# Patient Record
Sex: FEMALE | Race: WHITE | NOT HISPANIC OR LATINO | Employment: OTHER | URBAN - METROPOLITAN AREA
[De-identification: names, ages, dates, MRNs, and addresses within clinical notes are randomized per-mention and may not be internally consistent; named-entity substitution may affect disease eponyms.]

---

## 2017-03-14 ENCOUNTER — OFFICE VISIT (OUTPATIENT)
Dept: INTERNAL MEDICINE | Facility: CLINIC | Age: 82
End: 2017-03-14

## 2017-03-14 VITALS
HEART RATE: 72 BPM | BODY MASS INDEX: 30.05 KG/M2 | HEIGHT: 64 IN | WEIGHT: 176 LBS | SYSTOLIC BLOOD PRESSURE: 160 MMHG | DIASTOLIC BLOOD PRESSURE: 66 MMHG

## 2017-03-14 DIAGNOSIS — E11.9 TYPE 2 DIABETES MELLITUS WITHOUT COMPLICATION, WITHOUT LONG-TERM CURRENT USE OF INSULIN (HCC): ICD-10-CM

## 2017-03-14 DIAGNOSIS — N64.89 BREAST ASYMMETRY: ICD-10-CM

## 2017-03-14 DIAGNOSIS — I10 BENIGN ESSENTIAL HYPERTENSION: ICD-10-CM

## 2017-03-14 DIAGNOSIS — I67.82 TEMPORARY CEREBRAL VASCULAR DYSFUNCTION: Primary | ICD-10-CM

## 2017-03-14 DIAGNOSIS — E78.00 HYPERCHOLESTEROLEMIA: Primary | ICD-10-CM

## 2017-03-14 PROCEDURE — 99213 OFFICE O/P EST LOW 20 MIN: CPT | Performed by: INTERNAL MEDICINE

## 2017-03-14 RX ORDER — LOVASTATIN 40 MG/1
TABLET ORAL
Qty: 180 TABLET | Refills: 1 | Status: CANCELLED | OUTPATIENT
Start: 2017-03-14

## 2017-03-14 RX ORDER — LOVASTATIN 40 MG/1
40 TABLET ORAL NIGHTLY
Qty: 90 TABLET | Refills: 3 | Status: SHIPPED | OUTPATIENT
Start: 2017-03-14 | End: 2017-11-22

## 2017-03-14 NOTE — PROGRESS NOTES
Subjective   Monica Scherer is a 83 y.o. female.     History of Present Illness she is here today for follow-up of diabetes mellitus and hypertension.  She never received the prescription for hydrochlorothiazide and thus she is just taking Plendil in the morning for her hypertension.  She has had 4 days of cough without sputum production.  He began with sore throat.  There have been no fever sweats or chills.  She denied any wheezing or dyspnea.  There has been no nausea vomiting or diarrhea.  In general she has done well otherwise.  She denies any dyspnea on exertion or PND.  There has been no chest pain.  She denied any focal neurologic symptoms.  She is scheduled for her ophthalmology exam today.    Review of Systems   Constitutional: Negative for activity change, appetite change, chills, diaphoresis, fatigue and fever.   Respiratory: Positive for cough. Negative for chest tightness, shortness of breath and wheezing.    Cardiovascular: Negative for chest pain, palpitations and leg swelling.   Gastrointestinal: Negative for abdominal pain, diarrhea, nausea and vomiting.   Genitourinary: Negative for flank pain and hematuria.   Musculoskeletal: Negative for arthralgias and myalgias.   Neurological: Negative for dizziness, syncope, facial asymmetry, speech difficulty, weakness, numbness and headaches.   Psychiatric/Behavioral: Negative for dysphoric mood. The patient is not nervous/anxious.        Objective   Physical Exam   Constitutional: She is oriented to person, place, and time. She appears well-developed and well-nourished. She is active. She does not appear ill.   Eyes: Conjunctivae are normal.   Neck: Carotid bruit is not present.   Cardiovascular: Normal rate, regular rhythm, S1 normal and S2 normal.  Exam reveals no S3 and no S4.    No murmur heard.  Pulses:       Posterior tibial pulses are 2+ on the right side, and 2+ on the left side.   Pulmonary/Chest: No tachypnea. No respiratory distress. She has  no decreased breath sounds. She has no wheezes. She has no rhonchi. She has no rales.   Abdominal: Soft. Normal appearance and bowel sounds are normal. She exhibits no abdominal bruit and no mass. There is no hepatosplenomegaly. There is no tenderness.       Vascular Status -  Her exam exhibits no right foot edema. Her exam exhibits no left foot edema.  Neurological: She is alert and oriented to person, place, and time. Gait normal.   Psychiatric: She has a normal mood and affect. Her speech is normal and behavior is normal. Judgment and thought content normal. Cognition and memory are normal.       Assessment/Plan  November lab showed a hemoglobin A1c of 6.3.  Total cholesterol was 189 triglycerides 117 HDL cholesterol 53 LDL cholesterol 113    Assessment #1 hypertension-still inadequate control of systolic #2 diabetes mellitus-good control with diet #3 cerebral vascular disease-without recurrent symptoms    Plan #1 begin hydrochlorothiazide 12.5 mg by mouth daily.  Routine follow-up with me in 6 months.

## 2017-06-18 DIAGNOSIS — I10 ESSENTIAL HYPERTENSION: ICD-10-CM

## 2017-06-19 RX ORDER — FELODIPINE 10 MG/1
TABLET, EXTENDED RELEASE ORAL
Qty: 90 TABLET | Refills: 0 | Status: SHIPPED | OUTPATIENT
Start: 2017-06-19 | End: 2017-09-16 | Stop reason: SDUPTHER

## 2017-09-16 DIAGNOSIS — I10 ESSENTIAL HYPERTENSION: ICD-10-CM

## 2017-09-18 RX ORDER — FELODIPINE 10 MG/1
TABLET, EXTENDED RELEASE ORAL
Qty: 90 TABLET | Refills: 3 | Status: SHIPPED | OUTPATIENT
Start: 2017-09-18 | End: 2018-11-13 | Stop reason: SDUPTHER

## 2017-11-22 ENCOUNTER — OFFICE VISIT (OUTPATIENT)
Dept: INTERNAL MEDICINE | Facility: CLINIC | Age: 82
End: 2017-11-22

## 2017-11-22 VITALS
HEIGHT: 64 IN | HEART RATE: 71 BPM | DIASTOLIC BLOOD PRESSURE: 60 MMHG | OXYGEN SATURATION: 91 % | SYSTOLIC BLOOD PRESSURE: 130 MMHG | WEIGHT: 180 LBS | BODY MASS INDEX: 30.73 KG/M2

## 2017-11-22 DIAGNOSIS — E11.9 TYPE 2 DIABETES MELLITUS WITHOUT COMPLICATION, WITHOUT LONG-TERM CURRENT USE OF INSULIN (HCC): ICD-10-CM

## 2017-11-22 DIAGNOSIS — I67.82 TEMPORARY CEREBRAL VASCULAR DYSFUNCTION: ICD-10-CM

## 2017-11-22 DIAGNOSIS — I10 BENIGN ESSENTIAL HYPERTENSION: Primary | ICD-10-CM

## 2017-11-22 LAB
ALBUMIN SERPL-MCNC: 4.4 G/DL (ref 3.5–5.2)
ALBUMIN/GLOB SERPL: 1.4 G/DL
ALP SERPL-CCNC: 99 U/L (ref 39–117)
ALT SERPL-CCNC: 13 U/L (ref 1–33)
APPEARANCE UR: ABNORMAL
AST SERPL-CCNC: 17 U/L (ref 1–32)
BASOPHILS # BLD AUTO: 0.03 10*3/MM3 (ref 0–0.2)
BASOPHILS NFR BLD AUTO: 0.3 % (ref 0–1.5)
BILIRUB SERPL-MCNC: 0.5 MG/DL (ref 0.1–1.2)
BILIRUB UR QL STRIP: NEGATIVE
BUN SERPL-MCNC: 24 MG/DL (ref 8–23)
BUN/CREAT SERPL: 25.8 (ref 7–25)
CALCIUM SERPL-MCNC: 9.5 MG/DL (ref 8.6–10.5)
CHLORIDE SERPL-SCNC: 101 MMOL/L (ref 98–107)
CO2 SERPL-SCNC: 23.3 MMOL/L (ref 22–29)
COLOR UR: YELLOW
CREAT SERPL-MCNC: 0.93 MG/DL (ref 0.57–1)
EOSINOPHIL # BLD AUTO: 0.13 10*3/MM3 (ref 0–0.7)
EOSINOPHIL # BLD AUTO: 1.1 % (ref 0.3–6.2)
ERYTHROCYTE [DISTWIDTH] IN BLOOD BY AUTOMATED COUNT: 15.4 % (ref 11.7–13)
GLOBULIN SER CALC-MCNC: 3.2 GM/DL
GLUCOSE SERPL-MCNC: 109 MG/DL (ref 65–99)
GLUCOSE UR QL: NEGATIVE
HBA1C MFR BLD: 5.87 % (ref 4.8–5.6)
HCT VFR BLD AUTO: 41.1 % (ref 35.6–45.5)
HGB BLD-MCNC: 13.1 G/DL (ref 11.9–15.5)
HGB UR QL STRIP: NEGATIVE
IMM GRANULOCYTES # BLD: 0.06 10*3/MM3 (ref 0–0.03)
IMM GRANULOCYTES NFR BLD: 0.5 % (ref 0–0.5)
KETONES UR QL STRIP: NEGATIVE
LEUKOCYTE ESTERASE UR QL STRIP: NEGATIVE
LYMPHOCYTES # BLD AUTO: 3.22 10*3/MM3 (ref 0.9–4.8)
LYMPHOCYTES NFR BLD AUTO: 27 % (ref 19.6–45.3)
MCH RBC QN AUTO: 30.7 PG (ref 26.9–32)
MCHC RBC AUTO-ENTMCNC: 31.9 G/DL (ref 32.4–36.3)
MCV RBC AUTO: 96.3 FL (ref 80.5–98.2)
MONOCYTES # BLD AUTO: 0.7 10*3/MM3 (ref 0.2–1.2)
MONOCYTES NFR BLD AUTO: 5.9 % (ref 5–12)
NEUTROPHILS # BLD AUTO: 7.78 10*3/MM3 (ref 1.9–8.1)
NEUTROPHILS NFR BLD AUTO: 65.2 % (ref 42.7–76)
NITRITE UR QL STRIP: NEGATIVE
PH UR STRIP.AUTO: 5.5 [PH] (ref 5–8)
PLATELET # BLD AUTO: 277 10*3/MM3 (ref 140–500)
POTASSIUM SERPL-SCNC: 3.9 MMOL/L (ref 3.5–5.2)
PROT SERPL-MCNC: 7.6 G/DL (ref 6–8.5)
PROTEIN: NEGATIVE
RBC # BLD AUTO: 4.27 10*6/MM3 (ref 3.9–5.2)
SODIUM SERPL-SCNC: 141 MMOL/L (ref 136–145)
SP GR UR: 1.02 (ref 1–1.03)
UROBILINOGEN UR STRIP-MCNC: ABNORMAL MG/DL
WBC # BLD AUTO: 11.92 10*3/MM3 (ref 4.5–10.7)

## 2017-11-22 PROCEDURE — 99214 OFFICE O/P EST MOD 30 MIN: CPT | Performed by: INTERNAL MEDICINE

## 2017-11-22 NOTE — PROGRESS NOTES
Subjective   Monica Scherer is a 84 y.o. female.     History of Present Illness she is here today for yearly follow-up of hypertension, hypercholesterolemia, diabetes mellitus, and history of CVA.  She continues to do very well.  She is not taking lovastatin however for her hypercholesterolemia and her only medication for hypertension is Plendil.  She denies any dizziness or focal neurologic episodes.  She has not had any dyspnea on exertion, chest pain, PND, or swelling in the ankles.  She has one per night nocturia.  Otherwise her review systems is negative.  She had a normal mammogram in January of this year.  She has already received flu vaccination for this season.        Review of Systems   Constitutional: Negative for activity change, appetite change, fatigue and unexpected weight change.   HENT: Negative for trouble swallowing.    Respiratory: Negative for cough, chest tightness, shortness of breath and wheezing.    Cardiovascular: Negative for chest pain, palpitations and leg swelling.   Gastrointestinal: Negative for abdominal pain, anal bleeding, blood in stool, constipation, diarrhea, nausea and vomiting.   Genitourinary: Negative for dysuria, flank pain and frequency.   Musculoskeletal: Negative for gait problem.   Neurological: Negative for dizziness, syncope, facial asymmetry, speech difficulty, weakness, numbness and headaches.   Psychiatric/Behavioral: Negative for dysphoric mood. The patient is not nervous/anxious.        Objective   Physical Exam   Constitutional: She is oriented to person, place, and time. Vital signs are normal. She appears well-developed and well-nourished. She is active. She does not appear ill.   Eyes: Conjunctivae are normal.   Fundoscopic exam:       The right eye shows no AV nicking, no exudate and no hemorrhage.        The left eye shows no AV nicking, no exudate and no hemorrhage.   Neck: Carotid bruit is not present. No thyroid mass and no thyromegaly present.    Cardiovascular: Normal rate, regular rhythm, S1 normal and S2 normal.  Exam reveals no S3 and no S4.    No murmur heard.  Pulses:       Posterior tibial pulses are 2+ on the right side, and 2+ on the left side.   Pulmonary/Chest: No tachypnea. No respiratory distress. She has no decreased breath sounds. She has no wheezes. She has no rhonchi. She has no rales.   Abdominal: Soft. Normal appearance and bowel sounds are normal. She exhibits no abdominal bruit and no mass. There is no hepatosplenomegaly. There is no tenderness.       Vascular Status -  Her exam exhibits no right foot edema. Her exam exhibits no left foot edema.  Neurological: She is alert and oriented to person, place, and time. Gait normal.   Reflex Scores:       Bicep reflexes are 2+ on the right side.  Psychiatric: She has a normal mood and affect. Her speech is normal and behavior is normal. Judgment and thought content normal. Cognition and memory are normal.       Assessment/Plan assessment #1 hypertension-good control #2 diabetes mellitus-usually well controlled with diet #3 hypercholesterolemia-she no longer wishes to use statin therapy #4 cerebral vascular disease-without recurrent symptoms    Plan #1 no change medication #2 CBC, CMP, urinalysis, hemoglobin A1c today.  Routine follow-up with me in 6 months.

## 2017-12-06 ENCOUNTER — TELEPHONE (OUTPATIENT)
Dept: INTERNAL MEDICINE | Facility: CLINIC | Age: 82
End: 2017-12-06

## 2017-12-06 RX ORDER — AMOXICILLIN 500 MG/1
500 CAPSULE ORAL 3 TIMES DAILY
Qty: 30 CAPSULE | Refills: 0 | Status: SHIPPED | OUTPATIENT
Start: 2017-12-06 | End: 2018-01-11

## 2017-12-06 NOTE — TELEPHONE ENCOUNTER
----- Message from Roro Shepherd sent at 12/6/2017  2:04 PM EST -----  Contact: pt  Pt has a sore throat, coughing and congestion, aching all over.  Has notchecked for a fever.  She would like to see if she can get something called in.    Please advise.    [facility] [employer] [address] [place] [address] ATUL RUIZ AT [facility] [phone] [facility] [phone]

## 2018-01-11 ENCOUNTER — OFFICE VISIT (OUTPATIENT)
Dept: INTERNAL MEDICINE | Facility: CLINIC | Age: 83
End: 2018-01-11

## 2018-01-11 VITALS
DIASTOLIC BLOOD PRESSURE: 84 MMHG | SYSTOLIC BLOOD PRESSURE: 134 MMHG | HEART RATE: 77 BPM | BODY MASS INDEX: 30.56 KG/M2 | WEIGHT: 179 LBS | TEMPERATURE: 97.9 F | HEIGHT: 64 IN | OXYGEN SATURATION: 98 %

## 2018-01-11 DIAGNOSIS — J06.9 ACUTE URI: Primary | ICD-10-CM

## 2018-01-11 PROCEDURE — 99213 OFFICE O/P EST LOW 20 MIN: CPT | Performed by: NURSE PRACTITIONER

## 2018-01-11 RX ORDER — LORATADINE 10 MG/1
10 TABLET ORAL DAILY
Qty: 10 TABLET
Start: 2018-01-11 | End: 2018-01-21

## 2018-01-11 RX ORDER — GUAIFENESIN 600 MG/1
600 TABLET, EXTENDED RELEASE ORAL EVERY 12 HOURS SCHEDULED
Qty: 14 TABLET
Start: 2018-01-11 | End: 2018-01-18

## 2018-01-11 RX ORDER — CEPHALEXIN 500 MG/1
500 TABLET ORAL 3 TIMES DAILY
Qty: 21 TABLET | Refills: 0 | Status: SHIPPED | OUTPATIENT
Start: 2018-01-11 | End: 2018-01-18

## 2018-01-11 NOTE — PROGRESS NOTES
Subjective   Monica Scherer is a 84 y.o. female who presents due to respiratory symptoms.    URI    This is a new problem. The current episode started in the past 7 days. The problem has been gradually worsening. There has been no fever. Associated symptoms include congestion, coughing (mainly dry and nonproductive), nausea, rhinorrhea, sneezing and a sore throat. Pertinent negatives include no abdominal pain, chest pain, diarrhea, dysuria, ear pain, headaches, vomiting or wheezing. She has tried nothing for the symptoms.   Cough   Associated symptoms include postnasal drip, rhinorrhea and a sore throat. Pertinent negatives include no chest pain, chills, ear pain, fever, headaches, shortness of breath or wheezing.        The following portions of the patient's history were reviewed and updated as appropriate: allergies, current medications, past social history and problem list.    Past Medical History:   Diagnosis Date   • Diabetes mellitus    • Hyperlipidemia    • Hypertension    • Prolapsed uterus     per patient         Current Outpatient Prescriptions:   •  felodipine (PLENDIL) 10 MG 24 hr tablet, TAKE 1 TABLET BY MOUTH EVERY DAY., Disp: 90 tablet, Rfl: 3  •  Cephalexin 500 MG tablet, Take 500 mg by mouth 3 (Three) Times a Day for 7 days., Disp: 21 tablet, Rfl: 0  •  guaiFENesin (MUCINEX) 600 MG 12 hr tablet, Take 1 tablet by mouth Every 12 (Twelve) Hours for 7 days., Disp: 14 tablet, Rfl:   •  loratadine (CLARITIN) 10 MG tablet, Take 1 tablet by mouth Daily for 10 days., Disp: 10 tablet, Rfl:     Allergies   Allergen Reactions   • Latex Itching       Review of Systems   Constitutional: Positive for fatigue. Negative for activity change, appetite change, chills, fever and unexpected weight change.   HENT: Positive for congestion, postnasal drip, rhinorrhea, sinus pressure, sneezing and sore throat. Negative for drooling, ear pain, facial swelling, hearing loss, mouth sores, nosebleeds, trouble swallowing and  "voice change.    Eyes: Negative for pain, discharge, itching and visual disturbance.   Respiratory: Positive for cough (mainly dry and nonproductive). Negative for choking, chest tightness, shortness of breath and wheezing.    Cardiovascular: Negative for chest pain and palpitations.   Gastrointestinal: Positive for nausea. Negative for abdominal pain, constipation, diarrhea and vomiting.   Endocrine: Negative for polyuria.   Genitourinary: Negative for dysuria and frequency.   Musculoskeletal: Negative for back pain and joint swelling.   Neurological: Negative for headaches.       Objective   Vitals:    01/11/18 0918   BP: 134/84   BP Location: Left arm   Patient Position: Sitting   Cuff Size: Adult   Pulse: 77   Temp: 97.9 °F (36.6 °C)   TempSrc: Oral   SpO2: 98%   Weight: 81.2 kg (179 lb)   Height: 162.6 cm (64\")     Physical Exam   Constitutional: She appears well-developed and well-nourished. She is cooperative. She does not have a sickly appearance. She does not appear ill.   HENT:   Head: Normocephalic.   Right Ear: Hearing and external ear normal. No drainage, swelling or tenderness. Tympanic membrane is bulging. Tympanic membrane is not erythematous. No middle ear effusion. No decreased hearing is noted.   Left Ear: Hearing and external ear normal. No drainage, swelling or tenderness. Tympanic membrane is bulging. Tympanic membrane is not erythematous.  No middle ear effusion. No decreased hearing is noted.   Nose: Nose normal. No mucosal edema, rhinorrhea, sinus tenderness or nasal deformity. Right sinus exhibits no maxillary sinus tenderness and no frontal sinus tenderness. Left sinus exhibits no maxillary sinus tenderness and no frontal sinus tenderness.   Mouth/Throat: Mucous membranes are normal. Posterior oropharyngeal erythema present.   Eyes: Conjunctivae and lids are normal.   Neck: Trachea normal.   Cardiovascular: Regular rhythm, normal heart sounds and normal pulses.    No murmur " heard.  Pulmonary/Chest: Breath sounds normal. No respiratory distress. She has no decreased breath sounds. She has no wheezes. She has no rhonchi. She has no rales.   Lymphadenopathy:     She has no cervical adenopathy.   Neurological: She is alert.   Skin: Skin is warm, dry and intact.   Nursing note and vitals reviewed.      Assessment/Plan   Monica was seen today for uri and cough.    Diagnoses and all orders for this visit:    Acute URI  Comments:  may take Delsym at night to improve sleep if needed  Orders:  -     Cephalexin 500 MG tablet; Take 500 mg by mouth 3 (Three) Times a Day for 7 days.  -     guaiFENesin (MUCINEX) 600 MG 12 hr tablet; Take 1 tablet by mouth Every 12 (Twelve) Hours for 7 days.  -     loratadine (CLARITIN) 10 MG tablet; Take 1 tablet by mouth Daily for 10 days.

## 2018-01-23 ENCOUNTER — OFFICE VISIT (OUTPATIENT)
Dept: INTERNAL MEDICINE | Facility: CLINIC | Age: 83
End: 2018-01-23

## 2018-01-23 VITALS
TEMPERATURE: 98.5 F | HEIGHT: 64 IN | BODY MASS INDEX: 29.53 KG/M2 | SYSTOLIC BLOOD PRESSURE: 130 MMHG | HEART RATE: 72 BPM | OXYGEN SATURATION: 96 % | WEIGHT: 173 LBS | DIASTOLIC BLOOD PRESSURE: 70 MMHG

## 2018-01-23 DIAGNOSIS — J20.8 ACUTE VIRAL BRONCHITIS: Primary | ICD-10-CM

## 2018-01-23 PROCEDURE — 99212 OFFICE O/P EST SF 10 MIN: CPT | Performed by: INTERNAL MEDICINE

## 2018-01-23 RX ORDER — OSELTAMIVIR PHOSPHATE 75 MG/1
75 CAPSULE ORAL
COMMUNITY
Start: 2018-01-20 | End: 2018-01-25

## 2018-01-23 NOTE — PROGRESS NOTES
Subjective   Monica Scherer is a 84 y.o. female.     History of Present Illness she is here today for follow-up of acute bronchitis.  She was seen on the 11th and started on Keflex but this caused a rash so was discontinued.  On the 20th she had fever to 104° and was seen in urgent care.  She was influenza negative but started on Tamiflu 75 mg once daily.  She had fever to 101° yesterday but none today.  She has had a dry cough which appears to be improving.  She has had clear rhinorrhea as well.  There is been no sore throat.  She did have global headache but that has diminished significantly.  She had some nausea without vomiting which has improved as well.  He denies any dyspnea or wheezing.  There has been no chest pain.        Review of Systems   Constitutional: Positive for activity change, appetite change and fever. Negative for chills, diaphoresis and fatigue.   HENT: Positive for rhinorrhea. Negative for ear pain, postnasal drip, sinus pressure, sneezing, sore throat, trouble swallowing and voice change.    Respiratory: Positive for cough. Negative for chest tightness, shortness of breath and wheezing.    Cardiovascular: Negative for chest pain and leg swelling.   Gastrointestinal: Negative for abdominal pain, diarrhea, nausea and vomiting.   Musculoskeletal: Negative for arthralgias and myalgias.   Neurological: Negative for dizziness, syncope, weakness and headaches.       Objective   Physical Exam   Constitutional: She is oriented to person, place, and time. Vital signs are normal. She appears well-developed and well-nourished. She is active. She does not appear ill.   Eyes: Conjunctivae are normal.   Cardiovascular: Normal rate, regular rhythm, S1 normal and S2 normal.  Exam reveals no S3 and no S4.    No murmur heard.  Pulmonary/Chest: No tachypnea. No respiratory distress. She has no decreased breath sounds. She has no wheezes. She has no rhonchi. She has no rales.   Abdominal: Soft. Normal  appearance and bowel sounds are normal. She exhibits no abdominal bruit and no mass. There is no hepatosplenomegaly. There is no tenderness.       Vascular Status -  Her exam exhibits no right foot edema. Her exam exhibits no left foot edema.  Lymphadenopathy:     She has no cervical adenopathy.   Neurological: She is alert and oriented to person, place, and time. Gait normal.   Psychiatric: She has a normal mood and affect. Her speech is normal and behavior is normal. Judgment and thought content normal. Cognition and memory are normal.       Assessment/Plan O2 sat is 98% at rest and 98% post ambulation    Assessment #1 viral bronchitis-improving    Plan #1 discontinue Tamiflu #2 drink 8 glasses of fluids per day and use Tylenol Extra Strength one 4 times a day when necessary.  She will call if symptoms do not completely resolve.

## 2018-02-18 ENCOUNTER — APPOINTMENT (OUTPATIENT)
Dept: CT IMAGING | Facility: HOSPITAL | Age: 83
End: 2018-02-18

## 2018-02-18 ENCOUNTER — HOSPITAL ENCOUNTER (INPATIENT)
Facility: HOSPITAL | Age: 83
LOS: 6 days | Discharge: HOME | End: 2018-02-24
Attending: EMERGENCY MEDICINE | Admitting: INTERNAL MEDICINE

## 2018-02-18 DIAGNOSIS — K85.10 ACUTE BILIARY PANCREATITIS, UNSPECIFIED COMPLICATION STATUS: Primary | ICD-10-CM

## 2018-02-18 DIAGNOSIS — K21.00 GASTROESOPHAGEAL REFLUX DISEASE WITH ESOPHAGITIS: ICD-10-CM

## 2018-02-18 PROBLEM — I10 HYPERTENSION: Status: ACTIVE | Noted: 2018-02-18

## 2018-02-18 LAB
ALBUMIN SERPL-MCNC: 3.7 G/DL (ref 3.5–5.2)
ALBUMIN/GLOB SERPL: 1.1 G/DL
ALP SERPL-CCNC: 267 U/L (ref 39–117)
ALT SERPL W P-5'-P-CCNC: 94 U/L (ref 1–33)
AMYLASE SERPL-CCNC: 1348 U/L (ref 28–100)
ANION GAP SERPL CALCULATED.3IONS-SCNC: 14.6 MMOL/L
AST SERPL-CCNC: 198 U/L (ref 1–32)
BACTERIA UR QL AUTO: ABNORMAL /HPF
BASOPHILS # BLD AUTO: 0.02 10*3/MM3 (ref 0–0.2)
BASOPHILS NFR BLD AUTO: 0.1 % (ref 0–1.5)
BILIRUB SERPL-MCNC: 1.1 MG/DL (ref 0.1–1.2)
BILIRUB UR QL STRIP: NEGATIVE
BUN BLD-MCNC: 17 MG/DL (ref 8–23)
BUN/CREAT SERPL: 17.5 (ref 7–25)
CALCIUM SPEC-SCNC: 8.9 MG/DL (ref 8.6–10.5)
CHLORIDE SERPL-SCNC: 100 MMOL/L (ref 98–107)
CLARITY UR: ABNORMAL
CO2 SERPL-SCNC: 26.4 MMOL/L (ref 22–29)
COLOR UR: YELLOW
CREAT BLD-MCNC: 0.97 MG/DL (ref 0.57–1)
DEPRECATED RDW RBC AUTO: 53.3 FL (ref 37–54)
EOSINOPHIL # BLD AUTO: 0.16 10*3/MM3 (ref 0–0.7)
EOSINOPHIL NFR BLD AUTO: 0.6 % (ref 0.3–6.2)
ERYTHROCYTE [DISTWIDTH] IN BLOOD BY AUTOMATED COUNT: 15.5 % (ref 11.7–13)
GFR SERPL CREATININE-BSD FRML MDRD: 55 ML/MIN/1.73
GLOBULIN UR ELPH-MCNC: 3.5 GM/DL
GLUCOSE BLD-MCNC: 186 MG/DL (ref 65–99)
GLUCOSE UR STRIP-MCNC: NEGATIVE MG/DL
HCT VFR BLD AUTO: 39.1 % (ref 35.6–45.5)
HGB BLD-MCNC: 12.3 G/DL (ref 11.9–15.5)
HGB UR QL STRIP.AUTO: NEGATIVE
HOLD SPECIMEN: NORMAL
HOLD SPECIMEN: NORMAL
HYALINE CASTS UR QL AUTO: ABNORMAL /LPF
IMM GRANULOCYTES # BLD: 0.12 10*3/MM3 (ref 0–0.03)
IMM GRANULOCYTES NFR BLD: 0.5 % (ref 0–0.5)
KETONES UR QL STRIP: NEGATIVE
LEUKOCYTE ESTERASE UR QL STRIP.AUTO: ABNORMAL
LIPASE SERPL-CCNC: >600 U/L (ref 13–60)
LYMPHOCYTES # BLD AUTO: 2.55 10*3/MM3 (ref 0.9–4.8)
LYMPHOCYTES NFR BLD AUTO: 10 % (ref 19.6–45.3)
MCH RBC QN AUTO: 29.6 PG (ref 26.9–32)
MCHC RBC AUTO-ENTMCNC: 31.5 G/DL (ref 32.4–36.3)
MCV RBC AUTO: 94.2 FL (ref 80.5–98.2)
MONOCYTES # BLD AUTO: 1.25 10*3/MM3 (ref 0.2–1.2)
MONOCYTES NFR BLD AUTO: 4.9 % (ref 5–12)
NEUTROPHILS # BLD AUTO: 21.45 10*3/MM3 (ref 1.9–8.1)
NEUTROPHILS NFR BLD AUTO: 83.9 % (ref 42.7–76)
NITRITE UR QL STRIP: NEGATIVE
PH UR STRIP.AUTO: 7.5 [PH] (ref 5–8)
PLATELET # BLD AUTO: 283 10*3/MM3 (ref 140–500)
PMV BLD AUTO: 9.3 FL (ref 6–12)
POTASSIUM BLD-SCNC: 3.3 MMOL/L (ref 3.5–5.2)
PROT SERPL-MCNC: 7.2 G/DL (ref 6–8.5)
PROT UR QL STRIP: NEGATIVE
RBC # BLD AUTO: 4.15 10*6/MM3 (ref 3.9–5.2)
RBC # UR: ABNORMAL /HPF
REF LAB TEST METHOD: ABNORMAL
SODIUM BLD-SCNC: 141 MMOL/L (ref 136–145)
SP GR UR STRIP: 1.01 (ref 1–1.03)
SQUAMOUS #/AREA URNS HPF: ABNORMAL /HPF
UROBILINOGEN UR QL STRIP: ABNORMAL
WBC NRBC COR # BLD: 25.55 10*3/MM3 (ref 4.5–10.7)
WBC UR QL AUTO: ABNORMAL /HPF
WHOLE BLOOD HOLD SPECIMEN: NORMAL
WHOLE BLOOD HOLD SPECIMEN: NORMAL

## 2018-02-18 PROCEDURE — 99284 EMERGENCY DEPT VISIT MOD MDM: CPT

## 2018-02-18 PROCEDURE — 85025 COMPLETE CBC W/AUTO DIFF WBC: CPT | Performed by: EMERGENCY MEDICINE

## 2018-02-18 PROCEDURE — 80053 COMPREHEN METABOLIC PANEL: CPT | Performed by: EMERGENCY MEDICINE

## 2018-02-18 PROCEDURE — 83690 ASSAY OF LIPASE: CPT | Performed by: EMERGENCY MEDICINE

## 2018-02-18 PROCEDURE — 0 IOPAMIDOL 61 % SOLUTION: Performed by: NURSE PRACTITIONER

## 2018-02-18 PROCEDURE — 25010000002 MORPHINE PER 10 MG: Performed by: NURSE PRACTITIONER

## 2018-02-18 PROCEDURE — 36415 COLL VENOUS BLD VENIPUNCTURE: CPT | Performed by: EMERGENCY MEDICINE

## 2018-02-18 PROCEDURE — 81001 URINALYSIS AUTO W/SCOPE: CPT | Performed by: EMERGENCY MEDICINE

## 2018-02-18 PROCEDURE — 25010000002 ONDANSETRON PER 1 MG: Performed by: NURSE PRACTITIONER

## 2018-02-18 PROCEDURE — 74177 CT ABD & PELVIS W/CONTRAST: CPT

## 2018-02-18 PROCEDURE — 82150 ASSAY OF AMYLASE: CPT | Performed by: NURSE PRACTITIONER

## 2018-02-18 RX ORDER — FELODIPINE 10 MG/1
10 TABLET, EXTENDED RELEASE ORAL
Status: DISCONTINUED | OUTPATIENT
Start: 2018-02-19 | End: 2018-02-24 | Stop reason: HOSPADM

## 2018-02-18 RX ORDER — SODIUM CHLORIDE 0.9 % (FLUSH) 0.9 %
1-10 SYRINGE (ML) INJECTION AS NEEDED
Status: DISCONTINUED | OUTPATIENT
Start: 2018-02-18 | End: 2018-02-24 | Stop reason: HOSPADM

## 2018-02-18 RX ORDER — HEPARIN SODIUM 5000 [USP'U]/ML
5000 INJECTION, SOLUTION INTRAVENOUS; SUBCUTANEOUS EVERY 12 HOURS SCHEDULED
Status: DISCONTINUED | OUTPATIENT
Start: 2018-02-19 | End: 2018-02-20

## 2018-02-18 RX ORDER — ONDANSETRON 2 MG/ML
4 INJECTION INTRAMUSCULAR; INTRAVENOUS ONCE
Status: COMPLETED | OUTPATIENT
Start: 2018-02-18 | End: 2018-02-18

## 2018-02-18 RX ORDER — SODIUM CHLORIDE 0.9 % (FLUSH) 0.9 %
10 SYRINGE (ML) INJECTION AS NEEDED
Status: DISCONTINUED | OUTPATIENT
Start: 2018-02-18 | End: 2018-02-24 | Stop reason: HOSPADM

## 2018-02-18 RX ORDER — SODIUM CHLORIDE 9 MG/ML
125 INJECTION, SOLUTION INTRAVENOUS CONTINUOUS
Status: DISCONTINUED | OUTPATIENT
Start: 2018-02-19 | End: 2018-02-19

## 2018-02-18 RX ORDER — MORPHINE SULFATE 2 MG/ML
2 INJECTION, SOLUTION INTRAMUSCULAR; INTRAVENOUS
Status: DISCONTINUED | OUTPATIENT
Start: 2018-02-18 | End: 2018-02-24 | Stop reason: HOSPADM

## 2018-02-18 RX ORDER — ACETAMINOPHEN 325 MG/1
650 TABLET ORAL EVERY 4 HOURS PRN
Status: DISCONTINUED | OUTPATIENT
Start: 2018-02-18 | End: 2018-02-24 | Stop reason: HOSPADM

## 2018-02-18 RX ORDER — MORPHINE SULFATE 2 MG/ML
2 INJECTION, SOLUTION INTRAMUSCULAR; INTRAVENOUS ONCE
Status: COMPLETED | OUTPATIENT
Start: 2018-02-18 | End: 2018-02-18

## 2018-02-18 RX ORDER — ONDANSETRON 2 MG/ML
4 INJECTION INTRAMUSCULAR; INTRAVENOUS ONCE
Status: COMPLETED | OUTPATIENT
Start: 2018-02-19 | End: 2018-02-19

## 2018-02-18 RX ADMIN — ONDANSETRON 4 MG: 2 INJECTION INTRAMUSCULAR; INTRAVENOUS at 14:45

## 2018-02-18 RX ADMIN — MORPHINE SULFATE 2 MG: 2 INJECTION, SOLUTION INTRAMUSCULAR; INTRAVENOUS at 14:48

## 2018-02-18 RX ADMIN — SODIUM CHLORIDE 1000 ML: 9 INJECTION, SOLUTION INTRAVENOUS at 14:45

## 2018-02-18 RX ADMIN — IOPAMIDOL 85 ML: 612 INJECTION, SOLUTION INTRAVENOUS at 16:32

## 2018-02-18 NOTE — ED TRIAGE NOTES
Patient reports generalized abdominal pain with nausea that started this morning.  Family reports intermittent fever, n/v/d.  Has seen pcp for same.  Pain increasingly worse today.

## 2018-02-18 NOTE — ED PROVIDER NOTES
"EMERGENCY DEPARTMENT ENCOUNTER    CHIEF COMPLAINT  Chief Complaint: Abd pain  History given by: Pt  History limited by: Nothing  Room Number: 43/43  PMD: Juan Hernandez Jr., MD    HPI:  Pt is a 84 y.o. female with a hx of hypertension and diabetes who presents with abd pain described as sore onset last night. She also complains of nausea and constipation but denies CP, SOA, flu like symptoms, diarrhea, vomiting, or any other symptoms at this time. She reports she's recently had GI like symptoms about two weeks ago and she was seen by [facility] and her PCP at that time. She was given tamiflu despite a negative flu swab at that time    Duration: Last night  Timing: gradual  Location: generalized  Radiation: none  Quality: \"sore\"  Intensity/Severity: moderate  Progression: unchanged  Associated Symptoms: nausea, constipation  Aggravating Factors: none  Alleviating Factors: none  Previous Episodes: none  Treatment before arrival: Pt received no treatment PTA.    PAST MEDICAL HISTORY  Active Ambulatory Problems     Diagnosis Date Noted   • Vertigo 06/14/2016   • Temporary cerebral vascular dysfunction 06/14/2016   • Gastroesophageal reflux disease with esophagitis 06/14/2016   • Degeneration of intervertebral disc of cervical region 06/14/2016   • Diabetes mellitus 06/14/2016   • Benign essential hypertension 06/14/2016   • S/P cholecystectomy 06/14/2016   • Osteoarthritis of knee 10/31/2016   • Herpes zoster without complication 11/08/2016   • Acute viral bronchitis 01/23/2018     Resolved Ambulatory Problems     Diagnosis Date Noted   • Acute cholecystitis 06/07/2016   • Indigestion 10/31/2016     Past Medical History:   Diagnosis Date   • Diabetes mellitus    • Hyperlipidemia    • Hypertension    • Prolapsed uterus        PAST SURGICAL HISTORY  Past Surgical History:   Procedure Laterality Date   • APPENDECTOMY     • CHOLECYSTECTOMY N/A 6/8/2016    Procedure: CHOLECYSTECTOMY LAPAROSCOPIC;  Surgeon: Russ Gar MD;  " [place] [facility] [place] [facility] [place]  Service:        FAMILY HISTORY  History reviewed. No pertinent family history.    SOCIAL HISTORY  Social History     Social History   • Marital status: Single     Spouse name: N/A   • Number of children: N/A   • Years of education: N/A     Occupational History   • Not on file.     Social History Main Topics   • Smoking status: Never Smoker   • Smokeless tobacco: Never Used   • Alcohol use No   • Drug use: No   • Sexual activity: Defer     Other Topics Concern   • Not on file     Social History Narrative         ALLERGIES  Cephalexin and Latex    REVIEW OF SYSTEMS  Review of Systems   Constitutional: Negative.  Negative for activity change, appetite change ( decreased), chills, fatigue and fever.   HENT: Negative.  Negative for congestion, ear pain, rhinorrhea and sore throat.    Eyes: Negative.    Respiratory: Negative.  Negative for cough and shortness of breath.    Cardiovascular: Negative.  Negative for chest pain, palpitations and leg swelling ( pedal).   Gastrointestinal: Positive for abdominal pain, constipation and nausea. Negative for diarrhea and vomiting.   Endocrine: Negative.    Genitourinary: Negative.  Negative for decreased urine volume, difficulty urinating, dysuria, menstrual problem, pelvic pain, urgency and vaginal discharge.   Musculoskeletal: Negative.  Negative for back pain.   Skin: Negative.  Negative for rash.   Allergic/Immunologic: Negative.    Neurological: Negative.  Negative for dizziness, weakness, light-headedness, numbness and headaches.   Hematological: Negative.    Psychiatric/Behavioral: Negative.  The patient is not nervous/anxious.    All other systems reviewed and are negative.      PHYSICAL EXAM  ED Triage Vitals   Temp Heart Rate Resp BP SpO2   02/18/18 1338 02/18/18 1338 02/18/18 1338 02/18/18 1343 02/18/18 1338   98.8 °F (37.1 °C) 105 16 184/78 95 %      Temp src Heart Rate Source Patient Position BP Location FiO2 (%)   02/18/18 1338 -- -- -- --    Tympanic           Physical Exam   Constitutional: She is well-developed, well-nourished, and in no distress. No distress.   HENT:   Head: Normocephalic and atraumatic.   Mouth/Throat: Oropharynx is clear and moist and mucous membranes are normal.   Eyes: Pupils are equal, round, and reactive to light.   Neck: Normal range of motion.   Cardiovascular: Normal rate, regular rhythm and normal heart sounds.    Pulmonary/Chest: Effort normal and breath sounds normal. She has no wheezes.   Abdominal: Soft. Bowel sounds are normal. There is generalized tenderness ( mild).   Musculoskeletal: Normal range of motion. She exhibits no edema.   Neurological: She is alert.   Skin: Skin is warm and dry. No rash noted.   Psychiatric: Mood, memory, affect and judgment normal.   Nursing note and vitals reviewed.      LAB RESULTS  Recent Results (from the past 24 hour(s))   Comprehensive Metabolic Panel    Collection Time: 02/18/18  1:49 PM   Result Value Ref Range    Glucose 186 (H) 65 - 99 mg/dL    BUN 17 8 - 23 mg/dL    Creatinine 0.97 0.57 - 1.00 mg/dL    Sodium 141 136 - 145 mmol/L    Potassium 3.3 (L) 3.5 - 5.2 mmol/L    Chloride 100 98 - 107 mmol/L    CO2 26.4 22.0 - 29.0 mmol/L    Calcium 8.9 8.6 - 10.5 mg/dL    Total Protein 7.2 6.0 - 8.5 g/dL    Albumin 3.70 3.50 - 5.20 g/dL    ALT (SGPT) 94 (H) 1 - 33 U/L    AST (SGOT) 198 (H) 1 - 32 U/L    Alkaline Phosphatase 267 (H) 39 - 117 U/L    Total Bilirubin 1.1 0.1 - 1.2 mg/dL    eGFR Non African Amer 55 (L) >60 mL/min/1.73    Globulin 3.5 gm/dL    A/G Ratio 1.1 g/dL    BUN/Creatinine Ratio 17.5 7.0 - 25.0    Anion Gap 14.6 mmol/L   Lipase    Collection Time: 02/18/18  1:49 PM   Result Value Ref Range    Lipase >600 (H) 13 - 60 U/L   Light Blue Top    Collection Time: 02/18/18  1:49 PM   Result Value Ref Range    Extra Tube hold for add-on    Green Top (Gel)    Collection Time: 02/18/18  1:49 PM   Result Value Ref Range    Extra Tube Hold for add-ons.    Lavender Top     Collection Time: 02/18/18  1:49 PM   Result Value Ref Range    Extra Tube hold for add-on    Gold Top - SST    Collection Time: 02/18/18  1:49 PM   Result Value Ref Range    Extra Tube Hold for add-ons.    CBC Auto Differential    Collection Time: 02/18/18  1:49 PM   Result Value Ref Range    WBC 25.55 (H) 4.50 - 10.70 10*3/mm3    RBC 4.15 3.90 - 5.20 10*6/mm3    Hemoglobin 12.3 11.9 - 15.5 g/dL    Hematocrit 39.1 35.6 - 45.5 %    MCV 94.2 80.5 - 98.2 fL    MCH 29.6 26.9 - 32.0 pg    MCHC 31.5 (L) 32.4 - 36.3 g/dL    RDW 15.5 (H) 11.7 - 13.0 %    RDW-SD 53.3 37.0 - 54.0 fl    MPV 9.3 6.0 - 12.0 fL    Platelets 283 140 - 500 10*3/mm3    Neutrophil % 83.9 (H) 42.7 - 76.0 %    Lymphocyte % 10.0 (L) 19.6 - 45.3 %    Monocyte % 4.9 (L) 5.0 - 12.0 %    Eosinophil % 0.6 0.3 - 6.2 %    Basophil % 0.1 0.0 - 1.5 %    Immature Grans % 0.5 0.0 - 0.5 %    Neutrophils, Absolute 21.45 (H) 1.90 - 8.10 10*3/mm3    Lymphocytes, Absolute 2.55 0.90 - 4.80 10*3/mm3    Monocytes, Absolute 1.25 (H) 0.20 - 1.20 10*3/mm3    Eosinophils, Absolute 0.16 0.00 - 0.70 10*3/mm3    Basophils, Absolute 0.02 0.00 - 0.20 10*3/mm3    Immature Grans, Absolute 0.12 (H) 0.00 - 0.03 10*3/mm3   Amylase    Collection Time: 02/18/18  1:49 PM   Result Value Ref Range    Amylase 1348 (H) 28 - 100 U/L   Urinalysis With / Culture If Indicated - Urine, Clean Catch    Collection Time: 02/18/18  2:44 PM   Result Value Ref Range    Color, UA Yellow Yellow, Straw    Appearance, UA Cloudy (A) Clear    pH, UA 7.5 5.0 - 8.0    Specific Gravity, UA 1.013 1.005 - 1.030    Glucose, UA Negative Negative    Ketones, UA Negative Negative    Bilirubin, UA Negative Negative    Blood, UA Negative Negative    Protein, UA Negative Negative    Leuk Esterase, UA Trace (A) Negative    Nitrite, UA Negative Negative    Urobilinogen, UA 2.0 E.U./dL (A) 0.2 - 1.0 E.U./dL   Urinalysis, Microscopic Only - Urine, Clean Catch    Collection Time: 02/18/18  2:44 PM   Result Value Ref Range     RBC, UA 0-2 None Seen, 0-2 /HPF    WBC, UA 3-5 (A) None Seen, 0-2 /HPF    Bacteria, UA None Seen None Seen /HPF    Squamous Epithelial Cells, UA 7-12 (A) None Seen, 0-2 /HPF    Hyaline Casts, UA 3-6 None Seen /LPF    Methodology Automated Microscopy        I ordered the above labs and reviewed the results    RADIOLOGY  CT Abdomen Pelvis With Contrast   Final Result       1. Acute pancreatitis. Biliary ductal dilatation without a specific   cause identified, further evaluation could be considered.   2. Inflammatory changes around the proximal duodenum, presumably   reactive versus coexisting duodenitis.            Discussed by telephone Dr. Watson at time of interpretation, 1700,   02/18/2018.           This report was finalized on 2/18/2018 5:00 PM by Dr. Sai Plasencia MD.              I ordered the above noted radiological studies and reviewed the images on the PACS system.      PROGRESS AND CONSULTS    1500 Reviewed pt's history and workup with Dr. Watson.  After a bedside evaluation; Dr. Watson agrees with the plan of care    1520 BP- 141/87 HR- 83 Temp- 98.8 °F (37.1 °C) (Tympanic) O2 sat- 93%. Rechecked the patient who is in NAD and is resting comfortably. Informed pt of lab results which show elevated lipase and WBC. Informed pt and family of plan to order CT for further evaluation. Pt understands and agrees with treatment. All questions and concerns were addressed at this time.    1820 BP- 146/61 HR- 85 Temp- 98.8 °F (37.1 °C) (Tympanic) O2 sat- 96%. Rechecked the patient who is in NAD and is resting comfortably. Informed pt of plan to admit secondary CT results which show pancreatitis. Pt understands and agrees with treatment. All questions and concerns were addressed at this time.      CONSULTS  Time: 1810  Discussed case with Dr. Johnston (hospitalist)  Reviewed history, exam, results and treatments.  Discussed concerns and plan of care.  Dr. Johnston (hospiatlist) who agrees to admit the pt     COURSE  "& MEDICAL DECISION MAKING  Pertinent Labs and Imaging studies that were ordered and reviewed are noted above.  Results were reviewed/discussed with the patient and they were also made aware of online assess.   Pt also made aware that some labs, such as cultures, will not be resulted during ER visit and follow up with PMD is necessary.     MEDICATIONS GIVEN IN ER  Medications   sodium chloride 0.9 % flush 10 mL (not administered)   sodium chloride 0.9 % bolus 1,000 mL (0 mL Intravenous Stopped 2/18/18 1645)   morphine injection 2 mg (2 mg Intravenous Given 2/18/18 1448)   ondansetron (ZOFRAN) injection 4 mg (4 mg Intravenous Given 2/18/18 1445)   iopamidol (ISOVUE-300) 61 % injection 100 mL (85 mL Intravenous Given 2/18/18 1632)       /61 (BP Location: Left arm, Patient Position: Sitting)  Pulse 85  Temp 98.8 °F (37.1 °C) (Tympanic)   Resp 18  Ht 162.6 cm (64\")  Wt 77.1 kg (170 lb)  SpO2 96%  BMI 29.18 kg/m2    ADMISSION    Discussed treatment plan and reason for admission with pt/family and admitting physician.  Pt/family voiced understanding of the plan for admission for further testing/treatment as needed.      DIAGNOSIS  Final diagnoses:   Acute biliary pancreatitis, unspecified complication status       Documentation assistance provided by sammy Weston for CORTEZ Simon.  Information recorded by the scralison was done at my direction and has been verified and validated by me.     Henrietta Weston  02/18/18 1816       CORTEZ Orr  02/18/18 1921    "

## 2018-02-18 NOTE — ED NOTES
Pt complains of lower abd pain. Pt worse with palpation to RUQ. Reports constipation. Denies any urinary sx.      Katiana Andrews RN  02/18/18 8499

## 2018-02-18 NOTE — ED PROVIDER NOTES
The patient presents complaining of diffuse abdominal pain that began this morning. Pt denies fever, chills and any other symtoms. Pt's family reports flu like symptoms over the past month with vomiting and diarrhea x2.    Limited physical exam:  Patient is nontoxic appearing   Lungs/cardiovascular: Normal  Abdomen: Mild epigastric tenderness, no rebound or guarding    Labs reviewed. WBC 25.55 and Lipase>600. Plan to order CT    1655 - Received call from Dr. Plasencia (radiology) who states CT showed acute pancreatitis, inflammatory changes around proximal duodenum, common bile duct is 1.7cm and no pseudocysts    I supervised care provided by the midlevel provider.  We have discussed this patient's history, physical exam, and treatment plan.  I have reviewed the note and personally saw and examined the patient and agree with the plan of care.    Documentation assistance provided by sammy Sparks for Dr. Watson.  Information recorded by the sammy was done at my direction and has been verified and validated by me.           Bryn Sparks  02/18/18 6489       Ivan Watson MD  02/18/18 1937

## 2018-02-19 ENCOUNTER — APPOINTMENT (OUTPATIENT)
Dept: MRI IMAGING | Facility: HOSPITAL | Age: 83
End: 2018-02-19
Attending: INTERNAL MEDICINE

## 2018-02-19 ENCOUNTER — TELEPHONE (OUTPATIENT)
Dept: INTERNAL MEDICINE | Facility: CLINIC | Age: 83
End: 2018-02-19

## 2018-02-19 LAB
ALBUMIN SERPL-MCNC: 3 G/DL (ref 3.5–5.2)
ALBUMIN/GLOB SERPL: 1 G/DL
ALP SERPL-CCNC: 264 U/L (ref 39–117)
ALT SERPL W P-5'-P-CCNC: 116 U/L (ref 1–33)
ANION GAP SERPL CALCULATED.3IONS-SCNC: 13.9 MMOL/L
AST SERPL-CCNC: 144 U/L (ref 1–32)
BILIRUB SERPL-MCNC: 2.3 MG/DL (ref 0.1–1.2)
BUN BLD-MCNC: 12 MG/DL (ref 8–23)
BUN/CREAT SERPL: 14.3 (ref 7–25)
CALCIUM SPEC-SCNC: 8.2 MG/DL (ref 8.6–10.5)
CHLORIDE SERPL-SCNC: 102 MMOL/L (ref 98–107)
CHOLEST SERPL-MCNC: 126 MG/DL (ref 0–200)
CO2 SERPL-SCNC: 25.1 MMOL/L (ref 22–29)
CREAT BLD-MCNC: 0.84 MG/DL (ref 0.57–1)
DEPRECATED RDW RBC AUTO: 53.8 FL (ref 37–54)
ERYTHROCYTE [DISTWIDTH] IN BLOOD BY AUTOMATED COUNT: 15.8 % (ref 11.7–13)
GFR SERPL CREATININE-BSD FRML MDRD: 65 ML/MIN/1.73
GLOBULIN UR ELPH-MCNC: 3.1 GM/DL
GLUCOSE BLD-MCNC: 102 MG/DL (ref 65–99)
HCT VFR BLD AUTO: 33.1 % (ref 35.6–45.5)
HDLC SERPL-MCNC: 41 MG/DL (ref 40–60)
HGB BLD-MCNC: 10.6 G/DL (ref 11.9–15.5)
LDLC SERPL CALC-MCNC: 73 MG/DL (ref 0–100)
LDLC/HDLC SERPL: 1.78 {RATIO}
LIPASE SERPL-CCNC: >600 U/L (ref 13–60)
MCH RBC QN AUTO: 29.7 PG (ref 26.9–32)
MCHC RBC AUTO-ENTMCNC: 32 G/DL (ref 32.4–36.3)
MCV RBC AUTO: 92.7 FL (ref 80.5–98.2)
PLATELET # BLD AUTO: 266 10*3/MM3 (ref 140–500)
PMV BLD AUTO: 10.1 FL (ref 6–12)
POTASSIUM BLD-SCNC: 2.9 MMOL/L (ref 3.5–5.2)
PROT SERPL-MCNC: 6.1 G/DL (ref 6–8.5)
RBC # BLD AUTO: 3.57 10*6/MM3 (ref 3.9–5.2)
SODIUM BLD-SCNC: 141 MMOL/L (ref 136–145)
TRIGL SERPL-MCNC: 60 MG/DL (ref 0–150)
VLDLC SERPL-MCNC: 12 MG/DL (ref 5–40)
WBC NRBC COR # BLD: 13.7 10*3/MM3 (ref 4.5–10.7)

## 2018-02-19 PROCEDURE — 25010000002 ONDANSETRON PER 1 MG: Performed by: INTERNAL MEDICINE

## 2018-02-19 PROCEDURE — 85027 COMPLETE CBC AUTOMATED: CPT | Performed by: INTERNAL MEDICINE

## 2018-02-19 PROCEDURE — 25010000002 MORPHINE PER 10 MG: Performed by: INTERNAL MEDICINE

## 2018-02-19 PROCEDURE — 25010000002 ONDANSETRON PER 1 MG: Performed by: HOSPITALIST

## 2018-02-19 PROCEDURE — 99223 1ST HOSP IP/OBS HIGH 75: CPT | Performed by: INTERNAL MEDICINE

## 2018-02-19 PROCEDURE — 0 GADOBENATE DIMEGLUMINE 529 MG/ML SOLUTION: Performed by: HOSPITALIST

## 2018-02-19 PROCEDURE — 80061 LIPID PANEL: CPT | Performed by: INTERNAL MEDICINE

## 2018-02-19 PROCEDURE — 80053 COMPREHEN METABOLIC PANEL: CPT | Performed by: INTERNAL MEDICINE

## 2018-02-19 PROCEDURE — 25810000003 SODIUM CHLORIDE 0.9 % WITH KCL 20 MEQ 20-0.9 MEQ/L-% SOLUTION: Performed by: HOSPITALIST

## 2018-02-19 PROCEDURE — 74183 MRI ABD W/O CNTR FLWD CNTR: CPT

## 2018-02-19 PROCEDURE — A9577 INJ MULTIHANCE: HCPCS | Performed by: HOSPITALIST

## 2018-02-19 PROCEDURE — 83690 ASSAY OF LIPASE: CPT | Performed by: INTERNAL MEDICINE

## 2018-02-19 PROCEDURE — 25010000002 HEPARIN (PORCINE) PER 1000 UNITS: Performed by: INTERNAL MEDICINE

## 2018-02-19 RX ORDER — POTASSIUM CHLORIDE 750 MG/1
40 CAPSULE, EXTENDED RELEASE ORAL AS NEEDED
Status: DISCONTINUED | OUTPATIENT
Start: 2018-02-19 | End: 2018-02-24 | Stop reason: HOSPADM

## 2018-02-19 RX ORDER — FAMOTIDINE 10 MG/ML
20 INJECTION, SOLUTION INTRAVENOUS EVERY 12 HOURS SCHEDULED
Status: DISCONTINUED | OUTPATIENT
Start: 2018-02-19 | End: 2018-02-21 | Stop reason: DRUGHIGH

## 2018-02-19 RX ORDER — POTASSIUM CHLORIDE 7.45 MG/ML
10 INJECTION INTRAVENOUS
Status: DISCONTINUED | OUTPATIENT
Start: 2018-02-19 | End: 2018-02-24 | Stop reason: HOSPADM

## 2018-02-19 RX ORDER — ESTRADIOL 0.1 MG/G
2 CREAM VAGINAL NIGHTLY
Status: DISCONTINUED | OUTPATIENT
Start: 2018-02-19 | End: 2018-02-24 | Stop reason: HOSPADM

## 2018-02-19 RX ORDER — ONDANSETRON 4 MG/1
4 TABLET, ORALLY DISINTEGRATING ORAL EVERY 6 HOURS PRN
Status: DISCONTINUED | OUTPATIENT
Start: 2018-02-19 | End: 2018-02-24 | Stop reason: HOSPADM

## 2018-02-19 RX ORDER — PROMETHAZINE HYDROCHLORIDE 25 MG/ML
12.5 INJECTION, SOLUTION INTRAMUSCULAR; INTRAVENOUS EVERY 6 HOURS PRN
Status: DISCONTINUED | OUTPATIENT
Start: 2018-02-19 | End: 2018-02-24 | Stop reason: HOSPADM

## 2018-02-19 RX ORDER — ONDANSETRON 2 MG/ML
4 INJECTION INTRAMUSCULAR; INTRAVENOUS EVERY 6 HOURS PRN
Status: DISCONTINUED | OUTPATIENT
Start: 2018-02-19 | End: 2018-02-24 | Stop reason: HOSPADM

## 2018-02-19 RX ORDER — PROMETHAZINE HYDROCHLORIDE 12.5 MG/1
12.5 TABLET ORAL EVERY 6 HOURS PRN
Status: DISCONTINUED | OUTPATIENT
Start: 2018-02-19 | End: 2018-02-24 | Stop reason: HOSPADM

## 2018-02-19 RX ORDER — ONDANSETRON 4 MG/1
4 TABLET, FILM COATED ORAL EVERY 6 HOURS PRN
Status: DISCONTINUED | OUTPATIENT
Start: 2018-02-19 | End: 2018-02-24 | Stop reason: HOSPADM

## 2018-02-19 RX ORDER — SODIUM CHLORIDE AND POTASSIUM CHLORIDE 150; 900 MG/100ML; MG/100ML
100 INJECTION, SOLUTION INTRAVENOUS CONTINUOUS
Status: DISCONTINUED | OUTPATIENT
Start: 2018-02-19 | End: 2018-02-22

## 2018-02-19 RX ORDER — POTASSIUM CHLORIDE 1.5 G/1.77G
40 POWDER, FOR SOLUTION ORAL AS NEEDED
Status: DISCONTINUED | OUTPATIENT
Start: 2018-02-19 | End: 2018-02-24 | Stop reason: HOSPADM

## 2018-02-19 RX ADMIN — ONDANSETRON 4 MG: 2 INJECTION INTRAMUSCULAR; INTRAVENOUS at 00:13

## 2018-02-19 RX ADMIN — SODIUM CHLORIDE 100 ML/HR: 9 INJECTION, SOLUTION INTRAVENOUS at 09:00

## 2018-02-19 RX ADMIN — HEPARIN SODIUM 5000 UNITS: 5000 INJECTION, SOLUTION INTRAVENOUS; SUBCUTANEOUS at 20:02

## 2018-02-19 RX ADMIN — ONDANSETRON 4 MG: 2 INJECTION INTRAMUSCULAR; INTRAVENOUS at 21:59

## 2018-02-19 RX ADMIN — HEPARIN SODIUM 5000 UNITS: 5000 INJECTION, SOLUTION INTRAVENOUS; SUBCUTANEOUS at 00:03

## 2018-02-19 RX ADMIN — SODIUM CHLORIDE 100 ML/HR: 9 INJECTION, SOLUTION INTRAVENOUS at 00:04

## 2018-02-19 RX ADMIN — ESTRADIOL 2 G: 0.1 CREAM VAGINAL at 16:20

## 2018-02-19 RX ADMIN — GADOBENATE DIMEGLUMINE 15 ML: 529 INJECTION, SOLUTION INTRAVENOUS at 13:32

## 2018-02-19 RX ADMIN — ONDANSETRON 4 MG: 2 INJECTION INTRAMUSCULAR; INTRAVENOUS at 16:19

## 2018-02-19 RX ADMIN — POTASSIUM CHLORIDE 40 MEQ: 750 CAPSULE, EXTENDED RELEASE ORAL at 20:14

## 2018-02-19 RX ADMIN — HEPARIN SODIUM 5000 UNITS: 5000 INJECTION, SOLUTION INTRAVENOUS; SUBCUTANEOUS at 08:22

## 2018-02-19 RX ADMIN — MORPHINE SULFATE 2 MG: 2 INJECTION, SOLUTION INTRAMUSCULAR; INTRAVENOUS at 20:26

## 2018-02-19 RX ADMIN — MORPHINE SULFATE 2 MG: 2 INJECTION, SOLUTION INTRAMUSCULAR; INTRAVENOUS at 11:03

## 2018-02-19 RX ADMIN — FAMOTIDINE 20 MG: 10 INJECTION, SOLUTION INTRAVENOUS at 20:02

## 2018-02-19 RX ADMIN — POTASSIUM CHLORIDE AND SODIUM CHLORIDE 100 ML/HR: 900; 150 INJECTION, SOLUTION INTRAVENOUS at 16:55

## 2018-02-19 RX ADMIN — FAMOTIDINE 20 MG: 10 INJECTION, SOLUTION INTRAVENOUS at 14:02

## 2018-02-19 RX ADMIN — POTASSIUM CHLORIDE 40 MEQ: 750 CAPSULE, EXTENDED RELEASE ORAL at 16:19

## 2018-02-19 RX ADMIN — ONDANSETRON 4 MG: 2 INJECTION INTRAMUSCULAR; INTRAVENOUS at 09:41

## 2018-02-19 NOTE — H&P
Internal medicine history and physical   INTERNAL MEDICINE   [facility]       Patient Identification:  Name: Monica Scherer  Age: 84 y.o.  Sex: female  :  1933  MRN: [id number]                   Primary Care Physician: Juan Hernandez Jr., MD                                   Chief Complaint:  Severe abdominal pain started as an ache last night and worse this morning    History of Present Illness:   Patient is a 84-year-old female who's past medical history is as noted below really started feeling poorly about a month ago.  According to the daughter patient has episode of nausea vomiting started a month ago lasted for about 3-4s and she got better she did okay since then until about 2 weeks ago when she has similar symptoms that lasted for couple of days and resolved.  Patient recalled going back and seeing Dr. Hernandez on 2018 and at that time according to the daughter they were told the patient is recovered from the viral illness and continue to hydrate and monitor her overall course.  In that background patient went to bed feeling little bit sore in her stomach but otherwise okay and woke up this morning with worsening pain and associated nausea.  Patient says that her appetite is preserved she denies any fever and chills.  In the last month she was treated with Keflex for bronchitis and then recently was treated with Tamiflu for suspected pneumonia.    Past Medical History:  Past Medical History:   Diagnosis Date   • Diabetes mellitus    • Hyperlipidemia    • Hypertension    • Prolapsed uterus     per patient     Past Surgical History:  Past Surgical History:   Procedure Laterality Date   • APPENDECTOMY     • CHOLECYSTECTOMY N/A 2016    Procedure: CHOLECYSTECTOMY LAPAROSCOPIC;  Surgeon: Russ Gar MD;  [place] [facility] [place] [facility] [place]:       Home Meds:  Prescriptions Prior to Admission   Medication Sig Dispense Refill Last Dose   • felodipine (PLENDIL) 10 MG 24 hr tablet  "TAKE 1 TABLET BY MOUTH EVERY DAY. (Patient taking differently: TAKE 1 TABLET BY MOUTH EVERY MORNING) 90 tablet 3 Taking     Current Meds:     Current Facility-Administered Medications:   •  sodium chloride 0.9 % flush 10 mL, 10 mL, Intravenous, PRN, Ivan Watson MD  Allergies:  Allergies   Allergen Reactions   • Cephalexin Rash   • Latex Itching     Social History:   Social History   Substance Use Topics   • Smoking status: Never Smoker   • Smokeless tobacco: Never Used   • Alcohol use No      Family History:  History reviewed. No pertinent family history.       Review of Systems  See history of present illness and past medical history.  Constitutional: Negative.  Negative for activity change, appetite change ( decreased), chills, fatigue and fever.   HENT: Negative.  Negative for congestion, ear pain, rhinorrhea and sore throat.    Eyes: Negative.    Respiratory: Negative.  Negative for cough and shortness of breath.    Cardiovascular: Negative.  Negative for chest pain, palpitations and leg swelling  Gastrointestinal: Positive for abdominal pain, constipation and nausea. Negative for diarrhea and vomiting.   Endocrine: Negative.    Genitourinary: Negative.  Negative for decreased urine volume, difficulty urinating, dysuria, menstrual problem, pelvic pain, urgency and vaginal discharge.   Musculoskeletal: Negative.  Negative for back pain.   Skin: Negative.  Negative for rash.   Allergic/Immunologic: Negative.    Neurological: Negative.  Negative for dizziness, weakness, light-headedness, numbness and headaches.   Hematological: Negative.    Psychiatric/Behavioral: Negative.  The patient is not nervous/anxious.    Vitals:   /73 (BP Location: Left arm, Patient Position: Lying)  Pulse 65  Temp 98.2 °F (36.8 °C) (Oral)   Resp 18  Ht 162.6 cm (64\")  Wt 75 kg (165 lb 5.5 oz)  SpO2 94%  BMI 28.38 kg/m2  I/O:   Intake/Output Summary (Last 24 hours) at 02/18/18 3035  Last data filed at 02/18/18 1645   " Gross per 24 hour   Intake             1000 ml   Output                0 ml   Net             1000 ml     Exam:  General Appearance:    Alert, cooperative, no distress, appears stated ageDoes not appear to be toxic or septic    Head:    Normocephalic, without obvious abnormality, atraumatic   Eyes:    PERRL, conjunctiva/corneas clear, EOM's intact, both eyes   Ears:    Normal external ear canals, both ears   Nose:   Nares normal, septum midline, mucosa normal, no drainage    or sinus tenderness   Throat:   Lips, tongue, gums normal; oral mucosa pink and moist   Neck:   Supple, symmetrical, trachea midline, no adenopathy;     thyroid:  no enlargement/tenderness/nodules; no carotid    bruit or JVD   Back:     Symmetric, no curvature, ROM normal, no CVA tenderness   Lungs:     Clear to auscultation bilaterally, respirations unlabored   Chest Wall:    No tenderness or deformity    Heart:    Regular rate and rhythm, S1 and S2 normal, no murmur, rub   or gallop   Abdomen:     Soft, Epigastric tenderness-mild, bowel sounds active all four quadrants,     no masses, no hepatomegaly, no splenomegaly   Extremities:   Extremities normal, atraumatic, no cyanosis or edema   Pulses:   Pulses palpable in all extremities; symmetric all extremities   Skin:   Skin color normal, Skin is warm and dry,  no rashes or palpable lesions   Neurologic:   CNII-XII intact, motor strength grossly intact, sensation grossly intact to light touch, no focal deficits noted       Data Review:      I reviewed the patient's new clinical results.    Results from last 7 days  Lab Units 02/18/18  1349   WBC 10*3/mm3 25.55*   HEMOGLOBIN g/dL 12.3   PLATELETS 10*3/mm3 283       Results from last 7 days  Lab Units 02/18/18  1349   SODIUM mmol/L 141   POTASSIUM mmol/L 3.3*   CHLORIDE mmol/L 100   CO2 mmol/L 26.4   BUN mg/dL 17   CREATININE mg/dL 0.97   CALCIUM mg/dL 8.9   GLUCOSE mg/dL 186*   Lipase greater than 600 months amylase 1348  PLT 94  alkaline  phosphatase 267  CT Abdomen Pelvis With Contrast   Final Result        1. Acute pancreatitis. Biliary ductal dilatation without a specific   cause identified, further evaluation could be considered.   2. Inflammatory changes around the proximal duodenum, presumably   reactive versus coexisting duodenitis.               Assessment:  Active Hospital Problems (** Indicates Principal Problem)    Diagnosis Date Noted   • **Acute biliary pancreatitis [K85.10] 02/18/2018   • Hypertension [I10] 02/18/2018   • Diabetes mellitus [E11.9] 06/14/2016   • Benign essential hypertension [I10] 06/14/2016   • S/P cholecystectomy [Z90.49] 06/14/2016      Resolved Hospital Problems    Diagnosis Date Noted Date Resolved   No resolved problems to display.     Plan:  Acute pancreatitis context of previous cholecystectomy and alcohol use-underlying cause is either viral process or dyslipidemia with hypertriglyceridemia or effect of medication.  Plan bowel rest and keep her nothing by mouth except for ice chips and medications controlled her pain and hydrate her carefully.  Duodenitis versus reactive pancreatic inflammation involving the duodenum.  Plan IV Pepcid and monitor.  Hypertension-continue her blood pressure medicine  Abnormal LFTs with pancreatitis concerning is possibility of choledocholithiasis causing this presentation.  Diabetes mellitus with Hyperglycemia likely reactive-check hemoglobin A1c, Accu-Cheks and sliding scale coverage   Joshua Johnston MD   2/18/2018  11:46 PM  Much of this encounter note is an electronic transcription/translation of spoken language to printed text. The electronic translation of spoken language may permit erroneous, or at times, nonsensical words or phrases to be inadvertently transcribed; Although I have reviewed the note for such errors, some may still exist

## 2018-02-19 NOTE — TELEPHONE ENCOUNTER
----- Message from Idalia Bloom sent at 2/19/2018  8:35 AM EST -----  Contact: Raquel, daughter - Dr Hernandez's pt - RE: call / visit  Raquel, daughter calling and would like to know if you can go to hospital to see pt. Raquel informs is not very happy w/ the dr on call / treating pt. Raquel informs that they are informing that pt has pancreatis. Could you please call Raquel to discuss? Please Advise. Thanks    Raquel would like to know if you can see pt after hours.    Raquel, daughter  # [phone]

## 2018-02-19 NOTE — CONSULTS
[facility]  Initial Inpatient Consult Note    Referring Provider: MD Preston    Reason for Consultation: Pancreatitis    Subjective     History of present illness:    84 y.o. female with no significant GI past medical history except cholecystectomy 2016 for biliary colic, no previous history of pancreatitis, presents with abdominal pain.  She has had the abdominal pain for 48 hours.  It as epigastric radiating into her back.  Worse with movement.  Worse when she takes sips of water.  Better at rest.  She's never had pain like this before.  It is constant.  Over the last 10 days she has had intermittent periods of nausea and vomiting, diagnosed with the flu, diagnosed with bronchitis.  Placed on antibiotics and Tamiflu.  Her last vomiting episode was 4 days ago.  The abdominal pain began 48 hours ago.  There's been no bright red blood per rectum, melena.  No hematemesis.  She's had no previous EGD or colonoscopy in her lifetime.  She has NOT been having intermittent abdominal pain in the epigastric region consistent with S OD.  Her first bout of pain was 48 hours ago    Past Medical History:  Past Medical History:   Diagnosis Date   • Diabetes mellitus    • Hyperlipidemia    • Hypertension    • Prolapsed uterus     per patient     Past Surgical History:  Past Surgical History:   Procedure Laterality Date   • APPENDECTOMY     • CHOLECYSTECTOMY N/A 6/8/2016    Procedure: CHOLECYSTECTOMY LAPAROSCOPIC;  Surgeon: Russ Gar MD;  Location: [facility] [place];  Service:       Social History:   Social History   Substance Use Topics   • Smoking status: Never Smoker   • Smokeless tobacco: Never Used   • Alcohol use No      Family History:  History reviewed. No pertinent family history.    Home Meds:  Prescriptions Prior to Admission   Medication Sig Dispense Refill Last Dose   • felodipine (PLENDIL) 10 MG 24 hr tablet TAKE 1 TABLET BY MOUTH EVERY DAY. (Patient taking differently: TAKE 1 TABLET BY MOUTH  EVERY MORNING) 90 tablet 3 Taking     Current Meds:     felodipine 10 mg Oral Q24H   heparin (porcine) 5,000 Units Subcutaneous Q12H     Allergies:  Allergies   Allergen Reactions   • Cephalexin Rash   • Latex Itching     Review of Systems  She endorses abdominal pain, bloating, weakness and fatigue all other systems reviewed and negative     Objective     Vital Signs  Temp:  [98 °F (36.7 °C)-98.8 °F (37.1 °C)] 98.2 °F (36.8 °C)  Heart Rate:  [] 73  Resp:  [16-18] 18  BP: (119-184)/(61-87) 131/71  Physical Exam:  General Appearance:    Alert, cooperative, in no acute distress   Head:    Normocephalic, without obvious abnormality, atraumatic   Eyes:            Lids and lashes normal, conjunctivae and sclerae normal, no   icterus   Throat:   No oral lesions, no thrush, oral mucosa moist   Neck:   No adenopathy, supple, trachea midline, no thyromegaly, no   carotid bruit, no JVD   Lungs:     Clear to auscultation,respirations regular, even and                   unlabored    Heart:    Regular rhythm and normal rate, normal S1 and S2, no            murmur, no gallop, no rub, no click   Chest Wall:    No abnormalities observed   Abdomen:     Normal bowel sounds, no masses, no organomegaly, soft        tender, non-distended, no guarding, no rebound                 tenderness   Rectal:     Deferred   Extremities:   no edema, no cyanosis, no redness   Skin:   No bleeding, bruising or rash   Lymph nodes:   No palpable adenopathy   Psychiatric:  Judgement and insight: normal   Orientation to person place and time: normal   Mood and affect: normal   Results Review:   I reviewed the patient's new clinical results.      Results from last 7 days  Lab Units 02/19/18  0408 02/18/18  1349   WBC 10*3/mm3 13.70* 25.55*   HEMOGLOBIN g/dL 10.6* 12.3   HEMATOCRIT % 33.1* 39.1   PLATELETS 10*3/mm3 266 283       Results from last 7 days  Lab Units 02/19/18  0408 02/18/18  1349   SODIUM mmol/L 141 141   POTASSIUM mmol/L 2.9* 3.3*    CHLORIDE mmol/L 102 100   CO2 mmol/L 25.1 26.4   BUN mg/dL 12 17   CREATININE mg/dL 0.84 0.97   CALCIUM mg/dL 8.2* 8.9   BILIRUBIN mg/dL 2.3* 1.1   ALK PHOS U/L 264* 267*   ALT (SGPT) U/L 116* 94*   AST (SGOT) U/L 144* 198*   GLUCOSE mg/dL 102* 186*           Lab Results  Lab Value Date/Time   LIPASE >600 (H) 02/19/2018 0408   LIPASE >600 (H) 02/18/2018 1349   LIPASE 11 (L) 06/07/2016 2006       Radiology:  CT Abdomen Pelvis With Contrast   Final Result       1. Acute pancreatitis. Biliary ductal dilatation without a specific   cause identified, further evaluation could be considered.   2. Inflammatory changes around the proximal duodenum, presumably   reactive versus coexisting duodenitis.            Discussed by telephone Dr. Watson at time of interpretation, 1700,   02/18/2018.           This report was finalized on 2/18/2018 5:00 PM by Dr. Sai Plasencia MD.          MRI Abdomen With Contrast    (Results Pending)       Assessment/Plan   Patient Active Problem List   Diagnosis   • Vertigo   • Temporary cerebral vascular dysfunction   • Gastroesophageal reflux disease with esophagitis   • Degeneration of intervertebral disc of cervical region   • Diabetes mellitus   • Benign essential hypertension   • S/P cholecystectomy   • Osteoarthritis of knee   • Herpes zoster without complication   • Acute viral bronchitis   • Acute biliary pancreatitis   • Hypertension     Problem list    Acute pancreatitis  Abnormal imaging of the bile ducts on CAT scan  Elevated liver tests consistent with SOD 1 as a cause for pancreatitis, ampullary stenosis is a possibility, retained stones or sludge possibility  Abnormal imaging showing possible duodenitis versus duodenal ulcer versus inflammatory changes in the pancreas with no real abnormality of duodenum      Assessment/Plan    Continue nothing by mouth with lipase still greater than 600, hopefully able to start clears tomorrow  Increase IV fluid resuscitation today  MRCP  with contrast to get a better idea of size of bile ducts, any stones or sludge within the bile ducts, ampullary stenosis etc.  She will likely need ERCP when her pancreas cools off a bit  A.m. lipase  At the time of ERCP she may need EGD first to look at duodenum  Add IV Pepcid in case changes in  duodenum are peptic ulcer disease    I discussed the patients findings and my recommendations with patient, family and nursing staff.    Donnie Tobias MD

## 2018-02-19 NOTE — PROGRESS NOTES
LOS: 1 day     Name: Monica Scherer  Age/Sex: 84 y.o. female  :  1933        PCP: Juan Hernandez Jr., MD    Subjective   She still having some nausea and vomiting but none presently.  She's tolerating ice chips at times.  She wonders why she can't drink and eat if she is going to be throwing up anyway.  Called by nursing this morning about vaginal prolapse.  This is been a chronic issue for the last 2 years but at this point it's been rubbing on the sheets and is become somewhat irritated.  It also appears to have worsened a little bit with her significant nausea and vomiting.    General: No Fever or Chills, Cardiac: No Chest Pain or Palpitations, Resp: No Cough or SOA, GI: No Nausea, Vomiting, or Diarrhea and Other: No bleeding      famotidine 20 mg Intravenous Q12H   felodipine 10 mg Oral Q24H   heparin (porcine) 5,000 Units Subcutaneous Q12H       sodium chloride 125 mL/hr Last Rate: 125 mL/hr (18 1402)       Objective   Vital Signs  Temp:  [97 °F (36.1 °C)-98.8 °F (37.1 °C)] 97 °F (36.1 °C)  Heart Rate:  [64-90] 90  Resp:  [18] 18  BP: (119-163)/(61-87) 156/71  Body mass index is 28.38 kg/(m^2).    Intake/Output Summary (Last 24 hours) at 18 1443  Last data filed at 18 1200   Gross per 24 hour   Intake             1890 ml   Output             1450 ml   Net              440 ml       Physical Exam   Constitutional: She is oriented to person, place, and time. She appears well-developed and well-nourished. No distress.   HENT:   Head: Normocephalic and atraumatic.   Nose: Nose normal.   Mouth/Throat: No oropharyngeal exudate.   Eyes: Conjunctivae and EOM are normal. Scleral icterus is present.   Neck: Neck supple. No JVD present.   Cardiovascular: Normal rate, regular rhythm and normal heart sounds.    Pulmonary/Chest: Effort normal and breath sounds normal. No respiratory distress.   Abdominal: Soft. Bowel sounds are normal. There is tenderness. There is no rebound and no guarding.    Genitourinary:   Genitourinary Comments: There is about a 3 inch prolapse from the vagina   Musculoskeletal: Normal range of motion. She exhibits no edema.   Neurological: She is alert and oriented to person, place, and time.   Skin: Skin is warm and dry. No rash noted. She is not diaphoretic.   Psychiatric: She has a normal mood and affect. Her behavior is normal.   Nursing note and vitals reviewed.        Results Review:       I reviewed the patient's new clinical results.    Results from last 7 days  Lab Units 02/19/18  0408 02/18/18  1349   WBC 10*3/mm3 13.70* 25.55*   HEMOGLOBIN g/dL 10.6* 12.3   PLATELETS 10*3/mm3 266 283     Results from last 7 days  Lab Units 02/19/18  0408 02/18/18  1349   SODIUM mmol/L 141 141   POTASSIUM mmol/L 2.9* 3.3*   CHLORIDE mmol/L 102 100   CO2 mmol/L 25.1 26.4   BUN mg/dL 12 17   CREATININE mg/dL 0.84 0.97   CALCIUM mg/dL 8.2* 8.9   Estimated Creatinine Clearance: 49.4 mL/min (by C-G formula based on Cr of 0.84).  Lab Results   Component Value Date    HGBA1C 5.87 (H) 11/22/2017    HGBA1C 6.3 (H) 11/08/2016    HGBA1C 6.6 (H) 06/09/2015   No results found for: POCGLU      Assessment/Plan   Principal Problem:    Acute biliary pancreatitis  Active Problems:    Diabetes mellitus    Benign essential hypertension    S/P cholecystectomy    Hypertension      PLAN  - Plan to follow-up the MRCP with plans for probable ERCP tomorrow or the next day.  Lipase remains greater than 600  - Continue nothing by mouth except ice chips and allow IV fluids.  We'll change her fluids to normal saline with 20 of K.  I've also started her on potassium protocol for replacement.  - She has uterine prolapse noted.  There is some excoriation of the tissues.  As was discussed with Dr. Fontanez by phone and the plan is for Estrace cream and saran wrap for barrier.  She can follow-up in the outpatient setting with Dr. Fontanez once this is been under control and taking care of here in the hospital.  - She has a listed  history of diabetes her last A1c was 5.8.  Given her age will follow.  Her glucose was elevated on her BNP yesterday but this morning is within normal limits.      Disposition        Florencio Galan MD  [facility]  02/19/18  2:43 PM

## 2018-02-19 NOTE — PLAN OF CARE
Problem: Patient Care Overview (Adult)  Goal: Plan of Care Review  Outcome: Ongoing (interventions implemented as appropriate)   02/19/18 0318   Coping/Psychosocial Response Interventions   Plan Of Care Reviewed With patient   Outcome Evaluation   Outcome Summary/Follow up Plan New admission from ER. Abdominal pain tolerable. Started on IVFluids. NPO, may have ice chips. Voided freely.     Goal: Adult Individualization and Mutuality  Outcome: Ongoing (interventions implemented as appropriate)    Goal: Discharge Needs Assessment  Outcome: Ongoing (interventions implemented as appropriate)      Problem: Pancreatitis, Acute/Chronic (Adult)  Goal: Signs and Symptoms of Listed Potential Problems Will be Absent or Manageable (Pancreatitis, Acute/Chronic)  Outcome: Ongoing (interventions implemented as appropriate)

## 2018-02-20 LAB
ALBUMIN SERPL-MCNC: 3 G/DL (ref 3.5–5.2)
ALBUMIN/GLOB SERPL: 1 G/DL
ALP SERPL-CCNC: 290 U/L (ref 39–117)
ALT SERPL W P-5'-P-CCNC: 88 U/L (ref 1–33)
AMYLASE SERPL-CCNC: 370 U/L (ref 28–100)
ANION GAP SERPL CALCULATED.3IONS-SCNC: 10.8 MMOL/L
AST SERPL-CCNC: 81 U/L (ref 1–32)
BASOPHILS # BLD AUTO: 0.02 10*3/MM3 (ref 0–0.2)
BASOPHILS NFR BLD AUTO: 0.2 % (ref 0–1.5)
BILIRUB SERPL-MCNC: 4.7 MG/DL (ref 0.1–1.2)
BUN BLD-MCNC: 11 MG/DL (ref 8–23)
BUN/CREAT SERPL: 12.4 (ref 7–25)
CALCIUM SPEC-SCNC: 8.6 MG/DL (ref 8.6–10.5)
CHLORIDE SERPL-SCNC: 107 MMOL/L (ref 98–107)
CO2 SERPL-SCNC: 23.2 MMOL/L (ref 22–29)
CREAT BLD-MCNC: 0.89 MG/DL (ref 0.57–1)
DEPRECATED RDW RBC AUTO: 55.9 FL (ref 37–54)
EOSINOPHIL # BLD AUTO: 0.18 10*3/MM3 (ref 0–0.7)
EOSINOPHIL NFR BLD AUTO: 1.7 % (ref 0.3–6.2)
ERYTHROCYTE [DISTWIDTH] IN BLOOD BY AUTOMATED COUNT: 16.4 % (ref 11.7–13)
GFR SERPL CREATININE-BSD FRML MDRD: 60 ML/MIN/1.73
GLOBULIN UR ELPH-MCNC: 3 GM/DL
GLUCOSE BLD-MCNC: 93 MG/DL (ref 65–99)
HCT VFR BLD AUTO: 32.6 % (ref 35.6–45.5)
HGB BLD-MCNC: 10.3 G/DL (ref 11.9–15.5)
IMM GRANULOCYTES # BLD: 0.06 10*3/MM3 (ref 0–0.03)
IMM GRANULOCYTES NFR BLD: 0.6 % (ref 0–0.5)
INR PPP: 1.24 (ref 0.9–1.1)
LIPASE SERPL-CCNC: 547 U/L (ref 13–60)
LYMPHOCYTES # BLD AUTO: 1.36 10*3/MM3 (ref 0.9–4.8)
LYMPHOCYTES NFR BLD AUTO: 12.9 % (ref 19.6–45.3)
MCH RBC QN AUTO: 29.5 PG (ref 26.9–32)
MCHC RBC AUTO-ENTMCNC: 31.6 G/DL (ref 32.4–36.3)
MCV RBC AUTO: 93.4 FL (ref 80.5–98.2)
MONOCYTES # BLD AUTO: 0.55 10*3/MM3 (ref 0.2–1.2)
MONOCYTES NFR BLD AUTO: 5.2 % (ref 5–12)
NEUTROPHILS # BLD AUTO: 8.37 10*3/MM3 (ref 1.9–8.1)
NEUTROPHILS NFR BLD AUTO: 79.4 % (ref 42.7–76)
PLATELET # BLD AUTO: 220 10*3/MM3 (ref 140–500)
PMV BLD AUTO: 10.2 FL (ref 6–12)
POTASSIUM BLD-SCNC: 4.7 MMOL/L (ref 3.5–5.2)
PROT SERPL-MCNC: 6 G/DL (ref 6–8.5)
PROTHROMBIN TIME: 15.4 SECONDS (ref 11.7–14.2)
RBC # BLD AUTO: 3.49 10*6/MM3 (ref 3.9–5.2)
SODIUM BLD-SCNC: 141 MMOL/L (ref 136–145)
WBC NRBC COR # BLD: 10.54 10*3/MM3 (ref 4.5–10.7)

## 2018-02-20 PROCEDURE — 82150 ASSAY OF AMYLASE: CPT | Performed by: INTERNAL MEDICINE

## 2018-02-20 PROCEDURE — 99232 SBSQ HOSP IP/OBS MODERATE 35: CPT | Performed by: INTERNAL MEDICINE

## 2018-02-20 PROCEDURE — 85025 COMPLETE CBC W/AUTO DIFF WBC: CPT | Performed by: INTERNAL MEDICINE

## 2018-02-20 PROCEDURE — 25810000003 SODIUM CHLORIDE 0.9 % WITH KCL 20 MEQ 20-0.9 MEQ/L-% SOLUTION: Performed by: HOSPITALIST

## 2018-02-20 PROCEDURE — 25010000002 HEPARIN (PORCINE) PER 1000 UNITS: Performed by: INTERNAL MEDICINE

## 2018-02-20 PROCEDURE — 83690 ASSAY OF LIPASE: CPT | Performed by: INTERNAL MEDICINE

## 2018-02-20 PROCEDURE — 80053 COMPREHEN METABOLIC PANEL: CPT | Performed by: INTERNAL MEDICINE

## 2018-02-20 PROCEDURE — 85610 PROTHROMBIN TIME: CPT | Performed by: INTERNAL MEDICINE

## 2018-02-20 RX ORDER — HEPARIN SODIUM 5000 [USP'U]/ML
5000 INJECTION, SOLUTION INTRAVENOUS; SUBCUTANEOUS ONCE
Status: COMPLETED | OUTPATIENT
Start: 2018-02-20 | End: 2018-02-20

## 2018-02-20 RX ADMIN — POTASSIUM CHLORIDE AND SODIUM CHLORIDE 100 ML/HR: 900; 150 INJECTION, SOLUTION INTRAVENOUS at 12:46

## 2018-02-20 RX ADMIN — FAMOTIDINE 20 MG: 10 INJECTION, SOLUTION INTRAVENOUS at 20:47

## 2018-02-20 RX ADMIN — POTASSIUM CHLORIDE AND SODIUM CHLORIDE 100 ML/HR: 900; 150 INJECTION, SOLUTION INTRAVENOUS at 03:21

## 2018-02-20 RX ADMIN — HEPARIN SODIUM 5000 UNITS: 5000 INJECTION, SOLUTION INTRAVENOUS; SUBCUTANEOUS at 08:33

## 2018-02-20 RX ADMIN — ACETAMINOPHEN 650 MG: 325 TABLET, FILM COATED ORAL at 18:35

## 2018-02-20 RX ADMIN — HEPARIN SODIUM 5000 UNITS: 5000 INJECTION, SOLUTION INTRAVENOUS; SUBCUTANEOUS at 20:46

## 2018-02-20 RX ADMIN — FELODIPINE 10 MG: 10 TABLET, FILM COATED, EXTENDED RELEASE ORAL at 08:33

## 2018-02-20 RX ADMIN — ESTRADIOL 2 G: 0.1 CREAM VAGINAL at 19:10

## 2018-02-20 RX ADMIN — POTASSIUM CHLORIDE 40 MEQ: 750 CAPSULE, EXTENDED RELEASE ORAL at 00:22

## 2018-02-20 RX ADMIN — FAMOTIDINE 20 MG: 10 INJECTION, SOLUTION INTRAVENOUS at 08:33

## 2018-02-20 NOTE — PLAN OF CARE
Problem: Patient Care Overview (Adult)  Goal: Plan of Care Review  Outcome: Ongoing (interventions implemented as appropriate)   02/20/18 0251   Coping/Psychosocial Response Interventions   Plan Of Care Reviewed With patient;daughter   Outcome Evaluation   Outcome Summary/Follow up Plan pain controlled. vss. ambulates to void. IV fluids. NPO except ice chips. zofran for nausea. no emesis.

## 2018-02-20 NOTE — PROGRESS NOTES
Discharge Planning Assessment  [facility]     Patient Name: Monica Scherer  MRN: [id number]  Today's Date: 2/20/2018    Admit Date: 2/18/2018          Discharge Needs Assessment       02/20/18 1408    Living Environment    Lives With alone    Living Arrangements house    Home Accessibility no concerns    Stair Railings at Home outside, present on left side    Type of Financial/Environmental Concern none    Transportation Available car;family or friend will provide    Living Environment    Provides Primary Care For no one    Quality Of Family Relationships supportive    Able to Return to Prior Living Arrangements yes    Discharge Needs Assessment    Concerns To Be Addressed no discharge needs identified;denies needs/concerns at this time    Readmission Within The Last 30 Days no previous admission in last 30 days    Anticipated Changes Related to Illness none    Equipment Currently Used at Home none    Equipment Needed After Discharge none            Discharge Plan       02/20/18 1404    Case Management/Social Work Plan    Plan Home w/o needs    Patient/Family In Agreement With Plan yes    Additional Comments I met with pt and her granddaughter Elisa Mart ([phone]), pt confirmed the address, PCP and pharmacy are correct. Pt said she is IADL, uses no DME, has never had home health or been to a SNF. Pt said when discharged she plans to go to her son's home, [place] ([facility]), she said she most likely will later move into his home. Pt said she drives and has transportation. Pt said she does not anticipte any discharge needs.       Deedee Diaz RN          Discharge Placement     No information found                Demographic Summary       02/20/18 1407    Referral Information    Admission Type inpatient    Arrived From admitted as an inpatient    Referral Source admission list    Record Reviewed medical record    Contact Information    Permission Granted to Share  Information With family/designee            Functional Status       02/20/18 1408    Functional Status Current    Ambulation 2-->assistive person    Transferring 2-->assistive person    Toileting 2-->assistive person    Bathing 2-->assistive person    Dressing 2-->assistive person    Eating 0-->independent    Communication 0-->understands/communicates without difficulty    Swallowing (if score 2 or more for any item, consult Rehab Services) 0-->swallows foods/liquids without difficulty    Change in Functional Status Since Onset of Current Illness/Injury yes    Functional Status Prior    Ambulation 0-->independent    Transferring 0-->independent    Toileting 0-->independent    Bathing 0-->independent    Dressing 0-->independent    Eating 0-->independent    Communication 0-->understands/communicates without difficulty    Swallowing 0-->swallows foods/liquids without difficulty    IADL    Medications independent    Meal Preparation independent    Housekeeping independent    Laundry independent    Shopping independent    Oral Care independent            Psychosocial     None            Abuse/Neglect     None            Legal       02/20/18 1409    Legal    Legal Considerations patient/family ability to make health care decisions;patient/ for healthcare needs;patient/family capacity to make sound judgments    Legal Comments --   Pt said she does not have HCS, POA or Living Will            Substance Abuse     None            Patient Forms       02/20/18 1409    Patient Forms    Provider Choice List Delivered    Delivered to Patient    Method of delivery In person          Deedee Diaz RN

## 2018-02-20 NOTE — PROGRESS NOTES
LOS: 2 days     Name: Monica Scherer  Age/Sex: 84 y.o. female  :  1933        PCP: Juan Hernandez Jr., MD    Subjective   No vomiting overnight but still having pain crampy occasionally.  No new issues or complaints.  Main concern today is her Jaundice.  Questions answered and pathology explained.      General: No Fever or Chills, Cardiac: No Chest Pain or Palpitations, Resp: No Cough or SOA, GI: No Nausea, Vomiting, or Diarrhea and Other: No bleeding      estradiol 2 g Vaginal Nightly   famotidine 20 mg Intravenous Q12H   felodipine 10 mg Oral Q24H   heparin (porcine) 5,000 Units Subcutaneous Q12H       sodium chloride 0.9 % with KCl 20 mEq 100 mL/hr Last Rate: 100 mL/hr (18 0321)       Objective   Vital Signs  Temp:  [97 °F (36.1 °C)-98.9 °F (37.2 °C)] 98.9 °F (37.2 °C)  Heart Rate:  [68-90] 68  Resp:  [16-18] 16  BP: (112-156)/(59-74) 114/59  Body mass index is 28.38 kg/(m^2).    Intake/Output Summary (Last 24 hours) at 18 0745  Last data filed at 18 0635   Gross per 24 hour   Intake          2158.83 ml   Output             2100 ml   Net            58.83 ml       Physical Exam   Constitutional: She is oriented to person, place, and time. She appears well-developed and well-nourished. No distress.   HENT:   Head: Normocephalic and atraumatic.   Eyes: Conjunctivae and EOM are normal. Scleral icterus is present.   Neck: Neck supple. No JVD present.   Cardiovascular: Normal rate, regular rhythm and normal heart sounds.    Pulmonary/Chest: Effort normal and breath sounds normal. No respiratory distress.   Abdominal: Soft. Bowel sounds are normal. There is tenderness. There is no rebound and no guarding.   Genitourinary:   Genitourinary Comments: There is about a 3 inch prolapse from the vagina   Musculoskeletal: Normal range of motion. She exhibits no edema.   Neurological: She is alert and oriented to person, place, and time.   Skin: Skin is warm and dry. No rash noted. She is not  diaphoretic.   Psychiatric: She has a normal mood and affect. Her behavior is normal.   Nursing note and vitals reviewed.        Results Review:       I reviewed the patient's new clinical results.    Results from last 7 days  Lab Units 02/20/18  0410 02/19/18  0408 02/18/18  1349   WBC 10*3/mm3 10.54 13.70* 25.55*   HEMOGLOBIN g/dL 10.3* 10.6* 12.3   PLATELETS 10*3/mm3 220 266 283       Results from last 7 days  Lab Units 02/20/18  0410 02/19/18  0408 02/18/18  1349   SODIUM mmol/L 141 141 141   POTASSIUM mmol/L 4.7 2.9* 3.3*   CHLORIDE mmol/L 107 102 100   CO2 mmol/L 23.2 25.1 26.4   BUN mg/dL 11 12 17   CREATININE mg/dL 0.89 0.84 0.97   CALCIUM mg/dL 8.6 8.2* 8.9   Estimated Creatinine Clearance: 46.6 mL/min (by C-G formula based on Cr of 0.89).  Lab Results   Component Value Date    HGBA1C 5.87 (H) 11/22/2017    HGBA1C 6.3 (H) 11/08/2016    HGBA1C 6.6 (H) 06/09/2015   No results found for: POCGLU    Results from last 7 days  Lab Units 02/20/18  0410 02/19/18  0408 02/18/18  1349   ALK PHOS U/L 290* 264* 267*   BILIRUBIN mg/dL 4.7* 2.3* 1.1   ALT (SGPT) U/L 88* 116* 94*   AST (SGOT) U/L 81* 144* 198*       MRCP  IMPRESSION  1. Findings consistent with acute pancreatitis.  2. Mild biliary dilatation. No bile duct stones or strictures are seen.  3. Minimal pleural fluid.      Assessment/Plan   Principal Problem:    Acute biliary pancreatitis  Active Problems:    Diabetes mellitus    Benign essential hypertension    S/P cholecystectomy    Hypertension      PLAN  - MRCP shows acute pancreatitis with mild biliary dilation with no stones or strictures noted.  GI to decide on ERCP.  Lipase trending down bili remains elevated.  - Continue nothing by mouth except ice chips and allow IV fluids.  Potassium is better today.  If no plans for ERCP will begin CLD as tolerated  - continue estrace cream for irritation  - She has a listed history of diabetes her last A1c was 5.8.  Given her age will follow.  Her glucose was  elevated on her BMP 2/18 but this morning remains within normal limits.    Disposition        Florencio Galan MD  [facility]  02/20/18  7:45 AM

## 2018-02-20 NOTE — PLAN OF CARE
Problem: Patient Care Overview (Adult)  Goal: Plan of Care Review  Outcome: Ongoing (interventions implemented as appropriate)   02/20/18 7682   Coping/Psychosocial Response Interventions   Plan Of Care Reviewed With patient;family   Outcome Evaluation   Outcome Summary/Follow up Plan pt denies pain today. IVF continue. started on clear liq and elaina well but taking it slowly. no nausea. voiding well. NPO after MD for poss ERCP tm. Sl jaundice noted to eyes. low grade temp. enc cdb and walking.    Patient Care Overview   Progress improving     Goal: Adult Individualization and Mutuality  Outcome: Ongoing (interventions implemented as appropriate)    Goal: Discharge Needs Assessment  Outcome: Ongoing (interventions implemented as appropriate)      Problem: Pancreatitis, Acute/Chronic (Adult)  Goal: Signs and Symptoms of Listed Potential Problems Will be Absent or Manageable (Pancreatitis, Acute/Chronic)  Outcome: Ongoing (interventions implemented as appropriate)      Problem: Pressure Ulcer Risk (Jani Scale) (Adult,Obstetrics,Pediatric)  Goal: Identify Related Risk Factors and Signs and Symptoms  Outcome: Outcome(s) achieved Date Met: 02/20/18    Goal: Skin Integrity  Outcome: Ongoing (interventions implemented as appropriate)

## 2018-02-20 NOTE — PROGRESS NOTES
[facility], [facility]  Inpatient Progress Note    Reason for Follow Up:  Acute pancreatitis, elevated liver tests    Subjective     Interval History:   She feels better today less abdominal pain, would like clear liquid diet, MRCP done yesterday and reviewed with family    Current Facility-Administered Medications:   •  acetaminophen (TYLENOL) tablet 650 mg, 650 mg, Oral, Q4H PRN, Joshua Johnston MD  •  estradiol (ESTRACE) vaginal cream 2 g, 2 g, Vaginal, Nightly, Florencio Galan MD, 2 g at 02/19/18 1620  •  famotidine (PEPCID) injection 20 mg, 20 mg, Intravenous, Q12H, Donnie Tobias MD, 20 mg at 02/20/18 0833  •  felodipine (PLENDIL) 24 hr tablet 10 mg, 10 mg, Oral, Q24H, Joshua Johnston MD, 10 mg at 02/20/18 0833  •  heparin (porcine) 5000 UNIT/ML injection 5,000 Units, 5,000 Units, Subcutaneous, Q12H, Joshua Johnston MD, 5,000 Units at 02/20/18 0833  •  morphine injection 2 mg, 2 mg, Intravenous, Q2H PRN, Joshua Johnston MD, 2 mg at 02/19/18 2026  •  ondansetron (ZOFRAN) tablet 4 mg, 4 mg, Oral, Q6H PRN **OR** ondansetron ODT (ZOFRAN-ODT) disintegrating tablet 4 mg, 4 mg, Oral, Q6H PRN **OR** ondansetron (ZOFRAN) injection 4 mg, 4 mg, Intravenous, Q6H PRN, Florencio Galan MD, 4 mg at 02/19/18 2159  •  potassium chloride (MICRO-K) CR capsule 40 mEq, 40 mEq, Oral, PRN, 40 mEq at 02/20/18 0022 **OR** potassium chloride (KLOR-CON) packet 40 mEq, 40 mEq, Oral, PRN **OR** potassium chloride 10 mEq in 100 mL IVPB, 10 mEq, Intravenous, Q1H PRN, Florencio Galan MD  •  promethazine (PHENERGAN) tablet 12.5 mg, 12.5 mg, Oral, Q6H PRN **OR** promethazine (PHENERGAN) injection 12.5 mg, 12.5 mg, Intravenous, Q6H PRN, Florencio Galan MD  •  sodium chloride 0.9 % flush 1-10 mL, 1-10 mL, Intravenous, PRN, Joshua Johnston MD  •  sodium chloride 0.9 % flush 10 mL, 10 mL, Intravenous, PRN, Ivan Watson MD  •  sodium chloride 0.9 % with KCl 20 mEq/L infusion, 100 mL/hr, Intravenous, Continuous, Florencio MÉNDEZ  MD Angelia, Last Rate: 100 mL/hr at 02/20/18 0321, 100 mL/hr at 02/20/18 0321  Review of Systems:    She endorses weakness and fatigue all other systems reviewed and negative    Objective     Vital Signs  Temp:  [97 °F (36.1 °C)-98.9 °F (37.2 °C)] 97.9 °F (36.6 °C)  Heart Rate:  [66-90] 66  Resp:  [16-18] 16  BP: (112-156)/(59-74) 140/70  Body mass index is 28.38 kg/(m^2).    Intake/Output Summary (Last 24 hours) at 02/20/18 1043  Last data filed at 02/20/18 0830   Gross per 24 hour   Intake          1953.83 ml   Output             2100 ml   Net          -146.17 ml     I/O this shift:  In: -   Out: 300 [Urine:300]     Physical Exam:   General: patient awake, alert and cooperative   Eyes: Normal lids and lashes, no scleral icterus   Neck: supple, normal ROM   Skin: warm and dry, not jaundiced   Cardiovascular: regular rhythm and rate, no murmurs auscultated   Pulm: clear to auscultation bilaterally, regular and unlabored   Abdomen: soft, nontender, nondistended; normal bowel sounds   Rectal: deferred   Extremities: no rash or edema   Psychiatric: Normal mood and behavior; memory intact     Results Review:     I reviewed the patient's new clinical results.      Results from last 7 days  Lab Units 02/20/18  0410 02/19/18  0408 02/18/18  1349   WBC 10*3/mm3 10.54 13.70* 25.55*   HEMOGLOBIN g/dL 10.3* 10.6* 12.3   HEMATOCRIT % 32.6* 33.1* 39.1   PLATELETS 10*3/mm3 220 266 283       Results from last 7 days  Lab Units 02/20/18  0410 02/19/18  0408 02/18/18  1349   SODIUM mmol/L 141 141 141   POTASSIUM mmol/L 4.7 2.9* 3.3*   CHLORIDE mmol/L 107 102 100   CO2 mmol/L 23.2 25.1 26.4   BUN mg/dL 11 12 17   CREATININE mg/dL 0.89 0.84 0.97   CALCIUM mg/dL 8.6 8.2* 8.9   BILIRUBIN mg/dL 4.7* 2.3* 1.1   ALK PHOS U/L 290* 264* 267*   ALT (SGPT) U/L 88* 116* 94*   AST (SGOT) U/L 81* 144* 198*   GLUCOSE mg/dL 93 102* 186*       Results from last 7 days  Lab Units 02/20/18  0410   INR  1.24*       Lab Results  Lab Value Date/Time    LIPASE 547 (H) 02/20/2018 0410   LIPASE >600 (H) 02/19/2018 0408   LIPASE >600 (H) 02/18/2018 1349   LIPASE 11 (L) 06/07/2016 2006       Radiology:  MRI abdomen w wo contrast mrcp         CT Abdomen Pelvis With Contrast   Final Result       1. Acute pancreatitis. Biliary ductal dilatation without a specific   cause identified, further evaluation could be considered.   2. Inflammatory changes around the proximal duodenum, presumably   reactive versus coexisting duodenitis.            Discussed by telephone Dr. Watson at time of interpretation, 1700,   02/18/2018.           This report was finalized on 2/18/2018 5:00 PM by Dr. Sai Plasencia MD.              Assessment/Plan     Patient Active Problem List   Diagnosis   • Vertigo   • Temporary cerebral vascular dysfunction   • Gastroesophageal reflux disease with esophagitis   • Degeneration of intervertebral disc of cervical region   • Diabetes mellitus   • Benign essential hypertension   • S/P cholecystectomy   • Osteoarthritis of knee   • Herpes zoster without complication   • Acute viral bronchitis   • Acute biliary pancreatitis   • Hypertension     Acute pancreatitis  Elevated liver tests    Plan:    MRCP was reviewed with family, no obvious stones or sludge, mild ductal dilation, ERCP decision per Dr. Lechuga, I'll discuss this with him today  Follow liver tests  Follow lipase  Clear liquid diet today and I will make nothing by mouth after midnight in case ERCP to be done tomorrow    I discussed the patients findings and my recommendations with patient and family.    Donnie Tobias MD      D/W Dr Lechuga:    With MR CP showing no stones, sludge or large ductal dilation, he would rather wait on ERCP and discuss with her as an outpatient.  I will get a follow-up appointment with her in 2 weeks to discuss ERCP.  He does agree with EGD to look at the duodenum based on the CAT scan results above.  Let's await her lipase to normalize, then plan for EGD day  before discharge.  EGD likely Thursday.  Clear liquid diet today, likely advance to low fat diet tomorrow

## 2018-02-21 LAB
ALBUMIN SERPL-MCNC: 3.1 G/DL (ref 3.5–5.2)
ALBUMIN/GLOB SERPL: 0.9 G/DL
ALP SERPL-CCNC: 296 U/L (ref 39–117)
ALT SERPL W P-5'-P-CCNC: 62 U/L (ref 1–33)
ANION GAP SERPL CALCULATED.3IONS-SCNC: 11 MMOL/L
APTT PPP: 34.6 SECONDS (ref 22.7–35.4)
AST SERPL-CCNC: 40 U/L (ref 1–32)
BASOPHILS # BLD AUTO: 0.02 10*3/MM3 (ref 0–0.2)
BASOPHILS NFR BLD AUTO: 0.2 % (ref 0–1.5)
BILIRUB SERPL-MCNC: 2 MG/DL (ref 0.1–1.2)
BUN BLD-MCNC: 8 MG/DL (ref 8–23)
BUN/CREAT SERPL: 10.1 (ref 7–25)
CALCIUM SPEC-SCNC: 8.6 MG/DL (ref 8.6–10.5)
CHLORIDE SERPL-SCNC: 101 MMOL/L (ref 98–107)
CO2 SERPL-SCNC: 23 MMOL/L (ref 22–29)
CREAT BLD-MCNC: 0.79 MG/DL (ref 0.57–1)
DEPRECATED RDW RBC AUTO: 57.2 FL (ref 37–54)
EOSINOPHIL # BLD AUTO: 0.46 10*3/MM3 (ref 0–0.7)
EOSINOPHIL NFR BLD AUTO: 4.7 % (ref 0.3–6.2)
ERYTHROCYTE [DISTWIDTH] IN BLOOD BY AUTOMATED COUNT: 16.3 % (ref 11.7–13)
GFR SERPL CREATININE-BSD FRML MDRD: 69 ML/MIN/1.73
GLOBULIN UR ELPH-MCNC: 3.6 GM/DL
GLUCOSE BLD-MCNC: 104 MG/DL (ref 65–99)
HCT VFR BLD AUTO: 36.3 % (ref 35.6–45.5)
HGB BLD-MCNC: 10.9 G/DL (ref 11.9–15.5)
IMM GRANULOCYTES # BLD: 0.07 10*3/MM3 (ref 0–0.03)
IMM GRANULOCYTES NFR BLD: 0.7 % (ref 0–0.5)
INR PPP: 1.1 (ref 0.9–1.1)
LIPASE SERPL-CCNC: 193 U/L (ref 13–60)
LYMPHOCYTES # BLD AUTO: 1.89 10*3/MM3 (ref 0.9–4.8)
LYMPHOCYTES NFR BLD AUTO: 19.4 % (ref 19.6–45.3)
MCH RBC QN AUTO: 29 PG (ref 26.9–32)
MCHC RBC AUTO-ENTMCNC: 30 G/DL (ref 32.4–36.3)
MCV RBC AUTO: 96.5 FL (ref 80.5–98.2)
MONOCYTES # BLD AUTO: 0.69 10*3/MM3 (ref 0.2–1.2)
MONOCYTES NFR BLD AUTO: 7.1 % (ref 5–12)
NEUTROPHILS # BLD AUTO: 6.6 10*3/MM3 (ref 1.9–8.1)
NEUTROPHILS NFR BLD AUTO: 67.9 % (ref 42.7–76)
NRBC BLD MANUAL-RTO: 0 /100 WBC (ref 0–0)
PLATELET # BLD AUTO: 245 10*3/MM3 (ref 140–500)
PMV BLD AUTO: 9.4 FL (ref 6–12)
POTASSIUM BLD-SCNC: 3.8 MMOL/L (ref 3.5–5.2)
PROT SERPL-MCNC: 6.7 G/DL (ref 6–8.5)
PROTHROMBIN TIME: 14 SECONDS (ref 11.7–14.2)
RBC # BLD AUTO: 3.76 10*6/MM3 (ref 3.9–5.2)
SODIUM BLD-SCNC: 135 MMOL/L (ref 136–145)
WBC NRBC COR # BLD: 9.73 10*3/MM3 (ref 4.5–10.7)

## 2018-02-21 PROCEDURE — 99232 SBSQ HOSP IP/OBS MODERATE 35: CPT | Performed by: INTERNAL MEDICINE

## 2018-02-21 PROCEDURE — 85610 PROTHROMBIN TIME: CPT | Performed by: INTERNAL MEDICINE

## 2018-02-21 PROCEDURE — 85025 COMPLETE CBC W/AUTO DIFF WBC: CPT | Performed by: INTERNAL MEDICINE

## 2018-02-21 PROCEDURE — 83690 ASSAY OF LIPASE: CPT | Performed by: NURSE PRACTITIONER

## 2018-02-21 PROCEDURE — 25810000003 SODIUM CHLORIDE 0.9 % WITH KCL 20 MEQ 20-0.9 MEQ/L-% SOLUTION: Performed by: HOSPITALIST

## 2018-02-21 PROCEDURE — 80053 COMPREHEN METABOLIC PANEL: CPT | Performed by: INTERNAL MEDICINE

## 2018-02-21 PROCEDURE — 85730 THROMBOPLASTIN TIME PARTIAL: CPT | Performed by: INTERNAL MEDICINE

## 2018-02-21 RX ORDER — FAMOTIDINE 20 MG/1
20 TABLET, FILM COATED ORAL 2 TIMES DAILY
Status: DISCONTINUED | OUTPATIENT
Start: 2018-02-21 | End: 2018-02-24 | Stop reason: HOSPADM

## 2018-02-21 RX ADMIN — POTASSIUM CHLORIDE AND SODIUM CHLORIDE 100 ML/HR: 900; 150 INJECTION, SOLUTION INTRAVENOUS at 19:06

## 2018-02-21 RX ADMIN — FELODIPINE 10 MG: 10 TABLET, FILM COATED, EXTENDED RELEASE ORAL at 08:58

## 2018-02-21 RX ADMIN — FAMOTIDINE 20 MG: 20 TABLET, FILM COATED ORAL at 21:09

## 2018-02-21 RX ADMIN — FAMOTIDINE 20 MG: 10 INJECTION, SOLUTION INTRAVENOUS at 08:58

## 2018-02-21 RX ADMIN — ESTRADIOL 2 G: 0.1 CREAM VAGINAL at 21:09

## 2018-02-21 RX ADMIN — POTASSIUM CHLORIDE AND SODIUM CHLORIDE 100 ML/HR: 900; 150 INJECTION, SOLUTION INTRAVENOUS at 00:12

## 2018-02-21 NOTE — PLAN OF CARE
Problem: Patient Care Overview (Adult)  Goal: Plan of Care Review  Outcome: Ongoing (interventions implemented as appropriate)   02/21/18 0045   Coping/Psychosocial Response Interventions   Plan Of Care Reviewed With patient;daughter   Outcome Evaluation   Outcome Summary/Follow up Plan No c/o pain. VSS. ambulates to void w/ assist. IV fluids. tolerates clears.    Patient Care Overview   Progress improving

## 2018-02-21 NOTE — PROGRESS NOTES
LOS: 3 days     Name: Monica Scherer  Age/Sex: 84 y.o. female  :  1933        PCP: Juan Hernandez Jr., MD    Subjective   No further vomiting or pain.  Feeling a lot better and wants to eat    General: No Fever or Chills, Cardiac: No Chest Pain or Palpitations, Resp: No Cough or SOA, GI: No Nausea, Vomiting, or Diarrhea and Other: No bleeding      estradiol 2 g Vaginal Nightly   famotidine 20 mg Intravenous Q12H   felodipine 10 mg Oral Q24H       sodium chloride 0.9 % with KCl 20 mEq 100 mL/hr Last Rate: 100 mL/hr (18 0012)       Objective   Vital Signs  Temp:  [97.6 °F (36.4 °C)-99.3 °F (37.4 °C)] 98.4 °F (36.9 °C)  Heart Rate:  [71-86] 72  Resp:  [16-18] 16  BP: (109-145)/(53-75) 145/75  Body mass index is 28.38 kg/(m^2).    Intake/Output Summary (Last 24 hours) at 18 0831  Last data filed at 18 0012   Gross per 24 hour   Intake             2229 ml   Output             1900 ml   Net              329 ml       Physical Exam   Constitutional: She is oriented to person, place, and time. She appears well-developed and well-nourished. No distress.   HENT:   Head: Normocephalic and atraumatic.   Eyes: Conjunctivae and EOM are normal. No scleral icterus.   Neck: Neck supple. No JVD present.   Cardiovascular: Normal rate, regular rhythm and normal heart sounds.    Pulmonary/Chest: Effort normal and breath sounds normal. No respiratory distress.   Abdominal: Soft. Bowel sounds are normal. There is no tenderness. There is no rebound and no guarding.   Genitourinary:   Genitourinary Comments: There is about a 3 inch prolapse from the vagina   Musculoskeletal: Normal range of motion. She exhibits no edema.   Neurological: She is alert and oriented to person, place, and time.   Skin: Skin is warm and dry. No rash noted. She is not diaphoretic.   Psychiatric: She has a normal mood and affect. Her behavior is normal.   Nursing note and vitals reviewed.        Results Review:       I reviewed the  patient's new clinical results.    Results from last 7 days  Lab Units 02/21/18  0533 02/20/18  0410 02/19/18  0408 02/18/18  1349   WBC 10*3/mm3 9.73 10.54 13.70* 25.55*   HEMOGLOBIN g/dL 10.9* 10.3* 10.6* 12.3   PLATELETS 10*3/mm3 245 220 266 283       Results from last 7 days  Lab Units 02/21/18  0533 02/20/18  0410 02/19/18  0408 02/18/18  1349   SODIUM mmol/L 135* 141 141 141   POTASSIUM mmol/L 3.8 4.7 2.9* 3.3*   CHLORIDE mmol/L 101 107 102 100   CO2 mmol/L 23.0 23.2 25.1 26.4   BUN mg/dL 8 11 12 17   CREATININE mg/dL 0.79 0.89 0.84 0.97   CALCIUM mg/dL 8.6 8.6 8.2* 8.9   Estimated Creatinine Clearance: 51.9 mL/min (by C-G formula based on Cr of 0.79).  Lab Results   Component Value Date    HGBA1C 5.87 (H) 11/22/2017    HGBA1C 6.3 (H) 11/08/2016    HGBA1C 6.6 (H) 06/09/2015   No results found for: POCGLU    Results from last 7 days  Lab Units 02/21/18  0533 02/20/18 0410 02/19/18  0408   ALK PHOS U/L 296* 290* 264*   BILIRUBIN mg/dL 2.0* 4.7* 2.3*   ALT (SGPT) U/L 62* 88* 116*   AST (SGOT) U/L 40* 81* 144*       MRCP  IMPRESSION  1. Findings consistent with acute pancreatitis.  2. Mild biliary dilatation. No bile duct stones or strictures are seen.  3. Minimal pleural fluid.      Assessment/Plan   Principal Problem:    Acute biliary pancreatitis  Active Problems:    Diabetes mellitus    Benign essential hypertension    S/P cholecystectomy    Hypertension      PLAN  - MRCP shows acute pancreatitis with mild biliary dilation with no stones or strictures noted.  GI to decide on ERCP vs EGD.  Lipase trending down bili remains elevated.  - advance diet with no plans for procedure today  - continue estrace cream for irritation  - She has a listed history of diabetes her last A1c was 5.8.  Given her age will follow.  Her glucose was elevated on her BMP 2/18 but this morning remains within normal limits.    Disposition  Maybe home Friday or Saturday depending on GI's decisions on procedures      Florencio Galan,  MD [facility]  02/21/18  8:31 AM

## 2018-02-21 NOTE — PROGRESS NOTES
[facility]  Inpatient Progress Note    Reason for Follow Up:  Acute pancreatitis, elevated liver tests    Subjective     Interval History:   No abdominal pain, nausea or vomting.     Current Facility-Administered Medications:   •  acetaminophen (TYLENOL) tablet 650 mg, 650 mg, Oral, Q4H PRN, Joshua Johnston MD, 650 mg at 02/20/18 1835  •  estradiol (ESTRACE) vaginal cream 2 g, 2 g, Vaginal, Nightly, Florencio Galan MD, 2 g at 02/20/18 1910  •  famotidine (PEPCID) injection 20 mg, 20 mg, Intravenous, Q12H, Donnie Tobias MD, 20 mg at 02/21/18 0858  •  felodipine (PLENDIL) 24 hr tablet 10 mg, 10 mg, Oral, Q24H, Joshua Johnston MD, 10 mg at 02/21/18 0858  •  morphine injection 2 mg, 2 mg, Intravenous, Q2H PRN, Joshua Johnston MD, 2 mg at 02/19/18 2026  •  ondansetron (ZOFRAN) tablet 4 mg, 4 mg, Oral, Q6H PRN **OR** ondansetron ODT (ZOFRAN-ODT) disintegrating tablet 4 mg, 4 mg, Oral, Q6H PRN **OR** ondansetron (ZOFRAN) injection 4 mg, 4 mg, Intravenous, Q6H PRN, Florencio Galan MD, 4 mg at 02/19/18 2159  •  potassium chloride (MICRO-K) CR capsule 40 mEq, 40 mEq, Oral, PRN, 40 mEq at 02/20/18 0022 **OR** potassium chloride (KLOR-CON) packet 40 mEq, 40 mEq, Oral, PRN **OR** potassium chloride 10 mEq in 100 mL IVPB, 10 mEq, Intravenous, Q1H PRN, Florencio Galan MD  •  promethazine (PHENERGAN) tablet 12.5 mg, 12.5 mg, Oral, Q6H PRN **OR** promethazine (PHENERGAN) injection 12.5 mg, 12.5 mg, Intravenous, Q6H PRN, Florencio Galan MD  •  sodium chloride 0.9 % flush 1-10 mL, 1-10 mL, Intravenous, PRN, Joshua Johnston MD  •  sodium chloride 0.9 % flush 10 mL, 10 mL, Intravenous, PRN, Ivan Watson MD  •  sodium chloride 0.9 % with KCl 20 mEq/L infusion, 100 mL/hr, Intravenous, Continuous, Florencio Galan MD, Last Rate: 100 mL/hr at 02/21/18 0012, 100 mL/hr at 02/21/18 0012  Review of Systems:    The following systems were reviewed and negative;  respiratory, cardiovascular, musculoskeletal  and neurological    Objective     Vital Signs  Temp:  [97.6 °F (36.4 °C)-99.3 °F (37.4 °C)] 97.8 °F (36.6 °C)  Heart Rate:  [71-86] 82  Resp:  [16-18] 18  BP: (109-145)/(53-75) 111/60  Body mass index is 28.38 kg/(m^2).    Intake/Output Summary (Last 24 hours) at 02/21/18 1151  Last data filed at 02/21/18 0012   Gross per 24 hour   Intake             2229 ml   Output             1600 ml   Net              629 ml           Physical Exam:   General: patient awake, alert and cooperative   Eyes: Normal lids and lashes, no scleral icterus   Neck: supple, normal ROM   Skin: warm and dry, not jaundiced   Cardiovascular: regular rhythm and rate, no murmurs auscultated   Pulm: clear to auscultation bilaterally, regular and unlabored   Abdomen: soft, nontender, nondistended; normal bowel sounds   Rectal: deferred   Extremities: no rash or edema   Psychiatric: Normal mood and behavior; memory intact     Results Review:     I reviewed the patient's new clinical results.      Results from last 7 days  Lab Units 02/21/18  0533 02/20/18  0410 02/19/18  0408   WBC 10*3/mm3 9.73 10.54 13.70*   HEMOGLOBIN g/dL 10.9* 10.3* 10.6*   HEMATOCRIT % 36.3 32.6* 33.1*   PLATELETS 10*3/mm3 245 220 266       Results from last 7 days  Lab Units 02/21/18  0533 02/20/18  0410 02/19/18  0408   SODIUM mmol/L 135* 141 141   POTASSIUM mmol/L 3.8 4.7 2.9*   CHLORIDE mmol/L 101 107 102   CO2 mmol/L 23.0 23.2 25.1   BUN mg/dL 8 11 12   CREATININE mg/dL 0.79 0.89 0.84   CALCIUM mg/dL 8.6 8.6 8.2*   BILIRUBIN mg/dL 2.0* 4.7* 2.3*   ALK PHOS U/L 296* 290* 264*   ALT (SGPT) U/L 62* 88* 116*   AST (SGOT) U/L 40* 81* 144*   GLUCOSE mg/dL 104* 93 102*       Results from last 7 days  Lab Units 02/21/18  0533 02/20/18  0410   INR  1.10 1.24*       Lab Results  Lab Value Date/Time   LIPASE 547 (H) 02/20/2018 0410   LIPASE >600 (H) 02/19/2018 0408   LIPASE >600 (H) 02/18/2018 1349   LIPASE 11 (L) 06/07/2016 2006       Radiology:  MRI abdomen w wo contrast mrcp    Final Result      CT Abdomen Pelvis With Contrast   Final Result       1. Acute pancreatitis. Biliary ductal dilatation without a specific   cause identified, further evaluation could be considered.   2. Inflammatory changes around the proximal duodenum, presumably   reactive versus coexisting duodenitis.            Discussed by telephone Dr. Watson at time of interpretation, 1700,   02/18/2018.           This report was finalized on 2/18/2018 5:00 PM by Dr. Sai Plasencia MD.              Assessment/Plan     Patient Active Problem List   Diagnosis   • Vertigo   • Temporary cerebral vascular dysfunction   • Gastroesophageal reflux disease with esophagitis   • Degeneration of intervertebral disc of cervical region   • Diabetes mellitus   • Benign essential hypertension   • S/P cholecystectomy   • Osteoarthritis of knee   • Herpes zoster without complication   • Acute viral bronchitis   • Acute biliary pancreatitis   • Hypertension       I discussed the patients findings and my recommendations with patient and nursing staff.    Assessment/Recommendations:  1) Resolving pancreatitis with elevated LFT's - Dr. NADJA Lechuga wants to see the patient in two weeks to discuss possible outpatient ERCP once her pancreatitis has resolved. Advance diet as tolerated.  2) CT scan shows inflammatory changes around the proximal duodenum. Dr. Lechuga had said that he wanted an EGD prior to discharge when lipase has normalized. The patient seems very interested in being discharged. Lipase was not checked today but it will be tomorrow. I will not plan on EGD as patient states that Dr. Galan told her she would go home Thursday?    Russ Sheets MD

## 2018-02-22 ENCOUNTER — ANESTHESIA (OUTPATIENT)
Dept: GASTROENTEROLOGY | Facility: HOSPITAL | Age: 83
End: 2018-02-22

## 2018-02-22 ENCOUNTER — ANESTHESIA EVENT (OUTPATIENT)
Dept: GASTROENTEROLOGY | Facility: HOSPITAL | Age: 83
End: 2018-02-22

## 2018-02-22 LAB
ALBUMIN SERPL-MCNC: 3.2 G/DL (ref 3.5–5.2)
ALBUMIN/GLOB SERPL: 0.9 G/DL
ALP SERPL-CCNC: 268 U/L (ref 39–117)
ALT SERPL W P-5'-P-CCNC: 48 U/L (ref 1–33)
ANION GAP SERPL CALCULATED.3IONS-SCNC: 11.8 MMOL/L
AST SERPL-CCNC: 25 U/L (ref 1–32)
BILIRUB SERPL-MCNC: 1.4 MG/DL (ref 0.1–1.2)
BUN BLD-MCNC: 8 MG/DL (ref 8–23)
BUN/CREAT SERPL: 11.9 (ref 7–25)
CALCIUM SPEC-SCNC: 8.8 MG/DL (ref 8.6–10.5)
CHLORIDE SERPL-SCNC: 106 MMOL/L (ref 98–107)
CO2 SERPL-SCNC: 22.2 MMOL/L (ref 22–29)
CREAT BLD-MCNC: 0.67 MG/DL (ref 0.57–1)
GFR SERPL CREATININE-BSD FRML MDRD: 84 ML/MIN/1.73
GLOBULIN UR ELPH-MCNC: 3.4 GM/DL
GLUCOSE BLD-MCNC: 106 MG/DL (ref 65–99)
LIPASE SERPL-CCNC: 145 U/L (ref 13–60)
POTASSIUM BLD-SCNC: 3.9 MMOL/L (ref 3.5–5.2)
PROT SERPL-MCNC: 6.6 G/DL (ref 6–8.5)
SODIUM BLD-SCNC: 140 MMOL/L (ref 136–145)

## 2018-02-22 PROCEDURE — 0DD68ZX EXTRACTION OF STOMACH, VIA NATURAL OR ARTIFICIAL OPENING ENDOSCOPIC, DIAGNOSTIC: ICD-10-PCS | Performed by: INTERNAL MEDICINE

## 2018-02-22 PROCEDURE — 43239 EGD BIOPSY SINGLE/MULTIPLE: CPT | Performed by: INTERNAL MEDICINE

## 2018-02-22 PROCEDURE — 88305 TISSUE EXAM BY PATHOLOGIST: CPT | Performed by: INTERNAL MEDICINE

## 2018-02-22 PROCEDURE — 0DD98ZX EXTRACTION OF DUODENUM, VIA NATURAL OR ARTIFICIAL OPENING ENDOSCOPIC, DIAGNOSTIC: ICD-10-PCS | Performed by: INTERNAL MEDICINE

## 2018-02-22 PROCEDURE — 83690 ASSAY OF LIPASE: CPT | Performed by: HOSPITALIST

## 2018-02-22 PROCEDURE — 25810000003 SODIUM CHLORIDE 0.9 % WITH KCL 20 MEQ 20-0.9 MEQ/L-% SOLUTION: Performed by: HOSPITALIST

## 2018-02-22 PROCEDURE — 80053 COMPREHEN METABOLIC PANEL: CPT | Performed by: HOSPITALIST

## 2018-02-22 PROCEDURE — 25010000002 PROPOFOL 10 MG/ML EMULSION: Performed by: ANESTHESIOLOGY

## 2018-02-22 PROCEDURE — 87081 CULTURE SCREEN ONLY: CPT | Performed by: INTERNAL MEDICINE

## 2018-02-22 RX ORDER — PROPOFOL 10 MG/ML
VIAL (ML) INTRAVENOUS CONTINUOUS PRN
Status: DISCONTINUED | OUTPATIENT
Start: 2018-02-22 | End: 2018-02-22 | Stop reason: SURG

## 2018-02-22 RX ORDER — PANTOPRAZOLE SODIUM 40 MG/1
40 TABLET, DELAYED RELEASE ORAL
Status: DISCONTINUED | OUTPATIENT
Start: 2018-02-23 | End: 2018-02-23

## 2018-02-22 RX ORDER — PROPOFOL 10 MG/ML
VIAL (ML) INTRAVENOUS AS NEEDED
Status: DISCONTINUED | OUTPATIENT
Start: 2018-02-22 | End: 2018-02-22 | Stop reason: SURG

## 2018-02-22 RX ORDER — SODIUM CHLORIDE, SODIUM LACTATE, POTASSIUM CHLORIDE, CALCIUM CHLORIDE 600; 310; 30; 20 MG/100ML; MG/100ML; MG/100ML; MG/100ML
20 INJECTION, SOLUTION INTRAVENOUS CONTINUOUS
Status: DISCONTINUED | OUTPATIENT
Start: 2018-02-22 | End: 2018-02-22

## 2018-02-22 RX ADMIN — FELODIPINE 10 MG: 10 TABLET, FILM COATED, EXTENDED RELEASE ORAL at 08:39

## 2018-02-22 RX ADMIN — PROPOFOL 50 MG: 10 INJECTION, EMULSION INTRAVENOUS at 11:20

## 2018-02-22 RX ADMIN — PROPOFOL 30 MG: 10 INJECTION, EMULSION INTRAVENOUS at 11:25

## 2018-02-22 RX ADMIN — FAMOTIDINE 20 MG: 20 TABLET, FILM COATED ORAL at 21:21

## 2018-02-22 RX ADMIN — PROPOFOL 120 MCG/KG/MIN: 10 INJECTION, EMULSION INTRAVENOUS at 11:20

## 2018-02-22 RX ADMIN — POTASSIUM CHLORIDE AND SODIUM CHLORIDE 100 ML/HR: 900; 150 INJECTION, SOLUTION INTRAVENOUS at 04:14

## 2018-02-22 RX ADMIN — POTASSIUM CHLORIDE AND SODIUM CHLORIDE 100 ML/HR: 900; 150 INJECTION, SOLUTION INTRAVENOUS at 18:26

## 2018-02-22 RX ADMIN — SODIUM CHLORIDE, POTASSIUM CHLORIDE, SODIUM LACTATE AND CALCIUM CHLORIDE 20 ML/HR: 600; 310; 30; 20 INJECTION, SOLUTION INTRAVENOUS at 10:33

## 2018-02-22 RX ADMIN — FAMOTIDINE 20 MG: 20 TABLET, FILM COATED ORAL at 12:36

## 2018-02-22 RX ADMIN — SODIUM CHLORIDE, POTASSIUM CHLORIDE, SODIUM LACTATE AND CALCIUM CHLORIDE: 600; 310; 30; 20 INJECTION, SOLUTION INTRAVENOUS at 11:35

## 2018-02-22 NOTE — PROGRESS NOTES
LOS: 4 days     Name: Monica Scherer  Age/Sex: 84 y.o. female  :  1933        PCP: Juan Hernandez Jr., MD    Subjective   No further vomiting or pain.  Feeling a lot better upset this AM about miscommunication and wants to go home.  Not sure about EGD and demanding to speak with me.      General: No Fever or Chills, Cardiac: No Chest Pain or Palpitations, Resp: No Cough or SOA, GI: No Nausea, Vomiting, or Diarrhea and Other: No bleeding      estradiol 2 g Vaginal Nightly   famotidine 20 mg Oral BID   felodipine 10 mg Oral Q24H   [START ON 2018] pantoprazole 40 mg Oral Q AM       lactated ringers 20 mL/hr Last Rate: Stopped (18 1203)   sodium chloride 0.9 % with KCl 20 mEq 100 mL/hr Last Rate: 100 mL/hr (18 1237)       Objective   Vital Signs  Temp:  [97.1 °F (36.2 °C)-98.6 °F (37 °C)] 97.4 °F (36.3 °C)  Heart Rate:  [67-88] 67  Resp:  [16-18] 16  BP: (119-167)/(55-90) 127/62  Body mass index is 28.38 kg/(m^2).    Intake/Output Summary (Last 24 hours) at 18 1555  Last data filed at 18 1300   Gross per 24 hour   Intake             3770 ml   Output                0 ml   Net             3770 ml       Physical Exam   Constitutional: She is oriented to person, place, and time. She appears well-developed and well-nourished. No distress.   HENT:   Head: Normocephalic and atraumatic.   Eyes: Conjunctivae and EOM are normal. No scleral icterus.   Neck: Neck supple. No JVD present.   Cardiovascular: Normal rate, regular rhythm and normal heart sounds.    Pulmonary/Chest: Effort normal and breath sounds normal. No respiratory distress.   Abdominal: Soft. Bowel sounds are normal. There is no tenderness. There is no rebound and no guarding.   Genitourinary:   Genitourinary Comments: There is about a 3 inch prolapse from the vagina   Musculoskeletal: Normal range of motion. She exhibits no edema.   Neurological: She is alert and oriented to person, place, and time.   Skin: Skin is warm  and dry. No rash noted. She is not diaphoretic.   Psychiatric: She has a normal mood and affect. Her behavior is normal.   Nursing note and vitals reviewed.        Results Review:       I reviewed the patient's new clinical results.    Results from last 7 days  Lab Units 02/21/18  0533 02/20/18  0410 02/19/18  0408 02/18/18  1349   WBC 10*3/mm3 9.73 10.54 13.70* 25.55*   HEMOGLOBIN g/dL 10.9* 10.3* 10.6* 12.3   PLATELETS 10*3/mm3 245 220 266 283       Results from last 7 days  Lab Units 02/22/18  0302 02/21/18  0533 02/20/18  0410 02/19/18  0408 02/18/18  1349   SODIUM mmol/L 140 135* 141 141 141   POTASSIUM mmol/L 3.9 3.8 4.7 2.9* 3.3*   CHLORIDE mmol/L 106 101 107 102 100   CO2 mmol/L 22.2 23.0 23.2 25.1 26.4   BUN mg/dL 8 8 11 12 17   CREATININE mg/dL 0.67 0.79 0.89 0.84 0.97   CALCIUM mg/dL 8.8 8.6 8.6 8.2* 8.9   Estimated Creatinine Clearance: 51.9 mL/min (by C-G formula based on Cr of 0.67).  Lab Results   Component Value Date    HGBA1C 5.87 (H) 11/22/2017    HGBA1C 6.3 (H) 11/08/2016    HGBA1C 6.6 (H) 06/09/2015   No results found for: POCGLU    Results from last 7 days  Lab Units 02/22/18  0302 02/21/18  0533 02/20/18  0410   ALK PHOS U/L 268* 296* 290*   BILIRUBIN mg/dL 1.4* 2.0* 4.7*   ALT (SGPT) U/L 48* 62* 88*   AST (SGOT) U/L 25 40* 81*       MRCP  IMPRESSION  1. Findings consistent with acute pancreatitis.  2. Mild biliary dilatation. No bile duct stones or strictures are seen.  3. Minimal pleural fluid.      Assessment/Plan   Principal Problem:    Acute biliary pancreatitis  Active Problems:    Diabetes mellitus    Benign essential hypertension    S/P cholecystectomy    Hypertension      PLAN  - MRCP shows acute pancreatitis with mild biliary dilation with no stones or strictures noted.  GI plans EGD today Lipase is trending down  - advance diet following procedure  - continue estrace cream for irritation  - She has a listed history of diabetes her last A1c was 5.8.  Given her age will follow.  Her  glucose was elevated on her BMP 2/18 but this morning remains within normal limits.    Disposition  Maybe home Friday or Saturday depending on GI's decisions on procedures    > 35 mins spent in counseling and coordination regarding DC plan and procedures planned   .  Florencio Galan MD  [facility]  02/22/18  3:55 PM

## 2018-02-22 NOTE — PLAN OF CARE
Problem: Patient Care Overview (Adult)  Goal: Plan of Care Review  Outcome: Ongoing (interventions implemented as appropriate)   02/22/18 0708   Coping/Psychosocial Response Interventions   Plan Of Care Reviewed With patient   Outcome Evaluation   Outcome Summary/Follow up Plan Vitals stable. No complaints of pain. Up with assistance to BR. WEnt for EGD this am. Tolerating clear liquid diet and will be advanced as tolerated.    Patient Care Overview   Progress improving     Goal: Adult Individualization and Mutuality  Outcome: Ongoing (interventions implemented as appropriate)    Goal: Discharge Needs Assessment  Outcome: Ongoing (interventions implemented as appropriate)      Problem: Pancreatitis, Acute/Chronic (Adult)  Goal: Signs and Symptoms of Listed Potential Problems Will be Absent or Manageable (Pancreatitis, Acute/Chronic)  Outcome: Ongoing (interventions implemented as appropriate)      Problem: Pressure Ulcer Risk (Jani Scale) (Adult,Obstetrics,Pediatric)  Goal: Skin Integrity  Outcome: Ongoing (interventions implemented as appropriate)

## 2018-02-22 NOTE — ANESTHESIA POSTPROCEDURE EVALUATION
Patient: Monica Scherer    Procedure Summary     Date Anesthesia Start Anesthesia Stop Room / Location    02/22/18 1116 1142  [facility] /  [facility]       Procedure Diagnosis Surgeon Provider    ESOPHAGOGASTRODUODENOSCOPY with biopsies (N/A Esophagus) Duodenogastric bile reflux; Duodenal ulcer  (Gastroesophageal reflux disease with esophagitis [K21.0]) MD Danni Tovar MD          Anesthesia Type: MAC  Last vitals  BP   120/55 (02/22/18 1148)   Temp   36.7 °C (98.1 °F) (02/22/18 1027)   Pulse   71 (02/22/18 1148)   Resp   16 (02/22/18 1148)     SpO2   96 % (02/22/18 1148)     Post Anesthesia Care and Evaluation    Patient location during evaluation: bedside  Patient participation: complete - patient cannot participate  Level of consciousness: awake and alert  Pain management: adequate  Airway patency: patent  Anesthetic complications: No anesthetic complications  PONV Status: controlled  Cardiovascular status: acceptable  Respiratory status: acceptable  Hydration status: acceptable

## 2018-02-22 NOTE — PLAN OF CARE
Problem: GI Endoscopy (Adult)  Goal: Signs and Symptoms of Listed Potential Problems Will be Absent or Manageable (GI Endoscopy)  Outcome: Ongoing (interventions implemented as appropriate)   02/22/18 0958   GI Endoscopy   Problems Present (GI Endoscopy) none

## 2018-02-22 NOTE — PLAN OF CARE
Problem: Patient Care Overview (Adult)  Goal: Plan of Care Review  Outcome: Ongoing (interventions implemented as appropriate)   02/22/18 0151   Coping/Psychosocial Response Interventions   Plan Of Care Reviewed With patient;daughter   Outcome Evaluation   Outcome Summary/Follow up Plan no c/o pain. VSS. tolerates full liquid diet. NPO at midnight for EGD.

## 2018-02-22 NOTE — ANESTHESIA PREPROCEDURE EVALUATION
Anesthesia Evaluation     Patient summary reviewed and Nursing notes reviewed   NPO Solid Status: > 8 hours  NPO Liquid Status: > 2 hours           Airway   Mallampati: II  TM distance: >3 FB  Neck ROM: limited  no difficulty expected  Dental    (+) poor dentition        Pulmonary - normal exam   Cardiovascular - normal exam    (+) hypertension well controlled, hyperlipidemia      Neuro/Psych  (+) TIA, dizziness/light headedness,     GI/Hepatic/Renal/Endo    (+)  diabetes mellitus,     Musculoskeletal     Abdominal  - normal exam   Substance History      OB/GYN          Other   (+) arthritis (cervical ddd)                   Anesthesia Plan    ASA 3     MAC     Anesthetic plan and risks discussed with patient.

## 2018-02-22 NOTE — PLAN OF CARE
Problem: Patient Care Overview (Adult)  Goal: Plan of Care Review  Outcome: Ongoing (interventions implemented as appropriate)   02/21/18 3685   Coping/Psychosocial Response Interventions   Plan Of Care Reviewed With patient   Outcome Evaluation   Outcome Summary/Follow up Plan denies pain. elaina full liq diet. amb in samuel and sat in chair today with encouragement. family supportive. NPO after MN for EGD tomorrow. IVFs continue   Patient Care Overview   Progress improving

## 2018-02-23 LAB
ALBUMIN SERPL-MCNC: 3 G/DL (ref 3.5–5.2)
ALBUMIN/GLOB SERPL: 0.8 G/DL
ALP SERPL-CCNC: 233 U/L (ref 39–117)
ALT SERPL W P-5'-P-CCNC: 37 U/L (ref 1–33)
ANION GAP SERPL CALCULATED.3IONS-SCNC: 4.7 MMOL/L
AST SERPL-CCNC: 26 U/L (ref 1–32)
BASOPHILS # BLD AUTO: 0.02 10*3/MM3 (ref 0–0.2)
BASOPHILS NFR BLD AUTO: 0.2 % (ref 0–1.5)
BILIRUB SERPL-MCNC: 1 MG/DL (ref 0.1–1.2)
BUN BLD-MCNC: 13 MG/DL (ref 8–23)
BUN/CREAT SERPL: 17.1 (ref 7–25)
CALCIUM SPEC-SCNC: 9.1 MG/DL (ref 8.6–10.5)
CHLORIDE SERPL-SCNC: 102 MMOL/L (ref 98–107)
CO2 SERPL-SCNC: 26.3 MMOL/L (ref 22–29)
CREAT BLD-MCNC: 0.76 MG/DL (ref 0.57–1)
CYTO UR: NORMAL
DEPRECATED RDW RBC AUTO: 54.9 FL (ref 37–54)
EOSINOPHIL # BLD AUTO: 0.31 10*3/MM3 (ref 0–0.7)
EOSINOPHIL NFR BLD AUTO: 3.2 % (ref 0.3–6.2)
ERYTHROCYTE [DISTWIDTH] IN BLOOD BY AUTOMATED COUNT: 16.4 % (ref 11.7–13)
GFR SERPL CREATININE-BSD FRML MDRD: 73 ML/MIN/1.73
GLOBULIN UR ELPH-MCNC: 3.9 GM/DL
GLUCOSE BLD-MCNC: 114 MG/DL (ref 65–99)
HCT VFR BLD AUTO: 34.6 % (ref 35.6–45.5)
HGB BLD-MCNC: 11.6 G/DL (ref 11.9–15.5)
IMM GRANULOCYTES # BLD: 0.08 10*3/MM3 (ref 0–0.03)
IMM GRANULOCYTES NFR BLD: 0.8 % (ref 0–0.5)
LAB AP CASE REPORT: NORMAL
LIPASE SERPL-CCNC: 146 U/L (ref 13–60)
LYMPHOCYTES # BLD AUTO: 1.83 10*3/MM3 (ref 0.9–4.8)
LYMPHOCYTES NFR BLD AUTO: 19.1 % (ref 19.6–45.3)
Lab: NORMAL
MAGNESIUM SERPL-MCNC: 1.9 MG/DL (ref 1.6–2.4)
MCH RBC QN AUTO: 30.9 PG (ref 26.9–32)
MCHC RBC AUTO-ENTMCNC: 33.5 G/DL (ref 32.4–36.3)
MCV RBC AUTO: 92 FL (ref 80.5–98.2)
MONOCYTES # BLD AUTO: 0.74 10*3/MM3 (ref 0.2–1.2)
MONOCYTES NFR BLD AUTO: 7.7 % (ref 5–12)
NEUTROPHILS # BLD AUTO: 6.58 10*3/MM3 (ref 1.9–8.1)
NEUTROPHILS NFR BLD AUTO: 69 % (ref 42.7–76)
PATH REPORT.FINAL DX SPEC: NORMAL
PATH REPORT.GROSS SPEC: NORMAL
PLATELET # BLD AUTO: 274 10*3/MM3 (ref 140–500)
PMV BLD AUTO: 9.2 FL (ref 6–12)
POTASSIUM BLD-SCNC: 3.6 MMOL/L (ref 3.5–5.2)
POTASSIUM BLD-SCNC: 4.8 MMOL/L (ref 3.5–5.2)
PROT SERPL-MCNC: 6.9 G/DL (ref 6–8.5)
RBC # BLD AUTO: 3.76 10*6/MM3 (ref 3.9–5.2)
SODIUM BLD-SCNC: 133 MMOL/L (ref 136–145)
UREASE TISS QL: NEGATIVE
WBC NRBC COR # BLD: 9.56 10*3/MM3 (ref 4.5–10.7)

## 2018-02-23 PROCEDURE — 83690 ASSAY OF LIPASE: CPT | Performed by: HOSPITALIST

## 2018-02-23 PROCEDURE — 85025 COMPLETE CBC W/AUTO DIFF WBC: CPT | Performed by: HOSPITALIST

## 2018-02-23 PROCEDURE — 83735 ASSAY OF MAGNESIUM: CPT | Performed by: HOSPITALIST

## 2018-02-23 PROCEDURE — 84132 ASSAY OF SERUM POTASSIUM: CPT | Performed by: HOSPITALIST

## 2018-02-23 PROCEDURE — 99231 SBSQ HOSP IP/OBS SF/LOW 25: CPT | Performed by: INTERNAL MEDICINE

## 2018-02-23 PROCEDURE — 80053 COMPREHEN METABOLIC PANEL: CPT | Performed by: HOSPITALIST

## 2018-02-23 RX ORDER — PANTOPRAZOLE SODIUM 40 MG/1
40 TABLET, DELAYED RELEASE ORAL
Status: DISCONTINUED | OUTPATIENT
Start: 2018-02-23 | End: 2018-02-24 | Stop reason: HOSPADM

## 2018-02-23 RX ADMIN — PANTOPRAZOLE SODIUM 40 MG: 40 TABLET, DELAYED RELEASE ORAL at 06:37

## 2018-02-23 RX ADMIN — FAMOTIDINE 20 MG: 20 TABLET, FILM COATED ORAL at 20:10

## 2018-02-23 RX ADMIN — PANTOPRAZOLE SODIUM 40 MG: 40 TABLET, DELAYED RELEASE ORAL at 17:06

## 2018-02-23 RX ADMIN — FELODIPINE 10 MG: 10 TABLET, FILM COATED, EXTENDED RELEASE ORAL at 08:34

## 2018-02-23 RX ADMIN — POTASSIUM CHLORIDE 40 MEQ: 750 CAPSULE, EXTENDED RELEASE ORAL at 16:05

## 2018-02-23 RX ADMIN — POTASSIUM CHLORIDE 40 MEQ: 750 CAPSULE, EXTENDED RELEASE ORAL at 12:15

## 2018-02-23 RX ADMIN — FAMOTIDINE 20 MG: 20 TABLET, FILM COATED ORAL at 08:34

## 2018-02-23 NOTE — PLAN OF CARE
Problem: Patient Care Overview (Adult)  Goal: Plan of Care Review  Outcome: Ongoing (interventions implemented as appropriate)   02/23/18 6628   Coping/Psychosocial Response Interventions   Plan Of Care Reviewed With patient   Outcome Evaluation   Outcome Summary/Follow up Plan vss, elaina diet well no nausea, no c/o pain   Patient Care Overview   Progress improving     Goal: Adult Individualization and Mutuality  Outcome: Ongoing (interventions implemented as appropriate)    Goal: Discharge Needs Assessment  Outcome: Ongoing (interventions implemented as appropriate)      Problem: Pancreatitis, Acute/Chronic (Adult)  Goal: Signs and Symptoms of Listed Potential Problems Will be Absent or Manageable (Pancreatitis, Acute/Chronic)  Outcome: Ongoing (interventions implemented as appropriate)      Problem: Pressure Ulcer Risk (Jani Scale) (Adult,Obstetrics,Pediatric)  Goal: Skin Integrity  Outcome: Ongoing (interventions implemented as appropriate)

## 2018-02-23 NOTE — PLAN OF CARE
Problem: Patient Care Overview (Adult)  Goal: Plan of Care Review  Outcome: Ongoing (interventions implemented as appropriate)   02/23/18 0510   Coping/Psychosocial Response Interventions   Plan Of Care Reviewed With patient   Outcome Evaluation   Outcome Summary/Follow up Plan vss. elaina solid food last pm. no c/o pain, no c/o nausea. slept quietly between care through the night. family member at bedside.    Patient Care Overview   Progress improving     Goal: Adult Individualization and Mutuality  Outcome: Ongoing (interventions implemented as appropriate)    Goal: Discharge Needs Assessment  Outcome: Ongoing (interventions implemented as appropriate)      Problem: Pancreatitis, Acute/Chronic (Adult)  Goal: Signs and Symptoms of Listed Potential Problems Will be Absent or Manageable (Pancreatitis, Acute/Chronic)  Outcome: Ongoing (interventions implemented as appropriate)      Problem: Pressure Ulcer Risk (Jani Scale) (Adult,Obstetrics,Pediatric)  Goal: Skin Integrity  Outcome: Ongoing (interventions implemented as appropriate)

## 2018-02-23 NOTE — PROGRESS NOTES
LOS: 5 days     Name: Monica Scherer  Age/Sex: 84 y.o. female  :  1933        PCP: Juan Hernandez Jr., MD    Subjective   No further vomiting or pain.  Feeling a lot better no pain     General: No Fever or Chills, Cardiac: No Chest Pain or Palpitations, Resp: No Cough or SOA, GI: No Nausea, Vomiting, or Diarrhea and Other: No bleeding      estradiol 2 g Vaginal Nightly   famotidine 20 mg Oral BID   felodipine 10 mg Oral Q24H   pantoprazole 40 mg Oral BID AC          Objective   Vital Signs  Temp:  [97.4 °F (36.3 °C)-98.7 °F (37.1 °C)] 98.2 °F (36.8 °C)  Heart Rate:  [63-77] 77  Resp:  [16] 16  BP: (134-149)/(67-79) 136/74  Body mass index is 28.38 kg/(m^2).    Intake/Output Summary (Last 24 hours) at 18 1558  Last data filed at 18 1300   Gross per 24 hour   Intake             1746 ml   Output                0 ml   Net             1746 ml       Physical Exam   Constitutional: She is oriented to person, place, and time. She appears well-developed and well-nourished. No distress.   HENT:   Head: Normocephalic and atraumatic.   Eyes: Conjunctivae and EOM are normal. No scleral icterus.   Neck: Neck supple. No JVD present.   Cardiovascular: Normal rate, regular rhythm and normal heart sounds.    Pulmonary/Chest: Effort normal and breath sounds normal. No respiratory distress.   Abdominal: Soft. Bowel sounds are normal. There is no tenderness. There is no rebound and no guarding.   Genitourinary:   Genitourinary Comments: There is about a 3 inch prolapse from the vagina   Musculoskeletal: Normal range of motion. She exhibits no edema.   Neurological: She is alert and oriented to person, place, and time.   Skin: Skin is warm and dry. No rash noted. She is not diaphoretic.   Psychiatric: She has a normal mood and affect. Her behavior is normal.   Nursing note and vitals reviewed.        Results Review:       I reviewed the patient's new clinical results.    Results from last 7 days  Lab Units  02/23/18  0614 02/21/18  0533 02/20/18  0410 02/19/18  0408 02/18/18  1349   WBC 10*3/mm3 9.56 9.73 10.54 13.70* 25.55*   HEMOGLOBIN g/dL 11.6* 10.9* 10.3* 10.6* 12.3   PLATELETS 10*3/mm3 274 245 220 266 283       Results from last 7 days  Lab Units 02/23/18  0614 02/22/18  0302 02/21/18  0533 02/20/18  0410 02/19/18  0408 02/18/18  1349   SODIUM mmol/L 133* 140 135* 141 141 141   POTASSIUM mmol/L 3.6 3.9 3.8 4.7 2.9* 3.3*   CHLORIDE mmol/L 102 106 101 107 102 100   CO2 mmol/L 26.3 22.2 23.0 23.2 25.1 26.4   BUN mg/dL 13 8 8 11 12 17   CREATININE mg/dL 0.76 0.67 0.79 0.89 0.84 0.97   CALCIUM mg/dL 9.1 8.8 8.6 8.6 8.2* 8.9   MAGNESIUM mg/dL 1.9  --   --   --   --   --    Estimated Creatinine Clearance: 51.9 mL/min (by C-G formula based on Cr of 0.76).  Lab Results   Component Value Date    HGBA1C 5.87 (H) 11/22/2017    HGBA1C 6.3 (H) 11/08/2016    HGBA1C 6.6 (H) 06/09/2015   No results found for: POCGLU    Results from last 7 days  Lab Units 02/23/18  0614 02/22/18  0302 02/21/18  0533   ALK PHOS U/L 233* 268* 296*   BILIRUBIN mg/dL 1.0 1.4* 2.0*   ALT (SGPT) U/L 37* 48* 62*   AST (SGOT) U/L 26 25 40*       MRCP  IMPRESSION  1. Findings consistent with acute pancreatitis.  2. Mild biliary dilatation. No bile duct stones or strictures are seen.  3. Minimal pleural fluid.      Assessment/Plan   Principal Problem:    Acute biliary pancreatitis  Active Problems:    Diabetes mellitus    Benign essential hypertension    S/P cholecystectomy    Hypertension      PLAN  - MRCP shows acute pancreatitis with mild biliary dilation with no stones or strictures noted.  GI plans EGD with ucler now on PPI and lipase trending down   - advance diet following procedure  - continue estrace cream for irritation  - She has a listed history of diabetes her last A1c was 5.8.  Given her age will follow.  Her glucose was elevated on her BMP 2/18 but this morning remains within normal limits.    Disposition  Home in AM  .  Florencio Galan,  MD [facility]  02/23/18  3:58 PM

## 2018-02-23 NOTE — PROGRESS NOTES
[facility]  Inpatient Progress Note    Reason for Follow Up:  Acute pancreatitis, elevated liver tests    Subjective     Interval History:   EGD yesterday with one non bleeding duodenal ulcer, gastritis     No abdominal pain, nausea or vomting. Tolerating regular diet    Current Facility-Administered Medications:   •  acetaminophen (TYLENOL) tablet 650 mg, 650 mg, Oral, Q4H PRN, Joshua Johnston MD, 650 mg at 02/20/18 1835  •  estradiol (ESTRACE) vaginal cream 2 g, 2 g, Vaginal, Nightly, Florencio Galan MD, 2 g at 02/21/18 2109  •  famotidine (PEPCID) tablet 20 mg, 20 mg, Oral, BID, Donnie Tobias MD, 20 mg at 02/23/18 0834  •  felodipine (PLENDIL) 24 hr tablet 10 mg, 10 mg, Oral, Q24H, Joshua Johnston MD, 10 mg at 02/23/18 0834  •  morphine injection 2 mg, 2 mg, Intravenous, Q2H PRN, Joshua Johnston MD, 2 mg at 02/19/18 2026  •  ondansetron (ZOFRAN) tablet 4 mg, 4 mg, Oral, Q6H PRN **OR** ondansetron ODT (ZOFRAN-ODT) disintegrating tablet 4 mg, 4 mg, Oral, Q6H PRN **OR** ondansetron (ZOFRAN) injection 4 mg, 4 mg, Intravenous, Q6H PRN, Florencio Galan MD, 4 mg at 02/19/18 2159  •  pantoprazole (PROTONIX) EC tablet 40 mg, 40 mg, Oral, Q AM, Emerson RAMÍREZ MD, 40 mg at 02/23/18 0637  •  potassium chloride (MICRO-K) CR capsule 40 mEq, 40 mEq, Oral, PRN, 40 mEq at 02/23/18 1215 **OR** potassium chloride (KLOR-CON) packet 40 mEq, 40 mEq, Oral, PRN **OR** potassium chloride 10 mEq in 100 mL IVPB, 10 mEq, Intravenous, Q1H PRN, Florencio Galan MD  •  promethazine (PHENERGAN) tablet 12.5 mg, 12.5 mg, Oral, Q6H PRN **OR** promethazine (PHENERGAN) injection 12.5 mg, 12.5 mg, Intravenous, Q6H PRN, Florencio Galan MD  •  sodium chloride 0.9 % flush 1-10 mL, 1-10 mL, Intravenous, PRN, Joshua Johnston MD  •  sodium chloride 0.9 % flush 10 mL, 10 mL, Intravenous, PRN, Ivan Watson MD  Review of Systems:    The following systems were reviewed and negative;  respiratory, cardiovascular,  musculoskeletal and neurological    Objective     Vital Signs  Temp:  [97.1 °F (36.2 °C)-98.7 °F (37.1 °C)] 97.5 °F (36.4 °C)  Heart Rate:  [63-88] 68  Resp:  [16] 16  BP: (127-167)/(62-79) 149/67  Body mass index is 28.38 kg/(m^2).    Intake/Output Summary (Last 24 hours) at 02/23/18 1216  Last data filed at 02/23/18 0900   Gross per 24 hour   Intake             1736 ml   Output                0 ml   Net             1736 ml     I/O this shift:  In: 330 [P.O.:330]  Out: -      Physical Exam:   General: patient awake, alert and cooperative   Eyes: Normal lids and lashes, no scleral icterus   Neck: supple, normal ROM   Skin: warm and dry, not jaundiced   Cardiovascular: regular rhythm and rate, no murmurs auscultated   Pulm: clear to auscultation bilaterally, regular and unlabored   Abdomen: soft, nontender, nondistended; normal bowel sounds   Rectal: deferred   Extremities: no rash or edema   Psychiatric: Normal mood and behavior; memory intact     Results Review:     I reviewed the patient's new clinical results.      Results from last 7 days  Lab Units 02/23/18  0614 02/21/18  0533 02/20/18  0410   WBC 10*3/mm3 9.56 9.73 10.54   HEMOGLOBIN g/dL 11.6* 10.9* 10.3*   HEMATOCRIT % 34.6* 36.3 32.6*   PLATELETS 10*3/mm3 274 245 220       Results from last 7 days  Lab Units 02/23/18  0614 02/22/18  0302 02/21/18  0533   SODIUM mmol/L 133* 140 135*   POTASSIUM mmol/L 3.6 3.9 3.8   CHLORIDE mmol/L 102 106 101   CO2 mmol/L 26.3 22.2 23.0   BUN mg/dL 13 8 8   CREATININE mg/dL 0.76 0.67 0.79   CALCIUM mg/dL 9.1 8.8 8.6   BILIRUBIN mg/dL 1.0 1.4* 2.0*   ALK PHOS U/L 233* 268* 296*   ALT (SGPT) U/L 37* 48* 62*   AST (SGOT) U/L 26 25 40*   GLUCOSE mg/dL 114* 106* 104*       Results from last 7 days  Lab Units 02/21/18  0533 02/20/18  0410   INR  1.10 1.24*       Lab Results  Lab Value Date/Time   LIPASE 146 (H) 02/23/2018 0614   LIPASE 145 (H) 02/22/2018 0302   LIPASE 193 (H) 02/21/2018 1456   LIPASE 547 (H) 02/20/2018 0410    LIPASE >600 (H) 02/19/2018 0408   LIPASE >600 (H) 02/18/2018 1349   LIPASE 11 (L) 06/07/2016 2006       Radiology:  MRI abdomen w wo contrast mrcp   Final Result      CT Abdomen Pelvis With Contrast   Final Result       1. Acute pancreatitis. Biliary ductal dilatation without a specific   cause identified, further evaluation could be considered.   2. Inflammatory changes around the proximal duodenum, presumably   reactive versus coexisting duodenitis.            Discussed by telephone Dr. Watson at time of interpretation, 1700,   02/18/2018.           This report was finalized on 2/18/2018 5:00 PM by Dr. Sai Plasencia MD.              Assessment/Plan     Patient Active Problem List   Diagnosis   • Vertigo   • Temporary cerebral vascular dysfunction   • Gastroesophageal reflux disease with esophagitis   • Degeneration of intervertebral disc of cervical region   • Diabetes mellitus   • Benign essential hypertension   • S/P cholecystectomy   • Osteoarthritis of knee   • Herpes zoster without complication   • Acute viral bronchitis   • Acute biliary pancreatitis   • Hypertension       I discussed the patients findings and my recommendations with patient and nursing staff.      CORTEZ Bae      Discussed endoscopy findings with patient and patient's daughter.  Biopsy results are pending.  Patient should follow up with Dr. Lechuga as outlined below.  Please notify them of the date and time for appointment.  We'll sign off.        Impression   1) Resolving pancreatitis with elevated LFT's - lipase trending down. Tolerating diet  2) CT scan with abnormal duodenum-  Duodenal ulcer on EGD    Plan  PPI BID for duodenal ulcer  Pathology pending. [facility] office will call you with results.    Dr. NADJA Lechuga wants to see the patient in the office to discuss possible outpatient ERCP  Appointment on 3/13/18 at 10:15am  GI will sign off

## 2018-02-24 VITALS
RESPIRATION RATE: 16 BRPM | HEART RATE: 81 BPM | HEIGHT: 64 IN | DIASTOLIC BLOOD PRESSURE: 75 MMHG | BODY MASS INDEX: 28.23 KG/M2 | OXYGEN SATURATION: 96 % | SYSTOLIC BLOOD PRESSURE: 133 MMHG | WEIGHT: 165.34 LBS | TEMPERATURE: 97.3 F

## 2018-02-24 LAB
ALBUMIN SERPL-MCNC: 3.2 G/DL (ref 3.5–5.2)
ALBUMIN/GLOB SERPL: 0.9 G/DL
ALP SERPL-CCNC: 203 U/L (ref 39–117)
ALT SERPL W P-5'-P-CCNC: 37 U/L (ref 1–33)
ANION GAP SERPL CALCULATED.3IONS-SCNC: 10.7 MMOL/L
AST SERPL-CCNC: 29 U/L (ref 1–32)
BILIRUB SERPL-MCNC: 0.7 MG/DL (ref 0.1–1.2)
BUN BLD-MCNC: 22 MG/DL (ref 8–23)
BUN/CREAT SERPL: 19.6 (ref 7–25)
CALCIUM SPEC-SCNC: 9.1 MG/DL (ref 8.6–10.5)
CHLORIDE SERPL-SCNC: 101 MMOL/L (ref 98–107)
CO2 SERPL-SCNC: 23.3 MMOL/L (ref 22–29)
CREAT BLD-MCNC: 1.12 MG/DL (ref 0.57–1)
GFR SERPL CREATININE-BSD FRML MDRD: 46 ML/MIN/1.73
GLOBULIN UR ELPH-MCNC: 3.7 GM/DL
GLUCOSE BLD-MCNC: 164 MG/DL (ref 65–99)
LIPASE SERPL-CCNC: 202 U/L (ref 13–60)
POTASSIUM BLD-SCNC: 4 MMOL/L (ref 3.5–5.2)
PROT SERPL-MCNC: 6.9 G/DL (ref 6–8.5)
SODIUM BLD-SCNC: 135 MMOL/L (ref 136–145)

## 2018-02-24 PROCEDURE — 83690 ASSAY OF LIPASE: CPT | Performed by: HOSPITALIST

## 2018-02-24 PROCEDURE — 80053 COMPREHEN METABOLIC PANEL: CPT | Performed by: HOSPITALIST

## 2018-02-24 RX ORDER — PANTOPRAZOLE SODIUM 40 MG/1
40 TABLET, DELAYED RELEASE ORAL
Qty: 60 TABLET | Refills: 1 | Status: SHIPPED | OUTPATIENT
Start: 2018-02-24 | End: 2018-04-27 | Stop reason: SDUPTHER

## 2018-02-24 RX ORDER — ESTRADIOL 0.1 MG/G
2 CREAM VAGINAL NIGHTLY
Qty: 42.5 G | Refills: 1 | Status: SHIPPED | OUTPATIENT
Start: 2018-02-24 | End: 2018-07-31

## 2018-02-24 RX ORDER — ONDANSETRON 4 MG/1
4 TABLET, ORALLY DISINTEGRATING ORAL EVERY 6 HOURS PRN
Qty: 21 TABLET | Refills: 0 | Status: SHIPPED | OUTPATIENT
Start: 2018-02-24 | End: 2018-03-22

## 2018-02-24 RX ADMIN — FAMOTIDINE 20 MG: 20 TABLET, FILM COATED ORAL at 08:58

## 2018-02-24 RX ADMIN — PANTOPRAZOLE SODIUM 40 MG: 40 TABLET, DELAYED RELEASE ORAL at 06:52

## 2018-02-24 RX ADMIN — FELODIPINE 10 MG: 10 TABLET, FILM COATED, EXTENDED RELEASE ORAL at 08:58

## 2018-02-24 NOTE — DISCHARGE SUMMARY
Date of Admission: 2/18/2018  Date of Discharge:  2/24/2018    PCP: Juan Hernandez Jr., MD      DISCHARGE DIAGNOSIS  Principal Problem:    Acute biliary pancreatitis  Active Problems:    Diabetes mellitus    Benign essential hypertension    S/P cholecystectomy    Hypertension      SECONDARY DIAGNOSES  Past Medical History:   Diagnosis Date   • Diabetes mellitus     boarderline   • Hyperlipidemia    • Hypertension    • Prolapsed uterus     per patient       CONSULTS   Consults     Date and Time Order Name Status Description    2/18/2018 5206 Inpatient Consult to Gastroenterology Completed     2/18/2018 8267 [facility] (on-call MD unless specified) Completed           PROCEDURES PERFORMED  MRI abdomen w wo contrast mrcp   Final Result      CT Abdomen Pelvis With Contrast   Final Result       1. Acute pancreatitis. Biliary ductal dilatation without a specific   cause identified, further evaluation could be considered.   2. Inflammatory changes around the proximal duodenum, presumably   reactive versus coexisting duodenitis.            Discussed by telephone Dr. Watson at time of interpretation, 1700,   02/18/2018.           This report was finalized on 2/18/2018 5:00 PM by Dr. Sai Plasencia MD.          EGD- DUODENAL ULCER FOUND      HOSPITAL COURSE  Patient is a 84 y.o. female presented to [facility] complaining of abdominal pain nausea and vomiting.  Please see the admitting history and physical for further details.  He was made to the hospital with acute pancreatitis.  She was started on IV fluids and pain medications and supportively manage.  Her LFTs and bilirubin were elevated consistent with an obstructive process.  GI was consulted.  She had an MRCP done that did not show any focal stones or strictures.  She is continued on IV fluids her diet was slowly advanced.  Her case was discussed with Dr. Lechuga who planned for her to have an EGD done here to evaluate for any ulcers and then  "follow-up with him in the office in a few weeks to discuss ERCP down the road.  At any rate she is now tolerating a diet her pain is controlled she is feeling much better and really wants to go home today.  She's been started on Protonix for her ulcers and will follow-up with Dr. Lechuga.  She's had multiple episodes of this nausea and vomiting that been somewhat recurrent with pain.  Sounds like she might have microcytic stones or some form of stricture that's been intermittent at times.  She'll need an ERCP down the road but that can be further discussed in the outpatient setting.  At this point the plan is for her to go home today.      CONDITION ON DISCHARGE  Stable.      VITAL SIGNS  /75 (BP Location: Left arm, Patient Position: Lying)  Pulse 81  Temp 97.3 °F (36.3 °C) (Oral)   Resp 16  Ht 162.6 cm (64\")  Wt 75 kg (165 lb 5.5 oz)  SpO2 96%  BMI 28.38 kg/m2  Objective:  General Appearance:  Comfortable.    Vital signs: (most recent): Blood pressure 133/75, pulse 81, temperature 97.3 °F (36.3 °C), temperature source Oral, resp. rate 16, height 162.6 cm (64\"), weight 75 kg (165 lb 5.5 oz), SpO2 96 %.  Vital signs are normal.    Output: Producing urine.    HEENT: Normal HEENT exam.    Lungs:  Normal effort.  Breath sounds clear to auscultation.    Heart: Normal rate.  S1 normal and S2 normal.    Abdomen: Abdomen is soft.  Bowel sounds are normal.   There is no abdominal tenderness.     Extremities: Normal range of motion.    Pulses: Distal pulses are intact.    Neurological: Patient is alert and oriented to person, place and time.    Skin:  Warm and dry.                DISCHARGE DISPOSITION   Home or Self Care      DISCHARGE MEDICATIONS   Monica Scherer   Home Medication Instructions JOSHUA:[id number]    Printed on:02/24/18 7362   Medication Information                      estradiol (ESTRACE) 0.1 MG/GM vaginal cream  Insert 2 g into the vagina Every Night.             felodipine (PLENDIL) 10 MG 24 " hr tablet  TAKE 1 TABLET BY MOUTH EVERY DAY.             ondansetron ODT (ZOFRAN-ODT) 4 MG disintegrating tablet  Take 1 tablet by mouth Every 6 (Six) Hours As Needed for Nausea or Vomiting.             pantoprazole (PROTONIX) 40 MG EC tablet  Take 1 tablet by mouth 2 (Two) Times a Day Before Meals.               Diet Instructions     Diet: Regular; Thin       Discharge Diet:  Regular   Fluid Consistency:  Thin              Activity Instructions     Activity as Tolerated                 Future Appointments  Date Time Provider Department Center   3/8/2018 10:40 AM MD LAURIE Guerrero Jr. None   3/13/2018 10:15 AM MD LAURIE Garrison [facility] None   3/22/2018 9:20 AM MD LAURIE Guerrero Jr. None     Additional Instructions for the Follow-ups that You Need to Schedule     Discharge Follow-up with PCP    As directed    Follow Up Details:  2- 4 weeks           Discharge Follow-up with Specified Provider: Dr Lechuga as directed    As directed    To:  Dr Lechuga as directed           Discharge Follow-up with Specified Provider: Dr Fontanez; 6 Weeks    As directed    To:  Dr Fontanez    Follow Up:  6 Weeks                 Follow-up Information     Follow up with Sarah Fontanez MD .    Specialties:  Obstetrics and Gynecology, Gynecology    Why:  call for follow-up for prolasped uterus.     Contact information:    [address] [facility] [address]  [place] [id number] [phone]          Follow up with Juan Hernandez Jr., MD .    Specialty:  Internal Medicine    Why:  2- 4 weeks    Contact information:    [address] [place] [id number] [phone]            TEST  RESULTS PENDING AT DISCHARGE         Florencio Galan MD  [facility]  02/24/18  7:44 AM      Time: greater than 30 minutes.

## 2018-02-24 NOTE — PLAN OF CARE
Problem: Patient Care Overview (Adult)  Goal: Plan of Care Review  Outcome: Ongoing (interventions implemented as appropriate)   02/24/18 0314   Coping/Psychosocial Response Interventions   Plan Of Care Reviewed With patient   Outcome Evaluation   Outcome Summary/Follow up Plan VSS. No c/o pain or nausea. Ambulated in the samuel. Voided freely.    Patient Care Overview   Progress improving     Goal: Adult Individualization and Mutuality  Outcome: Ongoing (interventions implemented as appropriate)    Goal: Discharge Needs Assessment  Outcome: Ongoing (interventions implemented as appropriate)      Problem: Pancreatitis, Acute/Chronic (Adult)  Goal: Signs and Symptoms of Listed Potential Problems Will be Absent or Manageable (Pancreatitis, Acute/Chronic)  Outcome: Ongoing (interventions implemented as appropriate)      Problem: Pressure Ulcer Risk (Jani Scale) (Adult,Obstetrics,Pediatric)  Goal: Skin Integrity  Outcome: Ongoing (interventions implemented as appropriate)

## 2018-03-08 ENCOUNTER — TELEPHONE (OUTPATIENT)
Dept: GASTROENTEROLOGY | Facility: CLINIC | Age: 83
End: 2018-03-08

## 2018-03-08 NOTE — TELEPHONE ENCOUNTER
----- Message from Emerson RAMÍREZ MD sent at 2/27/2018  4:56 PM EST -----  Regarding: Biopsy results  To call results, recommend follow-up EGD with Dr. Tobias in 3 months, continue PPI.  ----- Message -----     From: Lab, Background User     Sent: 2/23/2018   1:40 PM       To: Emerson RAMÍREZ MD

## 2018-03-13 ENCOUNTER — OFFICE VISIT (OUTPATIENT)
Dept: GASTROENTEROLOGY | Facility: CLINIC | Age: 83
End: 2018-03-13

## 2018-03-13 VITALS
TEMPERATURE: 98.4 F | SYSTOLIC BLOOD PRESSURE: 132 MMHG | DIASTOLIC BLOOD PRESSURE: 62 MMHG | HEIGHT: 64 IN | BODY MASS INDEX: 29.19 KG/M2 | WEIGHT: 171 LBS

## 2018-03-13 DIAGNOSIS — K85.10 ACUTE BILIARY PANCREATITIS WITHOUT INFECTION OR NECROSIS: Primary | ICD-10-CM

## 2018-03-13 DIAGNOSIS — K26.9 DUODENAL ULCER: ICD-10-CM

## 2018-03-13 PROCEDURE — 99213 OFFICE O/P EST LOW 20 MIN: CPT | Performed by: INTERNAL MEDICINE

## 2018-03-13 NOTE — PROGRESS NOTES
Chief Complaint   Patient presents with   • Pancreatitis       Monica Scherer is a  84 y.o. female here for a follow up visit for History of pancreatitis.    HPI    Patient 84-year-old female with history diabetes, hyperlipidemia and hypertension presents status post hospitalization for acute pancreatitis.  Patient in the hospital for about a week with rapid improvement in her pancreatic numbers.  Patient noted with dilated ducts on CT with pancreatitis as well as MRCP.  Patient with no fever or chills and posts pancreatic improvement had EGD revealing a linear duodenal ulcer.  Since discharge patient is been doing well tolerating diet without difficulty.  Patient with no fever or chills no nausea vomiting noted.    Past Medical History:   Diagnosis Date   • Diabetes mellitus     boarderline   • Hyperlipidemia    • Hypertension    • Prolapsed uterus     per patient         Current Outpatient Prescriptions:   •  estradiol (ESTRACE) 0.1 MG/GM vaginal cream, Insert 2 g into the vagina Every Night., Disp: 42.5 g, Rfl: 1  •  felodipine (PLENDIL) 10 MG 24 hr tablet, TAKE 1 TABLET BY MOUTH EVERY DAY. (Patient taking differently: TAKE 1 TABLET BY MOUTH EVERY MORNING), Disp: 90 tablet, Rfl: 3  •  ondansetron ODT (ZOFRAN-ODT) 4 MG disintegrating tablet, Take 1 tablet by mouth Every 6 (Six) Hours As Needed for Nausea or Vomiting., Disp: 21 tablet, Rfl: 0  •  pantoprazole (PROTONIX) 40 MG EC tablet, Take 1 tablet by mouth 2 (Two) Times a Day Before Meals., Disp: 60 tablet, Rfl: 1    Allergies   Allergen Reactions   • Cephalexin Rash   • Latex Itching       Social History     Social History   • Marital status: Single     Spouse name: N/A   • Number of children: N/A   • Years of education: N/A     Occupational History   • Not on file.     Social History Main Topics   • Smoking status: Never Smoker   • Smokeless tobacco: Never Used   • Alcohol use No   • Drug use: No   • Sexual activity: Defer     Other Topics Concern   • Not  on file     Social History Narrative   • No narrative on file       History reviewed. No pertinent family history.    Review of Systems   Constitutional: Negative.    Respiratory: Negative.    Cardiovascular: Negative.    Gastrointestinal: Negative.    Endocrine: Negative.    Skin: Negative.    Hematological: Negative.        Vitals:    03/13/18 1023   BP: 132/62   Temp: 98.4 °F (36.9 °C)       Physical Exam   Constitutional: She is oriented to person, place, and time. She appears well-developed and well-nourished.   HENT:   Head: Normocephalic and atraumatic.   Eyes: Pupils are equal, round, and reactive to light. No scleral icterus.   Cardiovascular: Exam reveals no gallop and no friction rub.    No murmur heard.  Pulmonary/Chest: She has no wheezes. She has no rales.   Abdominal: Soft. Bowel sounds are normal. She exhibits no distension and no mass. There is no tenderness. No hernia.   Neurological: She is alert and oriented to person, place, and time.   Skin: Skin is warm and dry. No rash noted.   Psychiatric: She has a normal mood and affect. Her behavior is normal.   Vitals reviewed.      Admission on 02/18/2018, Discharged on 02/24/2018   No results displayed because visit has over 200 results.          Monica was seen today for pancreatitis.    Diagnoses and all orders for this visit:    Acute biliary pancreatitis without infection or necrosis    Duodenal ulcer      Patient 84-year-old female status post admission for acute pancreatitis found with duodenal ulcer.  Patient's pancreatic enzymes rapidly improved in-hospital.  On review of CT and MRI reading Owen's characterized ductal dilatation with duct measuring approximately 7-8 mm consistent with patient's age and history of cholecystectomy.  No stones were filling defects were seen and most likely pancreatitis was related to the duodenal ulcer or nodularity of the way around.  Patient currently feels well taking Protonix daily.  Would recommend patient  maintain on Protonix daily diet as tolerated and follow-up if symptoms return.  ERCP is not recommended at this time.

## 2018-03-22 ENCOUNTER — OFFICE VISIT (OUTPATIENT)
Dept: INTERNAL MEDICINE | Facility: CLINIC | Age: 83
End: 2018-03-22

## 2018-03-22 VITALS
DIASTOLIC BLOOD PRESSURE: 64 MMHG | HEART RATE: 76 BPM | OXYGEN SATURATION: 96 % | SYSTOLIC BLOOD PRESSURE: 126 MMHG | WEIGHT: 171 LBS | BODY MASS INDEX: 29.19 KG/M2 | HEIGHT: 64 IN

## 2018-03-22 DIAGNOSIS — I10 ESSENTIAL HYPERTENSION: ICD-10-CM

## 2018-03-22 DIAGNOSIS — I10 BENIGN ESSENTIAL HYPERTENSION: ICD-10-CM

## 2018-03-22 DIAGNOSIS — K85.10 ACUTE BILIARY PANCREATITIS WITHOUT INFECTION OR NECROSIS: ICD-10-CM

## 2018-03-22 DIAGNOSIS — K26.9 DUODENAL ULCER: Primary | ICD-10-CM

## 2018-03-22 DIAGNOSIS — I67.82 TEMPORARY CEREBRAL VASCULAR DYSFUNCTION: ICD-10-CM

## 2018-03-22 DIAGNOSIS — Z87.19 HISTORY OF PANCREATITIS: ICD-10-CM

## 2018-03-22 PROBLEM — J20.8 ACUTE VIRAL BRONCHITIS: Status: RESOLVED | Noted: 2018-01-23 | Resolved: 2018-03-22

## 2018-03-22 LAB
ALBUMIN SERPL-MCNC: 4.4 G/DL (ref 3.5–5.2)
ALBUMIN/GLOB SERPL: 1.5 G/DL
ALP SERPL-CCNC: 96 U/L (ref 39–117)
ALT SERPL-CCNC: 12 U/L (ref 1–33)
AST SERPL-CCNC: 16 U/L (ref 1–32)
BASOPHILS # BLD AUTO: 0.03 10*3/MM3 (ref 0–0.2)
BASOPHILS NFR BLD AUTO: 0.3 % (ref 0–1.5)
BILIRUB SERPL-MCNC: 0.6 MG/DL (ref 0.1–1.2)
BUN SERPL-MCNC: 23 MG/DL (ref 8–23)
BUN/CREAT SERPL: 22.1 (ref 7–25)
CALCIUM SERPL-MCNC: 9.5 MG/DL (ref 8.6–10.5)
CHLORIDE SERPL-SCNC: 100 MMOL/L (ref 98–107)
CO2 SERPL-SCNC: 26.5 MMOL/L (ref 22–29)
CREAT SERPL-MCNC: 1.04 MG/DL (ref 0.57–1)
EOSINOPHIL # BLD AUTO: 0.23 10*3/MM3 (ref 0–0.7)
EOSINOPHIL # BLD AUTO: 2.3 % (ref 0.3–6.2)
ERYTHROCYTE [DISTWIDTH] IN BLOOD BY AUTOMATED COUNT: 16.8 % (ref 11.7–13)
GLOBULIN SER CALC-MCNC: 3 GM/DL
GLUCOSE SERPL-MCNC: 141 MG/DL (ref 65–99)
HCT VFR BLD AUTO: 39.2 % (ref 35.6–45.5)
HGB BLD-MCNC: 12.2 G/DL (ref 11.9–15.5)
IMM GRANULOCYTES # BLD: 0.05 10*3/MM3 (ref 0–0.03)
IMM GRANULOCYTES NFR BLD: 0.5 % (ref 0–0.5)
LYMPHOCYTES # BLD AUTO: 3.03 10*3/MM3 (ref 0.9–4.8)
LYMPHOCYTES NFR BLD AUTO: 30.5 % (ref 19.6–45.3)
MCH RBC QN AUTO: 29.8 PG (ref 26.9–32)
MCHC RBC AUTO-ENTMCNC: 31.1 G/DL (ref 32.4–36.3)
MCV RBC AUTO: 95.6 FL (ref 80.5–98.2)
MONOCYTES # BLD AUTO: 0.6 10*3/MM3 (ref 0.2–1.2)
MONOCYTES NFR BLD AUTO: 6 % (ref 5–12)
NEUTROPHILS # BLD AUTO: 5.98 10*3/MM3 (ref 1.9–8.1)
NEUTROPHILS NFR BLD AUTO: 60.4 % (ref 42.7–76)
PLATELET # BLD AUTO: 285 10*3/MM3 (ref 140–500)
POTASSIUM SERPL-SCNC: 4 MMOL/L (ref 3.5–5.2)
PROT SERPL-MCNC: 7.4 G/DL (ref 6–8.5)
RBC # BLD AUTO: 4.1 10*6/MM3 (ref 3.9–5.2)
SODIUM SERPL-SCNC: 141 MMOL/L (ref 136–145)
WBC # BLD AUTO: 9.92 10*3/MM3 (ref 4.5–10.7)

## 2018-03-22 PROCEDURE — 99213 OFFICE O/P EST LOW 20 MIN: CPT | Performed by: INTERNAL MEDICINE

## 2018-03-22 NOTE — PROGRESS NOTES
Subjective   Monica Scherer is a 84 y.o. female.     History of Present Illness she is here today for follow-up of recent hospitalization for duodenal ulcer and secondary pancreatitis.  She has had no further abdominal pain nausea or vomiting.  Her appetite has returned to normal.  She was placed on Protonix 40 mg twice a day and taken off Plavix which had been used to prevent recurrent cerebral vascular episodes.  Presently she denies any further abdominal pain and she has not had any melanotic stool or rectal bleeding.  She denies any dysphagia.        Review of Systems   Constitutional: Positive for activity change and appetite change. Negative for fatigue.   HENT: Negative for trouble swallowing.    Respiratory: Negative for cough, chest tightness and shortness of breath.    Cardiovascular: Negative for chest pain and leg swelling.   Gastrointestinal: Negative for anal bleeding, blood in stool, constipation, diarrhea, nausea and vomiting.   Genitourinary: Negative for flank pain and hematuria.   Neurological: Negative for dizziness, syncope, facial asymmetry, speech difficulty, weakness, numbness and headaches.       Objective   Physical Exam   Constitutional: She is oriented to person, place, and time. Vital signs are normal. She appears well-developed and well-nourished. She is active. She does not appear ill.   Eyes: Conjunctivae are normal.   Neck: Carotid bruit is not present.   Cardiovascular: Normal rate, regular rhythm, S1 normal and S2 normal.  Exam reveals no S3 and no S4.    No murmur heard.  Pulmonary/Chest: No tachypnea. No respiratory distress. She has no decreased breath sounds. She has no wheezes. She has no rhonchi. She has no rales.   Abdominal: Soft. Normal appearance and bowel sounds are normal. She exhibits no abdominal bruit and no mass. There is no hepatosplenomegaly. There is no tenderness.     Vascular Status -  Her right foot exhibits normal right foot edema. Her left foot exhibits  normal left foot edema.  Neurological: She is alert and oriented to person, place, and time. Gait normal.   Psychiatric: She has a normal mood and affect. Her speech is normal and behavior is normal. Judgment and thought content normal. Cognition and memory are normal.         Assessment/Plan assessment #1 duodenal ulcer-doing well on Protonix #2 secondary pancreatitis-resolved #3 hypertension-good control #4 cerebral vascular disease-without recurrence thus far off Plavix    Plan #1 decrease Protonix 40 mg by mouth daily #2 CBC and CMP today.  Routine follow-up with me in 4 months.

## 2018-04-27 RX ORDER — PANTOPRAZOLE SODIUM 40 MG/1
TABLET, DELAYED RELEASE ORAL
Qty: 180 TABLET | Refills: 3 | Status: SHIPPED | OUTPATIENT
Start: 2018-04-27 | End: 2018-11-13 | Stop reason: SDUPTHER

## 2018-04-27 RX ORDER — PANTOPRAZOLE SODIUM 40 MG/1
40 TABLET, DELAYED RELEASE ORAL
Qty: 60 TABLET | Refills: 1 | Status: SHIPPED | OUTPATIENT
Start: 2018-04-27 | End: 2018-04-27 | Stop reason: SDUPTHER

## 2018-05-07 ENCOUNTER — TELEPHONE (OUTPATIENT)
Dept: INTERNAL MEDICINE | Facility: CLINIC | Age: 83
End: 2018-05-07

## 2018-05-07 RX ORDER — AZITHROMYCIN 250 MG/1
TABLET, FILM COATED ORAL
Qty: 6 TABLET | Refills: 0 | Status: SHIPPED | OUTPATIENT
Start: 2018-05-07 | End: 2018-07-31

## 2018-05-07 NOTE — TELEPHONE ENCOUNTER
----- Message from Idalia Bloom sent at 5/7/2018  8:58 AM EDT -----  Contact: Raquel, daughter - Dr Hernandez's pt - RE: new Rx  Raquel, daughter calling and would like a Rx called to pt's pharmacy. Raquel informs that pt has cough, congestion, sinus infection. Could you please call Rx to pt's pharmacy? Please advise. Thanks    [facility] [address] [place] [address] RD AT [facility] & [facility] [phone] [facility] [phone] FX    Pt # [phone]    Raquel, Daughter  # [phone]

## 2018-07-31 ENCOUNTER — OFFICE VISIT (OUTPATIENT)
Dept: INTERNAL MEDICINE | Facility: CLINIC | Age: 83
End: 2018-07-31

## 2018-07-31 VITALS
WEIGHT: 172 LBS | BODY MASS INDEX: 29.37 KG/M2 | DIASTOLIC BLOOD PRESSURE: 64 MMHG | HEIGHT: 64 IN | SYSTOLIC BLOOD PRESSURE: 136 MMHG

## 2018-07-31 DIAGNOSIS — E11.9 TYPE 2 DIABETES MELLITUS WITHOUT COMPLICATION, WITHOUT LONG-TERM CURRENT USE OF INSULIN (HCC): Primary | ICD-10-CM

## 2018-07-31 DIAGNOSIS — Z87.19 HISTORY OF PANCREATITIS: ICD-10-CM

## 2018-07-31 DIAGNOSIS — K26.9 DUODENAL ULCER: ICD-10-CM

## 2018-07-31 DIAGNOSIS — I10 ESSENTIAL HYPERTENSION: ICD-10-CM

## 2018-07-31 DIAGNOSIS — I67.82 TEMPORARY CEREBRAL VASCULAR DYSFUNCTION: ICD-10-CM

## 2018-07-31 LAB — HBA1C MFR BLD: 6.32 % (ref 4.8–5.6)

## 2018-07-31 PROCEDURE — 36415 COLL VENOUS BLD VENIPUNCTURE: CPT | Performed by: INTERNAL MEDICINE

## 2018-07-31 PROCEDURE — 99213 OFFICE O/P EST LOW 20 MIN: CPT | Performed by: INTERNAL MEDICINE

## 2018-07-31 NOTE — PROGRESS NOTES
Subjective   Monica Scherer is a 85 y.o. female.     History of Present Illness she is here today for follow-up of hypertension and diabetes mellitus as well as duodenal ulcer and and pancreatitis.  Fortunately since her hospitalization earlier this year she has had no recurrent GI symptoms.  She is back to her usual activity level.  She denies any DENG, PND, chest pain, or swelling in the ankles.  There is been no dizziness, syncope, or focal neurologic episodes.  Her appetite is good and her bowel habit is stable.  She denies any abdominal pain.        Review of Systems   Constitutional: Negative for activity change, appetite change and fatigue.   HENT: Negative for trouble swallowing.    Respiratory: Negative for cough, chest tightness, shortness of breath and wheezing.    Cardiovascular: Negative for chest pain, palpitations and leg swelling.   Gastrointestinal: Negative for abdominal pain, diarrhea, nausea and vomiting.   Genitourinary: Negative for flank pain, frequency and hematuria.   Neurological: Negative for dizziness, syncope, facial asymmetry, speech difficulty, weakness, numbness and headache.   Psychiatric/Behavioral: Negative for depressed mood. The patient is not nervous/anxious.        Objective   Physical Exam   Constitutional: She is oriented to person, place, and time. Vital signs are normal. She appears well-developed and well-nourished. She is active. She does not appear ill.   Eyes: Conjunctivae are normal.   Neck: Carotid bruit is not present.   Cardiovascular: Normal rate, regular rhythm, S1 normal and S2 normal.  Exam reveals no S3 and no S4.    No murmur heard.  Pulmonary/Chest: No tachypnea. No respiratory distress. She has no decreased breath sounds. She has no wheezes. She has no rhonchi. She has no rales.   Abdominal: Soft. Normal appearance and bowel sounds are normal. She exhibits no abdominal bruit and no mass. There is no hepatosplenomegaly. There is no tenderness.     Vascular  Status -  Her right foot exhibits no edema. Her left foot exhibits no edema.  Neurological: She is alert and oriented to person, place, and time. Gait normal.   Psychiatric: She has a normal mood and affect. Her behavior is normal. Judgment and thought content normal. Cognition and memory are normal.         Assessment/Plan assessment #1 hypertension-good control #2 diabetes mellitus-usually well controlled #3 history of duodenal ulcer and pancreatitis-without recurrence #4 cerebral vascular disease-without symptoms    Plan #1 no change medication #2 hemoglobin A1c today.  Routine follow-up in 4 months.

## 2018-11-13 ENCOUNTER — OFFICE VISIT (OUTPATIENT)
Dept: FAMILY MEDICINE CLINIC | Facility: CLINIC | Age: 83
End: 2018-11-13

## 2018-11-13 VITALS
BODY MASS INDEX: 29.37 KG/M2 | OXYGEN SATURATION: 98 % | HEIGHT: 64 IN | HEART RATE: 100 BPM | WEIGHT: 172 LBS | TEMPERATURE: 98.1 F | SYSTOLIC BLOOD PRESSURE: 138 MMHG | DIASTOLIC BLOOD PRESSURE: 80 MMHG

## 2018-11-13 DIAGNOSIS — K26.9 DUODENAL ULCER: ICD-10-CM

## 2018-11-13 DIAGNOSIS — E78.5 DYSLIPIDEMIA: ICD-10-CM

## 2018-11-13 DIAGNOSIS — Z23 IMMUNIZATION DUE: ICD-10-CM

## 2018-11-13 DIAGNOSIS — Z00.00 MEDICARE ANNUAL WELLNESS VISIT, SUBSEQUENT: Primary | ICD-10-CM

## 2018-11-13 DIAGNOSIS — I10 ESSENTIAL HYPERTENSION: ICD-10-CM

## 2018-11-13 DIAGNOSIS — R73.03 PREDIABETES: ICD-10-CM

## 2018-11-13 LAB
ALBUMIN SERPL-MCNC: 4.4 G/DL (ref 3.5–5.2)
ALBUMIN/GLOB SERPL: 1.3 G/DL
ALP SERPL-CCNC: 90 U/L (ref 39–117)
ALT SERPL-CCNC: 9 U/L (ref 1–33)
AST SERPL-CCNC: 10 U/L (ref 1–32)
BASOPHILS # BLD AUTO: 0.02 10*3/MM3 (ref 0–0.2)
BASOPHILS NFR BLD AUTO: 0.2 % (ref 0–1.5)
BILIRUB SERPL-MCNC: 0.6 MG/DL (ref 0.1–1.2)
BUN SERPL-MCNC: 27 MG/DL (ref 8–23)
BUN/CREAT SERPL: 24.1 (ref 7–25)
CALCIUM SERPL-MCNC: 10 MG/DL (ref 8.6–10.5)
CHLORIDE SERPL-SCNC: 104 MMOL/L (ref 98–107)
CHOLEST SERPL-MCNC: 191 MG/DL (ref 0–200)
CO2 SERPL-SCNC: 26.2 MMOL/L (ref 22–29)
CREAT SERPL-MCNC: 1.12 MG/DL (ref 0.57–1)
EOSINOPHIL # BLD AUTO: 0.04 10*3/MM3 (ref 0–0.7)
EOSINOPHIL NFR BLD AUTO: 0.4 % (ref 0.3–6.2)
ERYTHROCYTE [DISTWIDTH] IN BLOOD BY AUTOMATED COUNT: 14.9 % (ref 11.7–13)
GLOBULIN SER CALC-MCNC: 3.4 GM/DL
GLUCOSE SERPL-MCNC: 114 MG/DL (ref 65–99)
HBA1C MFR BLD: 6.1 % (ref 4.8–5.6)
HCT VFR BLD AUTO: 40.9 % (ref 35.6–45.5)
HDLC SERPL-MCNC: 50 MG/DL (ref 40–60)
HGB BLD-MCNC: 13.1 G/DL (ref 11.9–15.5)
IMM GRANULOCYTES # BLD: 0.04 10*3/MM3 (ref 0–0.03)
IMM GRANULOCYTES NFR BLD: 0.4 % (ref 0–0.5)
LDLC SERPL CALC-MCNC: 120 MG/DL (ref 0–100)
LYMPHOCYTES # BLD AUTO: 3.4 10*3/MM3 (ref 0.9–4.8)
LYMPHOCYTES NFR BLD AUTO: 31 % (ref 19.6–45.3)
MCH RBC QN AUTO: 29.9 PG (ref 26.9–32)
MCHC RBC AUTO-ENTMCNC: 32 G/DL (ref 32.4–36.3)
MCV RBC AUTO: 93.4 FL (ref 80.5–98.2)
MONOCYTES # BLD AUTO: 0.78 10*3/MM3 (ref 0.2–1.2)
MONOCYTES NFR BLD AUTO: 7.1 % (ref 5–12)
NEUTROPHILS # BLD AUTO: 6.74 10*3/MM3 (ref 1.9–8.1)
NEUTROPHILS NFR BLD AUTO: 61.3 % (ref 42.7–76)
PLATELET # BLD AUTO: 284 10*3/MM3 (ref 140–500)
POTASSIUM SERPL-SCNC: 3.9 MMOL/L (ref 3.5–5.2)
PROT SERPL-MCNC: 7.8 G/DL (ref 6–8.5)
RBC # BLD AUTO: 4.38 10*6/MM3 (ref 3.9–5.2)
SODIUM SERPL-SCNC: 142 MMOL/L (ref 136–145)
TRIGL SERPL-MCNC: 105 MG/DL (ref 0–150)
VLDLC SERPL CALC-MCNC: 21 MG/DL (ref 5–40)
WBC # BLD AUTO: 10.98 10*3/MM3 (ref 4.5–10.7)

## 2018-11-13 PROCEDURE — 90670 PCV13 VACCINE IM: CPT | Performed by: FAMILY MEDICINE

## 2018-11-13 PROCEDURE — 96160 PT-FOCUSED HLTH RISK ASSMT: CPT | Performed by: FAMILY MEDICINE

## 2018-11-13 PROCEDURE — G0009 ADMIN PNEUMOCOCCAL VACCINE: HCPCS | Performed by: FAMILY MEDICINE

## 2018-11-13 PROCEDURE — G0439 PPPS, SUBSEQ VISIT: HCPCS | Performed by: FAMILY MEDICINE

## 2018-11-13 RX ORDER — FELODIPINE 10 MG/1
10 TABLET, EXTENDED RELEASE ORAL DAILY
Qty: 90 TABLET | Refills: 3 | Status: SHIPPED | OUTPATIENT
Start: 2018-11-13 | End: 2019-12-26

## 2018-11-13 RX ORDER — PANTOPRAZOLE SODIUM 40 MG/1
40 TABLET, DELAYED RELEASE ORAL
Qty: 180 TABLET | Refills: 3 | Status: SHIPPED | OUTPATIENT
Start: 2018-11-13 | End: 2020-01-03

## 2018-11-13 NOTE — PROGRESS NOTES
QUICK REFERENCE INFORMATION:  The ABCs of the Annual Wellness Visit    Subsequent Medicare Wellness Visit    HEALTH RISK ASSESSMENT    5/4/1933    Recent Hospitalizations:  No hospitalization(s) within the last year..        Current Medical Providers:  Patient Care Team:  Amilcar Mauricio DO as PCP - General (Family Medicine)        Smoking Status:  Social History     Tobacco Use   Smoking Status Never Smoker   Smokeless Tobacco Never Used       Alcohol Consumption:  Social History     Substance and Sexual Activity   Alcohol Use No       Depression Screen:   PHQ-2/PHQ-9 Depression Screening 11/13/2018   Little interest or pleasure in doing things 0   Feeling down, depressed, or hopeless 0   Trouble falling or staying asleep, or sleeping too much 0   Feeling tired or having little energy 0   Poor appetite or overeating 0   Feeling bad about yourself - or that you are a failure or have let yourself or your family down 0   Trouble concentrating on things, such as reading the newspaper or watching television 0   Moving or speaking so slowly that other people could have noticed. Or the opposite - being so fidgety or restless that you have been moving around a lot more than usual 0   Thoughts that you would be better off dead, or of hurting yourself in some way 0   Total Score 0       Health Habits and Functional and Cognitive Screening:  Functional & Cognitive Status 11/13/2018   Do you have difficulty preparing food and eating? No   Do you have difficulty bathing yourself, getting dressed or grooming yourself? No   Do you have difficulty using the toilet? No   Do you have difficulty moving around from place to place? No   Do you have trouble with steps or getting out of a bed or a chair? No   In the past year have you fallen or experienced a near fall? No   Current Diet Well Balanced Diet   Dental Exam Not up to date   Eye Exam Up to date   Exercise (times per week) 5 times per week   Current Exercise Activities  Include Housecleaning   Do you need help using the phone?  No   Are you deaf or do you have serious difficulty hearing?  No   Do you need help with transportation? No   Do you need help shopping? No   Do you need help preparing meals?  No   Do you need help with housework?  No   Do you need help with laundry? No   Do you need help taking your medications? No   Do you need help managing money? No   Do you ever drive or ride in a car without wearing a seat belt? No   Have you felt unusual stress, anger or loneliness in the last month? No   Who do you live with? Child   If you need help, do you have trouble finding someone available to you? No   Do you have difficulty concentrating, remembering or making decisions? No           Does the patient have evidence of cognitive impairment? abnormal clock;  1 of 3 on recall    Aspirin use counseling: Does not need ASA (and currently is not on it)      Recent Lab Results:  CMP:  Lab Results   Component Value Date     (H) 11/13/2018    BUN 27 (H) 11/13/2018    CREATININE 1.12 (H) 11/13/2018    EGFRIFNONA 46 (L) 11/13/2018    EGFRIFAFRI 56 (L) 11/13/2018    BCR 24.1 11/13/2018     11/13/2018    K 3.9 11/13/2018    CO2 26.2 11/13/2018    CALCIUM 10.0 11/13/2018    PROTENTOTREF 7.8 11/13/2018    ALBUMIN 4.40 11/13/2018    LABGLOBREF 3.4 11/13/2018    LABIL2 1.3 11/13/2018    BILITOT 0.6 11/13/2018    ALKPHOS 90 11/13/2018    AST 10 11/13/2018    ALT 9 11/13/2018     Lipid Panel:  Lab Results   Component Value Date    CHOL 126 02/19/2018    TRIG 105 11/13/2018    HDL 50 11/13/2018    VLDL 21 11/13/2018    LDLHDL 1.78 02/19/2018     HbA1c:  Lab Results   Component Value Date    HGBA1C 6.10 (H) 11/13/2018       Visual Acuity:  No exam data present    Age-appropriate Screening Schedule:  Refer to the list below for future screening recommendations based on patient's age, sex and/or medical conditions. Orders for these recommended tests are listed in the plan section. The  patient has been provided with a written plan.    Health Maintenance   Topic Date Due   • URINE MICROALBUMIN  05/04/1933   • TDAP/TD VACCINES (1 - Tdap) 05/04/1952   • ZOSTER VACCINE (1 of 2) 05/04/1983   • MAMMOGRAM  01/18/2019   • HEMOGLOBIN A1C  05/13/2019   • PNEUMOCOCCAL VACCINES (65+ LOW/MEDIUM RISK) (2 of 2 - PPSV23) 11/13/2019   • LIPID PANEL  11/13/2019   • INFLUENZA VACCINE  Completed        Subjective   History of Present Illness    Monica Scherer is a 85 y.o. female who presents for an Subsequent Wellness Visit.    The following portions of the patient's history were reviewed and updated as appropriate: allergies, current medications, past family history, past medical history, past social history, past surgical history and problem list.    Outpatient Medications Prior to Visit   Medication Sig Dispense Refill   • felodipine (PLENDIL) 10 MG 24 hr tablet TAKE 1 TABLET BY MOUTH EVERY DAY. (Patient taking differently: TAKE 1 TABLET BY MOUTH EVERY MORNING) 90 tablet 3   • pantoprazole (PROTONIX) 40 MG EC tablet TAKE 1 TABLET BY MOUTH TWICE DAILY BEFORE MEALS 180 tablet 3     No facility-administered medications prior to visit.        Patient Active Problem List   Diagnosis   • Vertigo   • Temporary cerebral vascular dysfunction   • Gastroesophageal reflux disease with esophagitis   • Degeneration of intervertebral disc of cervical region   • Prediabetes   • S/P cholecystectomy   • Osteoarthritis of knee   • Herpes zoster without complication   • Hypertension   • Duodenal ulcer   • History of pancreatitis       Advance Care Planning:  has NO advance directive - information provided to the patient today    Identification of Risk Factors:  Risk factors include: prediabetes.    Review of Systems   Respiratory: Negative for shortness of breath.    Cardiovascular: Negative for chest pain, palpitations and leg swelling.   Gastrointestinal: Negative for abdominal pain, nausea and vomiting.       Compared to one  "year ago, the patient feels her physical health is the same.  Compared to one year ago, the patient feels her mental health is the same.    Objective     Physical Exam   Constitutional: She appears well-developed and well-nourished.   HENT:   Head: Normocephalic and atraumatic.   Neck: Neck supple. No JVD present. No thyromegaly present.   Cardiovascular: Normal rate, regular rhythm and normal heart sounds. Exam reveals no gallop and no friction rub.   No murmur heard.  Pulmonary/Chest: Effort normal and breath sounds normal. No respiratory distress. She has no wheezes. She has no rales.   Abdominal: Soft. Bowel sounds are normal. She exhibits no distension. There is no tenderness. There is no rebound and no guarding.   Musculoskeletal: She exhibits no edema.   Neurological: She is alert.   Skin: Skin is warm and dry.   Psychiatric: She has a normal mood and affect. Her behavior is normal.   Nursing note and vitals reviewed.      Vitals:    11/13/18 1041   BP: 138/80   BP Location: Left arm   Patient Position: Sitting   Cuff Size: Adult   Pulse: 100   Temp: 98.1 °F (36.7 °C)   TempSrc: Oral   SpO2: 98%   Weight: 78 kg (172 lb)   Height: 162.6 cm (64.02\")       Patient's Body mass index is 29.51 kg/m². BMI is above normal parameters. Recommendations include: educational material.      Assessment/Plan   Patient Self-Management and Personalized Health Advice  The patient has been provided with information about: diet and preventive services including:   · Pneumococcal vaccine .    Visit Diagnoses:    ICD-10-CM ICD-9-CM   1. Medicare annual wellness visit, subsequent Z00.00 V70.0   2. Essential hypertension I10 401.9   3. Duodenal ulcer K26.9 532.90   4. Prediabetes R73.03 790.29   5. Dyslipidemia E78.5 272.4   6. Immunization due Z23 V05.9       Orders Placed This Encounter   Procedures   • Comprehensive Metabolic Panel     Order Specific Question:   [facility] Has the patient fasted?     Answer:   Yes   • Hemoglobin A1c "     Order Specific Question:   [facility] Has the patient fasted?     Answer:   Yes   • Lipid Panel     Order Specific Question:   [facility] Has the patient fasted?     Answer:   Yes   • CBC & Differential     Order Specific Question:   Manual Differential     Answer:   No     Order Specific Question:   [facility] Has the patient fasted?     Answer:   Yes       Outpatient Encounter Medications as of 2018   Medication Sig Dispense Refill   • felodipine (PLENDIL) 10 MG 24 hr tablet Take 1 tablet by mouth Daily. 90 tablet 3   • pantoprazole (PROTONIX) 40 MG EC tablet Take 1 tablet by mouth 2 (Two) Times a Day Before Meals. 180 tablet 3   • [DISCONTINUED] felodipine (PLENDIL) 10 MG 24 hr tablet TAKE 1 TABLET BY MOUTH EVERY DAY. (Patient taking differently: TAKE 1 TABLET BY MOUTH EVERY MORNING) 90 tablet 3   • [DISCONTINUED] pantoprazole (PROTONIX) 40 MG EC tablet TAKE 1 TABLET BY MOUTH TWICE DAILY BEFORE MEALS 180 tablet 3   • [] pneumococcal conj. 13-valent (PREVNAR-13) vaccine 0.5 mL        No facility-administered encounter medications on file as of 2018.        Reviewed use of high risk medication in the elderly: yes  Reviewed for potential of harmful drug interactions in the elderly: yes    Follow Up:  Return in about 6 months (around 2019), or if symptoms worsen or fail to improve.     An After Visit Summary and PPPS with all of these plans were given to the patient.      Patient has been erroneously marked as diabetic. Based on the available clinical information, she does not have diabetes and should therefore be excluded from diabetic health maintenance and quality measures for the remainder of the reporting period.  She has prediabetes with last a1c 6.3%.  I reviewed chart and had one a1c 6.5%  pe rnotes never on hypoglycemic agents and a1c since then 6.3 to 6.3%;  Doesn't check bs's denies dm2 though.    Hx of duodenal ulcer, doing well on protonix, needs refill.

## 2018-11-13 NOTE — PATIENT INSTRUCTIONS
Medicare Wellness  Personal Prevention Plan of Service     Date of Office Visit:  2018  Encounter Provider:  Amilcar Mauricio DO  Place of Service:  [facility]  Patient Name: Monica Scherer  :  1933    As part of the Medicare Wellness portion of your visit today, we are providing you with this personalized preventive plan of services (PPPS). This plan is based upon recommendations of the [place] Preventive Services Task Force (USPSTF) and the Advisory Committee on Immunization Practices (ACIP).    This lists the preventive care services that should be considered, and provides dates of when you are due. Items listed as completed are up-to-date and do not require any further intervention.    Health Maintenance   Topic Date Due   • URINE MICROALBUMIN  1933   • TDAP/TD VACCINES (1 - Tdap) 1952   • ZOSTER VACCINE (1 of 2) 1983   • PNEUMOCOCCAL VACCINES (65+ LOW/MEDIUM RISK) (1 of 2 - PCV13) 1998   • MAMMOGRAM  2019   • HEMOGLOBIN A1C  2019   • LIPID PANEL  2019   • MEDICARE ANNUAL WELLNESS  2019   • INFLUENZA VACCINE  Completed       Orders Placed This Encounter   Procedures   • Comprehensive Metabolic Panel   • Hemoglobin A1c   • Lipid Panel   • CBC & Differential     Order Specific Question:   Manual Differential     Answer:   No       Return in about 6 months (around 2019), or if symptoms worsen or fail to improve.      Prediabetes  Prediabetes is the condition of having a blood sugar (blood glucose) level that is higher than it should be, but not high enough for you to be diagnosed with type 2 diabetes. Having prediabetes puts you at risk for developing type 2 diabetes (type 2 diabetes mellitus). Prediabetes may be called impaired glucose tolerance or impaired fasting glucose.  Prediabetes usually does not cause symptoms. Your health care provider can diagnose this condition with blood tests. You may be  tested for prediabetes if you are overweight and if you have at least one other risk factor for prediabetes.  Risk factors for prediabetes include:  · Having a family member with type 2 diabetes.  · Being overweight or obese.  · Being older than age 45.  · Being of [place] citizen, -American, /, or / descent.  · Having an inactive (sedentary) lifestyle.  · Having a history of gestational diabetes or polycystic ovarian syndrome (PCOS).  · Having low levels of good cholesterol (HDL-C) or high levels of blood fats (triglycerides).  · Having high blood pressure.    What is blood glucose and how is blood glucose measured?    Blood glucose refers to the amount of glucose in your bloodstream. Glucose comes from eating foods that contain sugars and starches (carbohydrates) that the body breaks down into glucose. Your blood glucose level may be measured in mg/dL (milligrams per deciliter) or mmol/L (millimoles per liter). Your blood glucose may be checked with one or more of the following blood tests:  · A fasting blood glucose (FBG) test. You will not be allowed to eat (you will fast) for at least 8 hours before a blood sample is taken.  ? A normal range for FBG is  mg/dl (3.9-5.6 mmol/L).  · An A1c (hemoglobin A1c) blood test. This test provides information about blood glucose control over the previous 2?3 months.  · An oral glucose tolerance test (OGTT). This test measures your blood glucose twice:  ? After fasting. This is your baseline level.  ? Two hours after you drink a beverage that contains glucose.    You may be diagnosed with prediabetes:  · If your FBG is 100?125 mg/dL (5.6-6.9 mmol/L).  · If your A1c level is 5.7?6.4%.  · If your OGGT result is 140?199 mg/dL (7.8-11 mmol/L).    These blood tests may be repeated to confirm your diagnosis.  What happens if blood glucose is too high?  The pancreas produces a hormone (insulin) that helps move glucose from the  bloodstream into cells. When cells in the body do not respond properly to insulin that the body makes (insulin resistance), excess glucose builds up in the blood instead of going into cells. As a result, high blood glucose (hyperglycemia) can develop, which can cause many complications. This is a symptom of prediabetes.  What can happen if blood glucose stays higher than normal for a long time?  Having high blood glucose for a long time is dangerous. Too much glucose in your blood can damage your nerves and blood vessels. Long-term damage can lead to complications from diabetes, which may include:  · Heart disease.  · Stroke.  · Blindness.  · Kidney disease.  · Depression.  · Poor circulation in the feet and legs, which could lead to surgical removal (amputation) in severe cases.    How can prediabetes be prevented from turning into type 2 diabetes?    To help prevent type 2 diabetes, take the following actions:  · Be physically active.  ? Do moderate-intensity physical activity for at least 30 minutes on at least 5 days of the week, or as much as told by your health care provider. This could be brisk walking, biking, or water aerobics.  ? Ask your health care provider what activities are safe for you. A mix of physical activities may be best, such as walking, swimming, cycling, and strength training.  · Lose weight as told by your health care provider.  ? Losing 5-7% of your body weight can reverse insulin resistance.  ? Your health care provider can determine how much weight loss is best for you and can help you lose weight safely.  · Follow a healthy meal plan. This includes eating lean proteins, complex carbohydrates, fresh fruits and vegetables, low-fat dairy products, and healthy fats.  ? Follow instructions from your health care provider about eating or drinking restrictions.  ? Make an appointment to see a diet and nutrition specialist (registered dietitian) to help you create a healthy eating plan that is  right for you.  · Do not smoke or use any tobacco products, such as cigarettes, chewing tobacco, and e-cigarettes. If you need help quitting, ask your health care provider.  · Take over-the-counter and prescription medicines as told by your health care provider. You may be prescribed medicines that help lower the risk of type 2 diabetes.    This information is not intended to replace advice given to you by your health care provider. Make sure you discuss any questions you have with your health care provider.  Document Released: 04/10/2017 Document Revised: 05/25/2017 Document Reviewed: 02/07/2017  [employer] Interactive Patient Education © 2018 [employer]    Advance Directive  Advance directives are legal documents that let you make choices ahead of time about your health care and medical treatment in case you become unable to communicate for yourself. Advance directives are a way for you to communicate your wishes to family, friends, and health care providers. This can help convey your decisions about end-of-life care if you become unable to communicate.  Discussing and writing advance directives should happen over time rather than all at once. Advance directives can be changed depending on your situation and what you want, even after you have signed the advance directives.  If you do not have an advance directive, some states assign family decision makers to act on your behalf based on how closely you are related to them. Each state has its own laws regarding advance directives. You may want to check with your health care provider, , or state representative about the laws in your state. There are different types of advance directives, such as:  · Medical power of .  · Living will.  · Do not resuscitate (DNR) or do not attempt resuscitation (DNAR) order.    Health care proxy and medical power of   A health care proxy, also called a health care agent, is a person who is appointed to make  medical decisions for you in cases in which you are unable to make the decisions yourself. Generally, people choose someone they know well and trust to represent their preferences. Make sure to ask this person for an agreement to act as your proxy. A proxy may have to exercise judgment in the event of a medical decision for which your wishes are not known.  A medical power of  is a legal document that names your health care proxy. Depending on the laws in your state, after the document is written, it may also need to be:  · Signed.  · Notarized.  · Dated.  · Copied.  · Witnessed.  · Incorporated into your medical record.    You may also want to appoint someone to manage your financial affairs in a situation in which you are unable to do so. This is called a durable power of  for finances. It is a separate legal document from the durable power of  for health care. You may choose the same person or someone different from your health care proxy to act as your agent in financial matters.  If you do not appoint a proxy, or if there is a concern that the proxy is not acting in your best interests, a court-appointed guardian may be designated to act on your behalf.  Living will  A living will is a set of instructions documenting your wishes about medical care when you cannot express them yourself. Health care providers should keep a copy of your living will in your medical record. You may want to give a copy to family members or friends. To alert caregivers in case of an emergency, you can place a card in your wallet to let them know that you have a living will and where they can find it. A living will is used if you become:  · Terminally ill.  · Incapacitated.  · Unable to communicate or make decisions.    Items to consider in your living will include:  · The use or non-use of life-sustaining equipment, such as dialysis machines and breathing machines (ventilators).  · A DNR or DNAR order, which is  the instruction not to use cardiopulmonary resuscitation (CPR) if breathing or heartbeat stops.  · The use or non-use of tube feeding.  · Withholding of food and fluids.  · Comfort (palliative) care when the goal becomes comfort rather than a cure.  · Organ and tissue donation.    A living will does not give instructions for distributing your money and property if you should pass away. It is recommended that you seek the advice of a  when writing a will. Decisions about taxes, beneficiaries, and asset distribution will be legally binding. This process can relieve your family and friends of any concerns surrounding disputes or questions that may come up about the distribution of your assets.  DNR or DNAR  A DNR or DNAR order is a request not to have CPR in the event that your heart stops beating or you stop breathing. If a DNR or DNAR order has not been made and shared, a health care provider will try to help any patient whose heart has stopped or who has stopped breathing. If you plan to have surgery, talk with your health care provider about how your DNR or DNAR order will be followed if problems occur.  Summary  · Advance directives are the legal documents that allow you to make choices ahead of time about your health care and medical treatment in case you become unable to communicate for yourself.  · The process of discussing and writing advance directives should happen over time. You can change the advance directives, even after you have signed them.  · Advance directives include DNR or DNAR orders, living hung, and designating an agent as your medical power of .  This information is not intended to replace advice given to you by your health care provider. Make sure you discuss any questions you have with your health care provider.  Document Released: 03/26/2009 Document Revised: 11/06/2017 Document Reviewed: 11/06/2017  [employer] Interactive Patient Education © 2017 [employer]

## 2018-11-18 NOTE — PROGRESS NOTES
Call and mail copy of results to patient.  Cholesterol is elevated, recommend atrovastatin 40mg po daily to prevent stroke.  Kidney function decline stable  Blood sugars are borderline, work on diet.

## 2018-11-21 RX ORDER — ATORVASTATIN CALCIUM 40 MG/1
40 TABLET, FILM COATED ORAL DAILY
Qty: 30 TABLET | Refills: 5 | Status: SHIPPED | OUTPATIENT
Start: 2018-11-21 | End: 2020-01-07 | Stop reason: HOSPADM

## 2019-02-18 DIAGNOSIS — Z12.39 SCREENING FOR MALIGNANT NEOPLASM OF BREAST: Primary | ICD-10-CM

## 2019-04-09 ENCOUNTER — TELEPHONE (OUTPATIENT)
Dept: FAMILY MEDICINE CLINIC | Facility: CLINIC | Age: 84
End: 2019-04-09

## 2019-04-09 RX ORDER — AZITHROMYCIN 250 MG/1
TABLET, FILM COATED ORAL
Qty: 6 TABLET | Refills: 0 | Status: SHIPPED | OUTPATIENT
Start: 2019-04-09 | End: 2019-07-19

## 2019-07-19 ENCOUNTER — OFFICE VISIT (OUTPATIENT)
Dept: FAMILY MEDICINE CLINIC | Facility: CLINIC | Age: 84
End: 2019-07-19

## 2019-07-19 VITALS
SYSTOLIC BLOOD PRESSURE: 122 MMHG | HEIGHT: 64 IN | BODY MASS INDEX: 28.34 KG/M2 | OXYGEN SATURATION: 97 % | DIASTOLIC BLOOD PRESSURE: 74 MMHG | WEIGHT: 166 LBS | HEART RATE: 62 BPM

## 2019-07-19 DIAGNOSIS — E78.49 OTHER HYPERLIPIDEMIA: ICD-10-CM

## 2019-07-19 DIAGNOSIS — K26.9 DUODENAL ULCER: ICD-10-CM

## 2019-07-19 DIAGNOSIS — R73.03 PREDIABETES: ICD-10-CM

## 2019-07-19 DIAGNOSIS — I10 ESSENTIAL HYPERTENSION: Primary | ICD-10-CM

## 2019-07-19 PROBLEM — E78.5 HYPERLIPIDEMIA: Status: ACTIVE | Noted: 2019-07-19

## 2019-07-19 PROCEDURE — 99214 OFFICE O/P EST MOD 30 MIN: CPT | Performed by: FAMILY MEDICINE

## 2019-07-19 NOTE — PROGRESS NOTES
Subjective   Monica Scherer is a 86 y.o. female. Presents today for   Chief Complaint   Patient presents with   • Hyperlipidemia   • Hypertension       Hypertension   This is a chronic problem. The current episode started more than 1 year ago. The problem is unchanged. The problem is controlled. Pertinent negatives include no chest pain, orthopnea, palpitations, peripheral edema, PND or shortness of breath. There are no associated agents to hypertension. Risk factors for coronary artery disease include dyslipidemia and post-menopausal state. Past treatments include calcium channel blockers. Current antihypertension treatment includes calcium channel blockers. The current treatment provides moderate improvement. There are no compliance problems.  Hypertensive end-organ damage includes CVA.   Hyperlipidemia   This is a chronic problem. The current episode started more than 1 year ago. The problem is controlled. Recent lipid tests were reviewed and are normal. Pertinent negatives include no chest pain or shortness of breath. Current antihyperlipidemic treatment includes statins. The current treatment provides moderate improvement of lipids. There are no compliance problems.  Risk factors for coronary artery disease include dyslipidemia, hypertension and post-menopausal.   Heartburn   She complains of heartburn (rare if spicey foods). She reports no chest pain, no coughing, no dysphagia, no early satiety or no wheezing. This is a chronic problem. The current episode started more than 1 month ago. The problem occurs rarely. Progression since onset: controlled. Pertinent negatives include no fatigue, melena, orthopnea or weight loss. She has tried a PPI for the symptoms. The treatment provided significant relief. Past procedures include an EGD. hx of duodenal ulcer.     Hx of predm, due for dm2 screen    Review of Systems   Constitutional: Negative for fatigue and weight loss.   Respiratory: Negative for cough,  "shortness of breath and wheezing.    Cardiovascular: Negative for chest pain, palpitations, orthopnea and PND.   Gastrointestinal: Positive for heartburn (rare if spicey foods). Negative for dysphagia and melena.       Patient Active Problem List   Diagnosis   • Vertigo   • Temporary cerebral vascular dysfunction   • Gastroesophageal reflux disease with esophagitis   • Degeneration of intervertebral disc of cervical region   • Prediabetes   • S/P cholecystectomy   • Osteoarthritis of knee   • Herpes zoster without complication   • Hypertension   • Duodenal ulcer   • History of pancreatitis       Social History     Socioeconomic History   • Marital status: Single     Spouse name: Not on file   • Number of children: Not on file   • Years of education: Not on file   • Highest education level: Not on file   Tobacco Use   • Smoking status: Never Smoker   • Smokeless tobacco: Never Used   Substance and Sexual Activity   • Alcohol use: No   • Drug use: No   • Sexual activity: Defer       Allergies   Allergen Reactions   • Cephalexin Rash   • Latex Itching       Current Outpatient Medications on File Prior to Visit   Medication Sig Dispense Refill   • atorvastatin (LIPITOR) 40 MG tablet Take 1 tablet by mouth Daily. 30 tablet 5   • felodipine (PLENDIL) 10 MG 24 hr tablet Take 1 tablet by mouth Daily. 90 tablet 3   • pantoprazole (PROTONIX) 40 MG EC tablet Take 1 tablet by mouth 2 (Two) Times a Day Before Meals. 180 tablet 3   • [DISCONTINUED] azithromycin (ZITHROMAX Z-YVES) 250 MG tablet Take 2 tablets the first day, then 1 tablet daily for 4 days. 6 tablet 0     No current facility-administered medications on file prior to visit.        Objective   Vitals:    07/19/19 1326   BP: 122/74   BP Location: Left arm   Patient Position: Sitting   Cuff Size: Adult   Pulse: 62   SpO2: 97%   Weight: 75.3 kg (166 lb)   Height: 162.6 cm (64\")       Physical Exam   Constitutional: She appears well-developed and well-nourished.   HENT: "   Head: Normocephalic and atraumatic.   Neck: Neck supple. No JVD present. No thyromegaly present.   Cardiovascular: Normal rate, regular rhythm and normal heart sounds. Exam reveals no gallop and no friction rub.   No murmur heard.  Pulmonary/Chest: Effort normal and breath sounds normal. No respiratory distress. She has no wheezes. She has no rales.   Abdominal: Soft. Bowel sounds are normal. She exhibits no distension. There is no tenderness. There is no rebound and no guarding.   Musculoskeletal: She exhibits no edema.   Neurological: She is alert.   Skin: Skin is warm and dry.   Psychiatric: She has a normal mood and affect. Her behavior is normal.   Nursing note and vitals reviewed.      Assessment/Plan   Monica was seen today for hyperlipidemia and hypertension.    Diagnoses and all orders for this visit:    Essential hypertension  -     Comprehensive Metabolic Panel  -     Hemoglobin A1c  -     Lipid Panel    Prediabetes  -     Comprehensive Metabolic Panel  -     Hemoglobin A1c  -     Lipid Panel    Duodenal ulcer  -     CBC & Differential    Other hyperlipidemia  -     Comprehensive Metabolic Panel  -     Hemoglobin A1c  -     Lipid Panel        -hypertension - controlled, continue medications  -HLD - continue statin, recheck lipids.  -screen for dm2  -recheck cbc;  Hx of duodenal ulcer, has been stable on ppi;  Heartburn controlled       -Follow up: 6 months and prn

## 2019-07-20 LAB
ALBUMIN SERPL-MCNC: 4.7 G/DL (ref 3.5–4.7)
ALBUMIN/GLOB SERPL: 1.8 {RATIO} (ref 1.2–2.2)
ALP SERPL-CCNC: 94 IU/L (ref 39–117)
ALT SERPL-CCNC: 9 IU/L (ref 0–32)
AST SERPL-CCNC: 13 IU/L (ref 0–40)
BASOPHILS # BLD AUTO: 0 X10E3/UL (ref 0–0.2)
BASOPHILS NFR BLD AUTO: 0 %
BILIRUB SERPL-MCNC: 0.6 MG/DL (ref 0–1.2)
BUN SERPL-MCNC: 25 MG/DL (ref 8–27)
BUN/CREAT SERPL: 22 (ref 12–28)
CALCIUM SERPL-MCNC: 9.8 MG/DL (ref 8.7–10.3)
CHLORIDE SERPL-SCNC: 102 MMOL/L (ref 96–106)
CHOLEST SERPL-MCNC: 192 MG/DL (ref 100–199)
CO2 SERPL-SCNC: 22 MMOL/L (ref 20–29)
CREAT SERPL-MCNC: 1.13 MG/DL (ref 0.57–1)
EOSINOPHIL # BLD AUTO: 0.2 X10E3/UL (ref 0–0.4)
EOSINOPHIL NFR BLD AUTO: 2 %
ERYTHROCYTE [DISTWIDTH] IN BLOOD BY AUTOMATED COUNT: 15.8 % (ref 12.3–15.4)
GLOBULIN SER CALC-MCNC: 2.6 G/DL (ref 1.5–4.5)
GLUCOSE SERPL-MCNC: 107 MG/DL (ref 65–99)
HBA1C MFR BLD: 6.3 % (ref 4.8–5.6)
HCT VFR BLD AUTO: 43.3 % (ref 34–46.6)
HDLC SERPL-MCNC: 58 MG/DL
HGB BLD-MCNC: 14.5 G/DL (ref 11.1–15.9)
IMM GRANULOCYTES # BLD AUTO: 0 X10E3/UL (ref 0–0.1)
IMM GRANULOCYTES NFR BLD AUTO: 0 %
LDLC SERPL CALC-MCNC: 115 MG/DL (ref 0–99)
LYMPHOCYTES # BLD AUTO: 3.3 X10E3/UL (ref 0.7–3.1)
LYMPHOCYTES NFR BLD AUTO: 30 %
MCH RBC QN AUTO: 30.6 PG (ref 26.6–33)
MCHC RBC AUTO-ENTMCNC: 33.5 G/DL (ref 31.5–35.7)
MCV RBC AUTO: 91 FL (ref 79–97)
MONOCYTES # BLD AUTO: 0.7 X10E3/UL (ref 0.1–0.9)
MONOCYTES NFR BLD AUTO: 7 %
NEUTROPHILS # BLD AUTO: 6.6 X10E3/UL (ref 1.4–7)
NEUTROPHILS NFR BLD AUTO: 61 %
PLATELET # BLD AUTO: 268 X10E3/UL (ref 150–450)
POTASSIUM SERPL-SCNC: 4.1 MMOL/L (ref 3.5–5.2)
PROT SERPL-MCNC: 7.3 G/DL (ref 6–8.5)
RBC # BLD AUTO: 4.74 X10E6/UL (ref 3.77–5.28)
SODIUM SERPL-SCNC: 143 MMOL/L (ref 134–144)
TRIGL SERPL-MCNC: 97 MG/DL (ref 0–149)
VLDLC SERPL CALC-MCNC: 19 MG/DL (ref 5–40)
WBC # BLD AUTO: 10.8 X10E3/UL (ref 3.4–10.8)

## 2019-07-20 NOTE — PROGRESS NOTES
Call and mail copy of results to patient.  Kidney function decline stable, avoid nsaids  Cholesterol ok control, continue atorvastatin  A1C prediabetes range.  It has been rising, work on diet and exercise.  Blood counts normal.

## 2019-12-24 DIAGNOSIS — I10 ESSENTIAL HYPERTENSION: ICD-10-CM

## 2019-12-26 RX ORDER — FELODIPINE 10 MG/1
10 TABLET, EXTENDED RELEASE ORAL DAILY
Qty: 90 TABLET | Refills: 3 | Status: SHIPPED | OUTPATIENT
Start: 2019-12-26 | End: 2020-12-11

## 2020-01-03 ENCOUNTER — HOSPITAL ENCOUNTER (EMERGENCY)
Facility: HOSPITAL | Age: 85
Discharge: HOME | End: 2020-01-03
Attending: EMERGENCY MEDICINE | Admitting: EMERGENCY MEDICINE

## 2020-01-03 VITALS
HEIGHT: 64 IN | TEMPERATURE: 97 F | HEART RATE: 85 BPM | OXYGEN SATURATION: 97 % | SYSTOLIC BLOOD PRESSURE: 148 MMHG | RESPIRATION RATE: 16 BRPM | DIASTOLIC BLOOD PRESSURE: 84 MMHG | BODY MASS INDEX: 29.12 KG/M2 | WEIGHT: 170.6 LBS

## 2020-01-03 DIAGNOSIS — I48.91 ATRIAL FIBRILLATION, UNSPECIFIED TYPE (HCC): Primary | ICD-10-CM

## 2020-01-03 LAB
ALBUMIN SERPL-MCNC: 4.1 G/DL (ref 3.5–5.2)
ALBUMIN/GLOB SERPL: 1.2 G/DL
ALP SERPL-CCNC: 80 U/L (ref 39–117)
ALT SERPL W P-5'-P-CCNC: 7 U/L (ref 1–33)
ANION GAP SERPL CALCULATED.3IONS-SCNC: 10.5 MMOL/L (ref 5–15)
AST SERPL-CCNC: 13 U/L (ref 1–32)
BACTERIA UR QL AUTO: NORMAL /HPF
BASOPHILS # BLD AUTO: 0.05 10*3/MM3 (ref 0–0.2)
BASOPHILS NFR BLD AUTO: 0.4 % (ref 0–1.5)
BILIRUB SERPL-MCNC: 0.3 MG/DL (ref 0.2–1.2)
BILIRUB UR QL STRIP: NEGATIVE
BUN BLD-MCNC: 30 MG/DL (ref 8–23)
BUN/CREAT SERPL: 25.9 (ref 7–25)
CALCIUM SPEC-SCNC: 9.2 MG/DL (ref 8.6–10.5)
CHLORIDE SERPL-SCNC: 103 MMOL/L (ref 98–107)
CLARITY UR: CLEAR
CO2 SERPL-SCNC: 25.5 MMOL/L (ref 22–29)
COLOR UR: YELLOW
CREAT BLD-MCNC: 1.16 MG/DL (ref 0.57–1)
DEPRECATED RDW RBC AUTO: 47.8 FL (ref 37–54)
EOSINOPHIL # BLD AUTO: 0.05 10*3/MM3 (ref 0–0.4)
EOSINOPHIL NFR BLD AUTO: 0.4 % (ref 0.3–6.2)
ERYTHROCYTE [DISTWIDTH] IN BLOOD BY AUTOMATED COUNT: 14 % (ref 12.3–15.4)
GFR SERPL CREATININE-BSD FRML MDRD: 44 ML/MIN/1.73
GLOBULIN UR ELPH-MCNC: 3.5 GM/DL
GLUCOSE BLD-MCNC: 115 MG/DL (ref 65–99)
GLUCOSE BLDC GLUCOMTR-MCNC: 112 MG/DL (ref 70–130)
GLUCOSE UR STRIP-MCNC: NEGATIVE MG/DL
HCT VFR BLD AUTO: 40.1 % (ref 34–46.6)
HGB BLD-MCNC: 13.5 G/DL (ref 12–15.9)
HGB UR QL STRIP.AUTO: ABNORMAL
HYALINE CASTS UR QL AUTO: NORMAL /LPF
IMM GRANULOCYTES # BLD AUTO: 0.11 10*3/MM3 (ref 0–0.05)
IMM GRANULOCYTES NFR BLD AUTO: 0.9 % (ref 0–0.5)
KETONES UR QL STRIP: NEGATIVE
LEUKOCYTE ESTERASE UR QL STRIP.AUTO: NEGATIVE
LYMPHOCYTES # BLD AUTO: 1.99 10*3/MM3 (ref 0.7–3.1)
LYMPHOCYTES NFR BLD AUTO: 17 % (ref 19.6–45.3)
MCH RBC QN AUTO: 31.3 PG (ref 26.6–33)
MCHC RBC AUTO-ENTMCNC: 33.7 G/DL (ref 31.5–35.7)
MCV RBC AUTO: 92.8 FL (ref 79–97)
MONOCYTES # BLD AUTO: 0.72 10*3/MM3 (ref 0.1–0.9)
MONOCYTES NFR BLD AUTO: 6.1 % (ref 5–12)
NEUTROPHILS # BLD AUTO: 8.81 10*3/MM3 (ref 1.7–7)
NEUTROPHILS NFR BLD AUTO: 75.2 % (ref 42.7–76)
NITRITE UR QL STRIP: NEGATIVE
NRBC BLD AUTO-RTO: 0 /100 WBC (ref 0–0.2)
PH UR STRIP.AUTO: <=5 [PH] (ref 5–8)
PLATELET # BLD AUTO: 248 10*3/MM3 (ref 140–450)
PMV BLD AUTO: 9.4 FL (ref 6–12)
POTASSIUM BLD-SCNC: 4 MMOL/L (ref 3.5–5.2)
PROT SERPL-MCNC: 7.6 G/DL (ref 6–8.5)
PROT UR QL STRIP: NEGATIVE
RBC # BLD AUTO: 4.32 10*6/MM3 (ref 3.77–5.28)
RBC # UR: NORMAL /HPF
REF LAB TEST METHOD: NORMAL
SODIUM BLD-SCNC: 139 MMOL/L (ref 136–145)
SP GR UR STRIP: 1.02 (ref 1–1.03)
SQUAMOUS #/AREA URNS HPF: NORMAL /HPF
UROBILINOGEN UR QL STRIP: ABNORMAL
WBC NRBC COR # BLD: 11.73 10*3/MM3 (ref 3.4–10.8)
WBC UR QL AUTO: NORMAL /HPF

## 2020-01-03 PROCEDURE — 99284 EMERGENCY DEPT VISIT MOD MDM: CPT

## 2020-01-03 PROCEDURE — 36415 COLL VENOUS BLD VENIPUNCTURE: CPT | Performed by: EMERGENCY MEDICINE

## 2020-01-03 PROCEDURE — 93010 ELECTROCARDIOGRAM REPORT: CPT | Performed by: INTERNAL MEDICINE

## 2020-01-03 PROCEDURE — 81001 URINALYSIS AUTO W/SCOPE: CPT | Performed by: EMERGENCY MEDICINE

## 2020-01-03 PROCEDURE — 85025 COMPLETE CBC W/AUTO DIFF WBC: CPT | Performed by: EMERGENCY MEDICINE

## 2020-01-03 PROCEDURE — 82962 GLUCOSE BLOOD TEST: CPT

## 2020-01-03 PROCEDURE — 80053 COMPREHEN METABOLIC PANEL: CPT | Performed by: EMERGENCY MEDICINE

## 2020-01-03 PROCEDURE — 93005 ELECTROCARDIOGRAM TRACING: CPT | Performed by: EMERGENCY MEDICINE

## 2020-01-03 RX ORDER — OMEPRAZOLE 20 MG/1
20 CAPSULE, DELAYED RELEASE ORAL DAILY
Qty: 30 CAPSULE | Refills: 11 | Status: SHIPPED | OUTPATIENT
Start: 2020-01-03 | End: 2020-09-14 | Stop reason: SDUPTHER

## 2020-01-03 RX ORDER — METOPROLOL SUCCINATE 25 MG/1
25 TABLET, EXTENDED RELEASE ORAL DAILY
Qty: 30 TABLET | Refills: 0 | Status: SHIPPED | OUTPATIENT
Start: 2020-01-03 | End: 2020-01-07 | Stop reason: HOSPADM

## 2020-01-04 ENCOUNTER — APPOINTMENT (OUTPATIENT)
Dept: GENERAL RADIOLOGY | Facility: HOSPITAL | Age: 85
End: 2020-01-04

## 2020-01-04 ENCOUNTER — APPOINTMENT (OUTPATIENT)
Dept: CT IMAGING | Facility: HOSPITAL | Age: 85
End: 2020-01-04

## 2020-01-04 ENCOUNTER — HOSPITAL ENCOUNTER (INPATIENT)
Facility: HOSPITAL | Age: 85
LOS: 3 days | Discharge: HOME HEALTH | End: 2020-01-07
Attending: EMERGENCY MEDICINE | Admitting: HOSPITALIST

## 2020-01-04 DIAGNOSIS — I48.91 ATRIAL FIBRILLATION WITH RVR (HCC): ICD-10-CM

## 2020-01-04 DIAGNOSIS — I63.81 CEREBROVASCULAR ACCIDENT (CVA) DUE TO OCCLUSION OF SMALL ARTERY (HCC): ICD-10-CM

## 2020-01-04 DIAGNOSIS — G45.9 TIA (TRANSIENT ISCHEMIC ATTACK): Primary | ICD-10-CM

## 2020-01-04 LAB
ABO GROUP BLD: NORMAL
ALBUMIN SERPL-MCNC: 4.4 G/DL (ref 3.5–5.2)
ALBUMIN/GLOB SERPL: 1.3 G/DL
ALP SERPL-CCNC: 84 U/L (ref 39–117)
ALT SERPL W P-5'-P-CCNC: 9 U/L (ref 1–33)
ANION GAP SERPL CALCULATED.3IONS-SCNC: 14.5 MMOL/L (ref 5–15)
APTT PPP: 26.6 SECONDS (ref 22.7–35.4)
AST SERPL-CCNC: 13 U/L (ref 1–32)
BASOPHILS # BLD AUTO: 0.06 10*3/MM3 (ref 0–0.2)
BASOPHILS NFR BLD AUTO: 0.5 % (ref 0–1.5)
BILIRUB SERPL-MCNC: 0.6 MG/DL (ref 0.2–1.2)
BLD GP AB SCN SERPL QL: NEGATIVE
BUN BLD-MCNC: 22 MG/DL (ref 8–23)
BUN/CREAT SERPL: 25 (ref 7–25)
CALCIUM SPEC-SCNC: 9.3 MG/DL (ref 8.6–10.5)
CHLORIDE SERPL-SCNC: 106 MMOL/L (ref 98–107)
CO2 SERPL-SCNC: 23.5 MMOL/L (ref 22–29)
CREAT BLD-MCNC: 0.88 MG/DL (ref 0.57–1)
DEPRECATED RDW RBC AUTO: 47.5 FL (ref 37–54)
EOSINOPHIL # BLD AUTO: 0.1 10*3/MM3 (ref 0–0.4)
EOSINOPHIL NFR BLD AUTO: 0.9 % (ref 0.3–6.2)
ERYTHROCYTE [DISTWIDTH] IN BLOOD BY AUTOMATED COUNT: 14.1 % (ref 12.3–15.4)
GFR SERPL CREATININE-BSD FRML MDRD: 61 ML/MIN/1.73
GLOBULIN UR ELPH-MCNC: 3.5 GM/DL
GLUCOSE BLD-MCNC: 150 MG/DL (ref 65–99)
GLUCOSE BLDC GLUCOMTR-MCNC: 134 MG/DL (ref 70–130)
HCT VFR BLD AUTO: 41.7 % (ref 34–46.6)
HGB BLD-MCNC: 14.5 G/DL (ref 12–15.9)
HOLD SPECIMEN: NORMAL
HOLD SPECIMEN: NORMAL
IMM GRANULOCYTES # BLD AUTO: 0.08 10*3/MM3 (ref 0–0.05)
IMM GRANULOCYTES NFR BLD AUTO: 0.7 % (ref 0–0.5)
INR PPP: 1.02 (ref 0.9–1.1)
LYMPHOCYTES # BLD AUTO: 3.12 10*3/MM3 (ref 0.7–3.1)
LYMPHOCYTES NFR BLD AUTO: 28.5 % (ref 19.6–45.3)
MCH RBC QN AUTO: 31.9 PG (ref 26.6–33)
MCHC RBC AUTO-ENTMCNC: 34.8 G/DL (ref 31.5–35.7)
MCV RBC AUTO: 91.9 FL (ref 79–97)
MONOCYTES # BLD AUTO: 0.73 10*3/MM3 (ref 0.1–0.9)
MONOCYTES NFR BLD AUTO: 6.7 % (ref 5–12)
NEUTROPHILS # BLD AUTO: 6.86 10*3/MM3 (ref 1.7–7)
NEUTROPHILS NFR BLD AUTO: 62.7 % (ref 42.7–76)
NRBC BLD AUTO-RTO: 0 /100 WBC (ref 0–0.2)
PLATELET # BLD AUTO: 239 10*3/MM3 (ref 140–450)
PMV BLD AUTO: 9.2 FL (ref 6–12)
POTASSIUM BLD-SCNC: 3.9 MMOL/L (ref 3.5–5.2)
PROT SERPL-MCNC: 7.9 G/DL (ref 6–8.5)
PROTHROMBIN TIME: 13.1 SECONDS (ref 11.7–14.2)
RBC # BLD AUTO: 4.54 10*6/MM3 (ref 3.77–5.28)
RH BLD: NEGATIVE
SODIUM BLD-SCNC: 144 MMOL/L (ref 136–145)
T&S EXPIRATION DATE: NORMAL
TROPONIN T SERPL-MCNC: <0.01 NG/ML (ref 0–0.03)
WBC NRBC COR # BLD: 10.95 10*3/MM3 (ref 3.4–10.8)
WHOLE BLOOD HOLD SPECIMEN: NORMAL
WHOLE BLOOD HOLD SPECIMEN: NORMAL

## 2020-01-04 PROCEDURE — 82607 VITAMIN B-12: CPT | Performed by: PSYCHIATRY & NEUROLOGY

## 2020-01-04 PROCEDURE — 85025 COMPLETE CBC W/AUTO DIFF WBC: CPT | Performed by: EMERGENCY MEDICINE

## 2020-01-04 PROCEDURE — 82746 ASSAY OF FOLIC ACID SERUM: CPT | Performed by: PSYCHIATRY & NEUROLOGY

## 2020-01-04 PROCEDURE — 84484 ASSAY OF TROPONIN QUANT: CPT | Performed by: EMERGENCY MEDICINE

## 2020-01-04 PROCEDURE — G0378 HOSPITAL OBSERVATION PER HR: HCPCS

## 2020-01-04 PROCEDURE — 0042T HC CT CEREBRAL PERFUSION W/WO CONTRAST: CPT

## 2020-01-04 PROCEDURE — 86850 RBC ANTIBODY SCREEN: CPT | Performed by: EMERGENCY MEDICINE

## 2020-01-04 PROCEDURE — 99285 EMERGENCY DEPT VISIT HI MDM: CPT

## 2020-01-04 PROCEDURE — 93005 ELECTROCARDIOGRAM TRACING: CPT | Performed by: EMERGENCY MEDICINE

## 2020-01-04 PROCEDURE — 85610 PROTHROMBIN TIME: CPT | Performed by: EMERGENCY MEDICINE

## 2020-01-04 PROCEDURE — 82565 ASSAY OF CREATININE: CPT

## 2020-01-04 PROCEDURE — 70496 CT ANGIOGRAPHY HEAD: CPT

## 2020-01-04 PROCEDURE — 0 IOPAMIDOL PER 1 ML: Performed by: EMERGENCY MEDICINE

## 2020-01-04 PROCEDURE — 86901 BLOOD TYPING SEROLOGIC RH(D): CPT | Performed by: EMERGENCY MEDICINE

## 2020-01-04 PROCEDURE — 25010000002 HEPARIN (PORCINE) PER 1000 UNITS: Performed by: EMERGENCY MEDICINE

## 2020-01-04 PROCEDURE — 93010 ELECTROCARDIOGRAM REPORT: CPT | Performed by: INTERNAL MEDICINE

## 2020-01-04 PROCEDURE — 70498 CT ANGIOGRAPHY NECK: CPT

## 2020-01-04 PROCEDURE — 85730 THROMBOPLASTIN TIME PARTIAL: CPT | Performed by: EMERGENCY MEDICINE

## 2020-01-04 PROCEDURE — 82962 GLUCOSE BLOOD TEST: CPT

## 2020-01-04 PROCEDURE — 71045 X-RAY EXAM CHEST 1 VIEW: CPT

## 2020-01-04 PROCEDURE — 80053 COMPREHEN METABOLIC PANEL: CPT | Performed by: EMERGENCY MEDICINE

## 2020-01-04 PROCEDURE — 86900 BLOOD TYPING SEROLOGIC ABO: CPT | Performed by: EMERGENCY MEDICINE

## 2020-01-04 RX ORDER — ASPIRIN 81 MG/1
324 TABLET, CHEWABLE ORAL ONCE
Status: COMPLETED | OUTPATIENT
Start: 2020-01-04 | End: 2020-01-04

## 2020-01-04 RX ORDER — HEPARIN SODIUM 10000 [USP'U]/100ML
12 INJECTION, SOLUTION INTRAVENOUS
Status: DISCONTINUED | OUTPATIENT
Start: 2020-01-04 | End: 2020-01-06

## 2020-01-04 RX ORDER — ATORVASTATIN CALCIUM 80 MG/1
80 TABLET, FILM COATED ORAL NIGHTLY
Status: DISCONTINUED | OUTPATIENT
Start: 2020-01-04 | End: 2020-01-05

## 2020-01-04 RX ORDER — ACETAMINOPHEN 650 MG/1
650 SUPPOSITORY RECTAL EVERY 4 HOURS PRN
Status: DISCONTINUED | OUTPATIENT
Start: 2020-01-04 | End: 2020-01-07 | Stop reason: HOSPADM

## 2020-01-04 RX ORDER — SODIUM CHLORIDE 0.9 % (FLUSH) 0.9 %
10 SYRINGE (ML) INJECTION AS NEEDED
Status: DISCONTINUED | OUTPATIENT
Start: 2020-01-04 | End: 2020-01-07 | Stop reason: HOSPADM

## 2020-01-04 RX ORDER — DILTIAZEM HYDROCHLORIDE 5 MG/ML
10 INJECTION INTRAVENOUS ONCE
Status: DISCONTINUED | OUTPATIENT
Start: 2020-01-04 | End: 2020-01-04

## 2020-01-04 RX ORDER — ACETAMINOPHEN 325 MG/1
650 TABLET ORAL EVERY 4 HOURS PRN
Status: DISCONTINUED | OUTPATIENT
Start: 2020-01-04 | End: 2020-01-07 | Stop reason: HOSPADM

## 2020-01-04 RX ORDER — SODIUM CHLORIDE 0.9 % (FLUSH) 0.9 %
10 SYRINGE (ML) INJECTION EVERY 12 HOURS SCHEDULED
Status: DISCONTINUED | OUTPATIENT
Start: 2020-01-04 | End: 2020-01-07 | Stop reason: HOSPADM

## 2020-01-04 RX ORDER — SODIUM CHLORIDE 9 MG/ML
100 INJECTION, SOLUTION INTRAVENOUS CONTINUOUS
Status: ACTIVE | OUTPATIENT
Start: 2020-01-04 | End: 2020-01-06

## 2020-01-04 RX ADMIN — IOPAMIDOL 95 ML: 755 INJECTION, SOLUTION INTRAVENOUS at 20:44

## 2020-01-04 RX ADMIN — ASPIRIN 324 MG: 81 TABLET, CHEWABLE ORAL at 21:26

## 2020-01-04 RX ADMIN — IOPAMIDOL 50 ML: 755 INJECTION, SOLUTION INTRAVENOUS at 20:40

## 2020-01-04 RX ADMIN — HEPARIN SODIUM 12 UNITS/KG/HR: 10000 INJECTION, SOLUTION INTRAVENOUS at 21:19

## 2020-01-04 NOTE — ED PROVIDER NOTES
EMERGENCY DEPARTMENT ENCOUNTER    Room Number:  32/32  Date seen:  1/3/2020  Time seen: 9:58 PM  PCP: Amilcar Mauricio DO  Historian: Patient      HPI:  Chief Complaint: Left foot weakness  A complete HPI/ROS/PMH/PSH/SH/FH are unobtainable due to: None  Context: Monica Scherer is a 86 y.o. female who presents to the ED c/o gradual onset of one episode of left foot weakness that occurred around 1430 today which spontaneously resolved without residual neurological deficits. Pt reports she was able to move her BLE during the onset of her sxs. She was able to walk without difficulty and she did not have to drag her left feet to ambulate. Pt denies numbness, CP, SOA, lightheadedness, palpitations, blurry vision, loss of vision, facial droop, slurred speech, blood in stool, melena. She has PMHx of HTN and gastric ulcers. Pt denies MI, PAD, a-fib. Pt denies recent surgery. Pt denies recent falls. Family at bedside        PAST MEDICAL HISTORY  Active Ambulatory Problems     Diagnosis Date Noted   • Vertigo 06/14/2016   • Temporary cerebral vascular dysfunction 06/14/2016   • Gastroesophageal reflux disease with esophagitis 06/14/2016   • Degeneration of intervertebral disc of cervical region 06/14/2016   • Prediabetes 06/14/2016   • S/P cholecystectomy 06/14/2016   • Osteoarthritis of knee 10/31/2016   • Herpes zoster without complication 11/08/2016   • Hypertension 02/18/2018   • Duodenal ulcer 03/13/2018   • History of pancreatitis 03/22/2018   • Hyperlipidemia 07/19/2019     Resolved Ambulatory Problems     Diagnosis Date Noted   • Acute cholecystitis 06/07/2016   • Benign essential hypertension 06/14/2016   • Indigestion 10/31/2016   • Acute viral bronchitis 01/23/2018   • Acute biliary pancreatitis 02/18/2018     Past Medical History:   Diagnosis Date   • Prolapsed uterus          PAST SURGICAL HISTORY  Past Surgical History:   Procedure Laterality Date   • APPENDECTOMY     • CHOLECYSTECTOMY N/A 6/8/2016     Procedure: CHOLECYSTECTOMY LAPAROSCOPIC;  Surgeon: Russ Gar MD;  Location: [facility] OR [facility];  Service:    • ENDOSCOPY N/A 2/22/2018    Z-line regular, 35 cm from incisors, normal esophagus, gastritis, bilious gastric fluid, one non-bleeding duodenal ulcer w/no stigmata of bleeding, Path: DUODENUM: FRAGMENTS OF GASTRIC TYPE MUCOSA WITH HYPERPLASIA (FOVEOLAR) AND PATCHY MIXED INFLAMMATION IN THE LAMINA PROPRIA WITH FOCAL FIBROSIS, COMMENT: 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    • EYE SURGERY      tre cataracts         FAMILY HISTORY  History reviewed. No pertinent family history.      SOCIAL HISTORY  Social History     Socioeconomic History   • Marital status: Single     Spouse name: Not on file   • Number of children: Not on file   • Years of education: Not on file   • Highest education level: Not on file   Tobacco Use   • Smoking status: Never Smoker   • Smokeless tobacco: Never Used   Substance and Sexual Activity   • Alcohol use: No   • Drug use: No   • Sexual activity: Defer         ALLERGIES  Cephalexin and Latex        REVIEW OF SYSTEMS  Review of Systems   Constitutional: Negative for diaphoresis and fever.   HENT: Negative for congestion and sore throat.         Denies facial droop   Eyes: Negative.  Negative for visual disturbance (denies blurry vision or loss of vision).   Respiratory: Negative for cough and shortness of breath.    Cardiovascular: Negative for chest pain and palpitations.   Gastrointestinal: Negative for abdominal pain, blood in stool, diarrhea and vomiting.        Denies melena   Endocrine: Negative for polyuria.   Genitourinary: Negative for dysuria and flank pain.   Musculoskeletal: Negative for joint swelling and neck pain.   Skin: Negative for rash and wound.   Allergic/Immunologic: Negative.    Neurological: Positive for weakness (left foot). Negative for seizures, speech difficulty (denies slurred speech), light-headedness, numbness and headaches.   Hematological:  Negative.  Negative for adenopathy.   Psychiatric/Behavioral: Negative.  Negative for sleep disturbance.   All other systems reviewed and are negative.           PHYSICAL EXAM  ED Triage Vitals   Temp Heart Rate Resp BP SpO2   01/03/20 1708 01/03/20 1708 01/03/20 1708 01/03/20 1732 01/03/20 1708   97 °F (36.1 °C) 115 16 113/93 98 %      Temp src Heart Rate Source Patient Position BP Location FiO2 (%)   01/03/20 1708 01/03/20 1708 01/03/20 1732 01/03/20 1732 --   Tympanic Monitor Sitting Left arm          GENERAL: Alert, awake, no acute distress  HENT: nares patent, poor dentition  EYES: no scleral icterus  CV: irregular rhythm, normal rate, no murmurs  RESPIRATORY: normal effort, CTAB  ABDOMEN: soft, non-tender throughout  MUSCULOSKELETAL: no deformity  NEURO: alert, moves all extremities, follows commands, Recent and remote memory functions are normal. The patient is attentive with normal concentration. Language is fluent. Speech is clear, non-dysarthric. Fund of knowledge is normal.   Symmetric smile with no facial droop.  Eyes close and shut strongly bilaterally.  Symmetric eyebrow raise bilaterally.  EOMI, PERRL  CN II-XII grossly normal otherwise.  5/5 strength to bilateral upper and lower extremities.  No pronator drift. Normal sensation to light touch throughout BUE and BLE  SKIN: warm, dry    Vital signs and nursing notes reviewed.      LAB RESULTS  Recent Results (from the past 24 hour(s))   POC Glucose Once    Collection Time: 01/03/20  5:35 PM   Result Value Ref Range    Glucose 112 70 - 130 mg/dL   Comprehensive Metabolic Panel    Collection Time: 01/03/20  5:48 PM   Result Value Ref Range    Glucose 115 (H) 65 - 99 mg/dL    BUN 30 (H) 8 - 23 mg/dL    Creatinine 1.16 (H) 0.57 - 1.00 mg/dL    Sodium 139 136 - 145 mmol/L    Potassium 4.0 3.5 - 5.2 mmol/L    Chloride 103 98 - 107 mmol/L    CO2 25.5 22.0 - 29.0 mmol/L    Calcium 9.2 8.6 - 10.5 mg/dL    Total Protein 7.6 6.0 - 8.5 g/dL    Albumin 4.10 3.50 -  5.20 g/dL    ALT (SGPT) 7 1 - 33 U/L    AST (SGOT) 13 1 - 32 U/L    Alkaline Phosphatase 80 39 - 117 U/L    Total Bilirubin 0.3 0.2 - 1.2 mg/dL    eGFR Non African Amer 44 (L) >60 mL/min/1.73    Globulin 3.5 gm/dL    A/G Ratio 1.2 g/dL    BUN/Creatinine Ratio 25.9 (H) 7.0 - 25.0    Anion Gap 10.5 5.0 - 15.0 mmol/L   CBC Auto Differential    Collection Time: 01/03/20  5:48 PM   Result Value Ref Range    WBC 11.73 (H) 3.40 - 10.80 10*3/mm3    RBC 4.32 3.77 - 5.28 10*6/mm3    Hemoglobin 13.5 12.0 - 15.9 g/dL    Hematocrit 40.1 34.0 - 46.6 %    MCV 92.8 79.0 - 97.0 fL    MCH 31.3 26.6 - 33.0 pg    MCHC 33.7 31.5 - 35.7 g/dL    RDW 14.0 12.3 - 15.4 %    RDW-SD 47.8 37.0 - 54.0 fl    MPV 9.4 6.0 - 12.0 fL    Platelets 248 140 - 450 10*3/mm3    Neutrophil % 75.2 42.7 - 76.0 %    Lymphocyte % 17.0 (L) 19.6 - 45.3 %    Monocyte % 6.1 5.0 - 12.0 %    Eosinophil % 0.4 0.3 - 6.2 %    Basophil % 0.4 0.0 - 1.5 %    Immature Grans % 0.9 (H) 0.0 - 0.5 %    Neutrophils, Absolute 8.81 (H) 1.70 - 7.00 10*3/mm3    Lymphocytes, Absolute 1.99 0.70 - 3.10 10*3/mm3    Monocytes, Absolute 0.72 0.10 - 0.90 10*3/mm3    Eosinophils, Absolute 0.05 0.00 - 0.40 10*3/mm3    Basophils, Absolute 0.05 0.00 - 0.20 10*3/mm3    Immature Grans, Absolute 0.11 (H) 0.00 - 0.05 10*3/mm3    nRBC 0.0 0.0 - 0.2 /100 WBC   Urinalysis With Culture If Indicated - Urine, Clean Catch    Collection Time: 01/03/20  8:11 PM   Result Value Ref Range    Color, UA Yellow Yellow, Straw    Appearance, UA Clear Clear    pH, UA <=5.0 5.0 - 8.0    Specific Gravity, UA 1.021 1.005 - 1.030    Glucose, UA Negative Negative    Ketones, UA Negative Negative    Bilirubin, UA Negative Negative    Blood, UA Small (1+) (A) Negative    Protein, UA Negative Negative    Leuk Esterase, UA Negative Negative    Nitrite, UA Negative Negative    Urobilinogen, UA 1.0 E.U./dL 0.2 - 1.0 E.U./dL   Urinalysis, Microscopic Only - Urine, Clean Catch    Collection Time: 01/03/20  8:11 PM   Result  Value Ref Range    RBC, UA 0-2 None Seen, 0-2 /HPF    WBC, UA 0-2 None Seen, 0-2 /HPF    Bacteria, UA None Seen None Seen /HPF    Squamous Epithelial Cells, UA 0-2 None Seen, 0-2 /HPF    Hyaline Casts, UA 0-2 None Seen /LPF    Methodology Automated Microscopy        Ordered the above labs and reviewed the results.    PROCEDURES  Procedures    EKG:           EKG time: 2039  Rhythm/Rate: A-fib, rate 92 BPM  P waves and NH: NA  QRS, axis: Normal  ST and T waves: No acute ischemic changes    Interpreted Contemporaneously by me, independently viewed  Compared to prior 10/21/16, a-fib is new today      MEDICATIONS GIVEN IN ER  Medications - No data to display          MEDICAL DECISION MAKING and ED Course    ED Course as of Jan 03 2323 Fri Jan 03, 2020 2222 86-year-old female presents with complaint of a very brief episode of weakness in her left foot today.  She has a normal neurologic exam now reports symptoms have completely resolved.  She had no difficulty walking during this time and had no other neurologic symptoms.  I do not feel that this represents a TIA nor do I feel that she needs neuro imaging or further evaluation for these transient symptoms at this time.  Incidentally, patient was noted to be in atrial fibrillation today without RVR.  She has no known history of atrial fibrillation.  She has a calculated chads vascular score of 4, correlating with a 4% annual risk of stroke.  She has no absolute contraindication to anticoagulation therapy.  She did have a diagnosis of peptic ulcer disease approximately 3 years ago but has no GI symptoms currently denies any recent melena or bright red blood per rectum.  Hemoglobin today is 13.5.  She does not currently take a daily PPI.  After explaining all the risk and benefits of anticoagulation therapy,, patient is agreement with starting a daily blood thinner to reduce her risk of stroke.  I have prescribed Eliquis 2.5 mg p.o. twice daily.  She will also be started  on omeprazole daily for GI prophylaxis as well as 25 mg metoprolol succinate daily.  She has been referred to cardiology for further evaluation.    [JR]      ED Course User Index  [JR] Craig Alvarez MD       7103: Examined pt. Pt is resting comfortably. Notified pt of negative acute lab work. Discussed with the pt at length the crucial need to start her on anticoagulation medication due to her EKG indicating a-fib rhythm. Discussed the plan to discharge the pt home with prescriptions for Metoprolol Succinate XL, Eliquis, Omeprazole. I instructed the pt to follow up with a cardiologist for further evaluation and management for her a-fib rhythm. Strict RTER instructions given to the pt. Pt understands and agrees with the plan, all questions answered.    MDM  Number of Diagnoses or Management Options  Atrial fibrillation, unspecified type (CMS/HCC):      Amount and/or Complexity of Data Reviewed  Clinical lab tests: ordered and reviewed (UA negative for UTI, Creatinine 1.16, WBC 11.73)  Tests in the medicine section of CPT®: ordered and reviewed (See EKG procedure note)  Decide to obtain previous medical records or to obtain history from someone other than the patient: yes               DIAGNOSIS  Final diagnoses:   Atrial fibrillation, unspecified type (CMS/HCC)         DISPOSITION  DISCHARGE    Patient discharged in stable condition.    Reviewed implications of results, diagnosis, meds, responsibility to follow up, warning signs and symptoms of possible worsening, potential complications and reasons to return to ER, including SOA or CP.    Patient/Family voiced understanding of above instructions.    Discussed plan for discharge, as there is no emergent indication for admission. Patient referred to primary care provider for BP management due to today's BP. Pt/family is agreeable and understands need for follow up and repeat testing.  Pt is aware that discharge does not mean that nothing is wrong but it  indicates no emergency is present that requires admission and they must continue care with follow-up as given below or physician of their choice.     FOLLOW-UP  [facility] CARDIOLOG[facility]  [address]  [facility] [address] [phone]  Schedule an appointment as soon as possible for a visit       Amilcar Mauricio DO  [address]  [address]  [facility] [address]  [phone]    Schedule an appointment as soon as possible for a visit            Medication List      New Prescriptions    apixaban 2.5 MG tablet tablet  Commonly known as:  ELIQUIS  Take 1 tablet by mouth Every 12 (Twelve) Hours for 30 days.     metoprolol succinate XL 25 MG 24 hr tablet  Commonly known as:  TOPROL-XL  Take 1 tablet by mouth Daily for 30 days.     omeprazole 20 MG capsule  Commonly known as:  priLOSEC  Take 1 capsule by mouth Daily.                      Latest Documented Vital Signs:  As of 11:23 PM  BP- 148/84 HR- 85 Temp- 97 °F (36.1 °C) (Tympanic) O2 sat- 97%        --  Documentation assistance provided by sammy Maravilla for Dr. IVAN Alvarez MD.  Information recorded by the sammy was done at my direction and has been verified and validated by me.      Please note that portions of this were completed with a voice recognition program.                Umu Maravilla  01/04/20 0216       Craig Alvarez MD  01/04/20 0417

## 2020-01-05 ENCOUNTER — APPOINTMENT (OUTPATIENT)
Dept: MRI IMAGING | Facility: HOSPITAL | Age: 85
End: 2020-01-05

## 2020-01-05 LAB
APTT PPP: 49.2 SECONDS (ref 22.7–35.4)
APTT PPP: 63.3 SECONDS (ref 22.7–35.4)
APTT PPP: 85.7 SECONDS (ref 22.7–35.4)
CHOLEST SERPL-MCNC: 188 MG/DL (ref 0–200)
FOLATE SERPL-MCNC: >20 NG/ML (ref 4.78–24.2)
GLUCOSE BLDC GLUCOMTR-MCNC: 111 MG/DL (ref 70–130)
GLUCOSE BLDC GLUCOMTR-MCNC: 123 MG/DL (ref 70–130)
GLUCOSE BLDC GLUCOMTR-MCNC: 141 MG/DL (ref 70–130)
GLUCOSE BLDC GLUCOMTR-MCNC: 159 MG/DL (ref 70–130)
HBA1C MFR BLD: 6.3 % (ref 4.8–5.6)
HDLC SERPL-MCNC: 49 MG/DL (ref 40–60)
LDLC SERPL CALC-MCNC: 116 MG/DL (ref 0–100)
LDLC/HDLC SERPL: 2.36 {RATIO}
T4 FREE SERPL-MCNC: 1.15 NG/DL (ref 0.93–1.7)
TRIGL SERPL-MCNC: 116 MG/DL (ref 0–150)
TSH SERPL DL<=0.05 MIU/L-ACNC: 3.42 UIU/ML (ref 0.27–4.2)
VIT B12 BLD-MCNC: 268 PG/ML (ref 211–946)
VLDLC SERPL-MCNC: 23.2 MG/DL (ref 5–40)

## 2020-01-05 PROCEDURE — 85730 THROMBOPLASTIN TIME PARTIAL: CPT | Performed by: INTERNAL MEDICINE

## 2020-01-05 PROCEDURE — 36415 COLL VENOUS BLD VENIPUNCTURE: CPT | Performed by: HOSPITALIST

## 2020-01-05 PROCEDURE — 84439 ASSAY OF FREE THYROXINE: CPT | Performed by: PSYCHIATRY & NEUROLOGY

## 2020-01-05 PROCEDURE — 25010000002 DIGOXIN PER 500 MCG: Performed by: INTERNAL MEDICINE

## 2020-01-05 PROCEDURE — 25010000002 HEPARIN (PORCINE) PER 1000 UNITS: Performed by: EMERGENCY MEDICINE

## 2020-01-05 PROCEDURE — 82962 GLUCOSE BLOOD TEST: CPT

## 2020-01-05 PROCEDURE — 99221 1ST HOSP IP/OBS SF/LOW 40: CPT | Performed by: PSYCHIATRY & NEUROLOGY

## 2020-01-05 PROCEDURE — 97530 THERAPEUTIC ACTIVITIES: CPT

## 2020-01-05 PROCEDURE — A9577 INJ MULTIHANCE: HCPCS | Performed by: INTERNAL MEDICINE

## 2020-01-05 PROCEDURE — 85730 THROMBOPLASTIN TIME PARTIAL: CPT | Performed by: HOSPITALIST

## 2020-01-05 PROCEDURE — 0 GADOBENATE DIMEGLUMINE 529 MG/ML SOLUTION: Performed by: INTERNAL MEDICINE

## 2020-01-05 PROCEDURE — 97162 PT EVAL MOD COMPLEX 30 MIN: CPT

## 2020-01-05 PROCEDURE — 70553 MRI BRAIN STEM W/O & W/DYE: CPT

## 2020-01-05 PROCEDURE — 80061 LIPID PANEL: CPT | Performed by: HOSPITALIST

## 2020-01-05 PROCEDURE — 83036 HEMOGLOBIN GLYCOSYLATED A1C: CPT | Performed by: HOSPITALIST

## 2020-01-05 PROCEDURE — 99222 1ST HOSP IP/OBS MODERATE 55: CPT | Performed by: INTERNAL MEDICINE

## 2020-01-05 PROCEDURE — 84443 ASSAY THYROID STIM HORMONE: CPT | Performed by: PSYCHIATRY & NEUROLOGY

## 2020-01-05 RX ORDER — ATORVASTATIN CALCIUM 80 MG/1
80 TABLET, FILM COATED ORAL NIGHTLY
Status: DISCONTINUED | OUTPATIENT
Start: 2020-01-05 | End: 2020-01-07 | Stop reason: HOSPADM

## 2020-01-05 RX ORDER — DIGOXIN 0.25 MG/ML
250 INJECTION INTRAMUSCULAR; INTRAVENOUS ONCE
Status: COMPLETED | OUTPATIENT
Start: 2020-01-05 | End: 2020-01-05

## 2020-01-05 RX ORDER — SODIUM CHLORIDE 0.9 % (FLUSH) 0.9 %
10 SYRINGE (ML) INJECTION EVERY 12 HOURS SCHEDULED
Status: DISCONTINUED | OUTPATIENT
Start: 2020-01-05 | End: 2020-01-07 | Stop reason: HOSPADM

## 2020-01-05 RX ORDER — DEXTROSE MONOHYDRATE 25 G/50ML
25 INJECTION, SOLUTION INTRAVENOUS
Status: DISCONTINUED | OUTPATIENT
Start: 2020-01-05 | End: 2020-01-07 | Stop reason: HOSPADM

## 2020-01-05 RX ORDER — SODIUM CHLORIDE 0.9 % (FLUSH) 0.9 %
10 SYRINGE (ML) INJECTION AS NEEDED
Status: DISCONTINUED | OUTPATIENT
Start: 2020-01-05 | End: 2020-01-07 | Stop reason: HOSPADM

## 2020-01-05 RX ORDER — ASPIRIN 300 MG/1
300 SUPPOSITORY RECTAL DAILY
Status: DISCONTINUED | OUTPATIENT
Start: 2020-01-05 | End: 2020-01-07 | Stop reason: HOSPADM

## 2020-01-05 RX ORDER — ASPIRIN 81 MG/1
81 TABLET, CHEWABLE ORAL DAILY
Status: DISCONTINUED | OUTPATIENT
Start: 2020-01-05 | End: 2020-01-07 | Stop reason: HOSPADM

## 2020-01-05 RX ORDER — NICOTINE POLACRILEX 4 MG
15 LOZENGE BUCCAL
Status: DISCONTINUED | OUTPATIENT
Start: 2020-01-05 | End: 2020-01-07 | Stop reason: HOSPADM

## 2020-01-05 RX ADMIN — GADOBENATE DIMEGLUMINE 15 ML: 529 INJECTION, SOLUTION INTRAVENOUS at 14:35

## 2020-01-05 RX ADMIN — SODIUM CHLORIDE, PRESERVATIVE FREE 10 ML: 5 INJECTION INTRAVENOUS at 08:08

## 2020-01-05 RX ADMIN — SODIUM CHLORIDE 100 ML/HR: 9 INJECTION, SOLUTION INTRAVENOUS at 06:41

## 2020-01-05 RX ADMIN — SODIUM CHLORIDE 100 ML/HR: 9 INJECTION, SOLUTION INTRAVENOUS at 22:19

## 2020-01-05 RX ADMIN — DIGOXIN 250 MCG: 0.25 INJECTION INTRAMUSCULAR; INTRAVENOUS at 15:33

## 2020-01-05 RX ADMIN — METOPROLOL TARTRATE 25 MG: 25 TABLET ORAL at 13:19

## 2020-01-05 RX ADMIN — SODIUM CHLORIDE, PRESERVATIVE FREE 10 ML: 5 INJECTION INTRAVENOUS at 21:05

## 2020-01-05 RX ADMIN — HEPARIN SODIUM 15.95 UNITS/KG/HR: 10000 INJECTION, SOLUTION INTRAVENOUS at 17:11

## 2020-01-05 RX ADMIN — DIGOXIN 250 MCG: 0.25 INJECTION INTRAMUSCULAR; INTRAVENOUS at 13:19

## 2020-01-05 RX ADMIN — SODIUM CHLORIDE, PRESERVATIVE FREE 10 ML: 5 INJECTION INTRAVENOUS at 21:04

## 2020-01-05 RX ADMIN — SODIUM CHLORIDE, PRESERVATIVE FREE 10 ML: 5 INJECTION INTRAVENOUS at 05:19

## 2020-01-05 RX ADMIN — ATORVASTATIN CALCIUM 40 MG: 80 TABLET, FILM COATED ORAL at 21:04

## 2020-01-06 ENCOUNTER — APPOINTMENT (OUTPATIENT)
Dept: CARDIOLOGY | Facility: HOSPITAL | Age: 85
End: 2020-01-06

## 2020-01-06 LAB
ANION GAP SERPL CALCULATED.3IONS-SCNC: 11.3 MMOL/L (ref 5–15)
AORTIC DIMENSIONLESS INDEX: 0.7 (DI)
APTT PPP: 82 SECONDS (ref 22.7–35.4)
BASOPHILS # BLD AUTO: 0.05 10*3/MM3 (ref 0–0.2)
BASOPHILS NFR BLD AUTO: 0.5 % (ref 0–1.5)
BH CV ECHO MEAS - ACS: 1.7 CM
BH CV ECHO MEAS - AI DEC SLOPE: 237 CM/SEC^2
BH CV ECHO MEAS - AI MAX PG: 93.7 MMHG
BH CV ECHO MEAS - AI MAX VEL: 484 CM/SEC
BH CV ECHO MEAS - AI P1/2T: 598.1 MSEC
BH CV ECHO MEAS - AO MAX PG: 7 MMHG
BH CV ECHO MEAS - AO MEAN PG (FULL): 3 MMHG
BH CV ECHO MEAS - AO MEAN PG: 4 MMHG
BH CV ECHO MEAS - AO ROOT AREA (BSA CORRECTED): 1.5
BH CV ECHO MEAS - AO ROOT AREA: 5.7 CM^2
BH CV ECHO MEAS - AO ROOT DIAM: 2.7 CM
BH CV ECHO MEAS - AO V2 MAX: 132 CM/SEC
BH CV ECHO MEAS - AO V2 MEAN: 90.7 CM/SEC
BH CV ECHO MEAS - AO V2 VTI: 27.7 CM
BH CV ECHO MEAS - AVA(I,A): 1.9 CM^2
BH CV ECHO MEAS - AVA(I,D): 1.9 CM^2
BH CV ECHO MEAS - BSA(HAYCOCK): 1.9 M^2
BH CV ECHO MEAS - BSA: 1.8 M^2
BH CV ECHO MEAS - BZI_BMI: 29.2 KILOGRAMS/M^2
BH CV ECHO MEAS - BZI_METRIC_HEIGHT: 162.6 CM
BH CV ECHO MEAS - BZI_METRIC_WEIGHT: 77.1 KG
BH CV ECHO MEAS - EDV(CUBED): 110.6 ML
BH CV ECHO MEAS - EDV(MOD-SP2): 70 ML
BH CV ECHO MEAS - EDV(MOD-SP4): 76 ML
BH CV ECHO MEAS - EDV(TEICH): 107.5 ML
BH CV ECHO MEAS - EF(CUBED): 67.5 %
BH CV ECHO MEAS - EF(MOD-BP): 62 %
BH CV ECHO MEAS - EF(MOD-SP2): 61.4 %
BH CV ECHO MEAS - EF(MOD-SP4): 61.8 %
BH CV ECHO MEAS - EF(TEICH): 59 %
BH CV ECHO MEAS - ESV(CUBED): 35.9 ML
BH CV ECHO MEAS - ESV(MOD-SP2): 27 ML
BH CV ECHO MEAS - ESV(MOD-SP4): 29 ML
BH CV ECHO MEAS - ESV(TEICH): 44.1 ML
BH CV ECHO MEAS - FS: 31.3 %
BH CV ECHO MEAS - IVS/LVPW: 1.1
BH CV ECHO MEAS - IVSD: 1 CM
BH CV ECHO MEAS - LA DIMENSION: 3.5 CM
BH CV ECHO MEAS - LA/AO: 1.3
BH CV ECHO MEAS - LAT PEAK E' VEL: 11 CM/SEC
BH CV ECHO MEAS - LV DIASTOLIC VOL/BSA (35-75): 41.6 ML/M^2
BH CV ECHO MEAS - LV MASS(C)D: 158.8 GRAMS
BH CV ECHO MEAS - LV MASS(C)DI: 87 GRAMS/M^2
BH CV ECHO MEAS - LV MAX PG: 3 MMHG
BH CV ECHO MEAS - LV MEAN PG: 1 MMHG
BH CV ECHO MEAS - LV SYSTOLIC VOL/BSA (12-30): 15.9 ML/M^2
BH CV ECHO MEAS - LV V1 MAX: 83 CM/SEC
BH CV ECHO MEAS - LV V1 MEAN: 57.1 CM/SEC
BH CV ECHO MEAS - LV V1 VTI: 18.1 CM
BH CV ECHO MEAS - LVIDD: 4.8 CM
BH CV ECHO MEAS - LVIDS: 3.3 CM
BH CV ECHO MEAS - LVLD AP2: 7.5 CM
BH CV ECHO MEAS - LVLD AP4: 7.1 CM
BH CV ECHO MEAS - LVLS AP2: 6.3 CM
BH CV ECHO MEAS - LVLS AP4: 6.1 CM
BH CV ECHO MEAS - LVOT AREA (M): 2.8 CM^2
BH CV ECHO MEAS - LVOT AREA: 2.8 CM^2
BH CV ECHO MEAS - LVOT DIAM: 1.9 CM
BH CV ECHO MEAS - LVPWD: 0.9 CM
BH CV ECHO MEAS - MED PEAK E' VEL: 8 CM/SEC
BH CV ECHO MEAS - MR MAX PG: 121.9 MMHG
BH CV ECHO MEAS - MR MAX VEL: 552 CM/SEC
BH CV ECHO MEAS - MV DEC SLOPE: 350 CM/SEC^2
BH CV ECHO MEAS - MV DEC TIME: 240 SEC
BH CV ECHO MEAS - MV E MAX VEL: 110 CM/SEC
BH CV ECHO MEAS - MV MAX PG: 6 MMHG
BH CV ECHO MEAS - MV MEAN PG: 2 MMHG
BH CV ECHO MEAS - MV P1/2T MAX VEL: 120 CM/SEC
BH CV ECHO MEAS - MV P1/2T: 100.4 MSEC
BH CV ECHO MEAS - MV V2 MEAN: 57.8 CM/SEC
BH CV ECHO MEAS - MV V2 VTI: 31.5 CM
BH CV ECHO MEAS - MVA P1/2T LCG: 1.8 CM^2
BH CV ECHO MEAS - MVA(P1/2T): 2.2 CM^2
BH CV ECHO MEAS - MVA(VTI): 1.6 CM^2
BH CV ECHO MEAS - PA ACC SLOPE: 763 CM/SEC^2
BH CV ECHO MEAS - PA ACC TIME: 0.11 SEC
BH CV ECHO MEAS - PA MAX PG: 3.4 MMHG
BH CV ECHO MEAS - PA PR(ACCEL): 28.2 MMHG
BH CV ECHO MEAS - PA V2 MAX: 92.1 CM/SEC
BH CV ECHO MEAS - PULM DIAS VEL: 37.8 CM/SEC
BH CV ECHO MEAS - PULM S/D: 0.6
BH CV ECHO MEAS - PULM SYS VEL: 22.8 CM/SEC
BH CV ECHO MEAS - QP/QS: 0.71
BH CV ECHO MEAS - RAP SYSTOLE: 3 MMHG
BH CV ECHO MEAS - RV MAX PG: 1 MMHG
BH CV ECHO MEAS - RV MEAN PG: 0 MMHG
BH CV ECHO MEAS - RV V1 MAX: 0.5 CM/SEC
BH CV ECHO MEAS - RV V1 MEAN: 30.9 CM/SEC
BH CV ECHO MEAS - RV V1 VTI: 11.6 CM
BH CV ECHO MEAS - RVOT AREA: 3.1 CM^2
BH CV ECHO MEAS - RVOT DIAM: 2 CM
BH CV ECHO MEAS - RVSP: 30.2 MMHG
BH CV ECHO MEAS - SI(AO): 86.9 ML/M^2
BH CV ECHO MEAS - SI(CUBED): 40.9 ML/M^2
BH CV ECHO MEAS - SI(LVOT): 28.1 ML/M^2
BH CV ECHO MEAS - SI(MOD-SP2): 23.6 ML/M^2
BH CV ECHO MEAS - SI(MOD-SP4): 25.7 ML/M^2
BH CV ECHO MEAS - SI(TEICH): 34.7 ML/M^2
BH CV ECHO MEAS - SV(AO): 158.6 ML
BH CV ECHO MEAS - SV(CUBED): 74.7 ML
BH CV ECHO MEAS - SV(LVOT): 51.3 ML
BH CV ECHO MEAS - SV(MOD-SP2): 43 ML
BH CV ECHO MEAS - SV(MOD-SP4): 47 ML
BH CV ECHO MEAS - SV(RVOT): 36.4 ML
BH CV ECHO MEAS - SV(TEICH): 63.4 ML
BH CV ECHO MEAS - TAPSE (>1.6): 1.9 CM2
BH CV ECHO MEAS - TR MAX VEL: 261 CM/SEC
BH CV ECHO MEASUREMENTS AVERAGE E/E' RATIO: 11.58
BH CV VAS BP RIGHT ARM: NORMAL MMHG
BH CV XLRA - RV BASE: 3.1 CM
BH CV XLRA - TDI S': 12 CM/SEC
BUN BLD-MCNC: 19 MG/DL (ref 8–23)
BUN/CREAT SERPL: 23.2 (ref 7–25)
CALCIUM SPEC-SCNC: 8.5 MG/DL (ref 8.6–10.5)
CHLORIDE SERPL-SCNC: 105 MMOL/L (ref 98–107)
CHOLEST SERPL-MCNC: 167 MG/DL (ref 0–200)
CO2 SERPL-SCNC: 20.7 MMOL/L (ref 22–29)
CREAT BLD-MCNC: 0.82 MG/DL (ref 0.57–1)
DEPRECATED RDW RBC AUTO: 49.7 FL (ref 37–54)
EOSINOPHIL # BLD AUTO: 0.22 10*3/MM3 (ref 0–0.4)
EOSINOPHIL NFR BLD AUTO: 2.1 % (ref 0.3–6.2)
ERYTHROCYTE [DISTWIDTH] IN BLOOD BY AUTOMATED COUNT: 14.4 % (ref 12.3–15.4)
GFR SERPL CREATININE-BSD FRML MDRD: 66 ML/MIN/1.73
GLUCOSE BLD-MCNC: 113 MG/DL (ref 65–99)
GLUCOSE BLDC GLUCOMTR-MCNC: 113 MG/DL (ref 70–130)
GLUCOSE BLDC GLUCOMTR-MCNC: 113 MG/DL (ref 70–130)
GLUCOSE BLDC GLUCOMTR-MCNC: 123 MG/DL (ref 70–130)
GLUCOSE BLDC GLUCOMTR-MCNC: 141 MG/DL (ref 70–130)
HCT VFR BLD AUTO: 38.5 % (ref 34–46.6)
HDLC SERPL-MCNC: 44 MG/DL (ref 40–60)
HGB BLD-MCNC: 12.8 G/DL (ref 12–15.9)
IMM GRANULOCYTES # BLD AUTO: 0.05 10*3/MM3 (ref 0–0.05)
IMM GRANULOCYTES NFR BLD AUTO: 0.5 % (ref 0–0.5)
LDLC SERPL CALC-MCNC: 100 MG/DL (ref 0–100)
LDLC/HDLC SERPL: 2.28 {RATIO}
LEFT ATRIUM VOLUME INDEX: 34 ML/M2
LEFT ATRIUM VOLUME: 58 CM3
LV EF 2D ECHO EST: 62 %
LYMPHOCYTES # BLD AUTO: 3.64 10*3/MM3 (ref 0.7–3.1)
LYMPHOCYTES NFR BLD AUTO: 35.1 % (ref 19.6–45.3)
MAXIMAL PREDICTED HEART RATE: 134 BPM
MCH RBC QN AUTO: 31.2 PG (ref 26.6–33)
MCHC RBC AUTO-ENTMCNC: 33.2 G/DL (ref 31.5–35.7)
MCV RBC AUTO: 93.9 FL (ref 79–97)
MONOCYTES # BLD AUTO: 0.71 10*3/MM3 (ref 0.1–0.9)
MONOCYTES NFR BLD AUTO: 6.8 % (ref 5–12)
NEUTROPHILS # BLD AUTO: 5.7 10*3/MM3 (ref 1.7–7)
NEUTROPHILS NFR BLD AUTO: 55 % (ref 42.7–76)
NRBC BLD AUTO-RTO: 0 /100 WBC (ref 0–0.2)
PLATELET # BLD AUTO: 231 10*3/MM3 (ref 140–450)
PMV BLD AUTO: 9.6 FL (ref 6–12)
POTASSIUM BLD-SCNC: 3.6 MMOL/L (ref 3.5–5.2)
RBC # BLD AUTO: 4.1 10*6/MM3 (ref 3.77–5.28)
SODIUM BLD-SCNC: 137 MMOL/L (ref 136–145)
STRESS TARGET HR: 114 BPM
TRIGL SERPL-MCNC: 113 MG/DL (ref 0–150)
VLDLC SERPL-MCNC: 22.6 MG/DL (ref 5–40)
WBC NRBC COR # BLD: 10.37 10*3/MM3 (ref 3.4–10.8)

## 2020-01-06 PROCEDURE — 99231 SBSQ HOSP IP/OBS SF/LOW 25: CPT | Performed by: NURSE PRACTITIONER

## 2020-01-06 PROCEDURE — 80061 LIPID PANEL: CPT | Performed by: PSYCHIATRY & NEUROLOGY

## 2020-01-06 PROCEDURE — 97110 THERAPEUTIC EXERCISES: CPT

## 2020-01-06 PROCEDURE — 82962 GLUCOSE BLOOD TEST: CPT

## 2020-01-06 PROCEDURE — 93306 TTE W/DOPPLER COMPLETE: CPT

## 2020-01-06 PROCEDURE — 99233 SBSQ HOSP IP/OBS HIGH 50: CPT | Performed by: NURSE PRACTITIONER

## 2020-01-06 PROCEDURE — 99232 SBSQ HOSP IP/OBS MODERATE 35: CPT | Performed by: INTERNAL MEDICINE

## 2020-01-06 PROCEDURE — 97535 SELF CARE MNGMENT TRAINING: CPT

## 2020-01-06 PROCEDURE — 92610 EVALUATE SWALLOWING FUNCTION: CPT

## 2020-01-06 PROCEDURE — 97165 OT EVAL LOW COMPLEX 30 MIN: CPT

## 2020-01-06 PROCEDURE — 25010000002 CYANOCOBALAMIN PER 1000 MCG: Performed by: NURSE PRACTITIONER

## 2020-01-06 PROCEDURE — 85025 COMPLETE CBC W/AUTO DIFF WBC: CPT | Performed by: INTERNAL MEDICINE

## 2020-01-06 PROCEDURE — 80048 BASIC METABOLIC PNL TOTAL CA: CPT | Performed by: INTERNAL MEDICINE

## 2020-01-06 PROCEDURE — 85730 THROMBOPLASTIN TIME PARTIAL: CPT | Performed by: INTERNAL MEDICINE

## 2020-01-06 PROCEDURE — 93306 TTE W/DOPPLER COMPLETE: CPT | Performed by: INTERNAL MEDICINE

## 2020-01-06 RX ORDER — CYANOCOBALAMIN 1000 UG/ML
1000 INJECTION, SOLUTION INTRAMUSCULAR; SUBCUTANEOUS ONCE
Status: COMPLETED | OUTPATIENT
Start: 2020-01-06 | End: 2020-01-06

## 2020-01-06 RX ADMIN — SODIUM CHLORIDE, PRESERVATIVE FREE 10 ML: 5 INJECTION INTRAVENOUS at 20:32

## 2020-01-06 RX ADMIN — APIXABAN 5 MG: 5 TABLET, FILM COATED ORAL at 10:49

## 2020-01-06 RX ADMIN — CYANOCOBALAMIN 1000 MCG: 1000 INJECTION, SOLUTION INTRAMUSCULAR; SUBCUTANEOUS at 15:30

## 2020-01-06 RX ADMIN — METOPROLOL TARTRATE 25 MG: 25 TABLET ORAL at 20:31

## 2020-01-06 RX ADMIN — ASPIRIN 81 MG: 81 TABLET, CHEWABLE ORAL at 10:49

## 2020-01-06 RX ADMIN — SODIUM CHLORIDE, PRESERVATIVE FREE 10 ML: 5 INJECTION INTRAVENOUS at 20:31

## 2020-01-06 RX ADMIN — APIXABAN 5 MG: 5 TABLET, FILM COATED ORAL at 20:31

## 2020-01-06 RX ADMIN — SODIUM CHLORIDE, PRESERVATIVE FREE 10 ML: 5 INJECTION INTRAVENOUS at 10:52

## 2020-01-06 RX ADMIN — SODIUM CHLORIDE, PRESERVATIVE FREE 10 ML: 5 INJECTION INTRAVENOUS at 10:51

## 2020-01-06 RX ADMIN — METOPROLOL TARTRATE 25 MG: 25 TABLET ORAL at 10:50

## 2020-01-06 RX ADMIN — ATORVASTATIN CALCIUM 80 MG: 80 TABLET, FILM COATED ORAL at 20:31

## 2020-01-07 VITALS
SYSTOLIC BLOOD PRESSURE: 145 MMHG | WEIGHT: 170 LBS | OXYGEN SATURATION: 97 % | HEIGHT: 64 IN | BODY MASS INDEX: 29.02 KG/M2 | RESPIRATION RATE: 19 BRPM | HEART RATE: 57 BPM | TEMPERATURE: 98 F | DIASTOLIC BLOOD PRESSURE: 73 MMHG

## 2020-01-07 LAB
CREAT BLDA-MCNC: 0.9 MG/DL (ref 0.6–1.3)
GLUCOSE BLDC GLUCOMTR-MCNC: 110 MG/DL (ref 70–130)
GLUCOSE BLDC GLUCOMTR-MCNC: 114 MG/DL (ref 70–130)
GLUCOSE BLDC GLUCOMTR-MCNC: 118 MG/DL (ref 70–130)

## 2020-01-07 PROCEDURE — 99238 HOSP IP/OBS DSCHRG MGMT 30/<: CPT | Performed by: INTERNAL MEDICINE

## 2020-01-07 PROCEDURE — 97110 THERAPEUTIC EXERCISES: CPT

## 2020-01-07 PROCEDURE — 82962 GLUCOSE BLOOD TEST: CPT

## 2020-01-07 RX ORDER — ASPIRIN 81 MG/1
81 TABLET, CHEWABLE ORAL DAILY
Qty: 30 TABLET | Refills: 0 | Status: SHIPPED | OUTPATIENT
Start: 2020-01-08 | End: 2020-01-31

## 2020-01-07 RX ORDER — ATORVASTATIN CALCIUM 80 MG/1
80 TABLET, FILM COATED ORAL NIGHTLY
Qty: 30 TABLET | Refills: 0 | Status: SHIPPED | OUTPATIENT
Start: 2020-01-07 | End: 2020-01-31

## 2020-01-07 RX ADMIN — APIXABAN 5 MG: 5 TABLET, FILM COATED ORAL at 09:08

## 2020-01-07 RX ADMIN — SODIUM CHLORIDE, PRESERVATIVE FREE 10 ML: 5 INJECTION INTRAVENOUS at 09:18

## 2020-01-07 RX ADMIN — ASPIRIN 81 MG: 81 TABLET, CHEWABLE ORAL at 09:08

## 2020-01-07 RX ADMIN — METOPROLOL TARTRATE 25 MG: 25 TABLET ORAL at 09:08

## 2020-01-08 ENCOUNTER — READMISSION MANAGEMENT (OUTPATIENT)
Dept: CALL CENTER | Facility: HOSPITAL | Age: 85
End: 2020-01-08

## 2020-01-08 NOTE — OUTREACH NOTE
Prep Survey      Responses   Facility patient discharged from?  [facility]   Is patient eligible?  Yes   Discharge diagnosis  Acute lacunar infarct    Does the patient have one of the following disease processes/diagnoses(primary or secondary)?  Stroke (TIA)   Does the patient have Home health ordered?  Yes   What is the Home health agency?   [facility]    Is there a DME ordered?  No   Prep survey completed?  Yes          Isabel Morales RN

## 2020-01-09 ENCOUNTER — READMISSION MANAGEMENT (OUTPATIENT)
Dept: CALL CENTER | Facility: HOSPITAL | Age: 85
End: 2020-01-09

## 2020-01-09 NOTE — OUTREACH NOTE
Stroke Week 1 Survey      Responses   Facility patient discharged from?  [facility]   Does the patient have one of the following disease processes/diagnoses(primary or secondary)?  Stroke (TIA)   Is there a successful TCM telephone encounter documented?  No   Week 1 attempt successful?  Yes   Call start time  1757   Call end time  1807   Discharge diagnosis  Acute lacunar infarct    Is patient permission given to speak with other caregiver?  Yes   Person spoke with today (if not patient) and relationship  DTR   Meds reviewed with patient/caregiver?  Yes   Is the patient having any side effects they believe may be caused by any medication additions or changes?  No   Does the patient have all medications ordered at discharge?  Yes   Is the patient taking all medications as directed (includes completed medication regime)?  Yes   Does the patient have a primary care provider?   Yes   Does the patient have an appointment with their PCP within 7 days of discharge?  N/A   Has the patient kept scheduled appointments due by today?  N/A   What [facility] the Home health agency?   [facility]   Has home health visited the patient within 72 hours of discharge?  Yes   Psychosocial issues?  No   Does the patient require any assistance with activities of daily living such as eating, bathing, dressing, walking, etc.?  Yes   ADL comments  Going up stairs   Does the patient have any residual symptoms from stroke/TIA?  Yes   Residual symptoms comments  weakness   Does the patient understand the diet ordered at discharge?  No   Did the patient receive a copy of their discharge instructions?  Yes   Nursing interventions  Reviewed instructions with patient   What is the patient's perception of their health status since discharge?  Improving   Nursing interventions  Nurse provided patient education   Is the patient able to teach back FAST for Stroke?  Yes   Is the patient/caregiver able to teach back the risk factors for a stroke?  High blood  pressure-goal below 120/80, Physical inactivity and obesity   Is the patient/caregiver able to teach back signs and symptoms related to disease process for when to call PCP?  Yes   Is the patient/caregiver able to teach back signs and symptoms related to disease process for when to call 911?  Yes   Is the patient/caregiver able to teach back the hierarchy of who to call/visit for symptoms/problems? PCP, Specialist, Home health nurse, Urgent Care, ED, 911  Yes   Additional teach back comments  Encouraged a better diet, encouraged daily exercise.     Week 1 call completed?  Yes          Sweetie Castrejon RN

## 2020-01-10 ENCOUNTER — TELEPHONE (OUTPATIENT)
Dept: FAMILY MEDICINE CLINIC | Facility: CLINIC | Age: 85
End: 2020-01-10

## 2020-01-13 ENCOUNTER — OFFICE VISIT (OUTPATIENT)
Dept: FAMILY MEDICINE CLINIC | Facility: CLINIC | Age: 85
End: 2020-01-13

## 2020-01-13 VITALS
TEMPERATURE: 97.7 F | WEIGHT: 168 LBS | OXYGEN SATURATION: 96 % | SYSTOLIC BLOOD PRESSURE: 122 MMHG | HEART RATE: 62 BPM | BODY MASS INDEX: 28.84 KG/M2 | DIASTOLIC BLOOD PRESSURE: 70 MMHG

## 2020-01-13 DIAGNOSIS — R29.898 WEAKNESS OF LEFT LOWER EXTREMITY: ICD-10-CM

## 2020-01-13 DIAGNOSIS — Z00.00 MEDICARE ANNUAL WELLNESS VISIT, SUBSEQUENT: Primary | ICD-10-CM

## 2020-01-13 DIAGNOSIS — R26.9 ABNORMAL GAIT: ICD-10-CM

## 2020-01-13 DIAGNOSIS — I10 ESSENTIAL HYPERTENSION: ICD-10-CM

## 2020-01-13 DIAGNOSIS — G45.9 TIA (TRANSIENT ISCHEMIC ATTACK): ICD-10-CM

## 2020-01-13 DIAGNOSIS — Z91.81 AT HIGH RISK FOR FALLS: ICD-10-CM

## 2020-01-13 DIAGNOSIS — I63.81 ACUTE LACUNAR INFARCTION (HCC): ICD-10-CM

## 2020-01-13 DIAGNOSIS — E78.49 OTHER HYPERLIPIDEMIA: ICD-10-CM

## 2020-01-13 PROCEDURE — G0439 PPPS, SUBSEQ VISIT: HCPCS | Performed by: FAMILY MEDICINE

## 2020-01-13 PROCEDURE — 99213 OFFICE O/P EST LOW 20 MIN: CPT | Performed by: FAMILY MEDICINE

## 2020-01-13 PROCEDURE — 96160 PT-FOCUSED HLTH RISK ASSMT: CPT | Performed by: FAMILY MEDICINE

## 2020-01-13 NOTE — PROGRESS NOTES
QUICK REFERENCE INFORMATION:  The ABCs of the Annual Wellness Visit    Subsequent Medicare Wellness Visit    HEALTH RISK ASSESSMENT    5/4/1933    Recent Hospitalizations:  Recently treated at the following:  [facility].        Current Medical Providers:  Patient Care Team:  Amilcar Mauricio DO as PCP - General (Family Medicine)        Smoking Status:  Social History     Tobacco Use   Smoking Status Never Smoker   Smokeless Tobacco Never Used       Alcohol Consumption:  Social History     Substance and Sexual Activity   Alcohol Use No       Depression Screen:   PHQ-2/PHQ-9 Depression Screening 1/13/2020   Little interest or pleasure in doing things 0   Feeling down, depressed, or hopeless 0   Trouble falling or staying asleep, or sleeping too much 0   Feeling tired or having little energy 0   Poor appetite or overeating 0   Feeling bad about yourself - or that you are a failure or have let yourself or your family down 0   Trouble concentrating on things, such as reading the newspaper or watching television 0   Moving or speaking so slowly that other people could have noticed. Or the opposite - being so fidgety or restless that you have been moving around a lot more than usual 0   Thoughts that you would be better off dead, or of hurting yourself in some way 0   Total Score 0   If you checked off any problems, how difficult have these problems made it for you to do your work, take care of things at home, or get along with other people? Not difficult at all       Health Habits and Functional and Cognitive Screening:  Functional & Cognitive Status 1/13/2020   Do you have difficulty preparing food and eating? No   Do you have difficulty bathing yourself, getting dressed or grooming yourself? No   Do you have difficulty using the toilet? No   Do you have difficulty moving around from place to place? No   Do you have trouble with steps or getting out of a bed or a chair? No   Current Diet Limited Junk  Food   Dental Exam Not up to date   Eye Exam Up to date   Exercise (times per week) 0 times per week   Current Exercise Activities Include None   Do you need help using the phone?  No   Are you deaf or do you have serious difficulty hearing?  No   Do you need help with transportation? Yes   Do you need help shopping? Yes   Do you need help preparing meals?  No   Do you need help with housework?  No   Do you need help with laundry? No   Do you need help taking your medications? No   Do you need help managing money? No   Do you ever drive or ride in a car without wearing a seat belt? No   Have you felt unusual stress, anger or loneliness in the last month? No   Who do you live with? Child   If you need help, do you have trouble finding someone available to you? No   Have you been bothered in the last four weeks by sexual problems? No   Do you have difficulty concentrating, remembering or making decisions? No           Does the patient have evidence of cognitive impairment? Yes    Aspirin use counseling: Taking ASA appropriately as indicated      Recent Lab Results:  CMP:  Lab Results   Component Value Date     (H) 07/19/2019    BUN 23 01/14/2020    CREATININE 0.95 01/14/2020    EGFRIFNONA 56 (L) 01/14/2020    EGFRIFAFRI 51 (L) 07/19/2019    BCR 24.2 01/14/2020     01/14/2020    K 4.0 01/14/2020    CO2 22.8 01/14/2020    CALCIUM 9.1 01/14/2020    PROTENTOTREF 7.3 07/19/2019    ALBUMIN 4.10 01/14/2020    LABGLOBREF 2.6 07/19/2019    LABIL2 1.8 07/19/2019    BILITOT 0.5 01/14/2020    ALKPHOS 75 01/14/2020    AST 14 01/14/2020    ALT 14 01/14/2020     Lipid Panel:  Lab Results   Component Value Date    CHOL 167 01/06/2020    TRIG 113 01/06/2020    HDL 44 01/06/2020    VLDL 22.6 01/06/2020    LDLHDL 2.28 01/06/2020     HbA1c:  Lab Results   Component Value Date    HGBA1C 6.30 (H) 01/05/2020       Visual Acuity:  No exam data present    Age-appropriate Screening Schedule:  Refer to the list below for future  screening recommendations based on patient's age, sex and/or medical conditions. Orders for these recommended tests are listed in the plan section. The patient has been provided with a written plan.    Health Maintenance   Topic Date Due   • URINE MICROALBUMIN  05/04/1933   • TDAP/TD VACCINES (1 - Tdap) 05/04/1944   • HEMOGLOBIN A1C  07/05/2020   • DIABETIC EYE EXAM  09/11/2020   • LIPID PANEL  01/06/2021   • MAMMOGRAM  03/26/2021   • INFLUENZA VACCINE  Completed        Subjective   History of Present Illness    Monica Scherer is a 86 y.o. female who presents for an Subsequent Wellness Visit.    The following portions of the patient's history were reviewed and updated as appropriate: allergies, current medications, past family history, past medical history, past social history, past surgical history and problem list.    Outpatient Medications Prior to Visit   Medication Sig Dispense Refill   • apixaban (ELIQUIS) 5 MG tablet tablet Take 1 tablet by mouth Every 12 (Twelve) Hours. Indications: Atrial Fibrillation 60 tablet 0   • aspirin 81 MG chewable tablet Chew 1 tablet Daily. 30 tablet 0   • atorvastatin (LIPITOR) 80 MG tablet Take 1 tablet by mouth Every Night. 30 tablet 0   • felodipine (PLENDIL) 10 MG 24 hr tablet TAKE 1 TABLET BY MOUTH DAILY 90 tablet 3   • metoprolol tartrate (LOPRESSOR) 25 MG tablet Take 1 tablet by mouth Every 12 (Twelve) Hours. 60 tablet 0   • omeprazole (priLOSEC) 20 MG capsule Take 1 capsule by mouth Daily. 30 capsule 11     No facility-administered medications prior to visit.        Patient Active Problem List   Diagnosis   • Vertigo   • Temporary cerebral vascular dysfunction   • Gastroesophageal reflux disease with esophagitis   • Degeneration of intervertebral disc of cervical region   • Prediabetes   • S/P cholecystectomy   • Osteoarthritis of knee   • Herpes zoster without complication   • Hypertension   • Duodenal ulcer   • History of pancreatitis   • Hyperlipidemia   • TIA  (transient ischemic attack)       Advance Care Planning:  Patient does not have an advance directive - information provided to the patient today    Identification of Risk Factors:  Risk factors include: Fall Risk  Obesity/Overweight .    Review of Systems   Constitutional: Unexpected weight change: Weight gain or loss.   Respiratory: Negative for shortness of breath and wheezing.    Cardiovascular: Negative for chest pain, palpitations and leg swelling.   Gastrointestinal: Negative for abdominal pain, nausea and vomiting.   Musculoskeletal: Positive for gait problem. Negative for myalgias.   Neurological: Positive for dizziness, weakness and light-headedness. Negative for tremors, syncope, facial asymmetry, speech difficulty, numbness and headaches.       Compared to one year ago, the patient feels her physical health is worse.  Compared to one year ago, the patient feels her mental health is the same.    Objective     Physical Exam   Constitutional: She appears well-developed and well-nourished.   HENT:   Head: Normocephalic and atraumatic.   Right Ear: External ear normal.   Left Ear: External ear normal.   Nose: Nose normal.   Mouth/Throat: Oropharynx is clear and moist.   Eyes: Pupils are equal, round, and reactive to light. EOM are normal.   Neck: Neck supple. No JVD present. No thyromegaly present.   Cardiovascular: Normal rate and normal heart sounds. An irregular rhythm present. Exam reveals no gallop and no friction rub.   No murmur heard.  Pulmonary/Chest: Effort normal and breath sounds normal. No respiratory distress. She has no wheezes. She has no rales.   Abdominal: Soft. Bowel sounds are normal. She exhibits no distension. There is no tenderness. There is no rebound and no guarding.   Musculoskeletal: She exhibits no edema.        Left hip: She exhibits decreased strength.   Neurological: She is alert. No cranial nerve deficit. She exhibits abnormal muscle tone. Gait abnormal.   Skin: Skin is warm and  dry. Capillary refill takes less than 2 seconds.   Psychiatric: She has a normal mood and affect. Her behavior is normal.   Nursing note and vitals reviewed.      Vitals:    01/13/20 1605   BP: 122/70   Pulse: 62   Temp: 97.7 °F (36.5 °C)   SpO2: 96%   Weight: 76.2 kg (168 lb)       Patient's Body mass index is 28.84 kg/m². BMI is above normal parameters. Recommendations include: educational material.      Assessment/Plan   Patient Self-Management and Personalized Health Advice  The patient has been provided with information about: diet and exercise and preventive services including:   · Annual Wellness Visit (AWV).    Visit Diagnoses:    ICD-10-CM ICD-9-CM   1. Medicare annual wellness visit, subsequent Z00.00 V70.0   2. Acute lacunar infarction (CMS/HCC) I63.81 434.91   3. Essential hypertension I10 401.9   4. Other hyperlipidemia E78.49 272.4   5. TIA (transient ischemic attack) G45.9 435.9   6. Abnormal gait R26.9 781.2   7. At high risk for falls Z91.81 V15.88   8. Weakness of left lower extremity R29.898 729.89       No orders of the defined types were placed in this encounter.      Outpatient Encounter Medications as of 1/13/2020   Medication Sig Dispense Refill   • apixaban (ELIQUIS) 5 MG tablet tablet Take 1 tablet by mouth Every 12 (Twelve) Hours. Indications: Atrial Fibrillation 60 tablet 0   • aspirin 81 MG chewable tablet Chew 1 tablet Daily. 30 tablet 0   • atorvastatin (LIPITOR) 80 MG tablet Take 1 tablet by mouth Every Night. 30 tablet 0   • felodipine (PLENDIL) 10 MG 24 hr tablet TAKE 1 TABLET BY MOUTH DAILY 90 tablet 3   • metoprolol tartrate (LOPRESSOR) 25 MG tablet Take 1 tablet by mouth Every 12 (Twelve) Hours. 60 tablet 0   • omeprazole (priLOSEC) 20 MG capsule Take 1 capsule by mouth Daily. 30 capsule 11     No facility-administered encounter medications on file as of 1/13/2020.        Reviewed use of high risk medication in the elderly: yes  Reviewed for potential of harmful drug interactions  in the elderly: yes    Follow Up:  Return in about 3 months (around 4/13/2020), or if symptoms worsen or fail to improve.     An After Visit Summary and PPPS with all of these plans were given to the patient.           ++++++++++++++++++++++++++++++++++++++++++++++++++++++++++++++++++   Chief Complaint   Patient presents with   • Annual Exam     medicare wellness exam   • Cerebrovascular Accident     hospital followup   • Atrial Fibrillation     new onset       The patient is an 87 y/o female with aterial hypertension, hyperlipidemia and prediabetes presented to ER  1/4/2020 with left leg weakness.  Her daughter reports was confused prior and had been in ER but was stable and released, but called 911 on recheck as new onset left leg weakness (they called 911) She also was found to be in atrial fibrillation with RVR.  She was initiated on PO metoprolol with heart rate controlled.  She was also started on Eliquis, daughter relates nurse told her to ask about CHADS2 score when saw me given age and prior ulcer.  Denies abd pain, or GI bleeding, tolerating anticoagulation.  Neurology did see patient as well while hospitalized., MRI was done with following impression: subacute infarct in the posterior aspect of the right lentiform nucleus and a small acute infarct within the periventricular white matter along the posterior body of the right lateral ventricle.  Echocardiogram was also done that noted: preserved ejection fraction, mild aortic valve regurgitation, other normal valves.  Bubble study was negative.     Patient had occupational therapy, they cleared for d/c.  She had speech therapy bedside eval, there was no signs or symptoms of aspiration.  Was seen by physical therapy and noted difficulty with stairs.  Cardiology recommended just metoprolol for a. Fib rate control.  They did load the patient with digoxin but given the age of the patient she was not released on daily digoxin.  She is taking Eliquis as mentioned  above.  C to come and start work with patient;  Nurse has been out and asked work in patient rather than wait as concerned by fluctuating bp and occly HR less than 50.  They report had parameters in hospital so concerned about giving if bp or HR too low.  CUrrently feels left leg weakness improved but not baseline;  No new focal neuro deficits;  No slurred speech; No facial asymmetries;  Denies cp, soa, abd pain, n/v;  occly dizzy and light headed with position changes;  NO falls or syncope since home.  NO pnd/orthopnea;  No worsening lower ext edema, has mild.       HPI  [id number] score:  6 points  Stroke risk was 9.7% per year in >90,000 patients (the [place] Atrial Fibrillation Cohort Study) and 13.6% risk of stroke/TIA/systemic embolism.    One recommendation suggests a 0 score is “low” risk and may not require anticoagulation; a 1 score is “low-moderate” risk and should consider antiplatelet or anticoagulation, and score 2 or greater is “moderate-high” risk and should otherwise be an anticoagulation candidate.    Review of Systems   Constitution: Unexpected weight change: Weight gain or loss.   Cardiovascular: Negative for chest pain, leg swelling and palpitations.   Respiratory: Negative for shortness of breath and wheezing.    Musculoskeletal: Positive for gait problem. Negative for myalgias.   Gastrointestinal: Negative for abdominal pain, nausea and vomiting.   Neurological: Positive for dizziness, light-headedness and weakness. Negative for facial asymmetry, headaches, numbness, speech difficulty, syncope and tremors.       Social History     Tobacco Use   • Smoking status: Never Smoker   • Smokeless tobacco: Never Used   Substance Use Topics   • Alcohol use: No     O:   Vitals:    01/13/20 1605   BP: 122/70   Pulse: 62   Temp: 97.7 °F (36.5 °C)   SpO2: 96%   Weight: 76.2 kg (168 lb)       Physical Exam   Constitutional: She appears well-developed and well-nourished.   HENT:   Head: Normocephalic and  atraumatic.   Right Ear: External ear normal.   Left Ear: External ear normal.   Nose: Nose normal.   Mouth/Throat: Oropharynx is clear and moist.   Eyes: Pupils are equal, round, and reactive to light. EOM are normal.   Neck: Neck supple. No JVD present. No thyromegaly present.   Cardiovascular: Normal rate and normal heart sounds. An irregular rhythm present. Exam reveals no gallop and no friction rub.   No murmur heard.  Pulmonary/Chest: Effort normal and breath sounds normal. No respiratory distress. She has no wheezes. She has no rales.   Abdominal: Soft. Bowel sounds are normal. She exhibits no distension. There is no tenderness. There is no rebound and no guarding.   Musculoskeletal: She exhibits no edema.        Left hip: She exhibits decreased strength.   Neurological: She is alert. No cranial nerve deficit. She exhibits abnormal muscle tone. Gait abnormal.   Skin: Skin is warm and dry. Capillary refill takes less than 2 seconds.   Psychiatric: She has a normal mood and affect. Her behavior is normal.   Nursing note and vitals reviewed.    Component      Latest Ref Rng & Units 1/4/2020 1/5/2020 1/6/2020   WBC      3.40 - 10.80 10*3/mm3   10.37   RBC      3.77 - 5.28 10*6/mm3   4.10   Hemoglobin      12.0 - 15.9 g/dL   12.8   Hematocrit      34.0 - 46.6 %   38.5   MCV      79.0 - 97.0 fL   93.9   MCH      26.6 - 33.0 pg   31.2   MCHC      31.5 - 35.7 g/dL   33.2   RDW      12.3 - 15.4 %   14.4   RDW-SD      37.0 - 54.0 fl   49.7   MPV      6.0 - 12.0 fL   9.6   Platelets      140 - 450 10*3/mm3   231   Neutrophil Rel %      42.7 - 76.0 %   55.0   Lymphocyte Rel %      19.6 - 45.3 %   35.1   Monocyte Rel %      5.0 - 12.0 %   6.8   Eosinophil Rel %      0.3 - 6.2 %   2.1   Basophil Rel %      0.0 - 1.5 %   0.5   Immature Granulocyte Rel %      0.0 - 0.5 %   0.5   Neutrophils Absolute      1.70 - 7.00 10*3/mm3   5.70   Lymphocytes Absolute      0.70 - 3.10 10*3/mm3   3.64 (H)   Monocytes Absolute      0.10 -  0.90 10*3/mm3   0.71   Eosinophils Absolute      0.00 - 0.40 10*3/mm3   0.22   Basophils Absolute      0.00 - 0.20 10*3/mm3   0.05   Immature Grans, Absolute      0.00 - 0.05 10*3/mm3   0.05   nRBC      0.0 - 0.2 /100 WBC   0.0   Glucose      65 - 99 mg/dL   113 (H)   BUN      8 - 23 mg/dL   19   Creatinine      0.57 - 1.00 mg/dL   0.82   Sodium      136 - 145 mmol/L   137   Potassium      3.5 - 5.2 mmol/L   3.6   Chloride      98 - 107 mmol/L   105   CO2      22.0 - 29.0 mmol/L   20.7 (L)   Calcium      8.6 - 10.5 mg/dL   8.5 (L)   eGFR Non African Am      >60 mL/min/1.73   66   BUN/Creatinine Ratio      7.0 - 25.0   23.2   Anion Gap      5.0 - 15.0 mmol/L   11.3   Total Cholesterol      0 - 200 mg/dL   167   Triglycerides      0 - 150 mg/dL   113   HDL Cholesterol      40 - 60 mg/dL   44   LDL Cholesterol       0 - 100 mg/dL   100   VLDL Cholesterol      5 - 40 mg/dL   22.6   LDL/HDL Ratio         2.28   Troponin T      0.000 - 0.030 ng/mL <0.010     Hemoglobin A1C      4.80 - 5.60 %  6.30 (H)    Free T4      0.93 - 1.70 ng/dL  1.15    Vitamin B-12      211 - 946 pg/mL 268     Folate      4.78 - 24.20 ng/mL >20.00     PTT      22.7 - 35.4 seconds   82.0 (H)       Monica was seen today for annual exam, cerebrovascular accident and atrial fibrillation.    Diagnoses and all orders for this visit:    Medicare annual wellness visit, subsequent    Acute lacunar infarction (CMS/HCC)    Essential hypertension    Other hyperlipidemia    TIA (transient ischemic attack)    Abnormal gait    At high risk for falls    Weakness of left lower extremity      Based on HR and bp, continue same dose of medications for now;  D/w if does have HR consistent trend below 50 to let me know and if symptomatic.  Take time getting up and ambulating.  Patient home bound with high fall risk potential and stairs in the home.  Highly recommend [facility] for nurse monitoring cardiovascular status and PT to work on gait, strengthening and reducing falls  as at high risk for serious injury;  She is on a. Fib as risk high enough out weighs fall risk and age as per cardiology.  Recommend f/u with cardiology and neurology as planned.  Repeat lipids in 3 months, adjust statin if LDL not down further.    Current outpatient and discharge medications have been reconciled for the patient.  Reviewed by: Amilcar Mauricio, DO    Return in about 3 months (around 4/13/2020), or if symptoms worsen or fail to improve.

## 2020-01-13 NOTE — PATIENT INSTRUCTIONS
Advance Directive    Advance directives are legal documents that let you make choices ahead of time about your health care and medical treatment in case you become unable to communicate for yourself. Advance directives are a way for you to communicate your wishes to family, friends, and health care providers. This can help convey your decisions about end-of-life care if you become unable to communicate.  Discussing and writing advance directives should happen over time rather than all at once. Advance directives can be changed depending on your situation and what you want, even after you have signed the advance directives.  If you do not have an advance directive, some states assign family decision makers to act on your behalf based on how closely you are related to them. Each state has its own laws regarding advance directives. You may want to check with your health care provider, , or state representative about the laws in your state. There are different types of advance directives, such as:  · Medical power of .  · Living will.  · Do not resuscitate (DNR) or do not attempt resuscitation (DNAR) order.  Health care proxy and medical power of   A health care proxy, also called a health care agent, is a person who is appointed to make medical decisions for you in cases in which you are unable to make the decisions yourself. Generally, people choose someone they know well and trust to represent their preferences. Make sure to ask this person for an agreement to act as your proxy. A proxy may have to exercise judgment in the event of a medical decision for which your wishes are not known.  A medical power of  is a legal document that names your health care proxy. Depending on the laws in your state, after the document is written, it may also need to be:  · Signed.  · Notarized.  · Dated.  · Copied.  · Witnessed.  · Incorporated into your medical record.  You may also want to appoint  someone to manage your financial affairs in a situation in which you are unable to do so. This is called a durable power of  for finances. It is a separate legal document from the durable power of  for health care. You may choose the same person or someone different from your health care proxy to act as your agent in financial matters.  If you do not appoint a proxy, or if there is a concern that the proxy is not acting in your best interests, a court-appointed guardian may be designated to act on your behalf.  Living will  A living will is a set of instructions documenting your wishes about medical care when you cannot express them yourself. Health care providers should keep a copy of your living will in your medical record. You may want to give a copy to family members or friends. To alert caregivers in case of an emergency, you can place a card in your wallet to let them know that you have a living will and where they can find it. A living will is used if you become:  · Terminally ill.  · Incapacitated.  · Unable to communicate or make decisions.  Items to consider in your living will include:  · The use or non-use of life-sustaining equipment, such as dialysis machines and breathing machines (ventilators).  · A DNR or DNAR order, which is the instruction not to use cardiopulmonary resuscitation (CPR) if breathing or heartbeat stops.  · The use or non-use of tube feeding.  · Withholding of food and fluids.  · Comfort (palliative) care when the goal becomes comfort rather than a cure.  · Organ and tissue donation.  A living will does not give instructions for distributing your money and property if you should pass away. It is recommended that you seek the advice of a  when writing a will. Decisions about taxes, beneficiaries, and asset distribution will be legally binding. This process can relieve your family and friends of any concerns surrounding disputes or questions that may come up about  the distribution of your assets.  DNR or DNAR  A DNR or DNAR order is a request not to have CPR in the event that your heart stops beating or you stop breathing. If a DNR or DNAR order has not been made and shared, a health care provider will try to help any patient whose heart has stopped or who has stopped breathing. If you plan to have surgery, talk with your health care provider about how your DNR or DNAR order will be followed if problems occur.  Summary  · Advance directives are the legal documents that allow you to make choices ahead of time about your health care and medical treatment in case you become unable to communicate for yourself.  · The process of discussing and writing advance directives should happen over time. You can change the advance directives, even after you have signed them.  · Advance directives include DNR or DNAR orders, living hung, and designating an agent as your medical power of .  This information is not intended to replace advice given to you by your health care provider. Make sure you discuss any questions you have with your health care provider.  Document Released: 03/26/2009 Document Revised: 11/06/2017 Document Reviewed: 11/06/2017  [employer] Interactive Patient Education © 2019 [employer]      Calorie Counting for Weight Loss  Calories are units of energy. Your body needs a certain amount of calories from food to keep you going throughout the day. When you eat more calories than your body needs, your body stores the extra calories as fat. When you eat fewer calories than your body needs, your body burns fat to get the energy it needs.  Calorie counting means keeping track of how many calories you eat and drink each day. Calorie counting can be helpful if you need to lose weight. If you make sure to eat fewer calories than your body needs, you should lose weight. Ask your health care provider what a healthy weight is for you.  For calorie counting to work, you will  need to eat the right number of calories in a day in order to lose a healthy amount of weight per week. A dietitian can help you determine how many calories you need in a day and will give you suggestions on how to reach your calorie goal.  · A healthy amount of weight to lose per week is usually 1-2 lb (0.5-0.9 kg). This usually means that your daily calorie intake should be reduced by 500-750 calories.  · Eating 1,200 - 1,500 calories per day can help most women lose weight.  · Eating 1,500 - 1,800 calories per day can help most men lose weight.  What is my plan?  My goal is to have __________ calories per day.  If I have this many calories per day, I should lose around __________ pounds per week.  What do I need to know about calorie counting?  In order to meet your daily calorie goal, you will need to:  · Find out how many calories are in each food you would like to eat. Try to do this before you eat.  · Decide how much of the food you plan to eat.  · Write down what you ate and how many calories it had. Doing this is called keeping a food log.  To successfully lose weight, it is important to balance calorie counting with a healthy lifestyle that includes regular activity. Aim for 150 minutes of moderate exercise (such as walking) or 75 minutes of vigorous exercise (such as running) each week.  Where do I find calorie information?    The number of calories in a food can be found on a Nutrition Facts label. If a food does not have a Nutrition Facts label, try to look up the calories online or ask your dietitian for help.  Remember that calories are listed per serving. If you choose to have more than one serving of a food, you will have to multiply the calories per serving by the amount of servings you plan to eat. For example, the label on a package of bread might say that a serving size is 1 slice and that there are 90 calories in a serving. If you eat 1 slice, you will have eaten 90 calories. If you eat 2  "slices, you will have eaten 180 calories.  How do I keep a food log?  Immediately after each meal, record the following information in your food log:  · What you ate. Don't forget to include toppings, sauces, and other extras on the food.  · How much you ate. This can be measured in cups, ounces, or number of items.  · How many calories each food and drink had.  · The total number of calories in the meal.  Keep your food log near you, such as in a small notebook in your pocket, or use a mobile joyce or website. Some programs will calculate calories for you and show you how many calories you have left for the day to meet your goal.  What are some calorie counting tips?    · Use your calories on foods and drinks that will fill you up and not leave you hungry:  ? Some examples of foods that fill you up are nuts and nut butters, vegetables, lean proteins, and high-fiber foods like whole grains. High-fiber foods are foods with more than 5 g fiber per serving.  ? Drinks such as sodas, specialty coffee drinks, alcohol, and juices have a lot of calories, yet do not fill you up.  · Eat nutritious foods and avoid empty calories. Empty calories are calories you get from foods or beverages that do not have many vitamins or protein, such as candy, sweets, and soda. It is better to have a nutritious high-calorie food (such as an avocado) than a food with few nutrients (such as a bag of chips).  · Know how many calories are in the foods you eat most often. This will help you calculate calorie counts faster.  · Pay attention to calories in drinks. Low-calorie drinks include water and unsweetened drinks.  · Pay attention to nutrition labels for \"low fat\" or \"fat free\" foods. These foods sometimes have the same amount of calories or more calories than the full fat versions. They also often have added sugar, starch, or salt, to make up for flavor that was removed with the fat.  · Find a way of tracking calories that works for you. Get " creative. Try different apps or programs if writing down calories does not work for you.  What are some portion control tips?  · Know how many calories are in a serving. This will help you know how many servings of a certain food you can have.  · Use a measuring cup to measure serving sizes. You could also try weighing out portions on a kitchen scale. With time, you will be able to estimate serving sizes for some foods.  · Take some time to put servings of different foods on your favorite plates, bowls, and cups so you know what a serving looks like.  · Try not to eat straight from a bag or box. Doing this can lead to overeating. Put the amount you would like to eat in a cup or on a plate to make sure you are eating the right portion.  · Use smaller plates, glasses, and bowls to prevent overeating.  · Try not to multitask (for example, watch TV or use your computer) while eating. If it is time to eat, sit down at a table and enjoy your food. This will help you to know when you are full. It will also help you to be aware of what you are eating and how much you are eating.  What are tips for following this plan?  Reading food labels  · Check the calorie count compared to the serving size. The serving size may be smaller than what you are used to eating.  · Check the source of the calories. Make sure the food you are eating is high in vitamins and protein and low in saturated and trans fats.  Shopping  · Read nutrition labels while you shop. This will help you make healthy decisions before you decide to purchase your food.  · Make a grocery list and stick to it.  Cooking  · Try to cook your favorite foods in a healthier way. For example, try baking instead of frying.  · Use low-fat dairy products.  Meal planning  · Use more fruits and vegetables. Half of your plate should be fruits and vegetables.  · Include lean proteins like poultry and fish.  How do I count calories when eating out?  · Ask for smaller portion  "sizes.  · Consider sharing an entree and sides instead of getting your own entree.  · If you get your own entree, eat only half. Ask for a box at the beginning of your meal and put the rest of your entree in it so you are not tempted to eat it.  · If calories are listed on the menu, choose the lower calorie options.  · Choose dishes that include vegetables, fruits, whole grains, low-fat dairy products, and lean protein.  · Choose items that are boiled, broiled, grilled, or steamed. Stay away from items that are buttered, battered, fried, or served with cream sauce. Items labeled \"crispy\" are usually fried, unless stated otherwise.  · Choose water, low-fat milk, unsweetened iced tea, or other drinks without added sugar. If you want an alcoholic beverage, choose a lower calorie option such as a glass of wine or light beer.  · Ask for dressings, sauces, and syrups on the side. These are usually high in calories, so you should limit the amount you eat.  · If you want a salad, choose a garden salad and ask for grilled meats. Avoid extra toppings like lopez, cheese, or fried items. Ask for the dressing on the side, or ask for olive oil and vinegar or lemon to use as dressing.  · Estimate how many servings of a food you are given. For example, a serving of cooked rice is ½ cup or about the size of half a baseball. Knowing serving sizes will help you be aware of how much food you are eating at restaurants. The list below tells you how big or small some common portion sizes are based on everyday objects:  ? 1 oz--4 stacked dice.  ? 3 oz--1 deck of cards.  ? 1 tsp--1 die.  ? 1 Tbsp--½ a ping-pong ball.  ? 2 Tbsp--1 ping-pong ball.  ? ½ cup--½ baseball.  ? 1 cup--1 baseball.  Summary  · Calorie counting means keeping track of how many calories you eat and drink each day. If you eat fewer calories than your body needs, you should lose weight.  · A healthy amount of weight to lose per week is usually 1-2 lb (0.5-0.9 kg). This " usually means reducing your daily calorie intake by 500-750 calories.  · The number of calories in a food can be found on a Nutrition Facts label. If a food does not have a Nutrition Facts label, try to look up the calories online or ask your dietitian for help.  · Use your calories on foods and drinks that will fill you up, and not on foods and drinks that will leave you hungry.  · Use smaller plates, glasses, and bowls to prevent overeating.  This information is not intended to replace advice given to you by your health care provider. Make sure you discuss any questions you have with your health care provider.  Document Released: 12/18/2006 Document Revised: 09/06/2019 Document Reviewed: 11/17/2017  [employer] Interactive Patient Education © 2019 [employer]      Exercising to Lose Weight  Exercise is structured, repetitive physical activity to improve fitness and health. Getting regular exercise is important for everyone. It is especially important if you are overweight. Being overweight increases your risk of heart disease, stroke, diabetes, high blood pressure, and several types of cancer. Reducing your calorie intake and exercising can help you lose weight.  Exercise is usually categorized as moderate or vigorous intensity. To lose weight, most people need to do a certain amount of moderate-intensity or vigorous-intensity exercise each week.  Moderate-intensity exercise    Moderate-intensity exercise is any activity that gets you moving enough to burn at least three times more energy (calories) than if you were sitting.  Examples of moderate exercise include:  · Walking a mile in 15 minutes.  · Doing light yard work.  · Biking at an easy pace.  Most people should get at least 150 minutes (2 hours and 30 minutes) a week of moderate-intensity exercise to maintain their body weight.  Vigorous-intensity exercise  Vigorous-intensity exercise is any activity that gets you moving enough to burn at least six times  more calories than if you were sitting. When you exercise at this intensity, you should be working hard enough that you are not able to carry on a conversation.  Examples of vigorous exercise include:  · Running.  · Playing a team sport, such as football, basketball, and soccer.  · Jumping rope.  Most people should get at least 75 minutes (1 hour and 15 minutes) a week of vigorous-intensity exercise to maintain their body weight.  How can exercise affect me?  When you exercise enough to burn more calories than you eat, you lose weight. Exercise also reduces body fat and builds muscle. The more muscle you have, the more calories you burn. Exercise also:  · Improves mood.  · Reduces stress and tension.  · Improves your overall fitness, flexibility, and endurance.  · Increases bone strength.  The amount of exercise you need to lose weight depends on:  · Your age.  · The type of exercise.  · Any health conditions you have.  · Your overall physical ability.  Talk to your health care provider about how much exercise you need and what types of activities are safe for you.  What actions can I take to lose weight?  Nutrition    · Make changes to your diet as told by your health care provider or diet and nutrition specialist (dietitian). This may include:  ? Eating fewer calories.  ? Eating more protein.  ? Eating less unhealthy fats.  ? Eating a diet that includes fresh fruits and vegetables, whole grains, low-fat dairy products, and lean protein.  ? Avoiding foods with added fat, salt, and sugar.  · Drink plenty of water while you exercise to prevent dehydration or heat stroke.  Activity  · Choose an activity that you enjoy and set realistic goals. Your health care provider can help you make an exercise plan that works for you.  · Exercise at a moderate or vigorous intensity most days of the week.  ? The intensity of exercise may vary from person to person. You can tell how intense a workout is for you by paying attention  to your breathing and heartbeat. Most people will notice their breathing and heartbeat get faster with more intense exercise.  · Do resistance training twice each week, such as:  ? Push-ups.  ? Sit-ups.  ? Lifting weights.  ? Using resistance bands.  · Getting short amounts of exercise can be just as helpful as long structured periods of exercise. If you have trouble finding time to exercise, try to include exercise in your daily routine.  ? Get up, stretch, and walk around every 30 minutes throughout the day.  ? Go for a walk during your lunch break.  ? Park your car farther away from your destination.  ? If you take public transportation, get off one stop early and walk the rest of the way.  ? Make phone calls while standing up and walking around.  ? Take the stairs instead of elevators or escalators.  · Wear comfortable clothes and shoes with good support.  · Do not exercise so much that you hurt yourself, feel dizzy, or get very short of breath.  Where to find more information  · [place] Department of Health and Human Services: www.hhs.gov  · Centers for Disease Control and Prevention (CDC): www.cdc.gov  Contact a health care provider:  · Before starting a new exercise program.  · If you have questions or concerns about your weight.  · If you have a medical problem that keeps you from exercising.  Get help right away if you have any of the following while exercising:  · Injury.  · Dizziness.  · Difficulty breathing or shortness of breath that does not go away when you stop exercising.  · Chest pain.  · Rapid heartbeat.  Summary  · Being overweight increases your risk of heart disease, stroke, diabetes, high blood pressure, and several types of cancer.  · Losing weight happens when you burn more calories than you eat.  · Reducing the amount of calories you eat in addition to getting regular moderate or vigorous exercise each week helps you lose weight.  This information is not intended to replace advice given to you  by your health care provider. Make sure you discuss any questions you have with your health care provider.  Document Released: 01/20/2012 Document Revised: 12/31/2018 Document Reviewed: 12/31/2018  [employer] Interactive Patient Education © 2019 [employer]      Fall Prevention in the Home, Adult  Falls can cause injuries and can affect people from all age groups. There are many simple things that you can do to make your home safe and to help prevent falls. Ask for help when making these changes, if needed.  What actions can I take to prevent falls?  General instructions  · Use good lighting in all rooms. Replace any light bulbs that burn out.  · Turn on lights if it is dark. Use night-lights.  · Place frequently used items in easy-to-reach places. Lower the shelves around your home if necessary.  · Set up furniture so that there are clear paths around it. Avoid moving your furniture around.  · Remove throw rugs and other tripping hazards from the floor.  · Avoid walking on wet floors.  · Fix any uneven floor surfaces.  · Add color or contrast paint or tape to grab bars and handrails in your home. Place contrasting color strips on the first and last steps of stairways.  · When you use a stepladder, make sure that it is completely opened and that the sides are firmly locked. Have someone hold the ladder while you are using it. Do not climb a closed stepladder.  · Be aware of any and all pets.  What can I do in the bathroom?         · Keep the floor dry. Immediately clean up any water that spills onto the floor.  · Remove soap buildup in the tub or shower on a regular basis.  · Use non-skid mats or decals on the floor of the tub or shower.  · Attach bath mats securely with double-sided, non-slip rug tape.  · If you need to sit down while you are in the shower, use a plastic, non-slip stool.  · Install grab bars by the toilet and in the tub and shower. Do not use towel bars as grab bars.  What can I do in the  bedroom?  · Make sure that a bedside light is easy to reach.  · Do not use oversized bedding that drapes onto the floor.  · Have a firm chair that has side arms to use for getting dressed.  What can I do in the kitchen?  · Clean up any spills right away.  · If you need to reach for something above you, use a sturdy step stool that has a grab bar.  · Keep electrical cables out of the way.  · Do not use floor polish or wax that makes floors slippery. If you must use wax, make sure that it is non-skid floor wax.  What can I do in the stairways?  · Do not leave any items on the stairs.  · Make sure that you have a light switch at the top of the stairs and the bottom of the stairs. Have them installed if you do not have them.  · Make sure that there are handrails on both sides of the stairs. Fix handrails that are broken or loose. Make sure that handrails are as long as the stairways.  · Install non-slip stair treads on all stairs in your home.  · Avoid having throw rugs at the top or bottom of stairways, or secure the rugs with carpet tape to prevent them from moving.  · Choose a carpet design that does not hide the edge of steps on the stairway.  · Check any carpeting to make sure that it is firmly attached to the stairs. Fix any carpet that is loose or worn.  What can I do on the outside of my home?  · Use bright outdoor lighting.  · Regularly repair the edges of walkways and driveways and fix any cracks.  · Remove high doorway thresholds.  · Trim any shrubbery on the main path into your home.  · Regularly check that handrails are securely fastened and in good repair. Both sides of any steps should have handrails.  · Install guardrails along the edges of any raised decks or porches.  · Clear walkways of debris and clutter, including tools and rocks.  · Have leaves, snow, and ice cleared regularly.  · Use sand or salt on walkways during winter months.  · In the garage, clean up any spills right away, including grease  or oil spills.  What other actions can I take?  · Wear closed-toe shoes that fit well and support your feet. Wear shoes that have rubber soles or low heels.  · Use mobility aids as needed, such as canes, walkers, scooters, and crutches.  · Review your medicines with your health care provider. Some medicines can cause dizziness or changes in blood pressure, which increase your risk of falling.  Talk with your health care provider about other ways that you can decrease your risk of falls. This may include working with a physical therapist or  to improve your strength, balance, and endurance.  Where to find more information  · Centers for Disease Control and Prevention, [place]: https://www.cdc.gov  · National [place] on Aging: https://zs0pepz.[place].nih.gov  Contact a health care provider if:  · You are afraid of falling at home.  · You feel weak, drowsy, or dizzy at home.  · You fall at home.  Summary  · There are many simple things that you can do to make your home safe and to help prevent falls.  · Ways to make your home safe include removing tripping hazards and installing grab bars in the bathroom.  · Ask for help when making these changes in your home.  This information is not intended to replace advice given to you by your health care provider. Make sure you discuss any questions you have with your health care provider.  Document Released: 12/08/2003 Document Revised: 08/02/2018 Document Reviewed: 08/02/2018  [employer] Interactive Patient Education © 2019 [employer]      Atrial Fibrillation    Atrial fibrillation is a type of heartbeat that is irregular or fast (rapid). If you have this condition, your heart beats without any order. This makes it hard for your heart to pump blood in a normal way. Having this condition gives you more risk for stroke, heart failure, and other heart problems.  Atrial fibrillation may start all of a sudden and then stop on its own, or it may become a long-lasting  problem.  What are the causes?  This condition may be caused by heart conditions, such as:  · High blood pressure.  · Heart failure.  · Heart valve disease.  · Heart surgery.  Other causes include:  · Pneumonia.  · Obstructive sleep apnea.  · Lung cancer.  · Thyroid disease.  · Drinking too much alcohol.  Sometimes the cause is not known.  What increases the risk?  You are more likely to develop this condition if:  · You smoke.  · You are older.  · You have diabetes.  · You are overweight.  · You have a family history of this condition.  · You exercise often and hard.  What are the signs or symptoms?  Common symptoms of this condition include:  · A feeling like your heart is beating very fast.  · Chest pain.  · Feeling short of breath.  · Feeling light-headed or weak.  · Getting tired easily.  Follow these instructions at home:  Medicines  · Take over-the-counter and prescription medicines only as told by your doctor.  · If your doctor gives you a blood-thinning medicine, take it exactly as told. Taking too much of it can cause bleeding. Taking too little of it does not protect you against clots. Clots can cause a stroke.  Lifestyle         · Do not use any tobacco products. These include cigarettes, chewing tobacco, and e-cigarettes. If you need help quitting, ask your doctor.  · Do not drink alcohol.  · Do not drink beverages that have caffeine. These include coffee, soda, and tea.  · Follow diet instructions as told by your doctor.  · Exercise regularly as told by your doctor.  General instructions  · If you have a condition that causes breathing to stop for a short period of time (apnea), treat it as told by your doctor.  · Keep a healthy weight. Do not use diet pills unless your doctor says they are safe for you. Diet pills may make heart problems worse.  · Keep all follow-up visits as told by your doctor. This is important.  Contact a doctor if:  · You notice a change in the speed, rhythm, or strength of your  "heartbeat.  · You are taking a blood-thinning medicine and you see more bruising.  · You get tired more easily when you move or exercise.  · You have a sudden change in weight.  Get help right away if:    · You have pain in your chest or your belly (abdomen).  · You have trouble breathing.  · You have blood in your vomit, poop, or pee (urine).  · You have any signs of a stroke. \"BE FAST\" is an easy way to remember the main warning signs:  ? B - Balance. Signs are dizziness, sudden trouble walking, or loss of balance.  ? E - Eyes. Signs are trouble seeing or a change in how you see.  ? F - Face. Signs are sudden weakness or loss of feeling in the face, or the face or eyelid drooping on one side.  ? A - Arms. Signs are weakness or loss of feeling in an arm. This happens suddenly and usually on one side of the body.  ? S - Speech. Signs are sudden trouble speaking, slurred speech, or trouble understanding what people say.  ? T - Time. Time to call emergency services. Write down what time symptoms started.  · You have other signs of a stroke, such as:  ? A sudden, very bad headache with no known cause.  ? Feeling sick to your stomach (nausea).  ? Throwing up (vomiting).  ? Jerky movements you cannot control (seizure).  These symptoms may be an emergency. Do not wait to see if the symptoms will go away. Get medical help right away. Call your local emergency services (911 in the [place] Do not drive yourself to the hospital.  Summary  · Atrial fibrillation is a type of heartbeat that is irregular or fast (rapid).  · You are at higher risk of this condition if you smoke, are older, have diabetes, or are overweight.  · Follow your doctor's instructions about medicines, diet, exercise, and follow-up visits.  · Get help right away if you think that you have signs of a stroke.  This information is not intended to replace advice given to you by your health care provider. Make sure you discuss any questions you have with your " health care provider.  Document Released: 09/26/2009 Document Revised: 02/08/2019 Document Reviewed: 02/08/2019  [employer] Interactive Patient Education © 2019 [employer]

## 2020-01-14 ENCOUNTER — HOSPITAL ENCOUNTER (EMERGENCY)
Facility: HOSPITAL | Age: 85
Discharge: HOME | End: 2020-01-14
Attending: EMERGENCY MEDICINE | Admitting: EMERGENCY MEDICINE

## 2020-01-14 ENCOUNTER — TELEPHONE (OUTPATIENT)
Dept: FAMILY MEDICINE CLINIC | Facility: CLINIC | Age: 85
End: 2020-01-14

## 2020-01-14 ENCOUNTER — APPOINTMENT (OUTPATIENT)
Dept: CT IMAGING | Facility: HOSPITAL | Age: 85
End: 2020-01-14

## 2020-01-14 VITALS
TEMPERATURE: 98.2 F | HEART RATE: 70 BPM | OXYGEN SATURATION: 95 % | WEIGHT: 167.99 LBS | RESPIRATION RATE: 16 BRPM | SYSTOLIC BLOOD PRESSURE: 112 MMHG | BODY MASS INDEX: 28.68 KG/M2 | HEIGHT: 64 IN | DIASTOLIC BLOOD PRESSURE: 83 MMHG

## 2020-01-14 DIAGNOSIS — R42 DIZZINESS: Primary | ICD-10-CM

## 2020-01-14 DIAGNOSIS — Z86.73 HISTORY OF CVA (CEREBROVASCULAR ACCIDENT): ICD-10-CM

## 2020-01-14 DIAGNOSIS — I48.91 RATE CONTROLLED ATRIAL FIBRILLATION (HCC): ICD-10-CM

## 2020-01-14 LAB
ALBUMIN SERPL-MCNC: 4.1 G/DL (ref 3.5–5.2)
ALBUMIN/GLOB SERPL: 1.2 G/DL
ALP SERPL-CCNC: 75 U/L (ref 39–117)
ALT SERPL W P-5'-P-CCNC: 14 U/L (ref 1–33)
ANION GAP SERPL CALCULATED.3IONS-SCNC: 13.2 MMOL/L (ref 5–15)
AST SERPL-CCNC: 14 U/L (ref 1–32)
BACTERIA UR QL AUTO: ABNORMAL /HPF
BASOPHILS # BLD AUTO: 0.05 10*3/MM3 (ref 0–0.2)
BASOPHILS NFR BLD AUTO: 0.5 % (ref 0–1.5)
BILIRUB SERPL-MCNC: 0.5 MG/DL (ref 0.2–1.2)
BILIRUB UR QL STRIP: NEGATIVE
BUN BLD-MCNC: 23 MG/DL (ref 8–23)
BUN/CREAT SERPL: 24.2 (ref 7–25)
CALCIUM SPEC-SCNC: 9.1 MG/DL (ref 8.6–10.5)
CHLORIDE SERPL-SCNC: 106 MMOL/L (ref 98–107)
CLARITY UR: CLEAR
CO2 SERPL-SCNC: 22.8 MMOL/L (ref 22–29)
COLOR UR: YELLOW
CREAT BLD-MCNC: 0.95 MG/DL (ref 0.57–1)
DEPRECATED RDW RBC AUTO: 47.8 FL (ref 37–54)
EOSINOPHIL # BLD AUTO: 0.15 10*3/MM3 (ref 0–0.4)
EOSINOPHIL NFR BLD AUTO: 1.5 % (ref 0.3–6.2)
ERYTHROCYTE [DISTWIDTH] IN BLOOD BY AUTOMATED COUNT: 14.4 % (ref 12.3–15.4)
GFR SERPL CREATININE-BSD FRML MDRD: 56 ML/MIN/1.73
GLOBULIN UR ELPH-MCNC: 3.4 GM/DL
GLUCOSE BLD-MCNC: 125 MG/DL (ref 65–99)
GLUCOSE UR STRIP-MCNC: NEGATIVE MG/DL
HCT VFR BLD AUTO: 41.2 % (ref 34–46.6)
HGB BLD-MCNC: 13.8 G/DL (ref 12–15.9)
HGB UR QL STRIP.AUTO: ABNORMAL
HYALINE CASTS UR QL AUTO: ABNORMAL /LPF
IMM GRANULOCYTES # BLD AUTO: 0.04 10*3/MM3 (ref 0–0.05)
IMM GRANULOCYTES NFR BLD AUTO: 0.4 % (ref 0–0.5)
KETONES UR QL STRIP: NEGATIVE
LEUKOCYTE ESTERASE UR QL STRIP.AUTO: NEGATIVE
LYMPHOCYTES # BLD AUTO: 2.8 10*3/MM3 (ref 0.7–3.1)
LYMPHOCYTES NFR BLD AUTO: 27.3 % (ref 19.6–45.3)
MAGNESIUM SERPL-MCNC: 1.9 MG/DL (ref 1.6–2.4)
MCH RBC QN AUTO: 30.6 PG (ref 26.6–33)
MCHC RBC AUTO-ENTMCNC: 33.5 G/DL (ref 31.5–35.7)
MCV RBC AUTO: 91.4 FL (ref 79–97)
MONOCYTES # BLD AUTO: 0.63 10*3/MM3 (ref 0.1–0.9)
MONOCYTES NFR BLD AUTO: 6.1 % (ref 5–12)
NEUTROPHILS # BLD AUTO: 6.6 10*3/MM3 (ref 1.7–7)
NEUTROPHILS NFR BLD AUTO: 64.2 % (ref 42.7–76)
NITRITE UR QL STRIP: NEGATIVE
NRBC BLD AUTO-RTO: 0 /100 WBC (ref 0–0.2)
PH UR STRIP.AUTO: <=5 [PH] (ref 5–8)
PLATELET # BLD AUTO: 255 10*3/MM3 (ref 140–450)
PMV BLD AUTO: 9.6 FL (ref 6–12)
POTASSIUM BLD-SCNC: 4 MMOL/L (ref 3.5–5.2)
PROT SERPL-MCNC: 7.5 G/DL (ref 6–8.5)
PROT UR QL STRIP: NEGATIVE
RBC # BLD AUTO: 4.51 10*6/MM3 (ref 3.77–5.28)
RBC # UR: ABNORMAL /HPF
REF LAB TEST METHOD: ABNORMAL
SODIUM BLD-SCNC: 142 MMOL/L (ref 136–145)
SP GR UR STRIP: 1.02 (ref 1–1.03)
SQUAMOUS #/AREA URNS HPF: ABNORMAL /HPF
TROPONIN T SERPL-MCNC: <0.01 NG/ML (ref 0–0.03)
UROBILINOGEN UR QL STRIP: ABNORMAL
WBC NRBC COR # BLD: 10.27 10*3/MM3 (ref 3.4–10.8)
WBC UR QL AUTO: ABNORMAL /HPF

## 2020-01-14 PROCEDURE — 25010000002 ONDANSETRON PER 1 MG: Performed by: EMERGENCY MEDICINE

## 2020-01-14 PROCEDURE — 93010 ELECTROCARDIOGRAM REPORT: CPT | Performed by: INTERNAL MEDICINE

## 2020-01-14 PROCEDURE — 84484 ASSAY OF TROPONIN QUANT: CPT | Performed by: EMERGENCY MEDICINE

## 2020-01-14 PROCEDURE — 93005 ELECTROCARDIOGRAM TRACING: CPT | Performed by: EMERGENCY MEDICINE

## 2020-01-14 PROCEDURE — 99284 EMERGENCY DEPT VISIT MOD MDM: CPT

## 2020-01-14 PROCEDURE — 85025 COMPLETE CBC W/AUTO DIFF WBC: CPT | Performed by: EMERGENCY MEDICINE

## 2020-01-14 PROCEDURE — 81001 URINALYSIS AUTO W/SCOPE: CPT | Performed by: EMERGENCY MEDICINE

## 2020-01-14 PROCEDURE — 80053 COMPREHEN METABOLIC PANEL: CPT | Performed by: EMERGENCY MEDICINE

## 2020-01-14 PROCEDURE — 83735 ASSAY OF MAGNESIUM: CPT | Performed by: EMERGENCY MEDICINE

## 2020-01-14 PROCEDURE — 70450 CT HEAD/BRAIN W/O DYE: CPT

## 2020-01-14 PROCEDURE — 96374 THER/PROPH/DIAG INJ IV PUSH: CPT

## 2020-01-14 RX ORDER — ONDANSETRON 2 MG/ML
4 INJECTION INTRAMUSCULAR; INTRAVENOUS ONCE
Status: COMPLETED | OUTPATIENT
Start: 2020-01-14 | End: 2020-01-14

## 2020-01-14 RX ORDER — MECLIZINE HYDROCHLORIDE 25 MG/1
50 TABLET ORAL EVERY 6 HOURS PRN
Qty: 40 TABLET | Refills: 0 | Status: SHIPPED | OUTPATIENT
Start: 2020-01-14 | End: 2022-11-22 | Stop reason: ALTCHOICE

## 2020-01-14 RX ORDER — SODIUM CHLORIDE 0.9 % (FLUSH) 0.9 %
10 SYRINGE (ML) INJECTION AS NEEDED
Status: DISCONTINUED | OUTPATIENT
Start: 2020-01-14 | End: 2020-01-15 | Stop reason: HOSPADM

## 2020-01-14 RX ADMIN — SODIUM CHLORIDE, PRESERVATIVE FREE 10 ML: 5 INJECTION INTRAVENOUS at 20:37

## 2020-01-14 RX ADMIN — ONDANSETRON 4 MG: 2 INJECTION INTRAMUSCULAR; INTRAVENOUS at 20:35

## 2020-01-14 NOTE — TELEPHONE ENCOUNTER
Calling and saw patient 9am this morning and heart rate , blood pressure was fine.  Daughter called at 2pm and said patient very lightheaded and just doesn't feel right.  Informed home health nurse that pt. Should proceed to er as she may be having another stroke. manish

## 2020-01-14 NOTE — ED PROVIDER NOTES
" EMERGENCY DEPARTMENT ENCOUNTER    Room Number:  37/37  Date of encounter:  1/14/2020  PCP: Amilcar Mauricio DO  Historian: Patient       HPI:  Chief Complaint: Dizziness  A complete HPI/ROS/PMH/PSH/SH/FH are unobtainable due to: NA    Context: Monica Scherer is a 86 y.o. female who presents to the ED c/o dizziness (\"lightheaded\") which started today around 1500.  She states the symptoms started after getting up to go use the restroom and lasted for about an hour.  She still feels light headed, but overall has improved.  She states she did not feel any palpitations or racing heart.  There was no nausea or vomiting.  No headache.  No chest pain, shortness of breath.  She has felt pretty well the past few days given her recent admission for stroke and new onset atrial fibrillation.  She states she is been compliant with her medications.      PAST MEDICAL HISTORY  Active Ambulatory Problems     Diagnosis Date Noted   • Vertigo 06/14/2016   • Temporary cerebral vascular dysfunction 06/14/2016   • Gastroesophageal reflux disease with esophagitis 06/14/2016   • Degeneration of intervertebral disc of cervical region 06/14/2016   • Prediabetes 06/14/2016   • S/P cholecystectomy 06/14/2016   • Osteoarthritis of knee 10/31/2016   • Herpes zoster without complication 11/08/2016   • Hypertension 02/18/2018   • Duodenal ulcer 03/13/2018   • History of pancreatitis 03/22/2018   • Hyperlipidemia 07/19/2019   • TIA (transient ischemic attack) 01/04/2020     Resolved Ambulatory Problems     Diagnosis Date Noted   • Acute cholecystitis 06/07/2016   • Benign essential hypertension 06/14/2016   • Indigestion 10/31/2016   • Acute viral bronchitis 01/23/2018   • Acute biliary pancreatitis 02/18/2018     Past Medical History:   Diagnosis Date   • Prolapsed uterus          PAST SURGICAL HISTORY  Past Surgical History:   Procedure Laterality Date   • APPENDECTOMY     • CHOLECYSTECTOMY N/A 6/8/2016    Procedure: CHOLECYSTECTOMY " LAPAROSCOPIC;  Surgeon: Russ Gar MD;  Location: [facility] [place];  Service:    • ENDOSCOPY N/A 2/22/2018    Z-line regular, 35 cm from incisors, normal esophagus, gastritis, bilious gastric fluid, one non-bleeding duodenal ulcer w/no stigmata of bleeding, Path: DUODENUM: FRAGMENTS OF GASTRIC TYPE MUCOSA WITH HYPERPLASIA (FOVEOLAR) AND PATCHY MIXED INFLAMMATION IN THE LAMINA PROPRIA WITH FOCAL FIBROSIS, COMMENT: 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    • EYE SURGERY      tre cataracts         FAMILY HISTORY  History reviewed. No pertinent family history.      SOCIAL HISTORY  Social History     Socioeconomic History   • Marital status: Single     Spouse name: Not on file   • Number of children: Not on file   • Years of education: Not on file   • Highest education level: Not on file   Tobacco Use   • Smoking status: Never Smoker   • Smokeless tobacco: Never Used   Substance and Sexual Activity   • Alcohol use: No   • Drug use: No   • Sexual activity: Defer         ALLERGIES  Cephalexin and Latex        REVIEW OF SYSTEMS  Review of Systems   Constitutional: Negative for chills, diaphoresis and fever.   HENT: Negative for nosebleeds and sore throat.    Eyes: Negative.    Respiratory: Negative for cough and shortness of breath.    Cardiovascular: Negative for chest pain, palpitations and leg swelling.   Gastrointestinal: Negative for abdominal pain, diarrhea and vomiting.   Genitourinary: Negative for dysuria.   Musculoskeletal: Negative for neck pain.   Skin: Negative for rash.   Allergic/Immunologic: Negative.    Neurological: Positive for dizziness and light-headedness. Negative for syncope, facial asymmetry, speech difficulty, weakness, numbness and headaches.   Hematological: Negative.    Psychiatric/Behavioral: Negative.    All other systems reviewed and are negative.       All systems reviewed and negative except for those discussed in HPI.       PHYSICAL EXAM    I have reviewed the triage vital signs and  nursing notes.    ED Triage Vitals   Temp Heart Rate Resp BP SpO2   01/14/20 1828 01/14/20 1810 01/14/20 1810 01/14/20 1828 01/14/20 1810   98.2 °F (36.8 °C) 79 16 140/92 97 %      Temp src Heart Rate Source Patient Position BP Location FiO2 (%)   -- -- -- -- --              Physical Exam   Constitutional: Pt. is oriented to person, place, and time and well-developed, well-nourished, and in no distress. No distress.   HENT: Normocephalic and atraumatic,  EOM are normal. Pupils are equal, round, and reactive to light. Oropharynx moist/nonerythematous.  Neck: Normal range of motion. Neck supple. No JVD present. No tracheal deviation present. No thyromegaly present.   Cardiovascular: Irregularly irregular.  Exam reveals no gallop and no friction rub.   No murmur heard.  Pulmonary/Chest: Effort normal and breath sounds normal. No stridor. No respiratory distress. No wheezes, no rales.   Abdominal: Soft. Bowel sounds are normal. No distension. There is no tenderness. There is no rebound and no guarding.   Musculoskeletal: Normal range of motion. No edema, tenderness or deformity.   Neurological: Pt. is alert and oriented to person, place, and time. Pt. has normal sensation and normal strength. No cranial nerve deficit. GCS score is 15.   Skin: Skin is warm and dry. No rash noted. Pt. is not diaphoretic. No erythema.   Psychiatric: Mood, affect and judgment normal.   Nursing note and vitals reviewed.        LAB RESULTS  Recent Results (from the past 24 hour(s))   Comprehensive Metabolic Panel    Collection Time: 01/14/20  6:50 PM   Result Value Ref Range    Glucose 125 (H) 65 - 99 mg/dL    BUN 23 8 - 23 mg/dL    Creatinine 0.95 0.57 - 1.00 mg/dL    Sodium 142 136 - 145 mmol/L    Potassium 4.0 3.5 - 5.2 mmol/L    Chloride 106 98 - 107 mmol/L    CO2 22.8 22.0 - 29.0 mmol/L    Calcium 9.1 8.6 - 10.5 mg/dL    Total Protein 7.5 6.0 - 8.5 g/dL    Albumin 4.10 3.50 - 5.20 g/dL    ALT (SGPT) 14 1 - 33 U/L    AST (SGOT) 14 1 - 32  U/L    Alkaline Phosphatase 75 39 - 117 U/L    Total Bilirubin 0.5 0.2 - 1.2 mg/dL    eGFR Non African Amer 56 (L) >60 mL/min/1.73    Globulin 3.4 gm/dL    A/G Ratio 1.2 g/dL    BUN/Creatinine Ratio 24.2 7.0 - 25.0    Anion Gap 13.2 5.0 - 15.0 mmol/L   Troponin    Collection Time: 01/14/20  6:50 PM   Result Value Ref Range    Troponin T <0.010 0.000 - 0.030 ng/mL   Magnesium    Collection Time: 01/14/20  6:50 PM   Result Value Ref Range    Magnesium 1.9 1.6 - 2.4 mg/dL   CBC Auto Differential    Collection Time: 01/14/20  6:50 PM   Result Value Ref Range    WBC 10.27 3.40 - 10.80 10*3/mm3    RBC 4.51 3.77 - 5.28 10*6/mm3    Hemoglobin 13.8 12.0 - 15.9 g/dL    Hematocrit 41.2 34.0 - 46.6 %    MCV 91.4 79.0 - 97.0 fL    MCH 30.6 26.6 - 33.0 pg    MCHC 33.5 31.5 - 35.7 g/dL    RDW 14.4 12.3 - 15.4 %    RDW-SD 47.8 37.0 - 54.0 fl    MPV 9.6 6.0 - 12.0 fL    Platelets 255 140 - 450 10*3/mm3    Neutrophil % 64.2 42.7 - 76.0 %    Lymphocyte % 27.3 19.6 - 45.3 %    Monocyte % 6.1 5.0 - 12.0 %    Eosinophil % 1.5 0.3 - 6.2 %    Basophil % 0.5 0.0 - 1.5 %    Immature Grans % 0.4 0.0 - 0.5 %    Neutrophils, Absolute 6.60 1.70 - 7.00 10*3/mm3    Lymphocytes, Absolute 2.80 0.70 - 3.10 10*3/mm3    Monocytes, Absolute 0.63 0.10 - 0.90 10*3/mm3    Eosinophils, Absolute 0.15 0.00 - 0.40 10*3/mm3    Basophils, Absolute 0.05 0.00 - 0.20 10*3/mm3    Immature Grans, Absolute 0.04 0.00 - 0.05 10*3/mm3    nRBC 0.0 0.0 - 0.2 /100 WBC   Urinalysis With Microscopic If Indicated (No Culture) - Urine, Clean Catch    Collection Time: 01/14/20  7:27 PM   Result Value Ref Range    Color, UA Yellow Yellow, Straw    Appearance, UA Clear Clear    pH, UA <=5.0 5.0 - 8.0    Specific Gravity, UA 1.016 1.005 - 1.030    Glucose, UA Negative Negative    Ketones, UA Negative Negative    Bilirubin, UA Negative Negative    Blood, UA Small (1+) (A) Negative    Protein, UA Negative Negative    Leuk Esterase, UA Negative Negative    Nitrite, UA Negative  Negative    Urobilinogen, UA 0.2 E.U./dL 0.2 - 1.0 E.U./dL   Urinalysis, Microscopic Only - Urine, Clean Catch    Collection Time: 01/14/20  7:27 PM   Result Value Ref Range    RBC, UA 0-2 None Seen, 0-2 /HPF    WBC, UA 0-2 None Seen, 0-2 /HPF    Bacteria, UA None Seen None Seen /HPF    Squamous Epithelial Cells, UA 3-6 (A) None Seen, 0-2 /HPF    Hyaline Casts, UA 0-2 None Seen /LPF    Methodology Automated Microscopy        Ordered the above labs and independently reviewed the results.        RADIOLOGY  Ct Head Without Contrast    Result Date: 1/14/2020  CT HEAD WO CONTRAST-  INDICATIONS: Dizziness, recent stroke  TECHNIQUE: Radiation dose reduction techniques were utilized, including automated exposure control and exposure modulation based on body size. Noncontrast head CT  COMPARISON: 01/04/2020  FINDINGS:    No acute intracranial hemorrhage, midline shift or mass effect. No acute territorial infarct is identified.  Moderate periventricular hypodensities suggest chronic small vessel ischemic change in a patient this age.  Arterial calcifications are seen at the base of the brain.  Ventricles, cisterns, cerebral sulci are unremarkable for patient age.  The visualized paranasal sinuses, orbits, mastoid air cells are unremarkable.           No acute intracranial hemorrhage or hydrocephalus. Chronic changes. If there is further clinical concern, MRI could be considered for further evaluation.  This report was finalized on 1/14/2020 8:03 PM by Dr. Sai Plasencia M.D.        I ordered the above noted radiological studies. Reviewed by me and discussed with radiologist.  See dictation for official radiology interpretation.      PROCEDURES    Procedures      MEDICATIONS GIVEN IN ER    Medications   sodium chloride 0.9 % flush 10 mL (10 mL Intravenous Given 1/14/20 2037)   ondansetron (ZOFRAN) injection 4 mg (4 mg Intravenous Given 1/14/20 2035)         PROGRESS, DATA ANALYSIS, CONSULTS, AND MEDICAL DECISION  MAKING    Any/all labs have been independently reviewed by me.  Any/all radiology studies have been reviewed by me and discussed with radiologist dictating the report.   EKG's independently viewed and interpreted by me.  Discussion below represents my analysis of pertinent findings related to patient's condition, differential diagnosis, treatment plan and final disposition.      ED Course as of Jan 14 2303   Tue Jan 14, 2020 2006 CT of the brain was independently viewed by me and discussed with Dr. Plasencia (radiology)-there are no acute processes identified.  For official interpretation, see dictated report.    [WC]   2024 Staff attempted to ambulate patient she was very ataxic and unsteady.    [WC]   2024 EKG performed at 1936 and interpreted by me shows atrial fibrillation with a rate of 70 bpm.  The QRS segments are unremarkable there are are nonspecific ST-T changes.  There is no significant change when compared to 1/4/2020.    [WC]   2201 She ambulated much better the second time around-I offered her remission to pursue rehab placement, but she declined.  She is staying in her son's home and he is watching her 24 hours a day.  Her daughter is comfortable with her being discharged.  I advised her to return to the emergency department should she have any problems.    [WC]      ED Course User Index  [WC] Ivan Hylton MD       AS OF 11:03 PM VITALS:    BP - 112/83  HR - 70  TEMP - 98.2 °F (36.8 °C)  02 SATS - 95%        DIAGNOSIS  Final diagnoses:   Dizziness   Rate controlled atrial fibrillation (CMS/HCC)   History of CVA (cerebrovascular accident)         DISPOSITION  Discharged           Ivan Hylton MD  01/14/20 2303

## 2020-01-16 ENCOUNTER — TELEPHONE (OUTPATIENT)
Dept: FAMILY MEDICINE CLINIC | Facility: CLINIC | Age: 85
End: 2020-01-16

## 2020-01-20 ENCOUNTER — READMISSION MANAGEMENT (OUTPATIENT)
Dept: CALL CENTER | Facility: HOSPITAL | Age: 85
End: 2020-01-20

## 2020-01-20 NOTE — OUTREACH NOTE
Stroke Week 2 Survey      Responses   Facility patient discharged from?  [facility]   Does the patient have one of the following disease processes/diagnoses(primary or secondary)?  Stroke (TIA)   Week 2 attempt successful?  Yes   Call start time  1129   Call end time  1137   Discharge diagnosis  Acute lacunar infarct    Is patient permission given to speak with other caregiver?  Yes   Person spoke with today (if not patient) and relationship  DTR   Meds reviewed with patient/caregiver?  Yes   Is the patient having any side effects they believe may be caused by any medication additions or changes?  No   Does the patient have all medications ordered at discharge?  Yes   Is the patient taking all medications as directed (includes completed medication regime)?  Yes   Does the patient have a primary care provider?   Yes   Does the patient have an appointment with their PCP within 7 days of discharge?  Yes   Has the patient kept scheduled appointments due by today?  Yes   What is the Home health agency?   [facility]   Has home health visited the patient within 72 hours of discharge?  Yes   Psychosocial issues?  No   Does the patient require any assistance with activities of daily living such as eating, bathing, dressing, walking, etc.?  Yes   Does the patient have any residual symptoms from stroke/TIA?  Yes   Residual symptoms comments  Some weakness in left leg.   Does the patient understand the diet ordered at discharge?  Yes   Did the patient receive a copy of their discharge instructions?  Yes   Nursing interventions  Reviewed instructions with patient   What is the patient's perception of their health status since discharge?  Improving   Nursing interventions  Nurse provided patient education   Is the patient able to teach back FAST for Stroke?  Yes   Is the patient/caregiver able to teach back the risk factors for a stroke?  High blood pressure-goal below 120/80, Physical inactivity and obesity   Is the  patient/caregiver able to teach back signs and symptoms related to disease process for when to call PCP?  Yes   Is the patient/caregiver able to teach back signs and symptoms related to disease process for when to call 911?  Yes   Is the patient/caregiver able to teach back the hierarchy of who to call/visit for symptoms/problems? PCP, Specialist, Home health nurse, Urgent Care, ED, 911  Yes   Additional teach back comments  She is improving, diet could still be better.  SHe is walking better.     Week 2 call completed?  Yes          Sweetie Castrejon RN

## 2020-01-27 ENCOUNTER — OFFICE VISIT (OUTPATIENT)
Dept: FAMILY MEDICINE CLINIC | Facility: CLINIC | Age: 85
End: 2020-01-27

## 2020-01-27 ENCOUNTER — TELEPHONE (OUTPATIENT)
Dept: NEUROLOGY | Facility: CLINIC | Age: 85
End: 2020-01-27

## 2020-01-27 VITALS
WEIGHT: 168 LBS | BODY MASS INDEX: 28.84 KG/M2 | HEART RATE: 72 BPM | TEMPERATURE: 97.9 F | DIASTOLIC BLOOD PRESSURE: 64 MMHG | SYSTOLIC BLOOD PRESSURE: 126 MMHG | OXYGEN SATURATION: 97 %

## 2020-01-27 DIAGNOSIS — E53.8 B12 DEFICIENCY: ICD-10-CM

## 2020-01-27 DIAGNOSIS — I10 ESSENTIAL HYPERTENSION: Primary | ICD-10-CM

## 2020-01-27 PROCEDURE — 96372 THER/PROPH/DIAG INJ SC/IM: CPT | Performed by: FAMILY MEDICINE

## 2020-01-27 PROCEDURE — 99213 OFFICE O/P EST LOW 20 MIN: CPT | Performed by: FAMILY MEDICINE

## 2020-01-27 RX ORDER — CYANOCOBALAMIN 1000 UG/ML
1000 INJECTION, SOLUTION INTRAMUSCULAR; SUBCUTANEOUS
Status: SHIPPED | OUTPATIENT
Start: 2020-01-27 | End: 2020-07-13

## 2020-01-27 RX ADMIN — CYANOCOBALAMIN 1000 MCG: 1000 INJECTION, SOLUTION INTRAMUSCULAR; SUBCUTANEOUS at 11:28

## 2020-01-27 NOTE — PROGRESS NOTES
Subjective   Monica Scherer is a 86 y.o. female. Presents today for   Chief Complaint   Patient presents with   • Follow-up     hospital visit mini stroke   • Hypertension       Hypertension   This is a chronic problem. The current episode started more than 1 year ago. The problem is unchanged. The problem is controlled. Pertinent negatives include no chest pain, orthopnea, palpitations, peripheral edema, PND or shortness of breath. There are no associated agents to hypertension. Risk factors for coronary artery disease include post-menopausal state and dyslipidemia. Past treatments include calcium channel blockers and beta blockers. Current antihypertension treatment includes calcium channel blockers and beta blockers. The current treatment provides moderate improvement. There are no compliance problems.  Hypertensive end-organ damage includes CVA (completing therapy and doing better;  improving activity level;  LLE weakness improving.).     b12 deficiency 268 in patient;  Had x1 shot in hospital only.  Family reports not taking oral as originally     Review of Systems   Respiratory: Negative for shortness of breath.    Cardiovascular: Negative for chest pain, palpitations, orthopnea and PND.       Patient Active Problem List   Diagnosis   • Vertigo   • Temporary cerebral vascular dysfunction   • Gastroesophageal reflux disease with esophagitis   • Degeneration of intervertebral disc of cervical region   • Prediabetes   • S/P cholecystectomy   • Osteoarthritis of knee   • Herpes zoster without complication   • Hypertension   • Duodenal ulcer   • History of pancreatitis   • Hyperlipidemia   • TIA (transient ischemic attack)       Social History     Socioeconomic History   • Marital status: Single     Spouse name: Not on file   • Number of children: Not on file   • Years of education: Not on file   • Highest education level: Not on file   Tobacco Use   • Smoking status: Never Smoker   • Smokeless tobacco: Never  Used   Substance and Sexual Activity   • Alcohol use: No   • Drug use: No   • Sexual activity: Defer       Allergies   Allergen Reactions   • Cephalexin Rash   • Latex Itching       Current Outpatient Medications on File Prior to Visit   Medication Sig Dispense Refill   • apixaban (ELIQUIS) 5 MG tablet tablet Take 1 tablet by mouth Every 12 (Twelve) Hours. Indications: Atrial Fibrillation 60 tablet 0   • aspirin 81 MG chewable tablet Chew 1 tablet Daily. 30 tablet 0   • atorvastatin (LIPITOR) 80 MG tablet Take 1 tablet by mouth Every Night. 30 tablet 0   • felodipine (PLENDIL) 10 MG 24 hr tablet TAKE 1 TABLET BY MOUTH DAILY 90 tablet 3   • meclizine (ANTIVERT) 25 MG tablet Take 2 tablets by mouth Every 6 (Six) Hours As Needed for Dizziness. 40 tablet 0   • metoprolol tartrate (LOPRESSOR) 25 MG tablet Take 1 tablet by mouth Every 12 (Twelve) Hours. 60 tablet 0   • omeprazole (priLOSEC) 20 MG capsule Take 1 capsule by mouth Daily. 30 capsule 11     No current facility-administered medications on file prior to visit.        Objective   Vitals:    01/27/20 1024   BP: 126/64   Pulse: 72   Temp: 97.9 °F (36.6 °C)   SpO2: 97%   Weight: 76.2 kg (168 lb)       Physical Exam   Constitutional: She appears well-developed and well-nourished.   HENT:   Head: Normocephalic and atraumatic.   Neck: Neck supple. No JVD present. No thyromegaly present.   Cardiovascular: Normal rate, regular rhythm and normal heart sounds. Exam reveals no gallop and no friction rub.   No murmur heard.  Pulmonary/Chest: Effort normal and breath sounds normal. No respiratory distress. She has no wheezes. She has no rales.   Abdominal: Soft. Bowel sounds are normal. She exhibits no distension. There is no tenderness. There is no rebound and no guarding.   Musculoskeletal: She exhibits no edema.   Neurological: She is alert.   Skin: Skin is warm and dry.   Psychiatric: She has a normal mood and affect. Her behavior is normal.   Nursing note and vitals  reviewed.      Assessment/Plan   Monica was seen today for follow-up and hypertension.    Diagnoses and all orders for this visit:    Essential hypertension    B12 deficiency  -     cyanocobalamin injection 1,000 mcg    -hypertension - controlled, continue medications;  Has not had to hold metoprolol;  No further dizziness;  No falls  -b12 def - d/w and will give 6 months of shots.         -Follow up: 3 months and prn

## 2020-01-29 ENCOUNTER — READMISSION MANAGEMENT (OUTPATIENT)
Dept: CALL CENTER | Facility: HOSPITAL | Age: 85
End: 2020-01-29

## 2020-01-29 ENCOUNTER — HOSPITAL ENCOUNTER (EMERGENCY)
Facility: HOSPITAL | Age: 85
Discharge: HOME | End: 2020-01-29
Attending: EMERGENCY MEDICINE | Admitting: EMERGENCY MEDICINE

## 2020-01-29 VITALS
HEIGHT: 60 IN | HEART RATE: 60 BPM | SYSTOLIC BLOOD PRESSURE: 144 MMHG | RESPIRATION RATE: 14 BRPM | OXYGEN SATURATION: 94 % | BODY MASS INDEX: 33.38 KG/M2 | TEMPERATURE: 97.1 F | WEIGHT: 170 LBS | DIASTOLIC BLOOD PRESSURE: 64 MMHG

## 2020-01-29 DIAGNOSIS — T14.8XXA BLEEDING FROM WOUND: Primary | ICD-10-CM

## 2020-01-29 LAB
ALBUMIN SERPL-MCNC: 3.7 G/DL (ref 3.5–5.2)
ALBUMIN/GLOB SERPL: 1.2 G/DL
ALP SERPL-CCNC: 75 U/L (ref 39–117)
ALT SERPL W P-5'-P-CCNC: 10 U/L (ref 1–33)
ANION GAP SERPL CALCULATED.3IONS-SCNC: 14.1 MMOL/L (ref 5–15)
APTT PPP: 33.6 SECONDS (ref 22.7–35.4)
AST SERPL-CCNC: 13 U/L (ref 1–32)
BACTERIA UR QL AUTO: ABNORMAL /HPF
BASOPHILS # BLD AUTO: 0.04 10*3/MM3 (ref 0–0.2)
BASOPHILS NFR BLD AUTO: 0.4 % (ref 0–1.5)
BILIRUB SERPL-MCNC: 0.5 MG/DL (ref 0.2–1.2)
BILIRUB UR QL STRIP: NEGATIVE
BUN BLD-MCNC: 22 MG/DL (ref 8–23)
BUN/CREAT SERPL: 25 (ref 7–25)
CALCIUM SPEC-SCNC: 9 MG/DL (ref 8.6–10.5)
CHLORIDE SERPL-SCNC: 104 MMOL/L (ref 98–107)
CLARITY UR: CLEAR
CO2 SERPL-SCNC: 22.9 MMOL/L (ref 22–29)
COLOR UR: YELLOW
CREAT BLD-MCNC: 0.88 MG/DL (ref 0.57–1)
DEPRECATED RDW RBC AUTO: 47.5 FL (ref 37–54)
EOSINOPHIL # BLD AUTO: 0.23 10*3/MM3 (ref 0–0.4)
EOSINOPHIL NFR BLD AUTO: 2.3 % (ref 0.3–6.2)
ERYTHROCYTE [DISTWIDTH] IN BLOOD BY AUTOMATED COUNT: 14.2 % (ref 12.3–15.4)
GFR SERPL CREATININE-BSD FRML MDRD: 61 ML/MIN/1.73
GLOBULIN UR ELPH-MCNC: 3.1 GM/DL
GLUCOSE BLD-MCNC: 148 MG/DL (ref 65–99)
GLUCOSE UR STRIP-MCNC: NEGATIVE MG/DL
HCT VFR BLD AUTO: 40.5 % (ref 34–46.6)
HGB BLD-MCNC: 13.1 G/DL (ref 12–15.9)
HGB UR QL STRIP.AUTO: ABNORMAL
HYALINE CASTS UR QL AUTO: ABNORMAL /LPF
IMM GRANULOCYTES # BLD AUTO: 0.07 10*3/MM3 (ref 0–0.05)
IMM GRANULOCYTES NFR BLD AUTO: 0.7 % (ref 0–0.5)
INR PPP: 1.4 (ref 0.9–1.1)
KETONES UR QL STRIP: NEGATIVE
LEUKOCYTE ESTERASE UR QL STRIP.AUTO: NEGATIVE
LYMPHOCYTES # BLD AUTO: 2.75 10*3/MM3 (ref 0.7–3.1)
LYMPHOCYTES NFR BLD AUTO: 27.6 % (ref 19.6–45.3)
MCH RBC QN AUTO: 29.8 PG (ref 26.6–33)
MCHC RBC AUTO-ENTMCNC: 32.3 G/DL (ref 31.5–35.7)
MCV RBC AUTO: 92.3 FL (ref 79–97)
MONOCYTES # BLD AUTO: 0.57 10*3/MM3 (ref 0.1–0.9)
MONOCYTES NFR BLD AUTO: 5.7 % (ref 5–12)
NEUTROPHILS # BLD AUTO: 6.32 10*3/MM3 (ref 1.7–7)
NEUTROPHILS NFR BLD AUTO: 63.3 % (ref 42.7–76)
NITRITE UR QL STRIP: NEGATIVE
NRBC BLD AUTO-RTO: 0 /100 WBC (ref 0–0.2)
PH UR STRIP.AUTO: 5.5 [PH] (ref 5–8)
PLATELET # BLD AUTO: 249 10*3/MM3 (ref 140–450)
PMV BLD AUTO: 9.7 FL (ref 6–12)
POTASSIUM BLD-SCNC: 3.7 MMOL/L (ref 3.5–5.2)
PROT SERPL-MCNC: 6.8 G/DL (ref 6–8.5)
PROT UR QL STRIP: NEGATIVE
PROTHROMBIN TIME: 16.9 SECONDS (ref 11.7–14.2)
RBC # BLD AUTO: 4.39 10*6/MM3 (ref 3.77–5.28)
RBC # UR: ABNORMAL /HPF
REF LAB TEST METHOD: ABNORMAL
SODIUM BLD-SCNC: 141 MMOL/L (ref 136–145)
SP GR UR STRIP: 1.01 (ref 1–1.03)
SQUAMOUS #/AREA URNS HPF: ABNORMAL /HPF
UROBILINOGEN UR QL STRIP: ABNORMAL
WBC NRBC COR # BLD: 9.98 10*3/MM3 (ref 3.4–10.8)
WBC UR QL AUTO: ABNORMAL /HPF

## 2020-01-29 PROCEDURE — 80053 COMPREHEN METABOLIC PANEL: CPT | Performed by: EMERGENCY MEDICINE

## 2020-01-29 PROCEDURE — P9612 CATHETERIZE FOR URINE SPEC: HCPCS

## 2020-01-29 PROCEDURE — 81001 URINALYSIS AUTO W/SCOPE: CPT | Performed by: EMERGENCY MEDICINE

## 2020-01-29 PROCEDURE — 85025 COMPLETE CBC W/AUTO DIFF WBC: CPT | Performed by: EMERGENCY MEDICINE

## 2020-01-29 PROCEDURE — 99283 EMERGENCY DEPT VISIT LOW MDM: CPT

## 2020-01-29 PROCEDURE — 85610 PROTHROMBIN TIME: CPT | Performed by: EMERGENCY MEDICINE

## 2020-01-29 PROCEDURE — 85730 THROMBOPLASTIN TIME PARTIAL: CPT | Performed by: EMERGENCY MEDICINE

## 2020-01-29 NOTE — OUTREACH NOTE
Stroke Week 3 Survey      Responses   Facility patient discharged from?  [facility]   Does the patient have one of the following disease processes/diagnoses(primary or secondary)?  Stroke (TIA)   Week 3 attempt successful?  No [Pt in ED at time of call]   Unsuccessful attempts  Attempt 1          Rocio Blank RN

## 2020-01-30 ENCOUNTER — READMISSION MANAGEMENT (OUTPATIENT)
Dept: CALL CENTER | Facility: HOSPITAL | Age: 85
End: 2020-01-30

## 2020-01-30 NOTE — OUTREACH NOTE
Stroke Week 3 Survey      Responses   Facility patient discharged from?  [facility]   Does the patient have one of the following disease processes/diagnoses(primary or secondary)?  Stroke (TIA)   Week 3 attempt successful?  Yes   Call start time  1428   Call end time  1431   Discharge diagnosis  Acute lacunar infarct,  TIA   Meds reviewed with patient/caregiver?  Yes   Is the patient taking all medications as directed (includes completed medication regime)?  Yes   Has the patient kept scheduled appointments due by today?  Yes   What is the Home health agency?   D/C'd from home health   Psychosocial issues?  No   Does the patient require any assistance with activities of daily living such as eating, bathing, dressing, walking, etc.?  No   Does the patient have any residual symptoms from stroke/TIA?  No   Does the patient understand the diet ordered at discharge?  No   What is the patient's perception of their health status since discharge?  Improving   Nursing interventions  Nurse provided patient education   Is the patient able to teach back FAST for Stroke?  Yes   Is the patient/caregiver able to teach back the risk factors for a stroke?  History of TIAs, High blood pressure-goal below 120/80, High Cholesterol, Smoking, Diabetes [Nonsmoker,  Pt reports she's not a diabetic although her discharge diagnosis lists Type 2 diabetes.]   If the patient is a current smoker, are they able to teach back resources for cessation?  -- [nonsmoker]   Week 3 call completed?  Yes          Rocio Blank RN

## 2020-01-31 RX ORDER — ASPIRIN 81 MG
TABLET,CHEWABLE ORAL
Qty: 30 TABLET | Refills: 2 | Status: SHIPPED | OUTPATIENT
Start: 2020-01-31 | End: 2020-04-14

## 2020-01-31 RX ORDER — APIXABAN 5 MG/1
TABLET, FILM COATED ORAL
Qty: 60 TABLET | Refills: 2 | Status: SHIPPED | OUTPATIENT
Start: 2020-01-31 | End: 2020-02-17 | Stop reason: SDUPTHER

## 2020-01-31 RX ORDER — ATORVASTATIN CALCIUM 80 MG/1
TABLET, FILM COATED ORAL
Qty: 30 TABLET | Refills: 2 | Status: SHIPPED | OUTPATIENT
Start: 2020-01-31 | End: 2021-03-01

## 2020-02-07 ENCOUNTER — READMISSION MANAGEMENT (OUTPATIENT)
Dept: CALL CENTER | Facility: HOSPITAL | Age: 85
End: 2020-02-07

## 2020-02-07 NOTE — OUTREACH NOTE
Stroke Week 4 Survey      Responses   Facility patient discharged from?  [facility]   Does the patient have one of the following disease processes/diagnoses(primary or secondary)?  Stroke (TIA)   Week 4 attempt successful?  No          Palmer Baires RN

## 2020-02-17 ENCOUNTER — OFFICE VISIT (OUTPATIENT)
Dept: CARDIOLOGY | Facility: CLINIC | Age: 85
End: 2020-02-17

## 2020-02-17 VITALS
WEIGHT: 168 LBS | OXYGEN SATURATION: 98 % | DIASTOLIC BLOOD PRESSURE: 70 MMHG | BODY MASS INDEX: 32.98 KG/M2 | HEIGHT: 60 IN | HEART RATE: 82 BPM | SYSTOLIC BLOOD PRESSURE: 124 MMHG

## 2020-02-17 DIAGNOSIS — Z86.73 HISTORY OF STROKE: ICD-10-CM

## 2020-02-17 DIAGNOSIS — I48.0 PAROXYSMAL ATRIAL FIBRILLATION (HCC): Primary | ICD-10-CM

## 2020-02-17 DIAGNOSIS — I10 ESSENTIAL HYPERTENSION: ICD-10-CM

## 2020-02-17 DIAGNOSIS — E78.49 OTHER HYPERLIPIDEMIA: ICD-10-CM

## 2020-02-17 PROCEDURE — 99214 OFFICE O/P EST MOD 30 MIN: CPT | Performed by: NURSE PRACTITIONER

## 2020-02-17 NOTE — PROGRESS NOTES
[facility] CARDIOLOGY  [address]  [facility] [address]  Phone: [phone]      Patient Name: Monica Scherer  :1933  Age: 86 y.o.  Primary Cardiologist: Eliezer Canales MD  Encounter Provider:  CORTEZ Morales      Chief Complaint:   Chief Complaint   Patient presents with   • Follow-up     1 month Hospital         HPI  Monica Scherer is a 86 y.o. female who presents today for hospital reevaluation.     Pt has a  history significant for new onset atrial fibrillation, acute lacunar infarct in the right hemisphere secondary to small vessel thromboses, hypertension, type 2 diabetes.    Patient was hospitalized -2020 for an acute lacunar infarct in the right hemisphere, new onset atrial fibrillation.  MRI revealed subacute infarct in the posterior aspect of the right lentiform nucleus and a small acute infarct within the periventricular white matter alongside the posterior body of the right lateral ventricle.  Echocardiogram revealed preserved LVEF, mild aortic valve regurgitation and a negative bubble study.  Patient was started on metoprolol for rate control and will continue with apixaban for anticoagulation.    Patient states that she has done well since discharge.  She is tolerating apixiban without any bleeding complications such as blood in the stool or blood in the urine.  She denies heart palpations or tachycardias. She reports some residual generalized weakness and fatigue.  She does note some shortness of breath with exertion with ADLs, she attributes this to the generalized weakness. She has completed PT.  She does not currently have any home health.  She has been compliant with walker use.     The following portions of the patient's history were reviewed and updated as appropriate: allergies, current medications, past family history, past medical history, past social history, past surgical history and problem list.      Review  "of Systems   Constitution: Positive for malaise/fatigue.   Cardiovascular: Positive for dyspnea on exertion. Negative for chest pain and leg swelling.   Respiratory: Negative for shortness of breath.    Neurological: Positive for weakness. Negative for light-headedness.   All other systems reviewed and are negative.      OBJECTIVE:     Vitals:    02/17/20 1031   BP: 124/70   BP Location: Right arm   Patient Position: Sitting   Pulse: 82   SpO2: 98%   Weight: 76.2 kg (168 lb)   Height: 152.4 cm (60\")     Body mass index is 32.81 kg/m².    Physical Exam   Constitutional: She is oriented to person, place, and time. Vital signs are normal. She appears well-developed and well-nourished. She is active.   Eyes: Conjunctivae are normal.   Neck: Carotid bruit is not present.   Cardiovascular: Normal rate, regular rhythm and normal heart sounds.   Pulmonary/Chest: Breath sounds normal.   Abdominal: Normal appearance.   Musculoskeletal:   No cyanosis, clubbing, edema  Normal ROM   Neurological: She is alert and oriented to person, place, and time. GCS eye subscore is 4. GCS verbal subscore is 5. GCS motor subscore is 6.   Skin: Skin is warm, dry and intact.   Psychiatric: She has a normal mood and affect. Her speech is normal and behavior is normal. Judgment and thought content normal. Cognition and memory are normal.       Procedures    Cardiac Procedures:  1. Echocardiogram 1/6/2020.  LVEF 62%.  Negative bubble study.  LAE.  Mild aortic valve regurgitation.  Mild mitral valve regurgitation.  Mild tricuspid valve regurgitation.        ASSESSMENT:      Diagnosis Plan   1. Paroxysmal atrial fibrillation (CMS/HCC)  apixaban (ELIQUIS) 5 MG tablet tablet    metoprolol tartrate (LOPRESSOR) 25 MG tablet   2. Other hyperlipidemia     3. Essential hypertension     4. History of stroke           PLAN OF CARE:     1. PAF.  Currently rate is controlled with metoprolol tartrate.  Patient has anticoagulated with apixaban and not having any " bleeding complications.  She denies any tachycardias, lightheadedness, palpitations, shortness of breath.  Continue current regimen.  2. Hyperlipidemia.  Reviewed lipid panel dated 1/6/2020 which revealed total cholesterol 167, triglycerides 113, HDL 44, .  Continue with atorvastatin.  3. Hypertension.  Blood pressure currently controlled at 124/70.  Continue current regimen.  4. History of stroke.  Continue aspirin, apixaban, statin therapy.  Follow-up with neurology as previously scheduled.  5. Follow-up with Dr. Canales in 4 months.  Sooner for problems or complications.           Discharge Medications           Accurate as of February 17, 2020 10:45 AM. If you have any questions, ask your nurse or doctor.               Changes to Medications      Instructions Start Date   apixaban 5 MG tablet tablet  Commonly known as:  ELIQUIS  What changed:  how much to take  Changed by:  CORTEZ Morales   5 mg, Oral, Every 12 Hours         Continue These Medications      Instructions Start Date   ASPIRIN LOW DOSE 81 MG chewable tablet  Generic drug:  aspirin   CHEW 1 TABLET BY MOUTH EVERY DAY      atorvastatin 80 MG tablet  Commonly known as:  LIPITOR   TAKE 1 TABLET BY MOUTH EVERY NIGHT AT BEDTIME      felodipine 10 MG 24 hr tablet  Commonly known as:  PLENDIL   10 mg, Oral, Daily      meclizine 25 MG tablet  Commonly known as:  ANTIVERT   50 mg, Oral, Every 6 Hours PRN      metoprolol tartrate 25 MG tablet  Commonly known as:  LOPRESSOR   25 mg, Oral, Every 12 Hours      omeprazole 20 MG capsule  Commonly known as:  priLOSEC   20 mg, Oral, Daily             Thank you for allowing me to participate in the care of your patient,         CORTEZ Morales  [facility]  02/17/20  10:45 AM    **Meño Disclaimer:**  Much of this encounter note is an electronic transcription/translation of spoken language to printed text. The electronic translation of spoken language may permit erroneous, or  at times, nonsensical words or phrases to be inadvertently transcribed. Although I have reviewed the note for such errors, some may still exist.

## 2020-02-25 ENCOUNTER — CLINICAL SUPPORT (OUTPATIENT)
Dept: FAMILY MEDICINE CLINIC | Facility: CLINIC | Age: 85
End: 2020-02-25

## 2020-02-25 DIAGNOSIS — E53.8 B12 DEFICIENCY: ICD-10-CM

## 2020-02-25 PROCEDURE — 96372 THER/PROPH/DIAG INJ SC/IM: CPT | Performed by: FAMILY MEDICINE

## 2020-02-25 RX ADMIN — CYANOCOBALAMIN 1000 MCG: 1000 INJECTION, SOLUTION INTRAMUSCULAR; SUBCUTANEOUS at 12:21

## 2020-04-07 ENCOUNTER — OFFICE VISIT (OUTPATIENT)
Dept: NEUROLOGY | Facility: CLINIC | Age: 85
End: 2020-04-07

## 2020-04-07 VITALS — DIASTOLIC BLOOD PRESSURE: 85 MMHG | SYSTOLIC BLOOD PRESSURE: 129 MMHG | HEART RATE: 83 BPM

## 2020-04-07 DIAGNOSIS — I48.0 PAROXYSMAL ATRIAL FIBRILLATION (HCC): ICD-10-CM

## 2020-04-07 DIAGNOSIS — E78.2 MIXED HYPERLIPIDEMIA: ICD-10-CM

## 2020-04-07 DIAGNOSIS — I63.332 CEREBROVASCULAR ACCIDENT (CVA) DUE TO THROMBOSIS OF LEFT POSTERIOR CEREBRAL ARTERY (HCC): Primary | ICD-10-CM

## 2020-04-07 PROBLEM — I63.312 CEREBROVASCULAR ACCIDENT (CVA) DUE TO THROMBOSIS OF LEFT MIDDLE CEREBRAL ARTERY (HCC): Status: ACTIVE | Noted: 2020-04-07

## 2020-04-07 PROCEDURE — 99213 OFFICE O/P EST LOW 20 MIN: CPT | Performed by: NURSE PRACTITIONER

## 2020-04-07 NOTE — PROGRESS NOTES
DOS: 2020  NAME: Monica Scherer   : 1933  PCP: Amilcar Mauricio DO    CC: stroke     Referring MD: No ref. provider found    Neurological Problem:  86 y.o.  female with a Hx of hypertension, hyperlipidemia, diabetes who was called for stroke follow-up.  History is provided by the patient and review of records as summarized below.    Interval History:  Ms. Scherer presented to [facility] on 2020 with complaints of left lower extremity weakness upon awakening.  She had presented to the ER the day before on 1/3 with complaints of left foot weakness that did resolve by the time she arrived to the hospital but she was found to have new paroxysmal atrial fibrillation and was started on Eliquis 2.5 mg twice daily and metoprolol for rate control.  BP on arrival on  was 161/105 with a heart rate of 130. NIH 0. EKG again revealed atrial fibrillation.  She underwent CT/CTA/CTP that revealed an area of T-max greater than 4 seconds located within the right PCA territory, severe stenosis of the right P1.  MRI brain also obtained that revealed a subacute right hemispheric lacunar infarct within the lateral lenticulostriate distribution, chronic bilateral cerebellar hemisphere infarcts and moderate to severe small vessel disease.  2D echo obtained revealed a borderline dilated LA size, LV function normal, EF 62%, calcification of the AV, mild AV, MV, and TV regurg. Aspirin was added and she was continued on a high-dose statin and Eliquis.  Her B12 was low normal and she received replacement.  We felt her heart lacunar stroke was secondary to small vessel thrombosis given the stroke location and fluctuating symptoms. Per review of her discharge summary she was discharged home with home health, home physical therapy, and continued on Eliquis, aspirin, and Lipitor.     I spoke with her via telephone today and she has been doing very well since being discharged from the hospital in January.  She  "did have a brief stent in the ER at the end of January due to some questionable vaginal bleeding but per documentation from the ER physician patient had a scab that was scratched off in her perineal area with 3-5 RBCs on her urinalysis.  Since then she has been tolerating both Eliquis and aspirin with no signs or symptoms of bleeding.  She denies any recent falls.  She did have home health PT come to her house a few days after she was discharged from the hospital but they have since signed off.  She did check her blood pressure while I was on the phone with her and it was 129/85 and her heart rate was 83.  She remains back to her neurologic baseline with no new stroke or TIA symptoms.  She denies any signs or symptoms of depression.  She has been sleeping well.  She does live with family.  She denies smoking or alcohol use.    Review of Systems:        Review of Systems   Constitutional: Negative.    HENT: Negative.    Eyes: Negative.    Respiratory: Negative.    Cardiovascular: Negative.    Gastrointestinal: Negative.    Endocrine: Negative.    Genitourinary: Negative.    Musculoskeletal: Negative.    Skin: Negative.    Allergic/Immunologic: Negative.    Neurological: Negative.    Hematological: Negative.    Psychiatric/Behavioral: Negative.        \"The following portions of the patient's history were reviewed and updated as appropriate: allergies, current medications, past family history, past medical history, past social history, past surgical history and problem list.\"    Review and Interpretation of Imaging as described in interval history.    Laboratory Results:             Lab Results   Component Value Date    HGBA1C 6.30 (H) 01/05/2020     Lab Results   Component Value Date    CHOL 167 01/06/2020    CHOL 188 01/05/2020    CHOL 126 02/19/2018     Lab Results   Component Value Date    HDL 44 01/06/2020    HDL 49 01/05/2020    HDL 58 07/19/2019     Lab Results   Component Value Date     01/06/2020    LDL " 116 (H) 01/05/2020     (H) 07/19/2019     Lab Results   Component Value Date    TRIG 113 01/06/2020    TRIG 116 01/05/2020    TRIG 97 07/19/2019     No components found for: CHOLHDL  No results found for: RPR  Lab Results   Component Value Date    TSH 3.420 01/05/2020     Lab Results   Component Value Date    VXMWGESE32 268 01/04/2020     Lab Results   Component Value Date    GLUCOSE 148 (H) 01/29/2020    BUN 22 01/29/2020    CREATININE 0.88 01/29/2020    EGFRIFNONA 61 01/29/2020    EGFRIFAFRI 51 (L) 07/19/2019    BCR 25.0 01/29/2020    K 3.7 01/29/2020    CO2 22.9 01/29/2020    CALCIUM 9.0 01/29/2020    PROTENTOTREF 7.3 07/19/2019    ALBUMIN 3.70 01/29/2020    LABIL2 1.8 07/19/2019    AST 13 01/29/2020    ALT 10 01/29/2020     Lab Results   Component Value Date    WBC 9.98 01/29/2020    HGB 13.1 01/29/2020    HCT 40.5 01/29/2020    MCV 92.3 01/29/2020     01/29/2020     Lab Results   Component Value Date    INR 1.40 (H) 01/29/2020    INR 1.02 01/04/2020    INR 1.10 02/21/2018    PROTIME 16.9 (H) 01/29/2020    PROTIME 13.1 01/04/2020    PROTIME 14.0 02/21/2018     Neurological examination:  Higher Integrative  Function: Oriented to time, place and person. Normal registration, recall, attention span and concentration. Normal language including comprehension, spontaneous speech.    Impression/Assessment:    Ms. Scherer is doing well following her right hemispheric lacunar infarcts felt to be secondary to small vessel thrombosis secondary to her severe small vessel disease.  She also was found to have paroxysmal atrial fibrillation the day before her admission to the hospital for her stroke and was started on Eliquis.  Aspirin was added due to her intracranial atherosclerotic disease.  She has been tolerating both Eliquis and aspirin well since being discharged from the hospital at the end of January after having some vaginal wound bleeding.  She continues on a high-dose statin, LDL in the hospital was  "100.  I did asked that she have a repeat lipid panel with her next PCP follow-up which is in the end of this month.  We discussed at length her personal risk factors for stroke and importance of risk factor control for stroke prevention, including BP and cholesterol control.  She will monitor her blood pressure regularly and follow-up with PCP for continued cholesterol surveillance.  We discussed stroke/TIA symptoms and importance of calling 911 if she were to experience any of these.  Follow-up in 1 year, sooner if symptoms warrant.    Plan:     Continue Eliquis  Continue ASA  Continue Lipitor, recommend recheck lipid panel with next PCP follow-up  Monitor BP regularly  Keep well-hydrated  Encourage regular physical activity   Blood pressure control to <130/80   Goal LDL <70-recommend high dose statins-    Serum glucose < 140   Call 911 for stroke any stroke symptoms   Follow-up in 1 year, sooner if symptoms warrant     Diagnoses and all orders for this visit:    Cerebrovascular accident (CVA) due to thrombosis of left middle cerebral artery (CMS/HCC)    Paroxysmal atrial fibrillation (CMS/HCC)    Mixed hyperlipidemia        MDM  Reviewed: previous chart and vitals  Reviewed previous: labs, MRI, CT scan and ECG  Interpretation: labs, MRI and CT scan      You have chosen to receive care through a telephone visit today. Do you consent to use a telephone visit for your medical care today? \"YES\"  This visit has been rescheduled as a phone visit to comply with patient safety concerns in accordance with CDC recommendations.   Total time of discussion was 13 minutes.      CORTEZ Vasquez    "

## 2020-04-14 RX ORDER — ASPIRIN 81 MG/1
TABLET, CHEWABLE ORAL
Qty: 30 TABLET | Refills: 2 | Status: SHIPPED | OUTPATIENT
Start: 2020-04-14 | End: 2020-07-29

## 2020-04-29 DIAGNOSIS — I48.0 PAROXYSMAL ATRIAL FIBRILLATION (HCC): ICD-10-CM

## 2020-04-29 RX ORDER — APIXABAN 5 MG/1
TABLET, FILM COATED ORAL
Qty: 60 TABLET | Refills: 5 | Status: SHIPPED | OUTPATIENT
Start: 2020-04-29 | End: 2020-11-24

## 2020-05-11 ENCOUNTER — OFFICE VISIT (OUTPATIENT)
Dept: FAMILY MEDICINE CLINIC | Facility: CLINIC | Age: 85
End: 2020-05-11

## 2020-05-11 DIAGNOSIS — I10 ESSENTIAL HYPERTENSION: Primary | ICD-10-CM

## 2020-05-11 DIAGNOSIS — E78.2 MIXED HYPERLIPIDEMIA: ICD-10-CM

## 2020-05-11 DIAGNOSIS — I48.0 PAROXYSMAL ATRIAL FIBRILLATION (HCC): ICD-10-CM

## 2020-05-11 DIAGNOSIS — I63.332 CEREBROVASCULAR ACCIDENT (CVA) DUE TO THROMBOSIS OF LEFT POSTERIOR CEREBRAL ARTERY (HCC): ICD-10-CM

## 2020-05-11 PROCEDURE — 99441 PR PHYS/QHP TELEPHONE EVALUATION 5-10 MIN: CPT | Performed by: FAMILY MEDICINE

## 2020-05-11 NOTE — PROGRESS NOTES
Subjective   Monica Scherer is a 87 y.o. female. Presents today for   Chief Complaint   Patient presents with   • Atrial Fibrillation   • Hyperlipidemia   • Hypertension       This visit has been rescheduled as a phone visit to comply with patient safety concerns in accordance with CDC recommendations. Total time of discussion was 7 minutes.  Patient Telephone VISIT, patient gave consent to treat.      History of Present Illness  Patient 86 y/o female with atrial fib, arterial htn and hld in reasonable control.  On eliquis and metoprolol for a. Fib.  She had prior stroke and did f/u with neurology last month.  Denies current cp/soa;  No palpitations.  NO new focal neuro deficits.  Feels good per patient.  STaying in due to COVID 19 as directed.  She had ED visit last January noting vaginal bleeding (vs urine), reports no further episodes since then (no vaginal bleeding or hematuria).    Review of Systems    Patient Active Problem List   Diagnosis   • Vertigo   • Temporary cerebral vascular dysfunction   • Gastroesophageal reflux disease with esophagitis   • Degeneration of intervertebral disc of cervical region   • Prediabetes   • S/P cholecystectomy   • Osteoarthritis of knee   • Herpes zoster without complication   • Hypertension   • Duodenal ulcer   • History of pancreatitis   • Hyperlipidemia   • TIA (transient ischemic attack)   • Cerebrovascular accident (CVA) due to thrombosis of left posterior cerebral artery (CMS/HCC)   • Paroxysmal atrial fibrillation (CMS/HCC)       Social History     Socioeconomic History   • Marital status: Single     Spouse name: Not on file   • Number of children: Not on file   • Years of education: Not on file   • Highest education level: Not on file   Tobacco Use   • Smoking status: Never Smoker   • Smokeless tobacco: Never Used   Substance and Sexual Activity   • Alcohol use: No   • Drug use: No   • Sexual activity: Defer       Allergies   Allergen Reactions   • Cephalexin Rash    • Latex Itching       Current Outpatient Medications on File Prior to Visit   Medication Sig Dispense Refill   • aspirin 81 MG chewable tablet CHEW AND SWALLOW 1 TABLET BY MOUTH EVERY DAY 30 tablet 2   • atorvastatin (LIPITOR) 80 MG tablet TAKE 1 TABLET BY MOUTH EVERY NIGHT AT BEDTIME 30 tablet 2   • ELIQUIS 5 MG tablet tablet TAKE 1 TABLET BY MOUTH EVERY 12 HOURS 60 tablet 5   • felodipine (PLENDIL) 10 MG 24 hr tablet TAKE 1 TABLET BY MOUTH DAILY 90 tablet 3   • meclizine (ANTIVERT) 25 MG tablet Take 2 tablets by mouth Every 6 (Six) Hours As Needed for Dizziness. 40 tablet 0   • metoprolol tartrate (LOPRESSOR) 25 MG tablet Take 1 tablet by mouth Every 12 (Twelve) Hours. 180 tablet 3   • omeprazole (priLOSEC) 20 MG capsule Take 1 capsule by mouth Daily. 30 capsule 11     Current Facility-Administered Medications on File Prior to Visit   Medication Dose Route Frequency Provider Last Rate Last Dose   • cyanocobalamin injection 1,000 mcg  1,000 mcg Intramuscular Q28 Days Amilcar Mauricio DO   1,000 mcg at 02/25/20 1221       Objective   There were no vitals filed for this visit.  There is no height or weight on file to calculate BMI.    Physical Exam   Psychiatric: She has a normal mood and affect. Her behavior is normal. Judgment and thought content normal.       Assessment/Plan   Monica was seen today for atrial fibrillation, hyperlipidemia and hypertension.    Diagnoses and all orders for this visit:    Essential hypertension    Mixed hyperlipidemia    Paroxysmal atrial fibrillation (CMS/HCC)    Cerebrovascular accident (CVA) due to thrombosis of left posterior cerebral artery (CMS/HCC)    -patient currently stable, continue same meds with no adjustments  -at f/u will discuss vaginal bleeding again as may need further evaluation.  Will wait given advanced age and current pandemic.         -Follow up: 3 months and prn

## 2020-06-30 ENCOUNTER — OFFICE VISIT (OUTPATIENT)
Dept: CARDIOLOGY | Facility: CLINIC | Age: 85
End: 2020-06-30

## 2020-06-30 VITALS
HEIGHT: 60 IN | BODY MASS INDEX: 33.34 KG/M2 | OXYGEN SATURATION: 96 % | DIASTOLIC BLOOD PRESSURE: 58 MMHG | SYSTOLIC BLOOD PRESSURE: 98 MMHG | HEART RATE: 87 BPM | WEIGHT: 169.8 LBS

## 2020-06-30 DIAGNOSIS — I48.11 LONGSTANDING PERSISTENT ATRIAL FIBRILLATION (HCC): ICD-10-CM

## 2020-06-30 DIAGNOSIS — I63.9 CEREBROVASCULAR ACCIDENT (CVA), UNSPECIFIED MECHANISM (HCC): Primary | ICD-10-CM

## 2020-06-30 PROCEDURE — 99213 OFFICE O/P EST LOW 20 MIN: CPT | Performed by: INTERNAL MEDICINE

## 2020-06-30 NOTE — ED NOTES
"Pt reports dizziness, starting approx 1500 today; denies SOA, CP, weakness, numbness or tingling in extremities.  Pt reports being discharged from this ED last week for \"several mini strokes.\"  Pt denies focal deficit at this time; pt aox4, responds appropriately to all questions and directives at this time with clear voice.     Luis Alfredo Matson RN  01/14/20 9554    " Detail Level: Simple

## 2020-07-19 NOTE — PROGRESS NOTES
Date of Office Visit: 2020  Encounter Provider: Rico Canales MD  Place of Service: [facility] CARDIOLOGY  Patient Name: Monica Scherer  :1933    Chief complaint: Follow-up for persistent atrial fibrillation, CVA.    History of Present Illness:    I again had the pleasure of seeing the patient in cardiology office on 2020.  She is a very pleasant 87 year-old female with a history of persistent atrial fibrillation and prior stroke who presents for follow-up.      I initially saw the patient in the hospital on 2020.  She had presented 2 days earlier with left lower extremity weakness, and was found to be in newly diagnosed atrial fibrillation with rapid ventricular response.  She was discharged on oral metoprolol and Eliquis, although she presented 2 days later with worsening left lower extremity weakness.  She ultimately was found to have a small acute infarct in the periventricular white matter near the posterior body of the right lateral ventricle, as well as a subacute infarct in the right lentiform nucleus.  These were felt to be from small vessel disease.  She remained in atrial fibrillation throughout the hospital stay.  She was rate controlled on metoprolol 25 mg twice a day at the time of discharge.  She was taking Eliquis 5 mg twice a day.  She had an echocardiogram performed on 2020 with an ejection fraction of 62% and very mild valvular disease.    The patient presents today for follow-up.  She is actually been doing very well.  She remains active despite her age.  Her blood pressure has been okay at home, although it is low today at 98/58.  She has not had any near-syncope or syncope.  She denied any chest pain or shortness of breath.  She is taking the Eliquis without any difficulty.      Past Medical History:   Diagnosis Date   • Atrial fibrillation (CMS/HCC)    • Hyperlipidemia    • Hypertension    • Lacunar infarct, acute (CMS/HCC)  01/2020    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   • New onset atrial fibrillation (CMS/HCC) 01/2020    now on rate control along with Eliquis   • Prolapsed uterus     per patient   • Stroke (CMS/HCC)    • TIA (transient ischemic attack) 01/2020   • Type 2 diabetes mellitus (CMS/HCC)    • Weakness        Past Surgical History:   Procedure Laterality Date   • APPENDECTOMY     • CHOLECYSTECTOMY N/A 6/8/2016    Procedure: CHOLECYSTECTOMY LAPAROSCOPIC;  Surgeon: Russ Gar MD;  Location: [facility] OR [facility] [place];  Service:    • ENDOSCOPY N/A 2/22/2018    Z-line regular, 35 cm from incisors, normal esophagus, gastritis, bilious gastric fluid, one non-bleeding duodenal ulcer w/no stigmata of bleeding, Path: DUODENUM: FRAGMENTS OF GASTRIC TYPE MUCOSA WITH HYPERPLASIA (FOVEOLAR) AND PATCHY MIXED INFLAMMATION IN THE LAMINA PROPRIA WITH FOCAL FIBROSIS, COMMENT: 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    • EYE SURGERY      tre cataracts       Current Outpatient Medications on File Prior to Visit   Medication Sig Dispense Refill   • aspirin 81 MG chewable tablet CHEW AND SWALLOW 1 TABLET BY MOUTH EVERY DAY 30 tablet 2   • atorvastatin (LIPITOR) 80 MG tablet TAKE 1 TABLET BY MOUTH EVERY NIGHT AT BEDTIME 30 tablet 2   • ELIQUIS 5 MG tablet tablet TAKE 1 TABLET BY MOUTH EVERY 12 HOURS 60 tablet 5   • felodipine (PLENDIL) 10 MG 24 hr tablet TAKE 1 TABLET BY MOUTH DAILY 90 tablet 3   • meclizine (ANTIVERT) 25 MG tablet Take 2 tablets by mouth Every 6 (Six) Hours As Needed for Dizziness. 40 tablet 0   • metoprolol tartrate (LOPRESSOR) 25 MG tablet Take 1 tablet by mouth Every 12 (Twelve) Hours. 180 tablet 3   • omeprazole (priLOSEC) 20 MG capsule Take 1 capsule by mouth Daily. 30 capsule 11     No current facility-administered medications on file prior to visit.      Allergies as of 06/30/2020 - Reviewed 05/11/2020   Allergen Reaction Noted   • Cephalexin Rash 01/23/2018   • Latex Itching 06/07/2016     Social History  "    Socioeconomic History   • Marital status: Single     Spouse name: Not on file   • Number of children: Not on file   • Years of education: Not on file   • Highest education level: Not on file   Tobacco Use   • Smoking status: Never Smoker   • Smokeless tobacco: Never Used   Substance and Sexual Activity   • Alcohol use: No   • Drug use: No   • Sexual activity: Defer     History reviewed. No pertinent family history.    Review of Systems   All other systems reviewed and are negative.     Objective:     Vitals:    06/30/20 1332   BP: 98/58   Pulse: 87   SpO2: 96%   Weight: 77 kg (169 lb 12.8 oz)   Height: 152.4 cm (60\")     Body mass index is 33.16 kg/m².    Physical Exam   Constitutional: She is oriented to person, place, and time. She appears well-developed and well-nourished.   HENT:   Head: Normocephalic and atraumatic.   Eyes: Conjunctivae are normal.   Neck: Neck supple.   Cardiovascular: Normal rate. An irregularly irregular rhythm present. Exam reveals no gallop and no friction rub.   No murmur heard.  Pulmonary/Chest: Effort normal and breath sounds normal.   Abdominal: Soft. There is no tenderness.   Musculoskeletal: She exhibits no edema.   Neurological: She is alert and oriented to person, place, and time.   Skin: Skin is warm.   Psychiatric: She has a normal mood and affect. Her behavior is normal.     Lab Review:   Procedures    Cardiac Procedures:  1.  Echocardiogram on 1/6/2020: The ejection fraction was 62%.  The left atrium was borderline dilated.  There was mild aortic insufficiency.  There was mild mitral regurgitation.  There was mild tricuspid regurgitation.    Assessment:       Diagnosis Plan   1. Cerebrovascular accident (CVA), unspecified mechanism (CMS/HCC)     2. Longstanding persistent atrial fibrillation (CMS/HCC)       Plan:       The patient seems to be doing well.  She is having no difficulty taking the Eliquis.  The 5 mg dose is the correct dose for her.  Although she is over 80 " years of age, she is over 60 kg in weight, and her renal function is normal.  She will continue on the metoprolol 25 mg twice a day.  This is controlling her rate well.  I did advise her to watch for any lightheadedness or near syncope.  She does have a blood pressure of 98/58 today.  She will also watch her blood pressure to make sure it is not too low.  For now, I did not change any medications.  I will see her back in 6 months.

## 2020-07-29 RX ORDER — ASPIRIN 81 MG/1
TABLET, CHEWABLE ORAL
Qty: 30 TABLET | Refills: 2 | Status: SHIPPED | OUTPATIENT
Start: 2020-07-29 | End: 2020-12-11

## 2020-09-14 RX ORDER — OMEPRAZOLE 20 MG/1
20 CAPSULE, DELAYED RELEASE ORAL DAILY
Qty: 30 CAPSULE | Refills: 11 | Status: SHIPPED | OUTPATIENT
Start: 2020-09-14 | End: 2021-09-23

## 2020-10-27 ENCOUNTER — TELEPHONE (OUTPATIENT)
Dept: FAMILY MEDICINE CLINIC | Facility: CLINIC | Age: 85
End: 2020-10-27

## 2020-10-27 RX ORDER — METHYLPREDNISOLONE 4 MG/1
TABLET ORAL
Qty: 21 TABLET | Refills: 0 | Status: SHIPPED | OUTPATIENT
Start: 2020-10-27 | End: 2020-12-01

## 2020-10-27 NOTE — TELEPHONE ENCOUNTER
Per pt, it is just the left arm (arm that flu shot was given in). Pt is taking tylenol for the pain and it is easing the pain off now.

## 2020-10-27 NOTE — TELEPHONE ENCOUNTER
PATIENT MADE FIRST AVAILABLE APPT FOR DR AGEE (DECLINED APRN)    SHE STATES EVER SINCE FLU SHOT END OF September, HER ARMS ARE ACHING REALLY BADLY    SHE WANTED TO WAIT UNTIL 12/1 FOR DR AGEE RATHER THAN SEE [facility], BUT WANTED TO INQUIRE IF THIS COULD BE RELATED TO HER MINI STROKES SHE HAD PREVIOUSLY, AND IF SO CAN SHE BE SEEN SOONER BY DR AGEE      [phone]

## 2020-10-27 NOTE — TELEPHONE ENCOUNTER
Can work in early next week.  Try holding atorvastatin until then and see if aching stops.  Sometimes strokes can cause pain, but I don't that if both arms and likely not flu shot either since both.

## 2020-11-02 ENCOUNTER — OFFICE VISIT (OUTPATIENT)
Dept: FAMILY MEDICINE CLINIC | Facility: CLINIC | Age: 85
End: 2020-11-02

## 2020-11-02 VITALS
HEART RATE: 83 BPM | SYSTOLIC BLOOD PRESSURE: 132 MMHG | WEIGHT: 174 LBS | DIASTOLIC BLOOD PRESSURE: 74 MMHG | OXYGEN SATURATION: 97 % | BODY MASS INDEX: 34.16 KG/M2 | HEIGHT: 60 IN

## 2020-11-02 DIAGNOSIS — M79.10 MYALGIA: Primary | ICD-10-CM

## 2020-11-02 PROCEDURE — 99213 OFFICE O/P EST LOW 20 MIN: CPT | Performed by: FAMILY MEDICINE

## 2020-11-02 NOTE — PROGRESS NOTES
Subjective   Monica Scherer is a 87 y.o. female. Presents today for   Chief Complaint   Patient presents with   • Arm Pain     left       History of Present Illness  Patient had flu injection left arm, still painful;  Able move arm;  No loss of sensation, no swelling or rash.  Not taking anything for pain.    Review of Systems   Musculoskeletal: Positive for myalgias.   Skin: Negative for rash and wound.   Neurological: Negative for numbness.       Patient Active Problem List   Diagnosis   • Vertigo   • Temporary cerebral vascular dysfunction   • Gastroesophageal reflux disease with esophagitis   • Degeneration of intervertebral disc of cervical region   • Prediabetes   • S/P cholecystectomy   • Osteoarthritis of knee   • Herpes zoster without complication   • Hypertension   • Duodenal ulcer   • History of pancreatitis   • Hyperlipidemia   • TIA (transient ischemic attack)   • Cerebrovascular accident (CVA) due to thrombosis of left posterior cerebral artery (CMS/HCC)   • Paroxysmal atrial fibrillation (CMS/HCC)       Social History     Socioeconomic History   • Marital status: Single     Spouse name: Not on file   • Number of children: Not on file   • Years of education: Not on file   • Highest education level: Not on file   Tobacco Use   • Smoking status: Never Smoker   • Smokeless tobacco: Never Used   Substance and Sexual Activity   • Alcohol use: No   • Drug use: No   • Sexual activity: Defer       Allergies   Allergen Reactions   • Cephalexin Rash   • Latex Itching       Current Outpatient Medications on File Prior to Visit   Medication Sig Dispense Refill   • aspirin 81 MG chewable tablet CHEW AND SWALLOW 1 TABLET BY MOUTH EVERY DAY 30 tablet 2   • atorvastatin (LIPITOR) 80 MG tablet TAKE 1 TABLET BY MOUTH EVERY NIGHT AT BEDTIME 30 tablet 2   • ELIQUIS 5 MG tablet tablet TAKE 1 TABLET BY MOUTH EVERY 12 HOURS 60 tablet 5   • felodipine (PLENDIL) 10 MG 24 hr tablet TAKE 1 TABLET BY MOUTH DAILY 90 tablet 3  "  • meclizine (ANTIVERT) 25 MG tablet Take 2 tablets by mouth Every 6 (Six) Hours As Needed for Dizziness. 40 tablet 0   • metoprolol tartrate (LOPRESSOR) 25 MG tablet Take 1 tablet by mouth Every 12 (Twelve) Hours. 180 tablet 3   • omeprazole (priLOSEC) 20 MG capsule Take 1 capsule by mouth Daily. 30 capsule 11   • methylPREDNISolone (MEDROL) 4 MG dose pack Take as directed on package instructions. 21 tablet 0     No current facility-administered medications on file prior to visit.        Objective   Vitals:    11/02/20 1206   BP: 132/74   BP Location: Right arm   Patient Position: Sitting   Cuff Size: Adult   Pulse: 83   SpO2: 97%   Weight: 78.9 kg (174 lb)   Height: 152.4 cm (60\")     Body mass index is 33.98 kg/m².    Physical Exam  Vitals signs and nursing note reviewed.   Constitutional:       Appearance: She is well-developed.   Musculoskeletal:      Right shoulder: She exhibits normal range of motion and normal strength.      Left shoulder: She exhibits tenderness. She exhibits normal range of motion, normal pulse and normal strength.      Comments: Mild tenderness over lateral deltoid muscle, no rash or skin changes  Negative drop arm test   Skin:     General: Skin is warm and dry.   Neurological:      Mental Status: She is alert and oriented to person, place, and time.   Psychiatric:         Behavior: Behavior normal.         Assessment/Plan   Diagnoses and all orders for this visit:    1. Myalgia (Primary)        Ice to left shoulder, apap for pain;  D/w likely continue to improve and fade.   subltle swelling on exam.         -Follow up: Prn - RTC if worse or no improvement.    "

## 2020-11-24 DIAGNOSIS — I48.0 PAROXYSMAL ATRIAL FIBRILLATION (HCC): ICD-10-CM

## 2020-11-24 RX ORDER — APIXABAN 5 MG/1
TABLET, FILM COATED ORAL
Qty: 60 TABLET | Refills: 5 | Status: SHIPPED | OUTPATIENT
Start: 2020-11-24 | End: 2021-08-23

## 2020-12-01 ENCOUNTER — OFFICE VISIT (OUTPATIENT)
Dept: FAMILY MEDICINE CLINIC | Facility: CLINIC | Age: 85
End: 2020-12-01

## 2020-12-01 VITALS
DIASTOLIC BLOOD PRESSURE: 56 MMHG | HEART RATE: 55 BPM | WEIGHT: 172 LBS | OXYGEN SATURATION: 96 % | HEIGHT: 60 IN | SYSTOLIC BLOOD PRESSURE: 112 MMHG | BODY MASS INDEX: 33.77 KG/M2

## 2020-12-01 DIAGNOSIS — I48.0 PAROXYSMAL ATRIAL FIBRILLATION (HCC): ICD-10-CM

## 2020-12-01 DIAGNOSIS — M79.10 MYALGIA: ICD-10-CM

## 2020-12-01 DIAGNOSIS — E78.2 MIXED HYPERLIPIDEMIA: ICD-10-CM

## 2020-12-01 DIAGNOSIS — I10 ESSENTIAL HYPERTENSION: Primary | ICD-10-CM

## 2020-12-01 DIAGNOSIS — R73.03 PREDIABETES: ICD-10-CM

## 2020-12-01 PROCEDURE — 99214 OFFICE O/P EST MOD 30 MIN: CPT | Performed by: FAMILY MEDICINE

## 2020-12-01 NOTE — PROGRESS NOTES
Subjective   Monica Scherer is a 87 y.o. female. Presents today for   Chief Complaint   Patient presents with   • myalgia   • Hypertension   • Hyperlipidemia   • Atrial Fibrillation       Hypertension  This is a chronic problem. The problem is controlled. Pertinent negatives include no chest pain, headaches, orthopnea, palpitations, peripheral edema, PND or shortness of breath. There are no associated agents to hypertension. The current treatment provides moderate improvement. Hypertensive end-organ damage includes CVA.   Hyperlipidemia  The problem is controlled. Recent lipid tests were reviewed and are normal. Pertinent negatives include no chest pain or shortness of breath. Current antihyperlipidemic treatment includes statins. The current treatment provides moderate improvement of lipids.   Atrial Fibrillation  Presents for follow-up visit. Symptoms are negative for chest pain, hypertension, hypotension, palpitations, shortness of breath and syncope. The symptoms have been stable. Past medical history includes atrial fibrillation and hyperlipidemia.     Left arm still sore from flu shot couple months ago, but better overall.    Review of Systems   Respiratory: Negative for shortness of breath.    Cardiovascular: Negative for chest pain, palpitations, orthopnea, syncope and PND.   Neurological: Negative for headaches.       Patient Active Problem List   Diagnosis   • Vertigo   • Temporary cerebral vascular dysfunction   • Gastroesophageal reflux disease with esophagitis   • Degeneration of intervertebral disc of cervical region   • Prediabetes   • S/P cholecystectomy   • Osteoarthritis of knee   • Herpes zoster without complication   • Hypertension   • Duodenal ulcer   • History of pancreatitis   • Hyperlipidemia   • TIA (transient ischemic attack)   • Cerebrovascular accident (CVA) due to thrombosis of left posterior cerebral artery (CMS/HCC)   • Paroxysmal atrial fibrillation (CMS/HCC)       Social History  "    Socioeconomic History   • Marital status: Single     Spouse name: Not on file   • Number of children: Not on file   • Years of education: Not on file   • Highest education level: Not on file   Tobacco Use   • Smoking status: Never Smoker   • Smokeless tobacco: Never Used   Substance and Sexual Activity   • Alcohol use: No   • Drug use: No   • Sexual activity: Defer       Allergies   Allergen Reactions   • Cephalexin Rash   • Latex Itching       Current Outpatient Medications on File Prior to Visit   Medication Sig Dispense Refill   • atorvastatin (LIPITOR) 80 MG tablet TAKE 1 TABLET BY MOUTH EVERY NIGHT AT BEDTIME 30 tablet 2   • Eliquis 5 MG tablet tablet TAKE 1 TABLET BY MOUTH EVERY 12 HOURS 60 tablet 5   • meclizine (ANTIVERT) 25 MG tablet Take 2 tablets by mouth Every 6 (Six) Hours As Needed for Dizziness. 40 tablet 0   • metoprolol tartrate (LOPRESSOR) 25 MG tablet Take 1 tablet by mouth Every 12 (Twelve) Hours. 180 tablet 3   • omeprazole (priLOSEC) 20 MG capsule Take 1 capsule by mouth Daily. 30 capsule 11     No current facility-administered medications on file prior to visit.        Objective   Vitals:    12/01/20 1545   BP: 112/56   Pulse: 55   SpO2: 96%   Weight: 78 kg (172 lb)   Height: 152.4 cm (60\")     Body mass index is 33.59 kg/m².    Physical Exam    Assessment/Plan   Diagnoses and all orders for this visit:    1. Essential hypertension (Primary)  -     Comprehensive Metabolic Panel    2. Myalgia    3. Mixed hyperlipidemia  -     Lipid Panel  -     Comprehensive Metabolic Panel    4. Paroxysmal atrial fibrillation (CMS/HCC)    5. Prediabetes  -     Hemoglobin A1c    -hx of predm, due for dm2 screen  -hypertension - controlled, continue medications  -HLD - continue statin, recheck lipids.  -continue ice to arm as needed;  Tylenol for pain prn         -Follow up: 6 months and prn  "

## 2020-12-11 DIAGNOSIS — I10 ESSENTIAL HYPERTENSION: ICD-10-CM

## 2020-12-11 RX ORDER — FELODIPINE 10 MG/1
10 TABLET, EXTENDED RELEASE ORAL DAILY
Qty: 90 TABLET | Refills: 3 | Status: SHIPPED | OUTPATIENT
Start: 2020-12-11 | End: 2021-12-03

## 2020-12-11 RX ORDER — ASPIRIN 81 MG/1
TABLET, CHEWABLE ORAL
Qty: 90 TABLET | Refills: 2 | Status: SHIPPED | OUTPATIENT
Start: 2020-12-11 | End: 2021-07-15

## 2021-01-19 ENCOUNTER — OFFICE VISIT (OUTPATIENT)
Dept: CARDIOLOGY | Facility: CLINIC | Age: 86
End: 2021-01-19

## 2021-01-19 VITALS
BODY MASS INDEX: 33.85 KG/M2 | OXYGEN SATURATION: 98 % | HEART RATE: 75 BPM | SYSTOLIC BLOOD PRESSURE: 124 MMHG | WEIGHT: 172.4 LBS | HEIGHT: 60 IN | DIASTOLIC BLOOD PRESSURE: 78 MMHG

## 2021-01-19 DIAGNOSIS — I63.9 CEREBROVASCULAR ACCIDENT (CVA), UNSPECIFIED MECHANISM (HCC): ICD-10-CM

## 2021-01-19 DIAGNOSIS — I48.11 LONGSTANDING PERSISTENT ATRIAL FIBRILLATION (HCC): Primary | ICD-10-CM

## 2021-01-19 PROCEDURE — 99213 OFFICE O/P EST LOW 20 MIN: CPT | Performed by: INTERNAL MEDICINE

## 2021-01-19 NOTE — PROGRESS NOTES
Date of Office Visit:  2021  Encounter Provider: Rico Canales MD  Place of Service: [facility] CARDIOLOGY  Patient Name: Monica Scherer  :1933    Chief complaint: Follow-up for persistent atrial fibrillation, CVA.    History of Present Illness:    I again had the pleasure of seeing the patient in cardiology office on 2021.  She is a very pleasant 87 year-old female with a history of persistent atrial fibrillation and prior stroke who presents for follow-up.      I initially saw the patient in the hospital on 2020.  She had presented 2 days earlier with left lower extremity weakness, and was found to be in newly diagnosed atrial fibrillation with rapid ventricular response.  She was discharged on oral metoprolol and Eliquis, although she presented 2 days later with worsening left lower extremity weakness.  She ultimately was found to have a small acute infarct in the periventricular white matter near the posterior body of the right lateral ventricle, as well as a subacute infarct in the right lentiform nucleus.  These were felt to be from small vessel disease.  She remained in atrial fibrillation throughout the hospital stay.  She was rate controlled on metoprolol 25 mg twice a day at the time of discharge.  She was taking Eliquis 5 mg twice a day.  She had an echocardiogram performed on 2020 with an ejection fraction of 62% and very mild valvular disease.    The patient presents today for follow-up.  She is actually been doing very well.  She continues to remain active despite her age.  She has not had any symptoms from the atrial fibrillation.  She denied any significant chest pain, shortness of breath, or palpitations.  She is taking the Eliquis without issues.      Past Medical History:   Diagnosis Date   • Atrial fibrillation (CMS/HCC)    • Hyperlipidemia    • Hypertension    • Lacunar infarct, acute (CMS/HCC) 2020    right hemisphere secondary  to small vessel thrombosis   • New onset atrial fibrillation (CMS/HCC) 01/2020    now on rate control along with Eliquis   • Prolapsed uterus     per patient   • Stroke (CMS/HCC)    • TIA (transient ischemic attack) 01/2020   • Weakness        Past Surgical History:   Procedure Laterality Date   • APPENDECTOMY     • CHOLECYSTECTOMY N/A 6/8/2016    Procedure: CHOLECYSTECTOMY LAPAROSCOPIC;  Surgeon: Russ Gar MD;  Location[place] [facility] [place] [facility] [place];  Service:    • ENDOSCOPY N/A 2/22/2018    Z-line regular, 35 cm from incisors, normal esophagus, gastritis, bilious gastric fluid, one non-bleeding duodenal ulcer w/no stigmata of bleeding, Path: DUODENUM: FRAGMENTS OF GASTRIC TYPE MUCOSA WITH HYPERPLASIA (FOVEOLAR) AND PATCHY MIXED INFLAMMATION IN THE LAMINA PROPRIA WITH FOCAL FIBROSIS, COMMENT: 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    • EYE SURGERY      tre cataracts       Current Outpatient Medications on File Prior to Visit   Medication Sig Dispense Refill   • aspirin 81 MG chewable tablet CHEW AND SWALLOW 1 TABLET BY MOUTH EVERY DAY 90 tablet 2   • atorvastatin (LIPITOR) 80 MG tablet TAKE 1 TABLET BY MOUTH EVERY NIGHT AT BEDTIME 30 tablet 2   • Eliquis 5 MG tablet tablet TAKE 1 TABLET BY MOUTH EVERY 12 HOURS 60 tablet 5   • felodipine (PLENDIL) 10 MG 24 hr tablet TAKE 1 TABLET BY MOUTH DAILY 90 tablet 3   • meclizine (ANTIVERT) 25 MG tablet Take 2 tablets by mouth Every 6 (Six) Hours As Needed for Dizziness. 40 tablet 0   • metoprolol tartrate (LOPRESSOR) 25 MG tablet Take 1 tablet by mouth Every 12 (Twelve) Hours. 180 tablet 3   • omeprazole (priLOSEC) 20 MG capsule Take 1 capsule by mouth Daily. 30 capsule 11     No current facility-administered medications on file prior to visit.      Allergies as of 01/19/2021 - Reviewed 01/19/2021   Allergen Reaction Noted   • Cephalexin Rash 01/23/2018   • Latex Itching 06/07/2016     Social History     Socioeconomic History   • Marital status: Single     Spouse name: Not on  "file   • Number of children: Not on file   • Years of education: Not on file   • Highest education level: Not on file   Tobacco Use   • Smoking status: Never Smoker   • Smokeless tobacco: Never Used   Substance and Sexual Activity   • Alcohol use: No   • Drug use: No   • Sexual activity: Defer     History reviewed. No pertinent family history.    Review of Systems   Constitution: Positive for malaise/fatigue.   All other systems reviewed and are negative.     Objective:     Vitals:    01/19/21 0824   BP: 124/78   Pulse: 75   SpO2: 98%   Weight: 78.2 kg (172 lb 6.4 oz)   Height: 152.4 cm (60\")     Body mass index is 33.67 kg/m².    Physical Exam   Constitutional: She is oriented to person, place, and time. She appears well-developed and well-nourished.   HENT:   Head: Normocephalic and atraumatic.   Eyes: Conjunctivae are normal.   Neck: Neck supple.   Cardiovascular: Normal rate. An irregularly irregular rhythm present. Exam reveals no gallop and no friction rub.   No murmur heard.  Pulmonary/Chest: Effort normal and breath sounds normal.   Abdominal: Soft. There is no abdominal tenderness.   Musculoskeletal:         General: No edema.   Neurological: She is alert and oriented to person, place, and time.   Skin: Skin is warm.   Psychiatric: She has a normal mood and affect. Her behavior is normal.     Lab Review:   Procedures    Cardiac Procedures:  1.  Echocardiogram on 1/6/2020: The ejection fraction was 62%.  The left atrium was borderline dilated.  There was mild aortic insufficiency.  There was mild mitral regurgitation.  There was mild tricuspid regurgitation.    Assessment:       Diagnosis Plan   1. Longstanding persistent atrial fibrillation (CMS/HCC)     2. Cerebrovascular accident (CVA), unspecified mechanism (CMS/HCC)       Plan:       The patient seems to be doing well.  She is having no difficulty taking the Eliquis.  The 5 mg dose is the correct dose for her.  Although she is over 80 years of age, she " is over 60 kg in weight, and her renal function is normal.  I do not feel that she needs to continue on the aspirin at this point at 87 years of age as she is on systemic anticoagulation.  I feel this will minimize her risk of bleeding.  She will continue on the metoprolol 25 mg twice a day for rate control and blood pressure.  Her blood pressure is excellent today at 124/78 on the metoprolol flow to feeding.  She is not having any significant edema from the felodipine.  I will plan on seeing her back in the office in 6 months unless other issues arise.

## 2021-03-01 RX ORDER — ATORVASTATIN CALCIUM 80 MG/1
TABLET, FILM COATED ORAL
Qty: 90 TABLET | Refills: 2 | Status: SHIPPED | OUTPATIENT
Start: 2021-03-01 | End: 2021-12-16 | Stop reason: SDUPTHER

## 2021-04-13 ENCOUNTER — TELEPHONE (OUTPATIENT)
Dept: FAMILY MEDICINE CLINIC | Facility: CLINIC | Age: 86
End: 2021-04-13

## 2021-04-13 RX ORDER — AZITHROMYCIN 250 MG/1
TABLET, FILM COATED ORAL
Qty: 6 TABLET | Refills: 0 | Status: SHIPPED | OUTPATIENT
Start: 2021-04-13 | End: 2021-06-14

## 2021-04-13 NOTE — TELEPHONE ENCOUNTER
I am sorry, just now seeing this message.  If still sick I would not have covid 19 vaccine tomorrow.  I did send something to [facility] if still needs.   I would give another 2 weeks for the covid 19 vaccine.  RRJ    ----- Message from Yash Owen MA sent at 4/9/2021 11:20 AM EDT -----  Regarding: FW: Non-Urgent Medical Question  Contact: [phone]      ----- Message -----  From: Monica Scherer  Sent: 4/9/2021  10:56 AM EDT  To: [facility]  Subject: Non-Urgent Medical Question                      Dr. Mauricio,                           I was wanting to ask if you could call me  in something. I have been congested and when I blow my nose its green, so I'm thinking I have a sinus infection.  I am also supposed to have my covid vaccine on Wednesday, April 14th. Can I go ahead and get it with me being sick or should I reschedule? Thank you so much.       Monica Scherer

## 2021-04-14 ENCOUNTER — APPOINTMENT (OUTPATIENT)
Dept: VACCINE CLINIC | Facility: HOSPITAL | Age: 86
End: 2021-04-14

## 2021-04-30 DIAGNOSIS — I48.0 PAROXYSMAL ATRIAL FIBRILLATION (HCC): ICD-10-CM

## 2021-06-14 ENCOUNTER — OFFICE VISIT (OUTPATIENT)
Dept: FAMILY MEDICINE CLINIC | Facility: CLINIC | Age: 86
End: 2021-06-14

## 2021-06-14 VITALS
DIASTOLIC BLOOD PRESSURE: 62 MMHG | BODY MASS INDEX: 34.95 KG/M2 | WEIGHT: 178 LBS | HEART RATE: 63 BPM | SYSTOLIC BLOOD PRESSURE: 122 MMHG | HEIGHT: 60 IN | OXYGEN SATURATION: 98 %

## 2021-06-14 DIAGNOSIS — Z78.0 MENOPAUSE: ICD-10-CM

## 2021-06-14 DIAGNOSIS — I48.0 PAROXYSMAL ATRIAL FIBRILLATION (HCC): ICD-10-CM

## 2021-06-14 DIAGNOSIS — I10 ESSENTIAL HYPERTENSION: ICD-10-CM

## 2021-06-14 DIAGNOSIS — Z00.00 MEDICARE ANNUAL WELLNESS VISIT, SUBSEQUENT: Primary | ICD-10-CM

## 2021-06-14 DIAGNOSIS — R73.03 PREDIABETES: ICD-10-CM

## 2021-06-14 DIAGNOSIS — E78.2 MIXED HYPERLIPIDEMIA: ICD-10-CM

## 2021-06-14 DIAGNOSIS — Z12.31 ENCOUNTER FOR SCREENING MAMMOGRAM FOR BREAST CANCER: ICD-10-CM

## 2021-06-14 DIAGNOSIS — I63.332 CEREBROVASCULAR ACCIDENT (CVA) DUE TO THROMBOSIS OF LEFT POSTERIOR CEREBRAL ARTERY (HCC): ICD-10-CM

## 2021-06-14 PROCEDURE — 1126F AMNT PAIN NOTED NONE PRSNT: CPT | Performed by: FAMILY MEDICINE

## 2021-06-14 PROCEDURE — 96160 PT-FOCUSED HLTH RISK ASSMT: CPT | Performed by: FAMILY MEDICINE

## 2021-06-14 PROCEDURE — 1170F FXNL STATUS ASSESSED: CPT | Performed by: FAMILY MEDICINE

## 2021-06-14 PROCEDURE — 99214 OFFICE O/P EST MOD 30 MIN: CPT | Performed by: FAMILY MEDICINE

## 2021-06-14 PROCEDURE — G0439 PPPS, SUBSEQ VISIT: HCPCS | Performed by: FAMILY MEDICINE

## 2021-06-14 NOTE — PROGRESS NOTES
QUICK REFERENCE INFORMATION:  The ABCs of the Annual Wellness Visit    Subsequent Medicare Wellness Visit    HEALTH RISK ASSESSMENT    5/4/1933    Recent Hospitalizations:  No hospitalization(s) within the last year..        Current Medical Providers:  Patient Care Team:  Amilcar Mauricio DO as PCP - General (Family Medicine)        Smoking Status:  Social History     Tobacco Use   Smoking Status Never Smoker   Smokeless Tobacco Never Used       Alcohol Consumption:  Social History     Substance and Sexual Activity   Alcohol Use No       Depression Screen:   PHQ-2/PHQ-9 Depression Screening 6/14/2021   Little interest or pleasure in doing things 0   Feeling down, depressed, or hopeless 0   Trouble falling or staying asleep, or sleeping too much 0   Feeling tired or having little energy 0   Poor appetite or overeating 0   Feeling bad about yourself - or that you are a failure or have let yourself or your family down 0   Trouble concentrating on things, such as reading the newspaper or watching television 0   Moving or speaking so slowly that other people could have noticed. Or the opposite - being so fidgety or restless that you have been moving around a lot more than usual 0   Thoughts that you would be better off dead, or of hurting yourself in some way 0   Total Score 0   If you checked off any problems, how difficult have these problems made it for you to do your work, take care of things at home, or get along with other people? Not difficult at all       Health Habits and Functional and Cognitive Screening:  Functional & Cognitive Status 6/14/2021   Do you have difficulty preparing food and eating? No   Do you have difficulty bathing yourself, getting dressed or grooming yourself? No   Do you have difficulty using the toilet? No   Do you have difficulty moving around from place to place? No   Do you have trouble with steps or getting out of a bed or a chair? No   Current Diet Well Balanced Diet   Dental Exam  Up to date   Eye Exam Up to date   Exercise (times per week) 0 times per week   Current Exercises Include No Regular Exercise   Current Exercise Activities Include -   Do you need help using the phone?  No   Are you deaf or do you have serious difficulty hearing?  No   Do you need help with transportation? No   Do you need help shopping? No   Do you need help preparing meals?  No   Do you need help with housework?  No   Do you need help with laundry? No   Do you need help taking your medications? No   Do you need help managing money? No   Do you ever drive or ride in a car without wearing a seat belt? No   Have you felt unusual stress, anger or loneliness in the last month? No   Who do you live with? Child   If you need help, do you have trouble finding someone available to you? No   Have you been bothered in the last four weeks by sexual problems? No   Do you have difficulty concentrating, remembering or making decisions? No           Does the patient have evidence of cognitive impairment? No    Aspirin use counseling: Taking ASA appropriately as indicated      Recent Lab Results:  CMP:  Lab Results   Component Value Date     (H) 07/19/2019    BUN 22 01/29/2020    CREATININE 0.88 01/29/2020    EGFRIFNONA 61 01/29/2020    EGFRIFAFRI 51 (L) 07/19/2019    BCR 25.0 01/29/2020     01/29/2020    K 3.7 01/29/2020    CO2 22.9 01/29/2020    CALCIUM 9.0 01/29/2020    PROTENTOTREF 7.3 07/19/2019    ALBUMIN 3.70 01/29/2020    LABGLOBREF 2.6 07/19/2019    LABIL2 1.8 07/19/2019    BILITOT 0.5 01/29/2020    ALKPHOS 75 01/29/2020    AST 13 01/29/2020    ALT 10 01/29/2020     Lipid Panel:  Lab Results   Component Value Date    CHOL 167 01/06/2020    TRIG 113 01/06/2020    HDL 44 01/06/2020    VLDL 22.6 01/06/2020    LDLHDL 2.28 01/06/2020     HbA1c:  Lab Results   Component Value Date    HGBA1C 6.30 (H) 01/05/2020       Visual Acuity:  No exam data present    Age-appropriate Screening Schedule:  Refer to the list below  for future screening recommendations based on patient's age, sex and/or medical conditions. Orders for these recommended tests are listed in the plan section. The patient has been provided with a written plan.    Health Maintenance   Topic Date Due   • URINE MICROALBUMIN  Never done   • DXA SCAN  Never done   • TDAP/TD VACCINES (1 - Tdap) Never done   • HEMOGLOBIN A1C  07/05/2020   • DIABETIC EYE EXAM  09/11/2020   • LIPID PANEL  01/06/2021   • MAMMOGRAM  03/26/2021   • INFLUENZA VACCINE  08/01/2021        Subjective   History of Present Illness    Monica Scherer is a 88 y.o. female who presents for an Subsequent Wellness Visit.    The following portions of the patient's history were reviewed and updated as appropriate: allergies, current medications, past family history, past medical history, past social history, past surgical history and problem list.    Outpatient Medications Prior to Visit   Medication Sig Dispense Refill   • aspirin 81 MG chewable tablet CHEW AND SWALLOW 1 TABLET BY MOUTH EVERY DAY 90 tablet 2   • atorvastatin (LIPITOR) 80 MG tablet TAKE 1 TABLET BY MOUTH EVERY DAY 90 tablet 2   • Eliquis 5 MG tablet tablet TAKE 1 TABLET BY MOUTH EVERY 12 HOURS 60 tablet 5   • felodipine (PLENDIL) 10 MG 24 hr tablet TAKE 1 TABLET BY MOUTH DAILY 90 tablet 3   • meclizine (ANTIVERT) 25 MG tablet Take 2 tablets by mouth Every 6 (Six) Hours As Needed for Dizziness. 40 tablet 0   • metoprolol tartrate (LOPRESSOR) 25 MG tablet TAKE 1 TABLET BY MOUTH EVERY 12 HOURS 180 tablet 3   • omeprazole (priLOSEC) 20 MG capsule Take 1 capsule by mouth Daily. 30 capsule 11   • azithromycin (ZITHROMAX) 250 MG tablet Take first 2 tablets together, then 1 every day until finished. 6 tablet 0     No facility-administered medications prior to visit.       Patient Active Problem List   Diagnosis   • Vertigo   • Temporary cerebral vascular dysfunction   • Gastroesophageal reflux disease with esophagitis   • Degeneration of  intervertebral disc of cervical region   • Prediabetes   • S/P cholecystectomy   • Osteoarthritis of knee   • Herpes zoster without complication   • Hypertension   • Duodenal ulcer   • History of pancreatitis   • Hyperlipidemia   • TIA (transient ischemic attack)   • Cerebrovascular accident (CVA) due to thrombosis of left posterior cerebral artery (CMS/HCC)   • Paroxysmal atrial fibrillation (CMS/HCC)       Advance Care Planning:  ACP discussion was held with the patient during this visit. Patient does not have an advance directive, information provided.    Identification of Risk Factors:  Risk factors include: Fall Risk.    Review of Systems   Respiratory: Negative for shortness of breath.    Cardiovascular: Negative for chest pain, palpitations, orthopnea and PND.       Compared to one year ago, the patient feels her physical health is the same.  Compared to one year ago, the patient feels her mental health is the same.    Objective     Physical Exam  Vitals and nursing note reviewed.   Constitutional:       Appearance: She is well-developed.   HENT:      Head: Normocephalic and atraumatic.   Neck:      Thyroid: No thyromegaly.      Vascular: No JVD.   Cardiovascular:      Rate and Rhythm: Normal rate and regular rhythm.      Heart sounds: Normal heart sounds. No murmur heard.   No friction rub. No gallop.    Pulmonary:      Effort: Pulmonary effort is normal. No respiratory distress.      Breath sounds: Normal breath sounds. No wheezing or rales.   Abdominal:      General: Bowel sounds are normal. There is no distension.      Palpations: Abdomen is soft.      Tenderness: There is no abdominal tenderness. There is no guarding or rebound.   Musculoskeletal:      Cervical back: Neck supple.   Skin:     General: Skin is warm and dry.   Neurological:      Mental Status: She is alert.   Psychiatric:         Behavior: Behavior normal.         Vitals:    06/14/21 1427   BP: 122/62   Pulse: 63   SpO2: 98%   Weight: 80.7  "kg (178 lb)   Height: 152.4 cm (60\")   PainSc: 0-No pain       Patient's Body mass index is 34.76 kg/m². indicating that she is obese (BMI >30). Obesity-related health conditions include the following: hypertension and dyslipidemias. Obesity is unchanged. BMI is is above average; BMI management plan is completed. We discussed portion control and increasing exercise..      Assessment/Plan   Patient Self-Management and Personalized Health Advice  The patient has been provided with information about: diet and exercise and preventive services including:   · Annual Wellness Visit (AWV).    Visit Diagnoses:    ICD-10-CM ICD-9-CM   1. Medicare annual wellness visit, subsequent  Z00.00 V70.0   2. Paroxysmal atrial fibrillation (CMS/[facility])  I48.0 427.31   3. Mixed hyperlipidemia  E78.2 272.2   4. Cerebrovascular accident (CVA) due to thrombosis of left posterior cerebral artery (CMS/[facility])  I63.332 434.01   5. Essential hypertension  I10 401.9   6. Encounter for screening mammogram for breast cancer  Z12.31 V76.12   7. Menopause  Z78.0 627.2   8. Prediabetes  R73.03 790.29       Orders Placed This Encounter   Procedures   • Mammo Screening Bilateral With CAD     Standing Status:   Future     Standing Expiration Date:   6/14/2022     Scheduling Instructions:      Prefers DXP, same day as DXP     Order Specific Question:   Reason for Exam:     Answer:   screening mammo   • DEXA Bone Density Axial     Standing Status:   Future     Standing Expiration Date:   6/14/2022     Scheduling Instructions:      Prefers DXP same day as mammo     Order Specific Question:   Reason for Exam:     Answer:   osteoporosis screening, postmenopause     Order Specific Question:   Release to patient     Answer:   Immediate   • Comprehensive Metabolic Panel     Order Specific Question:   Release to patient     Answer:   Immediate   • Lipid Panel   • Hemoglobin A1c     Order Specific Question:   Release to patient     Answer:   Immediate       Outpatient " Encounter Medications as of 6/14/2021   Medication Sig Dispense Refill   • aspirin 81 MG chewable tablet CHEW AND SWALLOW 1 TABLET BY MOUTH EVERY DAY 90 tablet 2   • atorvastatin (LIPITOR) 80 MG tablet TAKE 1 TABLET BY MOUTH EVERY DAY 90 tablet 2   • Eliquis 5 MG tablet tablet TAKE 1 TABLET BY MOUTH EVERY 12 HOURS 60 tablet 5   • felodipine (PLENDIL) 10 MG 24 hr tablet TAKE 1 TABLET BY MOUTH DAILY 90 tablet 3   • meclizine (ANTIVERT) 25 MG tablet Take 2 tablets by mouth Every 6 (Six) Hours As Needed for Dizziness. 40 tablet 0   • metoprolol tartrate (LOPRESSOR) 25 MG tablet TAKE 1 TABLET BY MOUTH EVERY 12 HOURS 180 tablet 3   • omeprazole (priLOSEC) 20 MG capsule Take 1 capsule by mouth Daily. 30 capsule 11   • [DISCONTINUED] azithromycin (ZITHROMAX) 250 MG tablet Take first 2 tablets together, then 1 every day until finished. 6 tablet 0     No facility-administered encounter medications on file as of 6/14/2021.       Reviewed use of high risk medication in the elderly: yes  Reviewed for potential of harmful drug interactions in the elderly: yes    Follow Up:  No follow-ups on file.     An After Visit Summary and PPPS with all of these plans were given to the patient.           ++++++++++++++++++++++++++++++++++++++++++++++++++++++++++++++++++     Chief Complaint   Patient presents with   • Diabetes   • Hypertension   • Annual Exam     [employer]   • Imaging Only     Needs orders   • mammogram     Diabetes  She presents for her follow-up diabetic visit. Diabetes type: prediabetes. Her disease course has been stable. Pertinent negatives for diabetes include no chest pain and no foot paresthesias. Symptoms are stable. Diabetic complications include a CVA.   Hypertension  This is a chronic problem. The current episode started more than 1 year ago. The problem is controlled. Pertinent negatives include no chest pain, orthopnea, palpitations, peripheral edema, PND or shortness of breath. The current treatment  "provides moderate improvement. There are no compliance problems.  Hypertensive end-organ damage includes CVA.   Atrial Fibrillation  Presents for follow-up visit. Symptoms are negative for chest pain, palpitations and shortness of breath. Past medical history includes atrial fibrillation and hyperlipidemia.   Hyperlipidemia  This is a chronic problem. The current episode started more than 1 year ago. The problem is controlled. Recent lipid tests were reviewed and are normal. Pertinent negatives include no chest pain or shortness of breath. Current antihyperlipidemic treatment includes statins. The current treatment provides moderate improvement of lipids.       Review of Systems   Cardiovascular: Negative for chest pain, orthopnea, palpitations and paroxysmal nocturnal dyspnea.   Respiratory: Negative for shortness of breath.        Social History     Tobacco Use   • Smoking status: Never Smoker   • Smokeless tobacco: Never Used   Substance Use Topics   • Alcohol use: No     O:   Vitals:    06/14/21 1427   BP: 122/62   Pulse: 63   SpO2: 98%   Weight: 80.7 kg (178 lb)   Height: 152.4 cm (60\")   PainSc: 0-No pain     Body mass index is 34.76 kg/m².  Vitals and nursing note reviewed.   Constitutional:       Appearance: Well-developed.   HENT:      Head: Normocephalic and atraumatic.   Neck:      Thyroid: No thyromegaly.      Vascular: No JVD.   Pulmonary:      Effort: Pulmonary effort is normal. No respiratory distress.      Breath sounds: Normal breath sounds. No wheezing. No rales.   Cardiovascular:      Normal rate. Regular rhythm. Normal heart sounds.      No gallop. No friction rub.   Abdominal:      General: Bowel sounds are normal. There is no distension.      Palpations: Abdomen is soft.      Tenderness: There is no abdominal tenderness. There is no guarding or rebound.   Musculoskeletal:      Cervical back: Neck supple. Skin:     General: Skin is warm and dry.   Neurological:      Mental Status: Alert. "   Psychiatric:         Behavior: Behavior normal.         Diagnoses and all orders for this visit:    1. Medicare annual wellness visit, subsequent (Primary)    2. Paroxysmal atrial fibrillation (CMS/HCC)    3. Mixed hyperlipidemia  -     Comprehensive Metabolic Panel  -     Lipid Panel    4. Cerebrovascular accident (CVA) due to thrombosis of left posterior cerebral artery (CMS/HCC)    5. Essential hypertension  -     Comprehensive Metabolic Panel    6. Encounter for screening mammogram for breast cancer  -     Mammo Screening Bilateral With CAD; Future    7. Menopause  -     DEXA Bone Density Axial; Future    8. Prediabetes  -     Hemoglobin A1c    scheduled for covid 19 vaccine;    Ok nonfasting labs as too hard to return  A. Fib - continue eliquis and metoprolol  -hypertension - controlled, continue medications  -screen for dm2 as hx of predm  -HLD - continue statin, recheck lipids.  -cva - asa, on NOAC;  Tight lipid and bp control    No follow-ups on file.

## 2021-06-14 NOTE — PATIENT INSTRUCTIONS
TIME TO VACCINATE FOR COVID 19  WEBSITE:  [email or web address]  We are currently in the 1A and 1B phase (see chart). If you are over age 70, you are now eligible for COVID vaccination. You can register directly for an appointment via our healthcare partners [phone]):  [facility]  Call [phone]  [facility]  [email or web address]  [email or web address];  Or you can text [employer] ([id number]) COVID and register via your smart phone.  • Do not sign up via multiple sites. Due to available supply, appointments are limited.  • If you have health insurance, including Medicare, you must bring your insurance card. There is no cost to you, regardless of your insurance status. All sites operate by appointment only and will require proof of age (such as a valid state identification) on arrival.  Sign up for our Vaccine Interest List - This is an information form to keep you in the know about where we are with the vaccine process. This form will not sign you up to receive the vaccine, but it will help you stay informed so you know when you will get it. Sign up now! (GO TO WEBSITE FOR FORM)    [facility] is now scheduling COVID-19 vaccinations for   Phases 1A and 1B of [place].  Phase 1A is meant for healthcare personnel, employed in the state [facility]. In phase 1B we are focusing on those age 70 and over. Because initial supplies are limited, we are prioritizing administration based on guidance from the CDC and [place] state authorities. If you meet the current criteria, you may schedule an appointment to be vaccinated online. COVID Vaccines are only approved for people 16 years of age or older. All minors must be accompanied by a parent or guardian.  You DO NOT need a [employer] account to schedule your appointment. However, having a [employer] account will allow you  flexibility when rescheduling an appointment. Sign up for [address].    Click this link to scheduled a vaccine:  [email or web address]    Other places to find to schedule vaccine:  [email or web address]://[facility]/[place]-[email or web address]-[email or web address]-[email or web address]-information        Exercising to Lose Weight  Exercise is structured, repetitive physical activity to improve fitness and health. Getting regular exercise is important for everyone. It is especially important if you are overweight. Being overweight increases your risk of heart disease, stroke, diabetes, high blood pressure, and several types of cancer. Reducing your calorie intake and exercising can help you lose weight.  Exercise is usually categorized as moderate or vigorous intensity. To lose weight, most people need to do a certain amount of moderate-intensity or vigorous-intensity exercise each week.  Moderate-intensity exercise    Moderate-intensity exercise is any activity that gets you moving enough to burn at least three times more energy (calories) than if you were sitting.  Examples of moderate exercise include:  · Walking a mile in 15 minutes.  · Doing light yard work.  · Biking at an easy pace.  Most people should get at least 150 minutes (2 hours and 30 minutes) a week of moderate-intensity exercise to maintain their body weight.  Vigorous-intensity exercise  Vigorous-intensity exercise is any activity that gets you moving enough to burn at least six times more calories than if you were sitting. When you exercise at this intensity, you should be working hard enough that you are not able to carry on a conversation.  Examples of vigorous exercise include:  · Running.  · Playing a team sport, such as football, basketball, and soccer.  · Jumping rope.  Most people should get at least 75 minutes (1 hour and 15 minutes) a week of vigorous-intensity exercise to maintain their body weight.  How can exercise affect  me?  When you exercise enough to burn more calories than you eat, you lose weight. Exercise also reduces body fat and builds muscle. The more muscle you have, the more calories you burn. Exercise also:  · Improves mood.  · Reduces stress and tension.  · Improves your overall fitness, flexibility, and endurance.  · Increases bone strength.  The amount of exercise you need to lose weight depends on:  · Your age.  · The type of exercise.  · Any health conditions you have.  · Your overall physical ability.  Talk to your health care provider about how much exercise you need and what types of activities are safe for you.  What actions can I take to lose weight?  Nutrition    · Make changes to your diet as told by your health care provider or diet and nutrition specialist (dietitian). This may include:  ? Eating fewer calories.  ? Eating more protein.  ? Eating less unhealthy fats.  ? Eating a diet that includes fresh fruits and vegetables, whole grains, low-fat dairy products, and lean protein.  ? Avoiding foods with added fat, salt, and sugar.  · Drink plenty of water while you exercise to prevent dehydration or heat stroke.  Activity  · Choose an activity that you enjoy and set realistic goals. Your health care provider can help you make an exercise plan that works for you.  · Exercise at a moderate or vigorous intensity most days of the week.  ? The intensity of exercise may vary from person to person. You can tell how intense a workout is for you by paying attention to your breathing and heartbeat. Most people will notice their breathing and heartbeat get faster with more intense exercise.  · Do resistance training twice each week, such as:  ? Push-ups.  ? Sit-ups.  ? Lifting weights.  ? Using resistance bands.  · Getting short amounts of exercise can be just as helpful as long structured periods of exercise. If you have trouble finding time to exercise, try to include exercise in your daily routine.  ? Get up,  stretch, and walk around every 30 minutes throughout the day.  ? Go for a walk during your lunch break.  ? Park your car farther away from your destination.  ? If you take public transportation, get off one stop early and walk the rest of the way.  ? Make phone calls while standing up and walking around.  ? Take the stairs instead of elevators or escalators.  · Wear comfortable clothes and shoes with good support.  · Do not exercise so much that you hurt yourself, feel dizzy, or get very short of breath.  Where to find more information  · U.S[place] Department of Health and Human Services: www.hhs.gov  · Centers for Disease Control and Prevention (CDC): www.cdc.gov  Contact a health care provider:  · Before starting a new exercise program.  · If you have questions or concerns about your weight.  · If you have a medical problem that keeps you from exercising.  Get help right away if you have any of the following while exercising:  · Injury.  · Dizziness.  · Difficulty breathing or shortness of breath that does not go away when you stop exercising.  · Chest pain.  · Rapid heartbeat.  Summary  · Being overweight increases your risk of heart disease, stroke, diabetes, high blood pressure, and several types of cancer.  · Losing weight happens when you burn more calories than you eat.  · Reducing the amount of calories you eat in addition to getting regular moderate or vigorous exercise each week helps you lose weight.  This information is not intended to replace advice given to you by your health care provider. Make sure you discuss any questions you have with your health care provider.  Document Revised: 04/15/2021 Document Reviewed: 04/15/2021  Elsevier Patient Education © 2021 Elsevier Inc.      Fall Prevention in the Home, Adult  Falls can cause injuries and can affect people from all age groups. There are many simple things that you can do to make your home safe and to help prevent falls. Ask for help when making these  changes, if needed.  What actions can I take to prevent falls?  General instructions  · Use good lighting in all rooms. Replace any light bulbs that burn out.  · Turn on lights if it is dark. Use night-lights.  · Place frequently used items in easy-to-reach places. Lower the shelves around your home if necessary.  · Set up furniture so that there are clear paths around it. Avoid moving your furniture around.  · Remove throw rugs and other tripping hazards from the floor.  · Avoid walking on wet floors.  · Fix any uneven floor surfaces.  · Add color or contrast paint or tape to grab bars and handrails in your home. Place contrasting color strips on the first and last steps of stairways.  · When you use a stepladder, make sure that it is completely opened and that the sides are firmly locked. Have someone hold the ladder while you are using it. Do not climb a closed stepladder.  · Be aware of any and all pets.  What can I do in the bathroom?         · Keep the floor dry. Immediately clean up any water that spills onto the floor.  · Remove soap buildup in the tub or shower on a regular basis.  · Use non-skid mats or decals on the floor of the tub or shower.  · Attach bath mats securely with double-sided, non-slip rug tape.  · If you need to sit down while you are in the shower, use a plastic, non-slip stool.  · Install grab bars by the toilet and in the tub and shower. Do not use towel bars as grab bars.  What can I do in the bedroom?  · Make sure that a bedside light is easy to reach.  · Do not use oversized bedding that drapes onto the floor.  · Have a firm chair that has side arms to use for getting dressed.  What can I do in the kitchen?  · Clean up any spills right away.  · If you need to reach for something above you, use a sturdy step stool that has a grab bar.  · Keep electrical cables out of the way.  · Do not use floor polish or wax that makes floors slippery. If you must use wax, make sure that it is  non-skid floor wax.  What can I do in the stairways?  · Do not leave any items on the stairs.  · Make sure that you have a light switch at the top of the stairs and the bottom of the stairs. Have them installed if you do not have them.  · Make sure that there are handrails on both sides of the stairs. Fix handrails that are broken or loose. Make sure that handrails are as long as the stairways.  · Install non-slip stair treads on all stairs in your home.  · Avoid having throw rugs at the top or bottom of stairways, or secure the rugs with carpet tape to prevent them from moving.  · Choose a carpet design that does not hide the edge of steps on the stairway.  · Check any carpeting to make sure that it is firmly attached to the stairs. Fix any carpet that is loose or worn.  What can I do on the outside of my home?  · Use bright outdoor lighting.  · Regularly repair the edges of walkways and driveways and fix any cracks.  · Remove high doorway thresholds.  · Trim any shrubbery on the main path into your home.  · Regularly check that handrails are securely fastened and in good repair. Both sides of any steps should have handrails.  · Install guardrails along the edges of any raised decks or porches.  · Clear walkways of debris and clutter, including tools and rocks.  · Have leaves, snow, and ice cleared regularly.  · Use sand or salt on walkways during winter months.  · In the garage, clean up any spills right away, including grease or oil spills.  What other actions can I take?  · Wear closed-toe shoes that fit well and support your feet. Wear shoes that have rubber soles or low heels.  · Use mobility aids as needed, such as canes, walkers, scooters, and crutches.  · Review your medicines with your health care provider. Some medicines can cause dizziness or changes in blood pressure, which increase your risk of falling.  Talk with your health care provider about other ways that you can decrease your risk of falls. This  may include working with a physical therapist or  to improve your strength, balance, and endurance.  Where to find more information  · Centers for Disease Control and Prevention, [place]: https://www.cdc.gov  · National [place] on Aging: https://xt9kiua.[place].nih.gov  Contact a health care provider if:  · You are afraid of falling at home.  · You feel weak, drowsy, or dizzy at home.  · You fall at home.  Summary  · There are many simple things that you can do to make your home safe and to help prevent falls.  · Ways to make your home safe include removing tripping hazards and installing grab bars in the bathroom.  · Ask for help when making these changes in your home.  This information is not intended to replace advice given to you by your health care provider. Make sure you discuss any questions you have with your health care provider.  Document Revised: 11/30/2018 Document Reviewed: 08/02/2018  [employer] Patient Education © 2021 [employer]      Advance Directive    Advance directives are legal documents that let you make choices ahead of time about your health care and medical treatment in case you become unable to communicate for yourself. Advance directives are a way for you to make known your wishes to family, friends, and health care providers. This can let others know about your end-of-life care if you become unable to communicate.  Discussing and writing advance directives should happen over time rather than all at once. Advance directives can be changed depending on your situation and what you want, even after you have signed the advance directives.  There are different types of advance directives, such as:  · Medical power of .  · Living will.  · Do not resuscitate (DNR) or do not attempt resuscitation (DNAR) order.  Health care proxy and medical power of   A health care proxy is also called a health care agent. This is a person who is appointed to make medical decisions for you in  cases where you are unable to make the decisions yourself. Generally, people choose someone they know well and trust to represent their preferences. Make sure to ask this person for an agreement to act as your proxy. A proxy may have to exercise judgment in the event of a medical decision for which your wishes are not known.  A medical power of  is a legal document that names your health care proxy. Depending on the laws in your state, after the document is written, it may also need to be:  · Signed.  · Notarized.  · Dated.  · Copied.  · Witnessed.  · Incorporated into your medical record.  You may also want to appoint someone to manage your money in a situation in which you are unable to do so. This is called a durable power of  for finances. It is a separate legal document from the durable power of  for health care. You may choose the same person or someone different from your health care proxy to act as your agent in money matters.  If you do not appoint a proxy, or if there is a concern that the proxy is not acting in your best interests, a court may appoint a guardian to act on your behalf.  Living will  A living will is a set of instructions that state your wishes about medical care when you cannot express them yourself. Health care providers should keep a copy of your living will in your medical record. You may want to give a copy to family members or friends. To alert caregivers in case of an emergency, you can place a card in your wallet to let them know that you have a living will and where they can find it. A living will is used if you become:  · Terminally ill.  · Disabled.  · Unable to communicate or make decisions.  Items to consider in your living will include:  · To use or not to use life-support equipment, such as dialysis machines and breathing machines (ventilators).  · A DNR or DNAR order. This tells health care providers not to use cardiopulmonary resuscitation (CPR) if  breathing or heartbeat stops.  · To use or not to use tube feeding.  · To be given or not to be given food and fluids.  · Comfort (palliative) care when the goal becomes comfort rather than a cure.  · Donation of organs and tissues.  A living will does not give instructions for distributing your money and property if you should pass away.  DNR or DNAR  A DNR or DNAR order is a request not to have CPR in the event that your heart stops beating or you stop breathing. If a DNR or DNAR order has not been made and shared, a health care provider will try to help any patient whose heart has stopped or who has stopped breathing. If you plan to have surgery, talk with your health care provider about how your DNR or DNAR order will be followed if problems occur.  What if I do not have an advance directive?  If you do not have an advance directive, some states assign family decision makers to act on your behalf based on how closely you are related to them. Each state has its own laws about advance directives. You may want to check with your health care provider, , or state representative about the laws in your state.  Summary  · Advance directives are the legal documents that allow you to make choices ahead of time about your health care and medical treatment in case you become unable to tell others about your care.  · The process of discussing and writing advance directives should happen over time. You can change the advance directives, even after you have signed them.  · Advance directives include DNR or DNAR orders, living hung, and designating an agent as your medical power of .  This information is not intended to replace advice given to you by your health care provider. Make sure you discuss any questions you have with your health care provider.  Document Revised: 01/28/2021 Document Reviewed: 07/16/2020  Elsevier Patient Education © 2021 Elsevier Inc.

## 2021-06-15 ENCOUNTER — IMMUNIZATION (OUTPATIENT)
Dept: VACCINE CLINIC | Facility: HOSPITAL | Age: 86
End: 2021-06-15

## 2021-06-15 LAB
ALBUMIN SERPL-MCNC: 4 G/DL (ref 3.6–4.6)
ALBUMIN/GLOB SERPL: 1.3 {RATIO} (ref 1.2–2.2)
ALP SERPL-CCNC: 129 IU/L (ref 48–121)
ALT SERPL-CCNC: 8 IU/L (ref 0–32)
AST SERPL-CCNC: 10 IU/L (ref 0–40)
BILIRUB SERPL-MCNC: 0.6 MG/DL (ref 0–1.2)
BUN SERPL-MCNC: 28 MG/DL (ref 8–27)
BUN/CREAT SERPL: 26 (ref 12–28)
CALCIUM SERPL-MCNC: 8.9 MG/DL (ref 8.7–10.3)
CHLORIDE SERPL-SCNC: 107 MMOL/L (ref 96–106)
CHOLEST SERPL-MCNC: 120 MG/DL (ref 100–199)
CO2 SERPL-SCNC: 20 MMOL/L (ref 20–29)
CREAT SERPL-MCNC: 1.07 MG/DL (ref 0.57–1)
GLOBULIN SER CALC-MCNC: 3 G/DL (ref 1.5–4.5)
GLUCOSE SERPL-MCNC: 179 MG/DL (ref 65–99)
HBA1C MFR BLD: 6.5 % (ref 4.8–5.6)
HDLC SERPL-MCNC: 44 MG/DL
LDLC SERPL CALC-MCNC: 52 MG/DL (ref 0–99)
POTASSIUM SERPL-SCNC: 4.3 MMOL/L (ref 3.5–5.2)
PROT SERPL-MCNC: 7 G/DL (ref 6–8.5)
SODIUM SERPL-SCNC: 142 MMOL/L (ref 134–144)
TRIGL SERPL-MCNC: 136 MG/DL (ref 0–149)
VLDLC SERPL CALC-MCNC: 24 MG/DL (ref 5–40)

## 2021-06-15 PROCEDURE — 0001A: CPT | Performed by: INTERNAL MEDICINE

## 2021-06-15 PROCEDURE — 91300 HC SARSCOV02 VAC 30MCG/0.3ML IM: CPT | Performed by: INTERNAL MEDICINE

## 2021-06-28 NOTE — PROGRESS NOTES
Call results to patient.  Kidney function decline, avoid nsaids which should do anyway due to blood thinner.  Choelsterol controlled  A1C now in diabetic range.  No change in meds, work on diet.

## 2021-07-06 ENCOUNTER — IMMUNIZATION (OUTPATIENT)
Dept: VACCINE CLINIC | Facility: HOSPITAL | Age: 86
End: 2021-07-06

## 2021-07-06 PROCEDURE — 0002A: CPT | Performed by: INTERNAL MEDICINE

## 2021-07-06 PROCEDURE — 91300 HC SARSCOV02 VAC 30MCG/0.3ML IM: CPT | Performed by: INTERNAL MEDICINE

## 2021-07-12 ENCOUNTER — TELEPHONE (OUTPATIENT)
Dept: FAMILY MEDICINE CLINIC | Facility: CLINIC | Age: 86
End: 2021-07-12

## 2021-07-12 RX ORDER — ALENDRONATE SODIUM 70 MG/1
70 TABLET ORAL
Qty: 12 TABLET | Refills: 3 | Status: SHIPPED | OUTPATIENT
Start: 2021-07-12 | End: 2021-07-15 | Stop reason: SDUPTHER

## 2021-07-12 NOTE — TELEPHONE ENCOUNTER
Patient high risk for fracture, has osteoporosis on DEXA scan.  Recommend start alendronate 70mg 1 weekly.  RRJ

## 2021-07-12 NOTE — TELEPHONE ENCOUNTER
Hub staff attempted to follow warm transfer process and was unsuccessful     Caller: Raquel Lebron    Relationship to patient: Emergency Contact    Best call back number: [phone]    Patient is needing:     PATIENT GOT A MESSAGE ON MACHINE ABOUT NEW MEDICATION. PATIENT DID NOT UNDERSTAND THE MESSAGE AND NEEDS CLARIFICATION.    DAUGHTER IS ASKING IF PATIENT SHOULD DISCONTINUE DAILY ASPRIN DUE TO KIDNEY FUNCTION DECLINE.

## 2021-07-12 NOTE — TELEPHONE ENCOUNTER
Patient agrees to try medication. Was going to send to pharmacy but there is an alert that med is contraindicated due to creatinine clearance. Please advise.

## 2021-07-15 RX ORDER — ALENDRONATE SODIUM 70 MG/1
70 TABLET ORAL
Qty: 12 TABLET | Refills: 3 | Status: SHIPPED | OUTPATIENT
Start: 2021-07-15 | End: 2021-12-16 | Stop reason: SDUPTHER

## 2021-07-15 RX ORDER — ASPIRIN 81 MG/1
TABLET, CHEWABLE ORAL
Qty: 90 TABLET | Refills: 2 | Status: SHIPPED | OUTPATIENT
Start: 2021-07-15

## 2021-07-19 ENCOUNTER — OFFICE VISIT (OUTPATIENT)
Dept: CARDIOLOGY | Facility: CLINIC | Age: 86
End: 2021-07-19

## 2021-07-19 ENCOUNTER — TELEPHONE (OUTPATIENT)
Dept: FAMILY MEDICINE CLINIC | Facility: CLINIC | Age: 86
End: 2021-07-19

## 2021-07-19 VITALS
HEIGHT: 60 IN | DIASTOLIC BLOOD PRESSURE: 72 MMHG | WEIGHT: 175.6 LBS | BODY MASS INDEX: 34.47 KG/M2 | SYSTOLIC BLOOD PRESSURE: 124 MMHG | OXYGEN SATURATION: 99 % | HEART RATE: 75 BPM

## 2021-07-19 DIAGNOSIS — I63.9 CEREBROVASCULAR ACCIDENT (CVA), UNSPECIFIED MECHANISM (HCC): ICD-10-CM

## 2021-07-19 DIAGNOSIS — I48.11 LONGSTANDING PERSISTENT ATRIAL FIBRILLATION (HCC): Primary | ICD-10-CM

## 2021-07-19 PROCEDURE — 99213 OFFICE O/P EST LOW 20 MIN: CPT | Performed by: INTERNAL MEDICINE

## 2021-07-19 NOTE — TELEPHONE ENCOUNTER
It is for the Alendronate. I called the pharmacy and they have it for her for $10.94 now. Pt is aware and will  med.

## 2021-07-19 NOTE — TELEPHONE ENCOUNTER
Is this alendronate?  Could get goodrx cheap.  Can send if need with this.  RRJ    ----- Message from Marilyn Marquez MA sent at 7/15/2021  9:25 AM EDT -----  Regarding: FW: Prescription Question  Contact: [phone]    ----- Message -----  From: Monica Scherer  Sent: 7/15/2021   8:57 AM EDT  [facility]  Subject: Prescription Question                            The new medication you prescribed is not covered under my insurance.  Is there a generic or something else I need to do?  Should I pay for it out of pocket?

## 2021-07-25 NOTE — PROGRESS NOTES
Date of Office Visit:  2021  Encounter Provider: Rico Canales MD  Place of Service: [facility] CARDIOLOGY  Patient Name: Monica Scherer  :1933    Chief complaint: Follow-up for persistent atrial fibrillation, CVA.    History of Present Illness:    I again had the pleasure of seeing the patient in cardiology office on 2021.  She is a very pleasant 88 year-old female with a history of persistent atrial fibrillation and prior stroke who presents for follow-up.      I initially saw the patient in the hospital on 2020.  She had presented 2 days earlier with left lower extremity weakness, and was found to be in newly diagnosed atrial fibrillation with rapid ventricular response.  She was discharged on oral metoprolol and Eliquis, although she presented 2 days later with worsening left lower extremity weakness.  She ultimately was found to have a small acute infarct in the periventricular white matter near the posterior body of the right lateral ventricle, as well as a subacute infarct in the right lentiform nucleus.  These were felt to be from small vessel disease.  She remained in atrial fibrillation throughout the hospital stay.  She was rate controlled on metoprolol 25 mg twice a day at the time of discharge.  She was taking Eliquis 5 mg twice a day.  She had an echocardiogram performed on 2020 with an ejection fraction of 62% and very mild valvular disease.    The patient presents today for follow-up.  She is actually been doing very well.  She still does not have any symptoms from the atrial fibrillation.  She does not feel any palpitations.  She has not had any chest discomfort.  She has some mild baseline shortness of breath which is unchanged.  She does tell me that she is having easy bruising.    Past Medical History:   Diagnosis Date   • Atrial fibrillation (CMS/HCC)    • Hyperlipidemia    • Hypertension    • Lacunar infarct, acute (CMS/HCC)  01/2020    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   • New onset atrial fibrillation (CMS/HCC) 01/2020    now on rate control along with Eliquis   • Prolapsed uterus     per patient   • Stroke (CMS/HCC)    • TIA (transient ischemic attack) 01/2020   • Weakness        Past Surgical History:   Procedure Laterality Date   • APPENDECTOMY     • CHOLECYSTECTOMY N/A 6/8/2016    Procedure: CHOLECYSTECTOMY LAPAROSCOPIC;  Surgeon: Russ Gar MD;  Location: [facility] [place];  Service:    • ENDOSCOPY N/A 2/22/2018    Z-line regular, 35 cm from incisors, normal esophagus, gastritis, bilious gastric fluid, one non-bleeding duodenal ulcer w/no stigmata of bleeding, Path: DUODENUM: FRAGMENTS OF GASTRIC TYPE MUCOSA WITH HYPERPLASIA (FOVEOLAR) AND PATCHY MIXED INFLAMMATION IN THE LAMINA PROPRIA WITH FOCAL FIBROSIS, COMMENT: 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    • EYE SURGERY      tre cataracts       Current Outpatient Medications on File Prior to Visit   Medication Sig Dispense Refill   • alendronate (Fosamax) 70 MG tablet Take 1 tablet by mouth Every 7 (Seven) Days. 12 tablet 3   • aspirin 81 MG chewable tablet CHEW AND SWALLOW 1 TABLET BY MOUTH EVERY DAY 90 tablet 2   • atorvastatin (LIPITOR) 80 MG tablet TAKE 1 TABLET BY MOUTH EVERY DAY 90 tablet 2   • Eliquis 5 MG tablet tablet TAKE 1 TABLET BY MOUTH EVERY 12 HOURS 60 tablet 5   • felodipine (PLENDIL) 10 MG 24 hr tablet TAKE 1 TABLET BY MOUTH DAILY 90 tablet 3   • meclizine (ANTIVERT) 25 MG tablet Take 2 tablets by mouth Every 6 (Six) Hours As Needed for Dizziness. 40 tablet 0   • metoprolol tartrate (LOPRESSOR) 25 MG tablet TAKE 1 TABLET BY MOUTH EVERY 12 HOURS 180 tablet 3   • omeprazole (priLOSEC) 20 MG capsule Take 1 capsule by mouth Daily. 30 capsule 11     No current facility-administered medications on file prior to visit.     Allergies as of 07/19/2021 - Reviewed 07/19/2021   Allergen Reaction Noted   • Cephalexin Rash 01/23/2018   • Latex Itching  "06/07/2016     Social History     Socioeconomic History   • Marital status: Single     Spouse name: Not on file   • Number of children: Not on file   • Years of education: Not on file   • Highest education level: Not on file   Tobacco Use   • Smoking status: Never Smoker   • Smokeless tobacco: Never Used   Substance and Sexual Activity   • Alcohol use: No   • Drug use: No   • Sexual activity: Defer     History reviewed. No pertinent family history.    Review of Systems   Constitutional: Positive for malaise/fatigue.   Hematologic/Lymphatic: Bruises/bleeds easily.   All other systems reviewed and are negative.     Objective:     Vitals:    07/19/21 0851   BP: 124/72   Pulse: 75   SpO2: 99%   Weight: 79.7 kg (175 lb 9.6 oz)   Height: 152.4 cm (60\")     Body mass index is 34.29 kg/m².    Physical Exam  Constitutional:       Appearance: She is well-developed.   HENT:      Head: Normocephalic and atraumatic.   Eyes:      Conjunctiva/sclera: Conjunctivae normal.   Cardiovascular:      Rate and Rhythm: Normal rate. Rhythm irregularly irregular.      Heart sounds: No murmur heard.   No friction rub. No gallop.    Pulmonary:      Effort: Pulmonary effort is normal.      Breath sounds: Normal breath sounds.   Abdominal:      Palpations: Abdomen is soft.      Tenderness: There is no abdominal tenderness.   Musculoskeletal:      Cervical back: Neck supple.   Skin:     General: Skin is warm.   Neurological:      Mental Status: She is alert and oriented to person, place, and time.   Psychiatric:         Behavior: Behavior normal.       Lab Review:   Procedures    Cardiac Procedures:  1.  Echocardiogram on 1/6/2020: The ejection fraction was 62%.  The left atrium was borderline dilated.  There was mild aortic insufficiency.  There was mild mitral regurgitation.  There was mild tricuspid regurgitation.    Assessment:       Diagnosis Plan   1. Longstanding persistent atrial fibrillation (CMS/HCC)     2. Cerebrovascular accident " (CVA), unspecified mechanism (CMS/HCC)       Plan:       Again, her only issue has really been easy bruising.  She is on the correct dose of Eliquis at 5 mg twice a day.  Her last GFR was 54, and she is not less than 60 kg (despite her age).  I am going to have her cut the aspirin down to 81 mg every other day to see if this will help.  The aspirin may need to be discontinued altogether in the future if she has any issues.    Her rate is well controlled, and she is asymptomatic from the atrial fibrillation.  She will continue on the metoprolol 25 mg twice a day for rate control and blood pressure.  Her blood pressure is excellent today at 124/72 on the metoprolol and felodipine.  She is not having any significant edema from the felodipine.  I will plan on seeing her back in the office in 8 months unless other issues arise.

## 2021-07-25 NOTE — PROGRESS NOTES
Dear Monica Scherer:    Your mammogram was normal.  We have enclosed a copy.    Sincerely,  SANTI Mauricio DO, MS

## 2021-08-22 DIAGNOSIS — I48.0 PAROXYSMAL ATRIAL FIBRILLATION (HCC): ICD-10-CM

## 2021-08-23 RX ORDER — APIXABAN 5 MG/1
TABLET, FILM COATED ORAL
Qty: 60 TABLET | Refills: 5 | Status: SHIPPED | OUTPATIENT
Start: 2021-08-23 | End: 2021-12-13

## 2021-09-06 ENCOUNTER — APPOINTMENT (OUTPATIENT)
Dept: CT IMAGING | Facility: HOSPITAL | Age: 86
End: 2021-09-06

## 2021-09-06 ENCOUNTER — HOSPITAL ENCOUNTER (EMERGENCY)
Facility: HOSPITAL | Age: 86
Discharge: HOME | End: 2021-09-06
Attending: EMERGENCY MEDICINE | Admitting: EMERGENCY MEDICINE

## 2021-09-06 VITALS
SYSTOLIC BLOOD PRESSURE: 144 MMHG | RESPIRATION RATE: 17 BRPM | DIASTOLIC BLOOD PRESSURE: 99 MMHG | WEIGHT: 170 LBS | HEART RATE: 100 BPM | BODY MASS INDEX: 29.02 KG/M2 | HEIGHT: 64 IN | TEMPERATURE: 97.7 F | OXYGEN SATURATION: 98 %

## 2021-09-06 DIAGNOSIS — R11.0 NAUSEA: ICD-10-CM

## 2021-09-06 DIAGNOSIS — R42 LIGHTHEADEDNESS: Primary | ICD-10-CM

## 2021-09-06 LAB
ALBUMIN SERPL-MCNC: 3.9 G/DL (ref 3.5–5.2)
ALBUMIN/GLOB SERPL: 1.2 G/DL
ALP SERPL-CCNC: 108 U/L (ref 39–117)
ALT SERPL W P-5'-P-CCNC: 9 U/L (ref 1–33)
ANION GAP SERPL CALCULATED.3IONS-SCNC: 11.8 MMOL/L (ref 5–15)
AST SERPL-CCNC: 16 U/L (ref 1–32)
BASOPHILS # BLD AUTO: 0.03 10*3/MM3 (ref 0–0.2)
BASOPHILS NFR BLD AUTO: 0.3 % (ref 0–1.5)
BILIRUB SERPL-MCNC: 0.5 MG/DL (ref 0–1.2)
BUN SERPL-MCNC: 20 MG/DL (ref 8–23)
BUN/CREAT SERPL: 20 (ref 7–25)
CALCIUM SPEC-SCNC: 8.6 MG/DL (ref 8.6–10.5)
CHLORIDE SERPL-SCNC: 105 MMOL/L (ref 98–107)
CO2 SERPL-SCNC: 23.2 MMOL/L (ref 22–29)
CREAT SERPL-MCNC: 1 MG/DL (ref 0.57–1)
DEPRECATED RDW RBC AUTO: 50.5 FL (ref 37–54)
EOSINOPHIL # BLD AUTO: 0.12 10*3/MM3 (ref 0–0.4)
EOSINOPHIL NFR BLD AUTO: 1.2 % (ref 0.3–6.2)
ERYTHROCYTE [DISTWIDTH] IN BLOOD BY AUTOMATED COUNT: 15.4 % (ref 12.3–15.4)
GFR SERPL CREATININE-BSD FRML MDRD: 52 ML/MIN/1.73
GLOBULIN UR ELPH-MCNC: 3.3 GM/DL
GLUCOSE SERPL-MCNC: 156 MG/DL (ref 65–99)
HCT VFR BLD AUTO: 38.9 % (ref 34–46.6)
HGB BLD-MCNC: 12.4 G/DL (ref 12–15.9)
IMM GRANULOCYTES # BLD AUTO: 0.08 10*3/MM3 (ref 0–0.05)
IMM GRANULOCYTES NFR BLD AUTO: 0.8 % (ref 0–0.5)
LYMPHOCYTES # BLD AUTO: 2.03 10*3/MM3 (ref 0.7–3.1)
LYMPHOCYTES NFR BLD AUTO: 21 % (ref 19.6–45.3)
MCH RBC QN AUTO: 28.4 PG (ref 26.6–33)
MCHC RBC AUTO-ENTMCNC: 31.9 G/DL (ref 31.5–35.7)
MCV RBC AUTO: 89.2 FL (ref 79–97)
MONOCYTES # BLD AUTO: 0.72 10*3/MM3 (ref 0.1–0.9)
MONOCYTES NFR BLD AUTO: 7.5 % (ref 5–12)
NEUTROPHILS NFR BLD AUTO: 6.68 10*3/MM3 (ref 1.7–7)
NEUTROPHILS NFR BLD AUTO: 69.2 % (ref 42.7–76)
NRBC BLD AUTO-RTO: 0 /100 WBC (ref 0–0.2)
PLATELET # BLD AUTO: 265 10*3/MM3 (ref 140–450)
PMV BLD AUTO: 9.8 FL (ref 6–12)
POTASSIUM SERPL-SCNC: 3.6 MMOL/L (ref 3.5–5.2)
PROT SERPL-MCNC: 7.2 G/DL (ref 6–8.5)
QT INTERVAL: 391 MS
RBC # BLD AUTO: 4.36 10*6/MM3 (ref 3.77–5.28)
SARS-COV-2 RNA RESP QL NAA+PROBE: NOT DETECTED
SODIUM SERPL-SCNC: 140 MMOL/L (ref 136–145)
TROPONIN T SERPL-MCNC: <0.01 NG/ML (ref 0–0.03)
WBC # BLD AUTO: 9.66 10*3/MM3 (ref 3.4–10.8)

## 2021-09-06 PROCEDURE — 93010 ELECTROCARDIOGRAM REPORT: CPT | Performed by: INTERNAL MEDICINE

## 2021-09-06 PROCEDURE — 85025 COMPLETE CBC W/AUTO DIFF WBC: CPT | Performed by: EMERGENCY MEDICINE

## 2021-09-06 PROCEDURE — 93005 ELECTROCARDIOGRAM TRACING: CPT | Performed by: EMERGENCY MEDICINE

## 2021-09-06 PROCEDURE — 80053 COMPREHEN METABOLIC PANEL: CPT | Performed by: EMERGENCY MEDICINE

## 2021-09-06 PROCEDURE — U0003 INFECTIOUS AGENT DETECTION BY NUCLEIC ACID (DNA OR RNA); SEVERE ACUTE RESPIRATORY SYNDROME CORONAVIRUS 2 (SARS-COV-2) (CORONAVIRUS DISEASE [COVID-19]), AMPLIFIED PROBE TECHNIQUE, MAKING USE OF HIGH THROUGHPUT TECHNOLOGIES AS DESCRIBED BY CMS-2020-01-R: HCPCS | Performed by: EMERGENCY MEDICINE

## 2021-09-06 PROCEDURE — 70450 CT HEAD/BRAIN W/O DYE: CPT

## 2021-09-06 PROCEDURE — 99284 EMERGENCY DEPT VISIT MOD MDM: CPT

## 2021-09-06 PROCEDURE — 84484 ASSAY OF TROPONIN QUANT: CPT | Performed by: EMERGENCY MEDICINE

## 2021-09-06 RX ORDER — SODIUM CHLORIDE 0.9 % (FLUSH) 0.9 %
10 SYRINGE (ML) INJECTION AS NEEDED
Status: DISCONTINUED | OUTPATIENT
Start: 2021-09-06 | End: 2021-09-06 | Stop reason: HOSPADM

## 2021-09-06 NOTE — ED TRIAGE NOTES
Started feeling lightheaded last night    Patient was placed in face mask during first look triage.  Patient was wearing a face mask throughout encounter.  I wore personal protective equipment throughout the encounter.  Hand hygiene was performed before and after patient encounter.

## 2021-09-06 NOTE — ED NOTES
Pt arrives with family member c/o intermittent lightheadedness & nausea since yesterday, numbness/tingling in both hands yesterday (denies any sensation deficits today), and diarrhea today. Pt denies sick contacts. Pt a&ox4, abc's intact, NAD noted, ambulatory in room.      Pt noted to have mask on when this RN entered the room.  This RN wore appropriate PPE throughout our encounter. Hand hygiene performed upon entering and exiting room.         Olga Bernabe, RN  09/06/21 7025

## 2021-09-07 NOTE — ED PROVIDER NOTES
EMERGENCY DEPARTMENT ENCOUNTER    Room Number:  32/32  Date of encounter:  9/6/2021  PCP: Amilcar Mauricio DO  Historian: Patient, family      HPI:  Chief Complaint: Dizziness  A complete HPI/ROS/PMH/PSH/SH/FH are unobtainable due to: None    Context: Monica Scherer is a 88 y.o. female who presents to the ED c/o dizziness.  Onset yesterday evening.  Symptoms last intermittently for about an hour at a time.  It improves with eating.  She states that she feels wobbly and lightheaded with associated nausea.  She is currently symptom-free.  She is on Eliquis.  No associated vision change or headache.  No neck pain.  No chest pain or shortness of breath.      PAST MEDICAL HISTORY  Active Ambulatory Problems     Diagnosis Date Noted   • Vertigo 06/14/2016   • Temporary cerebral vascular dysfunction 06/14/2016   • Gastroesophageal reflux disease with esophagitis 06/14/2016   • Degeneration of intervertebral disc of cervical region 06/14/2016   • Prediabetes 06/14/2016   • S/P cholecystectomy 06/14/2016   • Osteoarthritis of knee 10/31/2016   • Herpes zoster without complication 11/08/2016   • Hypertension 02/18/2018   • Duodenal ulcer 03/13/2018   • History of pancreatitis 03/22/2018   • Hyperlipidemia 07/19/2019   • TIA (transient ischemic attack) 01/04/2020   • Cerebrovascular accident (CVA) due to thrombosis of left posterior cerebral artery (CMS/HCC) 04/07/2020   • Paroxysmal atrial fibrillation (CMS/HCC) 04/07/2020     Resolved Ambulatory Problems     Diagnosis Date Noted   • Acute cholecystitis 06/07/2016   • Benign essential hypertension 06/14/2016   • Indigestion 10/31/2016   • Acute viral bronchitis 01/23/2018   • Acute biliary pancreatitis 02/18/2018     Past Medical History:   Diagnosis Date   • Atrial fibrillation (CMS/HCC)    • Lacunar infarct, 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   • New onset atrial fibrillation (CMS/HCC) 01/2020   • Prolapsed uterus    • Stroke (CMS/HCC)    • Weakness          PAST SURGICAL  HISTORY  Past Surgical History:   Procedure Laterality Date   • APPENDECTOMY     • CHOLECYSTECTOMY N/A 6/8/2016    Procedure: CHOLECYSTECTOMY LAPAROSCOPIC;  Surgeon: Russ Gar MD;  [place] [facility] [place] [facility] [place]  Service:    • ENDOSCOPY N/A 2/22/2018    Z-line regular, 35 cm from incisors, normal esophagus, gastritis, bilious gastric fluid, one non-bleeding duodenal ulcer w/no stigmata of bleeding, Path: DUODENUM: FRAGMENTS OF GASTRIC TYPE MUCOSA WITH HYPERPLASIA (FOVEOLAR) AND PATCHY MIXED INFLAMMATION IN THE LAMINA PROPRIA WITH FOCAL FIBROSIS, COMMENT: 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    • EYE SURGERY      tre cataracts         FAMILY HISTORY  No family history on file.      SOCIAL HISTORY  Social History     Socioeconomic History   • Marital status: Single     Spouse name: Not on file   • Number of children: Not on file   • Years of education: Not on file   • Highest education level: Not on file   Tobacco Use   • Smoking status: Never Smoker   • Smokeless tobacco: Never Used   Substance and Sexual Activity   • Alcohol use: No   • Drug use: No   • Sexual activity: Defer         ALLERGIES  Cephalexin and Latex        REVIEW OF SYSTEMS  Review of Systems     All systems reviewed and negative except for those discussed in HPI.       PHYSICAL EXAM    I have reviewed the triage vital signs and nursing notes.    ED Triage Vitals   Temp Heart Rate Resp BP SpO2   09/06/21 1021 09/06/21 1021 09/06/21 1021 09/06/21 1121 09/06/21 1021   97.7 °F (36.5 °C) (!) 127 16 138/84 98 %      Temp src Heart Rate Source Patient Position BP Location FiO2 (%)   09/06/21 1021 09/06/21 1021 09/06/21 1406 09/06/21 1406 --   Tympanic Monitor Lying Right arm        Physical Exam  GENERAL: not distressed  HENT: nares patent  EYES: no scleral icterus  CV: regular rhythm, regular rate  RESPIRATORY: normal effort, clear to auscultation bilaterally  ABDOMEN: soft, nontender  MUSCULOSKELETAL: no deformity  NEURO:   Recent and remote memory  functions are normal. The patient is attentive with normal concentration. Language is fluent. Speech is clear. The speech is non-dysarthric. Fund of knowledge is normal.   Symmetric smile with no facial droop.  Eyes close shut strongly bilaterally.  Symmetric eyebrow raise bilaterally.  EOMI, PERRL  CN II-XII grossly normal otherwise.  5/5 strength to extremities.  No pronator drift.  Intact FNF.  No meningismus.  Normal gait for her with a walker  SKIN: warm, dry        LAB RESULTS  Recent Results (from the past 24 hour(s))   Comprehensive Metabolic Panel    Collection Time: 09/06/21 11:35 AM    Specimen: Blood   Result Value Ref Range    Glucose 156 (H) 65 - 99 mg/dL    BUN 20 8 - 23 mg/dL    Creatinine 1.00 0.57 - 1.00 mg/dL    Sodium 140 136 - 145 mmol/L    Potassium 3.6 3.5 - 5.2 mmol/L    Chloride 105 98 - 107 mmol/L    CO2 23.2 22.0 - 29.0 mmol/L    Calcium 8.6 8.6 - 10.5 mg/dL    Total Protein 7.2 6.0 - 8.5 g/dL    Albumin 3.90 3.50 - 5.20 g/dL    ALT (SGPT) 9 1 - 33 U/L    AST (SGOT) 16 1 - 32 U/L    Alkaline Phosphatase 108 39 - 117 U/L    Total Bilirubin 0.5 0.0 - 1.2 mg/dL    eGFR Non African Amer 52 (L) >60 mL/min/1.73    Globulin 3.3 gm/dL    A/G Ratio 1.2 g/dL    BUN/Creatinine Ratio 20.0 7.0 - 25.0    Anion Gap 11.8 5.0 - 15.0 mmol/L   Troponin    Collection Time: 09/06/21 11:35 AM    Specimen: Blood   Result Value Ref Range    Troponin T <0.010 0.000 - 0.030 ng/mL   CBC Auto Differential    Collection Time: 09/06/21 11:35 AM    Specimen: Blood   Result Value Ref Range    WBC 9.66 3.40 - 10.80 10*3/mm3    RBC 4.36 3.77 - 5.28 10*6/mm3    Hemoglobin 12.4 12.0 - 15.9 g/dL    Hematocrit 38.9 34.0 - 46.6 %    MCV 89.2 79.0 - 97.0 fL    MCH 28.4 26.6 - 33.0 pg    MCHC 31.9 31.5 - 35.7 g/dL    RDW 15.4 12.3 - 15.4 %    RDW-SD 50.5 37.0 - 54.0 fl    MPV 9.8 6.0 - 12.0 fL    Platelets 265 140 - 450 10*3/mm3    Neutrophil % 69.2 42.7 - 76.0 %    Lymphocyte % 21.0 19.6 - 45.3 %    Monocyte % 7.5 5.0 - 12.0 %     Eosinophil % 1.2 0.3 - 6.2 %    Basophil % 0.3 0.0 - 1.5 %    Immature Grans % 0.8 (H) 0.0 - 0.5 %    Neutrophils, Absolute 6.68 1.70 - 7.00 10*3/mm3    Lymphocytes, Absolute 2.03 0.70 - 3.10 10*3/mm3    Monocytes, Absolute 0.72 0.10 - 0.90 10*3/mm3    Eosinophils, Absolute 0.12 0.00 - 0.40 10*3/mm3    Basophils, Absolute 0.03 0.00 - 0.20 10*3/mm3    Immature Grans, Absolute 0.08 (H) 0.00 - 0.05 10*3/mm3    nRBC 0.0 0.0 - 0.2 /100 WBC   ECG 12 Lead    Collection Time: 09/06/21 11:53 AM   Result Value Ref Range    QT Interval 391 ms   COVID-19,[facility] IN-HOUSE CEPHEID/GIDEON NP SWAB IN TRANSPORT MEDIA 8-12 HR TAT - Swab, Nasopharynx    Collection Time: 09/06/21  1:54 PM    Specimen: Nasopharynx; Swab   Result Value Ref Range    COVID19 Not Detected Not Detected - Ref. Range       Ordered the above labs and independently reviewed the results.        RADIOLOGY  CT Head Without Contrast    Result Date: 9/6/2021  CT HEAD WO CONTRAST-  INDICATIONS: Dizziness, nausea  TECHNIQUE: Radiation dose reduction techniques were utilized, including automated exposure control and exposure modulation based on body size. Noncontrast head CT  COMPARISON: 01/14/2020  FINDINGS:    No acute intracranial hemorrhage, midline shift or mass effect. No acute territorial infarct is identified.  Mild to moderate periventricular hypodensities suggest chronic small vessel ischemic change in a patient this age.  Arterial calcifications are seen at the base of the brain.  Ventricles, cisterns, cerebral sulci are unremarkable for patient age.  The visualized paranasal sinuses, orbits, mastoid air cells are unremarkable.           No acute intracranial hemorrhage or hydrocephalus. Chronic appearing changes of the brain. If there is further clinical concern, MRI could be considered for further evaluation.  This report was finalized on 9/6/2021 1:03 PM by Dr. Sai Plasencia M.D.        I ordered the above noted radiological studies. Reviewed by me  and discussed with radiologist.  See dictation for official radiology interpretation.      PROCEDURES    Procedures      MEDICATIONS GIVEN IN ER    Medications - No data to display      PROGRESS, DATA ANALYSIS, CONSULTS, AND MEDICAL DECISION MAKING    All labs have been independently reviewed by me.  All radiology studies have been reviewed by me and discussed with radiologist dictating the report.   EKG's independently viewed and interpreted by me.  Discussion below represents my analysis of pertinent findings related to patient's condition, differential diagnosis, treatment plan and final disposition.    Differential diagnosis includes TIA, stroke, BPPV, Ménière's, cardiac etiology.    ED Course as of Sep 06 2204   Mon Sep 06, 2021   1201 EKG interpreted by myself.  Time 11:53 AM.  Atrial fibrillation.  Heart rate 94.  Normal axis.  Normal intervals.  Nonspecific ST flattening.    [TD]   1317 Troponin T: <0.010 [TD]   1317 Creatinine: 1.00 [TD]   1317 WBC: 9.66 [TD]   1342 Patient ambulated to the emergency department with a walker.  She did very well with no dizziness.  She is back to her baseline self.    Patient did express concern that she could have COVID-19 even though she is not having significant symptoms.  She is concerned that her nausea could be a symptom of that.  Therefore I will test her at the time of discharge.    [TD]      ED Course User Index  [TD] Brian Erazo II, MD       She has a reassuring and normal neurological examination.  EKG and troponin are also reassuring.  CT head is negative.  She was able to ambulate through the emergency department with her walker at her baseline self.  I believe that she is appropriate and safe for discharge home.  I gave her and family good return precautions.    PPE: Both the patient and I wore a surgical mask throughout the entire patient encounter. I wore protective goggles.     AS OF 22:04 EDT VITALS:    BP - 144/99  HR - 100  TEMP - 97.7 °F (36.5  °C) (Tympanic)  O2 SATS - 98%        DIAGNOSIS  Final diagnoses:   Lightheadedness   Nausea         DISPOSITION  DISCHARGE    FOLLOW-UP  Amilcar Mauricio,   [address]  [place] [id number] [phone]    Schedule an appointment as soon as possible for a visit   As needed    [facility] Emergency Department  [address] [facility] [address] [phone]  Go to   If symptoms worsen         Medication List      No changes were made to your prescriptions during this visit.                  Brian Erazo II, MD  09/06/21 [id number]     Discharged

## 2021-09-08 ENCOUNTER — PATIENT OUTREACH (OUTPATIENT)
Dept: CASE MANAGEMENT | Facility: OTHER | Age: 86
End: 2021-09-08

## 2021-09-08 NOTE — OUTREACH NOTE
Patient Outreach    Ambulatory Case Management Note    Brief call with pt to fu recent ED visit. Pt states she feels better. She is not having anymore dizziness but did have some nausea this morning. She feels like this was probably the cause of her dizziness that sent her to the Ed. She states she did keep down some chicken noodle soup earlier today. Discussed the use of ensure when she can not eat a complete meal. Review of AVS with pt. No questions, concerns or needs regarding health wellness. [facility] Pt appreciative of phone call and declined to participate in  Case Management Program. No needs identified. Pt does have an appt with Dr Mauricio next week.         Fatou Crawford RN  Ambulatory Case Management    9/8/2021, 14:17 EDT

## 2021-09-10 ENCOUNTER — HOSPITAL ENCOUNTER (INPATIENT)
Facility: HOSPITAL | Age: 86
LOS: 4 days | Discharge: HOME | End: 2021-09-18
Attending: EMERGENCY MEDICINE | Admitting: HOSPITALIST

## 2021-09-10 ENCOUNTER — APPOINTMENT (OUTPATIENT)
Dept: GENERAL RADIOLOGY | Facility: HOSPITAL | Age: 86
End: 2021-09-10

## 2021-09-10 DIAGNOSIS — R53.1 GENERAL WEAKNESS: Primary | ICD-10-CM

## 2021-09-10 DIAGNOSIS — K62.5 RECTAL BLEEDING: ICD-10-CM

## 2021-09-10 DIAGNOSIS — R31.9 HEMATURIA, UNSPECIFIED TYPE: ICD-10-CM

## 2021-09-10 PROBLEM — Z86.73 HISTORY OF CVA (CEREBROVASCULAR ACCIDENT): Status: ACTIVE | Noted: 2021-09-10

## 2021-09-10 PROBLEM — Z79.01 ANTICOAGULATED: Status: ACTIVE | Noted: 2021-09-10

## 2021-09-10 LAB
ALBUMIN SERPL-MCNC: 3.6 G/DL (ref 3.5–5.2)
ALBUMIN/GLOB SERPL: 1.4 G/DL
ALP SERPL-CCNC: 82 U/L (ref 39–117)
ALT SERPL W P-5'-P-CCNC: 17 U/L (ref 1–33)
ANION GAP SERPL CALCULATED.3IONS-SCNC: 13.1 MMOL/L (ref 5–15)
AST SERPL-CCNC: 24 U/L (ref 1–32)
BACTERIA UR QL AUTO: ABNORMAL /HPF
BASOPHILS # BLD AUTO: 0.03 10*3/MM3 (ref 0–0.2)
BASOPHILS NFR BLD AUTO: 0.3 % (ref 0–1.5)
BILIRUB SERPL-MCNC: 0.7 MG/DL (ref 0–1.2)
BILIRUB UR QL STRIP: NEGATIVE
BUN SERPL-MCNC: 23 MG/DL (ref 8–23)
BUN/CREAT SERPL: 22.8 (ref 7–25)
CALCIUM SPEC-SCNC: 8.9 MG/DL (ref 8.6–10.5)
CHLORIDE SERPL-SCNC: 101 MMOL/L (ref 98–107)
CLARITY UR: ABNORMAL
CO2 SERPL-SCNC: 22.9 MMOL/L (ref 22–29)
COLOR UR: ABNORMAL
CREAT SERPL-MCNC: 1.01 MG/DL (ref 0.57–1)
DEPRECATED RDW RBC AUTO: 49.1 FL (ref 37–54)
EOSINOPHIL # BLD AUTO: 0.08 10*3/MM3 (ref 0–0.4)
EOSINOPHIL NFR BLD AUTO: 0.7 % (ref 0.3–6.2)
ERYTHROCYTE [DISTWIDTH] IN BLOOD BY AUTOMATED COUNT: 15.5 % (ref 12.3–15.4)
GFR SERPL CREATININE-BSD FRML MDRD: 52 ML/MIN/1.73
GLOBULIN UR ELPH-MCNC: 2.6 GM/DL
GLUCOSE SERPL-MCNC: 145 MG/DL (ref 65–99)
GLUCOSE UR STRIP-MCNC: NEGATIVE MG/DL
HCT VFR BLD AUTO: 36.8 % (ref 34–46.6)
HGB BLD-MCNC: 11.8 G/DL (ref 12–15.9)
HGB UR QL STRIP.AUTO: ABNORMAL
HYALINE CASTS UR QL AUTO: ABNORMAL /LPF
IMM GRANULOCYTES # BLD AUTO: 0.07 10*3/MM3 (ref 0–0.05)
IMM GRANULOCYTES NFR BLD AUTO: 0.6 % (ref 0–0.5)
KETONES UR QL STRIP: ABNORMAL
LEUKOCYTE ESTERASE UR QL STRIP.AUTO: ABNORMAL
LYMPHOCYTES # BLD AUTO: 2.32 10*3/MM3 (ref 0.7–3.1)
LYMPHOCYTES NFR BLD AUTO: 20.9 % (ref 19.6–45.3)
MCH RBC QN AUTO: 28.1 PG (ref 26.6–33)
MCHC RBC AUTO-ENTMCNC: 32.1 G/DL (ref 31.5–35.7)
MCV RBC AUTO: 87.6 FL (ref 79–97)
MONOCYTES # BLD AUTO: 0.87 10*3/MM3 (ref 0.1–0.9)
MONOCYTES NFR BLD AUTO: 7.8 % (ref 5–12)
NEUTROPHILS NFR BLD AUTO: 69.7 % (ref 42.7–76)
NEUTROPHILS NFR BLD AUTO: 7.75 10*3/MM3 (ref 1.7–7)
NITRITE UR QL STRIP: NEGATIVE
NRBC BLD AUTO-RTO: 0 /100 WBC (ref 0–0.2)
NT-PROBNP SERPL-MCNC: 1908 PG/ML (ref 0–1800)
PH UR STRIP.AUTO: <=5 [PH] (ref 5–8)
PLATELET # BLD AUTO: 238 10*3/MM3 (ref 140–450)
PMV BLD AUTO: 10.1 FL (ref 6–12)
POTASSIUM SERPL-SCNC: 3.8 MMOL/L (ref 3.5–5.2)
PROT SERPL-MCNC: 6.2 G/DL (ref 6–8.5)
PROT UR QL STRIP: ABNORMAL
QT INTERVAL: 379 MS
RBC # BLD AUTO: 4.2 10*6/MM3 (ref 3.77–5.28)
RBC # UR: ABNORMAL /HPF
REF LAB TEST METHOD: ABNORMAL
SODIUM SERPL-SCNC: 137 MMOL/L (ref 136–145)
SP GR UR STRIP: 1.02 (ref 1–1.03)
SQUAMOUS #/AREA URNS HPF: ABNORMAL /HPF
TROPONIN T SERPL-MCNC: <0.01 NG/ML (ref 0–0.03)
UROBILINOGEN UR QL STRIP: ABNORMAL
WBC # BLD AUTO: 11.12 10*3/MM3 (ref 3.4–10.8)
WBC UR QL AUTO: ABNORMAL /HPF

## 2021-09-10 PROCEDURE — G0378 HOSPITAL OBSERVATION PER HR: HCPCS

## 2021-09-10 PROCEDURE — 71045 X-RAY EXAM CHEST 1 VIEW: CPT

## 2021-09-10 PROCEDURE — 85025 COMPLETE CBC W/AUTO DIFF WBC: CPT | Performed by: PHYSICIAN ASSISTANT

## 2021-09-10 PROCEDURE — U0004 COV-19 TEST NON-CDC HGH THRU: HCPCS | Performed by: EMERGENCY MEDICINE

## 2021-09-10 PROCEDURE — 80053 COMPREHEN METABOLIC PANEL: CPT | Performed by: PHYSICIAN ASSISTANT

## 2021-09-10 PROCEDURE — 93005 ELECTROCARDIOGRAM TRACING: CPT | Performed by: PHYSICIAN ASSISTANT

## 2021-09-10 PROCEDURE — 83880 ASSAY OF NATRIURETIC PEPTIDE: CPT | Performed by: PHYSICIAN ASSISTANT

## 2021-09-10 PROCEDURE — P9612 CATHETERIZE FOR URINE SPEC: HCPCS

## 2021-09-10 PROCEDURE — 84484 ASSAY OF TROPONIN QUANT: CPT | Performed by: PHYSICIAN ASSISTANT

## 2021-09-10 PROCEDURE — 93010 ELECTROCARDIOGRAM REPORT: CPT | Performed by: INTERNAL MEDICINE

## 2021-09-10 PROCEDURE — 81001 URINALYSIS AUTO W/SCOPE: CPT | Performed by: PHYSICIAN ASSISTANT

## 2021-09-10 PROCEDURE — 99284 EMERGENCY DEPT VISIT MOD MDM: CPT

## 2021-09-10 RX ORDER — SODIUM CHLORIDE 0.9 % (FLUSH) 0.9 %
10 SYRINGE (ML) INJECTION AS NEEDED
Status: DISCONTINUED | OUTPATIENT
Start: 2021-09-10 | End: 2021-09-18 | Stop reason: HOSPADM

## 2021-09-10 RX ORDER — ONDANSETRON 4 MG/1
4 TABLET, FILM COATED ORAL EVERY 6 HOURS PRN
Status: DISCONTINUED | OUTPATIENT
Start: 2021-09-10 | End: 2021-09-18 | Stop reason: HOSPADM

## 2021-09-10 RX ORDER — ONDANSETRON 2 MG/ML
4 INJECTION INTRAMUSCULAR; INTRAVENOUS EVERY 6 HOURS PRN
Status: DISCONTINUED | OUTPATIENT
Start: 2021-09-10 | End: 2021-09-18 | Stop reason: HOSPADM

## 2021-09-10 RX ORDER — ACETAMINOPHEN 325 MG/1
650 TABLET ORAL EVERY 4 HOURS PRN
Status: DISCONTINUED | OUTPATIENT
Start: 2021-09-10 | End: 2021-09-18 | Stop reason: HOSPADM

## 2021-09-10 RX ORDER — ALUMINA, MAGNESIA, AND SIMETHICONE 2400; 2400; 240 MG/30ML; MG/30ML; MG/30ML
15 SUSPENSION ORAL EVERY 6 HOURS PRN
Status: DISCONTINUED | OUTPATIENT
Start: 2021-09-10 | End: 2021-09-18 | Stop reason: HOSPADM

## 2021-09-10 NOTE — ED PROVIDER NOTES
EMERGENCY DEPARTMENT ENCOUNTER    Room Number:  08/08  Date of encounter:  9/10/2021  PCP: Amilcar Mauricio DO  Historian: Patient, daughter      I used full protective equipment while examining this patient.  This includes face mask, gloves and protective eyewear.  I washed my hands before entering the room and immediately upon leaving the room      HPI:  Chief Complaint: Weakness, hematuria  A complete HPI/ROS/PMH/PSH/SH/FH are unobtainable due to: Nothing    Context: Monica Scherer is a 88 y.o. female who presents to the ED c/o several day history of generalized weakness, as well as 2-day history of hematuria.  Patient states she has felt weak for several days.  She was seen in our ER on 9/6/2021 for complaints of dizziness.  She was ultimately discharged.  She states that this time she feels generally weak.  This is worse with exertion and ambulating.  She noticed bright red hematuria for the past 2 days.  She is anticoagulated on Eliquis for history of atrial fibrillation.  She denies any dysuria.  She denies any dark or bloody stools.  She denies any significant abdominal pain she denies any chest pain, shortness of breath.    Per patient's daughter, patient has a prolapsed uterus and occasionally bleeds.    Review of Medical Records  Reviewed ER visit from 9/6/2021.  Patient had a fairly benign work-up including negative head CT.    PAST MEDICAL HISTORY  Active Ambulatory Problems     Diagnosis Date Noted   • Vertigo 06/14/2016   • Temporary cerebral vascular dysfunction 06/14/2016   • Gastroesophageal reflux disease with esophagitis 06/14/2016   • Degeneration of intervertebral disc of cervical region 06/14/2016   • Prediabetes 06/14/2016   • S/P cholecystectomy 06/14/2016   • Osteoarthritis of knee 10/31/2016   • Herpes zoster without complication 11/08/2016   • Hypertension 02/18/2018   • Duodenal ulcer 03/13/2018   • History of pancreatitis 03/22/2018   • Hyperlipidemia 07/19/2019   • TIA (transient  ischemic attack) 01/04/2020   • Cerebrovascular accident (CVA) due to thrombosis of left posterior cerebral artery (CMS/HCC) 04/07/2020   • Paroxysmal atrial fibrillation (CMS/HCC) 04/07/2020     Resolved Ambulatory Problems     Diagnosis Date Noted   • Acute cholecystitis 06/07/2016   • Benign essential hypertension 06/14/2016   • Indigestion 10/31/2016   • Acute viral bronchitis 01/23/2018   • Acute biliary pancreatitis 02/18/2018     Past Medical History:   Diagnosis Date   • Atrial fibrillation (CMS/HCC)    • Lacunar infarct, 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   • New onset atrial fibrillation (CMS/HCC) 01/2020   • Prolapsed uterus    • Stroke (CMS/HCC)    • Weakness          PAST SURGICAL HISTORY  Past Surgical History:   Procedure Laterality Date   • APPENDECTOMY     • CHOLECYSTECTOMY N/A 6/8/2016    Procedure: CHOLECYSTECTOMY LAPAROSCOPIC;  Surgeon: Russ Gar MD;  [place] [facility] [place] [facility] [place]  Service:    • ENDOSCOPY N/A 2/22/2018    Z-line regular, 35 cm from incisors, normal esophagus, gastritis, bilious gastric fluid, one non-bleeding duodenal ulcer w/no stigmata of bleeding, Path: DUODENUM: FRAGMENTS OF GASTRIC TYPE MUCOSA WITH HYPERPLASIA (FOVEOLAR) AND PATCHY MIXED INFLAMMATION IN THE LAMINA PROPRIA WITH FOCAL FIBROSIS, COMMENT: 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    • EYE SURGERY      tre cataracts         FAMILY HISTORY  No family history on file.      SOCIAL HISTORY  Social History     Socioeconomic History   • Marital status: Single     Spouse name: Not on file   • Number of children: Not on file   • Years of education: Not on file   • Highest education level: Not on file   Tobacco Use   • Smoking status: Never Smoker   • Smokeless tobacco: Never Used   Substance and Sexual Activity   • Alcohol use: No   • Drug use: No   • Sexual activity: Defer         ALLERGIES  Cephalexin and Latex        REVIEW OF SYSTEMS  All systems reviewed and negative except for those discussed in HPI.       PHYSICAL  EXAM    I have reviewed the triage vital signs and nursing notes.    ED Triage Vitals   Temp Heart Rate Resp BP SpO2   09/10/21 1723 09/10/21 1723 09/10/21 1723 09/10/21 1732 09/10/21 1723   97.5 °F (36.4 °C) 108 18 (!) 129/112 99 %      Temp src Heart Rate Source Patient Position BP Location FiO2 (%)   09/10/21 1723 09/10/21 1723 -- -- --   Tympanic Monitor          Physical Exam  GENERAL: Alert, oriented, elderly, not distressed  HENT: head atraumatic, no nuchal rigidity  EYES: no scleral icterus, EOMI  CV: Irregular rhythm, regular rate, no murmur  RESPIRATORY: normal effort, CTA  ABDOMEN: soft, nontender.  No CVA tenderness.  MUSCULOSKELETAL: no deformity, FROM, no calf swelling or tenderness  NEURO: alert, 5/5 strength in all extremities, normal cerebellar function  SKIN: warm, dry        LAB RESULTS  Recent Results (from the past 24 hour(s))   Comprehensive Metabolic Panel    Collection Time: 09/10/21  5:42 PM    Specimen: Blood   Result Value Ref Range    Glucose 145 (H) 65 - 99 mg/dL    BUN 23 8 - 23 mg/dL    Creatinine 1.01 (H) 0.57 - 1.00 mg/dL    Sodium 137 136 - 145 mmol/L    Potassium 3.8 3.5 - 5.2 mmol/L    Chloride 101 98 - 107 mmol/L    CO2 22.9 22.0 - 29.0 mmol/L    Calcium 8.9 8.6 - 10.5 mg/dL    Total Protein 6.2 6.0 - 8.5 g/dL    Albumin 3.60 3.50 - 5.20 g/dL    ALT (SGPT) 17 1 - 33 U/L    AST (SGOT) 24 1 - 32 U/L    Alkaline Phosphatase 82 39 - 117 U/L    Total Bilirubin 0.7 0.0 - 1.2 mg/dL    eGFR Non African Amer 52 (L) >60 mL/min/1.73    Globulin 2.6 gm/dL    A/G Ratio 1.4 g/dL    BUN/Creatinine Ratio 22.8 7.0 - 25.0    Anion Gap 13.1 5.0 - 15.0 mmol/L   BNP    Collection Time: 09/10/21  5:42 PM    Specimen: Blood   Result Value Ref Range    proBNP 1,908.0 (H) 0.0-1,800.0 pg/mL   Troponin    Collection Time: 09/10/21  5:42 PM    Specimen: Blood   Result Value Ref Range    Troponin T <0.010 0.000 - 0.030 ng/mL   CBC Auto Differential    Collection Time: 09/10/21  5:42 PM    Specimen: Blood    Result Value Ref Range    WBC 11.12 (H) 3.40 - 10.80 10*3/mm3    RBC 4.20 3.77 - 5.28 10*6/mm3    Hemoglobin 11.8 (L) 12.0 - 15.9 g/dL    Hematocrit 36.8 34.0 - 46.6 %    MCV 87.6 79.0 - 97.0 fL    MCH 28.1 26.6 - 33.0 pg    MCHC 32.1 31.5 - 35.7 g/dL    RDW 15.5 (H) 12.3 - 15.4 %    RDW-SD 49.1 37.0 - 54.0 fl    MPV 10.1 6.0 - 12.0 fL    Platelets 238 140 - 450 10*3/mm3    Neutrophil % 69.7 42.7 - 76.0 %    Lymphocyte % 20.9 19.6 - 45.3 %    Monocyte % 7.8 5.0 - 12.0 %    Eosinophil % 0.7 0.3 - 6.2 %    Basophil % 0.3 0.0 - 1.5 %    Immature Grans % 0.6 (H) 0.0 - 0.5 %    Neutrophils, Absolute 7.75 (H) 1.70 - 7.00 10*3/mm3    Lymphocytes, Absolute 2.32 0.70 - 3.10 10*3/mm3    Monocytes, Absolute 0.87 0.10 - 0.90 10*3/mm3    Eosinophils, Absolute 0.08 0.00 - 0.40 10*3/mm3    Basophils, Absolute 0.03 0.00 - 0.20 10*3/mm3    Immature Grans, Absolute 0.07 (H) 0.00 - 0.05 10*3/mm3    nRBC 0.0 0.0 - 0.2 /100 WBC   ECG 12 Lead    Collection Time: 09/10/21  5:51 PM   Result Value Ref Range    QT Interval 379 ms   Urinalysis With Microscopic If Indicated (No Culture) - Urine, Catheter    Collection Time: 09/10/21  8:03 PM    Specimen: Urine, Catheter   Result Value Ref Range    Color, UA Dark Yellow (A) Yellow, Straw    Appearance, UA Cloudy (A) Clear    pH, UA <=5.0 5.0 - 8.0    Specific Gravity, UA 1.020 1.005 - 1.030    Glucose, UA Negative Negative    Ketones, UA Trace (A) Negative    Bilirubin, UA Negative Negative    Blood, UA Large (3+) (A) Negative    Protein, UA Trace (A) Negative    Leuk Esterase, UA Trace (A) Negative    Nitrite, UA Negative Negative    Urobilinogen, UA 1.0 E.U./dL 0.2 - 1.0 E.U./dL   Urinalysis, Microscopic Only - Urine, Catheter    Collection Time: 09/10/21  8:03 PM    Specimen: Urine, Catheter   Result Value Ref Range    RBC, UA 0-2 None Seen, 0-2 /HPF    WBC, UA 0-2 None Seen, 0-2 /HPF    Bacteria, UA 1+ (A) None Seen /HPF    Squamous Epithelial Cells, UA 7-12 (A) None Seen, 0-2 /HPF     Hyaline Casts, UA 3-6 None Seen /LPF    Methodology Automated Microscopy        Ordered the above labs and independently reviewed the results.        RADIOLOGY  XR Chest 1 View    Result Date: 9/10/2021  PORTABLE CHEST  HISTORY: Weakness.  COMPARISON: 01/04/2020.  FINDINGS: The heart is within normal limits in size. There is no evidence of infiltrate, effusion or of congestive failure.  This report was finalized on 9/10/2021 8:22 PM by Dr. Florencio Judge M.D.        I ordered the above noted radiological studies. Reviewed by me and discussed with radiologist.  See dictation for official radiology interpretation.      MEDICATIONS GIVEN IN ER    Medications   sodium chloride 0.9 % flush 10 mL (has no administration in time range)         PROGRESS, DATA ANALYSIS, CONSULTS, AND MEDICAL DECISION MAKING    All labs have been independently reviewed by me.  All radiology studies have been reviewed by me and discussed with radiologist dictating the report.   EKG's independently viewed and interpreted by me.  Discussion below represents my analysis of pertinent findings related to patient's condition, differential diagnosis, treatment plan and final disposition.    I have discussed case with Dr. Alvarez, emergency room physician.  He has performed his own bedside examination and agrees with treatment plan.    ED Course as of Sep 10 2116   Fri Sep 10, 2021   1741 Patient presents with weakness, as well as hematuria.  Patient anticoagulated on Eliquis for history of A. fib.    [EE]   1801 WBC(!): 11.12 [EE]   1801 Hemoglobin(!): 11.8 [EE]   1823 Chest x-ray interpreted by myself shows no acute infiltrate or effusion.    [EE]   1937 EKG interpreted by myself.  Time 1751.  Atrial fibrillation, rate 91 bpm.  QRS normal with borderline left axis deviation.  No significant ST abnormalities.  EKG similar in comparison to prior EKG from 9/6/2021.    [EE]   2037 This is patient's second visit in 4 days for generalized weakness.   Patient now has hematuria.  Plan to admit the patient for further evaluation.    [EE]   2116 I discussed case with Dorene Robles, nurse practitioner with [facility].  She agrees to admit the patient to Dr. Costa.    [EE]      ED Course User Index  [EE] Omar Hopkins PA       AS OF 21:16 EDT VITALS:    BP - 169/98  HR - 75  TEMP - 98.2 °F (36.8 °C) (Oral)  O2 SATS - 97%        DIAGNOSIS  Final diagnoses:   General weakness   Hematuria, unspecified type         DISPOSITION  Admitted           Omar Hopkins PA  09/10/21 2117

## 2021-09-10 NOTE — ED TRIAGE NOTES
Pt reports blood in urine when urinated today bright red and increased weakness. Pt is on Eliquis.     Patient masked in first look triage. I was wearing mask and goggles.

## 2021-09-11 ENCOUNTER — APPOINTMENT (OUTPATIENT)
Dept: CARDIOLOGY | Facility: HOSPITAL | Age: 86
End: 2021-09-11

## 2021-09-11 ENCOUNTER — APPOINTMENT (OUTPATIENT)
Dept: MRI IMAGING | Facility: HOSPITAL | Age: 86
End: 2021-09-11

## 2021-09-11 PROBLEM — R31.9 HEMATURIA: Status: ACTIVE | Noted: 2021-09-11

## 2021-09-11 PROBLEM — R42 DIZZINESS AND GIDDINESS: Status: ACTIVE | Noted: 2021-09-11

## 2021-09-11 LAB
ANION GAP SERPL CALCULATED.3IONS-SCNC: 11 MMOL/L (ref 5–15)
AORTIC ARCH: 2.2 CM
AORTIC DIMENSIONLESS INDEX: 0.7 (DI)
ASCENDING AORTA: 2.9 CM
BASOPHILS # BLD AUTO: 0.03 10*3/MM3 (ref 0–0.2)
BASOPHILS # BLD AUTO: 0.04 10*3/MM3 (ref 0–0.2)
BASOPHILS NFR BLD AUTO: 0.3 % (ref 0–1.5)
BASOPHILS NFR BLD AUTO: 0.4 % (ref 0–1.5)
BH CV ECHO MEAS - AI DEC SLOPE: 231 CM/SEC^2
BH CV ECHO MEAS - AI MAX PG: 62.1 MMHG
BH CV ECHO MEAS - AI MAX VEL: 394 CM/SEC
BH CV ECHO MEAS - AI P1/2T: 499.6 MSEC
BH CV ECHO MEAS - AO ARCH DIAM (PROXIMAL TRANS.): 2.2 CM
BH CV ECHO MEAS - AO MAX PG (FULL): 6.3 MMHG
BH CV ECHO MEAS - AO MAX PG: 9.5 MMHG
BH CV ECHO MEAS - AO MEAN PG (FULL): 2 MMHG
BH CV ECHO MEAS - AO MEAN PG: 4 MMHG
BH CV ECHO MEAS - AO V2 MAX: 154 CM/SEC
BH CV ECHO MEAS - AO V2 MEAN: 93.2 CM/SEC
BH CV ECHO MEAS - AO V2 VTI: 26.2 CM
BH CV ECHO MEAS - ASC AORTA: 2.9 CM
BH CV ECHO MEAS - AVA(I,A): 1.9 CM^2
BH CV ECHO MEAS - AVA(I,D): 1.9 CM^2
BH CV ECHO MEAS - AVA(V,A): 1.5 CM^2
BH CV ECHO MEAS - AVA(V,D): 1.5 CM^2
BH CV ECHO MEAS - BSA(HAYCOCK): 1.9 M^2
BH CV ECHO MEAS - BSA: 1.8 M^2
BH CV ECHO MEAS - BZI_BMI: 29.2 KILOGRAMS/M^2
BH CV ECHO MEAS - BZI_METRIC_HEIGHT: 162.6 CM
BH CV ECHO MEAS - BZI_METRIC_WEIGHT: 77.1 KG
BH CV ECHO MEAS - EDV(CUBED): 68.9 ML
BH CV ECHO MEAS - EDV(MOD-SP2): 19 ML
BH CV ECHO MEAS - EDV(MOD-SP4): 19 ML
BH CV ECHO MEAS - EDV(TEICH): 74.2 ML
BH CV ECHO MEAS - EF(CUBED): 68.1 %
BH CV ECHO MEAS - EF(MOD-BP): 58.3 %
BH CV ECHO MEAS - EF(MOD-SP2): 52.6 %
BH CV ECHO MEAS - EF(MOD-SP4): 63.2 %
BH CV ECHO MEAS - EF(TEICH): 60.2 %
BH CV ECHO MEAS - ESV(CUBED): 22 ML
BH CV ECHO MEAS - ESV(MOD-SP2): 9 ML
BH CV ECHO MEAS - ESV(MOD-SP4): 7 ML
BH CV ECHO MEAS - ESV(TEICH): 29.6 ML
BH CV ECHO MEAS - FS: 31.7 %
BH CV ECHO MEAS - IVS/LVPW: 0.82
BH CV ECHO MEAS - IVSD: 0.9 CM
BH CV ECHO MEAS - LAT PEAK E' VEL: 11.2 CM/SEC
BH CV ECHO MEAS - LV DIASTOLIC VOL/BSA (35-75): 10.4 ML/M^2
BH CV ECHO MEAS - LV MASS(C)D: 132.1 GRAMS
BH CV ECHO MEAS - LV MASS(C)DI: 72.4 GRAMS/M^2
BH CV ECHO MEAS - LV MAX PG: 3.2 MMHG
BH CV ECHO MEAS - LV MEAN PG: 2 MMHG
BH CV ECHO MEAS - LV SYSTOLIC VOL/BSA (12-30): 3.8 ML/M^2
BH CV ECHO MEAS - LV V1 MAX: 88.8 CM/SEC
BH CV ECHO MEAS - LV V1 MEAN: 64.1 CM/SEC
BH CV ECHO MEAS - LV V1 VTI: 19.1 CM
BH CV ECHO MEAS - LVIDD: 4.1 CM
BH CV ECHO MEAS - LVIDS: 2.8 CM
BH CV ECHO MEAS - LVLD AP2: 5.3 CM
BH CV ECHO MEAS - LVLD AP4: 4.8 CM
BH CV ECHO MEAS - LVLS AP2: 4.5 CM
BH CV ECHO MEAS - LVLS AP4: 4.1 CM
BH CV ECHO MEAS - LVOT AREA (M): 2.5 CM^2
BH CV ECHO MEAS - LVOT AREA: 2.5 CM^2
BH CV ECHO MEAS - LVOT DIAM: 1.8 CM
BH CV ECHO MEAS - LVPWD: 1.1 CM
BH CV ECHO MEAS - MED PEAK E' VEL: 8.2 CM/SEC
BH CV ECHO MEAS - MV DEC SLOPE: 608 CM/SEC^2
BH CV ECHO MEAS - MV DEC TIME: 205 SEC
BH CV ECHO MEAS - MV E MAX VEL: 123 CM/SEC
BH CV ECHO MEAS - MV MAX PG: 6.3 MMHG
BH CV ECHO MEAS - MV MEAN PG: 2 MMHG
BH CV ECHO MEAS - MV P1/2T MAX VEL: 137 CM/SEC
BH CV ECHO MEAS - MV P1/2T: 66 MSEC
BH CV ECHO MEAS - MV V2 MAX: 125 CM/SEC
BH CV ECHO MEAS - MV V2 MEAN: 63.1 CM/SEC
BH CV ECHO MEAS - MV V2 VTI: 23 CM
BH CV ECHO MEAS - MVA P1/2T LCG: 1.6 CM^2
BH CV ECHO MEAS - MVA(P1/2T): 3.3 CM^2
BH CV ECHO MEAS - MVA(VTI): 2.1 CM^2
BH CV ECHO MEAS - PA ACC TIME: 0.07 SEC
BH CV ECHO MEAS - PA MAX PG (FULL): 1.6 MMHG
BH CV ECHO MEAS - PA MAX PG: 4 MMHG
BH CV ECHO MEAS - PA PR(ACCEL): 49.3 MMHG
BH CV ECHO MEAS - PA V2 MAX: 100 CM/SEC
BH CV ECHO MEAS - PVA(V,A): 1.8 CM^2
BH CV ECHO MEAS - PVA(V,D): 1.8 CM^2
BH CV ECHO MEAS - QP/QS: 0.66
BH CV ECHO MEAS - RAP SYSTOLE: 3 MMHG
BH CV ECHO MEAS - RV MAX PG: 2.4 MMHG
BH CV ECHO MEAS - RV MEAN PG: 1 MMHG
BH CV ECHO MEAS - RV V1 MAX: 77.7 CM/SEC
BH CV ECHO MEAS - RV V1 MEAN: 52 CM/SEC
BH CV ECHO MEAS - RV V1 VTI: 14.2 CM
BH CV ECHO MEAS - RVOT AREA: 2.3 CM^2
BH CV ECHO MEAS - RVOT DIAM: 1.7 CM
BH CV ECHO MEAS - RVSP: 23 MMHG
BH CV ECHO MEAS - SI(CUBED): 25.7 ML/M^2
BH CV ECHO MEAS - SI(LVOT): 26.6 ML/M^2
BH CV ECHO MEAS - SI(MOD-SP2): 5.5 ML/M^2
BH CV ECHO MEAS - SI(MOD-SP4): 6.6 ML/M^2
BH CV ECHO MEAS - SI(TEICH): 24.5 ML/M^2
BH CV ECHO MEAS - SV(CUBED): 47 ML
BH CV ECHO MEAS - SV(LVOT): 48.6 ML
BH CV ECHO MEAS - SV(MOD-SP2): 10 ML
BH CV ECHO MEAS - SV(MOD-SP4): 12 ML
BH CV ECHO MEAS - SV(RVOT): 32.2 ML
BH CV ECHO MEAS - SV(TEICH): 44.7 ML
BH CV ECHO MEAS - TAPSE (>1.6): 2.3 CM
BH CV ECHO MEAS - TR MAX PG: 20 MMHG
BH CV ECHO MEAS - TR MAX VEL: 223 CM/SEC
BH CV ECHO MEASUREMENTS AVERAGE E/E' RATIO: 12.68
BH CV XLRA - RV BASE: 2.7 CM
BH CV XLRA - RV LENGTH: 6 CM
BH CV XLRA - RV MID: 1.9 CM
BH CV XLRA - TDI S': 13.6 CM/SEC
BILIRUB UR QL STRIP: NEGATIVE
BUN SERPL-MCNC: 21 MG/DL (ref 8–23)
BUN/CREAT SERPL: 26.6 (ref 7–25)
CALCIUM SPEC-SCNC: 8.4 MG/DL (ref 8.6–10.5)
CHLORIDE SERPL-SCNC: 102 MMOL/L (ref 98–107)
CLARITY UR: CLEAR
CO2 SERPL-SCNC: 24 MMOL/L (ref 22–29)
COLOR UR: YELLOW
CREAT SERPL-MCNC: 0.79 MG/DL (ref 0.57–1)
DEPRECATED RDW RBC AUTO: 50.2 FL (ref 37–54)
DEPRECATED RDW RBC AUTO: 51.2 FL (ref 37–54)
EOSINOPHIL # BLD AUTO: 0.09 10*3/MM3 (ref 0–0.4)
EOSINOPHIL # BLD AUTO: 0.15 10*3/MM3 (ref 0–0.4)
EOSINOPHIL NFR BLD AUTO: 0.9 % (ref 0.3–6.2)
EOSINOPHIL NFR BLD AUTO: 1.4 % (ref 0.3–6.2)
ERYTHROCYTE [DISTWIDTH] IN BLOOD BY AUTOMATED COUNT: 15.6 % (ref 12.3–15.4)
ERYTHROCYTE [DISTWIDTH] IN BLOOD BY AUTOMATED COUNT: 15.7 % (ref 12.3–15.4)
GFR SERPL CREATININE-BSD FRML MDRD: 69 ML/MIN/1.73
GLUCOSE SERPL-MCNC: 112 MG/DL (ref 65–99)
GLUCOSE UR STRIP-MCNC: NEGATIVE MG/DL
HCT VFR BLD AUTO: 30.6 % (ref 34–46.6)
HCT VFR BLD AUTO: 30.6 % (ref 34–46.6)
HCT VFR BLD AUTO: 34.6 % (ref 34–46.6)
HCT VFR BLD AUTO: 36.8 % (ref 34–46.6)
HGB BLD-MCNC: 11.1 G/DL (ref 12–15.9)
HGB BLD-MCNC: 11.4 G/DL (ref 12–15.9)
HGB BLD-MCNC: 9.9 G/DL (ref 12–15.9)
HGB BLD-MCNC: 9.9 G/DL (ref 12–15.9)
HGB UR QL STRIP.AUTO: NEGATIVE
IMM GRANULOCYTES # BLD AUTO: 0.05 10*3/MM3 (ref 0–0.05)
IMM GRANULOCYTES # BLD AUTO: 0.05 10*3/MM3 (ref 0–0.05)
IMM GRANULOCYTES NFR BLD AUTO: 0.5 % (ref 0–0.5)
IMM GRANULOCYTES NFR BLD AUTO: 0.5 % (ref 0–0.5)
KETONES UR QL STRIP: NEGATIVE
LEFT ATRIUM VOLUME INDEX: 32.6 ML/M2
LEUKOCYTE ESTERASE UR QL STRIP.AUTO: NEGATIVE
LV EF 2D ECHO EST: 60 %
LYMPHOCYTES # BLD AUTO: 2.06 10*3/MM3 (ref 0.7–3.1)
LYMPHOCYTES # BLD AUTO: 2.38 10*3/MM3 (ref 0.7–3.1)
LYMPHOCYTES NFR BLD AUTO: 18.8 % (ref 19.6–45.3)
LYMPHOCYTES NFR BLD AUTO: 22.5 % (ref 19.6–45.3)
MAXIMAL PREDICTED HEART RATE: 132 BPM
MCH RBC QN AUTO: 28.8 PG (ref 26.6–33)
MCH RBC QN AUTO: 28.9 PG (ref 26.6–33)
MCHC RBC AUTO-ENTMCNC: 32.1 G/DL (ref 31.5–35.7)
MCHC RBC AUTO-ENTMCNC: 32.4 G/DL (ref 31.5–35.7)
MCV RBC AUTO: 89.2 FL (ref 79–97)
MCV RBC AUTO: 89.6 FL (ref 79–97)
MONOCYTES # BLD AUTO: 0.67 10*3/MM3 (ref 0.1–0.9)
MONOCYTES # BLD AUTO: 0.75 10*3/MM3 (ref 0.1–0.9)
MONOCYTES NFR BLD AUTO: 6.3 % (ref 5–12)
MONOCYTES NFR BLD AUTO: 6.9 % (ref 5–12)
NEUTROPHILS NFR BLD AUTO: 69.5 % (ref 42.7–76)
NEUTROPHILS NFR BLD AUTO: 7.34 10*3/MM3 (ref 1.7–7)
NEUTROPHILS NFR BLD AUTO: 7.88 10*3/MM3 (ref 1.7–7)
NEUTROPHILS NFR BLD AUTO: 72 % (ref 42.7–76)
NITRITE UR QL STRIP: NEGATIVE
NRBC BLD AUTO-RTO: 0 /100 WBC (ref 0–0.2)
NRBC BLD AUTO-RTO: 0 /100 WBC (ref 0–0.2)
PH UR STRIP.AUTO: <=5 [PH] (ref 5–8)
PLATELET # BLD AUTO: 196 10*3/MM3 (ref 140–450)
PLATELET # BLD AUTO: 216 10*3/MM3 (ref 140–450)
PMV BLD AUTO: 10.3 FL (ref 6–12)
PMV BLD AUTO: 10.3 FL (ref 6–12)
POTASSIUM SERPL-SCNC: 3.5 MMOL/L (ref 3.5–5.2)
PROT UR QL STRIP: NEGATIVE
RBC # BLD AUTO: 3.43 10*6/MM3 (ref 3.77–5.28)
RBC # BLD AUTO: 3.86 10*6/MM3 (ref 3.77–5.28)
SARS-COV-2 ORF1AB RESP QL NAA+PROBE: NOT DETECTED
SINUS: 2.7 CM
SODIUM SERPL-SCNC: 137 MMOL/L (ref 136–145)
SP GR UR STRIP: 1.02 (ref 1–1.03)
STJ: 2.5 CM
STRESS TARGET HR: 112 BPM
UROBILINOGEN UR QL STRIP: NORMAL
WBC # BLD AUTO: 10.56 10*3/MM3 (ref 3.4–10.8)
WBC # BLD AUTO: 10.93 10*3/MM3 (ref 3.4–10.8)

## 2021-09-11 PROCEDURE — G0378 HOSPITAL OBSERVATION PER HR: HCPCS

## 2021-09-11 PROCEDURE — 85014 HEMATOCRIT: CPT | Performed by: NURSE PRACTITIONER

## 2021-09-11 PROCEDURE — 36415 COLL VENOUS BLD VENIPUNCTURE: CPT | Performed by: INTERNAL MEDICINE

## 2021-09-11 PROCEDURE — 85014 HEMATOCRIT: CPT | Performed by: HOSPITALIST

## 2021-09-11 PROCEDURE — 70551 MRI BRAIN STEM W/O DYE: CPT

## 2021-09-11 PROCEDURE — 99222 1ST HOSP IP/OBS MODERATE 55: CPT | Performed by: NURSE PRACTITIONER

## 2021-09-11 PROCEDURE — 93306 TTE W/DOPPLER COMPLETE: CPT

## 2021-09-11 PROCEDURE — 93306 TTE W/DOPPLER COMPLETE: CPT | Performed by: INTERNAL MEDICINE

## 2021-09-11 PROCEDURE — 85025 COMPLETE CBC W/AUTO DIFF WBC: CPT | Performed by: INTERNAL MEDICINE

## 2021-09-11 PROCEDURE — 80048 BASIC METABOLIC PNL TOTAL CA: CPT | Performed by: INTERNAL MEDICINE

## 2021-09-11 PROCEDURE — 97110 THERAPEUTIC EXERCISES: CPT

## 2021-09-11 PROCEDURE — 97161 PT EVAL LOW COMPLEX 20 MIN: CPT

## 2021-09-11 PROCEDURE — 85018 HEMOGLOBIN: CPT | Performed by: NURSE PRACTITIONER

## 2021-09-11 PROCEDURE — 81003 URINALYSIS AUTO W/O SCOPE: CPT | Performed by: NURSE PRACTITIONER

## 2021-09-11 PROCEDURE — 85018 HEMOGLOBIN: CPT | Performed by: HOSPITALIST

## 2021-09-11 RX ORDER — FELODIPINE 5 MG/1
10 TABLET, EXTENDED RELEASE ORAL DAILY
Status: DISCONTINUED | OUTPATIENT
Start: 2021-09-11 | End: 2021-09-18 | Stop reason: HOSPADM

## 2021-09-11 RX ORDER — SODIUM CHLORIDE 9 MG/ML
75 INJECTION, SOLUTION INTRAVENOUS CONTINUOUS
Status: DISCONTINUED | OUTPATIENT
Start: 2021-09-11 | End: 2021-09-16

## 2021-09-11 RX ORDER — ATORVASTATIN CALCIUM 20 MG/1
40 TABLET, FILM COATED ORAL DAILY
Status: DISCONTINUED | OUTPATIENT
Start: 2021-09-11 | End: 2021-09-12

## 2021-09-11 RX ORDER — ASPIRIN 81 MG/1
81 TABLET, CHEWABLE ORAL DAILY
Status: DISCONTINUED | OUTPATIENT
Start: 2021-09-11 | End: 2021-09-13

## 2021-09-11 RX ORDER — MECLIZINE HYDROCHLORIDE 25 MG/1
50 TABLET ORAL EVERY 6 HOURS PRN
Status: DISCONTINUED | OUTPATIENT
Start: 2021-09-11 | End: 2021-09-18 | Stop reason: HOSPADM

## 2021-09-11 RX ORDER — PANTOPRAZOLE SODIUM 40 MG/1
40 TABLET, DELAYED RELEASE ORAL EVERY MORNING
Refills: 11 | Status: DISCONTINUED | OUTPATIENT
Start: 2021-09-11 | End: 2021-09-18 | Stop reason: HOSPADM

## 2021-09-11 RX ADMIN — METOPROLOL TARTRATE 25 MG: 25 TABLET, FILM COATED ORAL at 09:30

## 2021-09-11 RX ADMIN — ATORVASTATIN CALCIUM 40 MG: 20 TABLET, FILM COATED ORAL at 20:58

## 2021-09-11 RX ADMIN — SODIUM CHLORIDE 75 ML/HR: 9 INJECTION, SOLUTION INTRAVENOUS at 17:51

## 2021-09-11 RX ADMIN — METOPROLOL TARTRATE 25 MG: 25 TABLET, FILM COATED ORAL at 20:59

## 2021-09-11 RX ADMIN — FELODIPINE 10 MG: 5 TABLET, FILM COATED, EXTENDED RELEASE ORAL at 09:30

## 2021-09-11 RX ADMIN — PANTOPRAZOLE SODIUM 40 MG: 40 TABLET, DELAYED RELEASE ORAL at 09:30

## 2021-09-11 RX ADMIN — APIXABAN 5 MG: 5 TABLET, FILM COATED ORAL at 09:30

## 2021-09-11 RX ADMIN — ASPIRIN 81 MG: 81 TABLET, CHEWABLE ORAL at 09:30

## 2021-09-11 RX ADMIN — SODIUM CHLORIDE 75 ML/HR: 9 INJECTION, SOLUTION INTRAVENOUS at 04:06

## 2021-09-11 NOTE — ED PROVIDER NOTES
Pt presents to the ED c/o  generalized weakness, bloody urine, and dizziness.  She does have a history of atrial fibrillation and she takes Eliquis.  She reports compliance with this medicine.  She denies black or bloody stool.  She reportedly has a history of prolapsed uterus that occasionally will cause some bleeding.     On exam,   Awake and alert, no acute distress.  Irregular rhythm, normal rate, no murmur.  Normal work of breathing  Cranial nerves II through XII grossly intact.  There is no nystagmus noted.     Plan: Patient with persistent generalized weakness, dizziness, history of atrial fibrillation and anticoagulated.  She reportedly had some hematuria prior to arrival however urinalysis is unremarkable here today.  Given her persistent dizziness and being at risk for stroke she will be admitted for further evaluation.  It appears that she does have a history of vertigo however she has no nystagmus on my examination today.  Symptoms have been ongoing for several days and therefore she is outside the window for TPA or other intervention at this time.  She did have neuroimaging performed on 9/6/2021 that was unremarkable.    CT Head Without Contrast    Result Date: 9/6/2021  Narrative: CT HEAD WO CONTRAST-  INDICATIONS: Dizziness, nausea  TECHNIQUE: Radiation dose reduction techniques were utilized, including automated exposure control and exposure modulation based on body size. Noncontrast head CT  COMPARISON: 01/14/2020  FINDINGS:    No acute intracranial hemorrhage, midline shift or mass effect. No acute territorial infarct is identified.  Mild to moderate periventricular hypodensities suggest chronic small vessel ischemic change in a patient this age.  Arterial calcifications are seen at the base of the brain.  Ventricles, cisterns, cerebral sulci are unremarkable for patient age.  The visualized paranasal sinuses, orbits, mastoid air cells are unremarkable.          Impression:  No acute intracranial  hemorrhage or hydrocephalus. Chronic appearing changes of the brain. If there is further clinical concern, MRI could be considered for further evaluation.  This report was finalized on 9/6/2021 1:03 PM by Dr. Sai Plasencia M.D.      XR Chest 1 View    Result Date: 9/10/2021  Narrative: PORTABLE CHEST  HISTORY: Weakness.  COMPARISON: 01/04/2020.  FINDINGS: The heart is within normal limits in size. There is no evidence of infiltrate, effusion or of congestive failure.  This report was finalized on 9/10/2021 8:22 PM by Dr. Florencio Judge M.D.            I wore an N95 mask, face shield, and gloves during this patient encounter.  Patient also wearing a surgical mask.  Hand hygeine performed before and after seeing the patient.     Attestation:  The [facility] and I have discussed this patient's history, physical exam, and treatment plan.  I have reviewed the documentation and personally had a face to face interaction with the patient. I affirm the documentation and agree with the treatment and plan.  The attached note describes my personal findings.            Craig Alvarez MD  09/10/21 2059

## 2021-09-11 NOTE — H&P
Patient Name:  Monica Scherer  YOB: 1933  MRN:  [id number]  Admit Date:  9/10/2021  Patient Care Team:  Amilcar Mauricio DO as PCP - General (Family Medicine)      Subjective   History Present Illness     Chief Complaint   Patient presents with   • Blood in Urine   • Weakness - Generalized     History of Present Illness   Ms. Scherer is a 88 y.o. non-smoker with a history of paroxysmal atrial fibrillation on Eliquis, HTN, HLD, CVA, GERD that presents to [facility] complaining of generalized weakness and dizziness for the past three to four days.  This is the second visit in the last 4 days for weakness.  She reports her dizziness occurs while she is up walking.  She states that the room is not spinning.  She also admits to some nausea and one episode of vomiting.  She denies any abdominal pain.  Today she also noticed some blood in her urine.  She also complains of some dysuria.  Work-up in the emergency department has been pretty unremarkable.  She has been admitted to our service for observation.    Review of Systems   Constitutional: Negative for chills and fever.   HENT: Negative for congestion and rhinorrhea.    Eyes: Negative for photophobia and visual disturbance.   Respiratory: Negative for cough and shortness of breath.    Cardiovascular: Negative for chest pain and palpitations.   Gastrointestinal: Positive for nausea and vomiting. Negative for constipation and diarrhea.   Endocrine: Negative for cold intolerance and heat intolerance.   Genitourinary: Positive for dysuria and hematuria. Negative for difficulty urinating.   Musculoskeletal: Negative for gait problem and joint swelling.   Skin: Negative for rash and wound.   Neurological: Positive for dizziness and weakness. Negative for light-headedness and headaches.   Psychiatric/Behavioral: Negative for sleep disturbance and suicidal ideas.        Personal History     Past Medical History:   Diagnosis Date   •  Atrial fibrillation (CMS/HCC)    • Hyperlipidemia    • Hypertension    • Lacunar infarct, 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   • New onset atrial fibrillation (CMS/HCC) 01/2020    now on rate control along with Eliquis   • Prolapsed uterus     per patient   • Stroke (CMS/HCC)    • TIA (transient ischemic attack) 01/2020   • Weakness      Past Surgical History:   Procedure Laterality Date   • APPENDECTOMY     • CHOLECYSTECTOMY N/A 6/8/2016    Procedure: CHOLECYSTECTOMY LAPAROSCOPIC;  Surgeon: Russ Gar MD;  [place] [facility] [place] [facility] [place]  Service:    • ENDOSCOPY N/A 2/22/2018    Z-line regular, 35 cm from incisors, normal esophagus, gastritis, bilious gastric fluid, one non-bleeding duodenal ulcer w/no stigmata of bleeding, Path: DUODENUM: FRAGMENTS OF GASTRIC TYPE MUCOSA WITH HYPERPLASIA (FOVEOLAR) AND PATCHY MIXED INFLAMMATION IN THE LAMINA PROPRIA WITH FOCAL FIBROSIS, COMMENT: 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    • EYE SURGERY      tre cataracts     No family history on file.  Social History     Tobacco Use   • Smoking status: Never Smoker   • Smokeless tobacco: Never Used   Substance Use Topics   • Alcohol use: No   • Drug use: No     No current facility-administered medications on file prior to encounter.     Current Outpatient Medications on File Prior to Encounter   Medication Sig Dispense Refill   • alendronate (Fosamax) 70 MG tablet Take 1 tablet by mouth Every 7 (Seven) Days. 12 tablet 3   • aspirin 81 MG chewable tablet CHEW AND SWALLOW 1 TABLET BY MOUTH EVERY DAY 90 tablet 2   • atorvastatin (LIPITOR) 80 MG tablet TAKE 1 TABLET BY MOUTH EVERY DAY 90 tablet 2   • Eliquis 5 MG tablet tablet TAKE 1 TABLET BY MOUTH EVERY 12 HOURS 60 tablet 5   • felodipine (PLENDIL) 10 MG 24 hr tablet TAKE 1 TABLET BY MOUTH DAILY 90 tablet 3   • meclizine (ANTIVERT) 25 MG tablet Take 2 tablets by mouth Every 6 (Six) Hours As Needed for Dizziness. 40 tablet 0   •  metoprolol tartrate (LOPRESSOR) 25 MG tablet TAKE 1 TABLET BY MOUTH EVERY 12 HOURS 180 tablet 3   • omeprazole (priLOSEC) 20 MG capsule Take 1 capsule by mouth Daily. 30 capsule 11     Allergies   Allergen Reactions   • Cephalexin Rash   • Latex Itching       Objective    Objective     Vital Signs  Temp:  [97.5 °F (36.4 °C)-98.2 °F (36.8 °C)] 98 °F (36.7 °C)  Heart Rate:  [] 75  Resp:  [16-22] 20  BP: (129-169)/() 131/88  SpO2:  [95 %-99 %] 96 %  on   ;   Device (Oxygen Therapy): room air  Body mass index is 29.18 kg/m².    Physical Exam  Vitals and nursing note reviewed.   Constitutional:       General: She is not in acute distress.     Appearance: She is well-developed. She is not toxic-appearing.   HENT:      Head: Normocephalic and atraumatic.   Eyes:      General: No scleral icterus.        Right eye: No discharge.         Left eye: No discharge.      Conjunctiva/sclera: Conjunctivae normal.   Neck:      Vascular: No JVD.   Cardiovascular:      Rate and Rhythm: Normal rate. Rhythm irregularly irregular.      Heart sounds: Normal heart sounds. No murmur heard.   No friction rub. No gallop.    Pulmonary:      Effort: Pulmonary effort is normal. No respiratory distress.      Breath sounds: Normal breath sounds. No wheezing or rales.   Abdominal:      General: Bowel sounds are normal. There is no distension.      Palpations: Abdomen is soft.      Tenderness: There is no abdominal tenderness. There is no guarding.   Musculoskeletal:         General: No tenderness or deformity. Normal range of motion.      Cervical back: Normal range of motion and neck supple.   Skin:     General: Skin is warm and dry.      Capillary Refill: Capillary refill takes less than 2 seconds.   Neurological:      Mental Status: She is alert and oriented to person, place, and time.   Psychiatric:         Behavior: Behavior normal.       Results Review:  I reviewed the patient's new clinical results.  Discussed with ED  provider.    Lab Results (last 24 hours)     Procedure Component Value Units Date/Time    CBC & Differential [[id number]]  (Abnormal) Collected: 09/10/21 1742    Specimen: Blood Updated: 09/10/21 1754    Narrative:      The following orders were created for panel order CBC & Differential.  Procedure                               Abnormality         Status                     ---------                               -----------         ------                     CBC Auto Differential[[id number]]        Abnormal            Final result                 Please view results for these tests on the individual orders.    Comprehensive Metabolic Panel [[id number]]  (Abnormal) Collected: 09/10/21 1742    Specimen: Blood Updated: 09/10/21 1818     Glucose 145 mg/dL      BUN 23 mg/dL      Creatinine 1.01 mg/dL      Sodium 137 mmol/L      Potassium 3.8 mmol/L      Chloride 101 mmol/L      CO2 22.9 mmol/L      Calcium 8.9 mg/dL      Total Protein 6.2 g/dL      Albumin 3.60 g/dL      ALT (SGPT) 17 U/L      AST (SGOT) 24 U/L      Alkaline Phosphatase 82 U/L      Total Bilirubin 0.7 mg/dL      eGFR Non African Amer 52 mL/min/1.73      Globulin 2.6 gm/dL      A/G Ratio 1.4 g/dL      BUN/Creatinine Ratio 22.8     Anion Gap 13.1 mmol/L     Narrative:      GFR Normal >60  Chronic Kidney Disease <60  Kidney Failure <15      BNP [[id number]]  (Abnormal) Collected: 09/10/21 1742    Specimen: Blood Updated: 09/10/21 1815     proBNP 1,908.0 pg/mL     Narrative:      Among patients with dyspnea, NT-proBNP is highly sensitive for the detection of acute congestive heart failure. In addition NT-proBNP of <300 pg/ml effectively rules out acute congestive heart failure with 99% negative predictive value.    Results may be falsely decreased if patient taking Biotin.      Troponin [[id number]]  (Normal) Collected: 09/10/21 1742    Specimen: Blood Updated: 09/10/21 1817     Troponin T <0.010 ng/mL     Narrative:      Troponin T Reference Range:  <= 0.03  ng/mL-   Negative for AMI  >0.03 ng/mL-     Abnormal for myocardial necrosis.  Clinicians would have to utilize clinical acumen, EKG, Troponin and serial changes to determine if it is an Acute Myocardial Infarction or myocardial injury due to an underlying chronic condition.       Results may be falsely decreased if patient taking Biotin.      CBC Auto Differential [[id number]]  (Abnormal) Collected: 09/10/21 1742    Specimen: Blood Updated: 09/10/21 1754     WBC 11.12 10*3/mm3      RBC 4.20 10*6/mm3      Hemoglobin 11.8 g/dL      Hematocrit 36.8 %      MCV 87.6 fL      MCH 28.1 pg      MCHC 32.1 g/dL      RDW 15.5 %      RDW-SD 49.1 fl      MPV 10.1 fL      Platelets 238 10*3/mm3      Neutrophil % 69.7 %      Lymphocyte % 20.9 %      Monocyte % 7.8 %      Eosinophil % 0.7 %      Basophil % 0.3 %      Immature Grans % 0.6 %      Neutrophils, Absolute 7.75 10*3/mm3      Lymphocytes, Absolute 2.32 10*3/mm3      Monocytes, Absolute 0.87 10*3/mm3      Eosinophils, Absolute 0.08 10*3/mm3      Basophils, Absolute 0.03 10*3/mm3      Immature Grans, Absolute 0.07 10*3/mm3      nRBC 0.0 /100 WBC     Urinalysis With Microscopic If Indicated (No Culture) - Urine, Catheter [[id number]]  (Abnormal) Collected: 09/10/21 2003    Specimen: Urine, Catheter Updated: 09/10/21 2031     Color, UA Dark Yellow     Appearance, UA Cloudy     pH, UA <=5.0     Specific Gravity, UA 1.020     Glucose, UA Negative     Ketones, UA Trace     Bilirubin, UA Negative     Blood, UA Large (3+)     Protein, UA Trace     Leuk Esterase, UA Trace     Nitrite, UA Negative     Urobilinogen, UA 1.0 E.U./dL    Urinalysis, Microscopic Only - Urine, Catheter [[id number]]  (Abnormal) Collected: 09/10/21 2003    Specimen: Urine, Catheter Updated: 09/10/21 2031     RBC, UA 0-2 /HPF      WBC, UA 0-2 /HPF      Bacteria, UA 1+ /HPF      Squamous Epithelial Cells, UA 7-12 /HPF      Hyaline Casts, UA 3-6 /LPF      Methodology Automated Microscopy    COVID PRE-OP /  PRE-PROCEDURE SCREENING ORDER (NO ISOLATION) - Swab, Nasopharynx [[id number]] Collected: 09/10/21 2223    Specimen: Swab from Nasopharynx Updated: 09/10/21 2242    Narrative:      The following orders were created for panel order COVID PRE-OP / PRE-PROCEDURE SCREENING ORDER (NO ISOLATION) - Swab, Nasopharynx.  Procedure                               Abnormality         Status                     ---------                               -----------         ------                     COVID-19,APTIMA PANTHER(...[[id number]]                      In process                   Please view results for these tests on the individual orders.    COVID-19,APTIMA PANTHER(BISHOP), LASHON, NP/OP SWAB IN UTM/VTM/SALINE TRANSPORT MEDIA,24 HR TAT - Swab, Nasopharynx [[id number]] Collected: 09/10/21 2223    Specimen: Swab from Nasopharynx Updated: 09/10/21 2242          Imaging Results (Last 24 Hours)     Procedure Component Value Units Date/Time    XR Chest 1 View [[id number]] Collected: 09/10/21 1843     Updated: 09/10/21 2025    Narrative:      PORTABLE CHEST     HISTORY: Weakness.     COMPARISON: 01/04/2020.     FINDINGS: The heart is within normal limits in size. There is no  evidence of infiltrate, effusion or of congestive failure.     This report was finalized on 9/10/2021 8:22 PM by Dr. Florencio Judge M.D.             Results for orders placed during the hospital encounter of 01/04/20    Adult Transthoracic Echo Complete W/ Cont if Necessary Per Protocol (With Agitated Saline)    Interpretation Summary  · Estimated EF = 62%.  · Left ventricular systolic function is normal.  · Left atrial cavity size is borderline dilated.  · There is calcification of the aortic valve.  · Mild aortic valve regurgitation is present.  · Mild mitral valve regurgitation is present  · Mild tricuspid valve regurgitation is present.      ECG 12 Lead   Final Result   HEART RATE= 91  bpm   RR Interval= 659  ms   ME Interval=   ms   P Horizontal Axis=   deg   P  Front Axis=   deg   QRSD Interval= 90  ms   QT Interval= 379  ms   QRS Axis= -17  deg   T Wave Axis= -3  deg   - ABNORMAL ECG -   Atrial fibrillation   Borderline left axis deviation   POOR R WAVE PROGRESSION   NO SIGNIFICANT CHANGE FROM PREVIOUS ECG   Electronically Signed By: Eusebio Stallworth ([facility]) 10-Sep-2021 18:46:12   Date and Time of Study: 2021-09-10 17:51:53           Assessment/Plan     Active Hospital Problems    Diagnosis  POA   • **General weakness [R53.1]  Yes   • Hematuria [R31.9]  Yes   • History of CVA (cerebrovascular accident) [Z86.73]  Not Applicable   • Anticoagulated [Z79.01]  Not Applicable   • Paroxysmal atrial fibrillation (CMS/HCC) [I48.0]  Yes   • Hyperlipidemia [E78.5]  Yes   • Hypertension [I10]  Yes   • Gastroesophageal reflux disease with esophagitis [K21.00]  Yes      Resolved Hospital Problems   No resolved problems to display.       Ms. Scherer is a 88 y.o. non-smoker with a history of  who presents with dizziness and generalized weakness.    Generalized weakness and dizziness  -Patient reports intermittent dizziness on standing.  She denies any syncope.  She also denies any dyspnea or chest pain.  Currently in atrial fibrillation but reports no palpitations.  -MRI in a.m.  -Neurology consultation  -Check orthostatic blood pressure  -PT to treat and evaul    Abnormal UA  -Patient does admit to dysuria but her urinalysis looks contaminated.  Repeat UA    Hematuria   -Patient reports a history of prolapsed uterus that occasionally will bleed.  Hold Eliquis for now.  Defer urology consultation to [facility] WILL[facility].    Paroxysmal atrial fibrillation  -Rate controlled, hold Eliquis for now due to hematuria    Essential hypertension  -Stable, resume home regimen monitor for signs symptoms of hypotension    HLD  -Resume statin therapy     GERD  -Resume home PPI    I discussed the patient's findings and my recommendations with patient, ED provider and Dr. Costa.    VTE Prophylaxis - Eliquis  (home med).  Code Status - Full code.       CORTEZ Hurt  [facility]  09/10/21  22:30 EDT

## 2021-09-11 NOTE — CONSULTS
DOS: 2021  NAME: Monica Scherer   : 1933  PCP: Amilcar Mauircio DO  CC: Generalized weakness with no stated lightheadedness  Referring MD: Bladimir Costa MD    Neurological Problem and Interval History:  88 y.o.  female with a Hx of Hypertension, hyperlipidemia, low serum B12, lacunar infarct 2020 in setting of new onset A. fib since on Eliquis 5 mg twice daily and aspirin who presented to [facility] 9/10 due to complaint of persistent nausea/dizziness/weakness more so in the lower extremities since .  Patient also reports poor appetite.  Patient reportedly presented to the ER on  and was subsequently discharged to home; due to symptoms persisting not improving and presence of hematuria patient presented to the emergency room again.  UA this admission was positive for blood; 3+-patient feels that blood was coming from her rectum; known hemorrhoids.  Today, UA was repeated and there was no evidence of blood/leukocytes/nitrites noted.  There work-up includes Covid negative, chest x-ray negative and EKG A. fib with  long with CT of the head negative for acute findings.  Patient daughter reports that she has not felt well since her second Covid vaccine approximately 1 month ago    Neurologically, patient stable at baseline with broad-based unsteady gait which is improved somewhat by use of walker which patient uses at home to ambulate.  Lungs right lower quadrant crackles present otherwise clear.  MRI of the brain pending.     Past Medical/Surgical Hx:  Past Medical History:   Diagnosis Date   • Atrial fibrillation (CMS/HCC)    • Hyperlipidemia    • Hypertension    • Lacunar infarct, acute (CMS/HCC) 2020    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   • New onset atrial fibrillation (CMS/HCC) 2020    now on rate control along with Eliquis   • Prolapsed uterus     per patient   • Stroke (CMS/HCC)    • TIA (transient ischemic attack) 2020   •  Weakness      Past Surgical History:   Procedure Laterality Date   • APPENDECTOMY     • CHOLECYSTECTOMY N/A 6/8/2016    Procedure: CHOLECYSTECTOMY LAPAROSCOPIC;  Surgeon: Russ Gar MD;  [place] [facility] [place] [facility] [place]  Service:    • ENDOSCOPY N/A 2/22/2018    Z-line regular, 35 cm from incisors, normal esophagus, gastritis, bilious gastric fluid, one non-bleeding duodenal ulcer w/no stigmata of bleeding, Path: DUODENUM: FRAGMENTS OF GASTRIC TYPE MUCOSA WITH HYPERPLASIA (FOVEOLAR) AND PATCHY MIXED INFLAMMATION IN THE LAMINA PROPRIA WITH FOCAL FIBROSIS, COMMENT: 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    • EYE SURGERY      tre cataracts       Review of Systems:        A complete review of all systems is negative except as described above.    Medications On Admission  Medications Prior to Admission   Medication Sig Dispense Refill Last Dose   • aspirin 81 MG chewable tablet CHEW AND SWALLOW 1 TABLET BY MOUTH EVERY DAY 90 tablet 2 Patient Taking Differently at Unknown time   • alendronate (Fosamax) 70 MG tablet Take 1 tablet by mouth Every 7 (Seven) Days. 12 tablet 3    • atorvastatin (LIPITOR) 80 MG tablet TAKE 1 TABLET BY MOUTH EVERY DAY (Patient taking differently: 40 mg Daily.) 90 tablet 2    • Eliquis 5 MG tablet tablet TAKE 1 TABLET BY MOUTH EVERY 12 HOURS 60 tablet 5    • felodipine (PLENDIL) 10 MG 24 hr tablet TAKE 1 TABLET BY MOUTH DAILY 90 tablet 3    • meclizine (ANTIVERT) 25 MG tablet Take 2 tablets by mouth Every 6 (Six) Hours As Needed for Dizziness. 40 tablet 0    • metoprolol tartrate (LOPRESSOR) 25 MG tablet TAKE 1 TABLET BY MOUTH EVERY 12 HOURS 180 tablet 3    • omeprazole (priLOSEC) 20 MG capsule Take 1 capsule by mouth Daily. 30 capsule 11        Allergies:  Allergies   Allergen Reactions   • Cephalexin Rash   • Latex Itching       Social Hx:  Social History     Socioeconomic History   • Marital status: Single     Spouse name: Not on file   • Number of children: Not on file   • Years of education:  Not on file   • Highest education level: Not on file   Tobacco Use   • Smoking status: Never Smoker   • Smokeless tobacco: Never Used   Substance and Sexual Activity   • Alcohol use: No   • Drug use: No   • Sexual activity: Defer       Family Hx:  No family history on file.    Review of Imaging (Interpretation of images not reports):  PORTABLE CHEST     HISTORY: Weakness.     COMPARISON: 01/04/2020.     FINDINGS: The heart is within normal limits in size. There is no  evidence of infiltrate, effusion or of congestive failure.     This report was finalized on 9/10/2021 8:22 PM by Dr. Florencio Judge M.D.     PORTABLE CHEST     HISTORY: Weakness.     COMPARISON: 01/04/2020.     FINDINGS: The heart is within normal limits in size. There is no  evidence of infiltrate, effusion or of congestive failure.     This report was finalized on 9/10/2021 8:22 PM by Dr. Florencio Judge M.D.      Laboratory Results:   Lab Results   Component Value Date    GLUCOSE 112 (H) 09/11/2021    CALCIUM 8.4 (L) 09/11/2021     09/11/2021    K 3.5 09/11/2021    CO2 24.0 09/11/2021     09/11/2021    BUN 21 09/11/2021    CREATININE 0.79 09/11/2021    EGFRIFAFRI 54 (L) 06/14/2021    EGFRIFNONA 69 09/11/2021    BCR 26.6 (H) 09/11/2021    ANIONGAP 11.0 09/11/2021     Lab Results   Component Value Date    WBC 10.56 09/11/2021    HGB 11.1 (L) 09/11/2021    HCT 34.6 09/11/2021    MCV 89.6 09/11/2021     09/11/2021     Lab Results   Component Value Date    CHOL 167 01/06/2020    CHOL 188 01/05/2020    CHOL 126 02/19/2018     Lab Results   Component Value Date    HDL 44 06/14/2021    HDL 44 01/06/2020    HDL 49 01/05/2020     Lab Results   Component Value Date    LDL 52 06/14/2021     01/06/2020     (H) 01/05/2020     Lab Results   Component Value Date    TRIG 136 06/14/2021    TRIG 113 01/06/2020    TRIG 116 01/05/2020     Lab Results   Component Value Date    HGBA1C 6.5 (H) 06/14/2021     Lab Results   Component Value  "Date    INR 1.40 (H) 01/29/2020    INR 1.02 01/04/2020    INR 1.10 02/21/2018    PROTIME 16.9 (H) 01/29/2020    PROTIME 13.1 01/04/2020    PROTIME 14.0 02/21/2018         Physical Examination:  /66 (BP Location: Left arm, Patient Position: Standing)   Pulse 90   Temp 97 °F (36.1 °C) (Oral)   Resp 20   Ht 162.6 cm (64\")   Wt 77.1 kg (170 lb)   SpO2 97%   BMI 29.18 kg/m²   General Appearance:   Well developed, well nourished, well groomed, alert, and cooperative.  HEENT: Normocephalic.    Neck and Spine: Normal range of motion.  Normal alignment. No mass or tenderness. Cardiac: Regular rate and rhythm. No murmurs.  Peripheral Vasculature: Radial and pedal pulses are equal and symmetric.   Extremities:    No edema or deformities. Normal joint ROM.   Skin:    No rashes or birth marks.    Neurological examination:  Higher Integrative  Function: Oriented to time, place and person. Normal registration, recall, attention span and concentration. Normal language including comprehension, spontaneous speech, repetition, reading, writing, naming and vocabulary. No neglect with normal visual-spatial function and construction. Normal fund of knowledge and higher integrative function.  CN II: Pupils are equal, round, and reactive to light. Normal visual acuity and visual fields.    CN III IV VI: Extraocular movements are full without nystagmus.   CN V: Normal facial sensation and strength of muscles of mastication.  CN VII: Facial movements are symmetric. No weakness.  CN VIII:   Auditory acuity is normal.  CN IX & X:   Symmetric palatal movement.  CN XI: Sternocleidomastoid and trapezius are normal.  No weakness.  CN XII:   The tongue is midline.  No atrophy or fasciculations.  Motor: Normal muscle strength, bulk and tone in upper and lower extremities.  No fasciculations, rigidity, spasticity, or abnormal movements except very mild bilateral upper extremity hand tremor.  Reflexes: Plantar responses are " flexor.  Sensation: Normal to light touch,vibration and temperature in arms and legs.  Station and Gait: Abnormal/unsteady/wide-based gait  Coordination:  Finger-to-nose test shows no dysmetria.      Impression:   1.  Generalized weakness with abnormal gait  2.  Light headedness  3.  Hematuria; resolved  4.  Elevated BNP with right lower lung crackles-normal chest x-ray    Plan:  · MRI of the brain-pending  · TTE   · Eliquis 5 mg twice daily  · Aspirin 81 mg daily  · Atorvastatin 40 mg daily      Patient seen in conjunction with attending neurologist Dr. Conrado Saenz, APRN

## 2021-09-11 NOTE — ED NOTES
This RN wore mask, gloves, and protective eyewear throughout patient encounter. Pt wearing mask at all times. Hand hygiene performed before and after entering room.      Edwina Castanon RN  09/10/21 2003

## 2021-09-11 NOTE — PLAN OF CARE
Goal Outcome Evaluation:  Plan of Care Reviewed With: patient, daughter  Progress: no change  Outcome summary  Report recvd, pt admitted from ER approx 0300. Pt & conchita welcomed and oriented to unit, questions, comments, concerns addressed.  Pt has been asymptomatic and w/o c/o since admit. IVF infusing. Pt & conchita requested to complete MRI screening form after sleeping, forms to be filled out later this morning. Continue to monitor and tx per POC and MD orders.

## 2021-09-11 NOTE — SIGNIFICANT NOTE
09/11/21 1312   OTHER   Discipline physical therapist   Rehab Time/Intention   Session Not Performed patient unavailable for evaluation  (pt eating lunch; will check back pm)   Recommendation   PT - Next Appointment 09/11/21

## 2021-09-11 NOTE — PLAN OF CARE
Goal Outcome Evaluation:  Plan of Care Reviewed With: patient        Progress: improving  Outcome Summary: PT ADMITTED FROM ER LAST NOC AFTER INCREASED WEAKNESS AND DIZZINESS AT HOME. DENIES ANY SYMPTOMS TODAY. AMBULATES WITH ASSIST OF ONE AND A WALKER. DENIES SOA BUT DOES GET WINDED UPON EXERTION. PT EDUCATED REGARDING HTN AND B/P MONIOTORING. ORTHOSTATIC B/PS WITH VERY LITTLE DIFFERENCE.

## 2021-09-11 NOTE — THERAPY EVALUATION
Patient Name: Monica Scherer  : 1933    MRN: [id number]                              Today's Date: 2021       Admit Date: 9/10/2021    Visit Dx:     ICD-10-CM ICD-9-CM   1. General weakness  R53.1 780.79   2. Hematuria, unspecified type  R31.9 599.70     Patient Active Problem List   Diagnosis   • Vertigo   • Temporary cerebral vascular dysfunction   • Gastroesophageal reflux disease with esophagitis   • Degeneration of intervertebral disc of cervical region   • Prediabetes   • S/P cholecystectomy   • Osteoarthritis of knee   • Herpes zoster without complication   • Hypertension   • Duodenal ulcer   • History of pancreatitis   • Hyperlipidemia   • TIA (transient ischemic attack)   • Cerebrovascular accident (CVA) due to thrombosis of left posterior cerebral artery (CMS/HCC)   • Paroxysmal atrial fibrillation (CMS/HCC)   • General weakness   • History of CVA (cerebrovascular accident)   • Anticoagulated   • Hematuria   • Dizziness and giddiness     Past Medical History:   Diagnosis Date   • Atrial fibrillation (CMS/HCC)    • Hyperlipidemia    • Hypertension    • Lacunar infarct, acute (CMS/HCC) 2020    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   • New onset atrial fibrillation (CMS/HCC) 2020    now on rate control along with Eliquis   • Prolapsed uterus     per patient   • Stroke (CMS/HCC)    • TIA (transient ischemic attack) 2020   • Weakness      Past Surgical History:   Procedure Laterality Date   • APPENDECTOMY     • CHOLECYSTECTOMY N/A 2016    Procedure: CHOLECYSTECTOMY LAPAROSCOPIC;  Surgeon: Russ Gar MD;  Location: [facility];  Service:    • ENDOSCOPY N/A 2018    Z-line regular, 35 cm from incisors, normal esophagus, gastritis, bilious gastric fluid, one non-bleeding duodenal ulcer w/no stigmata of bleeding, Path: DUODENUM: FRAGMENTS OF GASTRIC TYPE MUCOSA WITH HYPERPLASIA (FOVEOLAR) AND PATCHY MIXED INFLAMMATION IN THE LAMINA PROPRIA WITH FOCAL FIBROSIS,  COMMENT: 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    • EYE SURGERY      tre cataracts     General Information     [facility] Name 09/11/21 1528          Physical Therapy Time and Intention    Document Type  evaluation  -LB     Mode of Treatment  physical therapy  -     Row Name 09/11/21 1528          General Information    Patient Profile Reviewed  yes  -LB     Prior Level of Function  independent: ambulates short distances with RW  -LB     Existing Precautions/Restrictions  fall  -LB     Barriers to Rehab  none identified  -LB     [facility] Name 09/11/21 1528          Living Environment    Lives With  child(leona), adult  -LB     [facility] Name 09/11/21 1528          Home Main Entrance    Number of Stairs, Main Entrance  three  -LB     [facility] Name 09/11/21 1528          Stairs Within Home, Primary    Number of Stairs, Within Home, Primary  four  -LB     [facility] Name 09/11/21 1528          Cognition    Orientation Status (Cognition)  oriented to;person;place  -[facility] Name 09/11/21 1528          Safety Issues, Functional Mobility    Impairments Affecting Function (Mobility)  balance;strength  -LB       User Key  (r) = Recorded By, (t) = Taken By, (c) = Cosigned By    Initials Name Provider Type    LB Sharmaine Ayers PT Physical Therapist        Mobility     [facility] Name 09/11/21 1529          Bed Mobility    Bed Mobility  bed mobility (all) activities  -LB     All Activities, [place] (Bed Mobility)  minimum assist (75% patient effort)  -LB     Assistive Device (Bed Mobility)  bed rails;head of bed elevated  -LB     [facility] Name 09/11/21 1529          Bed-Chair Transfer    Bed-Chair [place] (Transfers)  not tested  -LB     Row Name 09/11/21 1529          Sit-Stand Transfer    Sit-Stand [place] (Transfers)  contact guard  -LB     Assistive Device (Sit-Stand Transfers)  walker, front-wheeled  -LB     Row Name 09/11/21 1529          Gait/Stairs (Locomotion)    [place] Level (Gait)  contact guard  -LB     Assistive Device (Gait)   walker, front-wheeled  -LB     Distance in Feet (Gait)  25'  -LB     Deviations/Abnormal Patterns (Gait)  gait speed decreased;base of support, wide  -LB     Bilateral Gait Deviations  forward flexed posture  -LB     Comment (Gait/Stairs)  Ambulated within room using RW. Pt wished to rest after ambulation and returned to bed.  -LB       User Key  (r) = Recorded By, (t) = Taken By, (c) = Cosigned By    Initials Name Provider Type    LB Sharmaine Ayers, PT Physical Therapist        Obj/Interventions     Row Name 09/11/21 1530          Range of Motion Comprehensive    Comment, General Range of Motion  BLE WFl  -LB     Row Name 09/11/21 1530          Strength Comprehensive (MMT)    Comment, General Manual Muscle Testing (MMT) Assessment  BLE grossly 4-/5  -LB       User Key  (r) = Recorded By, (t) = Taken By, (c) = Cosigned By    Initials Name Provider Type    LB Sharmaine Ayers, PT Physical Therapist        Goals/Plan     Row Name 09/11/21 1534          Bed Mobility Goal 1 (PT)    Activity/Assistive Device (Bed Mobility Goal 1, PT)  bed mobility activities, all  -LB     [place] Level/Cues Needed (Bed Mobility Goal 1, PT)  standby assist  -LB     Time Frame (Bed Mobility Goal 1, PT)  1 week  -LB     Row Name 09/11/21 1534          Transfer Goal 1 (PT)    Activity/Assistive Device (Transfer Goal 1, PT)  transfers, all  -LB     [place] Level/Cues Needed (Transfer Goal 1, PT)  standby assist  -LB     Time Frame (Transfer Goal 1, PT)  1 week  -LB     Row Name 09/11/21 1534          Gait Training Goal 1 (PT)    Activity/Assistive Device (Gait Training Goal 1, PT)  gait (walking locomotion);walker, rolling  -LB     [place] Level (Gait Training Goal 1, PT)  supervision required  -LB     Distance (Gait Training Goal 1, PT)  150'  -LB     Time Frame (Gait Training Goal 1, PT)  1 week  -LB       User Key  (r) = Recorded By, (t) = Taken By, (c) = Cosigned By    Initials Name Provider Type    Sharmaine Hurst, PT  Physical Therapist        Clinical Impression     Row Name 09/11/21 1531          Pain    Additional Documentation  Pain Scale: Numbers Pre/Post-Treatment (Group)  -LB     Row Name 09/11/21 1531          Pain Scale: Numbers Pre/Post-Treatment    Pretreatment Pain Rating  0/10 - no pain  -LB     Posttreatment Pain Rating  0/10 - no pain  -LB     Pain Intervention(s)  Repositioned;Ambulation/increased activity  -LB     Row Name 09/11/21 1531          Plan of Care Review    Plan of Care Reviewed With  patient  -LB     Progress  improving  -LB     Outcome Summary  Pt is 89 yo female admitted with weakness, hematuria, and dizziness. She lives with her son with a few steps to enter and a few steps within home. She ambulates short distances with RW at home but has gotten weaker recently. Pt required min A for bed mobility and CGA for transfers and ambulation within room. She reports fatigue from several tests she had done today prior to PT. Pt will benefit from continued inpatient PT services to improve strength and ambulation distances prior to returning to son's home. Recommend home with HH at d/c.  -LB     Row Name 09/11/21 1531          Therapy Assessment/Plan (PT)    Patient/Family Therapy Goals Statement (PT)  Pt plans to return home at d/c.  -LB     Rehab Potential (PT)  good, to achieve stated therapy goals  -LB     Criteria for Skilled Interventions Met (PT)  yes  -LB     Row Name 09/11/21 1531          Vital Signs    O2 Delivery Pre Treatment  room air  -LB     O2 Delivery Intra Treatment  room air  -LB     O2 Delivery Post Treatment  room air  -LB     Pre Patient Position  Supine  -LB     Intra Patient Position  Standing  -LB     Post Patient Position  Supine  -LB     Row Name 09/11/21 1531          Positioning and Restraints    Pre-Treatment Position  in bed  -LB     Post Treatment Position  bed  -LB     In Bed  supine;call light within reach;encouraged to call for assist;exit alarm on  -LB       User Key  (r)  = Recorded By, (t) = Taken By, (c) = Cosigned By    Initials Name Provider Type    LB Sharmaine Ayers PT Physical Therapist        Outcome Measures     Row Name 09/11/21 1534          How much help from another person do you currently need...    Turning from your back to your side while in flat bed without using bedrails?  3  -LB     Moving from lying on back to sitting on the side of a flat bed without bedrails?  3  -LB     Moving to and from a bed to a chair (including a wheelchair)?  3  -LB     Standing up from a chair using your arms (e.g., wheelchair, bedside chair)?  3  -LB     Climbing 3-5 steps with a railing?  2  -LB     To walk in hospital room?  3  -LB     AM-PAC 6 Clicks Score (PT)  17  -LB     Row Name 09/11/21 1534          Functional Assessment    Outcome Measure Options  AM-PAC 6 Clicks Basic Mobility (PT)  -LB       User Key  (r) = Recorded By, (t) = Taken By, (c) = Cosigned By    Initials Name Provider Type    LB Sharmaine Ayers PT Physical Therapist                       Physical Therapy Education                 Title: PT OT SLP Therapies (Done)     Topic: Physical Therapy (Done)     Point: Mobility training (Done)     Learning Progress Summary           Patient Acceptance, E,TB,D, VU,DU by  at 9/11/2021 1535                   Point: Home exercise program (Done)     Learning Progress Summary           Patient Acceptance, E,TB,D, VU,DU by  at 9/11/2021 1535                   Point: Body mechanics (Done)     Learning Progress Summary           Patient Acceptance, E,TB,D, VU,DU by  at 9/11/2021 1535                   Point: Precautions (Done)     Learning Progress Summary           Patient Acceptance, E,TB,D, VU,DU by  at 9/11/2021 1535                               User Key     Initials Effective Dates Name Provider Type Discipline     08/09/20 -  Sharmaine Ayers PT Physical Therapist PT              PT Recommendation and Plan     Plan of Care Reviewed With: patient  Progress:  improving  Outcome Summary: Pt is 87 yo female admitted with weakness, hematuria, and dizziness. She lives with her son with a few steps to enter and a few steps within home. She ambulates short distances with RW at home but has gotten weaker recently. Pt required min A for bed mobility and CGA for transfers and ambulation within room. She reports fatigue from several tests she had done today prior to PT. Pt will benefit from continued inpatient PT services to improve strength and ambulation distances prior to returning to son's home. Recommend home with HH at d/c.     Time Calculation:   PT Charges     Row Name 09/11/21 1535 09/11/21 1312          Time Calculation    Start Time  1445  -LB  --     Stop Time  1500  -LB  --     Time Calculation (min)  15 min  -LB  --     PT Received On  09/11/21  -LB  --     PT - Next Appointment  09/12/21  -LB  09/11/21  -LB     PT Goal Re-Cert Due Date  09/18/21  -LB  --        Time Calculation- PT    Total Timed Code Minutes- PT  15 minute(s)  -LB  --       User Key  (r) = Recorded By, (t) = Taken By, (c) = Cosigned By    Initials Name Provider Type    Sharmaine Hurst, JEYSON Physical Therapist        Therapy Charges for Today     Code Description Service Date Service Provider Modifiers Qty    00110971783 HC PT THER PROC EA 15 MIN 9/11/2021 Sharmaine Ayers, PT GP 1    07370722285 HC PT EVAL LOW COMPLEXITY 1 9/11/2021 Sharmaine Ayers PT GP 1          PT G-Codes  Outcome Measure Options: AM-PAC 6 Clicks Basic Mobility (PT)  AM-PAC 6 Clicks Score (PT): 17    Sharmaine Ayers PT  9/11/2021

## 2021-09-11 NOTE — PLAN OF CARE
Goal Outcome Evaluation:  Plan of Care Reviewed With: patient        Progress: improving  Outcome Summary: Pt is 89 yo female admitted with weakness, hematuria, and dizziness. She lives with her son with a few steps to enter and a few steps within home. She ambulates short distances with RW at home but has gotten weaker recently. Pt required min A for bed mobility and CGA for transfers and ambulation within room. She reports fatigue from several tests she had done today prior to PT. Pt will benefit from continued inpatient PT services to improve strength and ambulation distances prior to returning to son's home. Recommend home with HH at d/c.    Patient was wearing a face mask during this therapy encounter. Therapist used appropriate personal protective equipment including gown, mask and gloves.  Mask used was standard procedure mask. Appropriate PPE was worn during the entire therapy session. Hand hygiene was completed before and after therapy session. Patient is not in enhanced droplet precautions.

## 2021-09-12 ENCOUNTER — INPATIENT HOSPITAL (OUTPATIENT)
Dept: URBAN - METROPOLITAN AREA HOSPITAL 113 | Facility: HOSPITAL | Age: 86
End: 2021-09-12
Payer: MEDICARE

## 2021-09-12 DIAGNOSIS — R31.9 HEMATURIA, UNSPECIFIED: ICD-10-CM

## 2021-09-12 DIAGNOSIS — D64.9 ANEMIA, UNSPECIFIED: ICD-10-CM

## 2021-09-12 DIAGNOSIS — I48.91 UNSPECIFIED ATRIAL FIBRILLATION: ICD-10-CM

## 2021-09-12 DIAGNOSIS — R42 DIZZINESS AND GIDDINESS: ICD-10-CM

## 2021-09-12 DIAGNOSIS — R11.2 NAUSEA WITH VOMITING, UNSPECIFIED: ICD-10-CM

## 2021-09-12 DIAGNOSIS — Z80.0 FAMILY HISTORY OF MALIGNANT NEOPLASM OF DIGESTIVE ORGANS: ICD-10-CM

## 2021-09-12 DIAGNOSIS — R53.1 WEAKNESS: ICD-10-CM

## 2021-09-12 DIAGNOSIS — K62.5 HEMORRHAGE OF ANUS AND RECTUM: ICD-10-CM

## 2021-09-12 LAB
ANION GAP SERPL CALCULATED.3IONS-SCNC: 10.4 MMOL/L (ref 5–15)
BUN SERPL-MCNC: 21 MG/DL (ref 8–23)
BUN/CREAT SERPL: 22.8 (ref 7–25)
CALCIUM SPEC-SCNC: 8.5 MG/DL (ref 8.6–10.5)
CHLORIDE SERPL-SCNC: 106 MMOL/L (ref 98–107)
CO2 SERPL-SCNC: 22.6 MMOL/L (ref 22–29)
CREAT SERPL-MCNC: 0.92 MG/DL (ref 0.57–1)
GFR SERPL CREATININE-BSD FRML MDRD: 58 ML/MIN/1.73
GLUCOSE SERPL-MCNC: 150 MG/DL (ref 65–99)
HCT VFR BLD AUTO: 33.5 % (ref 34–46.6)
HGB BLD-MCNC: 10.2 G/DL (ref 12–15.9)
POTASSIUM SERPL-SCNC: 3.6 MMOL/L (ref 3.5–5.2)
SODIUM SERPL-SCNC: 139 MMOL/L (ref 136–145)

## 2021-09-12 PROCEDURE — 99233 SBSQ HOSP IP/OBS HIGH 50: CPT | Performed by: NURSE PRACTITIONER

## 2021-09-12 PROCEDURE — 99221 1ST HOSP IP/OBS SF/LOW 40: CPT | Performed by: NURSE PRACTITIONER

## 2021-09-12 PROCEDURE — 85018 HEMOGLOBIN: CPT | Performed by: HOSPITALIST

## 2021-09-12 PROCEDURE — 85014 HEMATOCRIT: CPT | Performed by: HOSPITALIST

## 2021-09-12 PROCEDURE — G0378 HOSPITAL OBSERVATION PER HR: HCPCS

## 2021-09-12 RX ORDER — HYDROCORTISONE ACETATE PRAMOXINE HCL 2.5; 1 G/100G; G/100G
CREAM TOPICAL 2 TIMES DAILY
Status: DISCONTINUED | OUTPATIENT
Start: 2021-09-12 | End: 2021-09-18 | Stop reason: HOSPADM

## 2021-09-12 RX ORDER — ATORVASTATIN CALCIUM 20 MG/1
40 TABLET, FILM COATED ORAL NIGHTLY
Status: DISCONTINUED | OUTPATIENT
Start: 2021-09-12 | End: 2021-09-18 | Stop reason: HOSPADM

## 2021-09-12 RX ADMIN — APIXABAN 5 MG: 5 TABLET, FILM COATED ORAL at 07:41

## 2021-09-12 RX ADMIN — SODIUM CHLORIDE 75 ML/HR: 9 INJECTION, SOLUTION INTRAVENOUS at 06:38

## 2021-09-12 RX ADMIN — HYDROCORTISONE ACETATE PRAMOXINE HCL 1 APPLICATION: 2.5; 1 CREAM TOPICAL at 21:25

## 2021-09-12 RX ADMIN — FELODIPINE 10 MG: 5 TABLET, FILM COATED, EXTENDED RELEASE ORAL at 07:41

## 2021-09-12 RX ADMIN — ATORVASTATIN CALCIUM 40 MG: 20 TABLET, FILM COATED ORAL at 21:25

## 2021-09-12 RX ADMIN — METOPROLOL TARTRATE 25 MG: 25 TABLET, FILM COATED ORAL at 17:06

## 2021-09-12 RX ADMIN — PANTOPRAZOLE SODIUM 40 MG: 40 TABLET, DELAYED RELEASE ORAL at 06:38

## 2021-09-12 RX ADMIN — METOPROLOL TARTRATE 25 MG: 25 TABLET, FILM COATED ORAL at 07:42

## 2021-09-12 RX ADMIN — APIXABAN 5 MG: 5 TABLET, FILM COATED ORAL at 17:06

## 2021-09-12 NOTE — PROGRESS NOTES
"DOS: 2021  NAME: Monica Scherer   : 1933  PCP: Amilcar Mauricio DO  Chief Complaint   Patient presents with   • Blood in Urine   • Weakness - Generalized     Stroke    Subjective: Patient reports bleeding when going to the bathroom; unclear if coming from vaginal region versus rectum although patient has known external hemorrhoids.  Eliquis held overnight due to this.  Patient denies any new complaints under concerns on my exam.      Daughter at bedside.      Objective:  Vital signs: /79 (BP Location: Left arm, Patient Position: Lying)   Pulse 69   Temp 97.2 °F (36.2 °C) (Oral)   Resp 16   Ht 162.6 cm (64\")   Wt 77.1 kg (170 lb)   SpO2 96%   BMI 29.18 kg/m²       General Appearance:           Well developed, well nourished, well groomed, alert, and cooperative.  HEENT:            Normocephalic.    Neck and Spine:                    Normal range of motion.  Normal alignment. No mass or tenderness. Cardiac:              Regular rate and rhythm. No murmurs.  :    Evidence of sternal hemorrhoids without evidence of bleeding noted  Peripheral Vasculature:       Radial and pedal pulses are equal and symmetric.   Extremities:                           No edema or deformities. Normal joint ROM.   Skin:                                       No rashes or birth marks.     Neurological examination:  Higher Integrative  Function:         Oriented to time, place and person. Normal registration, recall, attention span and concentration. Normal language including comprehension, spontaneous speech, repetition, reading, writing, naming and vocabulary. No neglect with normal visual-spatial function and construction. Normal fund of knowledge and higher integrative function.  CN II:    Pupils are equal, round, and reactive to light. Normal visual acuity and visual fields.    CN III IV VI:      Extraocular movements are full without nystagmus.   CN V:   Normal facial sensation and strength of muscles of " mastication.  CN VII:             Facial movements are symmetric. No weakness.  CN VIII:                                   Auditory acuity is normal.  CN IX & X:                              Symmetric palatal movement.  CN XI:  Sternocleidomastoid and trapezius are normal.  No weakness.  CN XII:                                    The tongue is midline.  No atrophy or fasciculations.  Motor:  Normal muscle strength, bulk and tone in upper and lower extremities.  No fasciculations, rigidity, spasticity, or abnormal movements except very mild bilateral upper extremity hand tremor.  Reflexes:         Plantar responses are flexor.  Sensation:       Normal to light touch,vibration and temperature in arms and legs.  Station and Gait:        Abnormal/unsteady/wide-based gait  Coordination:                        Finger-to-nose test shows no dysmetria.      Exam unchanged from yesterday 09/11     Laboratory results:  Lab Results   Component Value Date    GLUCOSE 150 (H) 09/11/2021    CALCIUM 8.5 (L) 09/11/2021     09/11/2021    K 3.6 09/11/2021    CO2 22.6 09/11/2021     09/11/2021    BUN 21 09/11/2021    CREATININE 0.92 09/11/2021    EGFRIFAFRI 54 (L) 06/14/2021    EGFRIFNONA 58 (L) 09/11/2021    BCR 22.8 09/11/2021    ANIONGAP 10.4 09/11/2021     Lab Results   Component Value Date    WBC 10.93 (H) 09/11/2021    HGB 10.2 (L) 09/12/2021    HCT 33.5 (L) 09/12/2021    MCV 89.2 09/11/2021     09/11/2021     Lab Results   Component Value Date    CHOL 167 01/06/2020    CHOL 188 01/05/2020    CHOL 126 02/19/2018     Lab Results   Component Value Date    HDL 44 06/14/2021    HDL 44 01/06/2020    HDL 49 01/05/2020     Lab Results   Component Value Date    LDL 52 06/14/2021     01/06/2020     (H) 01/05/2020     Lab Results   Component Value Date    TRIG 136 06/14/2021    TRIG 113 01/06/2020    TRIG 116 01/05/2020          Review and interpretation of imaging:  MRI EXAMINATION BRAIN WITHOUT CONTRAST      HISTORY: Dizziness, nausea.     COMPARISON: Comparison is made to multiple prior CT examinations of the  brain with the most recent examination being the CT examination of  09/06/2021 as well as to the MRI examination of 01/05/2020.     TECHNIQUE: A MRI examination of the brain was performed utilizing  sagittal T1, axial diffusion, T1, T2, T2 FLAIR and gradient echo T2  imaging.     FINDINGS: There is no evidence of restricted diffusion to suggest acute  infarction. The axial T2 FLAIR sequence demonstrates at least moderate  small vessel ischemic disease. The appearance is similar to the prior  examination. A remote infarct involving the right cerebellar hemisphere  is noted laterally which was present on the prior MRI examination. A  smaller remote infarct involving the left cerebellar hemisphere is noted  superiorly, also present previously. There is no evidence of mass or  mass effect on this noncontrasted MRI examination of the brain. There is  expected flow-void in the basilar artery and in the distal aspect of the  internal carotid arteries bilaterally on the axial T2 sequence. Fluid is  present within the mastoid air cells on the left, similar in volume as  compared to the prior MRI examination.     IMPRESSION:  Small vessel ischemic disease, left mastoid fluid collection  and small remote cerebellar infarcts are appreciated as described in  detail above. There is no evidence of acute infarction or mass/mass  effect on this noncontrasted MRI examination of the brain.       Impression:  1.  Generalized weakness with abnormal gait  2.  Light headedness  3.  Hematuria; unknown source  4.  Elevated BNP (mildly)       Note: MRI of the brain shows no evidence of stroke/mass/hemorrhage.  Evidence of more advanced small vessel ischemic disease noted and evidence of chronic remote cerebellar infarcts.  JONATHAN shows EF 60%.  LV/LA normal.  No mention of PFO.  Left atrial volume index 32.6.  Generalized weakness/lower  extremity weakness/abnormal gait not clear although dense of increased matter disease seen on MRI.  Recommend that patient control risk factors specifically blood pressure/blood sugar/cholesterol.  Discussed increased risk for stroke recurrence without use of Eliquis in setting of A. fib; daughter and patient verbalized understanding.  Also recommended consideration of acute to subacute rehab versus in-home therapies which patient declined. No further neurology workup indicated. We will sign off and see again upon request.        Plan:  · Eliquis 5 mg twice daily  · Aspirin 81 mg daily  · Atorvastatin 40 mg daily       Patient reviewed with attending neurologist Dr. Conrado Cyr and he agrees with treatment plan above.     CORTEZ Whatley

## 2021-09-12 NOTE — CONSULTS
GI CONSULT  NOTE:    Referring Provider:  Dr. Wilkins    Chief complaint: rectal bleeding    Subjective .     History of present illness: Monica Scherer is a 88 y.o. female with history of A. fib and CVA (Rx Plavix), hemorrhoids, GERD, and gastric ulcer who presented on 9/10 with complaint of weakness and dizziness that worsened with change in position.  She also reported some blood in her urine, nausea, and one episode of vomiting.  Last night she had large amount of rectal bleeding so GI was consulted.  She has never had rectal bleeding before she denies any rectal or abdominal pain.  She is still seeing some blood today.  Overall she feels better, she is tolerating diet without nausea or vomiting.  Denies any recent constipation or diarrhea.  She has never had colonoscopy.  She does have family history of colorectal cancer (brother and son).  Last dose of Eliquis was yesterday.  She is mildly confused so her son helps with history.    No abdominal imaging this admission  Labs -Hgb 10.2 (was 12.4 on 9/6), MCV WNL, WBC 10.93, BMP WNL      Endo History:  2/2018 EGD (Dr. 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   • New onset atrial fibrillation (CMS/HCC) 01/2020    now on rate control along with Eliquis   • Prolapsed uterus     per patient   • Stroke (CMS/HCC)    • TIA (transient ischemic attack) 01/2020   • Weakness        Past Surgical History:  Past Surgical History:   Procedure Laterality Date   • APPENDECTOMY     • CHOLECYSTECTOMY N/A 6/8/2016    Procedure: CHOLECYSTECTOMY LAPAROSCOPIC;  Surgeon: Russ Gar MD;  Location: [facility] OR [facility] [place];  Service:    • ENDOSCOPY N/A 2/22/2018    Z-line regular, 35 cm from incisors, normal  esophagus, gastritis, bilious gastric fluid, one non-bleeding duodenal ulcer w/no stigmata of bleeding, Path: DUODENUM: FRAGMENTS OF GASTRIC TYPE MUCOSA WITH HYPERPLASIA (FOVEOLAR) AND PATCHY MIXED INFLAMMATION IN THE LAMINA PROPRIA WITH FOCAL FIBROSIS, COMMENT: 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    • EYE SURGERY      tre cataracts       Social History:  Social History     Tobacco Use   • Smoking status: Never Smoker   • Smokeless tobacco: Never Used   Substance Use Topics   • Alcohol use: No   • Drug use: No       Family History:  History reviewed. No pertinent family history.    Medications:  Medications Prior to Admission   Medication Sig Dispense Refill Last Dose   • aspirin 81 MG chewable tablet CHEW AND SWALLOW 1 TABLET BY MOUTH EVERY DAY 90 tablet 2 Patient Taking Differently at Unknown time   • alendronate (Fosamax) 70 MG tablet Take 1 tablet by mouth Every 7 (Seven) Days. 12 tablet 3    • atorvastatin (LIPITOR) 80 MG tablet TAKE 1 TABLET BY MOUTH EVERY DAY (Patient taking differently: 40 mg Daily.) 90 tablet 2    • Eliquis 5 MG tablet tablet TAKE 1 TABLET BY MOUTH EVERY 12 HOURS 60 tablet 5    • felodipine (PLENDIL) 10 MG 24 hr tablet TAKE 1 TABLET BY MOUTH DAILY 90 tablet 3    • meclizine (ANTIVERT) 25 MG tablet Take 2 tablets by mouth Every 6 (Six) Hours As Needed for Dizziness. 40 tablet 0    • metoprolol tartrate (LOPRESSOR) 25 MG tablet TAKE 1 TABLET BY MOUTH EVERY 12 HOURS 180 tablet 3    • omeprazole (priLOSEC) 20 MG capsule Take 1 capsule by mouth Daily. 30 capsule 11        Scheduled Meds:apixaban, 5 mg, Oral, Q12H  aspirin, 81 mg, Oral, Daily  atorvastatin, 40 mg, Oral, Nightly  felodipine, 10 mg, Oral, Daily  Hydrocort-Pramoxine (Perianal), , Rectal, BID  metoprolol tartrate, 25 mg, Oral, Q12H  pantoprazole, 40 mg, Oral, QAM      Continuous Infusions:sodium chloride, 75 mL/hr, Last Rate: 75 mL/hr (09/12/21 1256)      PRN Meds:.•  acetaminophen  •  aluminum-magnesium  "hydroxide-simethicone  •  meclizine  •  ondansetron **OR** ondansetron  •  [COMPLETED] Insert peripheral IV **AND** sodium chloride  •  sodium chloride    ALLERGIES:  Cephalexin and Latex    Review of Systems:  Review of Systems   Constitutional: Negative.    HENT: Negative.    Respiratory: Negative.    Cardiovascular: Negative.    Gastrointestinal: Positive for blood in stool. Negative for abdominal pain, constipation, diarrhea, nausea, rectal pain and vomiting.   Genitourinary: Negative.    Musculoskeletal: Negative.    Skin: Negative.    Neurological: Negative.    Psychiatric/Behavioral: Negative.          Objective     Vital Signs:   Vitals:    09/11/21 1231 09/11/21 1425 09/11/21 2119 09/12/21 0502   BP: 108/54 117/76 119/70 118/79   BP Location:  Left arm Left arm Left arm   Patient Position:  Lying Lying Lying   Pulse: 97 78 80 69   Resp:  20 18 16   Temp:  97.1 °F (36.2 °C) 97.9 °F (36.6 °C) 97.2 °F (36.2 °C)   TempSrc:  Oral Oral Oral   SpO2:  99% 96% 96%   Weight: 77.1 kg (170 lb)      Height: 162.6 cm (64\")          Physical Exam: Resting in bed, son present    General Appearance:    Awake and alert, mild confusion, in no acute distress   Head:    Normocephalic, without obvious abnormality   Throat:   No oral lesions, no thrush, oral mucosa moist   Lungs:     Respirations regular, even and unlabored   Chest Wall:    No abnormalities observed   Abdomen:     Soft, non-tender, non-distended, no rebound or guarding, no hepatosplenomegaly   Rectal:     Deferred   Extremities:   Moves all extremities, no edema, no cyanosis   Pulses:   Pulses palpable and equal bilaterally   Skin:   No bruising, no rash, no jaundice, normal palpation   Lymph nodes:   No cervical, supraclavicular or submandibular palpable adenopathy   Neurologic:   No asterixis       Results Review:   I reviewed the patient's labs and imaging.  Lab Results (last 24 hours)     Procedure Component Value Units Date/Time    Hemoglobin & Hematocrit, " Blood [[id number]]  (Abnormal) Collected: 09/12/21 0818    Specimen: Blood Updated: 09/12/21 0851     Hemoglobin 10.2 g/dL      Hematocrit 33.5 %     Basic Metabolic Panel [[id number]]  (Abnormal) Collected: 09/11/21 2321    Specimen: Blood Updated: 09/12/21 0000     Glucose 150 mg/dL      BUN 21 mg/dL      Creatinine 0.92 mg/dL      Sodium 139 mmol/L      Potassium 3.6 mmol/L      Chloride 106 mmol/L      CO2 22.6 mmol/L      Calcium 8.5 mg/dL      eGFR Non African Amer 58 mL/min/1.73      BUN/Creatinine Ratio 22.8     Anion Gap 10.4 mmol/L     Narrative:      GFR Normal >60  Chronic Kidney Disease <60  Kidney Failure <15      Hemoglobin & Hematocrit, Blood [[id number]]  (Abnormal) Collected: 09/11/21 2321    Specimen: Blood Updated: 09/11/21 2338     Hemoglobin 9.9 g/dL      Hematocrit 30.6 %     CBC & Differential [[id number]]  (Abnormal) Collected: 09/11/21 2321    Specimen: Blood Updated: 09/11/21 2338    Narrative:      The following orders were created for panel order CBC & Differential.  Procedure                               Abnormality         Status                     ---------                               -----------         ------                     CBC Auto Differential[[id number]]        Abnormal            Final result                 Please view results for these tests on the individual orders.    CBC Auto Differential [[id number]]  (Abnormal) Collected: 09/11/21 2321    Specimen: Blood Updated: 09/11/21 2338     WBC 10.93 10*3/mm3      RBC 3.43 10*6/mm3      Hemoglobin 9.9 g/dL      Hematocrit 30.6 %      MCV 89.2 fL      MCH 28.9 pg      MCHC 32.4 g/dL      RDW 15.6 %      RDW-SD 50.2 fl      MPV 10.3 fL      Platelets 196 10*3/mm3      Neutrophil % 72.0 %      Lymphocyte % 18.8 %      Monocyte % 6.9 %      Eosinophil % 1.4 %      Basophil % 0.4 %      Immature Grans % 0.5 %      Neutrophils, Absolute 7.88 10*3/mm3      Lymphocytes, Absolute 2.06 10*3/mm3      Monocytes, Absolute 0.75 10*3/mm3       Eosinophils, Absolute 0.15 10*3/mm3      Basophils, Absolute 0.04 10*3/mm3      Immature Grans, Absolute 0.05 10*3/mm3      nRBC 0.0 /100 WBC     Hemoglobin & Hematocrit, Blood [[id number]]  (Abnormal) Collected: 09/11/21 1954    Specimen: Blood Updated: 09/11/21 2026     Hemoglobin 11.4 g/dL      Hematocrit 36.8 %           Imaging Results (Last 24 Hours)     Procedure Component Value Units Date/Time    MRI Brain Without Contrast [[id number]] Collected: 09/11/21 1404     Updated: 09/12/21 1250    Narrative:      MRI EXAMINATION BRAIN WITHOUT CONTRAST     HISTORY: Dizziness, nausea.     COMPARISON: Comparison is made to multiple prior CT examinations of the  brain with the most recent examination being the CT examination of  09/06/2021 as well as to the MRI examination of 01/05/2020.     TECHNIQUE: A MRI examination of the brain was performed utilizing  sagittal T1, axial diffusion, T1, T2, T2 FLAIR and gradient echo T2  imaging.     FINDINGS: There is no evidence of restricted diffusion to suggest acute  infarction. The axial T2 FLAIR sequence demonstrates at least moderate  small vessel ischemic disease. The appearance is similar to the prior  examination. A remote infarct involving the right cerebellar hemisphere  is noted laterally which was present on the prior MRI examination. A  smaller remote infarct involving the left cerebellar hemisphere is noted  superiorly, also present previously. There is no evidence of mass or  mass effect on this noncontrasted MRI examination of the brain. There is  expected flow-void in the basilar artery and in the distal aspect of the  internal carotid arteries bilaterally on the axial T2 sequence. Fluid is  present within the mastoid air cells on the left, similar in volume as  compared to the prior MRI examination.       Impression:      Small vessel ischemic disease, left mastoid fluid collection  and small remote cerebellar infarcts are appreciated as described in  detail  above. There is no evidence of acute infarction or mass/mass  effect on this noncontrasted MRI examination of the brain.     This report was finalized on 9/12/2021 12:46 PM by Dr. Florencio Judge M.D.                ASSESSMENT:  Rectal bleeding - ddx hemorrhoids, diverticular, polyp, other  Mild normocytic anemia  Weakness, dizziness  Nausea/vomiting -improved  A. Fib -Rx Eliquis  History of CVA  Hematuria  Family history of colon cancer - no past colonoscopy      PLAN:  We discussed the risks/benefits of colonoscopy.  Patient would like to think about colonoscopy.    We will monitor for continued GI bleeding or drop in Hgb.    Continue diet as tolerated.  Analpram hemorrhoid cream twice daily ordered.  [facility] will follow.     We appreciate the referral    CORTEZ Hernandez  09/12/21  14:31 EDT

## 2021-09-12 NOTE — PROGRESS NOTES
"DAILY PROGRESS NOTE  [facility]    Patient Identification:  Name: Monica Scherer  Age: 88 y.o.  Sex: female  :  1933  MRN: [id number]         Primary Care Physician: Amilcar Mauricio DO    Subjective:  Interval History:She complains of some rectal bleeding.    Objective:    Scheduled Meds:apixaban, 5 mg, Oral, Q12H  aspirin, 81 mg, Oral, Daily  atorvastatin, 40 mg, Oral, Nightly  felodipine, 10 mg, Oral, Daily  metoprolol tartrate, 25 mg, Oral, Q12H  pantoprazole, 40 mg, Oral, QAM      Continuous Infusions:sodium chloride, 75 mL/hr, Last Rate: 75 mL/hr (21 1256)        Vital signs in last 24 hours:  Temp:  [97.1 °F (36.2 °C)-97.9 °F (36.6 °C)] 97.2 °F (36.2 °C)  Heart Rate:  [69-80] 69  Resp:  [16-20] 16  BP: (117-119)/(70-79) 118/79    Intake/Output:    Intake/Output Summary (Last 24 hours) at 2021 1321  Last data filed at 2021 0307  Gross per 24 hour   Intake 1094 ml   Output 600 ml   Net 494 ml       Exam:  /79 (BP Location: Left arm, Patient Position: Lying)   Pulse 69   Temp 97.2 °F (36.2 °C) (Oral)   Resp 16   Ht 162.6 cm (64\")   Wt 77.1 kg (170 lb)   SpO2 96%   BMI 29.18 kg/m²     General Appearance:    Alert, cooperative, no distress   Head:    Normocephalic, without obvious abnormality, atraumatic   Eyes:       Throat:   Lips, tongue, gums normal   Neck:   Supple, symmetrical, trachea midline, no JVD   Lungs:     Clear to auscultation bilaterally, respirations unlabored   Chest Wall:    No tenderness or deformity    Heart:    Regular rate and rhythm, S1 and S2 normal, no murmur,no  Rub or gallop   Abdomen:     Soft, nontender, bowel sounds active, no masses, no organomegaly    Extremities:   Extremities normal, atraumatic, no cyanosis or edema   Pulses:      Skin:   Skin is warm and dry,  no rashes or palpable lesions   Neurologic:   no focal deficits noted      Lab Results (last 72 hours)     Procedure Component Value Units Date/Time    Hemoglobin & " Hematocrit, Blood [[id number]]  (Abnormal) Collected: 09/12/21 0818    Specimen: Blood Updated: 09/12/21 0851     Hemoglobin 10.2 g/dL      Hematocrit 33.5 %     Basic Metabolic Panel [[id number]]  (Abnormal) Collected: 09/11/21 2321    Specimen: Blood Updated: 09/12/21 0000     Glucose 150 mg/dL      BUN 21 mg/dL      Creatinine 0.92 mg/dL      Sodium 139 mmol/L      Potassium 3.6 mmol/L      Chloride 106 mmol/L      CO2 22.6 mmol/L      Calcium 8.5 mg/dL      eGFR Non African Amer 58 mL/min/1.73      BUN/Creatinine Ratio 22.8     Anion Gap 10.4 mmol/L     Narrative:      GFR Normal >60  Chronic Kidney Disease <60  Kidney Failure <15      Hemoglobin & Hematocrit, Blood [[id number]]  (Abnormal) Collected: 09/11/21 2321    Specimen: Blood Updated: 09/11/21 2338     Hemoglobin 9.9 g/dL      Hematocrit 30.6 %     CBC & Differential [[id number]]  (Abnormal) Collected: 09/11/21 2321    Specimen: Blood Updated: 09/11/21 2338    Narrative:      The following orders were created for panel order CBC & Differential.  Procedure                               Abnormality         Status                     ---------                               -----------         ------                     CBC Auto Differential[[id number]]        Abnormal            Final result                 Please view results for these tests on the individual orders.    CBC Auto Differential [[id number]]  (Abnormal) Collected: 09/11/21 2321    Specimen: Blood Updated: 09/11/21 2338     WBC 10.93 10*3/mm3      RBC 3.43 10*6/mm3      Hemoglobin 9.9 g/dL      Hematocrit 30.6 %      MCV 89.2 fL      MCH 28.9 pg      MCHC 32.4 g/dL      RDW 15.6 %      RDW-SD 50.2 fl      MPV 10.3 fL      Platelets 196 10*3/mm3      Neutrophil % 72.0 %      Lymphocyte % 18.8 %      Monocyte % 6.9 %      Eosinophil % 1.4 %      Basophil % 0.4 %      Immature Grans % 0.5 %      Neutrophils, Absolute 7.88 10*3/mm3      Lymphocytes, Absolute 2.06 10*3/mm3      Monocytes, Absolute 0.75  10*3/mm3      Eosinophils, Absolute 0.15 10*3/mm3      Basophils, Absolute 0.04 10*3/mm3      Immature Grans, Absolute 0.05 10*3/mm3      nRBC 0.0 /100 WBC     Hemoglobin & Hematocrit, Blood [[id number]]  (Abnormal) Collected: 09/11/21 1954    Specimen: Blood Updated: 09/11/21 2026     Hemoglobin 11.4 g/dL      Hematocrit 36.8 %     Basic Metabolic Panel [[id number]]  (Abnormal) Collected: 09/11/21 0705    Specimen: Blood Updated: 09/11/21 0753     Glucose 112 mg/dL      BUN 21 mg/dL      Creatinine 0.79 mg/dL      Sodium 137 mmol/L      Potassium 3.5 mmol/L      Chloride 102 mmol/L      CO2 24.0 mmol/L      Calcium 8.4 mg/dL      eGFR Non African Amer 69 mL/min/1.73      BUN/Creatinine Ratio 26.6     Anion Gap 11.0 mmol/L     Narrative:      GFR Normal >60  Chronic Kidney Disease <60  Kidney Failure <15      CBC & Differential [[id number]]  (Abnormal) Collected: 09/11/21 0705    Specimen: Blood Updated: 09/11/21 0724    Narrative:      The following orders were created for panel order CBC & Differential.  Procedure                               Abnormality         Status                     ---------                               -----------         ------                     CBC Auto Differential[[id number]]        Abnormal            Final result                 Please view results for these tests on the individual orders.    CBC Auto Differential [[id number]]  (Abnormal) Collected: 09/11/21 0705    Specimen: Blood Updated: 09/11/21 0724     WBC 10.56 10*3/mm3      RBC 3.86 10*6/mm3      Hemoglobin 11.1 g/dL      Hematocrit 34.6 %      MCV 89.6 fL      MCH 28.8 pg      MCHC 32.1 g/dL      RDW 15.7 %      RDW-SD 51.2 fl      MPV 10.3 fL      Platelets 216 10*3/mm3      Neutrophil % 69.5 %      Lymphocyte % 22.5 %      Monocyte % 6.3 %      Eosinophil % 0.9 %      Basophil % 0.3 %      Immature Grans % 0.5 %      Neutrophils, Absolute 7.34 10*3/mm3      Lymphocytes, Absolute 2.38 10*3/mm3      Monocytes, Absolute 0.67  10*3/mm3      Eosinophils, Absolute 0.09 10*3/mm3      Basophils, Absolute 0.03 10*3/mm3      Immature Grans, Absolute 0.05 10*3/mm3      nRBC 0.0 /100 WBC     Urinalysis With Culture If Indicated - Kidney, Left [[id number]]  (Normal) Collected: 09/11/21 0411    Specimen: Urine from Kidney, Left Updated: 09/11/21 0702     Color, UA Yellow     Appearance, UA Clear     pH, UA <=5.0     Specific Gravity, UA 1.018     Glucose, UA Negative     Ketones, UA Negative     Bilirubin, UA Negative     Blood, UA Negative     Protein, UA Negative     Leuk Esterase, UA Negative     Nitrite, UA Negative     Urobilinogen, UA 1.0 E.U./dL    Narrative:      Urine microscopic not indicated.    COVID PRE-OP / PRE-PROCEDURE SCREENING ORDER (NO ISOLATION) - Swab, Nasopharynx [[id number]]  (Normal) Collected: 09/10/21 2223    Specimen: Swab from Nasopharynx Updated: 09/11/21 0241    Narrative:      The following orders were created for panel order COVID PRE-OP / PRE-PROCEDURE SCREENING ORDER (NO ISOLATION) - Swab, Nasopharynx.  Procedure                               Abnormality         Status                     ---------                               -----------         ------                     COVID-19,APTIMA PANTHER(...[[id number]]  Normal              Final result                 Please view results for these tests on the individual orders.    COVID-19,APTIMA PANTHER(BISHOP), LASHON, NP/OP SWAB IN UTM/VTM/SALINE TRANSPORT MEDIA,24 HR TAT - Swab, Nasopharynx [[id number]]  (Normal) Collected: 09/10/21 2223    Specimen: Swab from Nasopharynx Updated: 09/11/21 0241     COVID19 Not Detected    Narrative:      Fact sheet for providers: https://www.fda.gov/media/199529/download     Fact sheet for patients: https://www.fda.gov/media/026929/download    Test performed by RT PCR.    Urinalysis, Microscopic Only - Urine, Catheter [[id number]]  (Abnormal) Collected: 09/10/21 2003    Specimen: Urine, Catheter Updated: 09/10/21 2031     RBC, UA 0-2 /HPF       WBC, UA 0-2 /HPF      Bacteria, UA 1+ /HPF      Squamous Epithelial Cells, UA 7-12 /HPF      Hyaline Casts, UA 3-6 /LPF      Methodology Automated Microscopy    Urinalysis With Microscopic If Indicated (No Culture) - Urine, Catheter [[id number]]  (Abnormal) Collected: 09/10/21 2003    Specimen: Urine, Catheter Updated: 09/10/21 2031     Color, UA Dark Yellow     Appearance, UA Cloudy     pH, UA <=5.0     Specific Gravity, UA 1.020     Glucose, UA Negative     Ketones, UA Trace     Bilirubin, UA Negative     Blood, UA Large (3+)     Protein, UA Trace     Leuk Esterase, UA Trace     Nitrite, UA Negative     Urobilinogen, UA 1.0 E.U./dL    Comprehensive Metabolic Panel [[id number]]  (Abnormal) Collected: 09/10/21 1742    Specimen: Blood Updated: 09/10/21 1818     Glucose 145 mg/dL      BUN 23 mg/dL      Creatinine 1.01 mg/dL      Sodium 137 mmol/L      Potassium 3.8 mmol/L      Chloride 101 mmol/L      CO2 22.9 mmol/L      Calcium 8.9 mg/dL      Total Protein 6.2 g/dL      Albumin 3.60 g/dL      ALT (SGPT) 17 U/L      AST (SGOT) 24 U/L      Alkaline Phosphatase 82 U/L      Total Bilirubin 0.7 mg/dL      eGFR Non African Amer 52 mL/min/1.73      Globulin 2.6 gm/dL      A/G Ratio 1.4 g/dL      BUN/Creatinine Ratio 22.8     Anion Gap 13.1 mmol/L     Narrative:      GFR Normal >60  Chronic Kidney Disease <60  Kidney Failure <15      Troponin [[id number]]  (Normal) Collected: 09/10/21 1742    Specimen: Blood Updated: 09/10/21 1817     Troponin T <0.010 ng/mL     Narrative:      Troponin T Reference Range:  <= 0.03 ng/mL-   Negative for AMI  >0.03 ng/mL-     Abnormal for myocardial necrosis.  Clinicians would have to utilize clinical acumen, EKG, Troponin and serial changes to determine if it is an Acute Myocardial Infarction or myocardial injury due to an underlying chronic condition.       Results may be falsely decreased if patient taking Biotin.      BNP [[id number]]  (Abnormal) Collected: 09/10/21 1742    Specimen:  Blood Updated: 09/10/21 1815     proBNP 1,908.0 pg/mL     Narrative:      Among patients with dyspnea, NT-proBNP is highly sensitive for the detection of acute congestive heart failure. In addition NT-proBNP of <300 pg/ml effectively rules out acute congestive heart failure with 99% negative predictive value.    Results may be falsely decreased if patient taking Biotin.      CBC & Differential [[id number]]  (Abnormal) Collected: 09/10/21 1742    Specimen: Blood Updated: 09/10/21 1754    Narrative:      The following orders were created for panel order CBC & Differential.  Procedure                               Abnormality         Status                     ---------                               -----------         ------                     CBC Auto Differential[[id number]]        Abnormal            Final result                 Please view results for these tests on the individual orders.    CBC Auto Differential [[id number]]  (Abnormal) Collected: 09/10/21 1742    Specimen: Blood Updated: 09/10/21 1754     WBC 11.12 10*3/mm3      RBC 4.20 10*6/mm3      Hemoglobin 11.8 g/dL      Hematocrit 36.8 %      MCV 87.6 fL      MCH 28.1 pg      MCHC 32.1 g/dL      RDW 15.5 %      RDW-SD 49.1 fl      MPV 10.1 fL      Platelets 238 10*3/mm3      Neutrophil % 69.7 %      Lymphocyte % 20.9 %      Monocyte % 7.8 %      Eosinophil % 0.7 %      Basophil % 0.3 %      Immature Grans % 0.6 %      Neutrophils, Absolute 7.75 10*3/mm3      Lymphocytes, Absolute 2.32 10*3/mm3      Monocytes, Absolute 0.87 10*3/mm3      Eosinophils, Absolute 0.08 10*3/mm3      Basophils, Absolute 0.03 10*3/mm3      Immature Grans, Absolute 0.07 10*3/mm3      nRBC 0.0 /100 WBC         Data Review:  Results from last 7 days   Lab Units 09/11/21  2321 09/11/21  0705 09/11/21  0705 09/10/21  1742 09/10/21  1742   SODIUM mmol/L 139  --  137  --  137   POTASSIUM mmol/L 3.6  --  3.5  --  3.8   CHLORIDE mmol/L 106  --  102  --  101   CO2 mmol/L 22.6  --  24.0  --   22.9   BUN mg/dL 21  --  21  --  23   CREATININE mg/dL 0.92  --  0.79  --  1.01*   GLUCOSE mg/dL 150*   < > 112*   < > 145*   CALCIUM mg/dL 8.5*  --  8.4*  --  8.9    < > = values in this interval not displayed.     Results from last 7 days   Lab Units 09/12/21  0818 09/11/21  2321 09/11/21  1954 09/11/21  0705 09/11/21  0705 09/10/21  1742 09/10/21  1742   WBC 10*3/mm3  --  10.93*  --   --  10.56  --  11.12*   HEMOGLOBIN g/dL 10.2* 9.9*  9.9* 11.4*   < > 11.1*   < > 11.8*   HEMATOCRIT % 33.5* 30.6*  30.6* 36.8   < > 34.6   < > 36.8   PLATELETS 10*3/mm3  --  196  --   --  216  --  238    < > = values in this interval not displayed.             Lab Results   Lab Value Date/Time    TROPONINT <0.010 09/10/2021 1742    TROPONINT <0.010 09/06/2021 1135    TROPONINT <0.010 01/14/2020 1850    TROPONINT <0.010 01/04/2020 2007    TROPONINT <0.010 10/21/2016 2331    TROPONINT <0.010 10/21/2016 2125    TROPONINT <0.01 08/25/2014 1135         Results from last 7 days   Lab Units 09/10/21  1742 09/06/21  1135   ALK PHOS U/L 82 108   BILIRUBIN mg/dL 0.7 0.5   ALT (SGPT) U/L 17 9   AST (SGOT) U/L 24 16             No results found for: POCGLU        Past Medical History:   Diagnosis Date   • Atrial fibrillation (CMS/HCC)    • Hyperlipidemia    • Hypertension    • Lacunar infarct, 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   • New onset atrial fibrillation (CMS/HCC) 01/2020    now on rate control along with Eliquis   • Prolapsed uterus     per patient   • Stroke (CMS/HCC)    • TIA (transient ischemic attack) 01/2020   • Weakness        Assessment:  Active Hospital Problems    Diagnosis  POA   • **General weakness [R53.1]  Yes   • Rectal bleeding [K62.5]  Unknown   • Hematuria [R31.9]  Yes   • Dizziness and giddiness [R42]  Yes   • History of CVA (cerebrovascular accident) [Z86.73]  Not Applicable   • Anticoagulated [Z79.01]  Not Applicable   • Paroxysmal atrial fibrillation (CMS/HCC) [I48.0]   Yes   • Hyperlipidemia [E78.5]  Yes   • Hypertension [I10]  Yes   • Gastroesophageal reflux disease with esophagitis [K21.00]  Yes      Resolved Hospital Problems   No resolved problems to display.       Plan:  Neurology consult noted. Ask for GI consult and follow lab.      Howie Wilkins MD  9/12/2021  13:21 EDT

## 2021-09-12 NOTE — NURSING NOTE
Pt passed blood with both urination and BM during the shift, nurse held eliquis and notified Dorene Robles APRN, okay to hold med for tonight received. Pt ordered for occult blood and stat H&H. Will continue to monitor.

## 2021-09-12 NOTE — PLAN OF CARE
Goal Outcome Evaluation:         Pt is A&Ox 4, RA, pt ambulates to the restroom with a walker and 1 person assist. Pt denies any dizziness at this time. Pt passing a lot of blood when pees and bowel movement, nurse talked to Dorene TORO, order to hold med for the night received. Stable vital signs. Will continue to monitor.

## 2021-09-12 NOTE — PLAN OF CARE
Goal Outcome Evaluation:   Admitted for weakness. Blood in stool and urine on night shift. No bloody noted for me in urine today. VSS. Assist x1. Awaiting GI consult.

## 2021-09-13 ENCOUNTER — APPOINTMENT (OUTPATIENT)
Dept: CT IMAGING | Facility: HOSPITAL | Age: 86
End: 2021-09-13

## 2021-09-13 LAB
ANION GAP SERPL CALCULATED.3IONS-SCNC: 10.8 MMOL/L (ref 5–15)
BASOPHILS # BLD AUTO: 0.03 10*3/MM3 (ref 0–0.2)
BASOPHILS NFR BLD AUTO: 0.3 % (ref 0–1.5)
BUN SERPL-MCNC: 11 MG/DL (ref 8–23)
BUN/CREAT SERPL: 16.9 (ref 7–25)
CALCIUM SPEC-SCNC: 8.6 MG/DL (ref 8.6–10.5)
CHLORIDE SERPL-SCNC: 105 MMOL/L (ref 98–107)
CO2 SERPL-SCNC: 22.2 MMOL/L (ref 22–29)
CREAT SERPL-MCNC: 0.65 MG/DL (ref 0.57–1)
DEPRECATED RDW RBC AUTO: 52 FL (ref 37–54)
EOSINOPHIL # BLD AUTO: 0.21 10*3/MM3 (ref 0–0.4)
EOSINOPHIL NFR BLD AUTO: 2.3 % (ref 0.3–6.2)
ERYTHROCYTE [DISTWIDTH] IN BLOOD BY AUTOMATED COUNT: 15.6 % (ref 12.3–15.4)
GFR SERPL CREATININE-BSD FRML MDRD: 86 ML/MIN/1.73
GLUCOSE SERPL-MCNC: 168 MG/DL (ref 65–99)
HCT VFR BLD AUTO: 30.5 % (ref 34–46.6)
HGB BLD-MCNC: 9.5 G/DL (ref 12–15.9)
IMM GRANULOCYTES # BLD AUTO: 0.06 10*3/MM3 (ref 0–0.05)
IMM GRANULOCYTES NFR BLD AUTO: 0.7 % (ref 0–0.5)
LYMPHOCYTES # BLD AUTO: 1.82 10*3/MM3 (ref 0.7–3.1)
LYMPHOCYTES NFR BLD AUTO: 19.7 % (ref 19.6–45.3)
MCH RBC QN AUTO: 28.4 PG (ref 26.6–33)
MCHC RBC AUTO-ENTMCNC: 31.1 G/DL (ref 31.5–35.7)
MCV RBC AUTO: 91 FL (ref 79–97)
MONOCYTES # BLD AUTO: 0.64 10*3/MM3 (ref 0.1–0.9)
MONOCYTES NFR BLD AUTO: 6.9 % (ref 5–12)
NEUTROPHILS NFR BLD AUTO: 6.47 10*3/MM3 (ref 1.7–7)
NEUTROPHILS NFR BLD AUTO: 70.1 % (ref 42.7–76)
NRBC BLD AUTO-RTO: 0 /100 WBC (ref 0–0.2)
PLATELET # BLD AUTO: 224 10*3/MM3 (ref 140–450)
PMV BLD AUTO: 10.3 FL (ref 6–12)
POTASSIUM SERPL-SCNC: 3.1 MMOL/L (ref 3.5–5.2)
RBC # BLD AUTO: 3.35 10*6/MM3 (ref 3.77–5.28)
SODIUM SERPL-SCNC: 138 MMOL/L (ref 136–145)
WBC # BLD AUTO: 9.23 10*3/MM3 (ref 3.4–10.8)

## 2021-09-13 PROCEDURE — G0378 HOSPITAL OBSERVATION PER HR: HCPCS

## 2021-09-13 PROCEDURE — 80048 BASIC METABOLIC PNL TOTAL CA: CPT | Performed by: INTERNAL MEDICINE

## 2021-09-13 PROCEDURE — 99214 OFFICE O/P EST MOD 30 MIN: CPT | Performed by: NURSE PRACTITIONER

## 2021-09-13 PROCEDURE — 36415 COLL VENOUS BLD VENIPUNCTURE: CPT | Performed by: INTERNAL MEDICINE

## 2021-09-13 PROCEDURE — 99214 OFFICE O/P EST MOD 30 MIN: CPT | Performed by: PHYSICIAN ASSISTANT

## 2021-09-13 PROCEDURE — 74177 CT ABD & PELVIS W/CONTRAST: CPT

## 2021-09-13 PROCEDURE — 0 DIATRIZOATE MEGLUMINE & SODIUM PER 1 ML: Performed by: HOSPITALIST

## 2021-09-13 PROCEDURE — 25010000002 IOPAMIDOL 61 % SOLUTION: Performed by: HOSPITALIST

## 2021-09-13 PROCEDURE — 85025 COMPLETE CBC W/AUTO DIFF WBC: CPT | Performed by: INTERNAL MEDICINE

## 2021-09-13 RX ADMIN — METOPROLOL TARTRATE 25 MG: 25 TABLET, FILM COATED ORAL at 05:59

## 2021-09-13 RX ADMIN — ATORVASTATIN CALCIUM 40 MG: 20 TABLET, FILM COATED ORAL at 22:21

## 2021-09-13 RX ADMIN — SODIUM CHLORIDE 75 ML/HR: 9 INJECTION, SOLUTION INTRAVENOUS at 06:08

## 2021-09-13 RX ADMIN — PANTOPRAZOLE SODIUM 40 MG: 40 TABLET, DELAYED RELEASE ORAL at 05:59

## 2021-09-13 RX ADMIN — DIATRIZOATE MEGLUMINE AND DIATRIZOATE SODIUM 30 ML: 600; 100 SOLUTION ORAL; RECTAL at 10:45

## 2021-09-13 RX ADMIN — HYDROCORTISONE ACETATE PRAMOXINE HCL: 2.5; 1 CREAM TOPICAL at 20:42

## 2021-09-13 RX ADMIN — FELODIPINE 10 MG: 5 TABLET, FILM COATED, EXTENDED RELEASE ORAL at 09:33

## 2021-09-13 RX ADMIN — IOPAMIDOL 85 ML: 612 INJECTION, SOLUTION INTRAVENOUS at 12:26

## 2021-09-13 RX ADMIN — METOPROLOL TARTRATE 25 MG: 25 TABLET, FILM COATED ORAL at 18:33

## 2021-09-13 RX ADMIN — APIXABAN 5 MG: 5 TABLET, FILM COATED ORAL at 05:59

## 2021-09-13 RX ADMIN — ASPIRIN 81 MG: 81 TABLET, CHEWABLE ORAL at 09:33

## 2021-09-13 RX ADMIN — HYDROCORTISONE ACETATE PRAMOXINE HCL: 2.5; 1 CREAM TOPICAL at 09:33

## 2021-09-13 RX ADMIN — SODIUM CHLORIDE 75 ML/HR: 9 INJECTION, SOLUTION INTRAVENOUS at 20:40

## 2021-09-13 NOTE — PLAN OF CARE
Goal Outcome Evaluation:  Plan of Care Reviewed With: patient, daughter        Progress: improving   Little change in pt tonight-she continues to bleed bright red blood from rectum and a large hemorrhoid is visible-rectal cream started per MD order. Pt also has prolapsed uterus which is visible when she goes to the BR-no stool tonight so no occult blood could be collected. Up with standby assist and walker in room-VSS-no complaints of pain. Daughter at bedside. Will continue to monitor and follow current POC.

## 2021-09-13 NOTE — CONSULTS
[facility]        Patient Name: Monica Scherer  Age/Sex: 88 y.o. female  : 1933  MRN: [id number]    Date of Admission: 9/10/2021  Date of Encounter Visit: 21  Encounter Provider: CORTEZ Morales  Referring Provider: Bladimir Costa MD  Place of Service: [facility] CARDIOLOGY  Patient Care Team:  Amilcar Mauricio DO as PCP - General (Family Medicine)    Subjective:     Chief Complaint: Rectal bleeding    Reason for consult: Preoperative cardiovascular risk assessment    History of Present Illness:  Monica Scherer is a 88 y.o. female who follows Dr. Dsouza in our office.  Patient was admitted with generalized weakness, rectal bleeding.  We have been asked to be seen for cardiovascular risk assessment for upcoming endoscopy.     Patient with history of persistent atrial fibrillation, prior stroke.    Patient presented to the [facility] emergency department on 9/10/2021 with complaints of generalized weakness and dizziness.  Patient also found to have rectal bleeding.  Patient has had CT head which is negative for acute findings.  MRI of the brain revealed no evidence of stroke, mass, hemorrhage.  Did reveal advanced small vessel ischemic disease and evidence of chronic remote cerebellar infarcts.  Patient has had TTE which reveals LVEF 60%.  No PFO.  Neurology has signed off.  Patient has had urinalysis that have been positive for hematuria and there was concerned that bleeding could be coming from her rectum.  Gastroenterology has been following hemoglobin levels.  They are considering colonoscopy if rectal bleeding persists and/or hemoglobin continues to drop.  Hemoglobin 9.5 this morning, 10.2 yesterday.    Patient is at bedside with daughter.  Reports that today she is starting to have increased energy.  She states that today she has had a bowel movement and has not had any blood in the stool.  At last office visit  Dr. Dsouza recommended that patient decrease aspirin to every other day dosing.  Patient has been doing this at home however in the hospital it is been dosed daily.  She has been compliant with her apixaban 5 mg twice daily.  Patient has never had a colonoscopy for routine assessment.    Prior Cardiac Testin. Echocardiogram 2020.  LVEF 62%.  Left atrium borderline dilated.  Mild aortic valve insufficiency.  Mild mitral valve regurgitation.  Mild tricuspid valve regurgitation.  2. Echocardiogram 2021.  LVEF 60%.  Mild aortic valve regurgitation.  Estimated RVSP less than 35 mmHg.      Past Medical History:  Past Medical History:   Diagnosis Date   • Atrial fibrillation (CMS/HCC)    • Hyperlipidemia    • Hypertension    • Lacunar infarct, acute (CMS/HCC) 2020    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   • New onset atrial fibrillation (CMS/HCC) 2020    now on rate control along with Eliquis   • Prolapsed uterus     per patient   • Stroke (CMS/HCC)    • TIA (transient ischemic attack) 2020   • Weakness        Past Surgical History:   Procedure Laterality Date   • APPENDECTOMY     • CHOLECYSTECTOMY N/A 2016    Procedure: CHOLECYSTECTOMY LAPAROSCOPIC;  Surgeon: Russ Gar MD;  Location: [facility] OR [facility] [place];  Service:    • ENDOSCOPY N/A 2018    Z-line regular, 35 cm from incisors, normal esophagus, gastritis, bilious gastric fluid, one non-bleeding duodenal ulcer w/no stigmata of bleeding, Path: DUODENUM: FRAGMENTS OF GASTRIC TYPE MUCOSA WITH HYPERPLASIA (FOVEOLAR) AND PATCHY MIXED INFLAMMATION IN THE LAMINA PROPRIA WITH FOCAL FIBROSIS, COMMENT: 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    • EYE SURGERY      tre cataracts       Home Medications:   Medications Prior to Admission   Medication Sig Dispense Refill Last Dose   • aspirin 81 MG chewable tablet CHEW AND SWALLOW 1 TABLET BY MOUTH EVERY DAY 90 tablet 2 Patient Taking Differently at Unknown time   • alendronate  (Fosamax) 70 MG tablet Take 1 tablet by mouth Every 7 (Seven) Days. 12 tablet 3    • atorvastatin (LIPITOR) 80 MG tablet TAKE 1 TABLET BY MOUTH EVERY DAY (Patient taking differently: 40 mg Daily.) 90 tablet 2    • Eliquis 5 MG tablet tablet TAKE 1 TABLET BY MOUTH EVERY 12 HOURS 60 tablet 5    • felodipine (PLENDIL) 10 MG 24 hr tablet TAKE 1 TABLET BY MOUTH DAILY 90 tablet 3    • meclizine (ANTIVERT) 25 MG tablet Take 2 tablets by mouth Every 6 (Six) Hours As Needed for Dizziness. 40 tablet 0    • metoprolol tartrate (LOPRESSOR) 25 MG tablet TAKE 1 TABLET BY MOUTH EVERY 12 HOURS 180 tablet 3    • omeprazole (priLOSEC) 20 MG capsule Take 1 capsule by mouth Daily. 30 capsule 11        Allergies:  Allergies   Allergen Reactions   • Cephalexin Rash   • Latex Itching       Past Social History:  Social History     Socioeconomic History   • Marital status: Single     Spouse name: Not on file   • Number of children: Not on file   • Years of education: Not on file   • Highest education level: Not on file   Tobacco Use   • Smoking status: Never Smoker   • Smokeless tobacco: Never Used   Substance and Sexual Activity   • Alcohol use: No   • Drug use: No   • Sexual activity: Defer       Past Family History:  History reviewed. No pertinent family history.    Review of Systems   Constitutional: Positive for malaise/fatigue. Negative for chills, fever, weight gain and weight loss.   Cardiovascular: Negative for leg swelling.   Respiratory: Negative for cough, snoring and wheezing.    Hematologic/Lymphatic: Negative for bleeding problem. Does not bruise/bleed easily.   Skin: Negative for color change.   Musculoskeletal: Negative for falls, joint pain and myalgias.   Gastrointestinal: Positive for hemorrhoids and melena.   Genitourinary: Negative for hematuria.   Neurological: Positive for weakness. Negative for excessive daytime sleepiness.   Psychiatric/Behavioral: Negative for depression. The patient is not nervous/anxious.           Objective:   Temp:  [97.3 °F (36.3 °C)-98.4 °F (36.9 °C)] 98.4 °F (36.9 °C)  Heart Rate:  [62-93] 67  Resp:  [16] 16  BP: (102-137)/(60-85) 102/60     Intake/Output Summary (Last 24 hours) at 9/13/2021 1542  Last data filed at 9/13/2021 0608  Gross per 24 hour   Intake 2917 ml   Output 300 ml   Net 2617 ml     Body mass index is 29.18 kg/m².      09/10/21  1733 09/11/21  1231   Weight: 77.1 kg (170 lb) 77.1 kg (170 lb)     Weight change:     Vitals and nursing note reviewed.   Constitutional:       Appearance: Normal appearance. Well-developed.   Eyes:      Conjunctiva/sclera: Conjunctivae normal.   Neck:      Vascular: No carotid bruit.   Pulmonary:      Breath sounds: Normal breath sounds.   Cardiovascular:      Normal rate. Irregularly irregular rhythm. Normal S1 with normal intensity. Normal S2 with normal intensity.      Murmurs: There is no murmur.      No gallop. No click. No rub.   Musculoskeletal: Normal range of motion. Skin:     General: Skin is warm and dry.   Neurological:      Mental Status: Alert and oriented to person, place, and time.      GCS: GCS eye subscore is 4. GCS verbal subscore is 5. GCS motor subscore is 6.   Psychiatric:         Speech: Speech normal.         Behavior: Behavior normal.         Thought Content: Thought content normal.         Judgment: Judgment normal.           Lab Review:   Results from last 7 days   Lab Units 09/13/21  1152 09/11/21  2321 09/11/21  0705 09/10/21  1742   SODIUM mmol/L 138 139 137 137   POTASSIUM mmol/L 3.1* 3.6 3.5 3.8   CHLORIDE mmol/L 105 106 102 101   CO2 mmol/L 22.2 22.6 24.0 22.9   BUN mg/dL 11 21 21 23   CREATININE mg/dL 0.65 0.92 0.79 1.01*   GLUCOSE mg/dL 168* 150* 112* 145*   CALCIUM mg/dL 8.6 8.5* 8.4* 8.9   AST (SGOT) U/L  --   --   --  24   ALT (SGPT) U/L  --   --   --  17     Results from last 7 days   Lab Units 09/10/21  1742   TROPONIN T ng/mL <0.010     Results from last 7 days   Lab Units 09/13/21  1152 09/12/21  0818  09/11/21  2321 09/11/21 1954 09/11/21  0705 09/10/21  1742   WBC 10*3/mm3 9.23  --  10.93*  --  10.56 11.12*   HEMOGLOBIN g/dL 9.5* 10.2* 9.9*  9.9* 11.4* 11.1* 11.8*   HEMATOCRIT % 30.5* 33.5* 30.6*  30.6* 36.8 34.6 36.8   PLATELETS 10*3/mm3 224  --  196  --  216 238                   Invalid input(s): LDLCALC  Results from last 7 days   Lab Units 09/10/21  1742   PROBNP pg/mL 1,908.0*           Echo EF Estimated  Lab Results   Component Value Date    ECHOEFEST 60 09/11/2021       EKG:   I personally viewed and interpreted the patient's EKG    Imaging:  Imaging Results (Most Recent)     Procedure Component Value Units Date/Time    CT Abdomen Pelvis With Contrast [[id number]] Collected: 09/13/21 1347     Updated: 09/13/21 1348    Narrative:      CT ABDOMEN AND PELVIS WITH IV CONTRAST     HISTORY: 88-year-old female with abdominal pain.  Bright red blood per  rectum. Cholecystectomy, appendectomy, hysterectomy in the past.     TECHNIQUE: Radiation dose reduction techniques were utilized, including  automated exposure control and exposure modulation based on body size.   3 mm images were obtained through the abdomen and pelvis after the  administration of IV contrast. Compared with previous CT 02/18/2018.     FINDINGS: The liver appears unremarkable and there is no biliary  dilatation. The spleen, pancreas, adrenals, and left kidney appear  unremarkable other than 2 subcentimeter left renal cysts. There is a 1.1  cm slightly hyperdense right renal lesion which previously measured 5  mm, image 58. There is moderately extensive sigmoid diverticulosis  without evidence for diverticulitis. No acute bowel abnormality is seen.  There is extensive pelvic floor relaxation with vaginal prolapse and a  rectocele. The urinary bladder is partially collapsed, but there is  still a cystocele. There are moderately extensive abdominal aortic  atherosclerotic changes without aneurysmal dilatation. There are no  airspace opacities or  effusions at the visualized lower lung fields.  There is a stable small right upper ventral hernia containing fat, image  35.       Impression:      1. Moderately extensive sigmoid diverticulosis without evidence for  diverticulitis.  2. Extensive pelvic floor relaxation with vaginal prolapse, prominent  rectocele, and a small cystocele. The cystocele may enlarge with more  bladder distention.  3. Slight increase in size of a 1.1 cm slightly hyperdense right renal  cortical lesion since 2018.       MRI Brain Without Contrast [[id number]] Collected: 09/11/21 1404     Updated: 09/12/21 1250    Narrative:      MRI EXAMINATION BRAIN WITHOUT CONTRAST     HISTORY: Dizziness, nausea.     COMPARISON: Comparison is made to multiple prior CT examinations of the  brain with the most recent examination being the CT examination of  09/06/2021 as well as to the MRI examination of 01/05/2020.     TECHNIQUE: A MRI examination of the brain was performed utilizing  sagittal T1, axial diffusion, T1, T2, T2 FLAIR and gradient echo T2  imaging.     FINDINGS: There is no evidence of restricted diffusion to suggest acute  infarction. The axial T2 FLAIR sequence demonstrates at least moderate  small vessel ischemic disease. The appearance is similar to the prior  examination. A remote infarct involving the right cerebellar hemisphere  is noted laterally which was present on the prior MRI examination. A  smaller remote infarct involving the left cerebellar hemisphere is noted  superiorly, also present previously. There is no evidence of mass or  mass effect on this noncontrasted MRI examination of the brain. There is  expected flow-void in the basilar artery and in the distal aspect of the  internal carotid arteries bilaterally on the axial T2 sequence. Fluid is  present within the mastoid air cells on the left, similar in volume as  compared to the prior MRI examination.       Impression:      Small vessel ischemic disease, left mastoid  fluid collection  and small remote cerebellar infarcts are appreciated as described in  detail above. There is no evidence of acute infarction or mass/mass  effect on this noncontrasted MRI examination of the brain.     This report was finalized on 9/12/2021 12:46 PM by Dr. Florencio Judge M.D.       XR Chest 1 View [[id number]] Collected: 09/10/21 1843     Updated: 09/10/21 2025    Narrative:      PORTABLE CHEST     HISTORY: Weakness.     COMPARISON: 01/04/2020.     FINDINGS: The heart is within normal limits in size. There is no  evidence of infiltrate, effusion or of congestive failure.     This report was finalized on 9/10/2021 8:22 PM by Dr. Florencio Judge M.D.                 Assessment:       General weakness    Gastroesophageal reflux disease with esophagitis    Hypertension    Hyperlipidemia    Paroxysmal atrial fibrillation (CMS/HCC)    History of CVA (cerebrovascular accident)    Anticoagulated    Hematuria    Dizziness and giddiness    Rectal bleeding        Plan:     1. Generalized weakness  2. Hypertension  3. Hyperlipidemia  4. PAF  5. History of stroke  6. Hematuria  7. Rectal bleeding  8. Low hemoglobin    Monica Scherer is a 88 y.o. female who is admitted for evaluation of generalized weakness.  Patient has had a CT of the head as well as MRI of the brain which has been negative for mass, stroke, hemorrhage.  She has had some episodes of rectal bleeding and is currently anticoagulated with apixaban.  Patient has never had a colonoscopy.  Hemoglobin this morning 9.5 from 10.2 yesterday.  Gastroenterology has performed a CT of the abdomen and pelvis which reveals diverticulosis.  They recommended a colonoscopy to be performed and have requested that we evaluate patient to stop apixaban.    This is complex as patient has known atrial fibrillation with history of stroke that was thought to be caused by A. fib.  However, she is having episodes of active GI bleeding with anemia and is never had a  colonoscopy.  I think that at this point it is best to hold both aspirin and apixaban and plans for possible colonoscopy.  Our service will chart check patient tomorrow and will reevaluate patient on 9/15/2021.  Please notify our service if new cardiac episodes arise.    Thank you for allowing me to participate in the care of Monica Scherer. Feel free to contact me directly with any further questions or concerns.    CORTEZ Morales  [facility]  09/13/21  14:14 EDT      EMR Dragon/Transcription disclaimer:   Much of this encounter note is an electronic transcription/translation of spoken language to printed text. The electronic translation of spoken language may permit erroneous, or at times, nonsensical words or phrases to be inadvertently transcribed; Although I have reviewed the note for such errors, some may still exist.

## 2021-09-13 NOTE — PROGRESS NOTES
"DAILY PROGRESS NOTE  [facility]    Patient Identification:  Name: Monica Scherer  Age: 88 y.o.  Sex: female  :  1933  MRN: [id number]         Primary Care Physician: Amilcar Mauricio DO    Subjective:  Interval History:She complains of some rectal bleeding.    Objective:    Scheduled Meds:apixaban, 5 mg, Oral, Q12H  aspirin, 81 mg, Oral, Daily  atorvastatin, 40 mg, Oral, Nightly  felodipine, 10 mg, Oral, Daily  Hydrocort-Pramoxine (Perianal), , Rectal, BID  metoprolol tartrate, 25 mg, Oral, Q12H  pantoprazole, 40 mg, Oral, QAM      Continuous Infusions:sodium chloride, 75 mL/hr, Last Rate: 75 mL/hr (21 0608)        Vital signs in last 24 hours:  Temp:  [97.3 °F (36.3 °C)-98 °F (36.7 °C)] 97.6 °F (36.4 °C)  Heart Rate:  [62-93] 82  Resp:  [16] 16  BP: (115-137)/(70-85) 137/85    Intake/Output:    Intake/Output Summary (Last 24 hours) at 2021 1424  Last data filed at 2021 0608  Gross per 24 hour   Intake 2917 ml   Output 300 ml   Net 2617 ml       Exam:  /85 (BP Location: Left arm, Patient Position: Lying)   Pulse 82   Temp 97.6 °F (36.4 °C) (Oral)   Resp 16   Ht 162.6 cm (64\")   Wt 77.1 kg (170 lb)   SpO2 98%   BMI 29.18 kg/m²     General Appearance:    Alert, cooperative, no distress   Head:    Normocephalic, without obvious abnormality, atraumatic   Eyes:       Throat:   Lips, tongue, gums normal   Neck:   Supple, symmetrical, trachea midline, no JVD   Lungs:     Clear to auscultation bilaterally, respirations unlabored   Chest Wall:    No tenderness or deformity    Heart:    Regular rate and rhythm, S1 and S2 normal, no murmur,no  Rub or gallop   Abdomen:     Soft, nontender, bowel sounds active, no masses, no organomegaly    Extremities:   Extremities normal, atraumatic, no cyanosis or edema   Pulses:      Skin:   Skin is warm and dry,  no rashes or palpable lesions   Neurologic:   no focal deficits noted      Lab Results (last 72 hours)     Procedure " Component Value Units Date/Time    Hemoglobin & Hematocrit, Blood [[id number]]  (Abnormal) Collected: 09/12/21 0818    Specimen: Blood Updated: 09/12/21 0851     Hemoglobin 10.2 g/dL      Hematocrit 33.5 %     Basic Metabolic Panel [[id number]]  (Abnormal) Collected: 09/11/21 2321    Specimen: Blood Updated: 09/12/21 0000     Glucose 150 mg/dL      BUN 21 mg/dL      Creatinine 0.92 mg/dL      Sodium 139 mmol/L      Potassium 3.6 mmol/L      Chloride 106 mmol/L      CO2 22.6 mmol/L      Calcium 8.5 mg/dL      eGFR Non African Amer 58 mL/min/1.73      BUN/Creatinine Ratio 22.8     Anion Gap 10.4 mmol/L     Narrative:      GFR Normal >60  Chronic Kidney Disease <60  Kidney Failure <15      Hemoglobin & Hematocrit, Blood [[id number]]  (Abnormal) Collected: 09/11/21 2321    Specimen: Blood Updated: 09/11/21 2338     Hemoglobin 9.9 g/dL      Hematocrit 30.6 %     CBC & Differential [[id number]]  (Abnormal) Collected: 09/11/21 2321    Specimen: Blood Updated: 09/11/21 2338    Narrative:      The following orders were created for panel order CBC & Differential.  Procedure                               Abnormality         Status                     ---------                               -----------         ------                     CBC Auto Differential[[id number]]        Abnormal            Final result                 Please view results for these tests on the individual orders.    CBC Auto Differential [[id number]]  (Abnormal) Collected: 09/11/21 2321    Specimen: Blood Updated: 09/11/21 2338     WBC 10.93 10*3/mm3      RBC 3.43 10*6/mm3      Hemoglobin 9.9 g/dL      Hematocrit 30.6 %      MCV 89.2 fL      MCH 28.9 pg      MCHC 32.4 g/dL      RDW 15.6 %      RDW-SD 50.2 fl      MPV 10.3 fL      Platelets 196 10*3/mm3      Neutrophil % 72.0 %      Lymphocyte % 18.8 %      Monocyte % 6.9 %      Eosinophil % 1.4 %      Basophil % 0.4 %      Immature Grans % 0.5 %      Neutrophils, Absolute 7.88 10*3/mm3      Lymphocytes,  Absolute 2.06 10*3/mm3      Monocytes, Absolute 0.75 10*3/mm3      Eosinophils, Absolute 0.15 10*3/mm3      Basophils, Absolute 0.04 10*3/mm3      Immature Grans, Absolute 0.05 10*3/mm3      nRBC 0.0 /100 WBC     Hemoglobin & Hematocrit, Blood [[id number]]  (Abnormal) Collected: 09/11/21 1954    Specimen: Blood Updated: 09/11/21 2026     Hemoglobin 11.4 g/dL      Hematocrit 36.8 %     Basic Metabolic Panel [[id number]]  (Abnormal) Collected: 09/11/21 0705    Specimen: Blood Updated: 09/11/21 0753     Glucose 112 mg/dL      BUN 21 mg/dL      Creatinine 0.79 mg/dL      Sodium 137 mmol/L      Potassium 3.5 mmol/L      Chloride 102 mmol/L      CO2 24.0 mmol/L      Calcium 8.4 mg/dL      eGFR Non African Amer 69 mL/min/1.73      BUN/Creatinine Ratio 26.6     Anion Gap 11.0 mmol/L     Narrative:      GFR Normal >60  Chronic Kidney Disease <60  Kidney Failure <15      CBC & Differential [[id number]]  (Abnormal) Collected: 09/11/21 0705    Specimen: Blood Updated: 09/11/21 0724    Narrative:      The following orders were created for panel order CBC & Differential.  Procedure                               Abnormality         Status                     ---------                               -----------         ------                     CBC Auto Differential[[id number]]        Abnormal            Final result                 Please view results for these tests on the individual orders.    CBC Auto Differential [[id number]]  (Abnormal) Collected: 09/11/21 0705    Specimen: Blood Updated: 09/11/21 0724     WBC 10.56 10*3/mm3      RBC 3.86 10*6/mm3      Hemoglobin 11.1 g/dL      Hematocrit 34.6 %      MCV 89.6 fL      MCH 28.8 pg      MCHC 32.1 g/dL      RDW 15.7 %      RDW-SD 51.2 fl      MPV 10.3 fL      Platelets 216 10*3/mm3      Neutrophil % 69.5 %      Lymphocyte % 22.5 %      Monocyte % 6.3 %      Eosinophil % 0.9 %      Basophil % 0.3 %      Immature Grans % 0.5 %      Neutrophils, Absolute 7.34 10*3/mm3      Lymphocytes,  Absolute 2.38 10*3/mm3      Monocytes, Absolute 0.67 10*3/mm3      Eosinophils, Absolute 0.09 10*3/mm3      Basophils, Absolute 0.03 10*3/mm3      Immature Grans, Absolute 0.05 10*3/mm3      nRBC 0.0 /100 WBC     Urinalysis With Culture If Indicated - Kidney, Left [[id number]]  (Normal) Collected: 09/11/21 0411    Specimen: Urine from Kidney, Left Updated: 09/11/21 0702     Color, UA Yellow     Appearance, UA Clear     pH, UA <=5.0     Specific Gravity, UA 1.018     Glucose, UA Negative     Ketones, UA Negative     Bilirubin, UA Negative     Blood, UA Negative     Protein, UA Negative     Leuk Esterase, UA Negative     Nitrite, UA Negative     Urobilinogen, UA 1.0 E.U./dL    Narrative:      Urine microscopic not indicated.    COVID PRE-OP / PRE-PROCEDURE SCREENING ORDER (NO ISOLATION) - Swab, Nasopharynx [[id number]]  (Normal) Collected: 09/10/21 2223    Specimen: Swab from Nasopharynx Updated: 09/11/21 0241    Narrative:      The following orders were created for panel order COVID PRE-OP / PRE-PROCEDURE SCREENING ORDER (NO ISOLATION) - Swab, Nasopharynx.  Procedure                               Abnormality         Status                     ---------                               -----------         ------                     COVID-19,APTIMA PANTHER(...[[id number]]  Normal              Final result                 Please view results for these tests on the individual orders.    COVID-19,APTIMA PANTHER(BISHOP), LASHON, NP/OP SWAB IN UTM/VTM/SALINE TRANSPORT MEDIA,24 HR TAT - Swab, Nasopharynx [[id number]]  (Normal) Collected: 09/10/21 2223    Specimen: Swab from Nasopharynx Updated: 09/11/21 0241     COVID19 Not Detected    Narrative:      Fact sheet for providers: https://www.fda.gov/media/256281/download     Fact sheet for patients: https://www.fda.gov/media/421121/download    Test performed by RT PCR.    Urinalysis, Microscopic Only - Urine, Catheter [[id number]]  (Abnormal) Collected: 09/10/21 2003    Specimen: Urine,  Catheter Updated: 09/10/21 2031     RBC, UA 0-2 /HPF      WBC, UA 0-2 /HPF      Bacteria, UA 1+ /HPF      Squamous Epithelial Cells, UA 7-12 /HPF      Hyaline Casts, UA 3-6 /LPF      Methodology Automated Microscopy    Urinalysis With Microscopic If Indicated (No Culture) - Urine, Catheter [[id number]]  (Abnormal) Collected: 09/10/21 2003    Specimen: Urine, Catheter Updated: 09/10/21 2031     Color, UA Dark Yellow     Appearance, UA Cloudy     pH, UA <=5.0     Specific Gravity, UA 1.020     Glucose, UA Negative     Ketones, UA Trace     Bilirubin, UA Negative     Blood, UA Large (3+)     Protein, UA Trace     Leuk Esterase, UA Trace     Nitrite, UA Negative     Urobilinogen, UA 1.0 E.U./dL    Comprehensive Metabolic Panel [[id number]]  (Abnormal) Collected: 09/10/21 1742    Specimen: Blood Updated: 09/10/21 1818     Glucose 145 mg/dL      BUN 23 mg/dL      Creatinine 1.01 mg/dL      Sodium 137 mmol/L      Potassium 3.8 mmol/L      Chloride 101 mmol/L      CO2 22.9 mmol/L      Calcium 8.9 mg/dL      Total Protein 6.2 g/dL      Albumin 3.60 g/dL      ALT (SGPT) 17 U/L      AST (SGOT) 24 U/L      Alkaline Phosphatase 82 U/L      Total Bilirubin 0.7 mg/dL      eGFR Non African Amer 52 mL/min/1.73      Globulin 2.6 gm/dL      A/G Ratio 1.4 g/dL      BUN/Creatinine Ratio 22.8     Anion Gap 13.1 mmol/L     Narrative:      GFR Normal >60  Chronic Kidney Disease <60  Kidney Failure <15      Troponin [[id number]]  (Normal) Collected: 09/10/21 1742    Specimen: Blood Updated: 09/10/21 1817     Troponin T <0.010 ng/mL     Narrative:      Troponin T Reference Range:  <= 0.03 ng/mL-   Negative for AMI  >0.03 ng/mL-     Abnormal for myocardial necrosis.  Clinicians would have to utilize clinical acumen, EKG, Troponin and serial changes to determine if it is an Acute Myocardial Infarction or myocardial injury due to an underlying chronic condition.       Results may be falsely decreased if patient taking Biotin.      BNP [[id number]]   (Abnormal) Collected: 09/10/21 1742    Specimen: Blood Updated: 09/10/21 1815     proBNP 1,908.0 pg/mL     Narrative:      Among patients with dyspnea, NT-proBNP is highly sensitive for the detection of acute congestive heart failure. In addition NT-proBNP of <300 pg/ml effectively rules out acute congestive heart failure with 99% negative predictive value.    Results may be falsely decreased if patient taking Biotin.      CBC & Differential [[id number]]  (Abnormal) Collected: 09/10/21 1742    Specimen: Blood Updated: 09/10/21 1754    Narrative:      The following orders were created for panel order CBC & Differential.  Procedure                               Abnormality         Status                     ---------                               -----------         ------                     CBC Auto Differential[[id number]]        Abnormal            Final result                 Please view results for these tests on the individual orders.    CBC Auto Differential [[id number]]  (Abnormal) Collected: 09/10/21 1742    Specimen: Blood Updated: 09/10/21 1754     WBC 11.12 10*3/mm3      RBC 4.20 10*6/mm3      Hemoglobin 11.8 g/dL      Hematocrit 36.8 %      MCV 87.6 fL      MCH 28.1 pg      MCHC 32.1 g/dL      RDW 15.5 %      RDW-SD 49.1 fl      MPV 10.1 fL      Platelets 238 10*3/mm3      Neutrophil % 69.7 %      Lymphocyte % 20.9 %      Monocyte % 7.8 %      Eosinophil % 0.7 %      Basophil % 0.3 %      Immature Grans % 0.6 %      Neutrophils, Absolute 7.75 10*3/mm3      Lymphocytes, Absolute 2.32 10*3/mm3      Monocytes, Absolute 0.87 10*3/mm3      Eosinophils, Absolute 0.08 10*3/mm3      Basophils, Absolute 0.03 10*3/mm3      Immature Grans, Absolute 0.07 10*3/mm3      nRBC 0.0 /100 WBC         Data Review:  Results from last 7 days   Lab Units 09/13/21  1152 09/11/21 2321 09/11/21  2321 09/11/21  0705 09/11/21  0705   SODIUM mmol/L 138  --  139  --  137   POTASSIUM mmol/L 3.1*  --  3.6  --  3.5   CHLORIDE mmol/L 105   --  106  --  102   CO2 mmol/L 22.2  --  22.6  --  24.0   BUN mg/dL 11  --  21  --  21   CREATININE mg/dL 0.65  --  0.92  --  0.79   GLUCOSE mg/dL 168*   < > 150*   < > 112*   CALCIUM mg/dL 8.6  --  8.5*  --  8.4*    < > = values in this interval not displayed.     Results from last 7 days   Lab Units 09/13/21  1152 09/12/21  0818 09/11/21  2321 09/11/21  1954 09/11/21  0705   WBC 10*3/mm3 9.23  --  10.93*  --  10.56   HEMOGLOBIN g/dL 9.5* 10.2* 9.9*  9.9*   < > 11.1*   HEMATOCRIT % 30.5* 33.5* 30.6*  30.6*   < > 34.6   PLATELETS 10*3/mm3 224  --  196  --  216    < > = values in this interval not displayed.             Lab Results   Lab Value Date/Time    TROPONINT <0.010 09/10/2021 1742    TROPONINT <0.010 09/06/2021 1135    TROPONINT <0.010 01/14/2020 1850    TROPONINT <0.010 01/04/2020 2007    TROPONINT <0.010 10/21/2016 2331    TROPONINT <0.010 10/21/2016 2125    TROPONINT <0.01 08/25/2014 1135         Results from last 7 days   Lab Units 09/10/21  1742   ALK PHOS U/L 82   BILIRUBIN mg/dL 0.7   ALT (SGPT) U/L 17   AST (SGOT) U/L 24             No results found for: POCGLU        Past Medical History:   Diagnosis Date   • Atrial fibrillation (CMS/HCC)    • Hyperlipidemia    • Hypertension    • Lacunar infarct, 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   • New onset atrial fibrillation (CMS/HCC) 01/2020    now on rate control along with Eliquis   • Prolapsed uterus     per patient   • Stroke (CMS/HCC)    • TIA (transient ischemic attack) 01/2020   • Weakness        Assessment:  Active Hospital Problems    Diagnosis  POA   • **General weakness [R53.1]  Yes   • Rectal bleeding [K62.5]  Unknown   • Hematuria [R31.9]  Yes   • Dizziness and giddiness [R42]  Yes   • History of CVA (cerebrovascular accident) [Z86.73]  Not Applicable   • Anticoagulated [Z79.01]  Not Applicable   • Paroxysmal atrial fibrillation (CMS/HCC) [I48.0]  Yes   • Hyperlipidemia [E78.5]  Yes   • Hypertension  [I10]  Yes   • Gastroesophageal reflux disease with esophagitis [K21.00]  Yes      Resolved Hospital Problems   No resolved problems to display.       Plan:  Neurology consult noted. GI consult noted and follow lab.  ? Colonoscopy.    Howie Wilkins MD  9/13/2021  14:24 EDT

## 2021-09-13 NOTE — PROGRESS NOTES
[facility]  Inpatient Progress Note    Reason for Follow Up: Rectal bleeding    Subjective     Interval History:   She continues to have bright red blood per rectum that is in the toilet and on a pad today.  She does have significant uninflamed hemorrhoids and was started on cream yesterday.  She denies abdominal pain today however reports that she was having this previously.  She denies melena, nausea, vomiting, weight loss.  She admits to poor appetite over the last several months but is eating well today and tolerating diet.    Last hemoglobin 9/12/2021 which was 10.2 and stable from previous.  Do not have labs this morning to compare.  They are on order, these have not been drawn yet.  No recent imaging of the abdomen or pelvis.    Family history significant for son with stage IV colorectal cancer.  Family history also with colorectal cancer in her brother.  Daughter has multiple polyps and has colonoscopy regularly.  Patient has never had a colonoscopy.    She is on Eliquis for heart disease and A. Fib.  Last given at 06 this morning.  She does have history of duodenal ulcer in 2019, EGD with Dr. Izaguirre at that time and this was reviewed today.    Current Facility-Administered Medications:   •  acetaminophen (TYLENOL) tablet 650 mg, 650 mg, Oral, Q4H PRN, Dorene Robles, APRN  •  aluminum-magnesium hydroxide-simethicone (MAALOX MAX) 400-400-40 MG/5ML suspension 15 mL, 15 mL, Oral, Q6H PRN, Dorene Robles, APRN  •  apixaban (ELIQUIS) tablet 5 mg, 5 mg, Oral, Q12H, Bladimir Costa MD, 5 mg at 09/13/21 0559  •  aspirin chewable tablet 81 mg, 81 mg, Oral, Daily, Bladimir Costa MD, 81 mg at 09/13/21 0933  •  atorvastatin (LIPITOR) tablet 40 mg, 40 mg, Oral, Nightly, Howie Wilkins MD, 40 mg at 09/12/21 2125  •  felodipine (PLENDIL) 24 hr tablet 10 mg, 10 mg, Oral, Daily, Bladimir Costa MD, 10 mg at 09/13/21 0933  •  Hydrocort-Pramoxine (Perianal) (ANALPRAM-HC) 2.5-1 %  rectal cream, , Rectal, BID, Inez Nowak APRN, Given at 09/13/21 0933  •  meclizine (ANTIVERT) tablet 50 mg, 50 mg, Oral, Q6H PRN, Bladimir Costa MD  •  metoprolol tartrate (LOPRESSOR) tablet 25 mg, 25 mg, Oral, Q12H, Bladimir Costa MD, 25 mg at 09/13/21 0559  •  ondansetron (ZOFRAN) tablet 4 mg, 4 mg, Oral, Q6H PRN **OR** ondansetron (ZOFRAN) injection 4 mg, 4 mg, Intravenous, Q6H PRN, Dorene Robles APRN  •  pantoprazole (PROTONIX) EC tablet 40 mg, 40 mg, Oral, QAMJulissa Matthew D, MD, 40 mg at 09/13/21 0559  •  [COMPLETED] Insert peripheral IV, , , Once **AND** sodium chloride 0.9 % flush 10 mL, 10 mL, Intravenous, PRN, Omar Hopkins PA  •  sodium chloride 0.9 % flush 10 mL, 10 mL, Intravenous, PRN, Dorene Robles APRN  •  sodium chloride 0.9 % infusion, 75 mL/hr, Intravenous, Continuous, Dorene Robles APRN, Last Rate: 75 mL/hr at 09/13/21 0608, 75 mL/hr at 09/13/21 0608  Review of Systems:    The following systems were reviewed and negative;  constitution, respiratory, cardiovascular, musculoskeletal, neurological and behavioral/psych    Objective     Vital Signs  Temp:  [97.3 °F (36.3 °C)-98 °F (36.7 °C)] 97.6 °F (36.4 °C)  Heart Rate:  [62-93] 82  Resp:  [16] 16  BP: (115-137)/(70-85) 137/85  Body mass index is 29.18 kg/m².    Intake/Output Summary (Last 24 hours) at 9/13/2021 0950  Last data filed at 9/13/2021 0608  Gross per 24 hour   Intake 2917 ml   Output 300 ml   Net 2617 ml     No intake/output data recorded.     Physical Exam:   General: patient awake, alert and cooperative   Eyes: Normal lids and lashes, no scleral icterus   Neck: supple, normal ROM   Skin: warm and dry, not jaundiced   Abdomen: soft, nontender, nondistended; normal bowel sounds   Rectal: Multiple large external hemorrhoids surrounding anus, not inflamed, no erythema, nontender, no evidence of recent bleeding.  She does have blood on the pad in her underwear.  LORI not performed.   Extremities: no  rash or edema   Psychiatric: Normal mood and behavior; memory intact     Results Review:     I reviewed the patient's new clinical results.    Results from last 7 days   Lab Units 09/12/21  0818 09/11/21  2321 09/11/21  1954 09/11/21  0705 09/11/21  0705 09/10/21  1742 09/10/21  1742   WBC 10*3/mm3  --  10.93*  --   --  10.56  --  11.12*   HEMOGLOBIN g/dL 10.2* 9.9*  9.9* 11.4*   < > 11.1*   < > 11.8*   HEMATOCRIT % 33.5* 30.6*  30.6* 36.8   < > 34.6   < > 36.8   PLATELETS 10*3/mm3  --  196  --   --  216  --  238    < > = values in this interval not displayed.     Results from last 7 days   Lab Units 09/11/21  2321 09/11/21  0705 09/10/21  1742 09/06/21  1135 09/06/21  1135   SODIUM mmol/L 139 137 137   < > 140   POTASSIUM mmol/L 3.6 3.5 3.8   < > 3.6   CHLORIDE mmol/L 106 102 101   < > 105   CO2 mmol/L 22.6 24.0 22.9   < > 23.2   BUN mg/dL 21 21 23   < > 20   CREATININE mg/dL 0.92 0.79 1.01*   < > 1.00   CALCIUM mg/dL 8.5* 8.4* 8.9   < > 8.6   BILIRUBIN mg/dL  --   --  0.7  --  0.5   ALK PHOS U/L  --   --  82  --  108   ALT (SGPT) U/L  --   --  17  --  9   AST (SGOT) U/L  --   --  24  --  16   GLUCOSE mg/dL 150* 112* 145*   < > 156*    < > = values in this interval not displayed.         Lab Results   Lab Value Date/Time    LIPASE 202 (H) 02/24/2018 0508    LIPASE 146 (H) 02/23/2018 0614    LIPASE 145 (H) 02/22/2018 0302    LIPASE 193 (H) 02/21/2018 1456    LIPASE 547 (H) 02/20/2018 0410    LIPASE >600 (H) 02/19/2018 0408    LIPASE >600 (H) 02/18/2018 1349    LIPASE 11 (L) 06/07/2016 2006       Radiology:  MRI Brain Without Contrast   Final Result   Small vessel ischemic disease, left mastoid fluid collection   and small remote cerebellar infarcts are appreciated as described in   detail above. There is no evidence of acute infarction or mass/mass   effect on this noncontrasted MRI examination of the brain.       This report was finalized on 9/12/2021 12:46 PM by Dr. Florencio Judge M.D.          XR Chest 1 View    Final Result      CT Abdomen Pelvis With Contrast    (Results Pending)       Assessment/Plan     Patient Active Problem List   Diagnosis   • Vertigo   • Temporary cerebral vascular dysfunction   • Gastroesophageal reflux disease with esophagitis   • Degeneration of intervertebral disc of cervical region   • Prediabetes   • S/P cholecystectomy   • Osteoarthritis of knee   • Herpes zoster without complication   • Hypertension   • Duodenal ulcer   • History of pancreatitis   • Hyperlipidemia   • TIA (transient ischemic attack)   • Cerebrovascular accident (CVA) due to thrombosis of left posterior cerebral artery (CMS/HCC)   • Paroxysmal atrial fibrillation (CMS/HCC)   • General weakness   • History of CVA (cerebrovascular accident)   • Anticoagulated   • Hematuria   • Dizziness and giddiness   • Rectal bleeding       Assessment:  1. Rectal bleeding-differential diagnosis includes hemorrhoidal, diverticular, polyp, mass, malignancy, inflammatory  2. Mild normocytic anemia, pending repeat labs today  3. Family history of colon cancer, son and brother  4. Abdominal pain, nausea/vomiting-resolved per patient  5. Atrial fibrillation-currently taking Eliquis, last dose this morning  6. History of CVA, previously on Plavix but not currently.      Plan:  · Recommend assessing further with CT scan abdomen pelvis today to determine source of possible bleed including mass, malignancy, inflammatory.  · Will order stat hemoglobin today since labs have not been drawn.  · We will need to consider colonoscopy if rectal bleeding persists and/or hemoglobin continues to drop.  I discussed this with her and her daughter.  · If colonoscopy pursued, they would like us to get a cardiac release from Dr. Canales who is her cardiologist.  She will also need to hold the Eliquis unless colonoscopy is needed urgently.  · Addendum: Hemoglobin dropped to 9.5 today from 10.2 yesterday.  I went ahead and consulted Dr. Canales as she is likely  going to need colonoscopy given this and her persistent bleeding.    I discussed the patients findings and my recommendations with patient and family.    Dragon dictation used throughout this note.            Yessenia Santiago PA-C  [facility]  [address] [place] [address]  Office: [phone]

## 2021-09-14 LAB
ANION GAP SERPL CALCULATED.3IONS-SCNC: 10.5 MMOL/L (ref 5–15)
BASOPHILS # BLD AUTO: 0.04 10*3/MM3 (ref 0–0.2)
BASOPHILS NFR BLD AUTO: 0.4 % (ref 0–1.5)
BUN SERPL-MCNC: 9 MG/DL (ref 8–23)
BUN/CREAT SERPL: 14.8 (ref 7–25)
CALCIUM SPEC-SCNC: 8.3 MG/DL (ref 8.6–10.5)
CHLORIDE SERPL-SCNC: 107 MMOL/L (ref 98–107)
CO2 SERPL-SCNC: 22.5 MMOL/L (ref 22–29)
CREAT SERPL-MCNC: 0.61 MG/DL (ref 0.57–1)
DEPRECATED RDW RBC AUTO: 50 FL (ref 37–54)
EOSINOPHIL # BLD AUTO: 0.21 10*3/MM3 (ref 0–0.4)
EOSINOPHIL NFR BLD AUTO: 2.2 % (ref 0.3–6.2)
ERYTHROCYTE [DISTWIDTH] IN BLOOD BY AUTOMATED COUNT: 15.3 % (ref 12.3–15.4)
GFR SERPL CREATININE-BSD FRML MDRD: 93 ML/MIN/1.73
GLUCOSE SERPL-MCNC: 119 MG/DL (ref 65–99)
HCT VFR BLD AUTO: 29.6 % (ref 34–46.6)
HEMOCCULT STL QL: POSITIVE
HGB BLD-MCNC: 9.5 G/DL (ref 12–15.9)
IMM GRANULOCYTES # BLD AUTO: 0.06 10*3/MM3 (ref 0–0.05)
IMM GRANULOCYTES NFR BLD AUTO: 0.6 % (ref 0–0.5)
LYMPHOCYTES # BLD AUTO: 2.63 10*3/MM3 (ref 0.7–3.1)
LYMPHOCYTES NFR BLD AUTO: 27.4 % (ref 19.6–45.3)
MCH RBC QN AUTO: 28.7 PG (ref 26.6–33)
MCHC RBC AUTO-ENTMCNC: 32.1 G/DL (ref 31.5–35.7)
MCV RBC AUTO: 89.4 FL (ref 79–97)
MONOCYTES # BLD AUTO: 0.57 10*3/MM3 (ref 0.1–0.9)
MONOCYTES NFR BLD AUTO: 5.9 % (ref 5–12)
NEUTROPHILS NFR BLD AUTO: 6.1 10*3/MM3 (ref 1.7–7)
NEUTROPHILS NFR BLD AUTO: 63.5 % (ref 42.7–76)
NRBC BLD AUTO-RTO: 0 /100 WBC (ref 0–0.2)
PLATELET # BLD AUTO: 234 10*3/MM3 (ref 140–450)
PMV BLD AUTO: 10.5 FL (ref 6–12)
POTASSIUM SERPL-SCNC: 3.4 MMOL/L (ref 3.5–5.2)
POTASSIUM SERPL-SCNC: 4.1 MMOL/L (ref 3.5–5.2)
RBC # BLD AUTO: 3.31 10*6/MM3 (ref 3.77–5.28)
SODIUM SERPL-SCNC: 140 MMOL/L (ref 136–145)
WBC # BLD AUTO: 9.61 10*3/MM3 (ref 3.4–10.8)

## 2021-09-14 PROCEDURE — 99232 SBSQ HOSP IP/OBS MODERATE 35: CPT | Performed by: INTERNAL MEDICINE

## 2021-09-14 PROCEDURE — 82272 OCCULT BLD FECES 1-3 TESTS: CPT | Performed by: NURSE PRACTITIONER

## 2021-09-14 PROCEDURE — 84132 ASSAY OF SERUM POTASSIUM: CPT | Performed by: HOSPITALIST

## 2021-09-14 PROCEDURE — 85025 COMPLETE CBC W/AUTO DIFF WBC: CPT | Performed by: INTERNAL MEDICINE

## 2021-09-14 PROCEDURE — 80048 BASIC METABOLIC PNL TOTAL CA: CPT | Performed by: INTERNAL MEDICINE

## 2021-09-14 RX ORDER — POTASSIUM CHLORIDE 750 MG/1
40 TABLET, FILM COATED, EXTENDED RELEASE ORAL AS NEEDED
Status: DISCONTINUED | OUTPATIENT
Start: 2021-09-14 | End: 2021-09-14

## 2021-09-14 RX ORDER — POTASSIUM CHLORIDE 1.5 G/1.77G
40 POWDER, FOR SOLUTION ORAL AS NEEDED
Status: DISCONTINUED | OUTPATIENT
Start: 2021-09-14 | End: 2021-09-14

## 2021-09-14 RX ORDER — POTASSIUM CHLORIDE 29.8 MG/ML
20 INJECTION INTRAVENOUS
Status: DISCONTINUED | OUTPATIENT
Start: 2021-09-14 | End: 2021-09-14

## 2021-09-14 RX ORDER — POTASSIUM CHLORIDE 29.8 MG/ML
20 INJECTION INTRAVENOUS
Status: DISCONTINUED | OUTPATIENT
Start: 2021-09-14 | End: 2021-09-18 | Stop reason: HOSPADM

## 2021-09-14 RX ORDER — POTASSIUM CHLORIDE 1.5 G/1.77G
40 POWDER, FOR SOLUTION ORAL AS NEEDED
Status: DISCONTINUED | OUTPATIENT
Start: 2021-09-14 | End: 2021-09-18 | Stop reason: HOSPADM

## 2021-09-14 RX ORDER — POTASSIUM CHLORIDE 750 MG/1
40 TABLET, FILM COATED, EXTENDED RELEASE ORAL AS NEEDED
Status: DISCONTINUED | OUTPATIENT
Start: 2021-09-14 | End: 2021-09-18 | Stop reason: HOSPADM

## 2021-09-14 RX ADMIN — HYDROCORTISONE ACETATE PRAMOXINE HCL: 2.5; 1 CREAM TOPICAL at 09:12

## 2021-09-14 RX ADMIN — FELODIPINE 10 MG: 5 TABLET, FILM COATED, EXTENDED RELEASE ORAL at 11:46

## 2021-09-14 RX ADMIN — ATORVASTATIN CALCIUM 40 MG: 20 TABLET, FILM COATED ORAL at 20:43

## 2021-09-14 RX ADMIN — POTASSIUM CHLORIDE 40 MEQ: 750 TABLET, EXTENDED RELEASE ORAL at 11:46

## 2021-09-14 RX ADMIN — METOPROLOL TARTRATE 25 MG: 25 TABLET, FILM COATED ORAL at 09:12

## 2021-09-14 RX ADMIN — POTASSIUM CHLORIDE 40 MEQ: 750 TABLET, EXTENDED RELEASE ORAL at 09:12

## 2021-09-14 RX ADMIN — SODIUM CHLORIDE 75 ML/HR: 9 INJECTION, SOLUTION INTRAVENOUS at 11:46

## 2021-09-14 RX ADMIN — HYDROCORTISONE ACETATE PRAMOXINE HCL: 2.5; 1 CREAM TOPICAL at 20:43

## 2021-09-14 RX ADMIN — PANTOPRAZOLE SODIUM 40 MG: 40 TABLET, DELAYED RELEASE ORAL at 09:12

## 2021-09-14 RX ADMIN — METOPROLOL TARTRATE 25 MG: 25 TABLET, FILM COATED ORAL at 20:43

## 2021-09-14 NOTE — PROGRESS NOTES
[facility], [place], operated by [facility]  Inpatient Progress Note    Reason for Follow Up: Rectal bleeding    Subjective     Interval History:   Small amount of rectal bleeding today.  Eliquis was held yesterday, cardiology attending has cleared for colonoscopy    Current Facility-Administered Medications:   •  acetaminophen (TYLENOL) tablet 650 mg, 650 mg, Oral, Q4H PRN, Dorene Robles, APRN  •  aluminum-magnesium hydroxide-simethicone (MAALOX MAX) 400-400-40 MG/5ML suspension 15 mL, 15 mL, Oral, Q6H PRN, Dorene Robles, APRN  •  atorvastatin (LIPITOR) tablet 40 mg, 40 mg, Oral, Nightly, Howie Wilkins MD, 40 mg at 09/13/21 2221  •  felodipine (PLENDIL) 24 hr tablet 10 mg, 10 mg, Oral, Daily, Bladimir Costa MD, 10 mg at 09/13/21 0933  •  Hydrocort-Pramoxine (Perianal) (ANALPRAM-HC) 2.5-1 % rectal cream, , Rectal, BID, Inez Nowak APRN, Given at 09/14/21 0912  •  meclizine (ANTIVERT) tablet 50 mg, 50 mg, Oral, Q6H PRN, Bladimir Costa MD  •  metoprolol tartrate (LOPRESSOR) tablet 25 mg, 25 mg, Oral, Q12H, Bladimir Costa MD, 25 mg at 09/14/21 0912  •  ondansetron (ZOFRAN) tablet 4 mg, 4 mg, Oral, Q6H PRN **OR** ondansetron (ZOFRAN) injection 4 mg, 4 mg, Intravenous, Q6H PRN, Dorene Robles, APRN  •  pantoprazole (PROTONIX) EC tablet 40 mg, 40 mg, Oral, QAMJulissa Matthew D, MD, 40 mg at 09/14/21 0912  •  potassium chloride (K-DUR,KLOR-CON) ER tablet 40 mEq, 40 mEq, Oral, PRN, 40 mEq at 09/14/21 0912 **OR** potassium chloride (KLOR-CON) packet 40 mEq, 40 mEq, Oral, PRN **OR** potassium chloride 20 mEq in 50 mL IVPB, 20 mEq, Intravenous, Q1H PRN, Howie Wilkins MD  •  [COMPLETED] Insert peripheral IV, , , Once **AND** sodium chloride 0.9 % flush 10 mL, 10 mL, Intravenous, PRN, Omar Hopkins PA  •  sodium chloride 0.9 % flush 10 mL, 10 mL, Intravenous, PRN, Dorene Robles, APRN  •  sodium chloride 0.9 % infusion, 75 mL/hr, Intravenous, Continuous, Dorene Robles, APRAMARI, Last Rate:  75 mL/hr at 09/14/21 0507, 75 mL/hr at 09/14/21 0507  Review of Systems:    There is weakness and fatigue as other systems reviewed taking    Objective     Vital Signs  Temp:  [97.9 °F (36.6 °C)-98.4 °F (36.9 °C)] 97.9 °F (36.6 °C)  Heart Rate:  [67-80] 67  Resp:  [16] 16  BP: (102-138)/(60-89) 138/72  Body mass index is 29.18 kg/m².    Intake/Output Summary (Last 24 hours) at 9/14/2021 1137  Last data filed at 9/14/2021 0619  Gross per 24 hour   Intake 1790 ml   Output --   Net 1790 ml     No intake/output data recorded.     Physical Exam:   General: patient awake, alert and cooperative   Eyes: Normal lids and lashes, no scleral icterus   Neck: supple, normal ROM   Skin: warm and dry, not jaundiced   Cardiovascular: regular rhythm and rate, no murmurs auscultated   Pulm: clear to auscultation bilaterally, regular and unlabored   Abdomen: soft, nontender, nondistended; normal bowel sounds   Extremities: no rash or edema   Psychiatric: Normal mood and behavior; memory intact     Results Review:     I reviewed the patient's new clinical results.    Results from last 7 days   Lab Units 09/14/21 0928 09/13/21 1152 09/12/21 0818 09/11/21 2321 09/11/21  2321   WBC 10*3/mm3 9.61 9.23  --   --  10.93*   HEMOGLOBIN g/dL 9.5* 9.5* 10.2*   < > 9.9*  9.9*   HEMATOCRIT % 29.6* 30.5* 33.5*   < > 30.6*  30.6*   PLATELETS 10*3/mm3 234 224  --   --  196    < > = values in this interval not displayed.     Results from last 7 days   Lab Units 09/14/21 0928 09/13/21 1152 09/11/21 2321 09/11/21  0705 09/10/21  1742   SODIUM mmol/L 140 138 139   < > 137   POTASSIUM mmol/L 3.4* 3.1* 3.6   < > 3.8   CHLORIDE mmol/L 107 105 106   < > 101   CO2 mmol/L 22.5 22.2 22.6   < > 22.9   BUN mg/dL 9 11 21   < > 23   CREATININE mg/dL 0.61 0.65 0.92   < > 1.01*   CALCIUM mg/dL 8.3* 8.6 8.5*   < > 8.9   BILIRUBIN mg/dL  --   --   --   --  0.7   ALK PHOS U/L  --   --   --   --  82   ALT (SGPT) U/L  --   --   --   --  17   AST (SGOT) U/L  --   --    --   --  24   GLUCOSE mg/dL 119* 168* 150*   < > 145*    < > = values in this interval not displayed.         Lab Results   Lab Value Date/Time    LIPASE 202 (H) 02/24/2018 0508    LIPASE 146 (H) 02/23/2018 0614    LIPASE 145 (H) 02/22/2018 0302    LIPASE 193 (H) 02/21/2018 1456    LIPASE 547 (H) 02/20/2018 0410    LIPASE >600 (H) 02/19/2018 0408    LIPASE >600 (H) 02/18/2018 1349    LIPASE 11 (L) 06/07/2016 2006       Radiology:  CT Abdomen Pelvis With Contrast   Final Result   1. Moderately extensive sigmoid diverticulosis without evidence for   diverticulitis.   2. Extensive pelvic floor relaxation with vaginal prolapse, prominent   rectocele, and a small cystocele. The cystocele may enlarge with more   bladder distention.   3. Slight increase in size of a 1.1 cm slightly hyperdense right renal   cortical lesion since 2018.       This report was finalized on 9/14/2021 7:06 AM by Dr. Iris Acevedo M.D.          MRI Brain Without Contrast   Final Result   Small vessel ischemic disease, left mastoid fluid collection   and small remote cerebellar infarcts are appreciated as described in   detail above. There is no evidence of acute infarction or mass/mass   effect on this noncontrasted MRI examination of the brain.       This report was finalized on 9/12/2021 12:46 PM by Dr. Florencio Judge M.D.          XR Chest 1 View   Final Result          Assessment/Plan     Patient Active Problem List   Diagnosis   • Vertigo   • Temporary cerebral vascular dysfunction   • Gastroesophageal reflux disease with esophagitis   • Degeneration of intervertebral disc of cervical region   • Prediabetes   • S/P cholecystectomy   • Osteoarthritis of knee   • Herpes zoster without complication   • Hypertension   • Duodenal ulcer   • History of pancreatitis   • Hyperlipidemia   • TIA (transient ischemic attack)   • Cerebrovascular accident (CVA) due to thrombosis of left posterior cerebral artery (CMS/HCC)   • Paroxysmal atrial  fibrillation (CMS/HCC)   • General weakness   • History of CVA (cerebrovascular accident)   • Anticoagulated   • Hematuria   • Dizziness and giddiness   • Rectal bleeding       Assessment:  1. Rectal bleeding-differential diagnosis includes hemorrhoidal, diverticular, polyp, mass, malignancy, inflammatory  2. Mild normocytic anemia, pending repeat labs today  3. Family history of colon cancer, son and brother  4. Abdominal pain, nausea/vomiting-resolved per patient  5. Atrial fibrillation-currently taking Eliquis, last dose yesterday  6. History of CVA, previously on Plavix but not currently.        Plan:  · CAT scan ordered by GI has been reviewed.  Diverticulosis no diverticulitis.  Pelvic floor relaxation.  Rectocele.  · Follow hemoglobin  · She ate regular food today begin clear liquid diet tomorrow await Eliquis to be cleared  her system, plan for colonoscopy Thursday.  With pelvic floor relaxation and rectocele I wonder if there is stercoral ulceration, that will be on the differential diagnosis list  · cardiology attending has cleared this lady for procedure family is in agreement    I discussed the patients findings and my recommendations with patient and nursing staff.    Donnie Tobias MD

## 2021-09-14 NOTE — CASE MANAGEMENT/SOCIAL WORK
Discharge Planning Assessment  [facility]     Patient Name: Monica Scherer  MRN: [id number]  Today's Date: 9/14/2021    Admit Date: 9/10/2021    Discharge Needs Assessment     Row Name 09/14/21 1153       Living Environment    Lives With  child(leona), adult    Primary Care Provided by  self    Provides Primary Care For  no one    Family Caregiver if Needed  child(leona), adult    Able to Return to Prior Arrangements  yes       Resource/Environmental Concerns    Resource/Environmental Concerns  home accessibility    Home Accessibility Concerns  stairs to enter home       Transition Planning    Patient/Family Anticipates Transition to  home with family    Patient/Family Anticipated Services at Transition  none;outpatient care    Transportation Anticipated  family or friend will provide       Discharge Needs Assessment    Equipment Currently Used at Home  walker, rolling    Concerns to be Addressed  discharge planning    Outpatient/Agency/Support Group Needs  homecare agency        Discharge Plan     Row Name 09/14/21 1155       Plan    Plan  Home w/ family 24/7 care, possible outpatient PT    Provided Post Acute Provider List?  Refused    Refused Provider List Comment  [facility] denies HH referrals    Provided Post Acute Provider Quality & Resource List?  Refused    Refused Quality and Resource List Comment  [facility] denies HH referrals    Patient/Family in Agreement with Plan  yes    Plan Comments  CCP spoke to patient’s daughter; explained role of CCP, verified facesheet, and discussed d/c planning needs. Plan is home with possible outpatient PT. Patient lives with her son who is around to help out 24/7. It is a 1 level home with 2-3 steps in. PT recommends HH. CCP discussed PT recommendation with patient’s family. Patient’s family adamantly refuses HH referrals due to patient not wanting to lock her dog up in a room while the PT is there, and the dog is unfriendly. Patient’s family states they may be able to  talk her into outpatient PT. CCP encouraged patient’s family to discuss the positive aspects of her using HH or outpatient PT. CCP provided 4P CCP contact information. CCP to follow for any further recommendations that arise pending treatment. LEONIE Perry        Continued Care and Services - Admitted Since 9/10/2021    Coordination has not been started for this encounter.       Expected Discharge Date and Time     Expected Discharge Date Expected Discharge Time    Sep 17, 2021         Demographic Summary     Row Name 09/14/21 1153       General Information    Admission Type  observation    Arrived From  home    Referral Source  admission list    Reason for Consult  discharge planning    Preferred Language  English       Contact Information    Permission Granted to Share Info With  family/designee        Functional Status     Row Name 09/14/21 1153       Functional Status    Usual Activity Tolerance  moderate    Current Activity Tolerance  moderate       Functional Status, IADL    Medications  assistive equipment    Meal Preparation  assistive equipment    Housekeeping  assistive equipment    Laundry  assistive equipment    Shopping  assistive equipment       Mental Status    General Appearance WDL  WDL        Psychosocial    No documentation.       Abuse/Neglect    No documentation.       Legal    No documentation.       Substance Abuse    No documentation.       Patient Forms    No documentation.           LEONIE ROBB

## 2021-09-14 NOTE — PLAN OF CARE
Problem: Adult Inpatient Plan of Care  Goal: Plan of Care Review  Outcome: Ongoing, Progressing  Flowsheets (Taken 9/14/2021 0554)  Progress: improving  Plan of Care Reviewed With:   patient   daughter  Outcome Summary: Patient slept between care. Stool specimen sent . Daughter at bedside. Patient is up with assist x1 to the toilet using a walker. Patient is on IV fluids. Will continue to monitor vital signs, labs and comfort.   Goal Outcome Evaluation:  Plan of Care Reviewed With: patient, daughter        Progress: improving  Outcome Summary: Patient slept between care. Stool specimen sent . Daughter at bedside. Patient is up with assist x1 to the toilet using a walker. Patient is on IV fluids. Will continue to monitor vital signs, labs and comfort.

## 2021-09-14 NOTE — PROGRESS NOTES
Discharge Planning Assessment  [facility]     Patient Name: Monica Scherer  MRN: [id number]  Today's Date: 9/14/2021    Admit Date: 9/10/2021    Discharge Needs Assessment    No documentation.       Discharge Plan     Row Name 09/14/21 4489       Plan    Plan Comments  The patient is being transferred to [facility] on 9/14/21. CCP will continue to follow for any needs. SOWMYA Danielson RN, CCP        Continued Care and Services - Admitted Since 9/10/2021    Coordination has not been started for this encounter.       Expected Discharge Date and Time     Expected Discharge Date Expected Discharge Time    Sep 17, 2021         Demographic Summary    No documentation.       Functional Status    No documentation.       Psychosocial    No documentation.       Abuse/Neglect    No documentation.       Legal    No documentation.       Substance Abuse    No documentation.       Patient Forms    No documentation.           Eneida Danielson, RN

## 2021-09-14 NOTE — PROGRESS NOTES
"DAILY PROGRESS NOTE  [facility]    Patient Identification:  Name: Monica Scherer  Age: 88 y.o.  Sex: female  :  1933  MRN: [id number]         Primary Care Physician: Amilcar Mauricio DO    Subjective:  Interval History:She complains of some rectal bleeding.Not as bad. She thinks it her hemorrhoids.    Objective:    Scheduled Meds:atorvastatin, 40 mg, Oral, Nightly  felodipine, 10 mg, Oral, Daily  Hydrocort-Pramoxine (Perianal), , Rectal, BID  metoprolol tartrate, 25 mg, Oral, Q12H  pantoprazole, 40 mg, Oral, QAM      Continuous Infusions:sodium chloride, 75 mL/hr, Last Rate: 75 mL/hr (21 1146)        Vital signs in last 24 hours:  Temp:  [97.9 °F (36.6 °C)-98.4 °F (36.9 °C)] 97.9 °F (36.6 °C)  Heart Rate:  [67-80] 67  Resp:  [16] 16  BP: (102-138)/(60-89) 138/72    Intake/Output:    Intake/Output Summary (Last 24 hours) at 2021 1307  Last data filed at 2021 0619  Gross per 24 hour   Intake 1790 ml   Output --   Net 1790 ml       Exam:  /72 (BP Location: Left arm, Patient Position: Lying)   Pulse 67   Temp 97.9 °F (36.6 °C) (Oral)   Resp 16   Ht 162.6 cm (64\")   Wt 77.1 kg (170 lb)   SpO2 96%   BMI 29.18 kg/m²     General Appearance:    Alert, cooperative, no distress   Head:    Normocephalic, without obvious abnormality, atraumatic   Eyes:       Throat:   Lips, tongue, gums normal   Neck:   Supple, symmetrical, trachea midline, no JVD   Lungs:     Clear to auscultation bilaterally, respirations unlabored   Chest Wall:    No tenderness or deformity    Heart:    Regular rate and rhythm, S1 and S2 normal, no murmur,no  Rub or gallop   Abdomen:     Soft, nontender, bowel sounds active, no masses, no organomegaly    Extremities:   Extremities normal, atraumatic, no cyanosis or edema   Pulses:      Skin:   Skin is warm and dry,  no rashes or palpable lesions   Neurologic:   no focal deficits noted      Lab Results (last 72 hours)     Procedure Component Value Units " Date/Time    Hemoglobin & Hematocrit, Blood [[id number]]  (Abnormal) Collected: 09/12/21 0818    Specimen: Blood Updated: 09/12/21 0851     Hemoglobin 10.2 g/dL      Hematocrit 33.5 %     Basic Metabolic Panel [[id number]]  (Abnormal) Collected: 09/11/21 2321    Specimen: Blood Updated: 09/12/21 0000     Glucose 150 mg/dL      BUN 21 mg/dL      Creatinine 0.92 mg/dL      Sodium 139 mmol/L      Potassium 3.6 mmol/L      Chloride 106 mmol/L      CO2 22.6 mmol/L      Calcium 8.5 mg/dL      eGFR Non African Amer 58 mL/min/1.73      BUN/Creatinine Ratio 22.8     Anion Gap 10.4 mmol/L     Narrative:      GFR Normal >60  Chronic Kidney Disease <60  Kidney Failure <15      Hemoglobin & Hematocrit, Blood [[id number]]  (Abnormal) Collected: 09/11/21 2321    Specimen: Blood Updated: 09/11/21 2338     Hemoglobin 9.9 g/dL      Hematocrit 30.6 %     CBC & Differential [[id number]]  (Abnormal) Collected: 09/11/21 2321    Specimen: Blood Updated: 09/11/21 2338    Narrative:      The following orders were created for panel order CBC & Differential.  Procedure                               Abnormality         Status                     ---------                               -----------         ------                     CBC Auto Differential[[id number]]        Abnormal            Final result                 Please view results for these tests on the individual orders.    CBC Auto Differential [[id number]]  (Abnormal) Collected: 09/11/21 2321    Specimen: Blood Updated: 09/11/21 2338     WBC 10.93 10*3/mm3      RBC 3.43 10*6/mm3      Hemoglobin 9.9 g/dL      Hematocrit 30.6 %      MCV 89.2 fL      MCH 28.9 pg      MCHC 32.4 g/dL      RDW 15.6 %      RDW-SD 50.2 fl      MPV 10.3 fL      Platelets 196 10*3/mm3      Neutrophil % 72.0 %      Lymphocyte % 18.8 %      Monocyte % 6.9 %      Eosinophil % 1.4 %      Basophil % 0.4 %      Immature Grans % 0.5 %      Neutrophils, Absolute 7.88 10*3/mm3      Lymphocytes, Absolute 2.06 10*3/mm3       Monocytes, Absolute 0.75 10*3/mm3      Eosinophils, Absolute 0.15 10*3/mm3      Basophils, Absolute 0.04 10*3/mm3      Immature Grans, Absolute 0.05 10*3/mm3      nRBC 0.0 /100 WBC     Hemoglobin & Hematocrit, Blood [[id number]]  (Abnormal) Collected: 09/11/21 1954    Specimen: Blood Updated: 09/11/21 2026     Hemoglobin 11.4 g/dL      Hematocrit 36.8 %     Basic Metabolic Panel [[id number]]  (Abnormal) Collected: 09/11/21 0705    Specimen: Blood Updated: 09/11/21 0753     Glucose 112 mg/dL      BUN 21 mg/dL      Creatinine 0.79 mg/dL      Sodium 137 mmol/L      Potassium 3.5 mmol/L      Chloride 102 mmol/L      CO2 24.0 mmol/L      Calcium 8.4 mg/dL      eGFR Non African Amer 69 mL/min/1.73      BUN/Creatinine Ratio 26.6     Anion Gap 11.0 mmol/L     Narrative:      GFR Normal >60  Chronic Kidney Disease <60  Kidney Failure <15      CBC & Differential [[id number]]  (Abnormal) Collected: 09/11/21 0705    Specimen: Blood Updated: 09/11/21 0724    Narrative:      The following orders were created for panel order CBC & Differential.  Procedure                               Abnormality         Status                     ---------                               -----------         ------                     CBC Auto Differential[[id number]]        Abnormal            Final result                 Please view results for these tests on the individual orders.    CBC Auto Differential [[id number]]  (Abnormal) Collected: 09/11/21 0705    Specimen: Blood Updated: 09/11/21 0724     WBC 10.56 10*3/mm3      RBC 3.86 10*6/mm3      Hemoglobin 11.1 g/dL      Hematocrit 34.6 %      MCV 89.6 fL      MCH 28.8 pg      MCHC 32.1 g/dL      RDW 15.7 %      RDW-SD 51.2 fl      MPV 10.3 fL      Platelets 216 10*3/mm3      Neutrophil % 69.5 %      Lymphocyte % 22.5 %      Monocyte % 6.3 %      Eosinophil % 0.9 %      Basophil % 0.3 %      Immature Grans % 0.5 %      Neutrophils, Absolute 7.34 10*3/mm3      Lymphocytes, Absolute 2.38 10*3/mm3       Monocytes, Absolute 0.67 10*3/mm3      Eosinophils, Absolute 0.09 10*3/mm3      Basophils, Absolute 0.03 10*3/mm3      Immature Grans, Absolute 0.05 10*3/mm3      nRBC 0.0 /100 WBC     Urinalysis With Culture If Indicated - Kidney, Left [[id number]]  (Normal) Collected: 09/11/21 0411    Specimen: Urine from Kidney, Left Updated: 09/11/21 0702     Color, UA Yellow     Appearance, UA Clear     pH, UA <=5.0     Specific Gravity, UA 1.018     Glucose, UA Negative     Ketones, UA Negative     Bilirubin, UA Negative     Blood, UA Negative     Protein, UA Negative     Leuk Esterase, UA Negative     Nitrite, UA Negative     Urobilinogen, UA 1.0 E.U./dL    Narrative:      Urine microscopic not indicated.    COVID PRE-OP / PRE-PROCEDURE SCREENING ORDER (NO ISOLATION) - Swab, Nasopharynx [[id number]]  (Normal) Collected: 09/10/21 2223    Specimen: Swab from Nasopharynx Updated: 09/11/21 0241    Narrative:      The following orders were created for panel order COVID PRE-OP / PRE-PROCEDURE SCREENING ORDER (NO ISOLATION) - Swab, Nasopharynx.  Procedure                               Abnormality         Status                     ---------                               -----------         ------                     COVID-19,APTIMA PANTHER(...[[id number]]  Normal              Final result                 Please view results for these tests on the individual orders.    COVID-19,APTIMA PANTHER(BISHOP), LASHON, NP/OP SWAB IN UTM/VTM/SALINE TRANSPORT MEDIA,24 HR TAT - Swab, Nasopharynx [[id number]]  (Normal) Collected: 09/10/21 2223    Specimen: Swab from Nasopharynx Updated: 09/11/21 0241     COVID19 Not Detected    Narrative:      Fact sheet for providers: https://www.fda.gov/media/044384/download     Fact sheet for patients: https://www.fda.gov/media/315967/download    Test performed by RT PCR.    Urinalysis, Microscopic Only - Urine, Catheter [[id number]]  (Abnormal) Collected: 09/10/21 2003    Specimen: Urine, Catheter Updated: 09/10/21  2031     RBC, UA 0-2 /HPF      WBC, UA 0-2 /HPF      Bacteria, UA 1+ /HPF      Squamous Epithelial Cells, UA 7-12 /HPF      Hyaline Casts, UA 3-6 /LPF      Methodology Automated Microscopy    Urinalysis With Microscopic If Indicated (No Culture) - Urine, Catheter [[id number]]  (Abnormal) Collected: 09/10/21 2003    Specimen: Urine, Catheter Updated: 09/10/21 2031     Color, UA Dark Yellow     Appearance, UA Cloudy     pH, UA <=5.0     Specific Gravity, UA 1.020     Glucose, UA Negative     Ketones, UA Trace     Bilirubin, UA Negative     Blood, UA Large (3+)     Protein, UA Trace     Leuk Esterase, UA Trace     Nitrite, UA Negative     Urobilinogen, UA 1.0 E.U./dL    Comprehensive Metabolic Panel [[id number]]  (Abnormal) Collected: 09/10/21 1742    Specimen: Blood Updated: 09/10/21 1818     Glucose 145 mg/dL      BUN 23 mg/dL      Creatinine 1.01 mg/dL      Sodium 137 mmol/L      Potassium 3.8 mmol/L      Chloride 101 mmol/L      CO2 22.9 mmol/L      Calcium 8.9 mg/dL      Total Protein 6.2 g/dL      Albumin 3.60 g/dL      ALT (SGPT) 17 U/L      AST (SGOT) 24 U/L      Alkaline Phosphatase 82 U/L      Total Bilirubin 0.7 mg/dL      eGFR Non African Amer 52 mL/min/1.73      Globulin 2.6 gm/dL      A/G Ratio 1.4 g/dL      BUN/Creatinine Ratio 22.8     Anion Gap 13.1 mmol/L     Narrative:      GFR Normal >60  Chronic Kidney Disease <60  Kidney Failure <15      Troponin [[id number]]  (Normal) Collected: 09/10/21 1742    Specimen: Blood Updated: 09/10/21 1817     Troponin T <0.010 ng/mL     Narrative:      Troponin T Reference Range:  <= 0.03 ng/mL-   Negative for AMI  >0.03 ng/mL-     Abnormal for myocardial necrosis.  Clinicians would have to utilize clinical acumen, EKG, Troponin and serial changes to determine if it is an Acute Myocardial Infarction or myocardial injury due to an underlying chronic condition.       Results may be falsely decreased if patient taking Biotin.      BNP [[id number]]  (Abnormal) Collected:  09/10/21 1742    Specimen: Blood Updated: 09/10/21 1815     proBNP 1,908.0 pg/mL     Narrative:      Among patients with dyspnea, NT-proBNP is highly sensitive for the detection of acute congestive heart failure. In addition NT-proBNP of <300 pg/ml effectively rules out acute congestive heart failure with 99% negative predictive value.    Results may be falsely decreased if patient taking Biotin.      CBC & Differential [[id number]]  (Abnormal) Collected: 09/10/21 1742    Specimen: Blood Updated: 09/10/21 1754    Narrative:      The following orders were created for panel order CBC & Differential.  Procedure                               Abnormality         Status                     ---------                               -----------         ------                     CBC Auto Differential[[id number]]        Abnormal            Final result                 Please view results for these tests on the individual orders.    CBC Auto Differential [[id number]]  (Abnormal) Collected: 09/10/21 1742    Specimen: Blood Updated: 09/10/21 1754     WBC 11.12 10*3/mm3      RBC 4.20 10*6/mm3      Hemoglobin 11.8 g/dL      Hematocrit 36.8 %      MCV 87.6 fL      MCH 28.1 pg      MCHC 32.1 g/dL      RDW 15.5 %      RDW-SD 49.1 fl      MPV 10.1 fL      Platelets 238 10*3/mm3      Neutrophil % 69.7 %      Lymphocyte % 20.9 %      Monocyte % 7.8 %      Eosinophil % 0.7 %      Basophil % 0.3 %      Immature Grans % 0.6 %      Neutrophils, Absolute 7.75 10*3/mm3      Lymphocytes, Absolute 2.32 10*3/mm3      Monocytes, Absolute 0.87 10*3/mm3      Eosinophils, Absolute 0.08 10*3/mm3      Basophils, Absolute 0.03 10*3/mm3      Immature Grans, Absolute 0.07 10*3/mm3      nRBC 0.0 /100 WBC         Data Review:  Results from last 7 days   Lab Units 09/14/21 0928 09/13/21  1152 09/13/21  1152 09/11/21 2321 09/11/21 2321   SODIUM mmol/L 140  --  138  --  139   POTASSIUM mmol/L 3.4*  --  3.1*  --  3.6   CHLORIDE mmol/L 107  --  105  --  106    CO2 mmol/L 22.5  --  22.2  --  22.6   BUN mg/dL 9  --  11  --  21   CREATININE mg/dL 0.61  --  0.65  --  0.92   GLUCOSE mg/dL 119*   < > 168*   < > 150*   CALCIUM mg/dL 8.3*  --  8.6  --  8.5*    < > = values in this interval not displayed.     Results from last 7 days   Lab Units 09/14/21  0928 09/13/21  1152 09/12/21  0818 09/11/21  2321 09/11/21  2321   WBC 10*3/mm3 9.61 9.23  --   --  10.93*   HEMOGLOBIN g/dL 9.5* 9.5* 10.2*   < > 9.9*  9.9*   HEMATOCRIT % 29.6* 30.5* 33.5*   < > 30.6*  30.6*   PLATELETS 10*3/mm3 234 224  --   --  196    < > = values in this interval not displayed.             Lab Results   Lab Value Date/Time    TROPONINT <0.010 09/10/2021 1742    TROPONINT <0.010 09/06/2021 1135    TROPONINT <0.010 01/14/2020 1850    TROPONINT <0.010 01/04/2020 2007    TROPONINT <0.010 10/21/2016 2331    TROPONINT <0.010 10/21/2016 2125    TROPONINT <0.01 08/25/2014 1135         Results from last 7 days   Lab Units 09/10/21  1742   ALK PHOS U/L 82   BILIRUBIN mg/dL 0.7   ALT (SGPT) U/L 17   AST (SGOT) U/L 24             No results found for: POCGLU        Past Medical History:   Diagnosis Date   • Atrial fibrillation (CMS/HCC)    • Hyperlipidemia    • Hypertension    • Lacunar infarct, 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   • New onset atrial fibrillation (CMS/HCC) 01/2020    now on rate control along with Eliquis   • Prolapsed uterus     per patient   • Stroke (CMS/HCC)    • TIA (transient ischemic attack) 01/2020   • Weakness        Assessment:  Active Hospital Problems    Diagnosis  POA   • **General weakness [R53.1]  Yes   • Rectal bleeding [K62.5]  Unknown   • Hematuria [R31.9]  Yes   • Dizziness and giddiness [R42]  Yes   • History of CVA (cerebrovascular accident) [Z86.73]  Not Applicable   • Anticoagulated [Z79.01]  Not Applicable   • Paroxysmal atrial fibrillation (CMS/HCC) [I48.0]  Yes   • Hyperlipidemia [E78.5]  Yes   • Hypertension [I10]  Yes   •  Gastroesophageal reflux disease with esophagitis [K21.00]  Yes      Resolved Hospital Problems   No resolved problems to display.       Plan:  Neurology consult noted. GI consult noted and follow lab.  ? Colonoscopy  Thursday.    Howie Wilkins MD  9/14/2021  13:07 EDT

## 2021-09-14 NOTE — PLAN OF CARE
VSS. AC remains on hold. Clear liquids to start in AM with colonoscopy on Thursday. No c/o pain. Will continue to monitor.     Problem: Adult Inpatient Plan of Care  Goal: Plan of Care Review  Outcome: Ongoing, Progressing  Goal: Patient-Specific Goal (Individualized)  Outcome: Ongoing, Progressing  Goal: Absence of Hospital-Acquired Illness or Injury  Outcome: Ongoing, Progressing  Intervention: Identify and Manage Fall Risk  Recent Flowsheet Documentation  Taken 9/14/2021 1800 by Anali Jackman RN  Safety Promotion/Fall Prevention:   activity supervised   clutter free environment maintained   fall prevention program maintained   nonskid shoes/slippers when out of bed   safety round/check completed   room organization consistent  Taken 9/14/2021 1600 by Anali Jackman RN  Safety Promotion/Fall Prevention:   activity supervised   clutter free environment maintained   fall prevention program maintained   nonskid shoes/slippers when out of bed   room organization consistent   safety round/check completed  Taken 9/14/2021 1400 by Anali Jackman RN  Safety Promotion/Fall Prevention:   activity supervised   clutter free environment maintained   fall prevention program maintained   nonskid shoes/slippers when out of bed   room organization consistent   safety round/check completed  Taken 9/14/2021 1200 by Anali Jackman RN  Safety Promotion/Fall Prevention:   activity supervised   clutter free environment maintained   fall prevention program maintained   nonskid shoes/slippers when out of bed   safety round/check completed   room organization consistent  Taken 9/14/2021 1000 by Anali Jackman, RN  Safety Promotion/Fall Prevention:   activity supervised   clutter free environment maintained   fall prevention program maintained   nonskid shoes/slippers when out of bed   room organization consistent   safety round/check completed  Taken 9/14/2021 0912 by Anali Jackman, ALEJANDRA  Safety Promotion/Fall Prevention:   activity  supervised   clutter free environment maintained   fall prevention program maintained   nonskid shoes/slippers when out of bed   safety round/check completed   room organization consistent  Taken 9/14/2021 0730 by Anali Jackman RN  Safety Promotion/Fall Prevention:   activity supervised   clutter free environment maintained   fall prevention program maintained   nonskid shoes/slippers when out of bed   room organization consistent   safety round/check completed  Intervention: Prevent and Manage VTE (venous thromboembolism) Risk  Recent Flowsheet Documentation  Taken 9/14/2021 0912 by Anali Jackman RN  VTE Prevention/Management:   bilateral   dorsiflexion/plantar flexion performed  Goal: Optimal Comfort and Wellbeing  Outcome: Ongoing, Progressing  Intervention: Provide Person-Centered Care  Recent Flowsheet Documentation  Taken 9/14/2021 0912 by Anali Jackman RN  Trust Relationship/Rapport: care explained  Goal: Readiness for Transition of Care  Outcome: Ongoing, Progressing     Problem: Balance Impairment (Functional Deficit)  Goal: Improved Balance and Postural Control  Outcome: Ongoing, Progressing  Intervention: Optimize Balance and Safe Activity  Recent Flowsheet Documentation  Taken 9/14/2021 1800 by Anali Jackman RN  Safety Promotion/Fall Prevention:   activity supervised   clutter free environment maintained   fall prevention program maintained   nonskid shoes/slippers when out of bed   safety round/check completed   room organization consistent  Taken 9/14/2021 1600 by Anali Jackman RN  Safety Promotion/Fall Prevention:   activity supervised   clutter free environment maintained   fall prevention program maintained   nonskid shoes/slippers when out of bed   room organization consistent   safety round/check completed  Taken 9/14/2021 1400 by Anali Jackman RN  Safety Promotion/Fall Prevention:   activity supervised   clutter free environment maintained   fall prevention program maintained   nonskid  shoes/slippers when out of bed   room organization consistent   safety round/check completed  Taken 9/14/2021 1200 by Anali Jackman, RN  Safety Promotion/Fall Prevention:   activity supervised   clutter free environment maintained   fall prevention program maintained   nonskid shoes/slippers when out of bed   safety round/check completed   room organization consistent  Taken 9/14/2021 1000 by Anali Jackman, RN  Safety Promotion/Fall Prevention:   activity supervised   clutter free environment maintained   fall prevention program maintained   nonskid shoes/slippers when out of bed   room organization consistent   safety round/check completed  Taken 9/14/2021 0912 by Anali Jackman, RN  Safety Promotion/Fall Prevention:   activity supervised   clutter free environment maintained   fall prevention program maintained   nonskid shoes/slippers when out of bed   safety round/check completed   room organization consistent  Taken 9/14/2021 0730 by Anali Jackman, RN  Safety Promotion/Fall Prevention:   activity supervised   clutter free environment maintained   fall prevention program maintained   nonskid shoes/slippers when out of bed   room organization consistent   safety round/check completed     Problem: Coordination Impairment (Functional Deficit)  Goal: Optimal Coordination  Outcome: Ongoing, Progressing     Problem: Muscle Strength Impairment (Functional Deficit)  Goal: Improved Muscle Strength  Outcome: Ongoing, Progressing     Problem: Muscle Tone Impairment (Functional Deficit)  Goal: Improved Muscle Tone  Outcome: Ongoing, Progressing     Problem: Range of Motion Impairment (Functional Deficit)  Goal: Optimal Range of Motion  Outcome: Ongoing, Progressing     Problem: Sensory Impairment (Functional Deficit)  Goal: Compensation for Sensory Deficit  Outcome: Ongoing, Progressing     Problem: Nausea and Vomiting  Goal: Fluid and Electrolyte Balance  Outcome: Ongoing, Progressing     Problem: Hypertension  Comorbidity  Goal: Blood Pressure in Desired Range  Outcome: Ongoing, Progressing  Intervention: Maintain Hypertension-Management Strategies  Recent Flowsheet Documentation  Taken 9/14/2021 0912 by Anali Jackman RN  Medication Review/Management: medications reviewed     Problem: Fall Injury Risk  Goal: Absence of Fall and Fall-Related Injury  Outcome: Ongoing, Progressing  Intervention: Identify and Manage Contributors to Fall Injury Risk  Recent Flowsheet Documentation  Taken 9/14/2021 0912 by Anali Jackman RN  Medication Review/Management: medications reviewed  Intervention: Promote Injury-Free Environment  Recent Flowsheet Documentation  Taken 9/14/2021 1800 by Anali Jackman, RN  Safety Promotion/Fall Prevention:   activity supervised   clutter free environment maintained   fall prevention program maintained   nonskid shoes/slippers when out of bed   safety round/check completed   room organization consistent  Taken 9/14/2021 1600 by Anali Jackman RN  Safety Promotion/Fall Prevention:   activity supervised   clutter free environment maintained   fall prevention program maintained   nonskid shoes/slippers when out of bed   room organization consistent   safety round/check completed  Taken 9/14/2021 1400 by Anali Jackman RN  Safety Promotion/Fall Prevention:   activity supervised   clutter free environment maintained   fall prevention program maintained   nonskid shoes/slippers when out of bed   room organization consistent   safety round/check completed  Taken 9/14/2021 1200 by Anali Jackman RN  Safety Promotion/Fall Prevention:   activity supervised   clutter free environment maintained   fall prevention program maintained   nonskid shoes/slippers when out of bed   safety round/check completed   room organization consistent  Taken 9/14/2021 1000 by Anali Jackman RN  Safety Promotion/Fall Prevention:   activity supervised   clutter free environment maintained   fall prevention program maintained   nonskid  shoes/slippers when out of bed   room organization consistent   safety round/check completed  Taken 9/14/2021 0912 by Anali Jackman, RN  Safety Promotion/Fall Prevention:   activity supervised   clutter free environment maintained   fall prevention program maintained   nonskid shoes/slippers when out of bed   safety round/check completed   room organization consistent  Taken 9/14/2021 0730 by Anali Jackman, RN  Safety Promotion/Fall Prevention:   activity supervised   clutter free environment maintained   fall prevention program maintained   nonskid shoes/slippers when out of bed   room organization consistent   safety round/check completed   Goal Outcome Evaluation:

## 2021-09-15 LAB
ANION GAP SERPL CALCULATED.3IONS-SCNC: 11.7 MMOL/L (ref 5–15)
BASOPHILS # BLD AUTO: 0.05 10*3/MM3 (ref 0–0.2)
BASOPHILS NFR BLD AUTO: 0.5 % (ref 0–1.5)
BUN SERPL-MCNC: 9 MG/DL (ref 8–23)
BUN/CREAT SERPL: 13.4 (ref 7–25)
CALCIUM SPEC-SCNC: 8.5 MG/DL (ref 8.6–10.5)
CHLORIDE SERPL-SCNC: 107 MMOL/L (ref 98–107)
CO2 SERPL-SCNC: 20.3 MMOL/L (ref 22–29)
CREAT SERPL-MCNC: 0.67 MG/DL (ref 0.57–1)
DEPRECATED RDW RBC AUTO: 53.9 FL (ref 37–54)
EOSINOPHIL # BLD AUTO: 0.22 10*3/MM3 (ref 0–0.4)
EOSINOPHIL NFR BLD AUTO: 2.4 % (ref 0.3–6.2)
ERYTHROCYTE [DISTWIDTH] IN BLOOD BY AUTOMATED COUNT: 15.7 % (ref 12.3–15.4)
GFR SERPL CREATININE-BSD FRML MDRD: 83 ML/MIN/1.73
GLUCOSE SERPL-MCNC: 120 MG/DL (ref 65–99)
HCT VFR BLD AUTO: 33.7 % (ref 34–46.6)
HGB BLD-MCNC: 10.2 G/DL (ref 12–15.9)
LYMPHOCYTES # BLD AUTO: 2.25 10*3/MM3 (ref 0.7–3.1)
LYMPHOCYTES NFR BLD AUTO: 24.4 % (ref 19.6–45.3)
MCH RBC QN AUTO: 28.2 PG (ref 26.6–33)
MCHC RBC AUTO-ENTMCNC: 30.3 G/DL (ref 31.5–35.7)
MCV RBC AUTO: 93.1 FL (ref 79–97)
MONOCYTES # BLD AUTO: 0.58 10*3/MM3 (ref 0.1–0.9)
MONOCYTES NFR BLD AUTO: 6.3 % (ref 5–12)
NEUTROPHILS NFR BLD AUTO: 6.07 10*3/MM3 (ref 1.7–7)
NEUTROPHILS NFR BLD AUTO: 65.6 % (ref 42.7–76)
PLATELET # BLD AUTO: 258 10*3/MM3 (ref 140–450)
PMV BLD AUTO: 10.2 FL (ref 6–12)
POTASSIUM SERPL-SCNC: 4 MMOL/L (ref 3.5–5.2)
RBC # BLD AUTO: 3.62 10*6/MM3 (ref 3.77–5.28)
SARS-COV-2 RNA RESP QL NAA+PROBE: NOT DETECTED
SODIUM SERPL-SCNC: 139 MMOL/L (ref 136–145)
WBC # BLD AUTO: 9.24 10*3/MM3 (ref 3.4–10.8)

## 2021-09-15 PROCEDURE — U0003 INFECTIOUS AGENT DETECTION BY NUCLEIC ACID (DNA OR RNA); SEVERE ACUTE RESPIRATORY SYNDROME CORONAVIRUS 2 (SARS-COV-2) (CORONAVIRUS DISEASE [COVID-19]), AMPLIFIED PROBE TECHNIQUE, MAKING USE OF HIGH THROUGHPUT TECHNOLOGIES AS DESCRIBED BY CMS-2020-01-R: HCPCS | Performed by: INTERNAL MEDICINE

## 2021-09-15 PROCEDURE — 99232 SBSQ HOSP IP/OBS MODERATE 35: CPT | Performed by: NURSE PRACTITIONER

## 2021-09-15 PROCEDURE — 36415 COLL VENOUS BLD VENIPUNCTURE: CPT | Performed by: INTERNAL MEDICINE

## 2021-09-15 PROCEDURE — 97530 THERAPEUTIC ACTIVITIES: CPT

## 2021-09-15 PROCEDURE — 80048 BASIC METABOLIC PNL TOTAL CA: CPT | Performed by: INTERNAL MEDICINE

## 2021-09-15 PROCEDURE — 85025 COMPLETE CBC W/AUTO DIFF WBC: CPT | Performed by: INTERNAL MEDICINE

## 2021-09-15 PROCEDURE — 99232 SBSQ HOSP IP/OBS MODERATE 35: CPT | Performed by: INTERNAL MEDICINE

## 2021-09-15 RX ORDER — POLYETHYLENE GLYCOL 3350 17 G/17G
1 POWDER, FOR SOLUTION ORAL ONCE
Status: COMPLETED | OUTPATIENT
Start: 2021-09-15 | End: 2021-09-15

## 2021-09-15 RX ADMIN — POLYETHYLENE GLYCOL 3350 1 BOTTLE: 17 POWDER, FOR SOLUTION ORAL at 16:09

## 2021-09-15 RX ADMIN — SODIUM CHLORIDE 75 ML/HR: 9 INJECTION, SOLUTION INTRAVENOUS at 09:15

## 2021-09-15 RX ADMIN — SODIUM CHLORIDE 75 ML/HR: 9 INJECTION, SOLUTION INTRAVENOUS at 22:01

## 2021-09-15 RX ADMIN — HYDROCORTISONE ACETATE PRAMOXINE HCL: 2.5; 1 CREAM TOPICAL at 22:02

## 2021-09-15 RX ADMIN — METOPROLOL TARTRATE 25 MG: 25 TABLET, FILM COATED ORAL at 09:12

## 2021-09-15 RX ADMIN — METOPROLOL TARTRATE 25 MG: 25 TABLET, FILM COATED ORAL at 22:01

## 2021-09-15 RX ADMIN — FELODIPINE 10 MG: 5 TABLET, FILM COATED, EXTENDED RELEASE ORAL at 09:10

## 2021-09-15 RX ADMIN — ATORVASTATIN CALCIUM 40 MG: 20 TABLET, FILM COATED ORAL at 22:01

## 2021-09-15 RX ADMIN — HYDROCORTISONE ACETATE PRAMOXINE HCL: 2.5; 1 CREAM TOPICAL at 09:12

## 2021-09-15 RX ADMIN — PANTOPRAZOLE SODIUM 40 MG: 40 TABLET, DELAYED RELEASE ORAL at 06:44

## 2021-09-15 NOTE — PLAN OF CARE
Goal Outcome Evaluation:  Plan of Care Reviewed With: patient        Progress: no change  Outcome Summary: Pt agreeable to PT w/ a lot of encouragement and educ on imp of mobility to maintain strength. Pt amb to BR then to recliner req SBA and use of fww. Pt stood at toilet and cleaned herself up then pulled up brief w/ SBA. PT will prog as pt elaina.  Patient was not wearing a face mask during this therapy encounter. Therapist used appropriate personal protective equipment including eye protection, mask, and gloves.  Mask used was standard procedure mask. Appropriate PPE was worn during the entire therapy session. Hand hygiene was completed before and after therapy session. Patient is not in enhanced droplet precautions.

## 2021-09-15 NOTE — THERAPY TREATMENT NOTE
Patient Name: Monica Scherer  : 1933    MRN: [id number]                              Today's Date: 9/15/2021       Admit Date: 9/10/2021    Visit Dx:     ICD-10-CM ICD-9-CM   1. General weakness  R53.1 780.79   2. Hematuria, unspecified type  R31.9 599.70   3. Rectal bleeding  K62.5 569.3     Patient Active Problem List   Diagnosis   • Vertigo   • Temporary cerebral vascular dysfunction   • Gastroesophageal reflux disease with esophagitis   • Degeneration of intervertebral disc of cervical region   • Prediabetes   • S/P cholecystectomy   • Osteoarthritis of knee   • Herpes zoster without complication   • Hypertension   • Duodenal ulcer   • History of pancreatitis   • Hyperlipidemia   • TIA (transient ischemic attack)   • Cerebrovascular accident (CVA) due to thrombosis of left posterior cerebral artery (CMS/HCC)   • Paroxysmal atrial fibrillation (CMS/HCC)   • General weakness   • History of CVA (cerebrovascular accident)   • Anticoagulated   • Hematuria   • Dizziness and giddiness   • Rectal bleeding     Past Medical History:   Diagnosis Date   • Atrial fibrillation (CMS/HCC)    • Hyperlipidemia    • Hypertension    • Lacunar infarct, acute (CMS/HCC) 2020    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   • New onset atrial fibrillation (CMS/HCC) 2020    now on rate control along with Eliquis   • Prolapsed uterus     per patient   • Stroke (CMS/HCC)    • TIA (transient ischemic attack) 2020   • Weakness      Past Surgical History:   Procedure Laterality Date   • APPENDECTOMY     • CHOLECYSTECTOMY N/A 2016    Procedure: CHOLECYSTECTOMY LAPAROSCOPIC;  Surgeon: Russ Gar MD;  Location[place] [facility] [place];  Service:    • ENDOSCOPY N/A 2018    Z-line regular, 35 cm from incisors, normal esophagus, gastritis, bilious gastric fluid, one non-bleeding duodenal ulcer w/no stigmata of bleeding, Path: DUODENUM: FRAGMENTS OF GASTRIC TYPE MUCOSA WITH HYPERPLASIA (FOVEOLAR) AND PATCHY MIXED  INFLAMMATION IN THE LAMINA PROPRIA WITH FOCAL FIBROSIS, COMMENT: 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    • EYE SURGERY      tre cataracts     General Information     Row Name 09/15/21 1436          Physical Therapy Time and Intention    Document Type  therapy note (daily note)  -     Mode of Treatment  physical therapy  -     Row Name 09/15/21 1436          General Information    Existing Precautions/Restrictions  fall  -     Row Name 09/15/21 1436          Safety Issues, Functional Mobility    Impairments Affecting Function (Mobility)  endurance/activity tolerance  -       User Key  (r) = Recorded By, (t) = Taken By, (c) = Cosigned By    Initials Name Provider Type    PH Alice Yoder, PTA Physical Therapy Assistant        Mobility     Row Name 09/15/21 1436          Bed Mobility    Bed Mobility  supine-sit  -PH     Supine-Sit [place] (Bed Mobility)  supervision  -     Assistive Device (Bed Mobility)  bed rails;head of bed elevated  -PH     Comment (Bed Mobility)  Pt initially refusing PT 2/2 to having to prep for colonoscopy later today. Pt agreeable w/ encouragement and educ on imp of mobility, to short amb to BR then to chair only.  -PH     Row Name 09/15/21 1436          Transfers    Comment (Transfers)  STS x 2; from EOB / from toilet  -[facility] Name 09/15/21 [facility]          Sit-Stand Transfer    Sit-Stand [place] (Transfers)  supervision;verbal cues  -     Assistive Device (Sit-Stand Transfers)  other (see comments) no AD  -[facility] Name 09/15/21 1436          Gait/Stairs (Locomotion)    [place] Level (Gait)  supervision;standby assist  -PH     Assistive Device (Gait)  walker, front-wheeled  -PH     Distance in Feet (Gait)  12' to BR then 10' to recliner  -PH     Deviations/Abnormal Patterns (Gait)  base of support, wide;gait speed decreased;aaliyah decreased  -PH     Bilateral Gait Deviations  forward flexed posture  -PH     [place] Level (Stairs)  unable to  assess  -PH     Comment (Gait/Stairs)  pt amb at reduced gait speed w/ no LOB  -PH       User Key  (r) = Recorded By, (t) = Taken By, (c) = Cosigned By    Initials Name Provider Type    Alice Leong PTA Physical Therapy Assistant        Obj/Interventions     Row Name 09/15/21 1439          Motor Skills    Therapeutic Exercise  other (see comments) LAQ, seated march, hip abd, SAQ x 5 sec hold,  SLR; x 15 - 20 reps all w/ assist for SLR  -PH     Row Name 09/15/21 1439          Balance    Balance Assessment  sitting static balance;standing static balance  -PH     Static Sitting Balance  WNL  -PH     Static Standing Balance  WFL;unsupported;standing  -PH     Comment, Balance  pt stood at toilet and cleaned herself up then pulled up briefs w/ no AD  -PH       User Key  (r) = Recorded By, (t) = Taken By, (c) = Cosigned By    Initials Name Provider Type    Alice Leong PTA Physical Therapy Assistant        Goals/Plan    No documentation.       Clinical Impression     Row Name 09/15/21 1441          Pain    Additional Documentation  Pain Scale: Numbers Pre/Post-Treatment (Group)  -PH     Row Name 09/15/21 1441          Pain Scale: Numbers Pre/Post-Treatment    Pretreatment Pain Rating  0/10 - no pain  -PH     Posttreatment Pain Rating  0/10 - no pain  -PH     Row Name 09/15/21 1441          Plan of Care Review    Plan of Care Reviewed With  patient  -PH     Progress  no change  -PH     Outcome Summary  Pt agreeable to PT w/ a lot of encouragement and educ on imp of mobility to maintain strength. Pt amb to BR then to recliner req SBA and use of fww. Pt stood at toilet and cleaned herself up then pulled up brief w/ SBA. PT will prog as pt elaina.  -PH     Row Name 09/15/21 1441          Positioning and Restraints    Pre-Treatment Position  in bed  -PH     Post Treatment Position  chair  -PH     In Chair  reclined;call light within reach;encouraged to call for assist;exit alarm on  -PH       User Key   (r) = Recorded By, (t) = Taken By, (c) = Cosigned By    Initials Name Provider Type     Alice Yoder PTA Physical Therapy Assistant        Outcome Measures     Row Name 09/15/21 1442          How much help from another person do you currently need...    Turning from your back to your side while in flat bed without using bedrails?  3  -PH     Moving from lying on back to sitting on the side of a flat bed without bedrails?  3  -PH     Moving to and from a bed to a chair (including a wheelchair)?  3  -PH     Standing up from a chair using your arms (e.g., wheelchair, bedside chair)?  3  -PH     Climbing 3-5 steps with a railing?  2  -PH     To walk in hospital room?  3  -PH     AM-PAC 6 Clicks Score (PT)  17  -PH     Row Name 09/15/21 [facility]          Functional Assessment    Outcome Measure Options  AM-PAC 6 Clicks Basic Mobility (PT)  -PH       User Key  (r) = Recorded By, (t) = Taken By, (c) = Cosigned By    Initials Name Provider Type     Alice Yoder PTA Physical Therapy Assistant                       Physical Therapy Education                 Title: PT OT SLP Therapies (Done)     Topic: Physical Therapy (Done)     Point: Mobility training (Done)     Learning Progress Summary           Patient Acceptance, E,D, VU,NR,DU by  at 9/15/2021 1443    Acceptance, E,TB,D, VU,DU by LB at 9/11/2021 1535                   Point: Home exercise program (Done)     Learning Progress Summary           Patient Acceptance, E,D, VU,NR,DU by  at 9/15/2021 1443    Acceptance, E,TB,D, VU,DU by LB at 9/11/2021 1535                   Point: Body mechanics (Done)     Learning Progress Summary           Patient Acceptance, E,D, VU,NR,DU by  at 9/15/2021 1443    Acceptance, E,TB,D, VU,DU by LB at 9/11/2021 1535                   Point: Precautions (Done)     Learning Progress Summary           Patient Acceptance, E,D, VU,NR,DU by  at 9/15/2021 1443    Acceptance, E,TB,D, VU,DU by LB at 9/11/2021 1535                                User Key     Initials Effective Dates Name Provider Type Discipline    LB 08/09/20 -  Sharmaine Ayers, PT Physical Therapist PT    PH 06/16/21 -  Alice Yoder PTA Physical Therapy Assistant PT              PT Recommendation and Plan     Plan of Care Reviewed With: patient  Progress: no change  Outcome Summary: Pt agreeable to PT w/ a lot of encouragement and educ on imp of mobility to maintain strength. Pt amb to BR then to recliner req SBA and use of fww. Pt stood at toilet and cleaned herself up then pulled up brief w/ SBA. PT will prog as pt elaina.     Time Calculation:   PT Charges     Row Name 09/15/21 1443             Time Calculation    Start Time  1410  -PH      Stop Time  1424  -PH      Time Calculation (min)  14 min  -PH      PT Received On  09/15/21  -PH      PT - Next Appointment  09/16/21  -PH         Timed Charges    35795 - PT Therapeutic Exercise Minutes  6  -PH      01303 - PT Therapeutic Activity Minutes  8  -PH         Total Minutes    Timed Charges Total Minutes  14  -PH       Total Minutes  14  -PH        User Key  (r) = Recorded By, (t) = Taken By, (c) = Cosigned By    Initials Name Provider Type    PH Alice Yoder PTA Physical Therapy Assistant        Therapy Charges for Today     Code Description Service Date Service Provider Modifiers Qty    [id number]  PT THERAPEUTIC ACT EA 15 MIN 9/15/2021 Alice Yoder PTA GP 1          PT G-Codes  Outcome Measure Options: AM-PAC 6 Clicks Basic Mobility (PT)  AM-PAC 6 Clicks Score (PT): 17    Alice Yoder PTA  9/15/2021

## 2021-09-15 NOTE — PROGRESS NOTES
Gastroenterology   Inpatient Progress Note    Reason for Follow Up:  Rectal bleeding    Subjective     Interval History:   Patient's daughter at the bedside had some bleeding overnight small amounts of rectal bleeding cardiology has cleared her for colonoscopy Boy has been held    Current Facility-Administered Medications:   •  acetaminophen (TYLENOL) tablet 650 mg, 650 mg, Oral, Q4H PRN, Dorene Robles, APRN  •  aluminum-magnesium hydroxide-simethicone (MAALOX MAX) 400-400-40 MG/5ML suspension 15 mL, 15 mL, Oral, Q6H PRN, Dorene Robles, APRN  •  atorvastatin (LIPITOR) tablet 40 mg, 40 mg, Oral, Nightly, Howie Wilkins MD, 40 mg at 09/14/21 2043  •  felodipine (PLENDIL) 24 hr tablet 10 mg, 10 mg, Oral, Daily, Bladimir Costa MD, 10 mg at 09/15/21 0910  •  Hydrocort-Pramoxine (Perianal) (ANALPRAM-HC) 2.5-1 % rectal cream, , Rectal, BID, Inez Nowak APRN, Given at 09/15/21 0912  •  meclizine (ANTIVERT) tablet 50 mg, 50 mg, Oral, Q6H PRN, Bladimir Costa MD  •  metoprolol tartrate (LOPRESSOR) tablet 25 mg, 25 mg, Oral, Q12H, Bladimir Costa MD, 25 mg at 09/15/21 0912  •  ondansetron (ZOFRAN) tablet 4 mg, 4 mg, Oral, Q6H PRN **OR** ondansetron (ZOFRAN) injection 4 mg, 4 mg, Intravenous, Q6H PRN, Dorene Robles, APRN  •  pantoprazole (PROTONIX) EC tablet 40 mg, 40 mg, Oral, QAMJulissa Matthew D, MD, 40 mg at 09/15/21 0644  •  potassium chloride (K-DUR,KLOR-CON) ER tablet 40 mEq, 40 mEq, Oral, PRN, 40 mEq at 09/14/21 1146 **OR** potassium chloride (KLOR-CON) packet 40 mEq, 40 mEq, Oral, PRN **OR** potassium chloride 20 mEq in 50 mL IVPB, 20 mEq, Intravenous, Q1H PRN, Howie Wilkins MD  •  [COMPLETED] Insert peripheral IV, , , Once **AND** sodium chloride 0.9 % flush 10 mL, 10 mL, Intravenous, PRN, Omar Hopkins PA  •  sodium chloride 0.9 % flush 10 mL, 10 mL, Intravenous, PRN, Dorene Robles, APRN  •  sodium chloride 0.9 % infusion, 75 mL/hr, Intravenous, Continuous, Travis,  CORTEZ Varela, Last Rate: 75 mL/hr at 09/15/21 0915, 75 mL/hr at 09/15/21 0915  Review of Systems:               All systems were reviewed and negative except for:  Gastrointestinal: positive for  bright red blood per rectum    Objective     Vital Signs  Temp:  [97.2 °F (36.2 °C)-98.3 °F (36.8 °C)] 97.7 °F (36.5 °C)  Heart Rate:  [59-92] 59  Resp:  [16-18] 16  BP: (109-132)/(60-78) 117/60  Body mass index is 29.18 kg/m².    Intake/Output Summary (Last 24 hours) at 9/15/2021 1036  Last data filed at 9/15/2021 1009  Gross per 24 hour   Intake 1520 ml   Output 2350 ml   Net -830 ml     I/O this shift:  In: 1180 [P.O.:180; I.V.:1000]  Out: 700 [Urine:700]                Physical Exam:              General: patient awake, alert and cooperative              Eyes: no scleral icterus              Skin: warm and dry, not jaundiced              Abdomen: soft, nontender, nondistended; normal bowel sounds, no masses palpated, no periumbical lymphadenopathy              Psychiatric: Appropriate affect and behavior                Results Review:                I reviewed the patient's new clinical results.    Results from last 7 days   Lab Units 09/15/21  0719 09/14/21 0928 09/13/21  1152   WBC 10*3/mm3 9.24 9.61 9.23   HEMOGLOBIN g/dL 10.2* 9.5* 9.5*   HEMATOCRIT % 33.7* 29.6* 30.5*   PLATELETS 10*3/mm3 258 234 224     Results from last 7 days   Lab Units 09/15/21  0719 09/14/21  1554 09/14/21  0928 09/13/21  1152 09/13/21  1152 09/11/21  0705 09/10/21  1742   SODIUM mmol/L 139  --  140  --  138   < > 137   POTASSIUM mmol/L 4.0 4.1 3.4*   < > 3.1*   < > 3.8   CHLORIDE mmol/L 107  --  107  --  105   < > 101   CO2 mmol/L 20.3*  --  22.5  --  22.2   < > 22.9   BUN mg/dL 9  --  9  --  11   < > 23   CREATININE mg/dL 0.67  --  0.61  --  0.65   < > 1.01*   CALCIUM mg/dL 8.5*  --  8.3*  --  8.6   < > 8.9   BILIRUBIN mg/dL  --   --   --   --   --   --  0.7   ALK PHOS U/L  --   --   --   --   --   --  82   ALT (SGPT) U/L  --   --    --   --   --   --  17   AST (SGOT) U/L  --   --   --   --   --   --  24   GLUCOSE mg/dL 120*  --  119*  --  168*   < > 145*    < > = values in this interval not displayed.         Lab Results   Lab Value Date/Time    LIPASE 202 (H) 02/24/2018 0508    LIPASE 146 (H) 02/23/2018 0614    LIPASE 145 (H) 02/22/2018 0302    LIPASE 193 (H) 02/21/2018 1456    LIPASE 547 (H) 02/20/2018 0410    LIPASE >600 (H) 02/19/2018 0408    LIPASE >600 (H) 02/18/2018 1349    LIPASE 11 (L) 06/07/2016 2006       Radiology:  CT Abdomen Pelvis With Contrast   Final Result   1. Moderately extensive sigmoid diverticulosis without evidence for   diverticulitis.   2. Extensive pelvic floor relaxation with vaginal prolapse, prominent   rectocele, and a small cystocele. The cystocele may enlarge with more   bladder distention.   3. Slight increase in size of a 1.1 cm slightly hyperdense right renal   cortical lesion since 2018.       This report was finalized on 9/14/2021 7:06 AM by Dr. Iris Acevedo M.D.          MRI Brain Without Contrast   Final Result   Small vessel ischemic disease, left mastoid fluid collection   and small remote cerebellar infarcts are appreciated as described in   detail above. There is no evidence of acute infarction or mass/mass   effect on this noncontrasted MRI examination of the brain.       This report was finalized on 9/12/2021 12:46 PM by Dr. Florencio Judge M.D.          XR Chest 1 View   Final Result          Assessment/Plan     Patient Active Problem List   Diagnosis   • Vertigo   • Temporary cerebral vascular dysfunction   • Gastroesophageal reflux disease with esophagitis   • Degeneration of intervertebral disc of cervical region   • Prediabetes   • S/P cholecystectomy   • Osteoarthritis of knee   • Herpes zoster without complication   • Hypertension   • Duodenal ulcer   • History of pancreatitis   • Hyperlipidemia   • TIA (transient ischemic attack)   • Cerebrovascular accident (CVA) due to thrombosis of left  posterior cerebral artery (CMS/HCC)   • Paroxysmal atrial fibrillation (CMS/HCC)   • General weakness   • History of CVA (cerebrovascular accident)   • Anticoagulated   • Hematuria   • Dizziness and giddiness   • Rectal bleeding       Assessment:  1. Rectal bleeding, stable to increased hemoglobin today  2. Normocytic anemia  3. Family history of colon cancer  4. Abdominal pain nausea and vomiting all resolved  5. A. fib on Eliquis held and cleared by cardiology for colonoscopy  6. History of CVA  7. Recent CT scan with no GI abnormality other than diverticulosis and rectocele    These problems are new to me.  Plan:  · Colonoscopy  · Monitor hemoglobin and bleeding  I discussed the patients findings and my recommendations with patient and family.             Salvador Perdue M.D.  [facility] [place] [address]  Office: [phone]

## 2021-09-15 NOTE — PLAN OF CARE
Problem: Adult Inpatient Plan of Care  Goal: Plan of Care Review  9/15/2021 0406 by Stephanie Mejia RN  Outcome: Ongoing, Progressing  Flowsheets (Taken 9/15/2021 0406)  Plan of Care Reviewed With: patient  Outcome Summary: has not c/o any pain, mild bleeding from her external hemorrhoids still noted, also noted a large uterine prolapse noted, intact pink and moist, ambulated to the bathroom with help, falls precaution maintained   Goal Outcome Evaluation:  Plan of Care Reviewed With: patient        Progress: improving  Outcome Summary: has not c/o any pain, mild bleeding from her external hemorrhoids still noted, also noted a large uterine prolapse noted, intact pink and moist, ambulated to the bathroom with help, falls precaution maintained

## 2021-09-15 NOTE — PROGRESS NOTES
"[facility]    Patient Name: Monica Scherer  :1933  88 y.o.  LOS: 1  Encounter Provider: CORTEZ Morales      Patient Care Team:  Amilcar Mauricio DO as PCP - General (Family Medicine)    Chief Complaint: Follow-up atrial fibrillation, rectal bleeding    Interval History: Continues to have rectal bleeding.  Hemoglobin stable at 10.2.  Plan for colonoscopy tomorrow.       Objective   Vital Signs  Temp:  [97.2 °F (36.2 °C)-98.3 °F (36.8 °C)] 97.6 °F (36.4 °C)  Heart Rate:  [59-92] 82  Resp:  [16-18] 16  BP: (109-134)/(60-88) 134/88    Intake/Output Summary (Last 24 hours) at 9/15/2021 1347  Last data filed at 9/15/2021 1330  Gross per 24 hour   Intake 1700 ml   Output 2350 ml   Net -650 ml     Flowsheet Rows      First Filed Value   Admission Height  162.6 cm (64\") Documented at 09/10/2021 1733   Admission Weight  77.1 kg (170 lb) Documented at 09/10/2021 1733            Vitals and nursing note reviewed.   Constitutional:       Appearance: Normal appearance. Well-developed.   Eyes:      Conjunctiva/sclera: Conjunctivae normal.   Neck:      Vascular: No carotid bruit.   Pulmonary:      Breath sounds: Normal breath sounds.   Cardiovascular:      Normal rate. Regular rhythm. Normal S1 with normal intensity. Normal S2 with normal intensity.      Murmurs: There is no murmur.      No gallop. No click. No rub.   Edema:     Peripheral edema absent.   Musculoskeletal: Normal range of motion. Skin:     General: Skin is warm and dry.   Neurological:      Mental Status: Alert and oriented to person, place, and time.      GCS: GCS eye subscore is 4. GCS verbal subscore is 5. GCS motor subscore is 6.   Psychiatric:         Speech: Speech normal.         Behavior: Behavior normal.         Thought Content: Thought content normal.         Judgment: Judgment normal.           Pertinent Test Results:  Results from last 7 days   Lab Units 09/15/21  0719 21  1554 21  0928 21  1152 " 09/11/21  2321 09/11/21  0705 09/10/21  1742   SODIUM mmol/L 139  --  140 138 139 137 137   POTASSIUM mmol/L 4.0 4.1 3.4* 3.1* 3.6 3.5 3.8   CHLORIDE mmol/L 107  --  107 105 106 102 101   CO2 mmol/L 20.3*  --  22.5 22.2 22.6 24.0 22.9   BUN mg/dL 9  --  9 11 21 21 23   CREATININE mg/dL 0.67  --  0.61 0.65 0.92 0.79 1.01*   GLUCOSE mg/dL 120*  --  119* 168* 150* 112* 145*   CALCIUM mg/dL 8.5*  --  8.3* 8.6 8.5* 8.4* 8.9   AST (SGOT) U/L  --   --   --   --   --   --  24   ALT (SGPT) U/L  --   --   --   --   --   --  17     Results from last 7 days   Lab Units 09/10/21  1742   TROPONIN T ng/mL <0.010     Results from last 7 days   Lab Units 09/15/21  0719 09/14/21  0928 09/13/21  1152 09/12/21  0818 09/11/21  2321 09/11/21  1954 09/11/21  0705 09/10/21  1742 09/10/21  1742   WBC 10*3/mm3 9.24 9.61 9.23  --  10.93*  --  10.56  --  11.12*   HEMOGLOBIN g/dL 10.2* 9.5* 9.5* 10.2* 9.9*  9.9* 11.4* 11.1*   < > 11.8*   HEMATOCRIT % 33.7* 29.6* 30.5* 33.5* 30.6*  30.6* 36.8 34.6   < > 36.8   PLATELETS 10*3/mm3 258 234 224  --  196  --  216  --  238    < > = values in this interval not displayed.                   Invalid input(s): LDLCALC  Results from last 7 days   Lab Units 09/10/21  1742   PROBNP pg/mL 1,908.0*               Medication Review:   atorvastatin, 40 mg, Oral, Nightly  felodipine, 10 mg, Oral, Daily  Hydrocort-Pramoxine (Perianal), , Rectal, BID  metoprolol tartrate, 25 mg, Oral, Q12H  pantoprazole, 40 mg, Oral, QAM  polyethylene glycol, 1 bottle, Oral, Once         sodium chloride, 75 mL/hr, Last Rate: 75 mL/hr (09/15/21 0915)        Assessment/Plan   1. Generalized weakness  2. Hypertension  3. Hyperlipidemia  4. PAF  5. History of stroke  6. Hematuria  7. Rectal bleeding  8. Low hemoglobin    Patient continued to have episodes of rectal bleeding.  Apixaban is on hold.  Plan for colonoscopy tomorrow.  Will restart apixaban at the discretion of gastroenterology.    CORTEZ Morales  Cardiology Group  09/15/21  13:47 EDT

## 2021-09-15 NOTE — PROGRESS NOTES
"DAILY PROGRESS NOTE  [facility]    Patient Identification:  Name: Monica Scherer  Age: 88 y.o.  Sex: female  :  1933  MRN: [id number]         Primary Care Physician: Amilcar Mauricio DO    Subjective:  Interval History:She complains of some rectal bleeding.Not as bad. She thinks it her hemorrhoids.    Objective:    Scheduled Meds:atorvastatin, 40 mg, Oral, Nightly  felodipine, 10 mg, Oral, Daily  Hydrocort-Pramoxine (Perianal), , Rectal, BID  metoprolol tartrate, 25 mg, Oral, Q12H  pantoprazole, 40 mg, Oral, QAM  polyethylene glycol, 1 bottle, Oral, Once      Continuous Infusions:sodium chloride, 75 mL/hr, Last Rate: 75 mL/hr (09/15/21 0915)        Vital signs in last 24 hours:  Temp:  [97.2 °F (36.2 °C)-98.3 °F (36.8 °C)] 97.7 °F (36.5 °C)  Heart Rate:  [59-92] 59  Resp:  [16-18] 16  BP: (109-132)/(60-78) 117/60    Intake/Output:    Intake/Output Summary (Last 24 hours) at 9/15/2021 1259  Last data filed at 9/15/2021 1009  Gross per 24 hour   Intake 1520 ml   Output 2350 ml   Net -830 ml       Exam:  /60 (BP Location: Left arm, Patient Position: Lying)   Pulse 59   Temp 97.7 °F (36.5 °C) (Oral)   Resp 16   Ht 162.6 cm (64\")   Wt 77.1 kg (170 lb)   SpO2 97%   BMI 29.18 kg/m²     General Appearance:    Alert, cooperative, no distress   Head:    Normocephalic, without obvious abnormality, atraumatic   Eyes:       Throat:   Lips, tongue, gums normal   Neck:   Supple, symmetrical, trachea midline, no JVD   Lungs:     Clear to auscultation bilaterally, respirations unlabored   Chest Wall:    No tenderness or deformity    Heart:    Regular rate and rhythm, S1 and S2 normal, no murmur,no  Rub or gallop   Abdomen:     Soft, nontender, bowel sounds active, no masses, no organomegaly    Extremities:   Extremities normal, atraumatic, no cyanosis or edema   Pulses:      Skin:   Skin is warm and dry,  no rashes or palpable lesions   Neurologic:   no focal deficits noted      Lab Results " (last 72 hours)     Procedure Component Value Units Date/Time    Hemoglobin & Hematocrit, Blood [[id number]]  (Abnormal) Collected: 09/12/21 0818    Specimen: Blood Updated: 09/12/21 0851     Hemoglobin 10.2 g/dL      Hematocrit 33.5 %     Basic Metabolic Panel [[id number]]  (Abnormal) Collected: 09/11/21 2321    Specimen: Blood Updated: 09/12/21 0000     Glucose 150 mg/dL      BUN 21 mg/dL      Creatinine 0.92 mg/dL      Sodium 139 mmol/L      Potassium 3.6 mmol/L      Chloride 106 mmol/L      CO2 22.6 mmol/L      Calcium 8.5 mg/dL      eGFR Non African Amer 58 mL/min/1.73      BUN/Creatinine Ratio 22.8     Anion Gap 10.4 mmol/L     Narrative:      GFR Normal >60  Chronic Kidney Disease <60  Kidney Failure <15      Hemoglobin & Hematocrit, Blood [[id number]]  (Abnormal) Collected: 09/11/21 2321    Specimen: Blood Updated: 09/11/21 2338     Hemoglobin 9.9 g/dL      Hematocrit 30.6 %     CBC & Differential [[id number]]  (Abnormal) Collected: 09/11/21 2321    Specimen: Blood Updated: 09/11/21 2338    Narrative:      The following orders were created for panel order CBC & Differential.  Procedure                               Abnormality         Status                     ---------                               -----------         ------                     CBC Auto Differential[[id number]]        Abnormal            Final result                 Please view results for these tests on the individual orders.    CBC Auto Differential [[id number]]  (Abnormal) Collected: 09/11/21 2321    Specimen: Blood Updated: 09/11/21 2338     WBC 10.93 10*3/mm3      RBC 3.43 10*6/mm3      Hemoglobin 9.9 g/dL      Hematocrit 30.6 %      MCV 89.2 fL      MCH 28.9 pg      MCHC 32.4 g/dL      RDW 15.6 %      RDW-SD 50.2 fl      MPV 10.3 fL      Platelets 196 10*3/mm3      Neutrophil % 72.0 %      Lymphocyte % 18.8 %      Monocyte % 6.9 %      Eosinophil % 1.4 %      Basophil % 0.4 %      Immature Grans % 0.5 %      Neutrophils, Absolute 7.88  10*3/mm3      Lymphocytes, Absolute 2.06 10*3/mm3      Monocytes, Absolute 0.75 10*3/mm3      Eosinophils, Absolute 0.15 10*3/mm3      Basophils, Absolute 0.04 10*3/mm3      Immature Grans, Absolute 0.05 10*3/mm3      nRBC 0.0 /100 WBC     Hemoglobin & Hematocrit, Blood [[id number]]  (Abnormal) Collected: 09/11/21 1954    Specimen: Blood Updated: 09/11/21 2026     Hemoglobin 11.4 g/dL      Hematocrit 36.8 %     Basic Metabolic Panel [[id number]]  (Abnormal) Collected: 09/11/21 0705    Specimen: Blood Updated: 09/11/21 0753     Glucose 112 mg/dL      BUN 21 mg/dL      Creatinine 0.79 mg/dL      Sodium 137 mmol/L      Potassium 3.5 mmol/L      Chloride 102 mmol/L      CO2 24.0 mmol/L      Calcium 8.4 mg/dL      eGFR Non African Amer 69 mL/min/1.73      BUN/Creatinine Ratio 26.6     Anion Gap 11.0 mmol/L     Narrative:      GFR Normal >60  Chronic Kidney Disease <60  Kidney Failure <15      CBC & Differential [[id number]]  (Abnormal) Collected: 09/11/21 0705    Specimen: Blood Updated: 09/11/21 0724    Narrative:      The following orders were created for panel order CBC & Differential.  Procedure                               Abnormality         Status                     ---------                               -----------         ------                     CBC Auto Differential[[id number]]        Abnormal            Final result                 Please view results for these tests on the individual orders.    CBC Auto Differential [[id number]]  (Abnormal) Collected: 09/11/21 0705    Specimen: Blood Updated: 09/11/21 0724     WBC 10.56 10*3/mm3      RBC 3.86 10*6/mm3      Hemoglobin 11.1 g/dL      Hematocrit 34.6 %      MCV 89.6 fL      MCH 28.8 pg      MCHC 32.1 g/dL      RDW 15.7 %      RDW-SD 51.2 fl      MPV 10.3 fL      Platelets 216 10*3/mm3      Neutrophil % 69.5 %      Lymphocyte % 22.5 %      Monocyte % 6.3 %      Eosinophil % 0.9 %      Basophil % 0.3 %      Immature Grans % 0.5 %      Neutrophils, Absolute 7.34  10*3/mm3      Lymphocytes, Absolute 2.38 10*3/mm3      Monocytes, Absolute 0.67 10*3/mm3      Eosinophils, Absolute 0.09 10*3/mm3      Basophils, Absolute 0.03 10*3/mm3      Immature Grans, Absolute 0.05 10*3/mm3      nRBC 0.0 /100 WBC     Urinalysis With Culture If Indicated - Kidney, Left [[id number]]  (Normal) Collected: 09/11/21 0411    Specimen: Urine from Kidney, Left Updated: 09/11/21 0702     Color, UA Yellow     Appearance, UA Clear     pH, UA <=5.0     Specific Gravity, UA 1.018     Glucose, UA Negative     Ketones, UA Negative     Bilirubin, UA Negative     Blood, UA Negative     Protein, UA Negative     Leuk Esterase, UA Negative     Nitrite, UA Negative     Urobilinogen, UA 1.0 E.U./dL    Narrative:      Urine microscopic not indicated.    COVID PRE-OP / PRE-PROCEDURE SCREENING ORDER (NO ISOLATION) - Swab, Nasopharynx [[id number]]  (Normal) Collected: 09/10/21 2223    Specimen: Swab from Nasopharynx Updated: 09/11/21 0241    Narrative:      The following orders were created for panel order COVID PRE-OP / PRE-PROCEDURE SCREENING ORDER (NO ISOLATION) - Swab, Nasopharynx.  Procedure                               Abnormality         Status                     ---------                               -----------         ------                     COVID-19,APTIMA PANTHER(...[[id number]]  Normal              Final result                 Please view results for these tests on the individual orders.    COVID-19,APTIMA PANTHER(BISHOP), [facility], NP/OP SWAB IN UTM/VTM/[facility]ALINE TRANSPORT MEDIA,24 HR TAT - Swab, Nasopharynx [[id number]]  (Normal) Collected: 09/10/21 2223    Specimen: Swab from Nasopharynx Updated: 09/11/21 0241     COVID19 Not Detected    Narrative:      Fact sheet for providers: https://www.fda.gov/media/493048/download     Fact sheet for patients: https://www.fda.gov/media/358422/download    Test performed by RT PCR.    Urinalysis, Microscopic Only - Urine, Catheter [[id number]]  (Abnormal) Collected: 09/10/21  2003    Specimen: Urine, Catheter Updated: 09/10/21 2031     RBC, UA 0-2 /HPF      WBC, UA 0-2 /HPF      Bacteria, UA 1+ /HPF      Squamous Epithelial Cells, UA 7-12 /HPF      Hyaline Casts, UA 3-6 /LPF      Methodology Automated Microscopy    Urinalysis With Microscopic If Indicated (No Culture) - Urine, Catheter [[id number]]  (Abnormal) Collected: 09/10/21 2003    Specimen: Urine, Catheter Updated: 09/10/21 2031     Color, UA Dark Yellow     Appearance, UA Cloudy     pH, UA <=5.0     Specific Gravity, UA 1.020     Glucose, UA Negative     Ketones, UA Trace     Bilirubin, UA Negative     Blood, UA Large (3+)     Protein, UA Trace     Leuk Esterase, UA Trace     Nitrite, UA Negative     Urobilinogen, UA 1.0 E.U./dL    Comprehensive Metabolic Panel [[id number]]  (Abnormal) Collected: 09/10/21 1742    Specimen: Blood Updated: 09/10/21 1818     Glucose 145 mg/dL      BUN 23 mg/dL      Creatinine 1.01 mg/dL      Sodium 137 mmol/L      Potassium 3.8 mmol/L      Chloride 101 mmol/L      CO2 22.9 mmol/L      Calcium 8.9 mg/dL      Total Protein 6.2 g/dL      Albumin 3.60 g/dL      ALT (SGPT) 17 U/L      AST (SGOT) 24 U/L      Alkaline Phosphatase 82 U/L      Total Bilirubin 0.7 mg/dL      eGFR Non African Amer 52 mL/min/1.73      Globulin 2.6 gm/dL      A/G Ratio 1.4 g/dL      BUN/Creatinine Ratio 22.8     Anion Gap 13.1 mmol/L     Narrative:      GFR Normal >60  Chronic Kidney Disease <60  Kidney Failure <15      Troponin [[id number]]  (Normal) Collected: 09/10/21 1742    Specimen: Blood Updated: 09/10/21 1817     Troponin T <0.010 ng/mL     Narrative:      Troponin T Reference Range:  <= 0.03 ng/mL-   Negative for AMI  >0.03 ng/mL-     Abnormal for myocardial necrosis.  Clinicians would have to utilize clinical acumen, EKG, Troponin and serial changes to determine if it is an Acute Myocardial Infarction or myocardial injury due to an underlying chronic condition.       Results may be falsely decreased if patient taking  Biotin.      BNP [[id number]]  (Abnormal) Collected: 09/10/21 1742    Specimen: Blood Updated: 09/10/21 1815     proBNP 1,908.0 pg/mL     Narrative:      Among patients with dyspnea, NT-proBNP is highly sensitive for the detection of acute congestive heart failure. In addition NT-proBNP of <300 pg/ml effectively rules out acute congestive heart failure with 99% negative predictive value.    Results may be falsely decreased if patient taking Biotin.      CBC & Differential [[id number]]  (Abnormal) Collected: 09/10/21 1742    Specimen: Blood Updated: 09/10/21 1754    Narrative:      The following orders were created for panel order CBC & Differential.  Procedure                               Abnormality         Status                     ---------                               -----------         ------                     CBC Auto Differential[[id number]]        Abnormal            Final result                 Please view results for these tests on the individual orders.    CBC Auto Differential [[id number]]  (Abnormal) Collected: 09/10/21 1742    Specimen: Blood Updated: 09/10/21 1754     WBC 11.12 10*3/mm3      RBC 4.20 10*6/mm3      Hemoglobin 11.8 g/dL      Hematocrit 36.8 %      MCV 87.6 fL      MCH 28.1 pg      MCHC 32.1 g/dL      RDW 15.5 %      RDW-SD 49.1 fl      MPV 10.1 fL      Platelets 238 10*3/mm3      Neutrophil % 69.7 %      Lymphocyte % 20.9 %      Monocyte % 7.8 %      Eosinophil % 0.7 %      Basophil % 0.3 %      Immature Grans % 0.6 %      Neutrophils, Absolute 7.75 10*3/mm3      Lymphocytes, Absolute 2.32 10*3/mm3      Monocytes, Absolute 0.87 10*3/mm3      Eosinophils, Absolute 0.08 10*3/mm3      Basophils, Absolute 0.03 10*3/mm3      Immature Grans, Absolute 0.07 10*3/mm3      nRBC 0.0 /100 WBC         Data Review:  Results from last 7 days   Lab Units 09/15/21  0719 09/14/21  1554 09/14/21  0928 09/14/21  0928 09/13/21  1152 09/13/21  1152   SODIUM mmol/L 139  --   --  140  --  138   POTASSIUM  mmol/L 4.0 4.1  --  3.4*   < > 3.1*   CHLORIDE mmol/L 107  --   --  107  --  105   CO2 mmol/L 20.3*  --   --  22.5  --  22.2   BUN mg/dL 9  --   --  9  --  11   CREATININE mg/dL 0.67  --   --  0.61  --  0.65   GLUCOSE mg/dL 120*  --    < > 119*   < > 168*   CALCIUM mg/dL 8.5*  --   --  8.3*  --  8.6    < > = values in this interval not displayed.     Results from last 7 days   Lab Units 09/15/21  0719 09/14/21  0928 09/13/21  1152   WBC 10*3/mm3 9.24 9.61 9.23   HEMOGLOBIN g/dL 10.2* 9.5* 9.5*   HEMATOCRIT % 33.7* 29.6* 30.5*   PLATELETS 10*3/mm3 258 234 224             Lab Results   Lab Value Date/Time    TROPONINT <0.010 09/10/2021 1742    TROPONINT <0.010 09/06/2021 1135    TROPONINT <0.010 01/14/2020 1850    TROPONINT <0.010 01/04/2020 2007    TROPONINT <0.010 10/21/2016 2331    TROPONINT <0.010 10/21/2016 2125    TROPONINT <0.01 08/25/2014 1135         Results from last 7 days   Lab Units 09/10/21  1742   ALK PHOS U/L 82   BILIRUBIN mg/dL 0.7   ALT (SGPT) U/L 17   AST (SGOT) U/L 24             No results found for: POCGLU        Past Medical History:   Diagnosis Date   • Atrial fibrillation (CMS/[facility])    • Hyperlipidemia    • Hypertension    • Lacunar infarct, 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   • New onset atrial fibrillation (CMS/[facility]) 01/2020    now on rate control along with Eliquis   • Prolapsed uterus     per patient   • Stroke (CMS/[facility])    • TIA (transient ischemic attack) 01/2020   • Weakness        Assessment:  Active Hospital Problems    Diagnosis  POA   • **General weakness [R53.1]  Yes   • Rectal bleeding [K62.5]  Unknown   • Hematuria [R31.9]  Yes   • Dizziness and giddiness [R42]  Yes   • History of CVA (cerebrovascular accident) [Z86.73]  Not Applicable   • Anticoagulated [Z79.01]  Not Applicable   • Paroxysmal atrial fibrillation (CMS/HCC) [I48.0]  Yes   • Hyperlipidemia [E78.5]  Yes   • Hypertension [I10]  Yes   • Gastroesophageal reflux disease with  esophagitis [K21.00]  Yes      Resolved Hospital Problems   No resolved problems to display.       Plan:  Neurology consult noted. GI consult noted and follow lab.  ? Colonoscopy  Thursday.anticoagulation on hold.    Howie Wilkins MD  9/15/2021  12:59 EDT

## 2021-09-16 ENCOUNTER — ANESTHESIA EVENT (OUTPATIENT)
Dept: GASTROENTEROLOGY | Facility: HOSPITAL | Age: 86
End: 2021-09-16

## 2021-09-16 ENCOUNTER — ANESTHESIA (OUTPATIENT)
Dept: GASTROENTEROLOGY | Facility: HOSPITAL | Age: 86
End: 2021-09-16

## 2021-09-16 LAB
ANION GAP SERPL CALCULATED.3IONS-SCNC: 10.4 MMOL/L (ref 5–15)
BASOPHILS # BLD AUTO: 0.04 10*3/MM3 (ref 0–0.2)
BASOPHILS NFR BLD AUTO: 0.5 % (ref 0–1.5)
BUN SERPL-MCNC: 6 MG/DL (ref 8–23)
BUN/CREAT SERPL: 8.6 (ref 7–25)
CALCIUM SPEC-SCNC: 8.2 MG/DL (ref 8.6–10.5)
CHLORIDE SERPL-SCNC: 107 MMOL/L (ref 98–107)
CO2 SERPL-SCNC: 21.6 MMOL/L (ref 22–29)
CREAT SERPL-MCNC: 0.7 MG/DL (ref 0.57–1)
DEPRECATED RDW RBC AUTO: 51.5 FL (ref 37–54)
EOSINOPHIL # BLD AUTO: 0.19 10*3/MM3 (ref 0–0.4)
EOSINOPHIL NFR BLD AUTO: 2.5 % (ref 0.3–6.2)
ERYTHROCYTE [DISTWIDTH] IN BLOOD BY AUTOMATED COUNT: 15.3 % (ref 12.3–15.4)
GFR SERPL CREATININE-BSD FRML MDRD: 79 ML/MIN/1.73
GLUCOSE SERPL-MCNC: 131 MG/DL (ref 65–99)
HCT VFR BLD AUTO: 29.8 % (ref 34–46.6)
HGB BLD-MCNC: 9.3 G/DL (ref 12–15.9)
IMM GRANULOCYTES # BLD AUTO: 0.06 10*3/MM3 (ref 0–0.05)
IMM GRANULOCYTES NFR BLD AUTO: 0.8 % (ref 0–0.5)
LYMPHOCYTES # BLD AUTO: 1.76 10*3/MM3 (ref 0.7–3.1)
LYMPHOCYTES NFR BLD AUTO: 23 % (ref 19.6–45.3)
MCH RBC QN AUTO: 28.7 PG (ref 26.6–33)
MCHC RBC AUTO-ENTMCNC: 31.2 G/DL (ref 31.5–35.7)
MCV RBC AUTO: 92 FL (ref 79–97)
MONOCYTES # BLD AUTO: 0.49 10*3/MM3 (ref 0.1–0.9)
MONOCYTES NFR BLD AUTO: 6.4 % (ref 5–12)
NEUTROPHILS NFR BLD AUTO: 5.11 10*3/MM3 (ref 1.7–7)
NEUTROPHILS NFR BLD AUTO: 66.8 % (ref 42.7–76)
NRBC BLD AUTO-RTO: 0 /100 WBC (ref 0–0.2)
PLATELET # BLD AUTO: 254 10*3/MM3 (ref 140–450)
PMV BLD AUTO: 10 FL (ref 6–12)
POTASSIUM SERPL-SCNC: 3.4 MMOL/L (ref 3.5–5.2)
RBC # BLD AUTO: 3.24 10*6/MM3 (ref 3.77–5.28)
SODIUM SERPL-SCNC: 139 MMOL/L (ref 136–145)
WBC # BLD AUTO: 7.65 10*3/MM3 (ref 3.4–10.8)

## 2021-09-16 PROCEDURE — 88360 TUMOR IMMUNOHISTOCHEM/MANUAL: CPT | Performed by: INTERNAL MEDICINE

## 2021-09-16 PROCEDURE — 88342 IMHCHEM/IMCYTCHM 1ST ANTB: CPT | Performed by: INTERNAL MEDICINE

## 2021-09-16 PROCEDURE — 80048 BASIC METABOLIC PNL TOTAL CA: CPT | Performed by: INTERNAL MEDICINE

## 2021-09-16 PROCEDURE — 45385 COLONOSCOPY W/LESION REMOVAL: CPT | Performed by: INTERNAL MEDICINE

## 2021-09-16 PROCEDURE — 0DBH8ZX EXCISION OF CECUM, VIA NATURAL OR ARTIFICIAL OPENING ENDOSCOPIC, DIAGNOSTIC: ICD-10-PCS | Performed by: INTERNAL MEDICINE

## 2021-09-16 PROCEDURE — 45380 COLONOSCOPY AND BIOPSY: CPT | Performed by: INTERNAL MEDICINE

## 2021-09-16 PROCEDURE — 88305 TISSUE EXAM BY PATHOLOGIST: CPT | Performed by: INTERNAL MEDICINE

## 2021-09-16 PROCEDURE — S0260 H&P FOR SURGERY: HCPCS | Performed by: INTERNAL MEDICINE

## 2021-09-16 PROCEDURE — 0DBL8ZX EXCISION OF TRANSVERSE COLON, VIA NATURAL OR ARTIFICIAL OPENING ENDOSCOPIC, DIAGNOSTIC: ICD-10-PCS | Performed by: INTERNAL MEDICINE

## 2021-09-16 PROCEDURE — 25010000002 ONDANSETRON PER 1 MG: Performed by: NURSE PRACTITIONER

## 2021-09-16 PROCEDURE — 36415 COLL VENOUS BLD VENIPUNCTURE: CPT | Performed by: INTERNAL MEDICINE

## 2021-09-16 PROCEDURE — 25010000002 PROPOFOL 10 MG/ML EMULSION: Performed by: NURSE ANESTHETIST, CERTIFIED REGISTERED

## 2021-09-16 PROCEDURE — 85025 COMPLETE CBC W/AUTO DIFF WBC: CPT | Performed by: INTERNAL MEDICINE

## 2021-09-16 PROCEDURE — 0DBN8ZX EXCISION OF SIGMOID COLON, VIA NATURAL OR ARTIFICIAL OPENING ENDOSCOPIC, DIAGNOSTIC: ICD-10-PCS | Performed by: INTERNAL MEDICINE

## 2021-09-16 PROCEDURE — 88341 IMHCHEM/IMCYTCHM EA ADD ANTB: CPT | Performed by: INTERNAL MEDICINE

## 2021-09-16 RX ORDER — LIDOCAINE HYDROCHLORIDE 20 MG/ML
INJECTION, SOLUTION INFILTRATION; PERINEURAL AS NEEDED
Status: DISCONTINUED | OUTPATIENT
Start: 2021-09-16 | End: 2021-09-16 | Stop reason: SURG

## 2021-09-16 RX ORDER — PROPOFOL 10 MG/ML
VIAL (ML) INTRAVENOUS CONTINUOUS PRN
Status: DISCONTINUED | OUTPATIENT
Start: 2021-09-16 | End: 2021-09-16 | Stop reason: SURG

## 2021-09-16 RX ORDER — PROPOFOL 10 MG/ML
VIAL (ML) INTRAVENOUS AS NEEDED
Status: DISCONTINUED | OUTPATIENT
Start: 2021-09-16 | End: 2021-09-16 | Stop reason: SURG

## 2021-09-16 RX ORDER — SODIUM CHLORIDE 9 MG/ML
30 INJECTION, SOLUTION INTRAVENOUS CONTINUOUS PRN
Status: DISCONTINUED | OUTPATIENT
Start: 2021-09-16 | End: 2021-09-18 | Stop reason: HOSPADM

## 2021-09-16 RX ADMIN — PROPOFOL 50 MG: 10 INJECTION, EMULSION INTRAVENOUS at 13:37

## 2021-09-16 RX ADMIN — METOPROLOL TARTRATE 25 MG: 25 TABLET, FILM COATED ORAL at 16:58

## 2021-09-16 RX ADMIN — ONDANSETRON 4 MG: 2 INJECTION INTRAMUSCULAR; INTRAVENOUS at 05:26

## 2021-09-16 RX ADMIN — SODIUM CHLORIDE 30 ML/HR: 9 INJECTION, SOLUTION INTRAVENOUS at 11:31

## 2021-09-16 RX ADMIN — LIDOCAINE HYDROCHLORIDE 40 MG: 20 INJECTION, SOLUTION INFILTRATION; PERINEURAL at 13:37

## 2021-09-16 RX ADMIN — FELODIPINE 10 MG: 5 TABLET, FILM COATED, EXTENDED RELEASE ORAL at 18:05

## 2021-09-16 RX ADMIN — PANTOPRAZOLE SODIUM 40 MG: 40 TABLET, DELAYED RELEASE ORAL at 16:58

## 2021-09-16 RX ADMIN — Medication 200 MCG/KG/MIN: at 13:37

## 2021-09-16 NOTE — H&P
[facility]  Pre Procedure History & Physical    Chief Complaint:   Rectal bleeding, family history    Subjective     HPI:   This 88-year-old female presents the endoscopy suite for colonoscopic evaluation.  She has had issues with rectal bleeding and a known family history of colon cancer.  Not clear as to when she last underwent endoscopic evaluation.    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   • New onset atrial fibrillation (CMS/HCC) 01/2020    now on rate control along with Eliquis   • Prolapsed uterus     per patient   • Stroke (CMS/HCC)    • TIA (transient ischemic attack) 01/2020   • Weakness        Past Surgical History:  Past Surgical History:   Procedure Laterality Date   • APPENDECTOMY     • CHOLECYSTECTOMY N/A 6/8/2016    Procedure: CHOLECYSTECTOMY LAPAROSCOPIC;  Surgeon: Russ Gar MD;  Location: [facility] OR [facility] [place];  Service:    • ENDOSCOPY N/A 2/22/2018    Z-line regular, 35 cm from incisors, normal esophagus, gastritis, bilious gastric fluid, one non-bleeding duodenal ulcer w/no stigmata of bleeding, Path: DUODENUM: FRAGMENTS OF GASTRIC TYPE MUCOSA WITH HYPERPLASIA (FOVEOLAR) AND PATCHY MIXED INFLAMMATION IN THE LAMINA PROPRIA WITH FOCAL FIBROSIS, COMMENT: 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    • EYE SURGERY      tre cataracts       Family History:  History reviewed. No pertinent family history.    Social History:   reports that she has never smoked. She has never used smokeless tobacco. She reports that she does not drink alcohol and does not use drugs.    Medications:   Medications Prior to Admission   Medication Sig Dispense Refill Last Dose   • aspirin 81 MG chewable tablet CHEW AND SWALLOW 1 TABLET BY MOUTH EVERY DAY 90 tablet 2 Patient Taking Differently at Unknown  "time   • alendronate (Fosamax) 70 MG tablet Take 1 tablet by mouth Every 7 (Seven) Days. 12 tablet 3    • atorvastatin (LIPITOR) 80 MG tablet TAKE 1 TABLET BY MOUTH EVERY DAY (Patient taking differently: 40 mg Daily.) 90 tablet 2    • Eliquis 5 MG tablet tablet TAKE 1 TABLET BY MOUTH EVERY 12 HOURS 60 tablet 5    • felodipine (PLENDIL) 10 MG 24 hr tablet TAKE 1 TABLET BY MOUTH DAILY 90 tablet 3    • meclizine (ANTIVERT) 25 MG tablet Take 2 tablets by mouth Every 6 (Six) Hours As Needed for Dizziness. 40 tablet 0    • metoprolol tartrate (LOPRESSOR) 25 MG tablet TAKE 1 TABLET BY MOUTH EVERY 12 HOURS 180 tablet 3    • omeprazole (priLOSEC) 20 MG capsule Take 1 capsule by mouth Daily. 30 capsule 11        Allergies:  Cephalexin and Latex    ROS:    Pertinent items are noted in HPI, all other systems reviewed and negative     Objective     Blood pressure 133/90, pulse 75, temperature 97.4 °F (36.3 °C), temperature source Oral, resp. rate 16, height 162.6 cm (64\"), weight 77.1 kg (170 lb), SpO2 96 %.    Physical Exam   Constitutional: Pt is oriented to person, place, and time and well-developed, well-nourished, and in no distress.   Mouth/Throat: Oropharynx is clear and moist.   Neck: Normal range of motion.   Cardiovascular: Normal rate, regular rhythm and normal heart sounds.    Pulmonary/Chest: Effort normal and breath sounds normal.   Abdominal: Soft. Nontender  Skin: Skin is warm and dry.   Psychiatric: Mood, memory, affect and judgment normal.     Assessment/Plan     Diagnosis:  Rectal bleeding  Family history    Anticipated Surgical Procedure:  Colonoscopy    The risks, benefits, and alternatives of this procedure have been discussed with the patient or the responsible party- the patient understands and agrees to proceed.                                                          "

## 2021-09-16 NOTE — PLAN OF CARE
Goal Outcome Evaluation:  Plan of Care Reviewed With: patient        Progress: no change  Outcome Summary: Scant amount of bleeding from hemorrhoids, uterine prolapse is moist, pink and intact, no c/o pain, working on second half of bowel prep, colonoscopy scheduled later this morning, IVF infusing, vss, afebrile, daughter at bedside, will continue to monitor.

## 2021-09-16 NOTE — ANESTHESIA PREPROCEDURE EVALUATION
Anesthesia Evaluation     Patient summary reviewed and Nursing notes reviewed   NPO Solid Status: > 8 hours  NPO Liquid Status: > 2 hours           Airway   Mallampati: I  TM distance: >3 FB  Neck ROM: full  No difficulty expected  Dental - normal exam     Pulmonary - negative pulmonary ROS and normal exam   Cardiovascular - normal exam    (+) hypertension, dysrhythmias Atrial Fib, hyperlipidemia,       Neuro/Psych  (+) TIA, CVA, dizziness/light headedness, weakness,     GI/Hepatic/Renal/Endo    (+)  PUD, GI bleeding ,     Musculoskeletal     Abdominal  - normal exam    Bowel sounds: normal.   Substance History - negative use     OB/GYN negative ob/gyn ROS         Other   arthritis,                    Anesthesia Plan    ASA 4     MAC       Anesthetic plan, all risks, benefits, and alternatives have been provided, discussed and informed consent has been obtained with: patient.

## 2021-09-16 NOTE — PLAN OF CARE
Goal Outcome Evaluation:           Progress: improving  Outcome Summary: BP meds late due to being off floor for colonoscopy. VSS. Tolerating regular diet. SWmall amount of rectal bleeding due to polyp removal and large hemmorhoids. Family at bedside. IV s/l. Up to BR with assist.

## 2021-09-16 NOTE — ANESTHESIA POSTPROCEDURE EVALUATION
"Patient: Monica Scherer    Procedure Summary     Date: 09/16/21 Room / Location:  [facility] /  [facility]    Anesthesia Start: 1331 Anesthesia Stop: 1407    Procedure: COLONOSCOPY TO CECUM WITH HOT SNARE POLYPECTOMIES AND COLD BX POLYPECTOMY (N/A ) Diagnosis:       Diverticulosis      Colon polyps      Hemorrhoids      (Rectal bleeding [K62.5])    Surgeons: Emerson Izaguirre MD Provider: Gerson Boyer MD    Anesthesia Type: MAC ASA Status: 4          Anesthesia Type: MAC    Vitals  Vitals Value Taken Time   /72 09/16/21 1424   Temp     Pulse 74 09/16/21 1424   Resp 16 09/16/21 1424   SpO2 96 % 09/16/21 1424           Post Anesthesia Care and Evaluation    Patient location during evaluation: PACU  Patient participation: complete - patient participated  Level of consciousness: awake  Pain score: 0  Pain management: adequate  Airway patency: patent  Anesthetic complications: No anesthetic complications  PONV Status: none  Cardiovascular status: acceptable  Respiratory status: acceptable  Hydration status: acceptable    Comments: /72 (BP Location: Left arm, Patient Position: Lying)   Pulse 74   Temp 36.3 °C (97.4 °F) (Oral)   Resp 16   Ht 162.6 cm (64\")   Wt 77.1 kg (170 lb)   SpO2 96%   BMI 29.18 kg/m²       "

## 2021-09-16 NOTE — SIGNIFICANT NOTE
09/16/21 1526   OTHER   Discipline physical therapy assistant   Rehab Time/Intention   Session Not Performed patient unavailable for treatment  (pt was off floor for colonoscopy when checked on; PT will f/u tomorrow)   Recommendation   PT - Next Appointment 09/17/21

## 2021-09-17 LAB
ANION GAP SERPL CALCULATED.3IONS-SCNC: 10.1 MMOL/L (ref 5–15)
BASOPHILS # BLD AUTO: 0.05 10*3/MM3 (ref 0–0.2)
BASOPHILS NFR BLD AUTO: 0.5 % (ref 0–1.5)
BUN SERPL-MCNC: 10 MG/DL (ref 8–23)
BUN/CREAT SERPL: 11.8 (ref 7–25)
CALCIUM SPEC-SCNC: 8.3 MG/DL (ref 8.6–10.5)
CHLORIDE SERPL-SCNC: 108 MMOL/L (ref 98–107)
CO2 SERPL-SCNC: 21.9 MMOL/L (ref 22–29)
CREAT SERPL-MCNC: 0.85 MG/DL (ref 0.57–1)
DEPRECATED RDW RBC AUTO: 50.9 FL (ref 37–54)
EOSINOPHIL # BLD AUTO: 0.17 10*3/MM3 (ref 0–0.4)
EOSINOPHIL NFR BLD AUTO: 1.8 % (ref 0.3–6.2)
ERYTHROCYTE [DISTWIDTH] IN BLOOD BY AUTOMATED COUNT: 15.5 % (ref 12.3–15.4)
GFR SERPL CREATININE-BSD FRML MDRD: 63 ML/MIN/1.73
GLUCOSE SERPL-MCNC: 98 MG/DL (ref 65–99)
HCT VFR BLD AUTO: 30.1 % (ref 34–46.6)
HGB BLD-MCNC: 9.6 G/DL (ref 12–15.9)
IMM GRANULOCYTES # BLD AUTO: 0.07 10*3/MM3 (ref 0–0.05)
IMM GRANULOCYTES NFR BLD AUTO: 0.7 % (ref 0–0.5)
LYMPHOCYTES # BLD AUTO: 2.66 10*3/MM3 (ref 0.7–3.1)
LYMPHOCYTES NFR BLD AUTO: 27.8 % (ref 19.6–45.3)
MCH RBC QN AUTO: 28.7 PG (ref 26.6–33)
MCHC RBC AUTO-ENTMCNC: 31.9 G/DL (ref 31.5–35.7)
MCV RBC AUTO: 89.9 FL (ref 79–97)
MONOCYTES # BLD AUTO: 0.55 10*3/MM3 (ref 0.1–0.9)
MONOCYTES NFR BLD AUTO: 5.8 % (ref 5–12)
NEUTROPHILS NFR BLD AUTO: 6.06 10*3/MM3 (ref 1.7–7)
NEUTROPHILS NFR BLD AUTO: 63.4 % (ref 42.7–76)
NRBC BLD AUTO-RTO: 0 /100 WBC (ref 0–0.2)
PLATELET # BLD AUTO: 287 10*3/MM3 (ref 140–450)
PMV BLD AUTO: 10.4 FL (ref 6–12)
POTASSIUM SERPL-SCNC: 3.5 MMOL/L (ref 3.5–5.2)
RBC # BLD AUTO: 3.35 10*6/MM3 (ref 3.77–5.28)
SODIUM SERPL-SCNC: 140 MMOL/L (ref 136–145)
WBC # BLD AUTO: 9.56 10*3/MM3 (ref 3.4–10.8)

## 2021-09-17 PROCEDURE — 80048 BASIC METABOLIC PNL TOTAL CA: CPT | Performed by: INTERNAL MEDICINE

## 2021-09-17 PROCEDURE — 85025 COMPLETE CBC W/AUTO DIFF WBC: CPT | Performed by: INTERNAL MEDICINE

## 2021-09-17 PROCEDURE — 99232 SBSQ HOSP IP/OBS MODERATE 35: CPT | Performed by: INTERNAL MEDICINE

## 2021-09-17 PROCEDURE — 99232 SBSQ HOSP IP/OBS MODERATE 35: CPT | Performed by: NURSE PRACTITIONER

## 2021-09-17 RX ADMIN — HYDROCORTISONE ACETATE PRAMOXINE HCL: 2.5; 1 CREAM TOPICAL at 10:17

## 2021-09-17 RX ADMIN — PANTOPRAZOLE SODIUM 40 MG: 40 TABLET, DELAYED RELEASE ORAL at 06:22

## 2021-09-17 RX ADMIN — HYDROCORTISONE ACETATE PRAMOXINE HCL: 2.5; 1 CREAM TOPICAL at 21:25

## 2021-09-17 RX ADMIN — METOPROLOL TARTRATE 25 MG: 25 TABLET, FILM COATED ORAL at 21:25

## 2021-09-17 RX ADMIN — HYDROCORTISONE ACETATE PRAMOXINE HCL: 2.5; 1 CREAM TOPICAL at 02:15

## 2021-09-17 RX ADMIN — ATORVASTATIN CALCIUM 40 MG: 20 TABLET, FILM COATED ORAL at 21:25

## 2021-09-17 RX ADMIN — ATORVASTATIN CALCIUM 40 MG: 20 TABLET, FILM COATED ORAL at 02:14

## 2021-09-17 NOTE — PROGRESS NOTES
"    Patient Name: Monica Scherer  :1933  88 y.o.      Patient Care Team:  Amilcar Mauricio DO as PCP - General (Family Medicine)    Chief Complaint: follow up atrial fibrillation, rectal bleeding.     Interval History: She is resting in bed. Had one loose bowel movement yesterday post colonoscopy, no bleeding noted. Denies chest pain or pressure. No SOA. Hgb stable. She complains of bilateral hand tingling. This was present on admission.        Objective   Vital Signs  Temp:  [97.2 °F (36.2 °C)-98.6 °F (37 °C)] 98.6 °F (37 °C)  Heart Rate:  [71-84] 71  Resp:  [16] 16  BP: ()/(56-87) 113/56    Intake/Output Summary (Last 24 hours) at 2021 1514  Last data filed at 2021 1323  Gross per 24 hour   Intake 560 ml   Output 200 ml   Net 360 ml     Flowsheet Rows      First Filed Value   Admission Height  162.6 cm (64\") Documented at 09/10/2021 1733   Admission Weight  77.1 kg (170 lb) Documented at 09/10/2021 1733          Physical Exam:   General Appearance:    Alert, cooperative, in no acute distress   Lungs:     Clear to auscultation.  Normal respiratory effort and rate.      Heart:    irregular rhythm and normal rate, normal S1 and S2, no murmurs, gallops or rubs.     Chest Wall:    No abnormalities observed   Abdomen:     Soft, nontender, positive bowel sounds.     Extremities:   no cyanosis, clubbing or edema.  No marked joint deformities.  Adequate musculoskeletal strength.       Results Review:    Results from last 7 days   Lab Units 21  0641   SODIUM mmol/L 140   POTASSIUM mmol/L 3.5   CHLORIDE mmol/L 108*   CO2 mmol/L 21.9*   BUN mg/dL 10   CREATININE mg/dL 0.85   GLUCOSE mg/dL 98   CALCIUM mg/dL 8.3*     Results from last 7 days   Lab Units 09/10/21  1742   TROPONIN T ng/mL <0.010     Results from last 7 days   Lab Units 21  0641   WBC 10*3/mm3 9.56   HEMOGLOBIN g/dL 9.6*   HEMATOCRIT % 30.1*   PLATELETS 10*3/mm3 287                           Medication Review: "   atorvastatin, 40 mg, Oral, Nightly  felodipine, 10 mg, Oral, Daily  Hydrocort-Pramoxine (Perianal), , Rectal, BID  metoprolol tartrate, 25 mg, Oral, Q12H  pantoprazole, 40 mg, Oral, QAM         sodium chloride, 30 mL/hr, Last Rate: Stopped (09/16/21 1427)        Assessment/Plan   1. Rectal bleeding. Thrombosed external hemorrhoids noted and colon polyps removed. Awaiting AC recs from GI.   2. Generalized weakness - she was evaluated by neurology. MRI without evidence of acute stroke. Did show chronic infarcts and small vessel disease, risk factor modification recommended. She continues to complain of bilateral hand tingling.  3. Persistent atrial fibrillation - she has a CHADs2 Vasc score of 5. Would resume AC when/if safe from GI standpoint. Echocardiogram on 9/11 showed preserved LV function and essentially unremarkable.   4. History of stroke  5. Anemia - stable    CORTEZ Espinoza  [facility]  09/17/21  15:14 EDT    She had polyps removed and is high risk for bleeding next 7-10 days. She is also high risk for stroke as she has evidence of old strokes in multiple distributions on MRI. At this point, her risk of bleeding is greater than risk of stroke. We will hold apixaban for 7 days and restart WITHOUT aspirin at that time. We will follow closely as an outpatient. See Kelsey in 1-2 weeks. Discussed with Dr. Canales.

## 2021-09-17 NOTE — NURSING NOTE
Spoke with Dr. Izaguirre with GI about restarting eliquis. He said that we can restart if necessary but to be very cautious, patient is at high risk for bleeding for the next 7-10 days d/t removal of polyps. Dr. Wilkins notified - he asked RN to make sure it's ok with cardiology to restart med. RN left message with cardiology - waiting for response/orders.

## 2021-09-17 NOTE — SIGNIFICANT NOTE
"   09/17/21 1510   OTHER   Discipline physical therapy assistant   Rehab Time/Intention   Session Not Performed patient/family declined treatment  (pt politely declined, stating she has been \"up and down\" several times today and wants to rest; encouraged pt to ambulate w/ nsg or family later; plans to possibly d/c home tomorrow)   Recommendation   PT - Next Appointment 09/18/21     "

## 2021-09-17 NOTE — PROGRESS NOTES
[facility]  Inpatient Progress Note    Reason for Follow Up: Rectal bleeding    Subjective     Interval History:   Polyps removed yesterday, pathology pending    Current Facility-Administered Medications:   •  acetaminophen (TYLENOL) tablet 650 mg, 650 mg, Oral, Q4H PRN, Dorene Robles, APRN  •  aluminum-magnesium hydroxide-simethicone (MAALOX MAX) 400-400-40 MG/5ML suspension 15 mL, 15 mL, Oral, Q6H PRN, Dorene Robles APRN  •  atorvastatin (LIPITOR) tablet 40 mg, 40 mg, Oral, Nightly, Howie Wilkins MD, 40 mg at 09/17/21 0214  •  felodipine (PLENDIL) 24 hr tablet 10 mg, 10 mg, Oral, Daily, Bladimir Costa MD, 10 mg at 09/16/21 1805  •  Hydrocort-Pramoxine (Perianal) (ANALPRAM-HC) 2.5-1 % rectal cream, , Rectal, BID, Inez Nowak APRN, Given at 09/17/21 0215  •  meclizine (ANTIVERT) tablet 50 mg, 50 mg, Oral, Q6H PRN, Bladimir Costa MD  •  metoprolol tartrate (LOPRESSOR) tablet 25 mg, 25 mg, Oral, Q12H, Bladimir Costa MD, 25 mg at 09/16/21 1658  •  ondansetron (ZOFRAN) tablet 4 mg, 4 mg, Oral, Q6H PRN **OR** ondansetron (ZOFRAN) injection 4 mg, 4 mg, Intravenous, Q6H PRN, Dorene Robles APRN, 4 mg at 09/16/21 0526  •  pantoprazole (PROTONIX) EC tablet 40 mg, 40 mg, Oral, QAMJulissa Matthew D, MD, 40 mg at 09/17/21 0622  •  potassium chloride (K-DUR,KLOR-CON) ER tablet 40 mEq, 40 mEq, Oral, PRN, 40 mEq at 09/14/21 1146 **OR** potassium chloride (KLOR-CON) packet 40 mEq, 40 mEq, Oral, PRN **OR** potassium chloride 20 mEq in 50 mL IVPB, 20 mEq, Intravenous, Q1H PRN, Howie Wilkins MD  •  [COMPLETED] Insert peripheral IV, , , Once **AND** sodium chloride 0.9 % flush 10 mL, 10 mL, Intravenous, PRN, Omar Hopkins PA  •  sodium chloride 0.9 % flush 10 mL, 10 mL, Intravenous, PRN, Dorene Robles, APRN  •  sodium chloride 0.9 % infusion, 30 mL/hr, Intravenous, Continuous PRN, Emerson Izaguirre MD, Stopped at 09/16/21 1804  Review of Systems:    There is  weakness fatigue all other systems reviewed and negative    Objective     Vital Signs  Temp:  [97.2 °F (36.2 °C)-98 °F (36.7 °C)] 97.3 °F (36.3 °C)  Heart Rate:  [72-81] 73  Resp:  [16] 16  BP: ()/(48-90) 98/68  Body mass index is 29.18 kg/m².    Intake/Output Summary (Last 24 hours) at 9/17/2021 1000  Last data filed at 9/16/2021 1748  Gross per 24 hour   Intake 705 ml   Output --   Net 705 ml     No intake/output data recorded.     Physical Exam:   General: patient awake, alert and cooperative   Eyes: Normal lids and lashes, no scleral icterus   Neck: supple, normal ROM   Skin: warm and dry, not jaundiced   Cardiovascular: regular rhythm and rate, no murmurs auscultated   Pulm: clear to auscultation bilaterally, regular and unlabored   Abdomen: soft, nontender, nondistended; normal bowel sounds   Extremities: no rash or edema   Psychiatric: Normal mood and behavior; memory intact     Results Review:     I reviewed the patient's new clinical results.    Results from last 7 days   Lab Units 09/17/21  0641 09/16/21  0711 09/15/21  0719   WBC 10*3/mm3 9.56 7.65 9.24   HEMOGLOBIN g/dL 9.6* 9.3* 10.2*   HEMATOCRIT % 30.1* 29.8* 33.7*   PLATELETS 10*3/mm3 287 254 258     Results from last 7 days   Lab Units 09/17/21  0641 09/16/21  0711 09/15/21  0719 09/11/21  0705 09/10/21  1742   SODIUM mmol/L 140 139 139   < > 137   POTASSIUM mmol/L 3.5 3.4* 4.0   < > 3.8   CHLORIDE mmol/L 108* 107 107   < > 101   CO2 mmol/L 21.9* 21.6* 20.3*   < > 22.9   BUN mg/dL 10 6* 9   < > 23   CREATININE mg/dL 0.85 0.70 0.67   < > 1.01*   CALCIUM mg/dL 8.3* 8.2* 8.5*   < > 8.9   BILIRUBIN mg/dL  --   --   --   --  0.7   ALK PHOS U/L  --   --   --   --  82   ALT (SGPT) U/L  --   --   --   --  17   AST (SGOT) U/L  --   --   --   --  24   GLUCOSE mg/dL 98 131* 120*   < > 145*    < > = values in this interval not displayed.         Lab Results   Lab Value Date/Time    LIPASE 202 (H) 02/24/2018 0508    LIPASE 146 (H) 02/23/2018 0614    LIPASE  145 (H) 02/22/2018 0302    LIPASE 193 (H) 02/21/2018 1456    LIPASE 547 (H) 02/20/2018 0410    LIPASE >600 (H) 02/19/2018 0408    LIPASE >600 (H) 02/18/2018 1349    LIPASE 11 (L) 06/07/2016 2006       Radiology:  CT Abdomen Pelvis With Contrast   Final Result   1. Moderately extensive sigmoid diverticulosis without evidence for   diverticulitis.   2. Extensive pelvic floor relaxation with vaginal prolapse, prominent   rectocele, and a small cystocele. The cystocele may enlarge with more   bladder distention.   3. Slight increase in size of a 1.1 cm slightly hyperdense right renal   cortical lesion since 2018.       This report was finalized on 9/14/2021 7:06 AM by Dr. Iris Acevedo M.D.          MRI Brain Without Contrast   Final Result   Small vessel ischemic disease, left mastoid fluid collection   and small remote cerebellar infarcts are appreciated as described in   detail above. There is no evidence of acute infarction or mass/mass   effect on this noncontrasted MRI examination of the brain.       This report was finalized on 9/12/2021 12:46 PM by Dr. Florencio Judge M.D.          XR Chest 1 View   Final Result          Assessment/Plan     Patient Active Problem List   Diagnosis   • Vertigo   • Temporary cerebral vascular dysfunction   • Gastroesophageal reflux disease with esophagitis   • Degeneration of intervertebral disc of cervical region   • Prediabetes   • S/P cholecystectomy   • Osteoarthritis of knee   • Herpes zoster without complication   • Hypertension   • Duodenal ulcer   • History of pancreatitis   • Hyperlipidemia   • TIA (transient ischemic attack)   • Cerebrovascular accident (CVA) due to thrombosis of left posterior cerebral artery (CMS/HCC)   • Paroxysmal atrial fibrillation (CMS/HCC)   • General weakness   • History of CVA (cerebrovascular accident)   • Anticoagulated   • Hematuria   • Dizziness and giddiness   • Rectal bleeding          Assessment:  1. Rectal bleeding, stable to increased  hemoglobin today  2. Normocytic anemia  3. Family history of colon cancer  4. Abdominal pain nausea and vomiting all resolved  5. A. fib on Eliquis held and cleared by cardiology for colonoscopy  6. History of CVA  7. Recent CT scan with no GI abnormality other than diverticulosis and rectocele  8. Colon polyps removed  Plan:  · All symptoms resolve  · Follow-up pathology  · Follow hemoglobin      I discussed the patients findings and my recommendations with patient and nursing staff.    Donnie Tobias MD

## 2021-09-17 NOTE — PLAN OF CARE
Goal Outcome Evaluation:  Plan of Care Reviewed With: patient, daughter        Progress: improving  Outcome Summary: No complaints. Rested comfortably throughout the day. Daughter remained at bedside. Up with assist, bed alarm for safety. Per cardiology - hold eliquis for 7 days d/t risk of bleeding, patient made aware. BP meds held this AM d/t low BP. Will continue to monitor.

## 2021-09-17 NOTE — PROGRESS NOTES
"    Patient Name: Monica Scherer  :1933  88 y.o.      Patient Care Team:  Amilcar Mauricio DO as PCP - General (Family Medicine)    Chief Complaint: follow up atrial fibrillation, rectal bleeding.     Interval History: She is resting in bed. Had one loose bowel movement yesterday post colonoscopy, no bleeding noted. Denies chest pain or pressure. No SOA. Hgb stable. She complains of bilateral hand tingling. This was present on admission.        Objective   Vital Signs  Temp:  [97.2 °F (36.2 °C)-98.5 °F (36.9 °C)] 98.5 °F (36.9 °C)  Heart Rate:  [72-84] 84  Resp:  [16] 16  BP: ()/(48-90) 105/63    Intake/Output Summary (Last 24 hours) at 2021 1028  Last data filed at 2021 0959  Gross per 24 hour   Intake 885 ml   Output 200 ml   Net 685 ml     Flowsheet Rows      First Filed Value   Admission Height  162.6 cm (64\") Documented at 09/10/2021 1733   Admission Weight  77.1 kg (170 lb) Documented at 09/10/2021 1733          Physical Exam:   General Appearance:    Alert, cooperative, in no acute distress   Lungs:     Clear to auscultation.  Normal respiratory effort and rate.      Heart:    irregular rhythm and normal rate, normal S1 and S2, no murmurs, gallops or rubs.     Chest Wall:    No abnormalities observed   Abdomen:     Soft, nontender, positive bowel sounds.     Extremities:   no cyanosis, clubbing or edema.  No marked joint deformities.  Adequate musculoskeletal strength.       Results Review:    Results from last 7 days   Lab Units 21  0641   SODIUM mmol/L 140   POTASSIUM mmol/L 3.5   CHLORIDE mmol/L 108*   CO2 mmol/L 21.9*   BUN mg/dL 10   CREATININE mg/dL 0.85   GLUCOSE mg/dL 98   CALCIUM mg/dL 8.3*     Results from last 7 days   Lab Units 09/10/21  1742   TROPONIN T ng/mL <0.010     Results from last 7 days   Lab Units 21  0641   WBC 10*3/mm3 9.56   HEMOGLOBIN g/dL 9.6*   HEMATOCRIT % 30.1*   PLATELETS 10*3/mm3 287                           Medication Review: "   atorvastatin, 40 mg, Oral, Nightly  felodipine, 10 mg, Oral, Daily  Hydrocort-Pramoxine (Perianal), , Rectal, BID  metoprolol tartrate, 25 mg, Oral, Q12H  pantoprazole, 40 mg, Oral, QAM         sodium chloride, 30 mL/hr, Last Rate: Stopped (09/16/21 1427)        Assessment/Plan   1. Rectal bleeding. Thrombosed external hemorrhoids noted and colon polyps removed. Awaiting AC recs from GI.   2. Generalized weakness - she was evaluated by neurology. MRI without evidence of acute stroke. Did show chronic infarcts and small vessel disease, risk factor modification recommended. She continues to complain of bilateral hand tingling.  3. Persistent atrial fibrillation - she has a CHADs2 Vasc score of 5. Would resume AC when/if safe from GI standpoint. Echocardiogram on 9/11 showed preserved LV function and essentially unremarkable.   4. History of stroke  5. Anemia - stable    CORTEZ Espinoza  [facility]  09/17/21  10:28 EDT    She had polyps removed and is high risk for bleeding next 7-10 days. She is also high risk for stroke as she has evidence of old strokes in multiple distributions on MRI. At this point, her risk of bleeding is greater than risk of stroke. We will hold apixaban for 7 days and restart WITHOUT aspirin at that time. We will follow closely as an outpatient. See Kelsey in 1-2 weeks.

## 2021-09-17 NOTE — PROGRESS NOTES
Continued Stay Note  [facility]     Patient Name: Monica Scherer  MRN: [id number]  Today's Date: 9/17/2021    Admit Date: 9/10/2021    Discharge Plan     Row Name 09/17/21 1631       Plan    Plan  Home with family to assist 24/7    Plan Comments  Met with patient and daughter at bedside who confirmed DC plan is home. Stated family will assist as neeed. Denies any needs/equipment.        Discharge Codes    No documentation.       Expected Discharge Date and Time     Expected Discharge Date Expected Discharge Time    Sep 17, 2021             Sarah Munroe, RN

## 2021-09-17 NOTE — PROGRESS NOTES
"DAILY PROGRESS NOTE  [facility]    Patient Identification:  Name: Monica Scherer  Age: 88 y.o.  Sex: female  :  1933  MRN: [id number]         Primary Care Physician: Amilcar Mauricio DO    Subjective:  Interval History:She has no bleeding.    Objective:    Scheduled Meds:atorvastatin, 40 mg, Oral, Nightly  felodipine, 10 mg, Oral, Daily  Hydrocort-Pramoxine (Perianal), , Rectal, BID  metoprolol tartrate, 25 mg, Oral, Q12H  pantoprazole, 40 mg, Oral, QAM      Continuous Infusions:sodium chloride, 30 mL/hr, Last Rate: Stopped (21 1427)        Vital signs in last 24 hours:  Temp:  [97.2 °F (36.2 °C)-98.6 °F (37 °C)] 98.6 °F (37 °C)  Heart Rate:  [71-84] 71  Resp:  [16] 16  BP: ()/(48-87) 113/56    Intake/Output:    Intake/Output Summary (Last 24 hours) at 2021 1332  Last data filed at 2021 1323  Gross per 24 hour   Intake 1085 ml   Output 200 ml   Net 885 ml       Exam:  /56 (BP Location: Left arm, Patient Position: Lying)   Pulse 71   Temp 98.6 °F (37 °C) (Oral)   Resp 16   Ht 162.6 cm (64\")   Wt 77.1 kg (170 lb)   SpO2 96%   BMI 29.18 kg/m²     General Appearance:    Alert, cooperative, no distress   Head:    Normocephalic, without obvious abnormality, atraumatic   Eyes:       Throat:   Lips, tongue, gums normal   Neck:   Supple, symmetrical, trachea midline, no JVD   Lungs:     Clear to auscultation bilaterally, respirations unlabored   Chest Wall:    No tenderness or deformity    Heart:    Regular rate and rhythm, S1 and S2 normal, no murmur,no  Rub or gallop   Abdomen:     Soft, nontender, bowel sounds active, no masses, no organomegaly    Extremities:   Extremities normal, atraumatic, no cyanosis or edema   Pulses:      Skin:   Skin is warm and dry,  no rashes or palpable lesions   Neurologic:   no focal deficits noted      Lab Results (last 72 hours)     Procedure Component Value Units Date/Time    Hemoglobin & Hematocrit, Blood [[id number]]  " (Abnormal) Collected: 09/12/21 0818    Specimen: Blood Updated: 09/12/21 0851     Hemoglobin 10.2 g/dL      Hematocrit 33.5 %     Basic Metabolic Panel [[id number]]  (Abnormal) Collected: 09/11/21 2321    Specimen: Blood Updated: 09/12/21 0000     Glucose 150 mg/dL      BUN 21 mg/dL      Creatinine 0.92 mg/dL      Sodium 139 mmol/L      Potassium 3.6 mmol/L      Chloride 106 mmol/L      CO2 22.6 mmol/L      Calcium 8.5 mg/dL      eGFR Non African Amer 58 mL/min/1.73      BUN/Creatinine Ratio 22.8     Anion Gap 10.4 mmol/L     Narrative:      GFR Normal >60  Chronic Kidney Disease <60  Kidney Failure <15      Hemoglobin & Hematocrit, Blood [[id number]]  (Abnormal) Collected: 09/11/21 2321    Specimen: Blood Updated: 09/11/21 2338     Hemoglobin 9.9 g/dL      Hematocrit 30.6 %     CBC & Differential [[id number]]  (Abnormal) Collected: 09/11/21 2321    Specimen: Blood Updated: 09/11/21 2338    Narrative:      The following orders were created for panel order CBC & Differential.  Procedure                               Abnormality         Status                     ---------                               -----------         ------                     CBC Auto Differential[[id number]]        Abnormal            Final result                 Please view results for these tests on the individual orders.    CBC Auto Differential [[id number]]  (Abnormal) Collected: 09/11/21 2321    Specimen: Blood Updated: 09/11/21 2338     WBC 10.93 10*3/mm3      RBC 3.43 10*6/mm3      Hemoglobin 9.9 g/dL      Hematocrit 30.6 %      MCV 89.2 fL      MCH 28.9 pg      MCHC 32.4 g/dL      RDW 15.6 %      RDW-SD 50.2 fl      MPV 10.3 fL      Platelets 196 10*3/mm3      Neutrophil % 72.0 %      Lymphocyte % 18.8 %      Monocyte % 6.9 %      Eosinophil % 1.4 %      Basophil % 0.4 %      Immature Grans % 0.5 %      Neutrophils, Absolute 7.88 10*3/mm3      Lymphocytes, Absolute 2.06 10*3/mm3      Monocytes, Absolute 0.75 10*3/mm3      Eosinophils,  Absolute 0.15 10*3/mm3      Basophils, Absolute 0.04 10*3/mm3      Immature Grans, Absolute 0.05 10*3/mm3      nRBC 0.0 /100 WBC     Hemoglobin & Hematocrit, Blood [[id number]]  (Abnormal) Collected: 09/11/21 1954    Specimen: Blood Updated: 09/11/21 2026     Hemoglobin 11.4 g/dL      Hematocrit 36.8 %     Basic Metabolic Panel [[id number]]  (Abnormal) Collected: 09/11/21 0705    Specimen: Blood Updated: 09/11/21 0753     Glucose 112 mg/dL      BUN 21 mg/dL      Creatinine 0.79 mg/dL      Sodium 137 mmol/L      Potassium 3.5 mmol/L      Chloride 102 mmol/L      CO2 24.0 mmol/L      Calcium 8.4 mg/dL      eGFR Non African Amer 69 mL/min/1.73      BUN/Creatinine Ratio 26.6     Anion Gap 11.0 mmol/L     Narrative:      GFR Normal >60  Chronic Kidney Disease <60  Kidney Failure <15      CBC & Differential [[id number]]  (Abnormal) Collected: 09/11/21 0705    Specimen: Blood Updated: 09/11/21 0724    Narrative:      The following orders were created for panel order CBC & Differential.  Procedure                               Abnormality         Status                     ---------                               -----------         ------                     CBC Auto Differential[[id number]]        Abnormal            Final result                 Please view results for these tests on the individual orders.    CBC Auto Differential [[id number]]  (Abnormal) Collected: 09/11/21 0705    Specimen: Blood Updated: 09/11/21 0724     WBC 10.56 10*3/mm3      RBC 3.86 10*6/mm3      Hemoglobin 11.1 g/dL      Hematocrit 34.6 %      MCV 89.6 fL      MCH 28.8 pg      MCHC 32.1 g/dL      RDW 15.7 %      RDW-SD 51.2 fl      MPV 10.3 fL      Platelets 216 10*3/mm3      Neutrophil % 69.5 %      Lymphocyte % 22.5 %      Monocyte % 6.3 %      Eosinophil % 0.9 %      Basophil % 0.3 %      Immature Grans % 0.5 %      Neutrophils, Absolute 7.34 10*3/mm3      Lymphocytes, Absolute 2.38 10*3/mm3      Monocytes, Absolute 0.67 10*3/mm3      Eosinophils,  Absolute 0.09 10*3/mm3      Basophils, Absolute 0.03 10*3/mm3      Immature Grans, Absolute 0.05 10*3/mm3      nRBC 0.0 /100 WBC     Urinalysis With Culture If Indicated - Kidney, Left [[id number]]  (Normal) Collected: 09/11/21 0411    Specimen: Urine from Kidney, Left Updated: 09/11/21 0702     Color, UA Yellow     Appearance, UA Clear     pH, UA <=5.0     Specific Gravity, UA 1.018     Glucose, UA Negative     Ketones, UA Negative     Bilirubin, UA Negative     Blood, UA Negative     Protein, UA Negative     Leuk Esterase, UA Negative     Nitrite, UA Negative     Urobilinogen, UA 1.0 E.U./dL    Narrative:      Urine microscopic not indicated.    COVID PRE-OP / PRE-PROCEDURE SCREENING ORDER (NO ISOLATION) - Swab, Nasopharynx [[id number]]  (Normal) Collected: 09/10/21 2223    Specimen: Swab from Nasopharynx Updated: 09/11/21 0241    Narrative:      The following orders were created for panel order COVID PRE-OP / PRE-PROCEDURE SCREENING ORDER (NO ISOLATION) - Swab, Nasopharynx.  Procedure                               Abnormality         Status                     ---------                               -----------         ------                     COVID-19,APTIMA PANTHER(...[[id number]]  Normal              Final result                 Please view results for these tests on the individual orders.    COVID-19,APTIMA PANTHER(BISHOP),[facility], NP/OP SWAB IN UTM/VTM/SALINE TRANSPORT MEDIA,24 HR TAT - Swab, Nasopharynx [[id number]]  (Normal) Collected: 09/10/21 2223    Specimen: Swab from Nasopharynx Updated: 09/11/21 0241     COVID19 Not Detected    Narrative:      Fact sheet for providers: https://www.fda.gov/media/816004/download     Fact sheet for patients: https://www.fda.gov/media/042368/download    Test performed by RT PCR.    Urinalysis, Microscopic Only - Urine, Catheter [[id number]]  (Abnormal) Collected: 09/10/21 2003    Specimen: Urine, Catheter Updated: 09/10/21 2031     RBC, UA 0-2 /HPF      WBC, UA 0-2 /HPF       Bacteria, UA 1+ /HPF      Squamous Epithelial Cells, UA 7-12 /HPF      Hyaline Casts, UA 3-6 /LPF      Methodology Automated Microscopy    Urinalysis With Microscopic If Indicated (No Culture) - Urine, Catheter [[id number]]  (Abnormal) Collected: 09/10/21 2003    Specimen: Urine, Catheter Updated: 09/10/21 2031     Color, UA Dark Yellow     Appearance, UA Cloudy     pH, UA <=5.0     Specific Gravity, UA 1.020     Glucose, UA Negative     Ketones, UA Trace     Bilirubin, UA Negative     Blood, UA Large (3+)     Protein, UA Trace     Leuk Esterase, UA Trace     Nitrite, UA Negative     Urobilinogen, UA 1.0 E.U./dL    Comprehensive Metabolic Panel [[id number]]  (Abnormal) Collected: 09/10/21 1742    Specimen: Blood Updated: 09/10/21 1818     Glucose 145 mg/dL      BUN 23 mg/dL      Creatinine 1.01 mg/dL      Sodium 137 mmol/L      Potassium 3.8 mmol/L      Chloride 101 mmol/L      CO2 22.9 mmol/L      Calcium 8.9 mg/dL      Total Protein 6.2 g/dL      Albumin 3.60 g/dL      ALT (SGPT) 17 U/L      AST (SGOT) 24 U/L      Alkaline Phosphatase 82 U/L      Total Bilirubin 0.7 mg/dL      eGFR Non African Amer 52 mL/min/1.73      Globulin 2.6 gm/dL      A/G Ratio 1.4 g/dL      BUN/Creatinine Ratio 22.8     Anion Gap 13.1 mmol/L     Narrative:      GFR Normal >60  Chronic Kidney Disease <60  Kidney Failure <15      Troponin [[id number]]  (Normal) Collected: 09/10/21 1742    Specimen: Blood Updated: 09/10/21 1817     Troponin T <0.010 ng/mL     Narrative:      Troponin T Reference Range:  <= 0.03 ng/mL-   Negative for AMI  >0.03 ng/mL-     Abnormal for myocardial necrosis.  Clinicians would have to utilize clinical acumen, EKG, Troponin and serial changes to determine if it is an Acute Myocardial Infarction or myocardial injury due to an underlying chronic condition.       Results may be falsely decreased if patient taking Biotin.      BNP [[id number]]  (Abnormal) Collected: 09/10/21 1742    Specimen: Blood Updated: 09/10/21 1815      proBNP 1,908.0 pg/mL     Narrative:      Among patients with dyspnea, NT-proBNP is highly sensitive for the detection of acute congestive heart failure. In addition NT-proBNP of <300 pg/ml effectively rules out acute congestive heart failure with 99% negative predictive value.    Results may be falsely decreased if patient taking Biotin.      CBC & Differential [[id number]]  (Abnormal) Collected: 09/10/21 1742    Specimen: Blood Updated: 09/10/21 1754    Narrative:      The following orders were created for panel order CBC & Differential.  Procedure                               Abnormality         Status                     ---------                               -----------         ------                     CBC Auto Differential[[id number]]        Abnormal            Final result                 Please view results for these tests on the individual orders.    CBC Auto Differential [[id number]]  (Abnormal) Collected: 09/10/21 1742    Specimen: Blood Updated: 09/10/21 1754     WBC 11.12 10*3/mm3      RBC 4.20 10*6/mm3      Hemoglobin 11.8 g/dL      Hematocrit 36.8 %      MCV 87.6 fL      MCH 28.1 pg      MCHC 32.1 g/dL      RDW 15.5 %      RDW-SD 49.1 fl      MPV 10.1 fL      Platelets 238 10*3/mm3      Neutrophil % 69.7 %      Lymphocyte % 20.9 %      Monocyte % 7.8 %      Eosinophil % 0.7 %      Basophil % 0.3 %      Immature Grans % 0.6 %      Neutrophils, Absolute 7.75 10*3/mm3      Lymphocytes, Absolute 2.32 10*3/mm3      Monocytes, Absolute 0.87 10*3/mm3      Eosinophils, Absolute 0.08 10*3/mm3      Basophils, Absolute 0.03 10*3/mm3      Immature Grans, Absolute 0.07 10*3/mm3      nRBC 0.0 /100 WBC         Data Review:  Results from last 7 days   Lab Units 09/17/21  0641 09/16/21  0711 09/16/21  0711 09/15/21  0719 09/15/21  0719   SODIUM mmol/L 140  --  139  --  139   POTASSIUM mmol/L 3.5  --  3.4*  --  4.0   CHLORIDE mmol/L 108*  --  107  --  107   CO2 mmol/L 21.9*  --  21.6*  --  20.3*   BUN mg/dL 10  --   6*  --  9   CREATININE mg/dL 0.85  --  0.70  --  0.67   GLUCOSE mg/dL 98   < > 131*   < > 120*   CALCIUM mg/dL 8.3*  --  8.2*  --  8.5*    < > = values in this interval not displayed.     Results from last 7 days   Lab Units 09/17/21  0641 09/16/21  0711 09/15/21  0719   WBC 10*3/mm3 9.56 7.65 9.24   HEMOGLOBIN g/dL 9.6* 9.3* 10.2*   HEMATOCRIT % 30.1* 29.8* 33.7*   PLATELETS 10*3/mm3 287 254 258             Lab Results   Lab Value Date/Time    TROPONINT <0.010 09/10/2021 1742    TROPONINT <0.010 09/06/2021 1135    TROPONINT <0.010 01/14/2020 1850    TROPONINT <0.010 01/04/2020 2007    TROPONINT <0.010 10/21/2016 2331    TROPONINT <0.010 10/21/2016 2125    TROPONINT <0.01 08/25/2014 1135         Results from last 7 days   Lab Units 09/10/21  1742   ALK PHOS U/L 82   BILIRUBIN mg/dL 0.7   ALT (SGPT) U/L 17   AST (SGOT) U/L 24             No results found for: POCGLU        Past Medical History:   Diagnosis Date   • Atrial fibrillation (CMS/HCC)    • Hyperlipidemia    • Hypertension    • Lacunar infarct, 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   • New onset atrial fibrillation (CMS/[facility]) 01/2020    now on rate control along with Eliquis   • Prolapsed uterus     per patient   • Stroke (CMS/[facility])    • TIA (transient ischemic attack) 01/2020   • Weakness        Assessment:  Active Hospital Problems    Diagnosis  POA   • **General weakness [R53.1]  Yes   • Rectal bleeding [K62.5]  Unknown   • Hematuria [R31.9]  Yes   • Dizziness and giddiness [R42]  Yes   • History of CVA (cerebrovascular accident) [Z86.73]  Not Applicable   • Anticoagulated [Z79.01]  Not Applicable   • Paroxysmal atrial fibrillation (CMS/HCC) [I48.0]  Yes   • Hyperlipidemia [E78.5]  Yes   • Hypertension [I10]  Yes   • Gastroesophageal reflux disease with esophagitis [K21.00]  Yes      Resolved Hospital Problems   No resolved problems to display.       Plan:  Neurology consult noted. GI consult noted and follow lab.   Colonoscopy  Report noted had polyps removed  .anticoagulation on hold restart when OK with GI.    Howie Wilkins MD  9/17/2021  13:32 EDT

## 2021-09-17 NOTE — PLAN OF CARE
Goal Outcome Evaluation:  Plan of Care Reviewed With: patient        Progress: improving  Outcome Summary: Vss, afebrile, scant amount of rectal bleeding, no c/o pain, rested comfortably throughout the night, will continue to monitor.

## 2021-09-18 ENCOUNTER — READMISSION MANAGEMENT (OUTPATIENT)
Dept: CALL CENTER | Facility: HOSPITAL | Age: 86
End: 2021-09-18

## 2021-09-18 VITALS
BODY MASS INDEX: 29.02 KG/M2 | DIASTOLIC BLOOD PRESSURE: 59 MMHG | TEMPERATURE: 97.8 F | SYSTOLIC BLOOD PRESSURE: 108 MMHG | WEIGHT: 170 LBS | HEART RATE: 70 BPM | RESPIRATION RATE: 16 BRPM | OXYGEN SATURATION: 97 % | HEIGHT: 64 IN

## 2021-09-18 LAB
ANION GAP SERPL CALCULATED.3IONS-SCNC: 7.5 MMOL/L (ref 5–15)
BASOPHILS # BLD AUTO: 0.04 10*3/MM3 (ref 0–0.2)
BASOPHILS NFR BLD AUTO: 0.6 % (ref 0–1.5)
BUN SERPL-MCNC: 14 MG/DL (ref 8–23)
BUN/CREAT SERPL: 16.9 (ref 7–25)
CALCIUM SPEC-SCNC: 8.4 MG/DL (ref 8.6–10.5)
CHLORIDE SERPL-SCNC: 107 MMOL/L (ref 98–107)
CO2 SERPL-SCNC: 24.5 MMOL/L (ref 22–29)
CREAT SERPL-MCNC: 0.83 MG/DL (ref 0.57–1)
DEPRECATED RDW RBC AUTO: 53.8 FL (ref 37–54)
EOSINOPHIL # BLD AUTO: 0.15 10*3/MM3 (ref 0–0.4)
EOSINOPHIL NFR BLD AUTO: 2.2 % (ref 0.3–6.2)
ERYTHROCYTE [DISTWIDTH] IN BLOOD BY AUTOMATED COUNT: 15.6 % (ref 12.3–15.4)
GFR SERPL CREATININE-BSD FRML MDRD: 65 ML/MIN/1.73
GLUCOSE SERPL-MCNC: 102 MG/DL (ref 65–99)
HCT VFR BLD AUTO: 31.8 % (ref 34–46.6)
HGB BLD-MCNC: 9.5 G/DL (ref 12–15.9)
IMM GRANULOCYTES # BLD AUTO: 0.08 10*3/MM3 (ref 0–0.05)
IMM GRANULOCYTES NFR BLD AUTO: 1.2 % (ref 0–0.5)
LYMPHOCYTES # BLD AUTO: 2.17 10*3/MM3 (ref 0.7–3.1)
LYMPHOCYTES NFR BLD AUTO: 32.3 % (ref 19.6–45.3)
MCH RBC QN AUTO: 27.9 PG (ref 26.6–33)
MCHC RBC AUTO-ENTMCNC: 29.9 G/DL (ref 31.5–35.7)
MCV RBC AUTO: 93.3 FL (ref 79–97)
MONOCYTES # BLD AUTO: 0.47 10*3/MM3 (ref 0.1–0.9)
MONOCYTES NFR BLD AUTO: 7 % (ref 5–12)
NEUTROPHILS NFR BLD AUTO: 3.8 10*3/MM3 (ref 1.7–7)
NEUTROPHILS NFR BLD AUTO: 56.7 % (ref 42.7–76)
NRBC BLD AUTO-RTO: 0 /100 WBC (ref 0–0.2)
PLATELET # BLD AUTO: 273 10*3/MM3 (ref 140–450)
PMV BLD AUTO: 10.3 FL (ref 6–12)
POTASSIUM SERPL-SCNC: 3.6 MMOL/L (ref 3.5–5.2)
RBC # BLD AUTO: 3.41 10*6/MM3 (ref 3.77–5.28)
SODIUM SERPL-SCNC: 139 MMOL/L (ref 136–145)
WBC # BLD AUTO: 6.71 10*3/MM3 (ref 3.4–10.8)

## 2021-09-18 PROCEDURE — 63710000001 ONDANSETRON PER 8 MG: Performed by: NURSE PRACTITIONER

## 2021-09-18 PROCEDURE — 80048 BASIC METABOLIC PNL TOTAL CA: CPT | Performed by: INTERNAL MEDICINE

## 2021-09-18 PROCEDURE — 99232 SBSQ HOSP IP/OBS MODERATE 35: CPT | Performed by: NURSE PRACTITIONER

## 2021-09-18 PROCEDURE — 99231 SBSQ HOSP IP/OBS SF/LOW 25: CPT | Performed by: INTERNAL MEDICINE

## 2021-09-18 PROCEDURE — 85025 COMPLETE CBC W/AUTO DIFF WBC: CPT | Performed by: INTERNAL MEDICINE

## 2021-09-18 PROCEDURE — 97530 THERAPEUTIC ACTIVITIES: CPT

## 2021-09-18 PROCEDURE — 36415 COLL VENOUS BLD VENIPUNCTURE: CPT | Performed by: INTERNAL MEDICINE

## 2021-09-18 RX ADMIN — PANTOPRAZOLE SODIUM 40 MG: 40 TABLET, DELAYED RELEASE ORAL at 06:17

## 2021-09-18 RX ADMIN — HYDROCORTISONE ACETATE PRAMOXINE HCL: 2.5; 1 CREAM TOPICAL at 08:15

## 2021-09-18 RX ADMIN — ONDANSETRON HYDROCHLORIDE 4 MG: 4 TABLET, FILM COATED ORAL at 12:46

## 2021-09-18 RX ADMIN — FELODIPINE 10 MG: 5 TABLET, FILM COATED, EXTENDED RELEASE ORAL at 08:15

## 2021-09-18 RX ADMIN — POTASSIUM CHLORIDE 40 MEQ: 750 TABLET, EXTENDED RELEASE ORAL at 08:23

## 2021-09-18 RX ADMIN — METOPROLOL TARTRATE 25 MG: 25 TABLET, FILM COATED ORAL at 08:14

## 2021-09-18 NOTE — PLAN OF CARE
Goal Outcome Evaluation:  Plan of Care Reviewed With: patient        Progress: improving  Outcome Summary: VSS. No c/o pain. Voied freely, BM X2. Hemorrhiods reduced, cream applied. Uterine prolapsed protruding outside the vaginal. No other complaints.Slept well. Daughter at bedside.

## 2021-09-18 NOTE — OUTREACH NOTE
Prep Survey      Responses   [facility] patient discharged from?  [place]   Is LACE score < 7 ?  No   Emergency Room discharge w/ pulse ox?  No   Eligibility  [facility]   Date of Admission  09/10/21   Date of Discharge  09/18/21   Discharge Disposition  Home or Self Care   Discharge diagnosis  **General weakness Rectal bleeding    Does the patient have one of the following disease processes/diagnoses(primary or secondary)?  Other   Does the patient have Home health ordered?  No   Is there a DME ordered?  No   Prep survey completed?  Yes          Shell Panda RN

## 2021-09-18 NOTE — PLAN OF CARE
Goal Outcome Evaluation:            PT required much encouragement to amb today. Pt sba for sup to sit, sba for STS to rwx and amb 110' with cga. Soa and fatigue noted. Flexed posture with shuffle step pattern noted. Pt educated on need to maintain ankle rom and hamstring length for posture and improved gait pattern.

## 2021-09-18 NOTE — PROGRESS NOTES
[facility]  Inpatient Progress Note    Reason for Follow Up:  Rectal bleeding    Subjective     Interval History:   NO GI complaints    Current Facility-Administered Medications:   •  acetaminophen (TYLENOL) tablet 650 mg, 650 mg, Oral, Q4H PRN, Dorene Robles APRN  •  aluminum-magnesium hydroxide-simethicone (MAALOX MAX) 400-400-40 MG/5ML suspension 15 mL, 15 mL, Oral, Q6H PRN, Dorene Robles APRN  •  atorvastatin (LIPITOR) tablet 40 mg, 40 mg, Oral, Nightly, Howie Wilkins MD, 40 mg at 09/17/21 2125  •  felodipine (PLENDIL) 24 hr tablet 10 mg, 10 mg, Oral, Daily, Bladimir Costa MD, 10 mg at 09/18/21 0815  •  Hydrocort-Pramoxine (Perianal) (ANALPRAM-HC) 2.5-1 % rectal cream, , Rectal, BID, Inez Nowak APRN, Given at 09/18/21 0815  •  meclizine (ANTIVERT) tablet 50 mg, 50 mg, Oral, Q6H PRN, Bladimir Costa MD  •  metoprolol tartrate (LOPRESSOR) tablet 25 mg, 25 mg, Oral, Q12H, Bladimir Costa MD, 25 mg at 09/18/21 0814  •  ondansetron (ZOFRAN) tablet 4 mg, 4 mg, Oral, Q6H PRN **OR** ondansetron (ZOFRAN) injection 4 mg, 4 mg, Intravenous, Q6H PRN, Dorene Robles APRN, 4 mg at 09/16/21 0526  •  pantoprazole (PROTONIX) EC tablet 40 mg, 40 mg, Oral, QAMJulissa Matthew D, MD, 40 mg at 09/18/21 0617  •  potassium chloride (K-DUR,KLOR-CON) ER tablet 40 mEq, 40 mEq, Oral, PRN, 40 mEq at 09/18/21 0823 **OR** potassium chloride (KLOR-CON) packet 40 mEq, 40 mEq, Oral, PRN **OR** potassium chloride 20 mEq in 50 mL IVPB, 20 mEq, Intravenous, Q1H PRN, Howie Wilkins MD  •  [COMPLETED] Insert peripheral IV, , , Once **AND** sodium chloride 0.9 % flush 10 mL, 10 mL, Intravenous, PRN, Omar Hokpins PA  •  sodium chloride 0.9 % flush 10 mL, 10 mL, Intravenous, PRN, Dorene Robles, APRN  •  sodium chloride 0.9 % infusion, 30 mL/hr, Intravenous, Continuous PRN, Emerson Izaguirre MD, Stopped at 09/16/21 9484  Review of Systems:    The following systems were reviewed  and negative;  gastrointestinal    Objective     Vital Signs  Temp:  [97.6 °F (36.4 °C)-99.2 °F (37.3 °C)] 98.3 °F (36.8 °C)  Heart Rate:  [65-93] 76  Resp:  [16] 16  BP: (105-126)/(56-80) 126/75  Body mass index is 29.18 kg/m².    Intake/Output Summary (Last 24 hours) at 9/18/2021 0939  Last data filed at 9/18/2021 0609  Gross per 24 hour   Intake 830 ml   Output 1000 ml   Net -170 ml     No intake/output data recorded.     Physical Exam:   General: patient awake, alert and cooperative   Abdomen: soft, nontender, nondistended; normal bowel sounds   Rectal: deferred   Extremities: no rash or edema   Psychiatric: Normal mood and behavior; memory intact     Results Review:     I reviewed the patient's new clinical results.    Results from last 7 days   Lab Units 09/17/21  0641 09/16/21  0711 09/15/21  0719   WBC 10*3/mm3 9.56 7.65 9.24   HEMOGLOBIN g/dL 9.6* 9.3* 10.2*   HEMATOCRIT % 30.1* 29.8* 33.7*   PLATELETS 10*3/mm3 287 254 258     Results from last 7 days   Lab Units 09/17/21  0641 09/16/21  0711 09/15/21  0719   SODIUM mmol/L 140 139 139   POTASSIUM mmol/L 3.5 3.4* 4.0   CHLORIDE mmol/L 108* 107 107   CO2 mmol/L 21.9* 21.6* 20.3*   BUN mg/dL 10 6* 9   CREATININE mg/dL 0.85 0.70 0.67   CALCIUM mg/dL 8.3* 8.2* 8.5*   GLUCOSE mg/dL 98 131* 120*         Lab Results   Lab Value Date/Time    LIPASE 202 (H) 02/24/2018 0508    LIPASE 146 (H) 02/23/2018 0614    LIPASE 145 (H) 02/22/2018 0302    LIPASE 193 (H) 02/21/2018 1456    LIPASE 547 (H) 02/20/2018 0410    LIPASE >600 (H) 02/19/2018 0408    LIPASE >600 (H) 02/18/2018 1349    LIPASE 11 (L) 06/07/2016 2006       Radiology:  [unfilled]      Assessment/Plan   Assessment:   1. Rectal bleeding, resolved  2. Acute blood loss anemia, stable  3. Colon polyps s/p resection      Plan:   Path reviewed, benign hyperplastic polyps and submucosal lieomyoma.    Monitor H/H  GI will sign off    I discussed the patients findings and my recommendations with patient.         Donnie  JOSE M Renteria M.D.  [facility]  [address] [place] [address]  Office: [phone]

## 2021-09-18 NOTE — DISCHARGE SUMMARY
PHYSICIAN DISCHARGE SUMMARY                                                                        [facility]    Patient Identification:  Name: Monica Scherer  Age: 88 y.o.  Sex: female  :  1933  MRN: [id number]  Primary Care Physician: Amilcar Mauricio DO    Admit date: 9/10/2021  Discharge date and time:2021  Discharged Condition: good    Discharge Diagnoses:  Active Hospital Problems    Diagnosis  POA   • **General weakness [R53.1]  Yes   • Rectal bleeding [K62.5]  Unknown   • Hematuria [R31.9]  Yes   • Dizziness and giddiness [R42]  Yes   • History of CVA (cerebrovascular accident) [Z86.73]  Not Applicable   • Anticoagulated [Z79.01]  Not Applicable   • Paroxysmal atrial fibrillation (CMS/HCC) [I48.0]  Yes   • Hyperlipidemia [E78.5]  Yes   • Hypertension [I10]  Yes   • Gastroesophageal reflux disease with esophagitis [K21.00]  Yes      Resolved Hospital Problems   No resolved problems to display.          PMHX:   Past Medical History:   Diagnosis Date   • Atrial fibrillation (CMS/HCC)    • Hyperlipidemia    • Hypertension    • Lacunar infarct, acute (CMS/HCC) 2020    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   • New onset atrial fibrillation (CMS/HCC) 2020    now on rate control along with Eliquis   • Prolapsed uterus     per patient   • Stroke (CMS/HCC)    • TIA (transient ischemic attack) 2020   • Weakness      PSHX:   Past Surgical History:   Procedure Laterality Date   • APPENDECTOMY     • CHOLECYSTECTOMY N/A 2016    Procedure: CHOLECYSTECTOMY LAPAROSCOPIC;  Surgeon: Russ Gar MD;  Location: [facility] OR [facility] [place];  Service:    • COLONOSCOPY N/A 2021    Procedure: COLONOSCOPY TO CECUM WITH HOT SNARE POLYPECTOMIES AND COLD BX POLYPECTOMY;  Surgeon: Emerson Izaguirre MD;  Location: [facility] ENDOSCOPY;  Service: Gastroenterology;  Laterality: N/A;  pre: RECTAL BLEEDING, FAMILY HX OF  COLON CANCER  post: INTERNAL AND EXTERNAL HEMORRHOIDS, DIVERTICULOSIS, POLYPS   • ENDOSCOPY N/A 2/22/2018    Z-line regular, 35 cm from incisors, normal esophagus, gastritis, bilious gastric fluid, one non-bleeding duodenal ulcer w/no stigmata of bleeding, Path: DUODENUM: FRAGMENTS OF GASTRIC TYPE MUCOSA WITH HYPERPLASIA (FOVEOLAR) AND PATCHY MIXED INFLAMMATION IN THE LAMINA PROPRIA WITH FOCAL FIBROSIS, COMMENT: 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    • EYE SURGERY      tre cataracts       Hospital Course: Monica Scherer  is a 88 y.o. non-smoker with a history of paroxysmal atrial fibrillation on Eliquis, HTN, HLD, CVA, GERD that presents to [facility] complaining of generalized weakness and dizziness for the past three to four days.  This is the second visit in the last 4 days for weakness.  She reports her dizziness occurs while she is up walking.  She states that the room is not spinning.  She also admits to some nausea and one episode of vomiting.  She denies any abdominal pain.  Today she also noticed some blood in her urine.  She also complains of some dysuria.  Work-up in the emergency department has been pretty unremarkable.  She has been admitted to our service for observation.         The patient was admitted to the hospital and seen by neurology, GI medicine and cardiology.  The patient ruled out for acute stroke.  Patient was having some rectal bleeding and had Eliquis held for a couple of days and subsequently had EGD and colonoscopy and had some polyps removed.  Hemoglobin was stable and she is feeling better and felt well enough to go home after being in the hospital for a few days.  GI recommended holding her anticoagulants until the 23rd since she had some polyps removed.  She will follow-up with her primary care in 1 week and also follow-up with GI about the path report.    Consults:     Consults     Date and Time Order Name Status Description    9/13/2021  1:01 PM Inpatient  Cardiology Consult Completed     9/12/2021  1:20 PM Inpatient Gastroenterology Consult Completed     9/11/2021 12:19 AM Inpatient Neurology Consult General Completed     9/10/2021  9:03 PM JADEN (on-call MD unless specified) Details Completed         Results from last 7 days   Lab Units 09/18/21  0746   WBC 10*3/mm3 6.71   HEMOGLOBIN g/dL 9.5*   HEMATOCRIT % 31.8*   PLATELETS 10*3/mm3 273     Results from last 7 days   Lab Units 09/18/21  0745   SODIUM mmol/L 139   POTASSIUM mmol/L 3.6   CHLORIDE mmol/L 107   CO2 mmol/L 24.5   BUN mg/dL 14   CREATININE mg/dL 0.83   GLUCOSE mg/dL 102*   CALCIUM mg/dL 8.4*     Significant Diagnostic Studies:   WBC   Date Value Ref Range Status   09/18/2021 6.71 3.40 - 10.80 10*3/mm3 Final     Hemoglobin   Date Value Ref Range Status   09/18/2021 9.5 (L) 12.0 - 15.9 g/dL Final     Hematocrit   Date Value Ref Range Status   09/18/2021 31.8 (L) 34.0 - 46.6 % Final     Platelets   Date Value Ref Range Status   09/18/2021 273 140 - 450 10*3/mm3 Final     Sodium   Date Value Ref Range Status   09/18/2021 139 136 - 145 mmol/L Final     Potassium   Date Value Ref Range Status   09/18/2021 3.6 3.5 - 5.2 mmol/L Final     Comment:     Slight hemolysis detected by analyzer. Results may be affected.     Chloride   Date Value Ref Range Status   09/18/2021 107 98 - 107 mmol/L Final     CO2   Date Value Ref Range Status   09/18/2021 24.5 22.0 - 29.0 mmol/L Final     BUN   Date Value Ref Range Status   09/18/2021 14 8 - 23 mg/dL Final     Creatinine   Date Value Ref Range Status   09/18/2021 0.83 0.57 - 1.00 mg/dL Final     Glucose   Date Value Ref Range Status   09/18/2021 102 (H) 65 - 99 mg/dL Final     Calcium   Date Value Ref Range Status   09/18/2021 8.4 (L) 8.6 - 10.5 mg/dL Final     No results found for: AST, ALT, ALKPHOS  No results found for: APTT, INR  No results found for: COLORU, CLARITYU, SPECGRAV, PHUR, PROTEINUR, GLUCOSEU, KETONESU, BLOODU, NITRITE, LEUKOCYTESUR, BILIRUBINUR,  UROBILINOGEN, RBCUA, WBCUA, BACTERIA, UACOMMENT  No results found for: TROPONINT, TROPONINI, BNP  No components found for: HGBA1C;2  No components found for: TSH;2  Imaging Results (All)     Procedure Component Value Units Date/Time    CT Abdomen Pelvis With Contrast [[id number]] Collected: 09/13/21 1347     Updated: 09/14/21 0709    Narrative:      CT ABDOMEN AND PELVIS WITH IV CONTRAST     HISTORY: 88-year-old female with abdominal pain.  Bright red blood per  rectum. Cholecystectomy, appendectomy, hysterectomy in the past.     TECHNIQUE: Radiation dose reduction techniques were utilized, including  automated exposure control and exposure modulation based on body size.   3 mm images were obtained through the abdomen and pelvis after the  administration of IV contrast. Compared with previous CT 02/18/2018.     FINDINGS: The liver appears unremarkable and there is no biliary  dilatation. The spleen, pancreas, adrenals, and left kidney appear  unremarkable other than 2 subcentimeter left renal cysts. There is a 1.1  cm slightly hyperdense right renal lesion which previously measured 5  mm, image 58. There is moderately extensive sigmoid diverticulosis  without evidence for diverticulitis. No acute bowel abnormality is seen.  There is extensive pelvic floor relaxation with vaginal prolapse and a  rectocele. The urinary bladder is partially collapsed, but there is  still a cystocele. There are moderately extensive abdominal aortic  atherosclerotic changes without aneurysmal dilatation. There are no  airspace opacities or effusions at the visualized lower lung fields.  There is a stable small right upper ventral hernia containing fat, image  35.       Impression:      1. Moderately extensive sigmoid diverticulosis without evidence for  diverticulitis.  2. Extensive pelvic floor relaxation with vaginal prolapse, prominent  rectocele, and a small cystocele. The cystocele may enlarge with more  bladder distention.  3.  Slight increase in size of a 1.1 cm slightly hyperdense right renal  cortical lesion since 2018.     This report was finalized on 9/14/2021 7:06 AM by Dr. Iris Acevedo M.D.       MRI Brain Without Contrast [[id number]] Collected: 09/11/21 1404     Updated: 09/12/21 1250    Narrative:      MRI EXAMINATION BRAIN WITHOUT CONTRAST     HISTORY: Dizziness, nausea.     COMPARISON: Comparison is made to multiple prior CT examinations of the  brain with the most recent examination being the CT examination of  09/06/2021 as well as to the MRI examination of 01/05/2020.     TECHNIQUE: A MRI examination of the brain was performed utilizing  sagittal T1, axial diffusion, T1, T2, T2 FLAIR and gradient echo T2  imaging.     FINDINGS: There is no evidence of restricted diffusion to suggest acute  infarction. The axial T2 FLAIR sequence demonstrates at least moderate  small vessel ischemic disease. The appearance is similar to the prior  examination. A remote infarct involving the right cerebellar hemisphere  is noted laterally which was present on the prior MRI examination. A  smaller remote infarct involving the left cerebellar hemisphere is noted  superiorly, also present previously. There is no evidence of mass or  mass effect on this noncontrasted MRI examination of the brain. There is  expected flow-void in the basilar artery and in the distal aspect of the  internal carotid arteries bilaterally on the axial T2 sequence. Fluid is  present within the mastoid air cells on the left, similar in volume as  compared to the prior MRI examination.       Impression:      Small vessel ischemic disease, left mastoid fluid collection  and small remote cerebellar infarcts are appreciated as described in  detail above. There is no evidence of acute infarction or mass/mass  effect on this noncontrasted MRI examination of the brain.     This report was finalized on 9/12/2021 12:46 PM by Dr. Florencio Judge M.D.       XR Chest 1 View [[id number]]  Collected: 09/10/21 1843     Updated: 09/10/21 2025    Narrative:      PORTABLE CHEST     HISTORY: Weakness.     COMPARISON: 01/04/2020.     FINDINGS: The heart is within normal limits in size. There is no  evidence of infiltrate, effusion or of congestive failure.     This report was finalized on 9/10/2021 8:22 PM by Dr. Florencio Judge M.D.           Lab Results (last 7 days)     Procedure Component Value Units Date/Time    CBC & Differential [[id number]]  (Abnormal) Collected: 09/18/21 0746    Specimen: Blood Updated: 09/18/21 1111    Narrative:      The following orders were created for panel order CBC & Differential.  Procedure                               Abnormality         Status                     ---------                               -----------         ------                     CBC Auto Differential[[id number]]        Abnormal            Final result                 Please view results for these tests on the individual orders.    CBC Auto Differential [[id number]]  (Abnormal) Collected: 09/18/21 0746    Specimen: Blood Updated: 09/18/21 1111     WBC 6.71 10*3/mm3      RBC 3.41 10*6/mm3      Hemoglobin 9.5 g/dL      Hematocrit 31.8 %      MCV 93.3 fL      MCH 27.9 pg      MCHC 29.9 g/dL      RDW 15.6 %      RDW-SD 53.8 fl      MPV 10.3 fL      Platelets 273 10*3/mm3      Neutrophil % 56.7 %      Lymphocyte % 32.3 %      Monocyte % 7.0 %      Eosinophil % 2.2 %      Basophil % 0.6 %      Immature Grans % 1.2 %      Neutrophils, Absolute 3.80 10*3/mm3      Lymphocytes, Absolute 2.17 10*3/mm3      Monocytes, Absolute 0.47 10*3/mm3      Eosinophils, Absolute 0.15 10*3/mm3      Basophils, Absolute 0.04 10*3/mm3      Immature Grans, Absolute 0.08 10*3/mm3      nRBC 0.0 /100 WBC     Basic Metabolic Panel [[id number]]  (Abnormal) Collected: 09/18/21 0745    Specimen: Blood Updated: 09/18/21 1012     Glucose 102 mg/dL      BUN 14 mg/dL      Creatinine 0.83 mg/dL      Sodium 139 mmol/L      Potassium 3.6 mmol/L       Comment: Slight hemolysis detected by analyzer. Results may be affected.        Chloride 107 mmol/L      CO2 24.5 mmol/L      Calcium 8.4 mg/dL      eGFR Non African Amer 65 mL/min/1.73      BUN/Creatinine Ratio 16.9     Anion Gap 7.5 mmol/L     Narrative:      GFR Normal >60  Chronic Kidney Disease <60  Kidney Failure <15      Tissue Pathology Exam [[id number]] Collected: 09/16/21 1349    Specimen: Polyp from Large Intestine, Transverse Colon; Polyp from Large Intestine, Cecum; Polyp from Large Intestine, Rectum Updated: 09/17/21 1052     Case Report --     Surgical Pathology Report                         Case: [id number]                                  Authorizing Provider:  Emerson Izaguirre MD   Collected:           09/16/2021 01:49 PM          Ordering Location:     [facility]  Received:            09/16/2021 03:12 PM                                 ENDO SUITES                                                                  Pathologist:           Craig Miller MD                                                         Specimens:   1) - Large Intestine, Transverse Colon, TRANSVERSE COLON POLYP                                      2) - Large Intestine, Cecum, CECAL POLYP                                                            3) - Large Intestine, Rectum, RECTOSIGMOID POLYP                                            Final Diagnosis --     1. Transverse Colon Polyp Biopsy:     A. Polypoid colonic mucosa with features of submucosal lipoma and eroded developing hyperplastic polyp.  B. No glandular dysplasia nor malignancy identified.      2. Cecal Polyp Biopsy:   A. Hyperplastic polyp.  B. No glandular dysplasia nor malignancy identified.    3. Rectosigmoid Polyp Biopsy:    A. Polypoid colonic mucosa with prominent nodular stromal proliferation/neoplasm, favor       submucosal  leiomyoma (see comment).  B. No significant mitotic activity nor nuclear atypia seen.  C. No epithelial  "dysplasia nor malignancy identified.    Alex/gregorio       Comment --     A short panel of immunostains including DOG-1, , Ki67, Actin and S100 will be attempted on block 3A utilizing appropriate controls and reported separately. Differential diagnosis includes submucosal (leiomyoma, favored vs possible GIST or other benign neoplasm). Internal consultation with Dr. Waddell concurred.    Alex/gregorio       Gross Description --     1. The specimen is received in formalin labeled with the patient's name and further designated \"transverse colon polyp\" is an irregular pink tan soft tissue mass measuring 0.5 x 0.5 x 0.3 cm.  The tissue is bisected and submitted in 1A.     2. The specimen is received in formalin labeled with the patient's name and further designated 'cecal polyp biopsy' is a small fragment of gray-tan tissue. The specimen is submitted for embedding as received.    3. The specimen is received in formalin labeled with the patient's name and further designated \"rectosigmoid polyp biopsy\" is a single irregular pink tan to red soft tissue mass measuring 0.6 x 0.5 x 0.4 cm.  The tissue is diffusely erythematous and smooth.  It is trisected and submitted in 3A.  Total blocks this case: 3.     mb/uso/mec/brb       Basic Metabolic Panel [[id number]]  (Abnormal) Collected: 09/17/21 0641    Specimen: Blood Updated: 09/17/21 0901     Glucose 98 mg/dL      BUN 10 mg/dL      Creatinine 0.85 mg/dL      Sodium 140 mmol/L      Potassium 3.5 mmol/L      Chloride 108 mmol/L      CO2 21.9 mmol/L      Calcium 8.3 mg/dL      eGFR Non African Amer 63 mL/min/1.73      BUN/Creatinine Ratio 11.8     Anion Gap 10.1 mmol/L     Narrative:      GFR Normal >60  Chronic Kidney Disease <60  Kidney Failure <15      CBC & Differential [[id number]]  (Abnormal) Collected: 09/17/21 0641    Specimen: Blood Updated: 09/17/21 0811    Narrative:      The following orders were created for panel order CBC & Differential.  Procedure                           "     Abnormality         Status                     ---------                               -----------         ------                     CBC Auto Differential[[id number]]        Abnormal            Final result                 Please view results for these tests on the individual orders.    CBC Auto Differential [[id number]]  (Abnormal) Collected: 09/17/21 0641    Specimen: Blood Updated: 09/17/21 0811     WBC 9.56 10*3/mm3      RBC 3.35 10*6/mm3      Hemoglobin 9.6 g/dL      Hematocrit 30.1 %      MCV 89.9 fL      MCH 28.7 pg      MCHC 31.9 g/dL      RDW 15.5 %      RDW-SD 50.9 fl      MPV 10.4 fL      Platelets 287 10*3/mm3      Neutrophil % 63.4 %      Lymphocyte % 27.8 %      Monocyte % 5.8 %      Eosinophil % 1.8 %      Basophil % 0.5 %      Immature Grans % 0.7 %      Neutrophils, Absolute 6.06 10*3/mm3      Lymphocytes, Absolute 2.66 10*3/mm3      Monocytes, Absolute 0.55 10*3/mm3      Eosinophils, Absolute 0.17 10*3/mm3      Basophils, Absolute 0.05 10*3/mm3      Immature Grans, Absolute 0.07 10*3/mm3      nRBC 0.0 /100 WBC     Basic Metabolic Panel [[id number]]  (Abnormal) Collected: 09/16/21 0711    Specimen: Blood Updated: 09/16/21 0926     Glucose 131 mg/dL      BUN 6 mg/dL      Creatinine 0.70 mg/dL      Sodium 139 mmol/L      Potassium 3.4 mmol/L      Chloride 107 mmol/L      CO2 21.6 mmol/L      Calcium 8.2 mg/dL      eGFR Non African Amer 79 mL/min/1.73      BUN/Creatinine Ratio 8.6     Anion Gap 10.4 mmol/L     Narrative:      GFR Normal >60  Chronic Kidney Disease <60  Kidney Failure <15      CBC & Differential [[id number]]  (Abnormal) Collected: 09/16/21 0711    Specimen: Blood Updated: 09/16/21 0831    Narrative:      The following orders were created for panel order CBC & Differential.  Procedure                               Abnormality         Status                     ---------                               -----------         ------                     CBC Auto Differential[[id number]]         Abnormal            Final result                 Please view results for these tests on the individual orders.    CBC Auto Differential [[id number]]  (Abnormal) Collected: 09/16/21 0711    Specimen: Blood Updated: 09/16/21 0831     WBC 7.65 10*3/mm3      RBC 3.24 10*6/mm3      Hemoglobin 9.3 g/dL      Hematocrit 29.8 %      MCV 92.0 fL      MCH 28.7 pg      MCHC 31.2 g/dL      RDW 15.3 %      RDW-SD 51.5 fl      MPV 10.0 fL      Platelets 254 10*3/mm3      Neutrophil % 66.8 %      Lymphocyte % 23.0 %      Monocyte % 6.4 %      Eosinophil % 2.5 %      Basophil % 0.5 %      Immature Grans % 0.8 %      Neutrophils, Absolute 5.11 10*3/mm3      Lymphocytes, Absolute 1.76 10*3/mm3      Monocytes, Absolute 0.49 10*3/mm3      Eosinophils, Absolute 0.19 10*3/mm3      Basophils, Absolute 0.04 10*3/mm3      Immature Grans, Absolute 0.06 10*3/mm3      nRBC 0.0 /100 WBC     COVID PRE-OP / PRE-PROCEDURE SCREENING ORDER (NO ISOLATION) - Swab, Nasopharynx [[id number]]  (Normal) Collected: 09/15/21 1034    Specimen: Swab from Nasopharynx Updated: 09/15/21 1132    Narrative:      The following orders were created for panel order COVID PRE-OP / PRE-PROCEDURE SCREENING ORDER (NO ISOLATION) - Swab, Nasopharynx.  Procedure                               Abnormality         Status                     ---------                               -----------         ------                     COVID-19,[facility] IN-HOUSE...[[id number]]  Normal              Final result                 Please view results for these tests on the individual orders.    COVID-19,[facility] IN-HOUSE CEPHEID/GIDEON NP SWAB IN TRANSPORT MEDIA 8-12 HR TAT - Swab, Nasopharynx [[id number]]  (Normal) Collected: 09/15/21 1034    Specimen: Swab from Nasopharynx Updated: 09/15/21 1132     COVID19 Not Detected    Narrative:      Fact sheet for providers: https://www.fda.gov/media/175383/download     Fact sheet for patients: https://www.fda.gov/media/252131/download    Basic Metabolic Panel  [[id number]]  (Abnormal) Collected: 09/15/21 0719    Specimen: Blood Updated: 09/15/21 0817     Glucose 120 mg/dL      BUN 9 mg/dL      Creatinine 0.67 mg/dL      Sodium 139 mmol/L      Potassium 4.0 mmol/L      Chloride 107 mmol/L      CO2 20.3 mmol/L      Calcium 8.5 mg/dL      eGFR Non African Amer 83 mL/min/1.73      BUN/Creatinine Ratio 13.4     Anion Gap 11.7 mmol/L     Narrative:      GFR Normal >60  Chronic Kidney Disease <60  Kidney Failure <15      CBC & Differential [[id number]]  (Abnormal) Collected: 09/15/21 0719    Specimen: Blood Updated: 09/15/21 0816    Narrative:      The following orders were created for panel order CBC & Differential.  Procedure                               Abnormality         Status                     ---------                               -----------         ------                     CBC Auto Differential[[id number]]        Abnormal            Final result                 Please view results for these tests on the individual orders.    CBC Auto Differential [[id number]]  (Abnormal) Collected: 09/15/21 0719    Specimen: Blood Updated: 09/15/21 0816     WBC 9.24 10*3/mm3      RBC 3.62 10*6/mm3      Hemoglobin 10.2 g/dL      Hematocrit 33.7 %      MCV 93.1 fL      MCH 28.2 pg      MCHC 30.3 g/dL      RDW 15.7 %      RDW-SD 53.9 fl      MPV 10.2 fL      Platelets 258 10*3/mm3      Neutrophil % 65.6 %      Lymphocyte % 24.4 %      Monocyte % 6.3 %      Eosinophil % 2.4 %      Basophil % 0.5 %      Neutrophils, Absolute 6.07 10*3/mm3      Lymphocytes, Absolute 2.25 10*3/mm3      Monocytes, Absolute 0.58 10*3/mm3      Eosinophils, Absolute 0.22 10*3/mm3      Basophils, Absolute 0.05 10*3/mm3     Potassium [[id number]]  (Normal) Collected: 09/14/21 1554    Specimen: Blood Updated: 09/14/21 1718     Potassium 4.1 mmol/L     Basic Metabolic Panel [[id number]]  (Abnormal) Collected: 09/14/21 0928    Specimen: Blood Updated: 09/14/21 1040     Glucose 119 mg/dL      BUN 9 mg/dL       Creatinine 0.61 mg/dL      Sodium 140 mmol/L      Potassium 3.4 mmol/L      Chloride 107 mmol/L      CO2 22.5 mmol/L      Calcium 8.3 mg/dL      eGFR Non African Amer 93 mL/min/1.73      BUN/Creatinine Ratio 14.8     Anion Gap 10.5 mmol/L     Narrative:      GFR Normal >60  Chronic Kidney Disease <60  Kidney Failure <15      CBC & Differential [[id number]]  (Abnormal) Collected: 09/14/21 0928    Specimen: Blood Updated: 09/14/21 1021    Narrative:      The following orders were created for panel order CBC & Differential.  Procedure                               Abnormality         Status                     ---------                               -----------         ------                     CBC Auto Differential[[id number]]        Abnormal            Final result                 Please view results for these tests on the individual orders.    CBC Auto Differential [[id number]]  (Abnormal) Collected: 09/14/21 0928    Specimen: Blood Updated: 09/14/21 1021     WBC 9.61 10*3/mm3      RBC 3.31 10*6/mm3      Hemoglobin 9.5 g/dL      Hematocrit 29.6 %      MCV 89.4 fL      MCH 28.7 pg      MCHC 32.1 g/dL      RDW 15.3 %      RDW-SD 50.0 fl      MPV 10.5 fL      Platelets 234 10*3/mm3      Neutrophil % 63.5 %      Lymphocyte % 27.4 %      Monocyte % 5.9 %      Eosinophil % 2.2 %      Basophil % 0.4 %      Immature Grans % 0.6 %      Neutrophils, Absolute 6.10 10*3/mm3      Lymphocytes, Absolute 2.63 10*3/mm3      Monocytes, Absolute 0.57 10*3/mm3      Eosinophils, Absolute 0.21 10*3/mm3      Basophils, Absolute 0.04 10*3/mm3      Immature Grans, Absolute 0.06 10*3/mm3      nRBC 0.0 /100 WBC     Occult Blood X 1, Stool - Stool, Per Rectum [[id number]]  (Abnormal) Collected: 09/14/21 0411    Specimen: Stool from Per Rectum Updated: 09/14/21 0613     Fecal Occult Blood Positive    Basic Metabolic Panel [[id number]]  (Abnormal) Collected: 09/13/21 1152    Specimen: Blood Updated: 09/13/21 1240     Glucose 168 mg/dL      BUN 11  mg/dL      Creatinine 0.65 mg/dL      Sodium 138 mmol/L      Potassium 3.1 mmol/L      Chloride 105 mmol/L      CO2 22.2 mmol/L      Calcium 8.6 mg/dL      eGFR Non African Amer 86 mL/min/1.73      BUN/Creatinine Ratio 16.9     Anion Gap 10.8 mmol/L     Narrative:      GFR Normal >60  Chronic Kidney Disease <60  Kidney Failure <15      CBC & Differential [[id number]]  (Abnormal) Collected: 09/13/21 1152    Specimen: Blood Updated: 09/13/21 1216    Narrative:      The following orders were created for panel order CBC & Differential.  Procedure                               Abnormality         Status                     ---------                               -----------         ------                     CBC Auto Differential[[id number]]        Abnormal            Final result                 Please view results for these tests on the individual orders.    CBC Auto Differential [[id number]]  (Abnormal) Collected: 09/13/21 1152    Specimen: Blood Updated: 09/13/21 1216     WBC 9.23 10*3/mm3      RBC 3.35 10*6/mm3      Hemoglobin 9.5 g/dL      Hematocrit 30.5 %      MCV 91.0 fL      MCH 28.4 pg      MCHC 31.1 g/dL      RDW 15.6 %      RDW-SD 52.0 fl      MPV 10.3 fL      Platelets 224 10*3/mm3      Neutrophil % 70.1 %      Lymphocyte % 19.7 %      Monocyte % 6.9 %      Eosinophil % 2.3 %      Basophil % 0.3 %      Immature Grans % 0.7 %      Neutrophils, Absolute 6.47 10*3/mm3      Lymphocytes, Absolute 1.82 10*3/mm3      Monocytes, Absolute 0.64 10*3/mm3      Eosinophils, Absolute 0.21 10*3/mm3      Basophils, Absolute 0.03 10*3/mm3      Immature Grans, Absolute 0.06 10*3/mm3      nRBC 0.0 /100 WBC     Hemoglobin & Hematocrit, Blood [[id number]]  (Abnormal) Collected: 09/12/21 0818    Specimen: Blood Updated: 09/12/21 0851     Hemoglobin 10.2 g/dL      Hematocrit 33.5 %     Basic Metabolic Panel [[id number]]  (Abnormal) Collected: 09/11/21 2321    Specimen: Blood Updated: 09/12/21 0000     Glucose 150 mg/dL      BUN 21  mg/dL      Creatinine 0.92 mg/dL      Sodium 139 mmol/L      Potassium 3.6 mmol/L      Chloride 106 mmol/L      CO2 22.6 mmol/L      Calcium 8.5 mg/dL      eGFR Non African Amer 58 mL/min/1.73      BUN/Creatinine Ratio 22.8     Anion Gap 10.4 mmol/L     Narrative:      GFR Normal >60  Chronic Kidney Disease <60  Kidney Failure <15      Hemoglobin & Hematocrit, Blood [[id number]]  (Abnormal) Collected: 09/11/21 2321    Specimen: Blood Updated: 09/11/21 2338     Hemoglobin 9.9 g/dL      Hematocrit 30.6 %     CBC & Differential [[id number]]  (Abnormal) Collected: 09/11/21 2321    Specimen: Blood Updated: 09/11/21 2338    Narrative:      The following orders were created for panel order CBC & Differential.  Procedure                               Abnormality         Status                     ---------                               -----------         ------                     CBC Auto Differential[[id number]]        Abnormal            Final result                 Please view results for these tests on the individual orders.    CBC Auto Differential [[id number]]  (Abnormal) Collected: 09/11/21 2321    Specimen: Blood Updated: 09/11/21 2338     WBC 10.93 10*3/mm3      RBC 3.43 10*6/mm3      Hemoglobin 9.9 g/dL      Hematocrit 30.6 %      MCV 89.2 fL      MCH 28.9 pg      MCHC 32.4 g/dL      RDW 15.6 %      RDW-SD 50.2 fl      MPV 10.3 fL      Platelets 196 10*3/mm3      Neutrophil % 72.0 %      Lymphocyte % 18.8 %      Monocyte % 6.9 %      Eosinophil % 1.4 %      Basophil % 0.4 %      Immature Grans % 0.5 %      Neutrophils, Absolute 7.88 10*3/mm3      Lymphocytes, Absolute 2.06 10*3/mm3      Monocytes, Absolute 0.75 10*3/mm3      Eosinophils, Absolute 0.15 10*3/mm3      Basophils, Absolute 0.04 10*3/mm3      Immature Grans, Absolute 0.05 10*3/mm3      nRBC 0.0 /100 WBC     Hemoglobin & Hematocrit, Blood [[id number]]  (Abnormal) Collected: 09/11/21 1954    Specimen: Blood Updated: 09/11/21 2026     Hemoglobin 11.4 g/dL      " Hematocrit 36.8 %         /59 (BP Location: Left arm, Patient Position: Lying)   Pulse 70   Temp 97.8 °F (36.6 °C) (Oral)   Resp 16   Ht 162.6 cm (64\")   Wt 77.1 kg (170 lb)   SpO2 97%   BMI 29.18 kg/m²     Discharge Exam:  General Appearance:    Alert, cooperative, no distress                          Head:    Normocephalic, without obvious abnormality, atraumatic                          Eyes:                            Throat:   Lips, tongue, gums normal                          Neck:   Supple, symmetrical, trachea midline, no JVD                        Lungs:     Clear to auscultation bilaterally, respirations unlabored                Chest Wall:    No tenderness or deformity                        Heart:    Regular rate and rhythm, S1 and S2 normal, no murmur,no  Rub or gallop                  Abdomen:     Soft, non-tender, bowel sounds active, no masses, no organomegaly                  Extremities:   Extremities normal, atraumatic, no cyanosis or edema                             Skin:   Skin is warm and dry,  no rashes or palpable lesions                  Neurologic:   no focal deficits noted     Disposition:  Home    Patient Instructions:      Discharge Medications      Changes to Medications      Instructions Start Date   atorvastatin 80 MG tablet  Commonly known as: LIPITOR  What changed:   · how much to take  · how to take this   TAKE 1 TABLET BY MOUTH EVERY DAY         Continue These Medications      Instructions Start Date   alendronate 70 MG tablet  Commonly known as: Fosamax   70 mg, Oral, Every 7 Days      aspirin 81 MG chewable tablet   CHEW AND SWALLOW 1 TABLET BY MOUTH EVERY DAY      Eliquis 5 MG tablet tablet  Generic drug: apixaban   TAKE 1 TABLET BY MOUTH EVERY 12 HOURS      felodipine 10 MG 24 hr tablet  Commonly known as: PLENDIL   10 mg, Oral, Daily      meclizine 25 MG tablet  Commonly known as: ANTIVERT   50 mg, Oral, Every 6 Hours PRN      metoprolol tartrate 25 MG " tablet  Commonly known as: LOPRESSOR   TAKE 1 TABLET BY MOUTH EVERY 12 HOURS      omeprazole 20 MG capsule  Commonly known as: priLOSEC   20 mg, Oral, Daily           Future Appointments   Date Time Provider Department Center   12/16/2021  2:00 PM Amilcar Mauricio DO [facility]   3/22/2022  8:40 AM Rico Canales MD [facility]     Follow-up Information     Amilcar Mauricio DO Follow up in 1 week(s).    Specialties: Family Medicine, Urgent Care  Why: Restart anticoagulants on the 23rd  Contact information:  [address]  [facility] [address]  [facility] [id number] [phone]             Emerson Izaguirre MD Follow up.    Specialty: Gastroenterology  Contact information:  [address]  [facility] [address]  [facility] [id number] [phone]                 Discharge Order (From admission, onward)     Start     Ordered    09/18/21 1602  Discharge patient  Once     Expected Discharge Date: 09/18/21    Discharge Disposition: Home or Self Care    Physician of Record for Attribution - Please select from Treatment Team: BAMBI WILKINS [[id number]]    Review needed by CMO to determine Physician of Record: No       Question Answer Comment   Physician of Record for Attribution - Please select from Treatment Team BAMBI WILKINS    Review needed by CMO to determine Physician of Record No        09/18/21 1602                Total time spent discharging patient including evaluation,post hospitalization follow up,  medication and post hospitalization instructions and education total time exceeds 30 minutes.    Signed:  Bambi Wilkins MD  9/18/2021  16:11 EDT

## 2021-09-18 NOTE — PROGRESS NOTES
"[facility]    Patient Name: Monica Scherer  :1933  88 y.o.  LOS: 4  Encounter Provider: CORTEZ Morales      Patient Care Team:  Amilcar Mauricio DO as PCP - General (Family Medicine)    Chief Complaint: Follow-up atrial fibrillation, rectal bleeding    Interval History: Polyps removed on colonoscopy.  GI recommending restarting apixaban on .       Objective   Vital Signs  Temp:  [97.6 °F (36.4 °C)-99.2 °F (37.3 °C)] 97.8 °F (36.6 °C)  Heart Rate:  [65-93] 70  Resp:  [16] 16  BP: (108-126)/(59-80) 108/59    Intake/Output Summary (Last 24 hours) at 2021 1540  Last data filed at 2021 1200  Gross per 24 hour   Intake 450 ml   Output 1200 ml   Net -750 ml     Flowsheet Rows      First Filed Value   Admission Height  162.6 cm (64\") Documented at 09/10/2021 1733   Admission Weight  77.1 kg (170 lb) Documented at 09/10/2021 1733            Vitals and nursing note reviewed.   Constitutional:       Appearance: Normal appearance. Well-developed.   Eyes:      Conjunctiva/sclera: Conjunctivae normal.   Neck:      Vascular: No carotid bruit.   Pulmonary:      Breath sounds: Normal breath sounds.   Cardiovascular:      Normal rate. Regular rhythm. Normal S1 with normal intensity. Normal S2 with normal intensity.      Murmurs: There is no murmur.      No gallop. No click. No rub.   Edema:     Peripheral edema absent.   Musculoskeletal: Normal range of motion. Skin:     General: Skin is warm and dry.   Neurological:      Mental Status: Alert and oriented to person, place, and time.      GCS: GCS eye subscore is 4. GCS verbal subscore is 5. GCS motor subscore is 6.   Psychiatric:         Speech: Speech normal.         Behavior: Behavior normal.         Thought Content: Thought content normal.         Judgment: Judgment normal.           Pertinent Test Results:  Results from last 7 days   Lab Units 21  0745 21  0641 21  0711 09/15/21  0719 21  1554 " 09/14/21  0928 09/13/21  1152 09/11/21 2321 09/11/21  2321   SODIUM mmol/L 139 140 139 139  --  140 138  --  139   POTASSIUM mmol/L 3.6 3.5 3.4* 4.0 4.1 3.4* 3.1*   < > 3.6   CHLORIDE mmol/L 107 108* 107 107  --  107 105  --  106   CO2 mmol/L 24.5 21.9* 21.6* 20.3*  --  22.5 22.2  --  22.6   BUN mg/dL 14 10 6* 9  --  9 11  --  21   CREATININE mg/dL 0.83 0.85 0.70 0.67  --  0.61 0.65  --  0.92   GLUCOSE mg/dL 102* 98 131* 120*  --  119* 168*  --  150*   CALCIUM mg/dL 8.4* 8.3* 8.2* 8.5*  --  8.3* 8.6  --  8.5*    < > = values in this interval not displayed.         Results from last 7 days   Lab Units 09/18/21  0746 09/17/21  0641 09/16/21  0711 09/15/21  0719 09/14/21  0928 09/13/21  1152 09/12/21  0818 09/11/21 2321 09/11/21 2321   WBC 10*3/mm3 6.71 9.56 7.65 9.24 9.61 9.23  --   --  10.93*   HEMOGLOBIN g/dL 9.5* 9.6* 9.3* 10.2* 9.5* 9.5* 10.2*   < > 9.9*  9.9*   HEMATOCRIT % 31.8* 30.1* 29.8* 33.7* 29.6* 30.5* 33.5*   < > 30.6*  30.6*   PLATELETS 10*3/mm3 273 287 254 258 234 224  --   --  196    < > = values in this interval not displayed.                   Invalid input(s): LDLCALC                Medication Review:   atorvastatin, 40 mg, Oral, Nightly  felodipine, 10 mg, Oral, Daily  Hydrocort-Pramoxine (Perianal), , Rectal, BID  metoprolol tartrate, 25 mg, Oral, Q12H  pantoprazole, 40 mg, Oral, QAM         sodium chloride, 30 mL/hr, Last Rate: Stopped (09/16/21 1427)        Assessment/Plan   1. Generalized weakness  2. Hypertension  3. Hyperlipidemia  4. PAF  5. History of stroke  6. Hematuria  7. Rectal bleeding  8. Low hemoglobin    Endoscope revealed benign hyperplastic polyps and submucosal lieomyoma.  GI has signed off.  GI recommends restarting apixaban 9/23.  I am okay with this plan.  Okay for discharge from cardiovascular standpoint.      CORTEZ Morales  [facility]  09/18/21  13:47 EDT

## 2021-09-18 NOTE — THERAPY TREATMENT NOTE
Patient Name: Monica Scherer  : 1933    MRN: [id number]                              Today's Date: 2021       Admit Date: 9/10/2021    Visit Dx:     ICD-10-CM ICD-9-CM   1. General weakness  R53.1 780.79   2. Hematuria, unspecified type  R31.9 599.70   3. Rectal bleeding  K62.5 569.3     Patient Active Problem List   Diagnosis   • Vertigo   • Temporary cerebral vascular dysfunction   • Gastroesophageal reflux disease with esophagitis   • Degeneration of intervertebral disc of cervical region   • Prediabetes   • S/P cholecystectomy   • Osteoarthritis of knee   • Herpes zoster without complication   • Hypertension   • Duodenal ulcer   • History of pancreatitis   • Hyperlipidemia   • TIA (transient ischemic attack)   • Cerebrovascular accident (CVA) due to thrombosis of left posterior cerebral artery (CMS/HCC)   • Paroxysmal atrial fibrillation (CMS/HCC)   • General weakness   • History of CVA (cerebrovascular accident)   • Anticoagulated   • Hematuria   • Dizziness and giddiness   • Rectal bleeding     Past Medical History:   Diagnosis Date   • Atrial fibrillation (CMS/HCC)    • Hyperlipidemia    • Hypertension    • Lacunar infarct, acute (CMS/HCC) 2020    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   • New onset atrial fibrillation (CMS/HCC) 2020    now on rate control along with Eliquis   • Prolapsed uterus     per patient   • Stroke (CMS/HCC)    • TIA (transient ischemic attack) 2020   • Weakness      Past Surgical History:   Procedure Laterality Date   • APPENDECTOMY     • CHOLECYSTECTOMY N/A 2016    Procedure: CHOLECYSTECTOMY LAPAROSCOPIC;  Surgeon: Russ Gar MD;  Location: [facility] OR [facility];  Service:    • COLONOSCOPY N/A 2021    Procedure: COLONOSCOPY TO CECUM WITH HOT SNARE POLYPECTOMIES AND COLD BX POLYPECTOMY;  Surgeon: Emerson Izaguirre MD;  Location: [facility] ENDOSCOPY;  Service: Gastroenterology;  Laterality: N/A;  pre: RECTAL BLEEDING, FAMILY HX OF COLON  CANCER  post: INTERNAL AND EXTERNAL HEMORRHOIDS, DIVERTICULOSIS, POLYPS   • ENDOSCOPY N/A 2/22/2018    Z-line regular, 35 cm from incisors, normal esophagus, gastritis, bilious gastric fluid, one non-bleeding duodenal ulcer w/no stigmata of bleeding, Path: DUODENUM: FRAGMENTS OF GASTRIC TYPE MUCOSA WITH HYPERPLASIA (FOVEOLAR) AND PATCHY MIXED INFLAMMATION IN THE LAMINA PROPRIA WITH FOCAL FIBROSIS, COMMENT: 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    • EYE SURGERY      tre cataracts     General Information     Row Name 09/18/21 1423          Physical Therapy Time and Intention    Document Type  therapy note (daily note)  -SV     Mode of Treatment  individual therapy;physical therapy  -SV     Row Name 09/18/21 1423          General Information    Patient Profile Reviewed  yes  -SV       User Key  (r) = Recorded By, (t) = Taken By, (c) = Cosigned By    Initials Name Provider Type    SV Stephanie Herrera, PT Physical Therapist        Mobility     Row Name 09/18/21 1631          Bed Mobility    Supine-Sit [place] (Bed Mobility)  supervision  -SV     Assistive Device (Bed Mobility)  bed rails  -SV     Row Name 09/18/21 1631          Sit-Stand Transfer    Sit-Stand [place] (Transfers)  standby assist  -SV     Assistive Device (Sit-Stand Transfers)  walker, front-wheeled  -SV     Row Name 09/18/21 1631          Gait/Stairs (Locomotion)    [place] Level (Gait)  contact guard  -SV     Assistive Device (Gait)  walker, front-wheeled  -SV     Distance in Feet (Gait)  110' flexed posture , shuffle step pattern, soa and fatigued  -SV       User Key  (r) = Recorded By, (t) = Taken By, (c) = Cosigned By    Initials Name Provider Type     Stephanie Herrera, PT Physical Therapist        Obj/Interventions     Row Name 09/18/21 1632          Motor Skills    Therapeutic Exercise  -- discussed benefit of posterior leg stretch and Achilles tendon stretching : 1 rep 5h  -     Row Name 09/18/21 1632          Balance     Comment, Balance  sit bal sba, standing bal cga  -SV       User Key  (r) = Recorded By, (t) = Taken By, (c) = Cosigned By    Initials Name Provider Type    SV Stephanie Herrera, PT Physical Therapist        Goals/Plan    No documentation.       Clinical Impression    No documentation.       Outcome Measures    No documentation.                      Physical Therapy Education                 Title: PT OT SLP Therapies (Resolved)     Topic: Physical Therapy (Resolved)     Point: Mobility training (Resolved)     Learning Progress Summary           Patient Acceptance, E,D, VU,NR,DU by  at 9/15/2021 1443    Acceptance, E,TB,D, VU,DU by LB at 9/11/2021 1535                   Point: Home exercise program (Resolved)     Learning Progress Summary           Patient Acceptance, E,D, VU,NR,DU by  at 9/15/2021 1443    Acceptance, E,TB,D, VU,DU by LB at 9/11/2021 1535                   Point: Body mechanics (Resolved)     Learning Progress Summary           Patient Acceptance, E,D, VU,NR,DU by  at 9/15/2021 1443    Acceptance, E,TB,D, VU,DU by LB at 9/11/2021 1535                   Point: Precautions (Resolved)     Learning Progress Summary           Patient Acceptance, E,D, VU,NR,DU by  at 9/15/2021 1443    Acceptance, E,TB,D, VU,DU by LB at 9/11/2021 1535                               User Key     Initials Effective Dates Name Provider Type Discipline     08/09/20 -  Sharmaine Ayers, PT Physical Therapist PT     06/16/21 -  Alice Yoder, PTA Physical Therapy Assistant PT              PT Recommendation and Plan           Time Calculation:   PT Charges     Row Name 09/18/21 1634             Time Calculation    Start Time  1423  -SV      Stop Time  1440  -SV      Time Calculation (min)  17 min  -SV      PT Received On  09/18/21  -SV      PT - Next Appointment  09/19/21  -SV        User Key  (r) = Recorded By, (t) = Taken By, (c) = Cosigned By    Initials Name Provider Type    SV Stephanie Herrera, PT Physical  Therapist        Therapy Charges for Today     Code Description Service Date Service Provider Modifiers Qty    [id number]  PT THERAPEUTIC ACT EA 15 MIN 9/18/2021 Stephanie Herrera, PT GP 1          PT G-Codes  Outcome Measure Options: AM-PAC 6 Clicks Basic Mobility (PT)  AM-PAC 6 Clicks Score (PT): 17    Stephanie Herrera, PT  9/18/2021

## 2021-09-20 ENCOUNTER — TRANSITIONAL CARE MANAGEMENT TELEPHONE ENCOUNTER (OUTPATIENT)
Dept: CALL CENTER | Facility: HOSPITAL | Age: 86
End: 2021-09-20

## 2021-09-20 NOTE — OUTREACH NOTE
Call Center TCM Note      Responses   [facility] patient discharged from?  [place]   Does the patient have one of the following disease processes/diagnoses(primary or secondary)?  Other   TCM attempt successful?  Yes   Discharge diagnosis  **General weakness Rectal bleeding    Meds reviewed with patient/caregiver?  Yes   Does the patient have all medications ordered at discharge?  N/A   Prescription comments  No changes made to pt medications   Is the patient taking all medications as directed (includes completed medication regime)?  Yes   Does the patient have a primary care provider?   Yes   Does the patient have an appointment with their PCP within 7 days of discharge?  Yes   Comments regarding PCP  TCM [facility] with PCP is 09/24/2021   Has the patient kept scheduled appointments due by today?  Yes   Has home health visited the patient within 72 hours of discharge?  N/A   Psychosocial issues?  No   Did the patient receive a copy of their discharge instructions?  Yes   Nursing interventions  Reviewed instructions with patient   What is the patient's perception of their health status since discharge?  Improving   Is the patient/caregiver able to teach back signs and symptoms related to disease process for when to call PCP?  Yes   Is the patient/caregiver able to teach back signs and symptoms related to disease process for when to call 911?  Yes   Is the patient/caregiver able to teach back the hierarchy of who to call/visit for symptoms/problems? PCP, Specialist, Home health nurse, Urgent Care, ED, 911  Yes   If the patient is a current smoker, are they able to teach back resources for cessation?  Not a smoker   TCM call completed?  Yes   Wrap up additional comments  Pt slowly feeling better, Did have one episode of nausea since being home, took Pepcid and this resolved. No changes made to pt medicatons at hosp d/c. TCM [facility] PCP is sched for 09/24/2021. Call disconnected though I had finished with  questions. I have tried to call pt back twice but get busy signal.           Raquel Mills MA    9/20/2021, 11:58 EDT

## 2021-09-20 NOTE — PROGRESS NOTES
Case Management Discharge Note      Final Note: Discharged home with family assist. Sarah Munroe, RN    Provided Post Acute Provider List?: Refused  Refused Provider List Comment: [facility] denies HH referrals  Provided Post Acute Provider Quality & Resource List?: Refused  Refused Quality and Resource List Comment: [facility] denies HH referrals         Transportation Services  Private: Car    Final Discharge Disposition Code: 01 - home or self-care

## 2021-09-21 LAB
LAB AP CASE REPORT: NORMAL
LAB AP DIAGNOSIS COMMENT: NORMAL
PATH REPORT.ADDENDUM SPEC: NORMAL
PATH REPORT.FINAL DX SPEC: NORMAL
PATH REPORT.GROSS SPEC: NORMAL

## 2021-09-23 RX ORDER — OMEPRAZOLE 20 MG/1
CAPSULE, DELAYED RELEASE ORAL
Qty: 30 CAPSULE | Refills: 11 | Status: SHIPPED | OUTPATIENT
Start: 2021-09-23 | End: 2021-12-16 | Stop reason: SDUPTHER

## 2021-09-24 ENCOUNTER — OFFICE VISIT (OUTPATIENT)
Dept: FAMILY MEDICINE CLINIC | Facility: CLINIC | Age: 86
End: 2021-09-24

## 2021-09-24 VITALS
WEIGHT: 168 LBS | OXYGEN SATURATION: 96 % | SYSTOLIC BLOOD PRESSURE: 112 MMHG | DIASTOLIC BLOOD PRESSURE: 64 MMHG | BODY MASS INDEX: 28.68 KG/M2 | HEIGHT: 64 IN | HEART RATE: 62 BPM

## 2021-09-24 DIAGNOSIS — R53.83 OTHER FATIGUE: ICD-10-CM

## 2021-09-24 DIAGNOSIS — D64.9 NORMOCYTIC ANEMIA: Primary | ICD-10-CM

## 2021-09-24 DIAGNOSIS — K59.04 CHRONIC IDIOPATHIC CONSTIPATION: ICD-10-CM

## 2021-09-24 DIAGNOSIS — K64.8 INTERNAL HEMORRHOIDS: ICD-10-CM

## 2021-09-24 DIAGNOSIS — I48.0 PAROXYSMAL ATRIAL FIBRILLATION (HCC): ICD-10-CM

## 2021-09-24 PROCEDURE — 99214 OFFICE O/P EST MOD 30 MIN: CPT | Performed by: FAMILY MEDICINE

## 2021-09-24 RX ORDER — DOXYCYCLINE HYCLATE 50 MG/1
324 CAPSULE, GELATIN COATED ORAL
Qty: 30 TABLET | Refills: 2 | Status: SHIPPED | OUTPATIENT
Start: 2021-09-24 | End: 2021-12-03

## 2021-09-24 RX ORDER — POLYETHYLENE GLYCOL 3350 17 G/17G
17 POWDER, FOR SOLUTION ORAL DAILY
Qty: 30 PACKET | Refills: 12 | Status: SHIPPED | OUTPATIENT
Start: 2021-09-24 | End: 2022-11-22 | Stop reason: ALTCHOICE

## 2021-09-24 NOTE — PROGRESS NOTES
Subjective   Monica Scherer is a 88 y.o. female. Presents today for   Chief Complaint   Patient presents with   • Hospital Follow Up Visit       Hospital Course: Monica Scherer  is a 88 y.o. non-smoker with a history of paroxysmal atrial fibrillation on Eliquis, HTN, HLD, CVA, GERD that presents to [facility] complaining of generalized weakness and dizziness for the past three to four days.  This is the second visit in the last 4 days for weakness.  She reports her dizziness occurs while she is up walking.  She states that the room is not spinning.  She also admits to some nausea and one episode of vomiting.  She denies any abdominal pain.  Today she also noticed some blood in her urine.  She also complains of some dysuria.  Work-up in the emergency department has been pretty unremarkable.  She has been admitted to our service for observation.         The patient was admitted to the hospital and seen by neurology, GI medicine and cardiology.  The patient ruled out for acute stroke.  Patient was having some rectal bleeding and had Eliquis held for a couple of days and subsequently had EGD and colonoscopy and had some polyps removed.  Hemoglobin was stable and she is feeling better and felt well enough to go home after being in the hospital for a few days.  GI recommended holding her anticoagulants until the 23rd since she had some polyps removed.  She will follow-up with her primary care in 1 week and also follow-up with GI about the path report.  Anemia  Presents for follow-up visit. Symptoms include malaise/fatigue. There has been no abdominal pain, leg swelling or light-headedness. (Reports feels some better;   Reviewed pathology after c-scope and 3 polyps, 1 pending special stain with / leiomyoma) Signs of blood loss that are present include hematochezia (had while inpatint, no further). Signs of blood loss that are not present include melena. Compliance with medications is %.   Atrial  Fibrillation  Presents for follow-up visit. Symptoms are negative for chest pain, hemodynamic instability, hypertension, hypotension and shortness of breath. (On anticoauglation, was held due to bleeding.  Back on now.) Past medical history includes atrial fibrillation.       Review of Systems   Constitutional: Positive for malaise/fatigue.   Respiratory: Negative for shortness of breath.    Cardiovascular: Negative for chest pain.   Gastrointestinal: Positive for hematochezia (had while inpatint, no further). Negative for abdominal pain and melena.   Neurological: Negative for light-headedness.       Patient Active Problem List   Diagnosis   • Vertigo   • Temporary cerebral vascular dysfunction   • Gastroesophageal reflux disease with esophagitis   • Degeneration of intervertebral disc of cervical region   • Prediabetes   • S/P cholecystectomy   • Osteoarthritis of knee   • Herpes zoster without complication   • Hypertension   • Duodenal ulcer   • History of pancreatitis   • Hyperlipidemia   • TIA (transient ischemic attack)   • Cerebrovascular accident (CVA) due to thrombosis of left posterior cerebral artery (CMS/HCC)   • Paroxysmal atrial fibrillation (CMS/HCC)   • General weakness   • History of CVA (cerebrovascular accident)   • Anticoagulated   • Hematuria   • Dizziness and giddiness   • Rectal bleeding       Social History     Socioeconomic History   • Marital status: Single     Spouse name: Not on file   • Number of children: Not on file   • Years of education: Not on file   • Highest education level: Not on file   Tobacco Use   • Smoking status: Never Smoker   • Smokeless tobacco: Never Used   Substance and Sexual Activity   • Alcohol use: No   • Drug use: No   • Sexual activity: Defer       Allergies   Allergen Reactions   • Cephalexin Rash   • Latex Itching       Current Outpatient Medications on File Prior to Visit   Medication Sig Dispense Refill   • aspirin 81 MG chewable tablet CHEW AND SWALLOW 1  "TABLET BY MOUTH EVERY DAY 90 tablet 2   • atorvastatin (LIPITOR) 80 MG tablet TAKE 1 TABLET BY MOUTH EVERY DAY (Patient taking differently: 40 mg Daily.) 90 tablet 2   • Eliquis 5 MG tablet tablet TAKE 1 TABLET BY MOUTH EVERY 12 HOURS 60 tablet 5   • felodipine (PLENDIL) 10 MG 24 hr tablet TAKE 1 TABLET BY MOUTH DAILY 90 tablet 3   • meclizine (ANTIVERT) 25 MG tablet Take 2 tablets by mouth Every 6 (Six) Hours As Needed for Dizziness. 40 tablet 0   • metoprolol tartrate (LOPRESSOR) 25 MG tablet TAKE 1 TABLET BY MOUTH EVERY 12 HOURS 180 tablet 3   • omeprazole (priLOSEC) 20 MG capsule TAKE ONE CAPSULE BY MOUTH EVERY DAY 30 capsule 11   • alendronate (Fosamax) 70 MG tablet Take 1 tablet by mouth Every 7 (Seven) Days. 12 tablet 3     No current facility-administered medications on file prior to visit.       Objective   Vitals:    09/24/21 1431   BP: 112/64   Pulse: 62   SpO2: 96%   Weight: 76.2 kg (168 lb)   Height: 162.6 cm (64.02\")     Body mass index is 28.82 kg/m².    Physical Exam  Vitals and nursing note reviewed.   Constitutional:       Appearance: She is well-developed.   HENT:      Head: Normocephalic and atraumatic.   Neck:      Thyroid: No thyromegaly.      Vascular: No JVD.   Cardiovascular:      Rate and Rhythm: Normal rate and regular rhythm.      Heart sounds: Normal heart sounds. No murmur heard.   No friction rub. No gallop.    Pulmonary:      Effort: Pulmonary effort is normal. No respiratory distress.      Breath sounds: Normal breath sounds. No wheezing or rales.   Abdominal:      General: Bowel sounds are normal. There is no distension.      Palpations: Abdomen is soft.      Tenderness: There is no abdominal tenderness. There is no guarding or rebound.   Musculoskeletal:      Cervical back: Neck supple.   Skin:     General: Skin is warm and dry.   Neurological:      Mental Status: She is alert.   Psychiatric:         Behavior: Behavior normal.       Final Diagnosis   1. Transverse Colon Polyp " "Biopsy:                 A. Polypoid colonic mucosa with features of submucosal lipoma and eroded developing hyperplastic polyp.  B. No glandular dysplasia nor malignancy identified.       2. Cecal Polyp Biopsy:   A. Hyperplastic polyp.  B. No glandular dysplasia nor malignancy identified.     3. Rectosigmoid Polyp Biopsy:    A. Polypoid colonic mucosa with prominent nodular stromal proliferation/neoplasm, favor       submucosal  leiomyoma (see comment).  B. No significant mitotic activity nor nuclear atypia seen.  C. No epithelial dysplasia nor malignancy identified.     Jat/kds   Electronically signed by Craig Miller MD on 9/17/2021 at 1052   Comment    A short panel of immunostains including DOG-1, , Ki67, Actin and S100 will be attempted on block 3A utilizing appropriate controls and reported separately. Differential diagnosis includes submucosal (leiomyoma, favored vs possible GIST or other benign neoplasm). Internal consultation with Dr. Waddell concurred.    Jat/taylers   Gross Description    1. The specimen is received in formalin labeled with the patient's name and further designated \"transverse colon polyp\" is an irregular pink tan soft tissue mass measuring 0.5 x 0.5 x 0.3 cm.  The tissue is bisected and submitted in 1A.      2. The specimen is received in formalin labeled with the patient's name and further designated 'cecal polyp biopsy' is a small fragment of gray-tan tissue. The specimen is submitted for embedding as received.     3. The specimen is received in formalin labeled with the patient's name and further designated \"rectosigmoid polyp biopsy\" is a single irregular pink tan to red soft tissue mass measuring 0.6 x 0.5 x 0.4 cm.  The tissue is diffusely erythematous and smooth.  It is trisected and submitted in 3A.  Total blocks this case: 3.         Component      Latest Ref Rng & Units 9/18/2021   WBC      3.40 - 10.80 10*3/mm3 6.71   RBC      3.77 - 5.28 10*6/mm3 3.41 (L)   Hemoglobin     "  12.0 - 15.9 g/dL 9.5 (L)   Hematocrit      34.0 - 46.6 % 31.8 (L)   MCV      79.0 - 97.0 fL 93.3   MCH      26.6 - 33.0 pg 27.9   MCHC      31.5 - 35.7 g/dL 29.9 (L)   RDW      12.3 - 15.4 % 15.6 (H)   RDW-SD      37.0 - 54.0 fl 53.8   MPV      6.0 - 12.0 fL 10.3   Platelets      140 - 450 10*3/mm3 273   Neutrophil Rel %      42.7 - 76.0 % 56.7   Lymphocyte Rel %      19.6 - 45.3 % 32.3   Monocyte Rel %      5.0 - 12.0 % 7.0   Eosinophil Rel %      0.3 - 6.2 % 2.2   Basophil Rel %      0.0 - 1.5 % 0.6   Immature Granulocyte Rel %      0.0 - 0.5 % 1.2 (H)   Neutrophils Absolute      1.70 - 7.00 10*3/mm3 3.80   Lymphocytes Absolute      0.70 - 3.10 10*3/mm3 2.17   Monocytes Absolute      0.10 - 0.90 10*3/mm3 0.47   Eosinophils Absolute      0.00 - 0.40 10*3/mm3 0.15   Basophils Absolute      0.00 - 0.20 10*3/mm3 0.04   Immature Grans, Absolute      0.00 - 0.05 10*3/mm3 0.08 (H)   nRBC      0.0 - 0.2 /100 WBC 0.0   Glucose      65 - 99 mg/dL 102 (H)   BUN      8 - 23 mg/dL 14   Creatinine      0.57 - 1.00 mg/dL 0.83   Sodium      136 - 145 mmol/L 139   Potassium      3.5 - 5.2 mmol/L 3.6   Chloride      98 - 107 mmol/L 107   CO2      22.0 - 29.0 mmol/L 24.5   Calcium      8.6 - 10.5 mg/dL 8.4 (L)   eGFR Non African Am      >60 mL/min/1.73 65   BUN/Creatinine Ratio      7.0 - 25.0 16.9   Anion Gap      5.0 - 15.0 mmol/L 7.5     Assessment/Plan  `  Diagnoses and all orders for this visit:    1. Normocytic anemia (Primary)  -     CBC & Differential  -     Basic Metabolic Panel  -     JAMI,PE and FLC, Serum  -     Iron Profile  -     Ferritin  -     Folate  -     Vitamin B12  -     TSH  -     ferrous gluconate (FERGON) 324 MG tablet; Take 1 tablet by mouth Daily With Breakfast.  Dispense: 30 tablet; Refill: 2    2. Chronic idiopathic constipation  -     polyethylene glycol (MIRALAX) 17 g packet; Take 17 g by mouth Daily.  Dispense: 30 packet; Refill: 12    3. Other fatigue  -     CBC & Differential  -     TSH    4.  Paroxysmal atrial fibrillation (CMS/HCC)    5. Internal hemorrhoids  -     polyethylene glycol (MIRALAX) 17 g packet; Take 17 g by mouth Daily.  Dispense: 30 packet; Refill: 12    WILL RECHECK ANEMIA LABS  REPORTS NO FURTHER BLEEDING  RECOMMEND MIRALAX TO BELLA P STOOLS SOFT AND REPORTS CONSTIPATION AS WELL         -Follow up:  3 MONTHS AND PRN

## 2021-09-27 ENCOUNTER — OFFICE VISIT (OUTPATIENT)
Dept: CARDIOLOGY | Facility: CLINIC | Age: 86
End: 2021-09-27

## 2021-09-27 VITALS
DIASTOLIC BLOOD PRESSURE: 72 MMHG | SYSTOLIC BLOOD PRESSURE: 116 MMHG | BODY MASS INDEX: 28.68 KG/M2 | HEIGHT: 64 IN | OXYGEN SATURATION: 98 % | WEIGHT: 168 LBS | HEART RATE: 58 BPM

## 2021-09-27 DIAGNOSIS — I48.11 LONGSTANDING PERSISTENT ATRIAL FIBRILLATION (HCC): Primary | ICD-10-CM

## 2021-09-27 DIAGNOSIS — E78.49 OTHER HYPERLIPIDEMIA: ICD-10-CM

## 2021-09-27 DIAGNOSIS — Z87.19 HISTORY OF RECTAL BLEEDING: ICD-10-CM

## 2021-09-27 DIAGNOSIS — I63.9 CEREBROVASCULAR ACCIDENT (CVA), UNSPECIFIED MECHANISM (HCC): ICD-10-CM

## 2021-09-27 DIAGNOSIS — I10 ESSENTIAL HYPERTENSION: ICD-10-CM

## 2021-09-27 LAB
ALBUMIN SERPL ELPH-MCNC: 3.2 G/DL (ref 2.9–4.4)
ALBUMIN/GLOB SERPL: 1 {RATIO} (ref 0.7–1.7)
ALPHA1 GLOB SERPL ELPH-MCNC: 0.3 G/DL (ref 0–0.4)
ALPHA2 GLOB SERPL ELPH-MCNC: 0.9 G/DL (ref 0.4–1)
B-GLOBULIN SERPL ELPH-MCNC: 1 G/DL (ref 0.7–1.3)
BASOPHILS # BLD AUTO: 0.1 X10E3/UL (ref 0–0.2)
BASOPHILS NFR BLD AUTO: 0 %
BUN SERPL-MCNC: 27 MG/DL (ref 8–27)
BUN/CREAT SERPL: 27 (ref 12–28)
CALCIUM SERPL-MCNC: 9.4 MG/DL (ref 8.7–10.3)
CHLORIDE SERPL-SCNC: 106 MMOL/L (ref 96–106)
CO2 SERPL-SCNC: 19 MMOL/L (ref 20–29)
CREAT SERPL-MCNC: 1.01 MG/DL (ref 0.57–1)
EOSINOPHIL # BLD AUTO: 0.1 X10E3/UL (ref 0–0.4)
EOSINOPHIL NFR BLD AUTO: 1 %
ERYTHROCYTE [DISTWIDTH] IN BLOOD BY AUTOMATED COUNT: 16.4 % (ref 11.7–15.4)
FERRITIN SERPL-MCNC: 38 NG/ML (ref 15–150)
FOLATE SERPL-MCNC: 15.1 NG/ML
GAMMA GLOB SERPL ELPH-MCNC: 1.2 G/DL (ref 0.4–1.8)
GLOBULIN SER-MCNC: 3.4 G/DL (ref 2.2–3.9)
GLUCOSE SERPL-MCNC: 170 MG/DL (ref 65–99)
HCT VFR BLD AUTO: 35.2 % (ref 34–46.6)
HGB BLD-MCNC: 11.4 G/DL (ref 11.1–15.9)
IGA SERPL-MCNC: 160 MG/DL (ref 64–422)
IGG SERPL-MCNC: 1058 MG/DL (ref 586–1602)
IGM SERPL-MCNC: 143 MG/DL (ref 26–217)
IMM GRANULOCYTES # BLD AUTO: 0.1 X10E3/UL (ref 0–0.1)
IMM GRANULOCYTES NFR BLD AUTO: 1 %
INTERPRETATION SERPL IEP-IMP: ABNORMAL
IRON SATN MFR SERPL: 20 % (ref 15–55)
IRON SERPL-MCNC: 68 UG/DL (ref 27–139)
KAPPA LC FREE SER-MCNC: 35 MG/L (ref 3.3–19.4)
KAPPA LC FREE/LAMBDA FREE SER: 1.61 {RATIO} (ref 0.26–1.65)
LABORATORY COMMENT REPORT: ABNORMAL
LAMBDA LC FREE SERPL-MCNC: 21.7 MG/L (ref 5.7–26.3)
LYMPHOCYTES # BLD AUTO: 2 X10E3/UL (ref 0.7–3.1)
LYMPHOCYTES NFR BLD AUTO: 18 %
M PROTEIN SERPL ELPH-MCNC: ABNORMAL G/DL
MCH RBC QN AUTO: 29.3 PG (ref 26.6–33)
MCHC RBC AUTO-ENTMCNC: 32.4 G/DL (ref 31.5–35.7)
MCV RBC AUTO: 91 FL (ref 79–97)
MONOCYTES # BLD AUTO: 0.7 X10E3/UL (ref 0.1–0.9)
MONOCYTES NFR BLD AUTO: 6 %
NEUTROPHILS # BLD AUTO: 8.4 X10E3/UL (ref 1.4–7)
NEUTROPHILS NFR BLD AUTO: 74 %
PLATELET # BLD AUTO: 330 X10E3/UL (ref 150–450)
POTASSIUM SERPL-SCNC: 4.4 MMOL/L (ref 3.5–5.2)
PROT SERPL-MCNC: 6.6 G/DL (ref 6–8.5)
RBC # BLD AUTO: 3.89 X10E6/UL (ref 3.77–5.28)
SODIUM SERPL-SCNC: 141 MMOL/L (ref 134–144)
TIBC SERPL-MCNC: 332 UG/DL (ref 250–450)
TSH SERPL DL<=0.005 MIU/L-ACNC: 3.4 UIU/ML (ref 0.45–4.5)
UIBC SERPL-MCNC: 264 UG/DL (ref 118–369)
VIT B12 SERPL-MCNC: 333 PG/ML (ref 232–1245)
WBC # BLD AUTO: 11.3 X10E3/UL (ref 3.4–10.8)

## 2021-09-27 PROCEDURE — 99214 OFFICE O/P EST MOD 30 MIN: CPT | Performed by: NURSE PRACTITIONER

## 2021-09-28 ENCOUNTER — READMISSION MANAGEMENT (OUTPATIENT)
Dept: CALL CENTER | Facility: HOSPITAL | Age: 86
End: 2021-09-28

## 2021-09-28 NOTE — OUTREACH NOTE
Medical Week 2 Survey      Responses   [facility] patient discharged from?  [place]   Does the patient have one of the following disease processes/diagnoses(primary or secondary)?  Other   Week 2 attempt successful?  Yes   Call start time  0804   Discharge diagnosis  **General weakness Rectal bleeding    Call end time  0812   Meds reviewed with patient/caregiver?  Yes   Does the patient have all medications ordered at discharge?  N/A   Is the patient taking all medications as directed (includes completed medication regime)?  Yes   Medication comments  States pharmacy will have prescriptions ready in a couple of days. States they were short of help.    Does the patient have a primary care provider?   Yes   Does the patient have an appointment with their PCP within 7 days of discharge?  Yes   Comments regarding PCP  Saw Cardiologist yesterday has seen Dr. Mauricio as well   Has the patient kept scheduled appointments due by today?  Yes   Has home health visited the patient within 72 hours of discharge?  N/A   Psychosocial issues?  No   Did the patient receive a copy of their discharge instructions?  Yes   Nursing interventions  Reviewed instructions with patient   What is the patient's perception of their health status since discharge?  Improving   Is the patient/caregiver able to teach back signs and symptoms related to disease process for when to call PCP?  Yes   Is the patient/caregiver able to teach back signs and symptoms related to disease process for when to call 911?  Yes   Is the patient/caregiver able to teach back the hierarchy of who to call/visit for symptoms/problems? PCP, Specialist, Home health nurse, Urgent Care, ED, 911  Yes   If the patient is a current smoker, are they able to teach back resources for cessation?  Not a smoker   Week 2 Call Completed?  Yes   Wrap up additional comments  Pt states she is improved. Has new prescriptions from her PCP and her [facility] is having trouble filling  prescriptions because of staffing issues. Advised to check with [facility] out pharmacy and see if they can fill these for her.           Raquel Elias RN

## 2021-09-29 NOTE — PROGRESS NOTES
Call results to patient.  Blood counts improving;  b12 low normal, would start b12 1000mcg po daily if not already taking.

## 2021-10-01 RX ORDER — HYDROCORTISONE ACETATE, PRAMOXINE HCL 2.5; 1 G/100G; G/100G
1 CREAM TOPICAL 2 TIMES DAILY PRN
Qty: 57 G | Refills: 1 | Status: SHIPPED | OUTPATIENT
Start: 2021-10-01 | End: 2021-12-16

## 2021-10-06 ENCOUNTER — READMISSION MANAGEMENT (OUTPATIENT)
Dept: CALL CENTER | Facility: HOSPITAL | Age: 86
End: 2021-10-06

## 2021-10-06 NOTE — OUTREACH NOTE
Medical Week 3 Survey      Responses   [facility] patient discharged from?  [place]   Does the patient have one of the following disease processes/diagnoses(primary or secondary)?  Other   Week 3 attempt successful?  Yes   Call start time  1721   Call end time  1724   Discharge diagnosis  **General weakness Rectal bleeding    Meds reviewed with patient/caregiver?  Yes   Is the patient having any side effects they believe may be caused by any medication additions or changes?  No   Does the patient have all medications ordered at discharge?  N/A   Is the patient taking all medications as directed (includes completed medication regime)?  Yes   Does the patient have a primary care provider?   Yes   Does the patient have an appointment with their PCP within 7 days of discharge?  Yes   Has the patient kept scheduled appointments due by today?  Yes   Has home health visited the patient within 72 hours of discharge?  N/A   Psychosocial issues?  No   Did the patient receive a copy of their discharge instructions?  Yes   Nursing interventions  Reviewed instructions with patient   What is the patient's perception of their health status since discharge?  Improving   Is the patient/caregiver able to teach back signs and symptoms related to disease process for when to call PCP?  Yes   Is the patient/caregiver able to teach back signs and symptoms related to disease process for when to call 911?  Yes   Is the patient/caregiver able to teach back the hierarchy of who to call/visit for symptoms/problems? PCP, Specialist, Home health nurse, Urgent Care, ED, 911  Yes   Additional teach back comments  states feeling better each day   Week 3 Call Completed?  Yes          Helen Griffin RN

## 2021-10-18 ENCOUNTER — READMISSION MANAGEMENT (OUTPATIENT)
Dept: CALL CENTER | Facility: HOSPITAL | Age: 86
End: 2021-10-18

## 2021-10-18 ENCOUNTER — TELEPHONE (OUTPATIENT)
Dept: FAMILY MEDICINE CLINIC | Facility: CLINIC | Age: 86
End: 2021-10-18

## 2021-10-18 NOTE — TELEPHONE ENCOUNTER
Sorry for delay, I have been waiting for final pathology and it came back benign.   Nothing to worry about on the last polyp removed.        ----- Message from Nicki Leach MA sent at 10/7/2021 12:03 PM EDT -----  Regarding: FW: Test Results Question  Contact: [phone]    ----- Message -----  From: Monica Scherer  Sent: 10/7/2021  10:03 AM EDT  To: Franko Carnes [facility]  Subject: Test Results Question                            Hi Dr. Mauricio, Could you please call Monica about her test result on that 3rd polyp? 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 She is very worried about the results and wanted you to call her. I would appreciate it. Thank you for all you do,  Raquel

## 2021-10-18 NOTE — OUTREACH NOTE
Medical Week 4 Survey      Responses   [facility] patient discharged from? [employer]   Does the patient have one of the following disease processes/diagnoses(primary or secondary)? Other   Week 4 attempt successful? No          Sweetie Castrejon RN

## 2021-11-16 ENCOUNTER — FLU SHOT (OUTPATIENT)
Dept: FAMILY MEDICINE CLINIC | Facility: CLINIC | Age: 86
End: 2021-11-16

## 2021-11-16 DIAGNOSIS — Z23 NEED FOR INFLUENZA VACCINATION: Primary | ICD-10-CM

## 2021-11-16 PROCEDURE — 90662 IIV NO PRSV INCREASED AG IM: CPT | Performed by: FAMILY MEDICINE

## 2021-11-16 PROCEDURE — G0008 ADMIN INFLUENZA VIRUS VAC: HCPCS | Performed by: FAMILY MEDICINE

## 2021-12-03 DIAGNOSIS — I10 ESSENTIAL HYPERTENSION: ICD-10-CM

## 2021-12-03 DIAGNOSIS — D64.9 NORMOCYTIC ANEMIA: ICD-10-CM

## 2021-12-03 RX ORDER — FELODIPINE 10 MG/1
10 TABLET, EXTENDED RELEASE ORAL DAILY
Qty: 90 TABLET | Refills: 3 | Status: SHIPPED | OUTPATIENT
Start: 2021-12-03 | End: 2021-12-16 | Stop reason: SDUPTHER

## 2021-12-03 RX ORDER — DOXYCYCLINE HYCLATE 50 MG/1
324 CAPSULE, GELATIN COATED ORAL
Qty: 30 TABLET | Refills: 2 | Status: SHIPPED | OUTPATIENT
Start: 2021-12-03 | End: 2022-07-25

## 2021-12-13 DIAGNOSIS — I48.0 PAROXYSMAL ATRIAL FIBRILLATION (HCC): ICD-10-CM

## 2021-12-13 RX ORDER — APIXABAN 5 MG/1
TABLET, FILM COATED ORAL
Qty: 180 TABLET | Refills: 3 | Status: SHIPPED | OUTPATIENT
Start: 2021-12-13 | End: 2021-12-16 | Stop reason: SDUPTHER

## 2021-12-16 ENCOUNTER — OFFICE VISIT (OUTPATIENT)
Dept: FAMILY MEDICINE CLINIC | Facility: CLINIC | Age: 86
End: 2021-12-16

## 2021-12-16 VITALS
SYSTOLIC BLOOD PRESSURE: 112 MMHG | HEART RATE: 74 BPM | OXYGEN SATURATION: 97 % | DIASTOLIC BLOOD PRESSURE: 60 MMHG | HEIGHT: 64 IN | BODY MASS INDEX: 28.84 KG/M2

## 2021-12-16 DIAGNOSIS — I10 ESSENTIAL HYPERTENSION: ICD-10-CM

## 2021-12-16 DIAGNOSIS — K59.04 CHRONIC IDIOPATHIC CONSTIPATION: ICD-10-CM

## 2021-12-16 DIAGNOSIS — K64.8 INTERNAL HEMORRHOIDS: ICD-10-CM

## 2021-12-16 DIAGNOSIS — I48.0 PAROXYSMAL ATRIAL FIBRILLATION (HCC): ICD-10-CM

## 2021-12-16 PROCEDURE — 99214 OFFICE O/P EST MOD 30 MIN: CPT | Performed by: FAMILY MEDICINE

## 2021-12-16 RX ORDER — HYDROCORTISONE ACETATE, PRAMOXINE HCL 2.5; 1 G/100G; G/100G
CREAM TOPICAL
COMMUNITY
Start: 2021-11-16 | End: 2021-12-16

## 2021-12-16 RX ORDER — ATORVASTATIN CALCIUM 80 MG/1
80 TABLET, FILM COATED ORAL DAILY
Qty: 90 TABLET | Refills: 3 | Status: SHIPPED | OUTPATIENT
Start: 2021-12-16 | End: 2022-01-17

## 2021-12-16 RX ORDER — OMEPRAZOLE 20 MG/1
20 CAPSULE, DELAYED RELEASE ORAL DAILY
Qty: 90 CAPSULE | Refills: 3 | Status: SHIPPED | OUTPATIENT
Start: 2021-12-16 | End: 2023-02-09

## 2021-12-16 RX ORDER — FELODIPINE 10 MG/1
10 TABLET, EXTENDED RELEASE ORAL DAILY
Qty: 90 TABLET | Refills: 3 | Status: SHIPPED | OUTPATIENT
Start: 2021-12-16 | End: 2022-12-27

## 2021-12-16 RX ORDER — ALENDRONATE SODIUM 70 MG/1
70 TABLET ORAL
Qty: 12 TABLET | Refills: 3 | Status: SHIPPED | OUTPATIENT
Start: 2021-12-16 | End: 2023-02-15

## 2021-12-16 NOTE — PROGRESS NOTES
Subjective   Monica Scherer is a 88 y.o. female. Presents today for   Chief Complaint   Patient presents with   • Follow-up     3 month   • Hyperlipidemia   • Hypertension   • Osteoarthritis   • Cerebrovascular Accident       Hyperlipidemia  This is a chronic problem. The current episode started more than 1 year ago. The problem is controlled. Recent lipid tests were reviewed and are normal. Pertinent negatives include no chest pain or shortness of breath. The current treatment provides moderate improvement of lipids.   Hypertension  This is a chronic problem. The current episode started more than 1 year ago. The problem is unchanged. The problem is controlled. Pertinent negatives include no chest pain, orthopnea, palpitations, peripheral edema, PND or shortness of breath. The current treatment provides moderate improvement. Hypertensive end-organ damage includes CVA.   Cerebrovascular Accident  This is a chronic problem. Pertinent negatives include no chest pain. Treatments tried: on eliquis, no new focal neuro deficits.   Constipation  This is a chronic problem. The current episode started more than 1 month ago. The problem is unchanged. The stool is described as firm. The patient is on a high fiber diet. She does not exercise regularly. There has not been adequate water intake. Pertinent negatives include no melena. Associated symptoms comments: Has occly hemorrhoid bleeding. She has tried stool softeners for the symptoms. The treatment provided mild relief.       Review of Systems   Respiratory: Negative for shortness of breath.    Cardiovascular: Negative for chest pain, palpitations, orthopnea and PND.   Gastrointestinal: Positive for constipation. Negative for melena.       Patient Active Problem List   Diagnosis   • Vertigo   • Temporary cerebral vascular dysfunction   • Gastroesophageal reflux disease with esophagitis   • Degeneration of intervertebral disc of cervical region   • Prediabetes   • S/P  "cholecystectomy   • Osteoarthritis of knee   • Herpes zoster without complication   • Hypertension   • Duodenal ulcer   • History of pancreatitis   • Hyperlipidemia   • TIA (transient ischemic attack)   • Cerebrovascular accident (CVA) due to thrombosis of left posterior cerebral artery (HCC)   • Paroxysmal atrial fibrillation (HCC)   • General weakness   • History of CVA (cerebrovascular accident)   • Anticoagulated   • Hematuria   • Dizziness and giddiness   • Rectal bleeding       Social History     Socioeconomic History   • Marital status: Single   Tobacco Use   • Smoking status: Never Smoker   • Smokeless tobacco: Never Used   Substance and Sexual Activity   • Alcohol use: No   • Drug use: No   • Sexual activity: Defer       Allergies   Allergen Reactions   • Cephalexin Rash   • Latex Itching       Current Outpatient Medications on File Prior to Visit   Medication Sig Dispense Refill   • aspirin 81 MG chewable tablet CHEW AND SWALLOW 1 TABLET BY MOUTH EVERY DAY 90 tablet 2   • ferrous gluconate (FERGON) 324 MG tablet TAKE 1 TABLET BY MOUTH DAILY WITH BREAKFAST 30 tablet 2   • meclizine (ANTIVERT) 25 MG tablet Take 2 tablets by mouth Every 6 (Six) Hours As Needed for Dizziness. 40 tablet 0   • polyethylene glycol (MIRALAX) 17 g packet Take 17 g by mouth Daily. 30 packet 12     No current facility-administered medications on file prior to visit.       Objective   Vitals:    12/16/21 1349   BP: 112/60   Pulse: 74   SpO2: 97%   Weight: Comment: wheel chair bound   Height: 162.6 cm (64\")   PainSc: 0-No pain     Body mass index is 28.84 kg/m².    Physical Exam  Vitals and nursing note reviewed.   Constitutional:       Appearance: She is well-developed.   HENT:      Head: Normocephalic and atraumatic.   Neck:      Thyroid: No thyromegaly.      Vascular: No JVD.   Cardiovascular:      Rate and Rhythm: Normal rate and regular rhythm.      Heart sounds: Normal heart sounds. No murmur heard.  No friction rub. No gallop.  "   Pulmonary:      Effort: Pulmonary effort is normal. No respiratory distress.      Breath sounds: Normal breath sounds. No wheezing or rales.   Abdominal:      General: Bowel sounds are normal. There is no distension.      Palpations: Abdomen is soft.      Tenderness: There is no abdominal tenderness. There is no guarding or rebound.   Musculoskeletal:      Cervical back: Neck supple.   Skin:     General: Skin is warm and dry.   Neurological:      Mental Status: She is alert.      Motor: Weakness and abnormal muscle tone present.   Psychiatric:         Behavior: Behavior normal.       Component      Latest Ref Rng & Units 9/24/2021   WBC      3.4 - 10.8 x10E3/uL 11.3 (H)   RBC      3.77 - 5.28 x10E6/uL 3.89   Hemoglobin      11.1 - 15.9 g/dL 11.4   Hematocrit      34.0 - 46.6 % 35.2   MCV      79 - 97 fL 91   MCH      26.6 - 33.0 pg 29.3   MCHC      31.5 - 35.7 g/dL 32.4   RDW      11.7 - 15.4 % 16.4 (H)   Platelets      150 - 450 x10E3/uL 330   Neutrophil Rel %      Not Estab. % 74   Lymphocyte Rel %      Not Estab. % 18   Monocyte Rel %      Not Estab. % 6   Eosinophil Rel %      Not Estab. % 1   Basophil Rel %      Not Estab. % 0   Neutrophils Absolute      1.4 - 7.0 x10E3/uL 8.4 (H)   Lymphocytes Absolute      0.7 - 3.1 x10E3/uL 2.0   Monocytes Absolute      0.1 - 0.9 x10E3/uL 0.7   Eosinophils Absolute      0.0 - 0.4 x10E3/uL 0.1   Basophils Absolute      0.0 - 0.2 x10E3/uL 0.1   Immature Granulocyte Rel %      Not Estab. % 1   Immature Grans, Absolute      0.0 - 0.1 x10E3/uL 0.1   IgG      586 - 1,602 mg/dL 1,058   IgA      64 - 422 mg/dL 160   IgM      26 - 217 mg/dL 143   Total Protein      6.0 - 8.5 g/dL 6.6   Albumin      2.9 - 4.4 g/dL 3.2   Alpha-1-Globulin      0.0 - 0.4 g/dL 0.3   Alpha-2-Globulin      0.4 - 1.0 g/dL 0.9   Beta Globulin      0.7 - 1.3 g/dL 1.0   Gamma Globulin      0.4 - 1.8 g/dL 1.2   M-Tone      Not Observed g/dL Not Observed   Globulin      2.2 - 3.9 g/dL 3.4   A/G Ratio      0.7  - 1.7 1.0   Immunofixation Reflex, Serum       Comment   Please note       Comment   Kappa FLC      3.3 - 19.4 mg/L 35.0 (H)   Free Lambda Light Chains      5.7 - 26.3 mg/L 21.7   Kappa/Lambda Ratio      0.26 - 1.65 1.61   Glucose      65 - 99 mg/dL 170 (H)   BUN      8 - 27 mg/dL 27   Creatinine      0.57 - 1.00 mg/dL 1.01 (H)   eGFR Non African Am      >59 mL/min/1.73 50 (L)   eGFR African Am      >59 mL/min/1.73 57 (L)   BUN/Creatinine Ratio      12 - 28 27   Sodium      134 - 144 mmol/L 141   Potassium      3.5 - 5.2 mmol/L 4.4   Chloride      96 - 106 mmol/L 106   CO2      20 - 29 mmol/L 19 (L)   Calcium      8.7 - 10.3 mg/dL 9.4   TIBC      250 - 450 ug/dL 332   UIBC      118 - 369 ug/dL 264   Iron      27 - 139 ug/dL 68   Iron Saturation      15 - 55 % 20   Ferritin      15 - 150 ng/mL 38   Folate      >3.0 ng/mL 15.1   Vitamin B-12      232 - 1,245 pg/mL 333   TSH Baseline      0.450 - 4.500 uIU/mL 3.400     Assessment/Plan   Diagnoses and all orders for this visit:    1. Essential hypertension  -     felodipine (PLENDIL) 10 MG 24 hr tablet; Take 1 tablet by mouth Daily.  Dispense: 90 tablet; Refill: 3    2. Paroxysmal atrial fibrillation (HCC)  -     apixaban (Eliquis) 5 MG tablet tablet; Take 1 tablet by mouth Every 12 (Twelve) Hours.  Dispense: 180 tablet; Refill: 3  -     metoprolol tartrate (LOPRESSOR) 25 MG tablet; Take 1 tablet by mouth Every 12 (Twelve) Hours.  Dispense: 180 tablet; Refill: 3    3. Chronic idiopathic constipation    4. Internal hemorrhoids  -     hydrocortisone 2.5 % ointment; Wipe twice daily with pea sized amount  Dispense: 60 g; Refill: 2    Other orders  -     alendronate (Fosamax) 70 MG tablet; Take 1 tablet by mouth Every 7 (Seven) Days.  Dispense: 12 tablet; Refill: 3  -     atorvastatin (LIPITOR) 80 MG tablet; Take 1 tablet by mouth Daily.  Dispense: 90 tablet; Refill: 3  -     omeprazole (priLOSEC) 20 MG capsule; Take 1 capsule by mouth Daily.  Dispense: 90 capsule; Refill:  3    -hypertension - controlled, continue medications  -start miralax daily;  Will give steroid topical for hemorrhoids  -a. Fib - metoprolol and eliquis continue         -Follow up: 3 months and prn

## 2022-01-17 RX ORDER — ATORVASTATIN CALCIUM 80 MG/1
TABLET, FILM COATED ORAL
Qty: 90 TABLET | Refills: 3 | Status: SHIPPED | OUTPATIENT
Start: 2022-01-17 | End: 2023-02-09

## 2022-02-11 RX ORDER — AZITHROMYCIN 250 MG/1
TABLET, FILM COATED ORAL
Qty: 6 TABLET | Refills: 0 | Status: SHIPPED | OUTPATIENT
Start: 2022-02-11 | End: 2022-05-23

## 2022-02-14 ENCOUNTER — TELEPHONE (OUTPATIENT)
Dept: FAMILY MEDICINE CLINIC | Facility: CLINIC | Age: 87
End: 2022-02-14

## 2022-02-14 NOTE — TELEPHONE ENCOUNTER
Caller: Monica Scherer    Relationship: Self    Best call back number: [phone]    What is the best time to reach you: ANYTIME    Who are you requesting to speak with (clinical staff, provider,  specific staff member): CLINICAL    What was the call regarding: PATIENT STATES SHE HAD A VOICEMAIL THAT SHE COULD NOT UNDERSTAND BUT THINKS IT MAY BE ABOUT AN APPOINTMENT.     PATIENT IS REQUESTING A CALL BACK TO SEE WHAT IS GOING ON.

## 2022-03-22 ENCOUNTER — OFFICE VISIT (OUTPATIENT)
Dept: CARDIOLOGY | Facility: CLINIC | Age: 87
End: 2022-03-22

## 2022-03-22 VITALS
HEIGHT: 64 IN | OXYGEN SATURATION: 98 % | DIASTOLIC BLOOD PRESSURE: 80 MMHG | SYSTOLIC BLOOD PRESSURE: 138 MMHG | BODY MASS INDEX: 29.88 KG/M2 | HEART RATE: 89 BPM | WEIGHT: 175 LBS

## 2022-03-22 DIAGNOSIS — I63.9 CEREBROVASCULAR ACCIDENT (CVA), UNSPECIFIED MECHANISM: ICD-10-CM

## 2022-03-22 DIAGNOSIS — I48.11 LONGSTANDING PERSISTENT ATRIAL FIBRILLATION: Primary | ICD-10-CM

## 2022-03-22 PROCEDURE — 99213 OFFICE O/P EST LOW 20 MIN: CPT | Performed by: INTERNAL MEDICINE

## 2022-03-24 RX ORDER — FERROUS GLUCONATE 324(37.5)
TABLET ORAL
Qty: 30 TABLET | Refills: 3 | Status: SHIPPED | OUTPATIENT
Start: 2022-03-24 | End: 2022-11-22 | Stop reason: ALTCHOICE

## 2022-04-11 NOTE — PROGRESS NOTES
Date of Office Visit:   3/22/2022  Encounter Provider: Rico Canales MD  Place of Service: [facility] CARDIOLOGY  Patient Name: Monica Scherer  :1933    Chief complaint: Follow-up for persistent atrial fibrillation, CVA.    History of Present Illness:    I again had the pleasure of seeing the patient in cardiology office on 3/22/2022.  She is a very pleasant 88 year-old female with a history of persistent atrial fibrillation and prior stroke who presents for follow-up.      I initially saw the patient in the hospital on 2020.  She had presented 2 days earlier with left lower extremity weakness, and was found to be in newly diagnosed atrial fibrillation with rapid ventricular response.  She was discharged on oral metoprolol and Eliquis, although she presented 2 days later with worsening left lower extremity weakness.  She ultimately was found to have a small acute infarct in the periventricular white matter near the posterior body of the right lateral ventricle, as well as a subacute infarct in the right lentiform nucleus.  These were felt to be from small vessel disease.  She remained in atrial fibrillation throughout the hospital stay.  She was rate controlled on metoprolol 25 mg twice a day at the time of discharge.  She was taking Eliquis 5 mg twice a day.  She had an echocardiogram performed on 2020 with an ejection fraction of 62% and very mild valvular disease.    The patient presents today for follow-up.  She was hospitalized in 2021 with generalized weakness and rectal bleeding.  Her Eliquis was held, and she actually underwent a polypectomy.  This was eventually resumed afterwards.  She has not had further bleeding.  She denies any chest pain or shortness of breath.  She remains asymptomatic from the atrial fibrillation.    Past Medical History:   Diagnosis Date   • Atrial fibrillation (HCC)    • Hyperlipidemia    • Hypertension    • Lacunar  infarct, acute (HCC) 01/2020    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   • New onset atrial fibrillation (HCC) 01/2020    now on rate control along with Eliquis   • Prolapsed uterus     per patient   • Stroke (HCC)    • TIA (transient ischemic attack) 01/2020   • Weakness        Past Surgical History:   Procedure Laterality Date   • APPENDECTOMY     • CHOLECYSTECTOMY N/A 6/8/2016    Procedure: CHOLECYSTECTOMY LAPAROSCOPIC;  Surgeon: Russ Gar MD;  [place] [facility] [place]  Service:    • COLONOSCOPY N/A 9/16/2021    Procedure: COLONOSCOPY TO CECUM WITH HOT SNARE POLYPECTOMIES AND COLD BX POLYPECTOMY;  Surgeon: Emerson Izaguirre MD;  Location: [facility] ENDOSCOPY;  Service: Gastroenterology;  Laterality: N/A;  pre: RECTAL BLEEDING, FAMILY HX OF COLON CANCER  post: INTERNAL AND EXTERNAL HEMORRHOIDS, DIVERTICULOSIS, POLYPS   • ENDOSCOPY N/A 2/22/2018    Z-line regular, 35 cm from incisors, normal esophagus, gastritis, bilious gastric fluid, one non-bleeding duodenal ulcer w/no stigmata of bleeding, Path: DUODENUM: FRAGMENTS OF GASTRIC TYPE MUCOSA WITH HYPERPLASIA (FOVEOLAR) AND PATCHY MIXED INFLAMMATION IN THE LAMINA PROPRIA WITH FOCAL FIBROSIS, COMMENT: 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    • EYE SURGERY      tre cataracts       Current Outpatient Medications on File Prior to Visit   Medication Sig Dispense Refill   • alendronate (Fosamax) 70 MG tablet Take 1 tablet by mouth Every 7 (Seven) Days. 12 tablet 3   • apixaban (Eliquis) 5 MG tablet tablet Take 1 tablet by mouth Every 12 (Twelve) Hours. 180 tablet 3   • aspirin 81 MG chewable tablet CHEW AND SWALLOW 1 TABLET BY MOUTH EVERY DAY (Patient taking differently: Every Other Day.) 90 tablet 2   • atorvastatin (LIPITOR) 80 MG tablet TAKE 1 TABLET BY MOUTH EVERY DAY 90 tablet 3   • felodipine (PLENDIL) 10 MG 24 hr tablet Take 1 tablet by mouth Daily. 90 tablet 3   • ferrous gluconate (FERGON) 324 MG tablet TAKE 1 TABLET BY MOUTH DAILY WITH  "BREAKFAST 30 tablet 2   • hydrocortisone 2.5 % ointment Wipe twice daily with pea sized amount 60 g 2   • meclizine (ANTIVERT) 25 MG tablet Take 2 tablets by mouth Every 6 (Six) Hours As Needed for Dizziness. 40 tablet 0   • metoprolol tartrate (LOPRESSOR) 25 MG tablet Take 1 tablet by mouth Every 12 (Twelve) Hours. 180 tablet 3   • omeprazole (priLOSEC) 20 MG capsule Take 1 capsule by mouth Daily. 90 capsule 3   • polyethylene glycol (MIRALAX) 17 g packet Take 17 g by mouth Daily. (Patient taking differently: Take 17 g by mouth As Needed.) 30 packet 12   • azithromycin (ZITHROMAX) 250 MG tablet Take first 2 tablets together, then 1 every day until finished. 6 tablet 0     No current facility-administered medications on file prior to visit.     Allergies as of 03/22/2022 - Reviewed 03/22/2022   Allergen Reaction Noted   • Cephalexin Rash 01/23/2018   • Latex Itching 06/07/2016     Social History     Socioeconomic History   • Marital status: Single   Tobacco Use   • Smoking status: Never Smoker   • Smokeless tobacco: Never Used   • Tobacco comment: caffeine use- \"rare\"   Substance and Sexual Activity   • Alcohol use: No   • Drug use: No   • Sexual activity: Defer     History reviewed. No pertinent family history.    Review of Systems   Constitutional: Positive for malaise/fatigue.   All other systems reviewed and are negative.     Objective:     Vitals:    03/22/22 0916   BP: 138/80   BP Location: Left arm   Pulse: 89   SpO2: 98%   Weight: 79.4 kg (175 lb)   Height: 162.6 cm (64\")     Body mass index is 30.04 kg/m².    Physical Exam  Constitutional:       Appearance: She is well-developed.   HENT:      Head: Normocephalic and atraumatic.   Eyes:      Conjunctiva/sclera: Conjunctivae normal.   Cardiovascular:      Rate and Rhythm: Normal rate. Rhythm irregularly irregular.      Heart sounds: No murmur heard.    No friction rub. No gallop.   Pulmonary:      Effort: Pulmonary effort is normal.      Breath sounds: Normal " breath sounds.   Abdominal:      Palpations: Abdomen is soft.      Tenderness: There is no abdominal tenderness.   Musculoskeletal:      Cervical back: Neck supple.   Skin:     General: Skin is warm.   Neurological:      Mental Status: She is alert and oriented to person, place, and time.   Psychiatric:         Behavior: Behavior normal.       Lab Review:   Procedures    Cardiac Procedures:  1.  Echocardiogram on 1/6/2020: The ejection fraction was 62%.  The left atrium was borderline dilated.  There was mild aortic insufficiency.  There was mild mitral regurgitation.  There was mild tricuspid regurgitation.  2.  Echocardiogram on 9/11/2021: The ejection fraction was 60%.  There was aortic sclerosis.  There was mild aortic insufficiency.    Assessment:       Diagnosis Plan   1. Longstanding persistent atrial fibrillation (HCC)     2. Cerebrovascular accident (CVA), unspecified mechanism (HCC)       Plan:       Again, she was hospitalized in September 2021 with generalized weakness and rectal bleeding.  Her Eliquis was held, and she had a polypectomy at that time.  She is now back on Eliquis without any difficulty.  She had an echocardiogram on 9/11/2021 which showed an ejection fraction of 60%.  She is now back on Eliquis at 5 mg twice a day without any bleeding issues.  She takes aspirin 81 mg every other day for her history of stroke.  Her bruising has improved since switching this to every other day.    Her rate is well controlled, and she is asymptomatic from the atrial fibrillation.  She will continue on the metoprolol 25 mg twice a day for rate control and blood pressure.  I did not make any changes today.  I will see her back in 8 months unless other issues arise.

## 2022-05-23 ENCOUNTER — OFFICE VISIT (OUTPATIENT)
Dept: FAMILY MEDICINE CLINIC | Facility: CLINIC | Age: 87
End: 2022-05-23

## 2022-05-23 VITALS
WEIGHT: 173 LBS | RESPIRATION RATE: 20 BRPM | HEART RATE: 72 BPM | OXYGEN SATURATION: 97 % | HEIGHT: 64 IN | TEMPERATURE: 96.9 F | DIASTOLIC BLOOD PRESSURE: 56 MMHG | SYSTOLIC BLOOD PRESSURE: 120 MMHG | BODY MASS INDEX: 29.53 KG/M2

## 2022-05-23 DIAGNOSIS — E78.2 MIXED HYPERLIPIDEMIA: ICD-10-CM

## 2022-05-23 DIAGNOSIS — I48.0 PAROXYSMAL ATRIAL FIBRILLATION: ICD-10-CM

## 2022-05-23 DIAGNOSIS — I10 ESSENTIAL HYPERTENSION: Primary | ICD-10-CM

## 2022-05-23 DIAGNOSIS — D64.9 NORMOCYTIC ANEMIA: ICD-10-CM

## 2022-05-23 DIAGNOSIS — E55.9 VITAMIN D DEFICIENCY: ICD-10-CM

## 2022-05-23 DIAGNOSIS — R73.03 PREDIABETES: ICD-10-CM

## 2022-05-23 DIAGNOSIS — Z86.73 HISTORY OF CVA (CEREBROVASCULAR ACCIDENT): ICD-10-CM

## 2022-05-23 PROCEDURE — 99214 OFFICE O/P EST MOD 30 MIN: CPT | Performed by: FAMILY MEDICINE

## 2022-05-23 NOTE — PROGRESS NOTES
Subjective   Monica Scherer is a 89 y.o. female. Presents today for   Chief Complaint   Patient presents with   • Anemia   • Hypertension   • Hyperlipidemia   • Atrial Fibrillation       Anemia  Presents for follow-up visit. Symptoms include malaise/fatigue. There has been no abdominal pain, leg swelling, palpitations or paresthesias. Signs of blood loss that are not present include hematochezia and melena. Compliance with medications is %.   Hypertension  This is a chronic problem. The current episode started more than 1 year ago. The problem is unchanged. The problem is controlled. Associated symptoms include malaise/fatigue. Pertinent negatives include no chest pain, orthopnea, palpitations, peripheral edema, PND or shortness of breath. The current treatment provides moderate improvement.   Hyperlipidemia  This is a chronic problem. The current episode started more than 1 year ago. The problem is controlled. Recent lipid tests were reviewed and are normal. Pertinent negatives include no chest pain or shortness of breath. Current antihyperlipidemic treatment includes statins. The current treatment provides moderate improvement of lipids.   Atrial Fibrillation  Presents for follow-up visit. Symptoms are negative for chest pain, hypertension, hypotension, palpitations, shortness of breath and syncope. (ON ELIQUIS) The symptoms have been stable. Past medical history includes atrial fibrillation and hyperlipidemia.       Review of Systems   Constitutional: Positive for malaise/fatigue.   Respiratory: Negative for shortness of breath.    Cardiovascular: Negative for chest pain, palpitations, orthopnea, syncope and PND.   Gastrointestinal: Negative for abdominal pain, hematochezia and melena.   Neurological: Negative for paresthesias.       Patient Active Problem List   Diagnosis   • Vertigo   • Temporary cerebral vascular dysfunction   • Gastroesophageal reflux disease with esophagitis   • Degeneration of  "intervertebral disc of cervical region   • Prediabetes   • S/P cholecystectomy   • Osteoarthritis of knee   • Herpes zoster without complication   • Hypertension   • Duodenal ulcer   • History of pancreatitis   • Hyperlipidemia   • TIA (transient ischemic attack)   • Cerebrovascular accident (CVA) due to thrombosis of left posterior cerebral artery (HCC)   • Paroxysmal atrial fibrillation (HCC)   • General weakness   • History of CVA (cerebrovascular accident)   • Anticoagulated   • Hematuria   • Dizziness and giddiness   • Rectal bleeding       Social History     Socioeconomic History   • Marital status: Single   Tobacco Use   • Smoking status: Never Smoker   • Smokeless tobacco: Never Used   • Tobacco comment: caffeine use- \"rare\"   Vaping Use   • Vaping Use: Never used   Substance and Sexual Activity   • Alcohol use: No   • Drug use: No   • Sexual activity: Defer       Allergies   Allergen Reactions   • Cephalexin Rash   • Latex Itching       Current Outpatient Medications on File Prior to Visit   Medication Sig Dispense Refill   • alendronate (Fosamax) 70 MG tablet Take 1 tablet by mouth Every 7 (Seven) Days. 12 tablet 3   • apixaban (Eliquis) 5 MG tablet tablet Take 1 tablet by mouth Every 12 (Twelve) Hours. 180 tablet 3   • aspirin 81 MG chewable tablet CHEW AND SWALLOW 1 TABLET BY MOUTH EVERY DAY (Patient taking differently: Chew 81 mg Every Other Day. QOD) 90 tablet 2   • atorvastatin (LIPITOR) 80 MG tablet TAKE 1 TABLET BY MOUTH EVERY DAY 90 tablet 3   • felodipine (PLENDIL) 10 MG 24 hr tablet Take 1 tablet by mouth Daily. 90 tablet 3   • ferrous gluconate (FERGON) 324 MG tablet TAKE 1 TABLET BY MOUTH DAILY WITH BREAKFAST 30 tablet 2   • ferrous gluconate 324 (37.5 Fe) MG tablet tablet TAKE ONE TABLET BY MOUTH DAILY 30 tablet 3   • hydrocortisone 2.5 % ointment Wipe twice daily with pea sized amount 60 g 2   • meclizine (ANTIVERT) 25 MG tablet Take 2 tablets by mouth Every 6 (Six) Hours As Needed for " "Dizziness. 40 tablet 0   • metoprolol tartrate (LOPRESSOR) 25 MG tablet Take 1 tablet by mouth Every 12 (Twelve) Hours. 180 tablet 3   • omeprazole (priLOSEC) 20 MG capsule Take 1 capsule by mouth Daily. 90 capsule 3   • polyethylene glycol (MIRALAX) 17 g packet Take 17 g by mouth Daily. (Patient taking differently: Take 17 g by mouth As Needed.) 30 packet 12   • [DISCONTINUED] azithromycin (ZITHROMAX) 250 MG tablet Take first 2 tablets together, then 1 every day until finished. 6 tablet 0     No current facility-administered medications on file prior to visit.       Objective   Vitals:    05/23/22 0914   BP: 120/56   BP Location: Left arm   Patient Position: Sitting   Cuff Size: Adult   Pulse: 72   Resp: 20   Temp: 96.9 °F (36.1 °C)   TempSrc: Temporal   SpO2: 97%   Weight: 78.5 kg (173 lb)   Height: 162.6 cm (64\")   PainSc: 0-No pain     Body mass index is 29.7 kg/m².    Physical Exam  Vitals and nursing note reviewed.   Constitutional:       Appearance: She is well-developed.   HENT:      Head: Normocephalic and atraumatic.   Neck:      Thyroid: No thyromegaly.      Vascular: No JVD.   Cardiovascular:      Rate and Rhythm: Normal rate and regular rhythm.      Heart sounds: Normal heart sounds. No murmur heard.    No friction rub. No gallop.   Pulmonary:      Effort: Pulmonary effort is normal. No respiratory distress.      Breath sounds: Normal breath sounds. No wheezing or rales.   Abdominal:      General: Bowel sounds are normal. There is no distension.      Palpations: Abdomen is soft.      Tenderness: There is no abdominal tenderness. There is no guarding or rebound.   Musculoskeletal:      Cervical back: Neck supple.   Skin:     General: Skin is warm and dry.   Neurological:      Mental Status: She is alert.   Psychiatric:         Behavior: Behavior normal.         Assessment & Plan   Diagnoses and all orders for this visit:    1. Essential hypertension (Primary)  -     Comprehensive Metabolic Panel    2. " Paroxysmal atrial fibrillation (HCC)    3. Normocytic anemia  -     CBC & Differential  -     Iron Profile  -     Vitamin B12  -     Folate  -     Ferritin    4. Mixed hyperlipidemia  -     Comprehensive Metabolic Panel  -     Lipid Panel    5. Prediabetes  -     Hemoglobin A1c    6. History of CVA (cerebrovascular accident)    7. Vitamin D deficiency  -     Vitamin D 25 Hydroxy      -hypertension - controlled, continue medications  -A. FIB - CONTINUE ELIQUIS AND BETA BLOCKER  -HLD - continue statin, recheck lipids.  -HX OF CVA;    -PREDM - DUE DM2 SCREEN  -DUE CHECK VITAMIN D       -Follow up: 6 MONTHS AND PRN

## 2022-05-23 NOTE — PATIENT INSTRUCTIONS
Below are four things you can do to prevent falls:   Begin an exercise program to improve your leg strength & balance  Ask your doctor or pharmacist to review your medicines   Get annual eye check-ups & update your eyeglasses  Make your home safer by:  Removing clutter & tripping hazards  Putting railings on all stairs & adding grab bars in the bathroom  Having good lighting, especially on stairs    Contact your local community or senior center for information on exercise, fall prevention programs, or options for improving home safety.

## 2022-05-24 LAB
25(OH)D3+25(OH)D2 SERPL-MCNC: 10.9 NG/ML (ref 30–100)
ALBUMIN SERPL-MCNC: 4.2 G/DL (ref 3.6–4.6)
ALBUMIN/GLOB SERPL: 1.6 {RATIO} (ref 1.2–2.2)
ALP SERPL-CCNC: 133 IU/L (ref 44–121)
ALT SERPL-CCNC: 7 IU/L (ref 0–32)
AST SERPL-CCNC: 11 IU/L (ref 0–40)
BASOPHILS # BLD AUTO: 0 X10E3/UL (ref 0–0.2)
BASOPHILS NFR BLD AUTO: 0 %
BILIRUB SERPL-MCNC: 0.5 MG/DL (ref 0–1.2)
BUN SERPL-MCNC: 20 MG/DL (ref 8–27)
BUN/CREAT SERPL: 22 (ref 12–28)
CALCIUM SERPL-MCNC: 9.4 MG/DL (ref 8.7–10.3)
CHLORIDE SERPL-SCNC: 103 MMOL/L (ref 96–106)
CHOLEST SERPL-MCNC: 122 MG/DL (ref 100–199)
CO2 SERPL-SCNC: 20 MMOL/L (ref 20–29)
CREAT SERPL-MCNC: 0.93 MG/DL (ref 0.57–1)
EGFRCR SERPLBLD CKD-EPI 2021: 59 ML/MIN/1.73
EOSINOPHIL # BLD AUTO: 0.1 X10E3/UL (ref 0–0.4)
EOSINOPHIL NFR BLD AUTO: 1 %
ERYTHROCYTE [DISTWIDTH] IN BLOOD BY AUTOMATED COUNT: 14.4 % (ref 11.7–15.4)
FERRITIN SERPL-MCNC: 49 NG/ML (ref 15–150)
FOLATE SERPL-MCNC: 11 NG/ML
GLOBULIN SER CALC-MCNC: 2.7 G/DL (ref 1.5–4.5)
GLUCOSE SERPL-MCNC: 136 MG/DL (ref 65–99)
HBA1C MFR BLD: 6.6 % (ref 4.8–5.6)
HCT VFR BLD AUTO: 39.8 % (ref 34–46.6)
HDLC SERPL-MCNC: 42 MG/DL
HGB BLD-MCNC: 12.8 G/DL (ref 11.1–15.9)
IMM GRANULOCYTES # BLD AUTO: 0 X10E3/UL (ref 0–0.1)
IMM GRANULOCYTES NFR BLD AUTO: 0 %
IRON SATN MFR SERPL: 12 % (ref 15–55)
IRON SERPL-MCNC: 40 UG/DL (ref 27–139)
LDLC SERPL CALC-MCNC: 63 MG/DL (ref 0–99)
LYMPHOCYTES # BLD AUTO: 2.6 X10E3/UL (ref 0.7–3.1)
LYMPHOCYTES NFR BLD AUTO: 29 %
MCH RBC QN AUTO: 29.2 PG (ref 26.6–33)
MCHC RBC AUTO-ENTMCNC: 32.2 G/DL (ref 31.5–35.7)
MCV RBC AUTO: 91 FL (ref 79–97)
MONOCYTES # BLD AUTO: 0.5 X10E3/UL (ref 0.1–0.9)
MONOCYTES NFR BLD AUTO: 5 %
NEUTROPHILS # BLD AUTO: 5.9 X10E3/UL (ref 1.4–7)
NEUTROPHILS NFR BLD AUTO: 65 %
PLATELET # BLD AUTO: 294 X10E3/UL (ref 150–450)
POTASSIUM SERPL-SCNC: 3.8 MMOL/L (ref 3.5–5.2)
PROT SERPL-MCNC: 6.9 G/DL (ref 6–8.5)
RBC # BLD AUTO: 4.39 X10E6/UL (ref 3.77–5.28)
SODIUM SERPL-SCNC: 141 MMOL/L (ref 134–144)
TIBC SERPL-MCNC: 337 UG/DL (ref 250–450)
TRIGL SERPL-MCNC: 88 MG/DL (ref 0–149)
UIBC SERPL-MCNC: 297 UG/DL (ref 118–369)
VIT B12 SERPL-MCNC: 256 PG/ML (ref 232–1245)
VLDLC SERPL CALC-MCNC: 17 MG/DL (ref 5–40)
WBC # BLD AUTO: 9.1 X10E3/UL (ref 3.4–10.8)

## 2022-06-01 DIAGNOSIS — E53.8 B12 DEFICIENCY: Primary | ICD-10-CM

## 2022-06-01 DIAGNOSIS — E55.9 VITAMIN D DEFICIENCY: ICD-10-CM

## 2022-06-01 RX ORDER — SYRINGE W-NEEDLE,DISPOSAB,3 ML 25GX5/8"
SYRINGE, EMPTY DISPOSABLE MISCELLANEOUS
Qty: 8 EACH | Refills: 0 | Status: SHIPPED | OUTPATIENT
Start: 2022-06-01

## 2022-06-07 DIAGNOSIS — E55.9 VITAMIN D DEFICIENCY: ICD-10-CM

## 2022-06-07 NOTE — TELEPHONE ENCOUNTER
PATIENT REQUESTED A REFILL ON:vitamin D3 125 MCG (5000 UT) capsule capsule    PATIENT CAN BE REACHED ON:[phone]     PHARMACY PREFERRED [facility] AT [address] [place] [address] - [phone]  - [phone]   [phone]

## 2022-07-14 DIAGNOSIS — I48.0 PAROXYSMAL ATRIAL FIBRILLATION: ICD-10-CM

## 2022-07-14 RX ORDER — APIXABAN 5 MG/1
TABLET, FILM COATED ORAL
Qty: 60 TABLET | Refills: 4 | Status: SHIPPED | OUTPATIENT
Start: 2022-07-14 | End: 2023-02-15

## 2022-07-24 DIAGNOSIS — D64.9 NORMOCYTIC ANEMIA: ICD-10-CM

## 2022-07-25 RX ORDER — DOXYCYCLINE HYCLATE 50 MG/1
CAPSULE, GELATIN COATED ORAL
Qty: 30 TABLET | Refills: 2 | Status: SHIPPED | OUTPATIENT
Start: 2022-07-25 | End: 2022-10-24

## 2022-09-19 DIAGNOSIS — K64.8 INTERNAL HEMORRHOIDS: ICD-10-CM

## 2022-10-24 DIAGNOSIS — D64.9 NORMOCYTIC ANEMIA: ICD-10-CM

## 2022-10-24 RX ORDER — DOXYCYCLINE HYCLATE 50 MG/1
CAPSULE, GELATIN COATED ORAL
Qty: 30 TABLET | Refills: 2 | Status: SHIPPED | OUTPATIENT
Start: 2022-10-24 | End: 2023-01-30

## 2022-11-14 DIAGNOSIS — K64.8 INTERNAL HEMORRHOIDS: ICD-10-CM

## 2022-11-22 ENCOUNTER — OFFICE VISIT (OUTPATIENT)
Dept: CARDIOLOGY | Facility: CLINIC | Age: 87
End: 2022-11-22

## 2022-11-22 VITALS
SYSTOLIC BLOOD PRESSURE: 120 MMHG | DIASTOLIC BLOOD PRESSURE: 76 MMHG | HEIGHT: 64 IN | HEART RATE: 64 BPM | BODY MASS INDEX: 29.7 KG/M2 | OXYGEN SATURATION: 97 %

## 2022-11-22 DIAGNOSIS — E78.49 OTHER HYPERLIPIDEMIA: ICD-10-CM

## 2022-11-22 DIAGNOSIS — I63.9 CEREBROVASCULAR ACCIDENT (CVA), UNSPECIFIED MECHANISM: ICD-10-CM

## 2022-11-22 DIAGNOSIS — I48.11 LONGSTANDING PERSISTENT ATRIAL FIBRILLATION: Primary | ICD-10-CM

## 2022-11-22 DIAGNOSIS — I10 ESSENTIAL HYPERTENSION: ICD-10-CM

## 2022-11-22 PROCEDURE — 93000 ELECTROCARDIOGRAM COMPLETE: CPT | Performed by: NURSE PRACTITIONER

## 2022-11-22 PROCEDURE — 99214 OFFICE O/P EST MOD 30 MIN: CPT | Performed by: NURSE PRACTITIONER

## 2022-11-22 NOTE — PROGRESS NOTES
"    CARDIOLOGY        Patient Name: Monica Scherer  :1933  Age: 89 y.o.  Primary Cardiologist: Eliezer Canales MD  Encounter Provider:  CORTEZ Morales    Date of Service: 22      CHIEF COMPLAINT / REASON FOR OFFICE VISIT     Atrial Fibrillation (8 month f/u)      HISTORY OF PRESENT ILLNESS       HPI  Monica Scherer is a 89 y.o. female who presents today for Semi-annual follow up.    Pt has a  history significant for PAF, stroke, hypertension, hyperlipidemia, rectal bleeding, hematuria.    Patient states that she has done well since last assessment.  She states that she is asymptomatic and denies any episodes of chest pain, shortness of breath, palpitations, swelling or fatigue.  She does have some intermittent episodes of position related lightheadedness.  She is tolerating all medications without adverse effects.  She is sedentary and does not exercise.     The following portions of the patient's history were reviewed and updated as appropriate: allergies, current medications, past family history, past medical history, past social history, past surgical history and problem list.      VITAL SIGNS     Visit Vitals  /76 (BP Location: Left arm, Patient Position: Sitting, Cuff Size: Adult)   Pulse 64   Ht 162.6 cm (64\")   SpO2 97%   BMI 29.70 kg/m²         Wt Readings from Last 3 Encounters:   22 78.5 kg (173 lb)   22 79.4 kg (175 lb)   21 76.2 kg (168 lb)     Body mass index is 29.7 kg/m².      REVIEW OF SYSTEMS   Review of Systems   Constitutional: Negative for chills, fever, malaise/fatigue, weight gain and weight loss.   Cardiovascular: Negative for leg swelling.   Respiratory: Negative for cough, snoring and wheezing.    Hematologic/Lymphatic: Negative for bleeding problem. Does not bruise/bleed easily.   Skin: Negative for color change.   Musculoskeletal: Negative for falls, joint pain and myalgias.   Gastrointestinal: Negative for melena.   Genitourinary: " Negative for hematuria.   Neurological: Positive for light-headedness. Negative for excessive daytime sleepiness.   Psychiatric/Behavioral: Negative for depression. The patient is not nervous/anxious.            PHYSICAL EXAMINATION     Vitals and nursing note reviewed.   Constitutional:       Appearance: Normal appearance. Well-developed.   Eyes:      Conjunctiva/sclera: Conjunctivae normal.   Neck:      Vascular: No carotid bruit.   Pulmonary:      Breath sounds: Normal breath sounds.   Cardiovascular:      Normal rate. Irregularly irregular rhythm. Normal S1 with normal intensity. Normal S2 with normal intensity.      Murmurs: There is no murmur.      No gallop. No click. No rub.   Edema:     Peripheral edema absent.   Musculoskeletal: Normal range of motion. Skin:     General: Skin is warm and dry.   Neurological:      Mental Status: Alert and oriented to person, place, and time.      GCS: GCS eye subscore is 4. GCS verbal subscore is 5. GCS motor subscore is 6.   Psychiatric:         Speech: Speech normal.         Behavior: Behavior normal.         Thought Content: Thought content normal.         Judgment: Judgment normal.           REVIEWED DATA       ECG 12 Lead    Date/Time: 11/22/2022 9:59 AM  Performed by: Sharmaine Spence APRN  Authorized by: Sharmaine Spence APRN   Comparison: compared with previous ECG from 9/10/2021  Rhythm: atrial fibrillation  Rate: normal  BPM: 64  Conduction: conduction normal  ST Segments: ST segments normal  T Waves: T waves normal  QRS axis: normal  Other: no other findings    Clinical impression: abnormal EKG            Cardiac Procedures:  1. Echocardiogram 1/6/2020.  LVEF 62%.  Negative bubble study.  LAE.  Mild aortic valve regurgitation.  Mild mitral valve regurgitation.  Mild tricuspid valve regurgitation.  2. Echocardiogram 9/11/2021.  LVEF 60%.  Mild aortic valve regurgitation.  Calculated RVSP less than 35 mmHg    Lipid Panel    Lipid Panel 5/23/22   Total  Cholesterol 122   Triglycerides 88   HDL Cholesterol 42   VLDL Cholesterol 17   LDL Cholesterol  63               ASSESSMENT & PLAN     Diagnoses and all orders for this visit:    1. Longstanding persistent atrial fibrillation (HCC) (Primary)  · Heart rate currently controlled with metoprolol tartrate 25 mg twice daily  · Patient denies palpitations or irregular heartbeats  · Denies angina, dyspnea  · Anticoagulated with apixaban, denies bleeding complications  -     ECG 12 Lead    2. Cerebrovascular accident (CVA), unspecified mechanism (HCC)  · Anticoagulated with apixaban, continue aspirin and atorvastatin    3. Other hyperlipidemia  · LDL at goal.  Continue atorvastatin    4. Essential hypertension  · Blood pressure controlled at 120/76  · Continue metoprolol tartrate 25 mg twice daily        Return in about 1 year (around 11/22/2023) for Dr. Bassett-transitioning from Dr. Canales.    Future Appointments       Provider Department Center    11/28/2022 9:00 AM Amilcar Mauricio DO [facility] PRIMARY CARE [place]    12/8/2023 10:00 AM Omar Bassett MD [facility] CARDIOLOGY [place]                MEDICATIONS         Discharge Medications          Accurate as of November 22, 2022  1:03 PM. If you have any questions, ask your nurse or doctor.            Changes to Medications      Instructions Start Date   aspirin 81 MG chewable tablet  What changed:   · when to take this  · additional instructions   CHEW AND SWALLOW 1 TABLET BY MOUTH EVERY DAY         Continue These Medications      Instructions Start Date   alendronate 70 MG tablet  Commonly known as: Fosamax   70 mg, Oral, Every 7 Days      atorvastatin 80 MG tablet  Commonly known as: LIPITOR   TAKE 1 TABLET BY MOUTH EVERY DAY      Cyanocobalamin 1000 MCG/ML kit   1,000 mcg, Injection, Weekly, X 8 weeks then call for monthly regimen      Eliquis 5 MG tablet tablet  Generic drug: apixaban   TAKE 1 TABLET BY MOUTH EVERY 12 HOURS     "  felodipine 10 MG 24 hr tablet  Commonly known as: PLENDIL   10 mg, Oral, Daily      ferrous gluconate 324 MG tablet  Commonly known as: FERGON   TAKE ONE TABLET BY MOUTH DAILY      hydrocortisone 2.5 % ointment   WIPE TWO TIMES A DAY WITH PEA SIZED AMOUNT      metoprolol tartrate 25 MG tablet  Commonly known as: LOPRESSOR   25 mg, Oral, Every 12 Hours      omeprazole 20 MG capsule  Commonly known as: priLOSEC   20 mg, Oral, Daily      Syringe 25G X 1\" 3 ML misc   IM weekly x 8 weeks then call for monthly regimen      vitamin D3 125 MCG (5000 UT) capsule capsule   5,000 Units, Oral, Daily                 **Dragon Disclaimer:   Much of this encounter note is an electronic transcription/translation of spoken language to printed text. The electronic translation of spoken language may permit erroneous, or at times, nonsensical words or phrases to be inadvertently transcribed. Although I have reviewed the note for such errors, some may still exist.   "

## 2022-11-28 ENCOUNTER — OFFICE VISIT (OUTPATIENT)
Dept: FAMILY MEDICINE CLINIC | Facility: CLINIC | Age: 87
End: 2022-11-28

## 2022-11-28 VITALS
OXYGEN SATURATION: 96 % | DIASTOLIC BLOOD PRESSURE: 78 MMHG | HEART RATE: 51 BPM | SYSTOLIC BLOOD PRESSURE: 140 MMHG | BODY MASS INDEX: 29.71 KG/M2 | WEIGHT: 174 LBS | HEIGHT: 64 IN

## 2022-11-28 DIAGNOSIS — Z23 FLU VACCINE NEED: ICD-10-CM

## 2022-11-28 DIAGNOSIS — I48.0 PAROXYSMAL ATRIAL FIBRILLATION: ICD-10-CM

## 2022-11-28 DIAGNOSIS — E53.8 B12 DEFICIENCY: ICD-10-CM

## 2022-11-28 DIAGNOSIS — E55.9 VITAMIN D DEFICIENCY: ICD-10-CM

## 2022-11-28 DIAGNOSIS — R73.03 PREDIABETES: ICD-10-CM

## 2022-11-28 DIAGNOSIS — I10 PRIMARY HYPERTENSION: ICD-10-CM

## 2022-11-28 DIAGNOSIS — E78.2 MIXED HYPERLIPIDEMIA: ICD-10-CM

## 2022-11-28 DIAGNOSIS — Z00.00 MEDICARE ANNUAL WELLNESS VISIT, SUBSEQUENT: Primary | ICD-10-CM

## 2022-11-28 PROCEDURE — 90662 IIV NO PRSV INCREASED AG IM: CPT | Performed by: FAMILY MEDICINE

## 2022-11-28 PROCEDURE — 96160 PT-FOCUSED HLTH RISK ASSMT: CPT | Performed by: FAMILY MEDICINE

## 2022-11-28 PROCEDURE — 1126F AMNT PAIN NOTED NONE PRSNT: CPT | Performed by: FAMILY MEDICINE

## 2022-11-28 PROCEDURE — 1170F FXNL STATUS ASSESSED: CPT | Performed by: FAMILY MEDICINE

## 2022-11-28 PROCEDURE — G0008 ADMIN INFLUENZA VIRUS VAC: HCPCS | Performed by: FAMILY MEDICINE

## 2022-11-28 PROCEDURE — 1159F MED LIST DOCD IN RCRD: CPT | Performed by: FAMILY MEDICINE

## 2022-11-28 PROCEDURE — G0439 PPPS, SUBSEQ VISIT: HCPCS | Performed by: FAMILY MEDICINE

## 2022-11-28 NOTE — PATIENT INSTRUCTIONS
Advance Care Planning and Advance Directives     You make decisions on a daily basis - decisions about where you want to live, your career, your home, your life. Perhaps one of the most important decisions you face is your choice for future medical care. Take time to talk with your family and your healthcare team and start planning today.  Advance Care Planning is a process that can help you:  Understand possible future healthcare decisions in light of your own experiences  Reflect on those decision in light of your goals and values  Discuss your decisions with those closest to you and the healthcare professionals that care for you  Make a plan by creating a document that reflects your wishes    Surrogate Decision Maker  In the event of a medical emergency, which has left you unable to communicate or to make your own decisions, you would need someone to make decisions for you.  It is important to discuss your preferences for medical treatment with this person while you are in good health.     Qualities of a surrogate decision maker:  Willing to take on this role and responsibility  Knows what you want for future medical care  Willing to follow your wishes even if they don't agree with them  Able to make difficult medical decisions under stressful circumstances    Advance Directives  These are legal documents you can create that will guide your healthcare team and decision maker(s) when needed. These documents can be stored in the electronic medical record.    Living Will - a legal document to guide your care if you have a terminal condition or a serious illness and are unable to communicate. States vary by statute in document names/types, but most forms may include one or more of the following:        -  Directions regarding life-prolonging treatments        -  Directions regarding artificially provided nutrition/hydration        -  Choosing a healthcare decision maker        -  Direction regarding organ/tissue  "donation    Durable Power of  for Healthcare - this document names an -in-fact to make medical decisions for you, but it may also allow this person to make personal and financial decisions for you. Please seek the advice of an  if you need this type of document.    **Advance Directives are not required and no one may discriminate against you if you do not sign one.    Medical Orders  Many states allow specific forms/orders signed by your physician to record your wishes for medical treatment in your current state of health. This form, signed in personal communication with your physician, addresses resuscitation and other medical interventions that you may or may not want.      For more information or to schedule a time with a [facility] Advance Care Planning Facilitator contact: [email or web address] or call [phone] and someone will contact you directly.\"3-9-6-Almost None!\"  Healthy Habits Start Early    EAT 5 OR MORE SERVINGS OF VEGETABLES AND FRUITS EVERY DAY.    Help Monica get three vegetables and two fruits each day. Red, green, yellow, orange...encourage them to try all the colors so they can enjoy different flavors and get more vitamins.    How can I help Monica do this?  ---------------------------------------------  -BE PATIENT WITH Monica, remember it may take 10 times before they start to like new food. So, start with small bites and just keep trying.  -Serve at least one vegetable or fruit at every meal. Even try two. Remember, portions do not have to be as big as you think.  -Encourage eating fruits and vegetables instead of drinking them..it's a better way to get fiber and vitamins..so limit the amount of juice to 1/2 cup per day for children 1-6 years and one cup per day for children 7-18 years of age. Try using 1/2 part water and 1/2 part juice.    Spend less than two hours per day watching television and other screen media. Screen media includes video games, movies " and computer use for entertainment.    How can I help Monica do this?  -Turn off the TV at dinner. Dinner is the best time to hang out with your kids and just talk, learn about their day, and tell them about your day. Your kids have a lot to learn from you and dinner is a great time to share.

## 2022-11-28 NOTE — TELEPHONE ENCOUNTER
For you   MA Rooming Questions  Patient: Preet Bundy  MRN: [id number]    Date: 11/28/2022        1. Do you have any new issues?   no         2. Do you need any refills on medications?    no    3. Have you had any imaging done since your last visit? yes - Mammo    4. Have you been hospitalized or seen in the emergency room since your last visit here?   no    5. Did the patient have a depression screening completed today?  Yes    No data recorded     PHQ-9 Given to (if applicable):               PHQ-9 Score (if applicable):                     [] Positive     []  Negative              Does question #9 need addressed (if applicable)                     [] Yes    []  No               Shawn Meng, KARTHIK

## 2022-11-29 LAB
25(OH)D3+25(OH)D2 SERPL-MCNC: 65.6 NG/ML (ref 30–100)
ALBUMIN SERPL-MCNC: 4.4 G/DL (ref 3.5–5.2)
ALBUMIN/GLOB SERPL: 1.6 G/DL
ALP SERPL-CCNC: 123 U/L (ref 39–117)
ALT SERPL-CCNC: 7 U/L (ref 1–33)
AST SERPL-CCNC: <5 U/L (ref 1–32)
BASOPHILS # BLD AUTO: 0.04 10*3/MM3 (ref 0–0.2)
BASOPHILS NFR BLD AUTO: 0.4 % (ref 0–1.5)
BILIRUB SERPL-MCNC: 0.8 MG/DL (ref 0–1.2)
BUN SERPL-MCNC: 22 MG/DL (ref 8–23)
BUN/CREAT SERPL: 20.4 (ref 7–25)
CALCIUM SERPL-MCNC: 9.5 MG/DL (ref 8.6–10.5)
CHLORIDE SERPL-SCNC: 106 MMOL/L (ref 98–107)
CHOLEST SERPL-MCNC: 130 MG/DL (ref 0–200)
CO2 SERPL-SCNC: 23 MMOL/L (ref 22–29)
CREAT SERPL-MCNC: 1.08 MG/DL (ref 0.57–1)
EGFRCR SERPLBLD CKD-EPI 2021: 49.2 ML/MIN/1.73
EOSINOPHIL # BLD AUTO: 0.07 10*3/MM3 (ref 0–0.4)
EOSINOPHIL NFR BLD AUTO: 0.8 % (ref 0.3–6.2)
ERYTHROCYTE [DISTWIDTH] IN BLOOD BY AUTOMATED COUNT: 15 % (ref 12.3–15.4)
GLOBULIN SER CALC-MCNC: 2.8 GM/DL
GLUCOSE SERPL-MCNC: 124 MG/DL (ref 65–99)
HBA1C MFR BLD: 6.4 % (ref 4.8–5.6)
HCT VFR BLD AUTO: 42.6 % (ref 34–46.6)
HDLC SERPL-MCNC: 43 MG/DL (ref 40–60)
HGB BLD-MCNC: 13.6 G/DL (ref 12–15.9)
IMM GRANULOCYTES # BLD AUTO: 0.05 10*3/MM3 (ref 0–0.05)
IMM GRANULOCYTES NFR BLD AUTO: 0.6 % (ref 0–0.5)
LDLC SERPL CALC-MCNC: 67 MG/DL (ref 0–100)
LYMPHOCYTES # BLD AUTO: 2.31 10*3/MM3 (ref 0.7–3.1)
LYMPHOCYTES NFR BLD AUTO: 25.6 % (ref 19.6–45.3)
MCH RBC QN AUTO: 29.6 PG (ref 26.6–33)
MCHC RBC AUTO-ENTMCNC: 31.9 G/DL (ref 31.5–35.7)
MCV RBC AUTO: 92.6 FL (ref 79–97)
MONOCYTES # BLD AUTO: 0.52 10*3/MM3 (ref 0.1–0.9)
MONOCYTES NFR BLD AUTO: 5.8 % (ref 5–12)
NEUTROPHILS # BLD AUTO: 6.04 10*3/MM3 (ref 1.7–7)
NEUTROPHILS NFR BLD AUTO: 66.8 % (ref 42.7–76)
NRBC BLD AUTO-RTO: 0 /100 WBC (ref 0–0.2)
PLATELET # BLD AUTO: 236 10*3/MM3 (ref 140–450)
POTASSIUM SERPL-SCNC: 4 MMOL/L (ref 3.5–5.2)
PROT SERPL-MCNC: 7.2 G/DL (ref 6–8.5)
RBC # BLD AUTO: 4.6 10*6/MM3 (ref 3.77–5.28)
SODIUM SERPL-SCNC: 143 MMOL/L (ref 136–145)
TRIGL SERPL-MCNC: 109 MG/DL (ref 0–150)
VIT B12 SERPL-MCNC: >2000 PG/ML (ref 211–946)
VLDLC SERPL CALC-MCNC: 20 MG/DL (ref 5–40)
WBC # BLD AUTO: 9.03 10*3/MM3 (ref 3.4–10.8)

## 2022-12-27 DIAGNOSIS — I10 ESSENTIAL HYPERTENSION: ICD-10-CM

## 2022-12-27 RX ORDER — FELODIPINE 10 MG/1
TABLET, EXTENDED RELEASE ORAL
Qty: 90 TABLET | Refills: 3 | Status: SHIPPED | OUTPATIENT
Start: 2022-12-27

## 2023-01-30 DIAGNOSIS — D64.9 NORMOCYTIC ANEMIA: ICD-10-CM

## 2023-01-30 RX ORDER — DOXYCYCLINE HYCLATE 50 MG/1
CAPSULE, GELATIN COATED ORAL
Qty: 30 TABLET | Refills: 2 | Status: SHIPPED | OUTPATIENT
Start: 2023-01-30

## 2023-02-09 DIAGNOSIS — I48.0 PAROXYSMAL ATRIAL FIBRILLATION: ICD-10-CM

## 2023-02-09 RX ORDER — OMEPRAZOLE 20 MG/1
CAPSULE, DELAYED RELEASE ORAL
Qty: 90 CAPSULE | Refills: 3 | Status: SHIPPED | OUTPATIENT
Start: 2023-02-09

## 2023-02-09 RX ORDER — ATORVASTATIN CALCIUM 80 MG/1
TABLET, FILM COATED ORAL
Qty: 90 TABLET | Refills: 3 | Status: SHIPPED | OUTPATIENT
Start: 2023-02-09

## 2023-02-15 DIAGNOSIS — K64.8 INTERNAL HEMORRHOIDS: ICD-10-CM

## 2023-02-15 DIAGNOSIS — I48.0 PAROXYSMAL ATRIAL FIBRILLATION: ICD-10-CM

## 2023-02-15 RX ORDER — ALENDRONATE SODIUM 70 MG/1
TABLET ORAL
Qty: 12 TABLET | Refills: 3 | Status: SHIPPED | OUTPATIENT
Start: 2023-02-15

## 2023-02-15 RX ORDER — APIXABAN 5 MG/1
TABLET, FILM COATED ORAL
Qty: 180 TABLET | Refills: 0 | Status: SHIPPED | OUTPATIENT
Start: 2023-02-15

## 2023-03-30 ENCOUNTER — TELEPHONE (OUTPATIENT)
Dept: FAMILY MEDICINE CLINIC | Facility: CLINIC | Age: 88
End: 2023-03-30
Payer: MEDICARE

## 2023-03-30 NOTE — TELEPHONE ENCOUNTER
Caller: Raquel Lebron    Relationship: Emergency Contact    Best call back number: [phone]    What medication are you requesting: MEDICATION TO TREAT SYMPTOMS    What are your current symptoms: DIARRHEA, NAUSEA, STOMACH PAIN    How long have you been experiencing symptoms: SINCE 03-    Have you had these symptoms before:    [] Yes  [x] No    Have you been treated for these symptoms before:   [] Yes  [x] No    If a prescription is needed, what is your preferred pharmacy and phone number: [facility] [id number] - [address] AT [address] [place] [address] - [phone] [facility] [phone] FX     Additional notes: PATIENTS DAUGHTER STATED THE PATIENT HAS EXPERIENCING VERY BAD DIARRHEA AND DOS NOT THINK SHE WILL BE ABLE TO MAKE INTO THE OFFICE.    PATIENTS DAUGHTER STATED THE PATIENT HAS ALSO HAD STOMACH PAIN AND NAUSEA WITH THIS DIARRHEA.    PATIENTS DAUGHTER IS REQUESTING A MEDICATION BE SENT IN TO HELP WITH THIS.

## 2023-03-31 RX ORDER — ONDANSETRON 4 MG/1
4 TABLET, ORALLY DISINTEGRATING ORAL EVERY 8 HOURS PRN
Qty: 24 TABLET | Refills: 0 | Status: SHIPPED | OUTPATIENT
Start: 2023-03-31

## 2023-03-31 RX ORDER — LOPERAMIDE HYDROCHLORIDE 2 MG/1
2 CAPSULE ORAL 4 TIMES DAILY PRN
Qty: 24 CAPSULE | Refills: 0 | Status: SHIPPED | OUTPATIENT
Start: 2023-03-31

## 2023-03-31 NOTE — TELEPHONE ENCOUNTER
I sent in zofran and loepramide to take;  Push fluids and clear liquid;  Avoid dairy for 1 week.   There is a stomach virus floating around.  If severe abd pain, fevers or blood in stool, GO ED.  RRJ

## 2023-04-24 DIAGNOSIS — D64.9 NORMOCYTIC ANEMIA: ICD-10-CM

## 2023-04-25 RX ORDER — DOXYCYCLINE HYCLATE 50 MG/1
CAPSULE, GELATIN COATED ORAL
Qty: 30 TABLET | Refills: 2 | Status: SHIPPED | OUTPATIENT
Start: 2023-04-25

## 2023-05-11 DIAGNOSIS — E55.9 VITAMIN D DEFICIENCY: ICD-10-CM

## 2023-05-11 DIAGNOSIS — I48.0 PAROXYSMAL ATRIAL FIBRILLATION: ICD-10-CM

## 2023-05-11 RX ORDER — APIXABAN 5 MG/1
TABLET, FILM COATED ORAL
Qty: 180 TABLET | Refills: 0 | Status: SHIPPED | OUTPATIENT
Start: 2023-05-11

## 2023-05-12 DIAGNOSIS — K64.8 INTERNAL HEMORRHOIDS: ICD-10-CM

## 2023-06-05 ENCOUNTER — OFFICE VISIT (OUTPATIENT)
Dept: FAMILY MEDICINE CLINIC | Facility: CLINIC | Age: 88
End: 2023-06-05
Payer: MEDICARE

## 2023-06-05 VITALS
WEIGHT: 172 LBS | SYSTOLIC BLOOD PRESSURE: 130 MMHG | OXYGEN SATURATION: 97 % | DIASTOLIC BLOOD PRESSURE: 70 MMHG | HEIGHT: 64 IN | BODY MASS INDEX: 29.37 KG/M2 | HEART RATE: 86 BPM

## 2023-06-05 DIAGNOSIS — D64.9 NORMOCYTIC ANEMIA: ICD-10-CM

## 2023-06-05 DIAGNOSIS — I48.0 PAROXYSMAL ATRIAL FIBRILLATION: Primary | ICD-10-CM

## 2023-06-05 DIAGNOSIS — Z86.73 HISTORY OF CVA (CEREBROVASCULAR ACCIDENT): ICD-10-CM

## 2023-06-05 DIAGNOSIS — E78.2 MIXED HYPERLIPIDEMIA: ICD-10-CM

## 2023-06-05 DIAGNOSIS — E53.8 B12 DEFICIENCY: ICD-10-CM

## 2023-06-05 DIAGNOSIS — E55.9 VITAMIN D DEFICIENCY: ICD-10-CM

## 2023-06-05 DIAGNOSIS — E66.3 OVERWEIGHT (BMI 25.0-29.9): ICD-10-CM

## 2023-06-05 DIAGNOSIS — I10 PRIMARY HYPERTENSION: ICD-10-CM

## 2023-06-05 DIAGNOSIS — Z23 NEED FOR COVID-19 VACCINE: ICD-10-CM

## 2023-06-05 DIAGNOSIS — I10 ESSENTIAL HYPERTENSION: ICD-10-CM

## 2023-06-05 DIAGNOSIS — R73.03 PREDIABETES: ICD-10-CM

## 2023-06-05 NOTE — PATIENT INSTRUCTIONS
Advance Care Planning and Advance Directives     You make decisions on a daily basis - decisions about where you want to live, your career, your home, your life. Perhaps one of the most important decisions you face is your choice for future medical care. Take time to talk with your family and your healthcare team and start planning today.  Advance Care Planning is a process that can help you:  Understand possible future healthcare decisions in light of your own experiences  Reflect on those decision in light of your goals and values  Discuss your decisions with those closest to you and the healthcare professionals that care for you  Make a plan by creating a document that reflects your wishes    Surrogate Decision Maker  In the event of a medical emergency, which has left you unable to communicate or to make your own decisions, you would need someone to make decisions for you.  It is important to discuss your preferences for medical treatment with this person while you are in good health.     Qualities of a surrogate decision maker:  Willing to take on this role and responsibility  Knows what you want for future medical care  Willing to follow your wishes even if they don't agree with them  Able to make difficult medical decisions under stressful circumstances    Advance Directives  These are legal documents you can create that will guide your healthcare team and decision maker(s) when needed. These documents can be stored in the electronic medical record.    Living Will - a legal document to guide your care if you have a terminal condition or a serious illness and are unable to communicate. States vary by statute in document names/types, but most forms may include one or more of the following:        -  Directions regarding life-prolonging treatments        -  Directions regarding artificially provided nutrition/hydration        -  Choosing a healthcare decision maker        -  Direction regarding organ/tissue  donation    Durable Power of  for Healthcare - this document names an -in-fact to make medical decisions for you, but it may also allow this person to make personal and financial decisions for you. Please seek the advice of an  if you need this type of document.    **Advance Directives are not required and no one may discriminate against you if you do not sign one.    Medical Orders  Many states allow specific forms/orders signed by your physician to record your wishes for medical treatment in your current state of health. This form, signed in personal communication with your physician, addresses resuscitation and other medical interventions that you may or may not want.      For more information or to schedule a time with a [facility] Advance Care Planning Facilitator contact: [email or web address] or call [phone] and someone will contact you directly.

## 2023-06-07 LAB
25(OH)D3+25(OH)D2 SERPL-MCNC: 75.3 NG/ML (ref 30–100)
ALBUMIN SERPL-MCNC: 4.1 G/DL (ref 3.5–4.6)
ALBUMIN/GLOB SERPL: 1.5 {RATIO} (ref 1.2–2.2)
ALP SERPL-CCNC: 114 IU/L (ref 44–121)
ALT SERPL-CCNC: 6 IU/L (ref 0–32)
AST SERPL-CCNC: 12 IU/L (ref 0–40)
BILIRUB SERPL-MCNC: 0.6 MG/DL (ref 0–1.2)
BUN SERPL-MCNC: 26 MG/DL (ref 10–36)
BUN/CREAT SERPL: 27 (ref 12–28)
CALCIUM SERPL-MCNC: 9.6 MG/DL (ref 8.7–10.3)
CHLORIDE SERPL-SCNC: 104 MMOL/L (ref 96–106)
CHOLEST SERPL-MCNC: 119 MG/DL (ref 100–199)
CO2 SERPL-SCNC: 20 MMOL/L (ref 20–29)
CREAT SERPL-MCNC: 0.97 MG/DL (ref 0.57–1)
EGFRCR SERPLBLD CKD-EPI 2021: 56 ML/MIN/1.73
ERYTHROCYTE [DISTWIDTH] IN BLOOD BY AUTOMATED COUNT: 14.4 % (ref 11.7–15.4)
GLOBULIN SER CALC-MCNC: 2.7 G/DL (ref 1.5–4.5)
GLUCOSE SERPL-MCNC: 154 MG/DL (ref 70–99)
HBA1C MFR BLD: 6.6 % (ref 4.8–5.6)
HCT VFR BLD AUTO: 37.1 % (ref 34–46.6)
HDLC SERPL-MCNC: 43 MG/DL
HGB BLD-MCNC: 12 G/DL (ref 11.1–15.9)
LDLC SERPL CALC-MCNC: 59 MG/DL (ref 0–99)
MCH RBC QN AUTO: 29.1 PG (ref 26.6–33)
MCHC RBC AUTO-ENTMCNC: 32.3 G/DL (ref 31.5–35.7)
MCV RBC AUTO: 90 FL (ref 79–97)
PLATELET # BLD AUTO: 279 X10E3/UL (ref 150–450)
POTASSIUM SERPL-SCNC: 3.7 MMOL/L (ref 3.5–5.2)
PROT SERPL-MCNC: 6.8 G/DL (ref 6–8.5)
RBC # BLD AUTO: 4.12 X10E6/UL (ref 3.77–5.28)
SODIUM SERPL-SCNC: 140 MMOL/L (ref 134–144)
TRIGL SERPL-MCNC: 86 MG/DL (ref 0–149)
VIT B12 SERPL-MCNC: 1681 PG/ML (ref 232–1245)
VLDLC SERPL CALC-MCNC: 17 MG/DL (ref 5–40)
WBC # BLD AUTO: 10.4 X10E3/UL (ref 3.4–10.8)

## 2023-06-11 NOTE — PROGRESS NOTES
Mail copy of results to patient.  Kidney function improved  Cholesterol well controlled  Vitamin D normal  Blood counts normal  Diabetes well controlled

## 2023-08-07 DIAGNOSIS — D64.9 NORMOCYTIC ANEMIA: ICD-10-CM

## 2023-08-07 RX ORDER — DOXYCYCLINE HYCLATE 50 MG/1
CAPSULE, GELATIN COATED ORAL
Qty: 30 TABLET | Refills: 2 | Status: SHIPPED | OUTPATIENT
Start: 2023-08-07

## 2023-08-12 DIAGNOSIS — I48.0 PAROXYSMAL ATRIAL FIBRILLATION: ICD-10-CM

## 2023-08-14 RX ORDER — APIXABAN 5 MG/1
TABLET, FILM COATED ORAL
Qty: 180 TABLET | Refills: 0 | Status: SHIPPED | OUTPATIENT
Start: 2023-08-14

## 2023-10-16 ENCOUNTER — APPOINTMENT (OUTPATIENT)
Dept: CT IMAGING | Facility: HOSPITAL | Age: 88
End: 2023-10-16
Payer: MEDICARE

## 2023-10-16 ENCOUNTER — HOSPITAL ENCOUNTER (EMERGENCY)
Facility: HOSPITAL | Age: 88
Discharge: HOME | End: 2023-10-16
Attending: STUDENT IN AN ORGANIZED HEALTH CARE EDUCATION/TRAINING PROGRAM | Admitting: STUDENT IN AN ORGANIZED HEALTH CARE EDUCATION/TRAINING PROGRAM
Payer: MEDICARE

## 2023-10-16 VITALS
HEIGHT: 64 IN | HEART RATE: 79 BPM | WEIGHT: 172 LBS | BODY MASS INDEX: 29.37 KG/M2 | TEMPERATURE: 97.1 F | OXYGEN SATURATION: 95 % | RESPIRATION RATE: 16 BRPM | DIASTOLIC BLOOD PRESSURE: 85 MMHG | SYSTOLIC BLOOD PRESSURE: 165 MMHG

## 2023-10-16 DIAGNOSIS — R35.0 URINARY FREQUENCY: ICD-10-CM

## 2023-10-16 DIAGNOSIS — R93.5 ABNORMAL CT OF THE ABDOMEN: ICD-10-CM

## 2023-10-16 DIAGNOSIS — R10.9 LEFT FLANK PAIN: Primary | ICD-10-CM

## 2023-10-16 LAB
ALBUMIN SERPL-MCNC: 4 G/DL (ref 3.5–5.2)
ALBUMIN/GLOB SERPL: 1.4 G/DL
ALP SERPL-CCNC: 103 U/L (ref 39–117)
ALT SERPL W P-5'-P-CCNC: 7 U/L (ref 1–33)
ANION GAP SERPL CALCULATED.3IONS-SCNC: 11 MMOL/L (ref 5–15)
AST SERPL-CCNC: 11 U/L (ref 1–32)
BACTERIA UR QL AUTO: NORMAL /HPF
BASOPHILS # BLD AUTO: 0.05 10*3/MM3 (ref 0–0.2)
BASOPHILS NFR BLD AUTO: 0.5 % (ref 0–1.5)
BILIRUB SERPL-MCNC: 0.6 MG/DL (ref 0–1.2)
BILIRUB UR QL STRIP: NEGATIVE
BUN SERPL-MCNC: 22 MG/DL (ref 8–23)
BUN/CREAT SERPL: 23.2 (ref 7–25)
CALCIUM SPEC-SCNC: 9.8 MG/DL (ref 8.2–9.6)
CHLORIDE SERPL-SCNC: 105 MMOL/L (ref 98–107)
CLARITY UR: CLEAR
CO2 SERPL-SCNC: 24 MMOL/L (ref 22–29)
COLOR UR: YELLOW
CREAT SERPL-MCNC: 0.95 MG/DL (ref 0.57–1)
DEPRECATED RDW RBC AUTO: 47.7 FL (ref 37–54)
EGFRCR SERPLBLD CKD-EPI 2021: 57 ML/MIN/1.73
EOSINOPHIL # BLD AUTO: 0.07 10*3/MM3 (ref 0–0.4)
EOSINOPHIL NFR BLD AUTO: 0.7 % (ref 0.3–6.2)
ERYTHROCYTE [DISTWIDTH] IN BLOOD BY AUTOMATED COUNT: 14.7 % (ref 12.3–15.4)
GLOBULIN UR ELPH-MCNC: 2.9 GM/DL
GLUCOSE SERPL-MCNC: 162 MG/DL (ref 65–99)
GLUCOSE UR STRIP-MCNC: NEGATIVE MG/DL
HCT VFR BLD AUTO: 41.8 % (ref 34–46.6)
HGB BLD-MCNC: 13.6 G/DL (ref 12–15.9)
HGB UR QL STRIP.AUTO: ABNORMAL
HYALINE CASTS UR QL AUTO: NORMAL /LPF
IMM GRANULOCYTES # BLD AUTO: 0.06 10*3/MM3 (ref 0–0.05)
IMM GRANULOCYTES NFR BLD AUTO: 0.6 % (ref 0–0.5)
KETONES UR QL STRIP: NEGATIVE
LEUKOCYTE ESTERASE UR QL STRIP.AUTO: NEGATIVE
LIPASE SERPL-CCNC: 11 U/L (ref 13–60)
LYMPHOCYTES # BLD AUTO: 1.68 10*3/MM3 (ref 0.7–3.1)
LYMPHOCYTES NFR BLD AUTO: 17.9 % (ref 19.6–45.3)
MCH RBC QN AUTO: 29.4 PG (ref 26.6–33)
MCHC RBC AUTO-ENTMCNC: 32.5 G/DL (ref 31.5–35.7)
MCV RBC AUTO: 90.3 FL (ref 79–97)
MONOCYTES # BLD AUTO: 0.55 10*3/MM3 (ref 0.1–0.9)
MONOCYTES NFR BLD AUTO: 5.9 % (ref 5–12)
NEUTROPHILS NFR BLD AUTO: 6.95 10*3/MM3 (ref 1.7–7)
NEUTROPHILS NFR BLD AUTO: 74.4 % (ref 42.7–76)
NITRITE UR QL STRIP: NEGATIVE
NRBC BLD AUTO-RTO: 0 /100 WBC (ref 0–0.2)
PH UR STRIP.AUTO: 6.5 [PH] (ref 5–8)
PLATELET # BLD AUTO: 262 10*3/MM3 (ref 140–450)
PMV BLD AUTO: 9.1 FL (ref 6–12)
POTASSIUM SERPL-SCNC: 3.9 MMOL/L (ref 3.5–5.2)
PROT SERPL-MCNC: 6.9 G/DL (ref 6–8.5)
PROT UR QL STRIP: NEGATIVE
RBC # BLD AUTO: 4.63 10*6/MM3 (ref 3.77–5.28)
RBC # UR STRIP: NORMAL /HPF
REF LAB TEST METHOD: NORMAL
SODIUM SERPL-SCNC: 140 MMOL/L (ref 136–145)
SP GR UR STRIP: 1.01 (ref 1–1.03)
SQUAMOUS #/AREA URNS HPF: NORMAL /HPF
UROBILINOGEN UR QL STRIP: ABNORMAL
WBC # UR STRIP: NORMAL /HPF
WBC NRBC COR # BLD: 9.36 10*3/MM3 (ref 3.4–10.8)

## 2023-10-16 PROCEDURE — 83690 ASSAY OF LIPASE: CPT | Performed by: PHYSICIAN ASSISTANT

## 2023-10-16 PROCEDURE — 81001 URINALYSIS AUTO W/SCOPE: CPT | Performed by: PHYSICIAN ASSISTANT

## 2023-10-16 PROCEDURE — 80053 COMPREHEN METABOLIC PANEL: CPT | Performed by: PHYSICIAN ASSISTANT

## 2023-10-16 PROCEDURE — 99285 EMERGENCY DEPT VISIT HI MDM: CPT

## 2023-10-16 PROCEDURE — 74177 CT ABD & PELVIS W/CONTRAST: CPT

## 2023-10-16 PROCEDURE — 85025 COMPLETE CBC W/AUTO DIFF WBC: CPT | Performed by: PHYSICIAN ASSISTANT

## 2023-10-16 PROCEDURE — 25510000001 IOPAMIDOL 61 % SOLUTION: Performed by: STUDENT IN AN ORGANIZED HEALTH CARE EDUCATION/TRAINING PROGRAM

## 2023-10-16 RX ORDER — SODIUM CHLORIDE 0.9 % (FLUSH) 0.9 %
10 SYRINGE (ML) INJECTION AS NEEDED
Status: DISCONTINUED | OUTPATIENT
Start: 2023-10-16 | End: 2023-10-16 | Stop reason: HOSPADM

## 2023-10-16 RX ORDER — AMOXICILLIN AND CLAVULANATE POTASSIUM 875; 125 MG/1; MG/1
1 TABLET, FILM COATED ORAL 2 TIMES DAILY
Qty: 14 TABLET | Refills: 0 | Status: SHIPPED | OUTPATIENT
Start: 2023-10-16 | End: 2023-10-23

## 2023-10-16 RX ORDER — AMOXICILLIN AND CLAVULANATE POTASSIUM 875; 125 MG/1; MG/1
1 TABLET, FILM COATED ORAL ONCE
Status: COMPLETED | OUTPATIENT
Start: 2023-10-16 | End: 2023-10-16

## 2023-10-16 RX ADMIN — AMOXICILLIN AND CLAVULANATE POTASSIUM 1 TABLET: 875; 125 TABLET, FILM COATED ORAL at 19:14

## 2023-10-16 RX ADMIN — IOPAMIDOL 85 ML: 612 INJECTION, SOLUTION INTRAVENOUS at 17:42

## 2023-10-16 NOTE — DISCHARGE INSTRUCTIONS
Follow-up with your primary care provider as well as [facility].  Take the entire course of Augmentin as prescribed.  Use Tylenol as needed for back and abdominal pain.  Return to emergency department for any worsening symptoms.

## 2023-10-16 NOTE — ED PROVIDER NOTES
EMERGENCY DEPARTMENT ENCOUNTER    Room Number:  08/08  PCP: Amilcar Mauricio DO  Discussed/ obtained information from independent historians: Daughter at bedside      HPI:  Chief Complaint: LLQ abdominal pain    Context: Monica Scherer is a 89 y.o. female who presents to the ED c/o LLQ abdominal pain.  Patient reports several days ago she experienced diarrhea that has now resolved.  Today, she noticed pain along the left flank as well as increased urination.  She reports the pain is intermittent, denies alleviating or exacerbating factors.  Patient denies history of pyelonephritis or kidney stone.  She denies associated fever, nausea, vomiting.  Patient has had prior appendectomy, cholecystectomy.  Patient is on Eliquis for history of atrial fibrillation.  Patient denies syncope, headache, chest pain, dyspnea, diarrhea, dysuria, or any other systemic complaints.      External (non-ED) record review:   Reviewed note from office visit with PCP on 6/5/2023 where patient seen for paroxysmal A-fib, hypertension, hyperlipidemia, prediabetes.  Reviewed assessment and plan.  Plan to check labs, continue current medications, return to office in 6 months.  Reviewed prior laboratory studies.  Most recent CBC with hemoglobin 12.0.  Most recent hemoglobin A1c 6.6.      PAST MEDICAL HISTORY  Active Ambulatory Problems     Diagnosis Date Noted    Vertigo 06/14/2016    Temporary cerebral vascular dysfunction 06/14/2016    Gastroesophageal reflux disease with esophagitis 06/14/2016    Degeneration of intervertebral disc of cervical region 06/14/2016    Prediabetes 06/14/2016    S/P cholecystectomy 06/14/2016    Osteoarthritis of knee 10/31/2016    Herpes zoster without complication 11/08/2016    Hypertension 02/18/2018    Duodenal ulcer 03/13/2018    History of pancreatitis 03/22/2018    Hyperlipidemia 07/19/2019    TIA (transient ischemic attack) 01/04/2020    Cerebrovascular accident (CVA) due to thrombosis of left posterior  cerebral artery 04/07/2020    Paroxysmal atrial fibrillation 04/07/2020    General weakness 09/10/2021    History of CVA (cerebrovascular accident) 09/10/2021    Anticoagulated 09/10/2021    Hematuria 09/11/2021    Dizziness and giddiness 09/11/2021    Rectal bleeding 09/12/2021     Resolved Ambulatory Problems     Diagnosis Date Noted    Acute cholecystitis 06/07/2016    Benign essential hypertension 06/14/2016    Indigestion 10/31/2016    Acute viral bronchitis 01/23/2018    Acute biliary pancreatitis 02/18/2018     Past Medical History:   Diagnosis Date    Atrial fibrillation     Lacunar infarct, acute 01/2020    New onset atrial fibrillation 01/2020    Prolapsed uterus     Stroke     Weakness          PAST SURGICAL HISTORY  Past Surgical History:   Procedure Laterality Date    APPENDECTOMY      CHOLECYSTECTOMY N/A 6/8/2016    Procedure: CHOLECYSTECTOMY LAPAROSCOPIC;  Surgeon: Russ Gar MD;  Location:  [facility];  Service:     COLONOSCOPY N/A 9/16/2021    Procedure: COLONOSCOPY TO CECUM WITH HOT SNARE POLYPECTOMIES AND COLD BX POLYPECTOMY;  Surgeon: Emerson Izaguirre MD;  [facility];  Service: Gastroenterology;  Laterality: N/A;  pre: RECTAL BLEEDING, FAMILY HX OF COLON CANCER  post: INTERNAL AND EXTERNAL HEMORRHOIDS, DIVERTICULOSIS, POLYPS    ENDOSCOPY N/A 2/22/2018    Z-line regular, 35 cm from incisors, normal esophagus, gastritis, bilious gastric fluid, one non-bleeding duodenal ulcer w/no stigmata of bleeding, Path: DUODENUM: FRAGMENTS OF GASTRIC TYPE MUCOSA WITH HYPERPLASIA (FOVEOLAR) AND PATCHY MIXED INFLAMMATION IN THE LAMINA PROPRIA WITH FOCAL FIBROSIS, COMMENT: 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     EYE SURGERY      tre cataracts         FAMILY HISTORY  History reviewed. No pertinent family history.      SOCIAL HISTORY  Social History     Socioeconomic History    Marital status: Single   Tobacco Use    Smoking status: Never    Smokeless tobacco: Never    Tobacco  "comments:     caffeine use- \"rare\"   Vaping Use    Vaping Use: Never used   Substance and Sexual Activity    Alcohol use: No    Drug use: No    Sexual activity: Defer         ALLERGIES  Cephalexin and Latex        REVIEW OF SYSTEMS  Review of Systems   Constitutional:  Negative for chills and fever.   HENT:  Negative for ear pain and sore throat.    Respiratory:  Negative for cough and shortness of breath.    Cardiovascular:  Negative for chest pain and palpitations.   Gastrointestinal:  Negative for abdominal pain and vomiting.   Genitourinary:  Positive for flank pain and frequency. Negative for dysuria and hematuria.   Musculoskeletal:  Negative for arthralgias and joint swelling.   Skin:  Negative for pallor and rash.   Neurological:  Negative for numbness and headaches.   Psychiatric/Behavioral:  Negative for confusion and hallucinations.             PHYSICAL EXAM  ED Triage Vitals [10/16/23 1553]   Temp Heart Rate Resp BP SpO2   97.1 °F (36.2 °C) 115 -- -- 97 %      Temp src Heart Rate Source Patient Position BP Location FiO2 (%)   -- -- -- -- --       Physical Exam  Constitutional:       General: She is not in acute distress.     Appearance: She is well-developed.   HENT:      Head: Normocephalic and atraumatic.   Eyes:      Extraocular Movements: Extraocular movements intact.   Cardiovascular:      Rate and Rhythm: Normal rate and regular rhythm.      Heart sounds: Normal heart sounds.   Pulmonary:      Effort: Pulmonary effort is normal.      Breath sounds: Normal breath sounds.   Abdominal:      General: There is no distension.      Tenderness: There is left CVA tenderness. There is no right CVA tenderness.   Skin:     General: Skin is warm.   Neurological:      General: No focal deficit present.      Mental Status: She is alert and oriented to person, place, and time.   Psychiatric:         Mood and Affect: Mood normal.         Vital signs and nursing notes reviewed.          LAB RESULTS  Recent Results " (from the past 24 hour(s))   Comprehensive Metabolic Panel    Collection Time: 10/16/23  4:42 PM    Specimen: Blood   Result Value Ref Range    Glucose 162 (H) 65 - 99 mg/dL    BUN 22 8 - 23 mg/dL    Creatinine 0.95 0.57 - 1.00 mg/dL    Sodium 140 136 - 145 mmol/L    Potassium 3.9 3.5 - 5.2 mmol/L    Chloride 105 98 - 107 mmol/L    CO2 24.0 22.0 - 29.0 mmol/L    Calcium 9.8 (H) 8.2 - 9.6 mg/dL    Total Protein 6.9 6.0 - 8.5 g/dL    Albumin 4.0 3.5 - 5.2 g/dL    ALT (SGPT) 7 1 - 33 U/L    AST (SGOT) 11 1 - 32 U/L    Alkaline Phosphatase 103 39 - 117 U/L    Total Bilirubin 0.6 0.0 - 1.2 mg/dL    Globulin 2.9 gm/dL    A/G Ratio 1.4 g/dL    BUN/Creatinine Ratio 23.2 7.0 - 25.0    Anion Gap 11.0 5.0 - 15.0 mmol/L    eGFR 57.0 (L) >60.0 mL/min/1.73   Lipase    Collection Time: 10/16/23  4:42 PM    Specimen: Blood   Result Value Ref Range    Lipase 11 (L) 13 - 60 U/L   CBC Auto Differential    Collection Time: 10/16/23  4:42 PM    Specimen: Blood   Result Value Ref Range    WBC 9.36 3.40 - 10.80 10*3/mm3    RBC 4.63 3.77 - 5.28 10*6/mm3    Hemoglobin 13.6 12.0 - 15.9 g/dL    Hematocrit 41.8 34.0 - 46.6 %    MCV 90.3 79.0 - 97.0 fL    MCH 29.4 26.6 - 33.0 pg    MCHC 32.5 31.5 - 35.7 g/dL    RDW 14.7 12.3 - 15.4 %    RDW-SD 47.7 37.0 - 54.0 fl    MPV 9.1 6.0 - 12.0 fL    Platelets 262 140 - 450 10*3/mm3    Neutrophil % 74.4 42.7 - 76.0 %    Lymphocyte % 17.9 (L) 19.6 - 45.3 %    Monocyte % 5.9 5.0 - 12.0 %    Eosinophil % 0.7 0.3 - 6.2 %    Basophil % 0.5 0.0 - 1.5 %    Immature Grans % 0.6 (H) 0.0 - 0.5 %    Neutrophils, Absolute 6.95 1.70 - 7.00 10*3/mm3    Lymphocytes, Absolute 1.68 0.70 - 3.10 10*3/mm3    Monocytes, Absolute 0.55 0.10 - 0.90 10*3/mm3    Eosinophils, Absolute 0.07 0.00 - 0.40 10*3/mm3    Basophils, Absolute 0.05 0.00 - 0.20 10*3/mm3    Immature Grans, Absolute 0.06 (H) 0.00 - 0.05 10*3/mm3    nRBC 0.0 0.0 - 0.2 /100 WBC   Urinalysis With Culture If Indicated - Urine, Clean Catch    Collection Time:  10/16/23  6:06 PM    Specimen: Urine, Clean Catch   Result Value Ref Range    Color, UA Yellow Yellow, Straw    Appearance, UA Clear Clear    pH, UA 6.5 5.0 - 8.0    Specific Gravity, UA 1.015 1.005 - 1.030    Glucose, UA Negative Negative    Ketones, UA Negative Negative    Bilirubin, UA Negative Negative    Blood, UA Trace (A) Negative    Protein, UA Negative Negative    Leuk Esterase, UA Negative Negative    Nitrite, UA Negative Negative    Urobilinogen, UA 0.2 E.U./dL 0.2 - 1.0 E.U./dL   Urinalysis, Microscopic Only - Urine, Clean Catch    Collection Time: 10/16/23  6:06 PM    Specimen: Urine, Clean Catch   Result Value Ref Range    RBC, UA 0-2 None Seen, 0-2 /HPF    WBC, UA 0-2 None Seen, 0-2 /HPF    Bacteria, UA None Seen None Seen /HPF    Squamous Epithelial Cells, UA 0-2 None Seen, 0-2 /HPF    Hyaline Casts, UA 0-2 None Seen /LPF    Methodology Automated Microscopy        Ordered the above labs and reviewed the results.        RADIOLOGY  CT Abdomen Pelvis With Contrast    Result Date: 10/16/2023  EXAMINATION: CT OF THE ABDOMEN AND PELVIS WITH CONTRAST  TECHNIQUE: Computed tomography of the abdomen and pelvis after the uneventful administration of nonionic intravenous contrast per protocol. Radiation dose reduction techniques were utilized, including automated exposure control and exposure modulation based on body size.  HISTORY: Left flank pain and urinary frequency  COMPARISON: 9/13/2021  FINDINGS: Limited evaluation of the inferior thorax demonstrates atelectasis, without consolidation, pleural effusion, or pneumothorax. The heart is normal in size and configuration, without pericardial effusion.  The gallbladder is absent. There is stable mild extrahepatic biliary ductal dilation. A small sliding-type hiatal hernia is seen. There are colonic diverticula, without evidence of acute diverticulitis. Low-attenuation left renal lesions are technically indeterminate, likely cysts. There are colonic diverticula,  without evidence of acute diverticulitis.  The liver, adrenal glands, right kidney, pancreas, small bowel, and abdominal vasculature are normal. There is a small amount of gas in the urinary bladder. No intraperitoneal fluid collection or free gas are seen. No enlarged lymph nodes are demonstrated.  Bone windows demonstrate degenerative changes, without suspicious osseous lesion seen.      1. Gas in the urinary bladder, possibly secondary to recent instrumentation.  2. No acute or suspicious intraperitoneal process seen. Incidental findings as above.  This report was finalized on 10/16/2023 5:53 PM by Dr. Nuno Askew M.D on Workstation: [id number]       Ordered the above noted radiological studies. Reviewed by me in PACS.              MEDICATIONS GIVEN IN ER  Medications   iopamidol (ISOVUE-300) 61 % injection 100 mL (85 mL Intravenous Given by Other 10/16/23 1742)   amoxicillin-clavulanate (AUGMENTIN) 875-125 MG per tablet 1 tablet (1 tablet Oral Given 10/16/23 1914)             MEDICAL DECISION MAKING, PROGRESS, and CONSULTS    All labs have been independently reviewed by me.  All radiology studies have been reviewed by me and I have also reviewed the radiology report.   EKG's independently viewed and interpreted by me.  Discussion below represents my analysis of pertinent findings related to patient's condition, differential diagnosis, treatment plan and final disposition.            Orders placed during this visit:  Orders Placed This Encounter   Procedures    CT Abdomen Pelvis With Contrast    Comprehensive Metabolic Panel    Lipase    Urinalysis With Culture If Indicated - Urine, Clean Catch    CBC Auto Differential    Urinalysis, Microscopic Only - Urine, Clean Catch    CBC & Differential           Differential diagnosis:  Pyelonephritis, kidney stone, diverticulitis      Independent interpretation of labs, radiology studies, and discussions with consultants:  ED Course as of 10/16/23 2235   Mon Oct 16,  2023 1700 WBC: 9.36 [MP]   1700 Hemoglobin: 13.6 [MP]   1746 CT abdomen pelvis interpreted myself:  No inflammatory change, air noted in the bladder, correlate for recent catheterization or instrumentation. [FS]   1839 Leukocytes, UA: Negative [MP]   1839 WBC, UA: 0-2 [MP]   1839 Bacteria, UA: None Seen [MP]   2234 Patient presents emergency department with left flank pain and urinary frequency.  Worked up today with labs, UA, CT scan.  Noted to have small amount of gas in the urinary bladder.  UA does not appear to be acutely infected.  Given urinary bladder gas will empirically cover with Augmentin and have patient follow-up closely with urology.  I also encourage close follow-up with PCP, discussed ED return precautions.  She is otherwise well-appearing, hemodynamically stable, and therefore appropriate for discharge. [MP]      ED Course User Index  [FS] Rudolph Whyte MD  [MP] Jenn Moses, PA-C         Additional orders considered but not ordered:  Renal ultrasound      Additional sources:    - Chronic or social conditions impacting care: Hypertension          DIAGNOSIS  Final diagnoses:   Left flank pain   Urinary frequency   Abnormal CT of the abdomen (gas in bladder)           Follow Up:  Amilcar Mauricio,   [address] [address]  [phone]    Schedule an appointment as soon as possible for a visit   For reevaluation after ED visit    [facility]  [address] [facility] [address]  [phone]  Schedule an appointment as soon as possible for a visit   For follow-up of the gas in your urinary bladder      RX:     Medication List        New Prescriptions      amoxicillin-clavulanate 875-125 MG per tablet  Commonly known as: AUGMENTIN  Take 1 tablet by mouth 2 (Two) Times a Day for 7 days.            Changed      aspirin 81 MG chewable tablet  CHEW AND SWALLOW 1 TABLET BY MOUTH EVERY DAY  What changed:   when to take this  additional instructions      atorvastatin 80 MG tablet  Commonly known as: LIPITOR  TAKE ONE TABLET BY MOUTH DAILY  What changed:   how much to take  how to take this  when to take this               Where to Get Your Medications        These medications were sent to [id number] - [place], [place] - [address] [facility] - [phone]  - [phone]   [address] [facility] [address]      Phone: [phone]   amoxicillin-clavulanate 875-125 MG per tablet         Latest Documented Vital Signs:  As of 22:33 EDT  BP- 165/85 HR- 79 Temp- 97.1 °F (36.2 °C) O2 sat- 95%              --    Please note that portions of this were completed with a voice recognition program.       Note Disclaimer: At [facility], we believe that sharing information builds trust and better relationships. You are receiving this note because you are receiving care at [facility] or recently visited. It is possible you will see health information before a provider has talked with you about it. This kind of information can be easy to misunderstand. To help you fully understand what it means for your health, we urge you to discuss this note with your provider.             Jenn Moses PA-C  10/16/23 6649

## 2023-10-17 NOTE — ED PROVIDER NOTES
MD ATTESTATION NOTE    The JOBY and I have discussed this patient's history, physical exam, and treatment plan.  I have reviewed the documentation and personally had a face to face interaction with the patient. I affirm the documentation and agree with the treatment and plan.  The attached note describes my personal findings.      I provided a substantive portion of the care of the patient.  I personally performed the physical exam in its entirety, and below are my findings.  For this patient encounter, the patient wore surgical mask, I wore full protective PPE including N95 and eye protection.      Brief HPI: Patient presented emergency department with lower abdominal pain, now with low back pain.  Also with increased urinary frequency.  No fever.  No dysuria.    PHYSICAL EXAM  ED Triage Vitals   Temp Heart Rate Resp BP SpO2   10/16/23 1553 10/16/23 1553 10/16/23 1604 10/16/23 1606 10/16/23 1553   97.1 °F (36.2 °C) 115 16 167/85 97 %      Temp src Heart Rate Source Patient Position BP Location FiO2 (%)   -- 10/16/23 1639 10/16/23 1639 10/16/23 1639 --    Monitor Lying Right arm          GENERAL: no acute distress  HENT: nares patent  EYES: no scleral icterus  CV: regular rhythm, normal rate  RESPIRATORY: normal effort  ABDOMEN: soft, mild tenderness to palpation over the right and left flank  MUSCULOSKELETAL: no deformity  NEURO: alert, moves all extremities, follows commands  PSYCH:  calm, cooperative  SKIN: warm, dry    Vital signs and nursing notes reviewed.        Plan: Patient presented emergency department with flank pain, increased urinary frequency.  Otherwise well-appearing, vitals otherwise stable.  Labs otherwise reassuring, urine showing no significant inflammatory change.  Imaging demonstrating nonspecific gas in bladder, patient has had no urinary instrumentation recently, concern for possible infection.  Given symptoms and CT findings will cover empirically for urinary tract infection despite  reassuring urinalysis.  Given return precautions and discharged home.    ED Course as of 10/16/23 2327   Mon Oct 16, 2023   1700 WBC: 9.36 [MP]   1700 Hemoglobin: 13.6 [MP]   1746 CT abdomen pelvis interpreted myself:  No inflammatory change, air noted in the bladder, correlate for recent catheterization or instrumentation. [FS]   1839 Leukocytes, UA: Negative [MP]   1839 WBC, UA: 0-2 [MP]   1839 Bacteria, UA: None Seen [MP]   2234 Patient presents emergency department with left flank pain and urinary frequency.  Worked up today with labs, UA, CT scan.  Noted to have small amount of gas in the urinary bladder.  UA does not appear to be acutely infected.  Given urinary bladder gas will empirically cover with Augmentin and have patient follow-up closely with urology.  I also encourage close follow-up with PCP, discussed ED return precautions.  She is otherwise well-appearing, hemodynamically stable, and therefore appropriate for discharge. [MP]      ED Course User Index  [FS] Rudolph Whyte MD  [MP] Jenn Moses PA-C Shary, Frank S, MD  10/16/23 8104

## 2023-10-18 ENCOUNTER — PATIENT OUTREACH (OUTPATIENT)
Dept: CASE MANAGEMENT | Facility: OTHER | Age: 88
End: 2023-10-18
Payer: MEDICARE

## 2023-10-18 NOTE — OUTREACH NOTE
Patient Outreach    AMBULATORY CASE MANAGEMENT NOTE    Name and Relationship of Patient/Support Person: Monica Scherer DEEPTHI - Self    Call placed to patient to follow up recent ED visit. Introduced self and role of ACM. Mrs Scherer states the pain is off and on. She has started her antibiotic. She is unsure about the follow up appointment and asked her son to get on the phone with us. Per Conrado his sister is scheduling the urology and PCP appointment today. Discussed bladder gas and what that could mean but that this is not definite but is why the consult is needed. Denies any other questions or concerns at this time. Offered to call back next week to make sure they could obtain appointment and follow up condtion. They would like this         Fatou WASHINGTON  Ambulatory Case Management    10/18/2023, 14:14 EDT

## 2023-10-25 ENCOUNTER — PATIENT OUTREACH (OUTPATIENT)
Dept: CASE MANAGEMENT | Facility: OTHER | Age: 88
End: 2023-10-25
Payer: MEDICARE

## 2023-10-25 NOTE — OUTREACH NOTE
Patient Outreach    AMBULATORY CASE MANAGEMENT NOTE    Name and Relationship of Patient/Support Person: GilmerJerrellMonica DEEPTHI - Self    Follow up call to Mrs Scherer. She states she did see the urologist and he is going to send her to another Dr as well as complete additional xrays. She has some fear this could be cancer as there has been several family members of her with cancer. Assured her that her physicians would let her know if they felt this was a concern. Long enjoyable conversation. Agree ACM will follow up with her    Education Documentation  No documentation found.        Fatou WASHINGTON  Ambulatory Case Management    10/25/2023, 13:46 EDT

## 2023-11-10 DIAGNOSIS — I48.0 PAROXYSMAL ATRIAL FIBRILLATION: ICD-10-CM

## 2023-11-10 RX ORDER — APIXABAN 5 MG/1
TABLET, FILM COATED ORAL
Qty: 180 TABLET | Refills: 0 | Status: SHIPPED | OUTPATIENT
Start: 2023-11-10

## 2023-11-22 DIAGNOSIS — D64.9 NORMOCYTIC ANEMIA: ICD-10-CM

## 2023-11-26 RX ORDER — FERROUS GLUCONATE 324(38)MG
1 TABLET ORAL DAILY
Qty: 30 TABLET | Refills: 2 | Status: SHIPPED | OUTPATIENT
Start: 2023-11-26

## 2023-11-29 ENCOUNTER — PATIENT OUTREACH (OUTPATIENT)
Dept: CASE MANAGEMENT | Facility: OTHER | Age: 88
End: 2023-11-29
Payer: MEDICARE

## 2023-11-29 NOTE — OUTREACH NOTE
Patient Outreach    AMBULATORY CASE MANAGEMENT NOTE    Name and Relationship of Patient/Support Person: Monica Scherer L - Self    Call placed to patient for monthly check in. She states she is doing well. She has followed up with the urologist and he has cleared her. Discussed engagement in high risk case management program and she would like this. Long enjoyable conversation. She denies any issues at this time.     Adult Patient Profile  Questions/Answers      Flowsheet Row Most Recent Value   Symptoms/Conditions Managed at Home cardiovascular, neurological, musculoskeletal   Barriers to Managing Health age   Cardiovascular Symptoms/Conditions hypertension, high blood cholesterol   Cardiovascular Management Strategies medication therapy   Musculoskeletal Symptoms/Conditions mobility limited, joint pain   Musculoskeletal Management Strategies medication therapy   Neurological Symptoms/Conditions stroke, ischemic   Neurological Management Strategies medication therapy   Equipment Currently Used at Home wheelchair, walker, rolling   Primary Source of Support/Comfort child(leona)   People in Home child(leona), adult   Current Living Arrangements home            Education Documentation  Home Safety, taught by Ftaou Crawford, RN at 11/29/2023 11:49 AM.  Learner: Patient  Readiness: Eager  Method: Explanation  Response: Needs Reinforcement    Risk Factors, taught by Fatou Crawford, RN at 11/29/2023 11:49 AM.  Learner: Patient  Readiness: Eager  Method: Explanation  Response: Needs Reinforcement          Fatou WASHINGTON  Ambulatory Case Management    11/29/2023, 11:49 EST

## 2023-12-05 ENCOUNTER — SPECIALTY PHARMACY (OUTPATIENT)
Dept: ENDOCRINOLOGY | Age: 88
End: 2023-12-05
Payer: MEDICARE

## 2024-02-12 DIAGNOSIS — I10 ESSENTIAL HYPERTENSION: ICD-10-CM

## 2024-02-12 RX ORDER — FELODIPINE 10 MG/1
10 TABLET, EXTENDED RELEASE ORAL DAILY
Qty: 90 TABLET | Refills: 1 | Status: SHIPPED | OUTPATIENT
Start: 2024-02-12

## 2024-02-19 ENCOUNTER — PATIENT OUTREACH (OUTPATIENT)
Dept: CASE MANAGEMENT | Facility: OTHER | Age: 89
End: 2024-02-19
Payer: MEDICARE

## 2024-02-19 NOTE — OUTREACH NOTE
Patient Outreach    AMBULATORY CASE MANAGEMENT NOTE    Name and Relationship of Patient/Support Person: Monica Scherer - Self    Call placed to patient answered by her son who states she is busy right now. Agree ACM will call later this week.         Fatou WASHINGTON  Ambulatory Case Management    2/19/2024, 12:52 EST

## 2024-02-22 ENCOUNTER — PATIENT OUTREACH (OUTPATIENT)
Dept: CASE MANAGEMENT | Facility: OTHER | Age: 89
End: 2024-02-22
Payer: MEDICARE

## 2024-02-22 NOTE — OUTREACH NOTE
Patient Outreach    AMBULATORY CASE MANAGEMENT NOTE    Name and Relationship of Patient/Support Person: Monica Scherer L - Self    Call placed to patient who states she is doing very well. She recently had to cancel her PCP and Cardiology appointment due to a slashed tire. Her Son was able to help her take care of this and she has rescheduled her appointments. She denies any issues. Review of fall precautions. Long enjoyable conversation.     Education Documentation  Provider Follow-Up, taught by Fatou Crawford, RN at 2/22/2024 11:23 AM.  Learner: Patient  Readiness: Eager  Method: Explanation  Response: Verbalizes Understanding    Medication Management, taught by Fatou Crawford, RN at 2/22/2024 11:23 AM.  Learner: Patient  Readiness: Eager  Method: Explanation  Response: Verbalizes Understanding    Home Safety, taught by Fatou Crawfrod, RN at 2/22/2024 11:23 AM.  Learner: Patient  Readiness: Eager  Method: Explanation  Response: Verbalizes Understanding          Fatou WASHINGTON  Ambulatory Case Management    2/22/2024, 11:23 EST

## 2024-03-12 DIAGNOSIS — I48.0 PAROXYSMAL ATRIAL FIBRILLATION: ICD-10-CM

## 2024-03-19 ENCOUNTER — APPOINTMENT (OUTPATIENT)
Dept: CT IMAGING | Facility: HOSPITAL | Age: 89
End: 2024-03-19
Payer: MEDICARE

## 2024-03-19 ENCOUNTER — HOSPITAL ENCOUNTER (EMERGENCY)
Facility: HOSPITAL | Age: 89
Discharge: HOME | End: 2024-03-19
Attending: EMERGENCY MEDICINE | Admitting: EMERGENCY MEDICINE
Payer: MEDICARE

## 2024-03-19 ENCOUNTER — APPOINTMENT (OUTPATIENT)
Dept: GENERAL RADIOLOGY | Facility: HOSPITAL | Age: 89
End: 2024-03-19
Payer: MEDICARE

## 2024-03-19 VITALS
BODY MASS INDEX: 29.73 KG/M2 | HEIGHT: 64 IN | HEART RATE: 80 BPM | OXYGEN SATURATION: 93 % | RESPIRATION RATE: 18 BRPM | DIASTOLIC BLOOD PRESSURE: 93 MMHG | SYSTOLIC BLOOD PRESSURE: 148 MMHG | WEIGHT: 174.16 LBS | TEMPERATURE: 98 F

## 2024-03-19 DIAGNOSIS — R11.2 NAUSEA AND VOMITING, UNSPECIFIED VOMITING TYPE: Primary | ICD-10-CM

## 2024-03-19 LAB
ALBUMIN SERPL-MCNC: 3.8 G/DL (ref 3.5–5.2)
ALBUMIN/GLOB SERPL: 1.5 G/DL
ALP SERPL-CCNC: 115 U/L (ref 39–117)
ALT SERPL W P-5'-P-CCNC: 10 U/L (ref 1–33)
ANION GAP SERPL CALCULATED.3IONS-SCNC: 18 MMOL/L (ref 5–15)
AST SERPL-CCNC: 10 U/L (ref 1–32)
B PARAPERT DNA SPEC QL NAA+PROBE: NOT DETECTED
B PERT DNA SPEC QL NAA+PROBE: NOT DETECTED
BACTERIA UR QL AUTO: NORMAL /HPF
BASOPHILS # BLD AUTO: 0.01 10*3/MM3 (ref 0–0.2)
BASOPHILS NFR BLD AUTO: 0.1 % (ref 0–1.5)
BILIRUB SERPL-MCNC: 0.9 MG/DL (ref 0–1.2)
BILIRUB UR QL STRIP: NEGATIVE
BUN SERPL-MCNC: 20 MG/DL (ref 8–23)
BUN/CREAT SERPL: 17.9 (ref 7–25)
C PNEUM DNA NPH QL NAA+NON-PROBE: NOT DETECTED
CALCIUM SPEC-SCNC: 8.8 MG/DL (ref 8.2–9.6)
CHLORIDE SERPL-SCNC: 104 MMOL/L (ref 98–107)
CLARITY UR: CLEAR
CO2 SERPL-SCNC: 21 MMOL/L (ref 22–29)
COLOR UR: YELLOW
CREAT SERPL-MCNC: 1.12 MG/DL (ref 0.57–1)
DEPRECATED RDW RBC AUTO: 49.7 FL (ref 37–54)
EGFRCR SERPLBLD CKD-EPI 2021: 46.8 ML/MIN/1.73
EOSINOPHIL # BLD AUTO: 0.05 10*3/MM3 (ref 0–0.4)
EOSINOPHIL NFR BLD AUTO: 0.3 % (ref 0.3–6.2)
ERYTHROCYTE [DISTWIDTH] IN BLOOD BY AUTOMATED COUNT: 14.5 % (ref 12.3–15.4)
FLUAV SUBTYP SPEC NAA+PROBE: NOT DETECTED
FLUBV RNA ISLT QL NAA+PROBE: NOT DETECTED
GEN 5 2HR TROPONIN T REFLEX: 17 NG/L
GLOBULIN UR ELPH-MCNC: 2.5 GM/DL
GLUCOSE SERPL-MCNC: 242 MG/DL (ref 65–99)
GLUCOSE UR STRIP-MCNC: NEGATIVE MG/DL
HADV DNA SPEC NAA+PROBE: NOT DETECTED
HCOV 229E RNA SPEC QL NAA+PROBE: NOT DETECTED
HCOV HKU1 RNA SPEC QL NAA+PROBE: NOT DETECTED
HCOV NL63 RNA SPEC QL NAA+PROBE: NOT DETECTED
HCOV OC43 RNA SPEC QL NAA+PROBE: NOT DETECTED
HCT VFR BLD AUTO: 39 % (ref 34–46.6)
HGB BLD-MCNC: 12.5 G/DL (ref 12–15.9)
HGB UR QL STRIP.AUTO: ABNORMAL
HMPV RNA NPH QL NAA+NON-PROBE: NOT DETECTED
HPIV1 RNA ISLT QL NAA+PROBE: NOT DETECTED
HPIV2 RNA SPEC QL NAA+PROBE: NOT DETECTED
HPIV3 RNA NPH QL NAA+PROBE: NOT DETECTED
HPIV4 P GENE NPH QL NAA+PROBE: NOT DETECTED
HYALINE CASTS UR QL AUTO: NORMAL /LPF
IMM GRANULOCYTES # BLD AUTO: 0.09 10*3/MM3 (ref 0–0.05)
IMM GRANULOCYTES NFR BLD AUTO: 0.6 % (ref 0–0.5)
KETONES UR QL STRIP: NEGATIVE
LEUKOCYTE ESTERASE UR QL STRIP.AUTO: NEGATIVE
LIPASE SERPL-CCNC: 10 U/L (ref 13–60)
LYMPHOCYTES # BLD AUTO: 0.71 10*3/MM3 (ref 0.7–3.1)
LYMPHOCYTES NFR BLD AUTO: 4.8 % (ref 19.6–45.3)
M PNEUMO IGG SER IA-ACNC: NOT DETECTED
MCH RBC QN AUTO: 30 PG (ref 26.6–33)
MCHC RBC AUTO-ENTMCNC: 32.1 G/DL (ref 31.5–35.7)
MCV RBC AUTO: 93.5 FL (ref 79–97)
MONOCYTES # BLD AUTO: 0.52 10*3/MM3 (ref 0.1–0.9)
MONOCYTES NFR BLD AUTO: 3.5 % (ref 5–12)
NEUTROPHILS NFR BLD AUTO: 13.27 10*3/MM3 (ref 1.7–7)
NEUTROPHILS NFR BLD AUTO: 90.7 % (ref 42.7–76)
NITRITE UR QL STRIP: NEGATIVE
NRBC BLD AUTO-RTO: 0 /100 WBC (ref 0–0.2)
PH UR STRIP.AUTO: 5.5 [PH] (ref 5–8)
PLATELET # BLD AUTO: 243 10*3/MM3 (ref 140–450)
PMV BLD AUTO: 9.3 FL (ref 6–12)
POTASSIUM SERPL-SCNC: 3.1 MMOL/L (ref 3.5–5.2)
PROT SERPL-MCNC: 6.3 G/DL (ref 6–8.5)
PROT UR QL STRIP: NEGATIVE
QT INTERVAL: 404 MS
QTC INTERVAL: 489 MS
RBC # BLD AUTO: 4.17 10*6/MM3 (ref 3.77–5.28)
RBC # UR STRIP: NORMAL /HPF
REF LAB TEST METHOD: NORMAL
RHINOVIRUS RNA SPEC NAA+PROBE: NOT DETECTED
RSV RNA NPH QL NAA+NON-PROBE: NOT DETECTED
SARS-COV-2 RNA NPH QL NAA+NON-PROBE: NOT DETECTED
SODIUM SERPL-SCNC: 143 MMOL/L (ref 136–145)
SP GR UR STRIP: 1.01 (ref 1–1.03)
SQUAMOUS #/AREA URNS HPF: NORMAL /HPF
TROPONIN T DELTA: -2 NG/L
TROPONIN T SERPL HS-MCNC: 19 NG/L
UROBILINOGEN UR QL STRIP: ABNORMAL
WBC # UR STRIP: NORMAL /HPF
WBC NRBC COR # BLD AUTO: 14.65 10*3/MM3 (ref 3.4–10.8)

## 2024-03-19 PROCEDURE — 71045 X-RAY EXAM CHEST 1 VIEW: CPT

## 2024-03-19 PROCEDURE — 84484 ASSAY OF TROPONIN QUANT: CPT | Performed by: PHYSICIAN ASSISTANT

## 2024-03-19 PROCEDURE — 83690 ASSAY OF LIPASE: CPT | Performed by: PHYSICIAN ASSISTANT

## 2024-03-19 PROCEDURE — 93005 ELECTROCARDIOGRAM TRACING: CPT | Performed by: PHYSICIAN ASSISTANT

## 2024-03-19 PROCEDURE — 74177 CT ABD & PELVIS W/CONTRAST: CPT

## 2024-03-19 PROCEDURE — 85025 COMPLETE CBC W/AUTO DIFF WBC: CPT | Performed by: PHYSICIAN ASSISTANT

## 2024-03-19 PROCEDURE — 36415 COLL VENOUS BLD VENIPUNCTURE: CPT

## 2024-03-19 PROCEDURE — 80053 COMPREHEN METABOLIC PANEL: CPT | Performed by: PHYSICIAN ASSISTANT

## 2024-03-19 PROCEDURE — 25510000001 IOPAMIDOL 61 % SOLUTION: Performed by: EMERGENCY MEDICINE

## 2024-03-19 PROCEDURE — 81001 URINALYSIS AUTO W/SCOPE: CPT | Performed by: PHYSICIAN ASSISTANT

## 2024-03-19 PROCEDURE — 25810000003 SODIUM CHLORIDE 0.9 % SOLUTION: Performed by: PHYSICIAN ASSISTANT

## 2024-03-19 PROCEDURE — 0202U NFCT DS 22 TRGT SARS-COV-2: CPT | Performed by: PHYSICIAN ASSISTANT

## 2024-03-19 PROCEDURE — 63710000001 ONDANSETRON ODT 4 MG TABLET DISPERSIBLE: Performed by: PHYSICIAN ASSISTANT

## 2024-03-19 PROCEDURE — 99285 EMERGENCY DEPT VISIT HI MDM: CPT

## 2024-03-19 PROCEDURE — 70450 CT HEAD/BRAIN W/O DYE: CPT

## 2024-03-19 RX ORDER — ONDANSETRON 4 MG/1
4 TABLET, ORALLY DISINTEGRATING ORAL ONCE
Status: COMPLETED | OUTPATIENT
Start: 2024-03-19 | End: 2024-03-19

## 2024-03-19 RX ORDER — ONDANSETRON 4 MG/1
4 TABLET, ORALLY DISINTEGRATING ORAL 4 TIMES DAILY PRN
Qty: 15 TABLET | Refills: 0 | Status: SHIPPED | OUTPATIENT
Start: 2024-03-19

## 2024-03-19 RX ORDER — SODIUM CHLORIDE 0.9 % (FLUSH) 0.9 %
10 SYRINGE (ML) INJECTION AS NEEDED
Status: DISCONTINUED | OUTPATIENT
Start: 2024-03-19 | End: 2024-03-19 | Stop reason: HOSPADM

## 2024-03-19 RX ORDER — POTASSIUM CHLORIDE 750 MG/1
40 TABLET, FILM COATED, EXTENDED RELEASE ORAL ONCE
Status: COMPLETED | OUTPATIENT
Start: 2024-03-19 | End: 2024-03-19

## 2024-03-19 RX ADMIN — ONDANSETRON 4 MG: 4 TABLET, ORALLY DISINTEGRATING ORAL at 14:14

## 2024-03-19 RX ADMIN — SODIUM CHLORIDE 1000 ML: 9 INJECTION, SOLUTION INTRAVENOUS at 07:37

## 2024-03-19 RX ADMIN — POTASSIUM CHLORIDE 40 MEQ: 750 TABLET, EXTENDED RELEASE ORAL at 11:45

## 2024-03-19 RX ADMIN — IOPAMIDOL 85 ML: 612 INJECTION, SOLUTION INTRAVENOUS at 11:31

## 2024-03-19 NOTE — ED PROVIDER NOTES
EMERGENCY DEPARTMENT ENCOUNTER    Room Number:  06/06  Date of encounter:  3/19/2024  PCP: Amilcar Mauricio DO  Historian: Patient  Chronic or social conditions impacting care (social determinants of health): Full code from home    HPI:  Chief Complaint: Vomiting, weakness  A complete HPI/ROS/PMH/PSH/SH/FH are unobtainable due to: Nothing    Context: Monica Scherer is a 89 y.o. female with a history of hypertension, A-fib, CVA.  She presents to the ED c/o acute vomiting, weakness, lightheadedness.  Patient reports an intermittent headache.  She denies any diarrhea, abdominal pain.  She was given Zofran en route by EMS and states that she feels much better.  At this time she denies any acute headache, chest pain, abdominal pain, fever.    Review of prior external notes (non-ED):   I reviewed primary care office visit from 6/5/2023.  Patient being followed for history of paroxysmal A-fib, hypertension, hyperlipidemia.    Review of prior external test results outside of this encounter:  I reviewed a CMP from 10/16/2023.  Creatinine 0.95, potassium 3.9    PAST MEDICAL HISTORY  Active Ambulatory Problems     Diagnosis Date Noted    Vertigo 06/14/2016    Temporary cerebral vascular dysfunction 06/14/2016    Gastroesophageal reflux disease with esophagitis 06/14/2016    Degeneration of intervertebral disc of cervical region 06/14/2016    Prediabetes 06/14/2016    S/P cholecystectomy 06/14/2016    Osteoarthritis of knee 10/31/2016    Herpes zoster without complication 11/08/2016    Hypertension 02/18/2018    Duodenal ulcer 03/13/2018    History of pancreatitis 03/22/2018    Hyperlipidemia 07/19/2019    TIA (transient ischemic attack) 01/04/2020    Cerebrovascular accident (CVA) due to thrombosis of left posterior cerebral artery 04/07/2020    Paroxysmal atrial fibrillation 04/07/2020    General weakness 09/10/2021    History of CVA (cerebrovascular accident) 09/10/2021    Anticoagulated 09/10/2021    Hematuria  "09/11/2021    Dizziness and giddiness 09/11/2021    Rectal bleeding 09/12/2021     Resolved Ambulatory Problems     Diagnosis Date Noted    Acute cholecystitis 06/07/2016    Benign essential hypertension 06/14/2016    Indigestion 10/31/2016    Acute viral bronchitis 01/23/2018    Acute biliary pancreatitis 02/18/2018     Past Medical History:   Diagnosis Date    Atrial fibrillation     Lacunar infarct, acute 01/2020    New onset atrial fibrillation 01/2020    Prolapsed uterus     Stroke     Weakness          PAST SURGICAL HISTORY  Past Surgical History:   Procedure Laterality Date    APPENDECTOMY      CHOLECYSTECTOMY N/A 6/8/2016    Procedure: CHOLECYSTECTOMY LAPAROSCOPIC;  Surgeon: Russ Gar MD;  Location:  [facility];  Service:     COLONOSCOPY N/A 9/16/2021    Procedure: COLONOSCOPY TO CECUM WITH HOT SNARE POLYPECTOMIES AND COLD BX POLYPECTOMY;  Surgeon: Emerson Izaguirre MD;  Location:  [facility];  Service: Gastroenterology;  Laterality: N/A;  pre: RECTAL BLEEDING, FAMILY HX OF COLON CANCER  post: INTERNAL AND EXTERNAL HEMORRHOIDS, DIVERTICULOSIS, POLYPS    ENDOSCOPY N/A 2/22/2018    Z-line regular, 35 cm from incisors, normal esophagus, gastritis, bilious gastric fluid, one non-bleeding duodenal ulcer w/no stigmata of bleeding, Path: DUODENUM: FRAGMENTS OF GASTRIC TYPE MUCOSA WITH HYPERPLASIA (FOVEOLAR) AND PATCHY MIXED INFLAMMATION IN THE LAMINA PROPRIA WITH FOCAL FIBROSIS, COMMENT: 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     EYE SURGERY      tre cataracts         FAMILY HISTORY  History reviewed. No pertinent family history.      SOCIAL HISTORY  Social History     Socioeconomic History    Marital status: Single   Tobacco Use    Smoking status: Never    Smokeless tobacco: Never    Tobacco comments:     caffeine use- \"rare\"   Vaping Use    Vaping status: Never Used   Substance and Sexual Activity    Alcohol use: No    Drug use: No    Sexual activity: Defer         ALLERGIES  Cephalexin and " Latex        REVIEW OF SYSTEMS  All systems reviewed and negative except for those discussed in HPI.       PHYSICAL EXAM    I have reviewed the triage vital signs and nursing notes.    ED Triage Vitals [03/19/24 0716]   Temp Heart Rate Resp BP SpO2   98 °F (36.7 °C) 94 18 (!) 150/108 97 %      Temp src Heart Rate Source Patient Position BP Location FiO2 (%)   -- Monitor Sitting Left arm --       Physical Exam  GENERAL: Alert, well-appearing, oriented, not distressed  HENT: head atraumatic, no nuchal rigidity  EYES: no scleral icterus, EOMI  CV: Irregular rhythm, regular rate, no murmur  RESPIRATORY: normal effort, CTA  ABDOMEN: soft, nontender  MUSCULOSKELETAL: no deformity, FROM, no calf swelling or tenderness  NEURO: alert, moves all extremities, follows commands  SKIN: warm, dry        LAB RESULTS  Recent Results (from the past 24 hour(s))   Comprehensive Metabolic Panel    Collection Time: 03/19/24  7:36 AM    Specimen: Blood   Result Value Ref Range    Glucose 242 (H) 65 - 99 mg/dL    BUN 20 8 - 23 mg/dL    Creatinine 1.12 (H) 0.57 - 1.00 mg/dL    Sodium 143 136 - 145 mmol/L    Potassium 3.1 (L) 3.5 - 5.2 mmol/L    Chloride 104 98 - 107 mmol/L    CO2 21.0 (L) 22.0 - 29.0 mmol/L    Calcium 8.8 8.2 - 9.6 mg/dL    Total Protein 6.3 6.0 - 8.5 g/dL    Albumin 3.8 3.5 - 5.2 g/dL    ALT (SGPT) 10 1 - 33 U/L    AST (SGOT) 10 1 - 32 U/L    Alkaline Phosphatase 115 39 - 117 U/L    Total Bilirubin 0.9 0.0 - 1.2 mg/dL    Globulin 2.5 gm/dL    A/G Ratio 1.5 g/dL    BUN/Creatinine Ratio 17.9 7.0 - 25.0    Anion Gap 18.0 (H) 5.0 - 15.0 mmol/L    eGFR 46.8 (L) >60.0 mL/min/1.73   Lipase    Collection Time: 03/19/24  7:36 AM    Specimen: Blood   Result Value Ref Range    Lipase 10 (L) 13 - 60 U/L   CBC Auto Differential    Collection Time: 03/19/24  7:36 AM    Specimen: Blood   Result Value Ref Range    WBC 14.65 (H) 3.40 - 10.80 10*3/mm3    RBC 4.17 3.77 - 5.28 10*6/mm3    Hemoglobin 12.5 12.0 - 15.9 g/dL    Hematocrit 39.0  34.0 - 46.6 %    MCV 93.5 79.0 - 97.0 fL    MCH 30.0 26.6 - 33.0 pg    MCHC 32.1 31.5 - 35.7 g/dL    RDW 14.5 12.3 - 15.4 %    RDW-SD 49.7 37.0 - 54.0 fl    MPV 9.3 6.0 - 12.0 fL    Platelets 243 140 - 450 10*3/mm3    Neutrophil % 90.7 (H) 42.7 - 76.0 %    Lymphocyte % 4.8 (L) 19.6 - 45.3 %    Monocyte % 3.5 (L) 5.0 - 12.0 %    Eosinophil % 0.3 0.3 - 6.2 %    Basophil % 0.1 0.0 - 1.5 %    Immature Grans % 0.6 (H) 0.0 - 0.5 %    Neutrophils, Absolute 13.27 (H) 1.70 - 7.00 10*3/mm3    Lymphocytes, Absolute 0.71 0.70 - 3.10 10*3/mm3    Monocytes, Absolute 0.52 0.10 - 0.90 10*3/mm3    Eosinophils, Absolute 0.05 0.00 - 0.40 10*3/mm3    Basophils, Absolute 0.01 0.00 - 0.20 10*3/mm3    Immature Grans, Absolute 0.09 (H) 0.00 - 0.05 10*3/mm3    nRBC 0.0 0.0 - 0.2 /100 WBC   High Sensitivity Troponin T    Collection Time: 03/19/24  7:36 AM    Specimen: Blood   Result Value Ref Range    HS Troponin T 19 (H) <14 ng/L   ECG 12 Lead Chest Pain    Collection Time: 03/19/24  7:37 AM   Result Value Ref Range    QT Interval 404 ms    QTC Interval 489 ms   Respiratory Panel PCR w/COVID-19(SARS-CoV-2) [facility]-House, NP Swab in [facility], 2 HR TAT - Swab, Nasopharynx    Collection Time: 03/19/24  9:06 AM    Specimen: Nasopharynx; Swab   Result Value Ref Range    ADENOVIRUS, PCR Not Detected Not Detected    Coronavirus 229E Not Detected Not Detected    Coronavirus HKU1 Not Detected Not Detected    Coronavirus NL63 Not Detected Not Detected    Coronavirus OC43 Not Detected Not Detected    COVID19 Not Detected Not Detected - Ref. Range    Human Metapneumovirus Not Detected Not Detected    Human Rhinovirus/Enterovirus Not Detected Not Detected    Influenza A PCR Not Detected Not Detected    Influenza B PCR Not Detected Not Detected    Parainfluenza Virus 1 Not Detected Not Detected    Parainfluenza Virus 2 Not Detected Not Detected    Parainfluenza Virus 3 Not Detected Not Detected    Parainfluenza Virus 4 Not Detected Not  Detected    RSV, PCR Not Detected Not Detected    Bordetella pertussis pcr Not Detected Not Detected    Bordetella parapertussis PCR Not Detected Not Detected    Chlamydophila pneumoniae PCR Not Detected Not Detected    Mycoplasma pneumo by PCR Not Detected Not Detected   High Sensitivity Troponin T 2Hr    Collection Time: 03/19/24 10:35 AM    Specimen: Arm, Right; Blood   Result Value Ref Range    HS Troponin T 17 (H) <14 ng/L    Troponin T Delta -2 >=-4 - <+4 ng/L   Urinalysis With Microscopic If Indicated (No Culture) - Urine, Clean Catch    Collection Time: 03/19/24 10:43 AM    Specimen: Urine, Clean Catch   Result Value Ref Range    Color, UA Yellow Yellow, Straw    Appearance, UA Clear Clear    pH, UA 5.5 5.0 - 8.0    Specific Gravity, UA 1.014 1.005 - 1.030    Glucose, UA Negative Negative    Ketones, UA Negative Negative    Bilirubin, UA Negative Negative    Blood, UA Small (1+) (A) Negative    Protein, UA Negative Negative    Leuk Esterase, UA Negative Negative    Nitrite, UA Negative Negative    Urobilinogen, UA 1.0 E.U./dL 0.2 - 1.0 E.U./dL   Urinalysis, Microscopic Only - Urine, Clean Catch    Collection Time: 03/19/24 10:43 AM    Specimen: Urine, Clean Catch   Result Value Ref Range    RBC, UA 0-2 None Seen, 0-2 /HPF    WBC, UA 0-2 None Seen, 0-2 /HPF    Bacteria, UA None Seen None Seen /HPF    Squamous Epithelial Cells, UA 0-2 None Seen, 0-2 /HPF    Hyaline Casts, UA 0-2 None Seen /LPF    Methodology Automated Microscopy        Ordered the above labs and independently reviewed the results.        RADIOLOGY  CT Abdomen Pelvis With Contrast    Result Date: 3/19/2024  CT ABDOMEN AND PELVIS WITH CONTRAST  HISTORY: Nausea, vomiting and weakness.  TECHNIQUE: Abdomen and pelvis CT was performed with 85 mL Isovue-300 IV contrast and correlated with CT 10/16/2023.  FINDINGS: Visualized lung bases are clear. There is a small sliding hiatal hernia, unchanged. Prior cholecystectomy; no biliary ductal dilatation.  The liver, pancreas, spleen, adrenals, and the right kidney appear normal. There are a few small cystic lesions at the left kidney which appear unchanged. No hydronephrosis or perinephric stranding.  Extensive atheromatous calcification with normal caliber abdominal aorta. Normal enhancement of the mesenteric vessels.  Rectal prolapse. Uterus is absent. Urinary bladder appears normal.  Extensive descending and sigmoid colon diverticulosis. No evidence of diverticulitis or other acute intra-abdominal inflammatory process. Small bowel appears normal.  No lymphadenopathy or free fluid.  Advanced degenerative change in the spine and at the hips. No bone lesion.      No acute abnormality is identified. There is a hiatal hernia, rectal prolapse, and colonic diverticulosis without evidence of diverticulitis. There is also advanced degenerative change in the lumbar spine and at the hips.      Radiation dose reduction techniques were utilized, including automated exposure control and exposure modulation based on body size.   This report was finalized on 3/19/2024 1:13 PM by Dr. Shukri Peck M.D on Workstation: [id number]      CT Head Without Contrast    Result Date: 3/19/2024  CT HEAD WITHOUT CONTRAST  HISTORY: Headache, on blood thinners.  COMPARISON: MRI brain 09/11/2021 and CT head 09/06/2021.  FINDINGS: Atrophy is appreciated involving predominantly the frontal and temporal lobes, similar in appearance as compared to the prior study. Areas of decreased attenuation involving the white matter of the cerebral hemispheres is appreciated bilaterally consistent with small vessel ischemic disease also unchanged. There is no evidence of hemorrhage, hydrocephalus or of a focal area of decreased attenuation to suggest acute infarction. Further evaluation could be performed with a MRI examination of the brain as indicated.  Radiation dose reduction techniques were utilized, including automated exposure control and exposure  modulation based on body size.       XR Chest 1 View    Result Date: 3/19/2024  XR CHEST 1 VW-  HISTORY: Weakness.  COMPARISON: Chest radiograph 9/10/2021  FINDINGS: A single view of the chest was obtained.  Support Devices:  None. Cardiac Silhouette/Mediastinum/Brea: The cardiac silhouette is upper normal to mildly enlarged. The aorta is tortuous, similar to prior. There is calcific aortic atherosclerosis. Lungs/Pleural Spaces:  The lungs and pleural spaces are clear. Chest Wall/Diaphragm/Upper Abdomen: There is degenerative disc disease.   CONCLUSION(S):   1.  No focal consolidation or effusion.  This report was finalized on 3/19/2024 7:56 AM by Dr. Usha Cardona M.D on Workstation: [id number]       I ordered the above noted radiological studies. Reviewed by me and discussed with radiologist.  See dictation for official radiology interpretation.      MEDICATIONS GIVEN IN ER    Medications   sodium chloride 0.9 % flush 10 mL (has no administration in time range)   sodium chloride 0.9 % bolus 1,000 mL (0 mL Intravenous Stopped 3/19/24 0905)   potassium chloride (K-DUR,KLOR-CON) ER tablet 40 mEq (40 mEq Oral Given 3/19/24 1145)   iopamidol (ISOVUE-300) 61 % injection 100 mL (85 mL Intravenous Given 3/19/24 1131)   ondansetron ODT (ZOFRAN-ODT) disintegrating tablet 4 mg (4 mg Oral Given 3/19/24 1414)         ADDITIONAL ORDERS CONSIDERED BUT NOT ORDERED:  Considered admission however patient has a fairly benign workup here.  She feels much better.  No evidence of sepsis, respiratory failure.      PROGRESS, DATA ANALYSIS, CONSULTS, AND MEDICAL DECISION MAKING    All labs have been independently interpreted by myself.  All radiology studies have been independently interpreted by myself and discussed with radiologist dictating the report.   EKG's independently interpreted by myself.  Discussion below represents my analysis of pertinent findings related to patient's condition, differential diagnosis, treatment plan and  final disposition.    I have discussed case with Dr. Alvarez, emergency room physician.  He has performed his own bedside examination and agrees with treatment plan.    ED Course as of 03/19/24 1519   Tue Mar 19, 2024   0731 Patient presents with an approximate 12-hour history of generalized weakness, nausea, vomiting.  She has had some intermittent lightheadedness and headache.  She is anticoagulated.  Stable vitals.  No overt chest pain.  Differential diagnoses include but limited to gastroenteritis, ACS, intracranial hemorrhage. [EE]   0758 WBC(!): 14.65 [EE]   0825 Chest x-ray independently interpreted myself shows no acute infiltrate. [EE]   0825 EKG independently interpreted myself.  Time 0737.  Atrial fibrillation, rate 88.  QRS normal with normal axis.  No significant ST abnormalities.  Similar to prior EKG from 9/10/2021 [EE]   1017 COVID19: Not Detected [EE]   1017 Influenza A PCR: Not Detected [EE]   1017 Influenza B PCR: Not Detected [EE]   1349 Recheck of patient.  She states she feels improved.  No obvious signs of sepsis, intracranial hemorrhage, ACS.  We will discharge. [EE]      ED Course User Index  [EE] Omar Hopkins PA       AS OF 15:19 EDT VITALS:    BP - 148/93  HR - 80  TEMP - 98 °F (36.7 °C)  O2 SATS - 93%        DIAGNOSIS  Final diagnoses:   Nausea and vomiting, unspecified vomiting type         DISPOSITION  Discharged    Admission was considered but after careful review of the patient's presentation, physical examination, diagnostic results, and response to treatment the patient may be safely discharged with outpatient follow-up.         Dictated utilizing Dragon dictation     Omar Hopkins PA  03/19/24 1401

## 2024-03-27 ENCOUNTER — PATIENT OUTREACH (OUTPATIENT)
Dept: CASE MANAGEMENT | Facility: OTHER | Age: 89
End: 2024-03-27
Payer: MEDICARE

## 2024-04-03 DIAGNOSIS — I48.0 PAROXYSMAL ATRIAL FIBRILLATION: ICD-10-CM

## 2024-04-03 RX ORDER — OMEPRAZOLE 20 MG/1
20 CAPSULE, DELAYED RELEASE ORAL DAILY
Qty: 90 CAPSULE | Refills: 0 | Status: SHIPPED | OUTPATIENT
Start: 2024-04-03

## 2024-04-29 ENCOUNTER — TELEPHONE (OUTPATIENT)
Dept: CARDIOLOGY | Facility: CLINIC | Age: 89
End: 2024-04-29

## 2024-04-29 NOTE — TELEPHONE ENCOUNTER
Caller: MUNA TRONCOSO    Relationship to patient: Emergency Contact    Best call back number: [phone]    Type of visit: FOLLOW UP     Requested date: AROUND 10:00 OR 11:00     If rescheduling, when is the original appointment: 4.29.24     Additional notes:PT'S SON CALLED IN NEEDING TO R/S APPT TODAY.NEXT HANDY IS NOT UNTIL JULY AND PT SAW APRN LAST VISIT. PLEASE ADVISE WHEN TO R/S PT AND CALL SON BACK.

## 2024-05-01 ENCOUNTER — PATIENT OUTREACH (OUTPATIENT)
Dept: CASE MANAGEMENT | Facility: OTHER | Age: 89
End: 2024-05-01
Payer: MEDICARE

## 2024-05-01 NOTE — OUTREACH NOTE
Patient Outreach    AMBULATORY CASE MANAGEMENT NOTE    Names and Relationships of Patient/Support Persons: Caller: Monica Scherer; Relationship: Self -     Call placed to patient for monthly check in. She states she is doing well. She did have to cancel her latest cardiology appointment due to what she thinks was food poisoning. She is now better and awaiting a call from the office. She has been monitoring her blood pressure. Discussed desire to increase activity. She plans on going out her porch today to enjoy the sunshine. Discussed safety measures and need to use her walker.     Education Documentation  Unresolved/Worsening Symptoms, taught by Fatou Crawford RN at 5/1/2024 10:48 AM.  Learner: Patient  Readiness: Eager  Method: Explanation  Response: Verbalizes Understanding    Self-Care, taught by Fatou Crawford RN at 5/1/2024 10:48 AM.  Learner: Patient  Readiness: Eager  Method: Explanation  Response: Verbalizes Understanding    Provider Follow-Up, taught by Fatou Crawford RN at 5/1/2024 10:48 AM.  Learner: Patient  Readiness: Eager  Method: Explanation  Response: Verbalizes Understanding    Medication Management, taught by Fatou Crawford RN at 5/1/2024 10:48 AM.  Learner: Patient  Readiness: Eager  Method: Explanation  Response: Verbalizes Understanding    Blood Pressure Monitoring, taught by Fatou Crawford RN at 5/1/2024 10:48 AM.  Learner: Patient  Readiness: Eager  Method: Explanation  Response: Verbalizes Understanding    Risk Factors, taught by Fatou Crawford RN at 5/1/2024 10:48 AM.  Learner: Patient  Readiness: Eager  Method: Explanation  Response: Verbalizes Understanding    Description, taught by Fatou Crawford RN at 5/1/2024 10:48 AM.  Learner: Patient  Readiness: Eager  Method: Explanation  Response: Verbalizes Understanding    Medication Management, taught by Fatou Crawford RN at 5/1/2024 10:48 AM.  Learner: Patient  Readiness: Eager  Method: Explanation  Response: Verbalizes  Understanding    Home Safety, taught by Fatou Crawford, RN at 5/1/2024 10:48 AM.  Learner: Patient  Readiness: Eager  Method: Explanation  Response: Verbalizes Darrick [place]  Ambulatory Case Management    5/1/2024, 10:48 EDT

## 2024-05-06 DIAGNOSIS — D64.9 NORMOCYTIC ANEMIA: ICD-10-CM

## 2024-05-06 RX ORDER — ATORVASTATIN CALCIUM 80 MG/1
80 TABLET, FILM COATED ORAL DAILY
Qty: 90 TABLET | Refills: 1 | Status: SHIPPED | OUTPATIENT
Start: 2024-05-06

## 2024-05-06 RX ORDER — FERROUS GLUCONATE 324(38)MG
1 TABLET ORAL DAILY
Qty: 30 TABLET | Refills: 2 | Status: SHIPPED | OUTPATIENT
Start: 2024-05-06

## 2024-05-24 ENCOUNTER — OFFICE VISIT (OUTPATIENT)
Dept: CARDIOLOGY | Facility: CLINIC | Age: 89
End: 2024-05-24
Payer: MEDICARE

## 2024-05-24 VITALS
WEIGHT: 158.6 LBS | SYSTOLIC BLOOD PRESSURE: 132 MMHG | HEIGHT: 64 IN | HEART RATE: 70 BPM | BODY MASS INDEX: 27.08 KG/M2 | OXYGEN SATURATION: 97 % | DIASTOLIC BLOOD PRESSURE: 82 MMHG

## 2024-05-24 DIAGNOSIS — I10 PRIMARY HYPERTENSION: ICD-10-CM

## 2024-05-24 DIAGNOSIS — I48.0 PAROXYSMAL ATRIAL FIBRILLATION: Primary | ICD-10-CM

## 2024-05-24 DIAGNOSIS — K21.00 GASTROESOPHAGEAL REFLUX DISEASE WITH ESOPHAGITIS WITHOUT HEMORRHAGE: ICD-10-CM

## 2024-05-24 DIAGNOSIS — K26.9 DUODENAL ULCER: ICD-10-CM

## 2024-05-24 DIAGNOSIS — E78.2 MIXED HYPERLIPIDEMIA: ICD-10-CM

## 2024-05-24 PROCEDURE — 93000 ELECTROCARDIOGRAM COMPLETE: CPT | Performed by: STUDENT IN AN ORGANIZED HEALTH CARE EDUCATION/TRAINING PROGRAM

## 2024-05-24 PROCEDURE — 99214 OFFICE O/P EST MOD 30 MIN: CPT | Performed by: STUDENT IN AN ORGANIZED HEALTH CARE EDUCATION/TRAINING PROGRAM

## 2024-05-24 NOTE — PROGRESS NOTES
[facility]    Subjective:     Encounter Date:05/24/24      Patient ID: Monica Scherer is a 89 y.o. female.    Chief Complaint:   Chief Complaint   Patient presents with    Longstanding persistent atrial fibrillation     Patient is in the office today for her 1 year follow up appointment.       History of Present Illness    Ms. Scherer is a pleasant 89-year-old lady past medical history persistent A-fib, prior CVA, who presents for follow-up.  She was previously followed with Dr. Canales, who saw her initially for consultation with A-fib with RVR in the setting of CVA.  She was discharged on metoprolol and Eliquis.  She was thought to have small vessel disease on top of a large vessel CVA in the past.  She had initially been on Eliquis 5 twice daily, as well as aspirin 81 daily, and then aspirin 80 once moved every other day due to bruising.    She presents today with her nephew, and her nephew's wife.  She has no new complaints today.  She does ambulate with a wheelchair.    The following portions of the patient's history were reviewed and updated as appropriate: allergies, current medications, past family history, past medical history, past social history, past surgical history and problem list.    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    New onset atrial fibrillation 01/2020    now on rate control along with Eliquis    Prolapsed uterus     per patient    Stroke     TIA (transient ischemic attack) 01/2020    Weakness        Past Surgical History:   Procedure Laterality Date    APPENDECTOMY      CHOLECYSTECTOMY N/A 6/8/2016    Procedure: CHOLECYSTECTOMY LAPAROSCOPIC;  Surgeon: Russ Gar MD;  [place] [facility] [place] [facility] [place]  Service:     COLONOSCOPY N/A 9/16/2021    Procedure: COLONOSCOPY TO CECUM WITH HOT SNARE POLYPECTOMIES AND COLD BX  "POLYPECTOMY;  Surgeon: Emerson Izaguirre MD;  Location: [facility] ENDOSCOPY;  Service: Gastroenterology;  Laterality: N/A;  pre: RECTAL BLEEDING, FAMILY HX OF COLON CANCER  post: INTERNAL AND EXTERNAL HEMORRHOIDS, DIVERTICULOSIS, POLYPS    ENDOSCOPY N/A 2/22/2018    Z-line regular, 35 cm from incisors, normal esophagus, gastritis, bilious gastric fluid, one non-bleeding duodenal ulcer w/no stigmata of bleeding, Path: DUODENUM: FRAGMENTS OF GASTRIC TYPE MUCOSA WITH HYPERPLASIA (FOVEOLAR) AND PATCHY MIXED INFLAMMATION IN THE LAMINA PROPRIA WITH FOCAL FIBROSIS, COMMENT: 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     EYE SURGERY      tre cataracts           ECG 12 Lead    Date/Time: 5/24/2024 10:06 AM  Performed by: Omar Bassett MD    Authorized by: Omar Bassett MD  Comparison: compared with previous ECG from 3/19/2024  Similar to previous ECG  Rhythm: atrial fibrillation  Rate: normal  BPM: 83  Conduction: conduction normal  QRS axis: normal  Other findings: non-specific ST-T wave changes    Clinical impression: abnormal EKG             Objective:     Vitals:    05/24/24 0921   BP: 132/82   BP Location: Right arm   Patient Position: Sitting   Pulse: 70   SpO2: 97%   Weight: 71.9 kg (158 lb 9.6 oz)   Height: 162.6 cm (64\")         Constitutional:       Appearance: Not in distress. Frail. Chronically ill-appearing.      Comments: Currently in wheelchair   Neck:      Vascular: JVD normal.   Pulmonary:      Effort: Pulmonary effort is normal.      Breath sounds: Normal breath sounds.   Cardiovascular:      PMI at left midclavicular line. Normal rate. Irregularly irregular rhythm. Normal S2.       Murmurs: There is no murmur.   Pulses:     Intact distal pulses.   Edema:     Peripheral edema absent.   Skin:     General: Skin is warm and dry.   Neurological:      General: No focal deficit present.      Mental Status: Alert, oriented to person, place, and time and oriented to person, place and time.   Psychiatric:         " Mood and Affect: Mood and affect normal.         Lab Review:     Lipid Panel          6/6/2023    09:19   Lipid Panel   Total Cholesterol 119    Triglycerides 86    HDL Cholesterol 43    VLDL Cholesterol 17    LDL Cholesterol  59      BUN   Date Value Ref Range Status   03/19/2024 20 8 - 23 mg/dL Final     Creatinine   Date Value Ref Range Status   03/19/2024 1.12 (H) 0.57 - 1.00 mg/dL Final   01/04/2020 0.90 0.60 - 1.30 mg/dL Final     Comment:     Serial Number: [id number]:  [id number]     Potassium   Date Value Ref Range Status   03/19/2024 3.1 (L) 3.5 - 5.2 mmol/L Final     ALT (SGPT)   Date Value Ref Range Status   03/19/2024 10 1 - 33 U/L Final     AST (SGOT)   Date Value Ref Range Status   03/19/2024 10 1 - 32 U/L Final     Cardiac Procedures:  Echocardiogram 1/6/2020.  LVEF 62%.  Negative bubble study.  LAE.  Mild aortic valve regurgitation.  Mild mitral valve regurgitation.  Mild tricuspid valve regurgitation.  Echocardiogram 9/11/2021.  LVEF 60%.  Mild aortic valve regurgitation.  Calculated RVSP less than 35 mmHg       Performed        Assessment:          Diagnosis Plan   1. Paroxysmal atrial fibrillation        2. Mixed hyperlipidemia        3. Primary hypertension        4. Duodenal ulcer        5. Gastroesophageal reflux disease with esophagitis without hemorrhage               Plan:         History of CVA, atrial fibrillation: Continue Eliquis 5 twice daily  She previously was on aspirin per Dr. Canales.  She is 89, she has GERD, and history of duodenal ulcer.  I reviewed her CTA head and neck obtained from 2020, and I do not see any significant cerebral atherosclerosis.  I discussed with patient and her family and I think she can stop the aspirin as I do not think it is providing much benefit here  Atrial fibrillation, persistent: Asymptomatic.  Rate controlled currently.  On metoprolol 25 twice daily.  Continue.  Continue Eliquis 5 twice daily  Hyperlipidemia: LDL currently less than 55, at  goal, again I do not see much for intracerebral atherosclerosis on her imaging, if ultimately that the Lipitor needs to be decreased to 40 from 80 I think that is probably fine but we will keep this regimen going for now.    Thank you for allowing me to participate in the care of Monica Scherer. Feel free to contact me directly with any further questions or concerns.    RTC 6 months with NP for biannual check-in given need for Eliquis.  She will see me back in 1 year unless new issues arise.    Omar Bassett MD  [facility]  05/24/24  09:41 EDT       Current Outpatient Medications:     alendronate (FOSAMAX) 70 MG tablet, TAKE 1 TABLET BY MOUTH ONCE WEEKLY ON AN EMPTY STOMACH BEFORE BREAKFAST. REMAIN UPRIGHT FOR 30 MINUTES AND TAKE WITH 8 OUNCES OF WATER, Disp: 12 tablet, Rfl: 3    apixaban (Eliquis) 5 MG tablet tablet, Take 1 tablet by mouth Every 12 (Twelve) Hours., Disp: 180 tablet, Rfl: 1    atorvastatin (LIPITOR) 80 MG tablet, Take 1 tablet by mouth Daily., Disp: 90 tablet, Rfl: 1    felodipine (PLENDIL) 10 MG 24 hr tablet, Take 1 tablet by mouth Daily., Disp: 90 tablet, Rfl: 1    ferrous gluconate (FERGON) 324 MG tablet, TAKE ONE TABLET BY MOUTH DAILY, Disp: 30 tablet, Rfl: 2    hydrocortisone 2.5 % ointment, WIPE TWO TIMES A DAY WITH PEA SIZED AMOUNT, Disp: 60 g, Rfl: 0    metoprolol tartrate (LOPRESSOR) 25 MG tablet, Take 1 tablet by mouth Every 12 (Twelve) Hours., Disp: 180 tablet, Rfl: 0    omeprazole (priLOSEC) 20 MG capsule, Take 1 capsule by mouth Daily., Disp: 90 capsule, Rfl: 0    ondansetron ODT (ZOFRAN-ODT) 4 MG disintegrating tablet, Place 1 tablet on the tongue 4 (Four) Times a Day As Needed for Nausea or Vomiting., Disp: 15 tablet, Rfl: 0    vitamin D3 125 MCG (5000 UT) capsule capsule, Take 1 capsule by mouth Daily., Disp: 90 capsule, Rfl: 3         Return in about 6 months (around 11/24/2024).      Part of this note may be an electronic transcription/translation of spoken  language to printed text using the Dragon Dictation System.

## 2024-06-06 ENCOUNTER — PATIENT OUTREACH (OUTPATIENT)
Dept: CASE MANAGEMENT | Facility: OTHER | Age: 89
End: 2024-06-06
Payer: MEDICARE

## 2024-06-06 NOTE — OUTREACH NOTE
Patient Outreach    AMBULATORY CASE MANAGEMENT NOTE    Names and Relationships of Patient/Support Persons: Caller: Monica Scherer DEEPTHI; Relationship: Self -     Call placed to patient for monthly check in. She states she has been to her cardiologist. Review of AVS with pt. No questions.   Review of her blood pressure which is now well controlled. She does have an upcoming appointment with PCP and family will take her. Discussed increased activity. She is going out and is sitting on her porch. She is able to gambulate in the house with assistance of her walker. Discussed getting up several times a day for 10 minutes at a time to walk in place or around the room.   She states getting down her front steps are sometimes difficult for both her and her daughter. Discussed looking into getting a ramp for her. Discussed suitcase ramps as a less expensive option. She states she will consider this and discuss with her family. Long enjoyable conversation.         Education Documentation  Unresolved/Worsening Symptoms, taught by Fatou Crawford RN at 6/6/2024 10:36 AM.  Learner: Patient  Readiness: Eager  Method: Explanation  Response: Verbalizes Understanding    Safety, taught by Fatou Crawford RN at 6/6/2024 10:36 AM.  Learner: Patient  Readiness: Eager  Method: Explanation  Response: Verbalizes Understanding    Medication Management, taught by Fatou Crawford RN at 6/6/2024 10:36 AM.  Learner: Patient  Readiness: Eager  Method: Explanation  Response: Verbalizes Understanding    Home Safety, taught by Fatou Crawford RN at 6/6/2024 10:36 AM.  Learner: Patient  Readiness: Eager  Method: Explanation  Response: Verbalizes Understanding    Energy Conservation, taught by Fatou Crawford RN at 6/6/2024 10:36 AM.  Learner: Patient  Readiness: Eager  Method: Explanation  Response: Verbalizes Understanding    Risk Factors, taught by Fatou Crawford RN at 6/6/2024 10:36 AM.  Learner: Patient  Readiness: Eager  Method:  Explanation  Response: Verbalizes Understanding          Fatou WASHINGTON  Ambulatory Case Management    6/6/2024, 10:36 EDT

## 2024-06-26 DIAGNOSIS — I48.0 PAROXYSMAL ATRIAL FIBRILLATION: ICD-10-CM

## 2024-07-01 RX ORDER — OMEPRAZOLE 20 MG/1
20 CAPSULE, DELAYED RELEASE ORAL DAILY
Qty: 90 CAPSULE | Refills: 0 | Status: SHIPPED | OUTPATIENT
Start: 2024-07-01

## 2024-07-12 DIAGNOSIS — E55.9 VITAMIN D DEFICIENCY: ICD-10-CM

## 2024-07-15 RX ORDER — OMEPRAZOLE 20 MG/1
20 CAPSULE, DELAYED RELEASE ORAL DAILY
Qty: 90 CAPSULE | Refills: 0 | Status: SHIPPED | OUTPATIENT
Start: 2024-07-15

## 2024-07-16 ENCOUNTER — PATIENT OUTREACH (OUTPATIENT)
Dept: CASE MANAGEMENT | Facility: OTHER | Age: 89
End: 2024-07-16
Payer: MEDICARE

## 2024-07-16 NOTE — OUTREACH NOTE
Patient Outreach    AMBULATORY CASE MANAGEMENT NOTE    Names and Relationships of Patient/Support Persons: Contact: Monica Scherer; Relationship: Self      Call placed to patient for monthly check in. She states she is doing well. She is concerned regarding her refills. Assured this has been taken care of by PCP and called to her pharmacy. Reminded of upcoming appointment with her PCP in September. Her family will be able to transport.        Education Documentation  Unresolved/Worsening Symptoms, taught by Fatou Crawford RN at 7/16/2024 11:00 AM.  Learner: Patient  Readiness: Eager  Method: Explanation  Response: Verbalizes Understanding    Safety, taught by Fatou Crawford RN at 7/16/2024 11:00 AM.  Learner: Patient  Readiness: Eager  Method: Explanation  Response: Verbalizes Understanding    Medication Management, taught by Fatou Crawford RN at 7/16/2024 11:00 AM.  Learner: Patient  Readiness: Eager  Method: Explanation  Response: Verbalizes Understanding    Home Safety, taught by Fatou Crawford RN at 7/16/2024 11:00 AM.  Learner: Patient  Readiness: Eager  Method: Explanation  Response: Verbalizes Understanding    Energy Conservation, taught by Fatou Crawford, RN at 7/16/2024 11:00 AM.  Learner: Patient  Readiness: Eager  Method: Explanation  Response: Verbalizes Understanding    Risk Factors, taught by Fatou Crawford, RN at 7/16/2024 11:00 AM.  Learner: Patient  Readiness: Eager  Method: Explanation  Response: Verbalizes Understanding          Fatou WASHINGTON  Ambulatory Case Management    7/16/2024, 11:01 EDT

## 2024-08-09 DIAGNOSIS — I10 ESSENTIAL HYPERTENSION: ICD-10-CM

## 2024-08-09 RX ORDER — FELODIPINE 10 MG/1
10 TABLET, EXTENDED RELEASE ORAL DAILY
Qty: 90 TABLET | Refills: 1 | Status: SHIPPED | OUTPATIENT
Start: 2024-08-09

## 2024-08-22 ENCOUNTER — PATIENT OUTREACH (OUTPATIENT)
Dept: CASE MANAGEMENT | Facility: OTHER | Age: 89
End: 2024-08-22
Payer: MEDICARE

## 2024-08-22 NOTE — OUTREACH NOTE
Patient Outreach    AMBULATORY CASE MANAGEMENT NOTE    Names and Relationships of Patient/Support Persons: Contact: Gilmer Monica DEEPTHI; Relationship: Self -     Call placed to patient for monthly check in. She states she is doing well. Reminded of upcoming appointment with Dr Mauricio. She states she has been sitting on the porch in the afternoon when it is cool. She would like some information regarding assistance with ramp. Discussed asking a  to call her and she would like that. Long enjoyable conversation. Referral sent.     Education Documentation  Medication Management, taught by Fatou Crawford, RN at 8/22/2024  1:40 PM.  Learner: Patient  Readiness: Eager  Method: Explanation  Response: Verbalizes Understanding    Home Safety, taught by Fatou Crawford, RN at 8/22/2024  1:40 PM.  Learner: Patient  Readiness: Eager  Method: Explanation  Response: Verbalizes Understanding    Energy Conservation, taught by Fatou Crawford, RN at 8/22/2024  1:40 PM.  Learner: Patient  Readiness: Eager  Method: Explanation  Response: Verbalizes Understanding          Fatou WASHINGTON  Ambulatory Case Management    8/22/2024, 13:41 EDT

## 2024-08-30 ENCOUNTER — PATIENT OUTREACH (OUTPATIENT)
Age: 89
End: 2024-08-30
Payer: MEDICARE

## 2024-08-30 NOTE — OUTREACH NOTE
Social Work Assessment  Questions/Answers      Flowsheet Row Most Recent Value   Referral Source physician, outpatient staff, outpatient clinic   Reason for Consult community resources, other (see comments)  [wheelchair ramp resources]   Preferred Language English   Advance Care Planning Reviewed no concerns identified   Current Living Arrangements home   Potentially Unsafe Housing Conditions none   In the past 12 months has the electric, gas, oil, or water company threatened to shut off services in your home? No   Primary Care Provided by self   Employment Status retired   Source of Income unable to assess   Application for Public Assistance pending public assistance pending number   Usual Activity Tolerance moderate   Current Activity Tolerance moderate          SDOH updated and reviewed with the patient during this program:  --     Employment: Not At Risk (8/30/2024)    Employment     Do you want help finding or keeping work or a job?: I do not need or want help      Financial Resource Strain: Low Risk  (8/30/2024)    Overall Financial Resource Strain (CARDIA)     Difficulty of Paying Living Expenses: Not very hard      --     Food Insecurity: No Food Insecurity (8/30/2024)    Hunger Vital Sign     Worried About Running Out of Food in the Last Year: Never true     Ran Out of Food in the Last Year: Never true      --     Housing Stability: Low Risk  (8/30/2024)    Housing Stability Vital Sign     Unable to Pay for Housing in the Last Year: No     Number of Times Moved in the Last Year: 1     Homeless in the Last Year: No      --     Transportation Needs: No Transportation Needs (8/30/2024)    PRAPARE - Transportation     Lack of Transportation (Medical): No     Lack of Transportation (Non-Medical): No      --     Utilities: Not At Risk (8/30/2024)    [facility] Utilities     Threatened with loss of utilities: No      Continuing Care   [facility] FOR [address]  [place] [address]    Phone: [phone]    Resource for: Housing Stability     Patient Outreach    MSW outreach to patient as requested per RN-ACM for assistance with community resource needs. Patient states she is looking for community resources to assist her with a wheelchair ramp. MSW briefly reviewed information on agencies that patient could apply to for assistance with wheelchair ramp including [facility], , and [facility]. Patient aware that she will need to contact agencies to begin application processes to see if she is eligible. Patient discussed the best way for her to receive this information would be by mail. MSW also included information on private pay agencies that could assist with building wheelchair ramp if needed. Patient to review community resource list once received and contact MSW with any additional questions.    Yamila QUINTANILLA -   Ambulatory Case Management    8/30/2024, 10:52 EDT

## 2024-09-23 ENCOUNTER — PATIENT OUTREACH (OUTPATIENT)
Dept: CASE MANAGEMENT | Facility: OTHER | Age: 89
End: 2024-09-23
Payer: MEDICARE

## 2024-09-24 DIAGNOSIS — I48.0 PAROXYSMAL ATRIAL FIBRILLATION: ICD-10-CM

## 2024-09-24 RX ORDER — METOPROLOL TARTRATE 25 MG/1
25 TABLET, FILM COATED ORAL EVERY 12 HOURS
Qty: 180 TABLET | Refills: 0 | Status: SHIPPED | OUTPATIENT
Start: 2024-09-24

## 2024-09-30 ENCOUNTER — OFFICE VISIT (OUTPATIENT)
Dept: FAMILY MEDICINE CLINIC | Facility: CLINIC | Age: 89
End: 2024-09-30
Payer: MEDICARE

## 2024-09-30 VITALS
HEIGHT: 64 IN | BODY MASS INDEX: 26.8 KG/M2 | OXYGEN SATURATION: 95 % | SYSTOLIC BLOOD PRESSURE: 130 MMHG | HEART RATE: 103 BPM | WEIGHT: 157 LBS | DIASTOLIC BLOOD PRESSURE: 70 MMHG

## 2024-09-30 DIAGNOSIS — I10 ESSENTIAL HYPERTENSION: ICD-10-CM

## 2024-09-30 DIAGNOSIS — E66.3 OVERWEIGHT (BMI 25.0-29.9): ICD-10-CM

## 2024-09-30 DIAGNOSIS — D64.9 NORMOCYTIC ANEMIA: ICD-10-CM

## 2024-09-30 DIAGNOSIS — E55.9 VITAMIN D DEFICIENCY: ICD-10-CM

## 2024-09-30 DIAGNOSIS — Z00.00 MEDICARE ANNUAL WELLNESS VISIT, SUBSEQUENT: Primary | ICD-10-CM

## 2024-09-30 DIAGNOSIS — E78.2 MIXED HYPERLIPIDEMIA: ICD-10-CM

## 2024-09-30 DIAGNOSIS — E11.9 TYPE 2 DIABETES MELLITUS WITHOUT COMPLICATION, WITHOUT LONG-TERM CURRENT USE OF INSULIN: ICD-10-CM

## 2024-09-30 DIAGNOSIS — I48.0 PAROXYSMAL ATRIAL FIBRILLATION: ICD-10-CM

## 2024-09-30 PROCEDURE — 99214 OFFICE O/P EST MOD 30 MIN: CPT | Performed by: FAMILY MEDICINE

## 2024-09-30 PROCEDURE — 1160F RVW MEDS BY RX/DR IN RCRD: CPT | Performed by: FAMILY MEDICINE

## 2024-09-30 PROCEDURE — 1159F MED LIST DOCD IN RCRD: CPT | Performed by: FAMILY MEDICINE

## 2024-09-30 PROCEDURE — G0439 PPPS, SUBSEQ VISIT: HCPCS | Performed by: FAMILY MEDICINE

## 2024-09-30 PROCEDURE — 1170F FXNL STATUS ASSESSED: CPT | Performed by: FAMILY MEDICINE

## 2024-09-30 PROCEDURE — 1126F AMNT PAIN NOTED NONE PRSNT: CPT | Performed by: FAMILY MEDICINE

## 2024-09-30 PROCEDURE — 96160 PT-FOCUSED HLTH RISK ASSMT: CPT | Performed by: FAMILY MEDICINE

## 2024-09-30 NOTE — PROGRESS NOTES
"QUICK REFERENCE INFORMATION:  The ABCs of the Annual Wellness Visit    subsequent Medicare Wellness Visit    HEALTH RISK ASSESSMENT    1933  Monica Scherer is a 89 y.o. female who presents for an subsequent Wellness Visit.    The following portions of the patient's history were reviewed and   updated as appropriate: allergies, current medications, past family history, past medical history, past social history, past surgical history, and problem list.    Compared to one year ago, the patient feels his physical   health is the same.    Compared to one year ago, the patient feels his mental   health is the same.    Recent Hospitalizations:  She was not admitted to the hospital during the last year.     Current Medical Providers:  Patient Care Team:  Amilcar Mauricio DO as PCP - General (Family Medicine)  Fatou Crawford RN as Ambulatory  ([facility])  Yamila Brooks MSW as  ([facility]) ([facility])  Omar Bassett MD as Consulting Physician (Cardiology)    I reviewed list of current Medical providers and suppliers with patient and are listed above to the best of the patient's and my knowledge.    Smoking Status:  Social History     Tobacco Use   Smoking Status Never    Passive exposure: Never   Smokeless Tobacco Never   Tobacco Comments    caffeine use- \"rare\"       Alcohol Consumption:  Social History     Substance and Sexual Activity   Alcohol Use No       Depression Screen:       2024    10:00 AM   PHQ-2/PHQ-9 Depression Screening   Little Interest or Pleasure in Doing Things 0-->not at all   Feeling Down, Depressed or Hopeless 0-->not at all   PHQ-9: Brief Depression Severity Measure Score 0       Health Habits and Functional and Cognitive Screenin/30/2024    10:00 AM   Functional & Cognitive Status   Do you have difficulty preparing food and eating? No   Do you have difficulty bathing yourself, getting dressed or grooming yourself? No   Do you " have difficulty using the toilet? No   Do you have difficulty moving around from place to place? No   Do you have trouble with steps or getting out of a bed or a chair? No   Current Diet Well Balanced Diet   Dental Exam Up to date   Eye Exam Not up to date   Exercise (times per week) 0 times per week   Current Exercises Include No Regular Exercise   Do you need help using the phone?  No   Are you deaf or do you have serious difficulty hearing?  No   Do you need help to go to places out of walking distance? Yes   Do you need help shopping? Yes   Do you need help preparing meals?  No   Do you need help with housework?  No   Do you need help with laundry? No   Do you need help taking your medications? No   Do you need help managing money? No   Do you ever drive or ride in a car without wearing a seat belt? No   Have you felt unusual stress, anger or loneliness in the last month? No   Who do you live with? Child   If you need help, do you have trouble finding someone available to you? No   Have you been bothered in the last four weeks by sexual problems? No   Do you have difficulty concentrating, remembering or making decisions? No       Does the patient have evidence of cognitive impairment? Yes    Aspirin use counseling: Does not need ASA (and currently is not on it)    Recent Lab Results:  CMP:  Lab Results   Component Value Date    BUN 20 03/19/2024    CREATININE 1.12 (H) 03/19/2024    EGFRIFNONA 50 (L) 09/24/2021    EGFRIFAFRI 57 (L) 09/24/2021    BCR 17.9 03/19/2024     03/19/2024    K 3.1 (L) 03/19/2024    CO2 21.0 (L) 03/19/2024    CALCIUM 8.8 03/19/2024    PROTENTOTREF 6.8 06/06/2023    ALBUMIN 3.8 03/19/2024    LABGLOBREF 2.7 06/06/2023    LABIL2 1.5 06/06/2023    BILITOT 0.9 03/19/2024    ALKPHOS 115 03/19/2024    AST 10 03/19/2024    ALT 10 03/19/2024     Lipid Panel:  Lab Results   Component Value Date    CHOL 167 01/06/2020    TRIG 86 06/06/2023    HDL 43 06/06/2023    VLDL 17 06/06/2023    LDLHDL  2.28 01/06/2020     HbA1c:  Lab Results   Component Value Date    HGBA1C 6.6 (H) 06/06/2023       Visual Acuity:  No results found.    Age-appropriate Screening Schedule:  Refer to the list below for future screening recommendations based on patient's age, sex and/or medical conditions. Orders for these recommended tests are listed in the plan section. The patient has been provided with a written plan.    Health Maintenance   Topic Date Due    MAMMOGRAM  06/23/2023    DXA SCAN  06/23/2023    LIPID PANEL  06/06/2024    INFLUENZA VACCINE  08/01/2024    COVID-19 Vaccine (3 - 2023-24 season) 12/20/2024 (Originally 9/1/2024)    RSV Vaccine - Adults (1 - 1-dose 60+ series) 09/30/2025 (Originally 5/4/1993)    Pneumococcal Vaccine 65+ (2 of 2 - PPSV23 or PCV20) 09/30/2025 (Originally 11/13/2019)    TDAP/TD VACCINES (1 - Tdap) 09/30/2025 (Originally 5/4/1952)    ANNUAL WELLNESS VISIT  09/30/2025    BMI FOLLOWUP  09/30/2025          Outpatient Medications Prior to Visit   Medication Sig Dispense Refill    alendronate (FOSAMAX) 70 MG tablet TAKE 1 TABLET BY MOUTH ONCE WEEKLY ON AN EMPTY STOMACH BEFORE BREAKFAST. REMAIN UPRIGHT FOR 30 MINUTES AND TAKE WITH 8 OUNCES OF WATER 12 tablet 3    apixaban (Eliquis) 5 MG tablet tablet Take 1 tablet by mouth Every 12 (Twelve) Hours. 180 tablet 1    atorvastatin (LIPITOR) 80 MG tablet Take 1 tablet by mouth Daily. 90 tablet 1    felodipine (PLENDIL) 10 MG 24 hr tablet Take 1 tablet by mouth Daily. 90 tablet 1    ferrous gluconate (FERGON) 324 MG tablet TAKE ONE TABLET BY MOUTH DAILY 30 tablet 2    hydrocortisone 2.5 % ointment WIPE TWO TIMES A DAY WITH PEA SIZED AMOUNT 60 g 0    metoprolol tartrate (LOPRESSOR) 25 MG tablet Take 1 tablet by mouth Every 12 (Twelve) Hours. 180 tablet 0    omeprazole (priLOSEC) 20 MG capsule Take 1 capsule by mouth Daily. 90 capsule 0    ondansetron ODT (ZOFRAN-ODT) 4 MG disintegrating tablet Place 1 tablet on the tongue 4 (Four) Times a Day As Needed for  Nausea or Vomiting. 15 tablet 0    vitamin D3 (D-3-5) 125 MCG (5000 UT) capsule capsule Take 1 capsule by mouth Daily. 90 capsule 3     No facility-administered medications prior to visit.       Patient Active Problem List   Diagnosis    Vertigo    Temporary cerebral vascular dysfunction    Gastroesophageal reflux disease with esophagitis    Degeneration of intervertebral disc of cervical region    Prediabetes    S/P cholecystectomy    Osteoarthritis of knee    Herpes zoster without complication    Hypertension    Duodenal ulcer    History of pancreatitis    Hyperlipidemia    TIA (transient ischemic attack)    Cerebrovascular accident (CVA) due to thrombosis of left posterior cerebral artery    Paroxysmal atrial fibrillation    General weakness    History of CVA (cerebrovascular accident)    Anticoagulated    Hematuria    Dizziness and giddiness    Rectal bleeding       Advance Care Planning:  ACP discussion was held with the patient during this visit. Patient does not have an advance directive, information provided.    Identification of Risk Factors:  Risk factors include: Fall Risk  Obesity/Overweight .    Review of Systems   Respiratory:  Negative for shortness of breath.    Cardiovascular:  Negative for chest pain, palpitations, orthopnea, syncope and PND.       Objective     Physical Exam  Vitals and nursing note reviewed.   Constitutional:       Appearance: She is well-developed.   HENT:      Head: Normocephalic and atraumatic.   Neck:      Thyroid: No thyromegaly.      Vascular: No JVD.   Cardiovascular:      Rate and Rhythm: Normal rate. Rhythm irregular.      Heart sounds: Normal heart sounds. No murmur heard.     No friction rub. No gallop.   Pulmonary:      Effort: Pulmonary effort is normal. No respiratory distress.      Breath sounds: Normal breath sounds. No wheezing or rales.   Abdominal:      General: Bowel sounds are normal. There is no distension.      Palpations: Abdomen is soft.      Tenderness:  "There is no abdominal tenderness. There is no guarding or rebound.   Musculoskeletal:      Cervical back: Neck supple.   Skin:     General: Skin is warm and dry.   Neurological:      Mental Status: She is alert.      Gait: Gait normal.   Psychiatric:         Behavior: Behavior normal.         Vitals:    09/30/24 1040   BP: 130/70   Pulse: 103   SpO2: 95%   Weight: 71.2 kg (157 lb)   Height: 162.6 cm (64\")   PainSc: 0-No pain     Body mass index is 26.95 kg/m².    BMI is >= 25 and <30. (Overweight) The following options were offered after discussion;: weight loss educational material (shared in after visit summary)      Assessment & Plan   Patient Self-Management and Personalized Health Advice  The patient has been provided with information about: diet and exercise and preventive services including:   Annual Wellness Visit (AWV).    Visit Diagnoses:    ICD-10-CM ICD-9-CM   1. Medicare annual wellness visit, subsequent  Z00.00 V70.0   2. Paroxysmal atrial fibrillation  I48.0 427.31   3. Essential hypertension  I10 401.9   4. Vitamin D deficiency  E55.9 268.9   5. Mixed hyperlipidemia  E78.2 272.2   6. Type 2 diabetes mellitus without complication, without long-term current use of insulin  E11.9 250.00   7. Normocytic anemia  D64.9 285.9   8. Overweight (BMI 25.0-29.9)  E66.3 278.02       Orders Placed This Encounter   Procedures    Microalbumin / Creatinine Urine Ratio - Urine, Clean Catch     Order Specific Question:   Release to patient     Answer:   Routine Release [[id number]]    Lipid Panel     Order Specific Question:   Release to patient     Answer:   Routine Release [[id number]]    Comprehensive Metabolic Panel     Order Specific Question:   Release to patient     Answer:   Routine Release [[id number]]    Hemoglobin A1c     Order Specific Question:   Release to patient     Answer:   Routine Release [[id number]]    CBC (No Diff)     Order Specific Question:   Release to patient     Answer:   Routine Release " [[id number]]    TSH     Order Specific Question:   Release to patient     Answer:   Routine Release [[id number]]    Vitamin D,25-Hydroxy     Order Specific Question:   Release to patient     Answer:   Routine Release [[id number]]    Vitamin B12     Order Specific Question:   Release to patient     Answer:   Routine Release [[id number]]       Outpatient Encounter Medications as of 9/30/2024   Medication Sig Dispense Refill    alendronate (FOSAMAX) 70 MG tablet TAKE 1 TABLET BY MOUTH ONCE WEEKLY ON AN EMPTY STOMACH BEFORE BREAKFAST. REMAIN UPRIGHT FOR 30 MINUTES AND TAKE WITH 8 OUNCES OF WATER 12 tablet 3    apixaban (Eliquis) 5 MG tablet tablet Take 1 tablet by mouth Every 12 (Twelve) Hours. 180 tablet 1    atorvastatin (LIPITOR) 80 MG tablet Take 1 tablet by mouth Daily. 90 tablet 1    felodipine (PLENDIL) 10 MG 24 hr tablet Take 1 tablet by mouth Daily. 90 tablet 1    ferrous gluconate (FERGON) 324 MG tablet TAKE ONE TABLET BY MOUTH DAILY 30 tablet 2    hydrocortisone 2.5 % ointment WIPE TWO TIMES A DAY WITH PEA SIZED AMOUNT 60 g 0    metoprolol tartrate (LOPRESSOR) 25 MG tablet Take 1 tablet by mouth Every 12 (Twelve) Hours. 180 tablet 0    omeprazole (priLOSEC) 20 MG capsule Take 1 capsule by mouth Daily. 90 capsule 0    ondansetron ODT (ZOFRAN-ODT) 4 MG disintegrating tablet Place 1 tablet on the tongue 4 (Four) Times a Day As Needed for Nausea or Vomiting. 15 tablet 0    vitamin D3 (D-3-5) 125 MCG (5000 UT) capsule capsule Take 1 capsule by mouth Daily. 90 capsule 3     No facility-administered encounter medications on file as of 9/30/2024.       Reviewed use of high risk medication in the elderly: yes  Reviewed for potential of harmful drug interactions in the elderly: yes  No opioid medication identified on active medication list. I have reviewed chart for other potential  high risk medication/s and harmful drug interactions in the elderly.    Social History     Socioeconomic History    Marital status: Single  "  Tobacco Use    Smoking status: Never     Passive exposure: Never    Smokeless tobacco: Never    Tobacco comments:     caffeine use- \"rare\"   Vaping Use    Vaping status: Never Used   Substance and Sexual Activity    Alcohol use: No    Drug use: No    Sexual activity: Defer     Patient was screened for OUD and DEYSI risk factors.  Monica Scherer's pain level was assessed as well today.  I included a review current recommendations on pain management, best use practices, current CDC guidelines, alternatives to opioids including OTC & Rx topicals, non-opioid oral medications (eg nsaids, acetominophen) and life style interventions such as yoga, patricia chi, stretching, regular exercise, PT/OT and referral to specialty care like Pain Management that specialize in pain treatment when appropriate.   I also included a review of serious risks of opioid use and substance use disorder.  I have included all of this in the after visit summary along with other risk factors identified that are pertinent to the patient today.      Follow Up:  Return in about 6 months (around 3/30/2025), or if symptoms worsen or fail to improve.     An After Visit Summary and PPPS with all of these plans were given to the patient.           ++++++++++++++++++++++++++++++++++++++++++++++++++++++++++++++++++   Additional E&M Note during same encounter follows:  Patient has multiple medical problems which are significant and separately identifiable that require additional work above and beyond the Medicare Wellness Visit.   Chief Complaint   Patient presents with    Medicare Wellness-subsequent    Hyperlipidemia    Hypertension    Diabetes    Atrial Fibrillation     Hyperlipidemia  This is a chronic problem. The problem is controlled. Recent lipid tests were reviewed and are normal. Exacerbating diseases include diabetes. Pertinent negatives include no chest pain or shortness of breath. Current antihyperlipidemic treatment includes statins. The current " "treatment provides moderate improvement of lipids.   Hypertension  This is a chronic problem. The problem is unchanged. The problem is controlled. Pertinent negatives include no chest pain, orthopnea, palpitations, peripheral edema, PND or shortness of breath. The current treatment provides moderate improvement.   Diabetes  She presents for her follow-up diabetic visit. She has type 2 diabetes mellitus. Her disease course has been stable. Pertinent negatives for diabetes include no chest pain.   Atrial Fibrillation  Presents for follow-up visit. Symptoms are negative for chest pain, hypertension, hypotension, palpitations, shortness of breath, syncope and tachycardia. Past medical history includes atrial fibrillation and hyperlipidemia.       Review of Systems   Cardiovascular:  Negative for chest pain, orthopnea, palpitations, paroxysmal nocturnal dyspnea and syncope.   Respiratory:  Negative for shortness of breath.        Social History     Tobacco Use    Smoking status: Never     Passive exposure: Never    Smokeless tobacco: Never    Tobacco comments:     caffeine use- \"rare\"   Substance Use Topics    Alcohol use: No     O:   Vitals:    09/30/24 1040   BP: 130/70   Pulse: 103   SpO2: 95%   Weight: 71.2 kg (157 lb)   Height: 162.6 cm (64\")   PainSc: 0-No pain     Body mass index is 26.95 kg/m².  Vitals and nursing note reviewed.   Constitutional:       Appearance: Well-developed.   HENT:      Head: Normocephalic and atraumatic.   Neck:      Thyroid: No thyromegaly.      Vascular: No JVD.   Pulmonary:      Effort: Pulmonary effort is normal. No respiratory distress.      Breath sounds: Normal breath sounds. No wheezing. No rales.   Cardiovascular:      Normal rate. Irregular rhythm. Normal heart sounds.      No gallop.  No friction rub.   Abdominal:      General: Bowel sounds are normal. There is no distension.      Palpations: Abdomen is soft.      Tenderness: There is no abdominal tenderness. There is no " guarding or rebound.   Musculoskeletal:      Cervical back: Neck supple. Skin:     General: Skin is warm and dry.   Neurological:      Mental Status: Alert.      Gait: Gait normal.   Psychiatric:         Behavior: Behavior normal.         Diagnoses and all orders for this visit:    1. Medicare annual wellness visit, subsequent (Primary)    2. Paroxysmal atrial fibrillation    3. Essential hypertension    4. Vitamin D deficiency  -     Vitamin D,25-Hydroxy    5. Mixed hyperlipidemia  -     Lipid Panel    6. Type 2 diabetes mellitus without complication, without long-term current use of insulin  -     Microalbumin / Creatinine Urine Ratio - Urine, Clean Catch  -     Lipid Panel  -     Comprehensive Metabolic Panel  -     Hemoglobin A1c  -     TSH    7. Normocytic anemia  -     Comprehensive Metabolic Panel  -     CBC (No Diff)  -     Vitamin B12    8. Overweight (BMI 25.0-29.9)    Recheck blood counts and b12;  due check vitamin D  Dm2 - has had 2 A1c 6.5% or higher so technically dm2, though diet controlled; d ue labs today  -hypertension - controlled, continue medications  -HLD - continue statin, recheck lipids.  Paf - continue lopressor and eliquis    Return in about 6 months (around 3/30/2025), or if symptoms worsen or fail to improve.

## 2024-09-30 NOTE — PATIENT INSTRUCTIONS
Advance Care Planning and Advance Directives     You make decisions on a daily basis - decisions about where you want to live, your career, your home, your life. Perhaps one of the most important decisions you face is your choice for future medical care. Take time to talk with your family and your healthcare team and start planning today.  Advance Care Planning is a process that can help you:  Understand possible future healthcare decisions in light of your own experiences  Reflect on those decision in light of your goals and values  Discuss your decisions with those closest to you and the healthcare professionals that care for you  Make a plan by creating a document that reflects your wishes    Surrogate Decision Maker  In the event of a medical emergency, which has left you unable to communicate or to make your own decisions, you would need someone to make decisions for you.  It is important to discuss your preferences for medical treatment with this person while you are in good health.     Qualities of a surrogate decision maker:  Willing to take on this role and responsibility  Knows what you want for future medical care  Willing to follow your wishes even if they don't agree with them  Able to make difficult medical decisions under stressful circumstances    Advance Directives  These are legal documents you can create that will guide your healthcare team and decision maker(s) when needed. These documents can be stored in the electronic medical record.    Living Will - a legal document to guide your care if you have a terminal condition or a serious illness and are unable to communicate. States vary by statute in document names/types, but most forms may include one or more of the following:        -  Directions regarding life-prolonging treatments        -  Directions regarding artificially provided nutrition/hydration        -  Choosing a healthcare decision maker        -  Direction regarding organ/tissue  donation    Durable Power of  for Healthcare - this document names an -in-fact to make medical decisions for you, but it may also allow this person to make personal and financial decisions for you. Please seek the advice of an  if you need this type of document.    **Advance Directives are not required and no one may discriminate against you if you do not sign one.    Medical Orders  Many states allow specific forms/orders signed by your physician to record your wishes for medical treatment in your current state of health. This form, signed in personal communication with your physician, addresses resuscitation and other medical interventions that you may or may not want.      For more information or to schedule a time with a [facility] Advance Care Planning Facilitator contact: [facility] or call [phone] and someone will contact you directly.Calorie Counting for Weight Loss  Calories are units of energy. Your body needs a certain number of calories from food to keep going throughout the day. When you eat or drink more calories than your body needs, your body stores the extra calories mostly as fat. When you eat or drink fewer calories than your body needs, your body burns fat to get the energy it needs.  Calorie counting means keeping track of how many calories you eat and drink each day. Calorie counting can be helpful if you need to lose weight. If you eat fewer calories than your body needs, you should lose weight. Ask your health care provider what a healthy weight is for you.  For calorie counting to work, you will need to eat the right number of calories each day to lose a healthy amount of weight per week. A dietitian can help you figure out how many calories you need in a day and will suggest ways to reach your calorie goal.  A healthy amount of weight to lose each week is usually 1-2 lb (0.5-0.9 kg). This usually means that your daily calorie intake should be  reduced by 500-750 calories.  Eating 1,200-1,500 calories a day can help most women lose weight.  Eating 1,500-1,800 calories a day can help most men lose weight.  What do I need to know about calorie counting?  Work with your health care provider or dietitian to determine how many calories you should get each day. To meet your daily calorie goal, you will need to:  Find out how many calories are in each food that you would like to eat. Try to do this before you eat.  Decide how much of the food you plan to eat.  Keep a food log. Do this by writing down what you ate and how many calories it had.  To successfully lose weight, it is important to balance calorie counting with a healthy lifestyle that includes regular activity.  Where do I find calorie information?  The number of calories in a food can be found on a Nutrition Facts label. If a food does not have a Nutrition Facts label, try to look up the calories online or ask your dietitian for help.  Remember that calories are listed per serving. If you choose to have more than one serving of a food, you will have to multiply the calories per serving by the number of servings you plan to eat. For example, the label on a package of bread might say that a serving size is 1 slice and that there are 90 calories in a serving. If you eat 1 slice, you will have eaten 90 calories. If you eat 2 slices, you will have eaten 180 calories.  How do I keep a food log?  After each time that you eat, record the following in your food log as soon as possible:  What you ate. Be sure to include toppings, sauces, and other extras on the food.  How much you ate. This can be measured in cups, ounces, or number of items.  How many calories were in each food and drink.  The total number of calories in the food you ate.  Keep your food log near you, such as in a pocket-sized notebook or on an joyce or website on your mobile phone. Some programs will calculate calories for you and show you how  many calories you have left to meet your daily goal.  What are some portion-control tips?  Know how many calories are in a serving. This will help you know how many servings you can have of a certain food.  Use a measuring cup to measure serving sizes. You could also try weighing out portions on a kitchen scale. With time, you will be able to estimate serving sizes for some foods.  Take time to put servings of different foods on your favorite plates or in your favorite bowls and cups so you know what a serving looks like.  Try not to eat straight from a food's packaging, such as from a bag or box. Eating straight from the package makes it hard to see how much you are eating and can lead to overeating. Put the amount you would like to eat in a cup or on a plate to make sure you are eating the right portion.  Use smaller plates, glasses, and bowls for smaller portions and to prevent overeating.  Try not to multitask. For example, avoid watching TV or using your computer while eating. If it is time to eat, sit down at a table and enjoy your food. This will help you recognize when you are full. It will also help you be more mindful of what and how much you are eating.  What are tips for following this plan?  Reading food labels  Check the calorie count compared with the serving size. The serving size may be smaller than what you are used to eating.  Check the source of the calories. Try to choose foods that are high in protein, fiber, and vitamins, and low in saturated fat, trans fat, and sodium.  Shopping  Read nutrition labels while you shop. This will help you make healthy decisions about which foods to buy.  Pay attention to nutrition labels for low-fat or fat-free foods. These foods sometimes have the same number of calories or more calories than the full-fat versions. They also often have added sugar, starch, or salt to make up for flavor that was removed with the fat.  Make a grocery list of lower-calorie foods  and stick to it.  Cooking  Try to cook your favorite foods in a healthier way. For example, try baking instead of frying.  Use low-fat dairy products.  Meal planning  Use more fruits and vegetables. One-half of your plate should be fruits and vegetables.  Include lean proteins, such as chicken, turkey, and fish.  Lifestyle  Each week, aim to do one of the followin minutes of moderate exercise, such as walking.  75 minutes of vigorous exercise, such as running.  General information  Know how many calories are in the foods you eat most often. This will help you calculate calorie counts faster.  Find a way of tracking calories that works for you. Get creative. Try different apps or programs if writing down calories does not work for you.  What foods should I eat?    Eat nutritious foods. It is better to have a nutritious, high-calorie food, such as an avocado, than a food with few nutrients, such as a bag of potato chips.  Use your calories on foods and drinks that will fill you up and will not leave you hungry soon after eating.  Examples of foods that fill you up are nuts and nut butters, vegetables, lean proteins, and high-fiber foods such as whole grains. High-fiber foods are foods with more than 5 g of fiber per serving.  Pay attention to calories in drinks. Low-calorie drinks include water and unsweetened drinks.  The items listed above may not be a complete list of foods and beverages you can eat. Contact a dietitian for more information.  What foods should I limit?  Limit foods or drinks that are not good sources of vitamins, minerals, or protein or that are high in unhealthy fats. These include:  Candy.  Other sweets.  Sodas, specialty coffee drinks, alcohol, and juice.  The items listed above may not be a complete list of foods and beverages you should avoid. Contact a dietitian for more information.  How do I count calories when eating out?  Pay attention to portions. Often, portions are much larger  when eating out. Try these tips to keep portions smaller:  Consider sharing a meal instead of getting your own.  If you get your own meal, eat only half of it. Before you start eating, ask for a container and put half of your meal into it.  When available, consider ordering smaller portions from the menu instead of full portions.  Pay attention to your food and drink choices. Knowing the way food is cooked and what is included with the meal can help you eat fewer calories.  If calories are listed on the menu, choose the lower-calorie options.  Choose dishes that include vegetables, fruits, whole grains, low-fat dairy products, and lean proteins.  Choose items that are boiled, broiled, grilled, or steamed. Avoid items that are buttered, battered, fried, or served with cream sauce. Items labeled as crispy are usually fried, unless stated otherwise.  Choose water, low-fat milk, unsweetened iced tea, or other drinks without added sugar. If you want an alcoholic beverage, choose a lower-calorie option, such as a glass of wine or light beer.  Ask for dressings, sauces, and syrups on the side. These are usually high in calories, so you should limit the amount you eat.  If you want a salad, choose a garden salad and ask for grilled meats. Avoid extra toppings such as lopez, cheese, or fried items. Ask for the dressing on the side, or ask for olive oil and vinegar or lemon to use as dressing.  Estimate how many servings of a food you are given. Knowing serving sizes will help you be aware of how much food you are eating at restaurants.  Where to find more information  Centers for Disease Control and Prevention: www.cdc.gov  [place] Department of Agriculture: myplate.gov  Summary  Calorie counting means keeping track of how many calories you eat and drink each day. If you eat fewer calories than your body needs, you should lose weight.  A healthy amount of weight to lose per week is usually 1-2 lb (0.5-0.9 kg). This usually  means reducing your daily calorie intake by 500-750 calories.  The number of calories in a food can be found on a Nutrition Facts label. If a food does not have a Nutrition Facts label, try to look up the calories online or ask your dietitian for help.  Use smaller plates, glasses, and bowls for smaller portions and to prevent overeating.  Use your calories on foods and drinks that will fill you up and not leave you hungry shortly after a meal.  This information is not intended to replace advice given to you by your health care provider. Make sure you discuss any questions you have with your health care provider.  Document Revised: 01/28/2021 Document Reviewed: 01/28/2021  [employer] Patient Education © 2022 [employer]    Exercising to Lose Weight  Getting regular exercise is important for everyone. It is especially important if you are overweight. Being overweight increases your risk of heart disease, stroke, diabetes, high blood pressure, and several types of cancer. Exercising, and reducing the calories you consume, can help you lose weight and improve fitness and health.  Exercise can be moderate or vigorous intensity. To lose weight, most people need to do a certain amount of moderate or vigorous-intensity exercise each week.  How can exercise affect me?  You lose weight when you exercise enough to burn more calories than you eat. Exercise also reduces body fat and builds muscle. The more muscle you have, the more calories you burn. Exercise also:  Improves mood.  Reduces stress and tension.  Improves your overall fitness, flexibility, and endurance.  Increases bone strength.  Moderate-intensity exercise  Moderate-intensity exercise is any activity that gets you moving enough to burn at least three times more energy (calories) than if you were sitting.  Examples of moderate exercise include:  Walking a mile in 15 minutes.  Doing light yard work.  Biking at an easy pace.  Most people should get at least 150  minutes of moderate-intensity exercise a week to maintain their body weight.  Vigorous-intensity exercise  Vigorous-intensity exercise is any activity that gets you moving enough to burn at least six times more calories than if you were sitting. When you exercise at this intensity, you should be working hard enough that you are not able to carry on a conversation.  Examples of vigorous exercise include:  Running.  Playing a team sport, such as football, basketball, and soccer.  Jumping rope.  Most people should get at least 75 minutes a week of vigorous exercise to maintain their body weight.  What actions can I take to lose weight?  The amount of exercise you need to lose weight depends on:  Your age.  The type of exercise.  Any health conditions you have.  Your overall physical ability.  Talk to your health care provider about how much exercise you need and what types of activities are safe for you.  Nutrition    Make changes to your diet as told by your health care provider or diet and nutrition specialist (dietitian). This may include:  Eating fewer calories.  Eating more protein.  Eating less unhealthy fats.  Eating a diet that includes fresh fruits and vegetables, whole grains, low-fat dairy products, and lean protein.  Avoiding foods with added fat, salt, and sugar.  Drink plenty of water while you exercise to prevent dehydration or heat stroke.  Activity  Choose an activity that you enjoy and set realistic goals. Your health care provider can help you make an exercise plan that works for you.  Exercise at a moderate or vigorous intensity most days of the week.  The intensity of exercise may vary from person to person. You can tell how intense a workout is for you by paying attention to your breathing and heartbeat. Most people will notice their breathing and heartbeat get faster with more intense exercise.  Do resistance training twice each week, such as:  Push-ups.  Sit-ups.  Lifting weights.  Using  resistance bands.  Getting short amounts of exercise can be just as helpful as long, structured periods of exercise. If you have trouble finding time to exercise, try doing these things as part of your daily routine:  Get up, stretch, and walk around every 30 minutes throughout the day.  Go for a walk during your lunch break.  Park your car farther away from your destination.  If you take public transportation, get off one stop early and walk the rest of the way.  Make phone calls while standing up and walking around.  Take the stairs instead of elevators or escalators.  Wear comfortable clothes and shoes with good support.  Do not exercise so much that you hurt yourself, feel dizzy, or get very short of breath.  Where to find more information  [place] Department of Health and Human Services: www.hhs.gov  Centers for Disease Control and Prevention: www.cdc.gov  Contact a health care provider:  Before starting a new exercise program.  If you have questions or concerns about your weight.  If you have a medical problem that keeps you from exercising.  Get help right away if:  You have any of the following while exercising:  Injury.  Dizziness.  Difficulty breathing or shortness of breath that does not go away when you stop exercising.  Chest pain.  Rapid heartbeat.  These symptoms may represent a serious problem that is an emergency. Do not wait to see if the symptoms will go away. Get medical help right away. Call your local emergency services (911 in the [place] Do not drive yourself to the hospital.  Summary  Getting regular exercise is especially important if you are overweight.  Being overweight increases your risk of heart disease, stroke, diabetes, high blood pressure, and several types of cancer.  Losing weight happens when you burn more calories than you eat.  Reducing the amount of calories you eat, and getting regular moderate or vigorous exercise each week, helps you lose weight.  This information is not  intended to replace advice given to you by your health care provider. Make sure you discuss any questions you have with your health care provider.  Document Revised: 02/13/2022 Document Reviewed: 02/13/2022  [employer] Patient Education © 2022 [employer] Below are four things you can do to prevent falls:   Begin an exercise program to improve your leg strength & balance  Ask your doctor or pharmacist to review your medicines   Get annual eye check-ups & update your eyeglasses  Make your home safer by:  Removing clutter & tripping hazards  Putting railings on all stairs & adding grab bars in the bathroom  Having good lighting, especially on stairs    Contact your local community or senior center for information on exercise, fall prevention programs, or options for improving home safety.

## 2024-10-01 LAB
25(OH)D3+25(OH)D2 SERPL-MCNC: 64.9 NG/ML (ref 30–100)
ALBUMIN SERPL-MCNC: 4.3 G/DL (ref 3.5–5.2)
ALBUMIN/CREAT UR: 52 MG/G CREAT (ref 0–29)
ALBUMIN/GLOB SERPL: 1.6 G/DL
ALP SERPL-CCNC: 112 U/L (ref 39–117)
ALT SERPL-CCNC: 9 U/L (ref 1–33)
AST SERPL-CCNC: 11 U/L (ref 1–32)
BILIRUB SERPL-MCNC: 1.1 MG/DL (ref 0–1.2)
BUN SERPL-MCNC: 27 MG/DL (ref 8–23)
BUN/CREAT SERPL: 22.3 (ref 7–25)
CALCIUM SERPL-MCNC: 9.6 MG/DL (ref 8.2–9.6)
CHLORIDE SERPL-SCNC: 102 MMOL/L (ref 98–107)
CHOLEST SERPL-MCNC: 141 MG/DL (ref 0–200)
CO2 SERPL-SCNC: 26.8 MMOL/L (ref 22–29)
CREAT SERPL-MCNC: 1.21 MG/DL (ref 0.57–1)
CREAT UR-MCNC: 349.6 MG/DL
EGFRCR SERPLBLD CKD-EPI 2021: 42.4 ML/MIN/1.73
ERYTHROCYTE [DISTWIDTH] IN BLOOD BY AUTOMATED COUNT: 14.8 % (ref 12.3–15.4)
GLOBULIN SER CALC-MCNC: 2.7 GM/DL
GLUCOSE SERPL-MCNC: 156 MG/DL (ref 65–99)
HBA1C MFR BLD: 6.4 % (ref 4.8–5.6)
HCT VFR BLD AUTO: 44.4 % (ref 34–46.6)
HDLC SERPL-MCNC: 45 MG/DL (ref 40–60)
HGB BLD-MCNC: 14.3 G/DL (ref 12–15.9)
LDLC SERPL CALC-MCNC: 76 MG/DL (ref 0–100)
MCH RBC QN AUTO: 30.4 PG (ref 26.6–33)
MCHC RBC AUTO-ENTMCNC: 32.2 G/DL (ref 31.5–35.7)
MCV RBC AUTO: 94.3 FL (ref 79–97)
MICROALBUMIN UR-MCNC: 181 UG/ML
PLATELET # BLD AUTO: 292 10*3/MM3 (ref 140–450)
POTASSIUM SERPL-SCNC: 4.2 MMOL/L (ref 3.5–5.2)
PROT SERPL-MCNC: 7 G/DL (ref 6–8.5)
RBC # BLD AUTO: 4.71 10*6/MM3 (ref 3.77–5.28)
SODIUM SERPL-SCNC: 144 MMOL/L (ref 136–145)
TRIGL SERPL-MCNC: 108 MG/DL (ref 0–150)
TSH SERPL DL<=0.005 MIU/L-ACNC: 4.46 UIU/ML (ref 0.27–4.2)
VIT B12 SERPL-MCNC: >2000 PG/ML (ref 211–946)
VLDLC SERPL CALC-MCNC: 20 MG/DL (ref 5–40)
WBC # BLD AUTO: 11.4 10*3/MM3 (ref 3.4–10.8)

## 2024-10-06 NOTE — PROGRESS NOTES
Mail copy of results to patient.  Diabetes and cholesterol are controlled  Kidney function decline noted, avoid all nsaids  Blood counts normal;  WBC mildly elevated, will recheck next ov  Thyroid borderline, will recheck again next office visit.

## 2024-10-30 ENCOUNTER — PATIENT OUTREACH (OUTPATIENT)
Dept: CASE MANAGEMENT | Facility: OTHER | Age: 89
End: 2024-10-30
Payer: MEDICARE

## 2024-10-30 NOTE — OUTREACH NOTE
AMBULATORY CASE MANAGEMENT NOTE    Names and Relationships of Patient/Support Persons: Contact: Monica Scherer; Relationship: Self -   Call placed to patient for monthly check in. She states she is doing well. Her family had changed her contact information and [facility] lost contact. She called [facility] and left her home number for [facility] to contact. She denies any issues.She states she continues to drive but always takes her walker and wheelchair in case she needs them. Discussed home safety. Her daughter lives with her. She has great family support. Denies issues at this time.     Education Documentation  Unresolved/Worsening Symptoms, taught by Fatou Crawford RN at 10/30/2024  2:57 PM.  Learner: Patient  Readiness: Eager  Method: Explanation  Response: Verbalizes Understanding    Safety, taught by Fatou Crawford RN at 10/30/2024  2:57 PM.  Learner: Patient  Readiness: Eager  Method: Explanation  Response: Verbalizes Understanding    Medication Management, taught by Fatou Crawford RN at 10/30/2024  2:57 PM.  Learner: Patient  Readiness: Eager  Method: Explanation  Response: Verbalizes Understanding    Home Safety, taught by Fatou Crawford RN at 10/30/2024  2:57 PM.  Learner: Patient  Readiness: Eager  Method: Explanation  Response: Verbalizes Understanding    Energy Conservation, taught by Fatou Crawford RN at 10/30/2024  2:57 PM.  Learner: Patient  Readiness: Eager  Method: Explanation  Response: Verbalizes Understanding    Risk Factors, taught by Fatou Crawford RN at 10/30/2024  2:57 PM.  Learner: Patient  Readiness: Eager  Method: Explanation  Response: Verbalizes Understanding          Fatou WASHINGTON  Ambulatory Case Management    10/30/2024, 14:58 EDT

## 2024-11-12 DIAGNOSIS — D64.9 NORMOCYTIC ANEMIA: ICD-10-CM

## 2024-11-12 RX ORDER — FERROUS GLUCONATE 324(38)MG
1 TABLET ORAL DAILY
Qty: 30 TABLET | Refills: 4 | Status: SHIPPED | OUTPATIENT
Start: 2024-11-12

## 2024-12-09 ENCOUNTER — OFFICE VISIT (OUTPATIENT)
Dept: CARDIOLOGY | Facility: CLINIC | Age: 89
End: 2024-12-09
Payer: MEDICARE

## 2024-12-09 VITALS
OXYGEN SATURATION: 98 % | HEART RATE: 93 BPM | BODY MASS INDEX: 25.95 KG/M2 | SYSTOLIC BLOOD PRESSURE: 140 MMHG | WEIGHT: 152 LBS | HEIGHT: 64 IN | DIASTOLIC BLOOD PRESSURE: 60 MMHG

## 2024-12-09 DIAGNOSIS — I48.0 PAROXYSMAL ATRIAL FIBRILLATION: ICD-10-CM

## 2024-12-09 PROCEDURE — 1160F RVW MEDS BY RX/DR IN RCRD: CPT | Performed by: NURSE PRACTITIONER

## 2024-12-09 PROCEDURE — 1159F MED LIST DOCD IN RCRD: CPT | Performed by: NURSE PRACTITIONER

## 2024-12-09 PROCEDURE — 99214 OFFICE O/P EST MOD 30 MIN: CPT | Performed by: NURSE PRACTITIONER

## 2024-12-09 PROCEDURE — 93000 ELECTROCARDIOGRAM COMPLETE: CPT | Performed by: NURSE PRACTITIONER

## 2024-12-09 RX ORDER — ATORVASTATIN CALCIUM 80 MG/1
80 TABLET, FILM COATED ORAL DAILY
Qty: 90 TABLET | Refills: 3 | Status: SHIPPED | OUTPATIENT
Start: 2024-12-09

## 2024-12-09 RX ORDER — METOPROLOL TARTRATE 25 MG/1
25 TABLET, FILM COATED ORAL EVERY 12 HOURS
Qty: 180 TABLET | Refills: 3 | Status: SHIPPED | OUTPATIENT
Start: 2024-12-09

## 2024-12-09 NOTE — PROGRESS NOTES
Date of Office Visit: 2024  Encounter Provider: CORTEZ Carrillo  Place of Service: [facility] CARDIOLOGY  Patient Name: Monica Scherer  :1933    Chief complaint: Afib, hypertension    HPI: Monica Scherer is a 89 y.o. female who is a patient of  Dr. Bassett and is new to me today.  She has a history of persistent A-fib, previous stroke, initially was seen for A-fib with RVR in the setting of acute stroke in the past.  Was placed on metoprolol and Eliquis.  She had been on aspirin but then it was moved to every other day due to bruising.  She was last in the office in May she has a nephew who helps take care of her and she lives with her son..  A neighbor brings her in today.    She denies any chest pain, pressure or tightness.  No significant lower extremity edema.  Her heart rates are well-controlled on her current dose.  She has had no new neurological diagnosis or stroke.  Her recent lipid panel shows an LDL of 76 and an HDL 45.    Previous testing and notes have been reviewed by me.    Latest Reference Range & Units 24 11:12   Sodium 136 - 145 mmol/L 144   Potassium 3.5 - 5.2 mmol/L 4.2   Chloride 98 - 107 mmol/L 102   CO2 22.0 - 29.0 mmol/L 26.8   BUN 8 - 23 mg/dL 27 (H)   Creatinine 0.57 - 1.00 mg/dL 1.21 (H)   BUN/Creatinine Ratio 7.0 - 25.0  22.3   EGFR Result >60.0 mL/min/1.73 42.4 (L)   Glucose 65 - 99 mg/dL 156 (H)   Calcium 8.2 - 9.6 mg/dL 9.6   Alkaline Phosphatase 39 - 117 U/L 112   Total Protein 6.0 - 8.5 g/dL 7.0   Albumin 3.5 - 5.2 g/dL 4.3   A/G Ratio g/dL 1.6   AST (SGOT) 1 - 32 U/L 11   ALT (SGPT) 1 - 33 U/L 9   Total Bilirubin 0.0 - 1.2 mg/dL 1.1   Hemoglobin A1C 4.80 - 5.60 % 6.40 (H)   TSH Baseline 0.270 - 4.200 uIU/mL 4.460 (H)   Vitamin B-12 211 - 946 pg/mL >2000 (H)   Total Cholesterol 0 - 200 mg/dL 141   HDL Cholesterol 40 - 60 mg/dL 45   LDL Cholesterol  0 - 100 mg/dL 76   Triglycerides 0 - 150 mg/dL 108   VLDL Cholesterol Yovanny 5  "- 40 mg/dL 20   (H): Data is abnormally high  (L): Data is abnormally low  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    New onset atrial fibrillation 01/2020    now on rate control along with Eliquis    Prolapsed uterus     per patient    Stroke     TIA (transient ischemic attack) 01/2020    Weakness        Past Surgical History:   Procedure Laterality Date    APPENDECTOMY      CHOLECYSTECTOMY N/A 6/8/2016    Procedure: CHOLECYSTECTOMY LAPAROSCOPIC;  Surgeon: Russ Gar MD;  Location:  [facility];  Service:     COLONOSCOPY N/A 9/16/2021    Procedure: COLONOSCOPY TO CECUM WITH HOT SNARE POLYPECTOMIES AND COLD BX POLYPECTOMY;  Surgeon: Emerson Izaguirre MD;  Location: [facility] ENDOSCOPY;  Service: Gastroenterology;  Laterality: N/A;  pre: RECTAL BLEEDING, FAMILY HX OF COLON CANCER  post: INTERNAL AND EXTERNAL HEMORRHOIDS, DIVERTICULOSIS, POLYPS    ENDOSCOPY N/A 2/22/2018    Z-line regular, 35 cm from incisors, normal esophagus, gastritis, bilious gastric fluid, one non-bleeding duodenal ulcer w/no stigmata of bleeding, Path: DUODENUM: FRAGMENTS OF GASTRIC TYPE MUCOSA WITH HYPERPLASIA (FOVEOLAR) AND PATCHY MIXED INFLAMMATION IN THE LAMINA PROPRIA WITH FOCAL FIBROSIS, COMMENT: 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     EYE SURGERY      tre cataracts       Social History     Socioeconomic History    Marital status: Single   Tobacco Use    Smoking status: Never     Passive exposure: Never    Smokeless tobacco: Never    Tobacco comments:     caffeine use- \"rare\"   Vaping Use    Vaping status: Never Used   Substance and Sexual Activity    Alcohol use: No    Drug use: No    Sexual activity: Defer       History reviewed. No pertinent family history.    Review of Systems   Constitutional: Negative for diaphoresis and malaise/fatigue.   Cardiovascular:  Negative for " "chest pain, claudication, dyspnea on exertion, irregular heartbeat, leg swelling, near-syncope, orthopnea, palpitations, paroxysmal nocturnal dyspnea and syncope.   Respiratory:  Negative for cough, shortness of breath and sleep disturbances due to breathing.    Musculoskeletal:  Negative for falls.   Neurological:  Negative for dizziness and weakness.   Psychiatric/Behavioral:  Negative for altered mental status and substance abuse.        Allergies   Allergen Reactions    Cephalexin Rash    Latex Itching         Current Outpatient Medications:     alendronate (FOSAMAX) 70 MG tablet, TAKE 1 TABLET BY MOUTH ONCE WEEKLY ON AN EMPTY STOMACH BEFORE BREAKFAST. REMAIN UPRIGHT FOR 30 MINUTES AND TAKE WITH 8 OUNCES OF WATER, Disp: 12 tablet, Rfl: 3    apixaban (Eliquis) 5 MG tablet tablet, Take 1 tablet by mouth Every 12 (Twelve) Hours., Disp: 180 tablet, Rfl: 1    atorvastatin (LIPITOR) 80 MG tablet, Take 1 tablet by mouth Daily., Disp: 90 tablet, Rfl: 1    felodipine (PLENDIL) 10 MG 24 hr tablet, Take 1 tablet by mouth Daily., Disp: 90 tablet, Rfl: 1    ferrous gluconate (FERGON) 324 MG tablet, Take 1 tablet by mouth Daily., Disp: 30 tablet, Rfl: 4    hydrocortisone 2.5 % ointment, WIPE TWO TIMES A DAY WITH PEA SIZED AMOUNT, Disp: 60 g, Rfl: 0    metoprolol tartrate (LOPRESSOR) 25 MG tablet, Take 1 tablet by mouth Every 12 (Twelve) Hours., Disp: 180 tablet, Rfl: 0    omeprazole (priLOSEC) 20 MG capsule, Take 1 capsule by mouth Daily., Disp: 90 capsule, Rfl: 0    ondansetron ODT (ZOFRAN-ODT) 4 MG disintegrating tablet, Place 1 tablet on the tongue 4 (Four) Times a Day As Needed for Nausea or Vomiting., Disp: 15 tablet, Rfl: 0    vitamin D3 (D-3-5) 125 MCG (5000 UT) capsule capsule, Take 1 capsule by mouth Daily., Disp: 90 capsule, Rfl: 3        Objective:     Vitals:    12/09/24 1121   BP: 140/60   BP Location: Left arm   Patient Position: Sitting   Pulse: 93   SpO2: 98%   Weight: 68.9 kg (152 lb)   Height: 162.6 cm (64\") "     Body mass index is 26.09 kg/m².    PHYSICAL EXAM:    Constitutional:       General: Not in acute distress.     Appearance: Normal appearance. Well-developed.   Eyes:      Pupils: Pupils are equal, round, and reactive to light.   HENT:      Head: Normocephalic.   Neck:      Vascular: No carotid bruit or JVD.   Pulmonary:      Effort: Pulmonary effort is normal. No tachypnea.      Breath sounds: Normal breath sounds. No wheezing. No rales.   Cardiovascular:      Normal rate. Irregularly irregular rhythm.      No gallop.    Pulses:     Intact distal pulses.   Edema:     Peripheral edema absent.   Abdominal:      General: Bowel sounds are normal.      Palpations: Abdomen is soft.      Tenderness: There is no abdominal tenderness.   Musculoskeletal: Normal range of motion.      Cervical back: Normal range of motion and neck supple. No edema. Skin:     General: Skin is warm and dry.   Neurological:      Mental Status: Alert and oriented to person, place, and time.           ECG 12 Lead    Date/Time: 12/9/2024 12:09 PM  Performed by: Joycelyn Shafer APRN    Authorized by: Joycelyn Shafer APRN  Comparison: compared with previous ECG   Similar to previous ECG  Rhythm: atrial fibrillation  Rate: normal  QRS axis: normal  Other findings: non-specific ST-T wave changes    Clinical impression: abnormal EKG        Lipid Panel          9/30/2024    11:12   Lipid Panel   Total Cholesterol 141    Triglycerides 108    HDL Cholesterol 45    VLDL Cholesterol 20    LDL Cholesterol  76          Assessment/Plan:        Persistent Afib- rate controlled. Continue metoprolol to 25mg BID and Eliquis 5mg  BID. No issues with bleeding.    2. History of CVA- Continue with Eliquis 5mg BID. CTA of head in 2020 stable. No significant cerebral atherosclerosis. Off asa therapy.    3. Dyslipidemia- Lipids at goal as noted above. Continue with atorvastatin 80mg daily       Your medication list            Accurate as of December 9, 2024 11:38  AM. If you have any questions, ask your nurse or doctor.                CONTINUE taking these medications        Instructions Last Dose Given Next Dose Due   alendronate 70 MG tablet  Commonly known as: FOSAMAX      TAKE 1 TABLET BY MOUTH ONCE WEEKLY ON AN EMPTY STOMACH BEFORE BREAKFAST. REMAIN UPRIGHT FOR 30 MINUTES AND TAKE WITH 8 OUNCES OF WATER       atorvastatin 80 MG tablet  Commonly known as: LIPITOR      Take 1 tablet by mouth Daily.       Eliquis 5 MG tablet tablet  Generic drug: apixaban      Take 1 tablet by mouth Every 12 (Twelve) Hours.       felodipine 10 MG 24 hr tablet  Commonly known as: PLENDIL      Take 1 tablet by mouth Daily.       ferrous gluconate 324 MG tablet  Commonly known as: FERGON      Take 1 tablet by mouth Daily.       hydrocortisone 2.5 % ointment      WIPE TWO TIMES A DAY WITH PEA SIZED AMOUNT       metoprolol tartrate 25 MG tablet  Commonly known as: LOPRESSOR      Take 1 tablet by mouth Every 12 (Twelve) Hours.       omeprazole 20 MG capsule  Commonly known as: priLOSEC      Take 1 capsule by mouth Daily.       ondansetron ODT 4 MG disintegrating tablet  Commonly known as: ZOFRAN-ODT      Place 1 tablet on the tongue 4 (Four) Times a Day As Needed for Nausea or Vomiting.       vitamin D3 125 MCG (5000 UT) capsule capsule  Commonly known as: D-3-5      Take 1 capsule by mouth Daily.                  As always, it has been a pleasure to participate in your patient's care.      Sincerely,     Joycelyn TORO

## 2024-12-11 ENCOUNTER — PATIENT OUTREACH (OUTPATIENT)
Dept: CASE MANAGEMENT | Facility: OTHER | Age: 89
End: 2024-12-11
Payer: MEDICARE

## 2024-12-11 NOTE — OUTREACH NOTE
AMBULATORY CASE MANAGEMENT NOTE    Names and Relationships of Patient/Support Persons: Contact: Monica Scherer; Relationship: Self -     Patient Outreach    Call placed to patient for monthly check. Recently saw cardiology. Review of AVS. Denies questions at this time. Reminded of need for her Flu shot.     Education Documentation  Unresolved/Worsening Symptoms, taught by Fatou Crawford, RN at 12/11/2024 10:53 AM.  Learner: Patient  Readiness: Eager  Method: Explanation  Response: Verbalizes Understanding    Provider Follow-Up, taught by Fatou Crawford, RN at 12/11/2024 10:53 AM.  Learner: Patient  Readiness: Eager  Method: Explanation  Response: Verbalizes Understanding    Medication Management, taught by Fatou Crawford, RN at 12/11/2024 10:53 AM.  Learner: Patient  Readiness: Eager  Method: Explanation  Response: Verbalizes Understanding    Blood Pressure Monitoring, taught by Fatou Crawford, RN at 12/11/2024 10:53 AM.  Learner: Patient  Readiness: Eager  Method: Explanation  Response: Verbalizes Understanding          Fatou WASHINGTON  Ambulatory Case Management    12/11/2024, 10:54 EST

## 2025-01-01 ENCOUNTER — HOSPITAL ENCOUNTER (INPATIENT)
Facility: HOSPITAL | Age: OVER 89
LOS: 7 days | End: 2025-07-14
Attending: STUDENT IN AN ORGANIZED HEALTH CARE EDUCATION/TRAINING PROGRAM | Admitting: STUDENT IN AN ORGANIZED HEALTH CARE EDUCATION/TRAINING PROGRAM
Payer: COMMERCIAL

## 2025-01-01 ENCOUNTER — HOSPITAL ENCOUNTER (INPATIENT)
Facility: HOSPITAL | Age: OVER 89
LOS: 11 days | Discharge: HOSPICE | End: 2025-07-07
Attending: EMERGENCY MEDICINE | Admitting: STUDENT IN AN ORGANIZED HEALTH CARE EDUCATION/TRAINING PROGRAM
Payer: MEDICARE

## 2025-01-01 ENCOUNTER — APPOINTMENT (OUTPATIENT)
Dept: CT IMAGING | Facility: HOSPITAL | Age: OVER 89
End: 2025-01-01
Payer: MEDICARE

## 2025-01-01 ENCOUNTER — PATIENT OUTREACH (OUTPATIENT)
Dept: CASE MANAGEMENT | Facility: OTHER | Age: OVER 89
End: 2025-01-01
Payer: MEDICARE

## 2025-01-01 VITALS
BODY MASS INDEX: 25.64 KG/M2 | WEIGHT: 150.2 LBS | SYSTOLIC BLOOD PRESSURE: 141 MMHG | OXYGEN SATURATION: 97 % | TEMPERATURE: 97.3 F | HEART RATE: 93 BPM | HEIGHT: 64 IN | DIASTOLIC BLOOD PRESSURE: 72 MMHG | RESPIRATION RATE: 16 BRPM

## 2025-01-01 VITALS
OXYGEN SATURATION: 84 % | DIASTOLIC BLOOD PRESSURE: 45 MMHG | RESPIRATION RATE: 12 BRPM | SYSTOLIC BLOOD PRESSURE: 80 MMHG | HEART RATE: 141 BPM | TEMPERATURE: 96.8 F

## 2025-01-01 DIAGNOSIS — K62.5 RECTAL BLEEDING: Primary | ICD-10-CM

## 2025-01-01 DIAGNOSIS — K52.9 COLITIS: ICD-10-CM

## 2025-01-01 LAB
ABO GROUP BLD: NORMAL
ADV 40+41 DNA STL QL NAA+NON-PROBE: NOT DETECTED
ALBUMIN SERPL-MCNC: 2.9 G/DL (ref 3.5–5.2)
ALBUMIN SERPL-MCNC: 3.2 G/DL (ref 3.5–5.2)
ALBUMIN/GLOB SERPL: 1 G/DL
ALBUMIN/GLOB SERPL: 1.1 G/DL
ALP SERPL-CCNC: 70 U/L (ref 39–117)
ALP SERPL-CCNC: 71 U/L (ref 39–117)
ALT SERPL W P-5'-P-CCNC: 7 U/L (ref 1–33)
ALT SERPL W P-5'-P-CCNC: 7 U/L (ref 1–33)
AMMONIA BLD-SCNC: 13 UMOL/L (ref 11–51)
ANION GAP SERPL CALCULATED.3IONS-SCNC: 10.3 MMOL/L (ref 5–15)
ANION GAP SERPL CALCULATED.3IONS-SCNC: 10.3 MMOL/L (ref 5–15)
ANION GAP SERPL CALCULATED.3IONS-SCNC: 11.9 MMOL/L (ref 5–15)
ANION GAP SERPL CALCULATED.3IONS-SCNC: 8.4 MMOL/L (ref 5–15)
ANION GAP SERPL CALCULATED.3IONS-SCNC: 8.4 MMOL/L (ref 5–15)
ANION GAP SERPL CALCULATED.3IONS-SCNC: 9.1 MMOL/L (ref 5–15)
APTT PPP: 50.7 SECONDS (ref 22.7–35.4)
AST SERPL-CCNC: 12 U/L (ref 1–32)
AST SERPL-CCNC: 14 U/L (ref 1–32)
ASTRO TYP 1-8 RNA STL QL NAA+NON-PROBE: NOT DETECTED
BACTERIA SPEC AEROBE CULT: ABNORMAL
BACTERIA SPEC AEROBE CULT: NORMAL
BASOPHILS # BLD AUTO: 0.03 10*3/MM3 (ref 0–0.2)
BASOPHILS # BLD AUTO: 0.03 10*3/MM3 (ref 0–0.2)
BASOPHILS # BLD AUTO: 0.04 10*3/MM3 (ref 0–0.2)
BASOPHILS # BLD AUTO: 0.04 10*3/MM3 (ref 0–0.2)
BASOPHILS # BLD AUTO: 0.05 10*3/MM3 (ref 0–0.2)
BASOPHILS # BLD AUTO: 0.06 10*3/MM3 (ref 0–0.2)
BASOPHILS NFR BLD AUTO: 0.3 % (ref 0–1.5)
BASOPHILS NFR BLD AUTO: 0.4 % (ref 0–1.5)
BASOPHILS NFR BLD AUTO: 0.4 % (ref 0–1.5)
BASOPHILS NFR BLD AUTO: 0.6 % (ref 0–1.5)
BASOPHILS NFR BLD AUTO: 0.6 % (ref 0–1.5)
BASOPHILS NFR BLD AUTO: 0.7 % (ref 0–1.5)
BILIRUB SERPL-MCNC: 0.5 MG/DL (ref 0–1.2)
BILIRUB SERPL-MCNC: 0.7 MG/DL (ref 0–1.2)
BILIRUB UR QL STRIP: NEGATIVE
BLD GP AB SCN SERPL QL: NEGATIVE
BUN SERPL-MCNC: 11 MG/DL (ref 8–23)
BUN SERPL-MCNC: 14 MG/DL (ref 8–23)
BUN SERPL-MCNC: 15 MG/DL (ref 8–23)
BUN SERPL-MCNC: 21 MG/DL (ref 8–23)
BUN SERPL-MCNC: 29 MG/DL (ref 8–23)
BUN SERPL-MCNC: 8 MG/DL (ref 8–23)
BUN/CREAT SERPL: 10.3 (ref 7–25)
BUN/CREAT SERPL: 12.2 (ref 7–25)
BUN/CREAT SERPL: 14.6 (ref 7–25)
BUN/CREAT SERPL: 15 (ref 7–25)
BUN/CREAT SERPL: 19.6 (ref 7–25)
BUN/CREAT SERPL: 24.6 (ref 7–25)
C CAYETANENSIS DNA STL QL NAA+NON-PROBE: NOT DETECTED
C COLI+JEJ+UPSA DNA STL QL NAA+NON-PROBE: NOT DETECTED
C DIFF GDH + TOXINS A+B STL QL IA.RAPID: NEGATIVE
C DIFF TOX GENS STL QL NAA+PROBE: POSITIVE
CALCIUM SPEC-SCNC: 7.5 MG/DL (ref 8.2–9.6)
CALCIUM SPEC-SCNC: 8 MG/DL (ref 8.2–9.6)
CALCIUM SPEC-SCNC: 8.1 MG/DL (ref 8.2–9.6)
CALCIUM SPEC-SCNC: 8.4 MG/DL (ref 8.2–9.6)
CALCIUM SPEC-SCNC: 8.6 MG/DL (ref 8.2–9.6)
CALCIUM SPEC-SCNC: 8.9 MG/DL (ref 8.2–9.6)
CHLORIDE SERPL-SCNC: 108 MMOL/L (ref 98–107)
CHLORIDE SERPL-SCNC: 110 MMOL/L (ref 98–107)
CHLORIDE SERPL-SCNC: 111 MMOL/L (ref 98–107)
CHLORIDE SERPL-SCNC: 111 MMOL/L (ref 98–107)
CHLORIDE SERPL-SCNC: 112 MMOL/L (ref 98–107)
CHLORIDE SERPL-SCNC: 114 MMOL/L (ref 98–107)
CLARITY UR: CLEAR
CO2 SERPL-SCNC: 20.9 MMOL/L (ref 22–29)
CO2 SERPL-SCNC: 22.1 MMOL/L (ref 22–29)
CO2 SERPL-SCNC: 23.6 MMOL/L (ref 22–29)
CO2 SERPL-SCNC: 23.7 MMOL/L (ref 22–29)
CO2 SERPL-SCNC: 24.6 MMOL/L (ref 22–29)
CO2 SERPL-SCNC: 24.7 MMOL/L (ref 22–29)
COLOR UR: YELLOW
CREAT SERPL-MCNC: 0.78 MG/DL (ref 0.57–1)
CREAT SERPL-MCNC: 0.9 MG/DL (ref 0.57–1)
CREAT SERPL-MCNC: 0.96 MG/DL (ref 0.57–1)
CREAT SERPL-MCNC: 1 MG/DL (ref 0.57–1)
CREAT SERPL-MCNC: 1.07 MG/DL (ref 0.57–1)
CREAT SERPL-MCNC: 1.18 MG/DL (ref 0.57–1)
CRYPTOSP DNA STL QL NAA+NON-PROBE: NOT DETECTED
DEPRECATED RDW RBC AUTO: 52.9 FL (ref 37–54)
DEPRECATED RDW RBC AUTO: 53.1 FL (ref 37–54)
DEPRECATED RDW RBC AUTO: 53.2 FL (ref 37–54)
DEPRECATED RDW RBC AUTO: 53.6 FL (ref 37–54)
DEPRECATED RDW RBC AUTO: 54.1 FL (ref 37–54)
DEPRECATED RDW RBC AUTO: 55.1 FL (ref 37–54)
E HISTOLYT DNA STL QL NAA+NON-PROBE: NOT DETECTED
EAEC PAA PLAS AGGR+AATA ST NAA+NON-PRB: NOT DETECTED
EC STX1+STX2 GENES STL QL NAA+NON-PROBE: NOT DETECTED
EGFRCR SERPLBLD CKD-EPI 2021: 43.4 ML/MIN/1.73
EGFRCR SERPLBLD CKD-EPI 2021: 48.8 ML/MIN/1.73
EGFRCR SERPLBLD CKD-EPI 2021: 53 ML/MIN/1.73
EGFRCR SERPLBLD CKD-EPI 2021: 55.6 ML/MIN/1.73
EGFRCR SERPLBLD CKD-EPI 2021: 60.1 ML/MIN/1.73
EGFRCR SERPLBLD CKD-EPI 2021: 71.4 ML/MIN/1.73
EOSINOPHIL # BLD AUTO: 0.16 10*3/MM3 (ref 0–0.4)
EOSINOPHIL # BLD AUTO: 0.24 10*3/MM3 (ref 0–0.4)
EOSINOPHIL # BLD AUTO: 0.24 10*3/MM3 (ref 0–0.4)
EOSINOPHIL # BLD AUTO: 0.25 10*3/MM3 (ref 0–0.4)
EOSINOPHIL # BLD AUTO: 0.27 10*3/MM3 (ref 0–0.4)
EOSINOPHIL # BLD AUTO: 0.3 10*3/MM3 (ref 0–0.4)
EOSINOPHIL NFR BLD AUTO: 1.8 % (ref 0.3–6.2)
EOSINOPHIL NFR BLD AUTO: 2.5 % (ref 0.3–6.2)
EOSINOPHIL NFR BLD AUTO: 3.1 % (ref 0.3–6.2)
EOSINOPHIL NFR BLD AUTO: 3.4 % (ref 0.3–6.2)
EOSINOPHIL NFR BLD AUTO: 3.8 % (ref 0.3–6.2)
EOSINOPHIL NFR BLD AUTO: 3.9 % (ref 0.3–6.2)
EPEC EAE GENE STL QL NAA+NON-PROBE: DETECTED
ERYTHROCYTE [DISTWIDTH] IN BLOOD BY AUTOMATED COUNT: 15.4 % (ref 12.3–15.4)
ERYTHROCYTE [DISTWIDTH] IN BLOOD BY AUTOMATED COUNT: 15.5 % (ref 12.3–15.4)
ERYTHROCYTE [DISTWIDTH] IN BLOOD BY AUTOMATED COUNT: 15.7 % (ref 12.3–15.4)
ERYTHROCYTE [DISTWIDTH] IN BLOOD BY AUTOMATED COUNT: 15.8 % (ref 12.3–15.4)
ETEC LTA+ST1A+ST1B TOX ST NAA+NON-PROBE: NOT DETECTED
FOLATE SERPL-MCNC: 8.64 NG/ML (ref 4.78–24.2)
G LAMBLIA DNA STL QL NAA+NON-PROBE: NOT DETECTED
GLOBULIN UR ELPH-MCNC: 2.9 GM/DL
GLOBULIN UR ELPH-MCNC: 2.9 GM/DL
GLUCOSE BLDC GLUCOMTR-MCNC: 107 MG/DL (ref 70–130)
GLUCOSE BLDC GLUCOMTR-MCNC: 114 MG/DL (ref 70–130)
GLUCOSE BLDC GLUCOMTR-MCNC: 116 MG/DL (ref 70–130)
GLUCOSE BLDC GLUCOMTR-MCNC: 119 MG/DL (ref 70–130)
GLUCOSE BLDC GLUCOMTR-MCNC: 119 MG/DL (ref 70–130)
GLUCOSE BLDC GLUCOMTR-MCNC: 126 MG/DL (ref 70–130)
GLUCOSE BLDC GLUCOMTR-MCNC: 130 MG/DL (ref 70–130)
GLUCOSE BLDC GLUCOMTR-MCNC: 160 MG/DL (ref 70–130)
GLUCOSE BLDC GLUCOMTR-MCNC: 170 MG/DL (ref 70–130)
GLUCOSE BLDC GLUCOMTR-MCNC: 170 MG/DL (ref 70–130)
GLUCOSE BLDC GLUCOMTR-MCNC: 196 MG/DL (ref 70–130)
GLUCOSE BLDC GLUCOMTR-MCNC: 67 MG/DL (ref 70–130)
GLUCOSE BLDC GLUCOMTR-MCNC: 72 MG/DL (ref 70–130)
GLUCOSE BLDC GLUCOMTR-MCNC: 73 MG/DL (ref 70–130)
GLUCOSE BLDC GLUCOMTR-MCNC: 75 MG/DL (ref 70–130)
GLUCOSE BLDC GLUCOMTR-MCNC: 78 MG/DL (ref 70–130)
GLUCOSE BLDC GLUCOMTR-MCNC: 84 MG/DL (ref 70–130)
GLUCOSE BLDC GLUCOMTR-MCNC: 88 MG/DL (ref 70–130)
GLUCOSE BLDC GLUCOMTR-MCNC: 90 MG/DL (ref 70–130)
GLUCOSE BLDC GLUCOMTR-MCNC: 94 MG/DL (ref 70–130)
GLUCOSE BLDC GLUCOMTR-MCNC: 99 MG/DL (ref 70–130)
GLUCOSE SERPL-MCNC: 119 MG/DL (ref 65–99)
GLUCOSE SERPL-MCNC: 138 MG/DL (ref 65–99)
GLUCOSE SERPL-MCNC: 151 MG/DL (ref 65–99)
GLUCOSE SERPL-MCNC: 71 MG/DL (ref 65–99)
GLUCOSE SERPL-MCNC: 74 MG/DL (ref 65–99)
GLUCOSE SERPL-MCNC: 99 MG/DL (ref 65–99)
GLUCOSE UR STRIP-MCNC: NEGATIVE MG/DL
GRAM STN SPEC: ABNORMAL
HCT VFR BLD AUTO: 26.2 % (ref 34–46.6)
HCT VFR BLD AUTO: 26.3 % (ref 34–46.6)
HCT VFR BLD AUTO: 27.7 % (ref 34–46.6)
HCT VFR BLD AUTO: 28.6 % (ref 34–46.6)
HCT VFR BLD AUTO: 28.7 % (ref 34–46.6)
HCT VFR BLD AUTO: 31.9 % (ref 34–46.6)
HGB BLD-MCNC: 10.2 G/DL (ref 12–15.9)
HGB BLD-MCNC: 8.3 G/DL (ref 12–15.9)
HGB BLD-MCNC: 8.3 G/DL (ref 12–15.9)
HGB BLD-MCNC: 9 G/DL (ref 12–15.9)
HGB BLD-MCNC: 9.3 G/DL (ref 12–15.9)
HGB BLD-MCNC: 9.3 G/DL (ref 12–15.9)
HGB UR QL STRIP.AUTO: NEGATIVE
IMM GRANULOCYTES # BLD AUTO: 0.05 10*3/MM3 (ref 0–0.05)
IMM GRANULOCYTES # BLD AUTO: 0.07 10*3/MM3 (ref 0–0.05)
IMM GRANULOCYTES # BLD AUTO: 0.07 10*3/MM3 (ref 0–0.05)
IMM GRANULOCYTES # BLD AUTO: 0.08 10*3/MM3 (ref 0–0.05)
IMM GRANULOCYTES # BLD AUTO: 0.08 10*3/MM3 (ref 0–0.05)
IMM GRANULOCYTES # BLD AUTO: 0.15 10*3/MM3 (ref 0–0.05)
IMM GRANULOCYTES NFR BLD AUTO: 0.5 % (ref 0–0.5)
IMM GRANULOCYTES NFR BLD AUTO: 0.9 % (ref 0–0.5)
IMM GRANULOCYTES NFR BLD AUTO: 1 % (ref 0–0.5)
IMM GRANULOCYTES NFR BLD AUTO: 1 % (ref 0–0.5)
IMM GRANULOCYTES NFR BLD AUTO: 1.2 % (ref 0–0.5)
IMM GRANULOCYTES NFR BLD AUTO: 1.5 % (ref 0–0.5)
INR PPP: 1.82 (ref 0.9–1.1)
ISOLATED FROM: ABNORMAL
KETONES UR QL STRIP: NEGATIVE
LEUKOCYTE ESTERASE UR QL STRIP.AUTO: NEGATIVE
LYMPHOCYTES # BLD AUTO: 1.4 10*3/MM3 (ref 0.7–3.1)
LYMPHOCYTES # BLD AUTO: 1.64 10*3/MM3 (ref 0.7–3.1)
LYMPHOCYTES # BLD AUTO: 1.66 10*3/MM3 (ref 0.7–3.1)
LYMPHOCYTES # BLD AUTO: 1.83 10*3/MM3 (ref 0.7–3.1)
LYMPHOCYTES # BLD AUTO: 1.98 10*3/MM3 (ref 0.7–3.1)
LYMPHOCYTES # BLD AUTO: 3.67 10*3/MM3 (ref 0.7–3.1)
LYMPHOCYTES NFR BLD AUTO: 14.8 % (ref 19.6–45.3)
LYMPHOCYTES NFR BLD AUTO: 18.4 % (ref 19.6–45.3)
LYMPHOCYTES NFR BLD AUTO: 24.7 % (ref 19.6–45.3)
LYMPHOCYTES NFR BLD AUTO: 26.1 % (ref 19.6–45.3)
LYMPHOCYTES NFR BLD AUTO: 28.9 % (ref 19.6–45.3)
LYMPHOCYTES NFR BLD AUTO: 37.7 % (ref 19.6–45.3)
MAGNESIUM SERPL-MCNC: 1.9 MG/DL (ref 1.7–2.3)
MAGNESIUM SERPL-MCNC: 1.9 MG/DL (ref 1.7–2.3)
MAGNESIUM SERPL-MCNC: 2 MG/DL (ref 1.7–2.3)
MCH RBC QN AUTO: 29.9 PG (ref 26.6–33)
MCH RBC QN AUTO: 30.1 PG (ref 26.6–33)
MCH RBC QN AUTO: 30.4 PG (ref 26.6–33)
MCH RBC QN AUTO: 30.4 PG (ref 26.6–33)
MCH RBC QN AUTO: 30.5 PG (ref 26.6–33)
MCH RBC QN AUTO: 30.7 PG (ref 26.6–33)
MCHC RBC AUTO-ENTMCNC: 31.6 G/DL (ref 31.5–35.7)
MCHC RBC AUTO-ENTMCNC: 31.7 G/DL (ref 31.5–35.7)
MCHC RBC AUTO-ENTMCNC: 32 G/DL (ref 31.5–35.7)
MCHC RBC AUTO-ENTMCNC: 32.4 G/DL (ref 31.5–35.7)
MCHC RBC AUTO-ENTMCNC: 32.5 G/DL (ref 31.5–35.7)
MCHC RBC AUTO-ENTMCNC: 32.5 G/DL (ref 31.5–35.7)
MCV RBC AUTO: 92.6 FL (ref 79–97)
MCV RBC AUTO: 93.6 FL (ref 79–97)
MCV RBC AUTO: 94.2 FL (ref 79–97)
MCV RBC AUTO: 94.7 FL (ref 79–97)
MCV RBC AUTO: 95.2 FL (ref 79–97)
MCV RBC AUTO: 96.7 FL (ref 79–97)
MONOCYTES # BLD AUTO: 0.47 10*3/MM3 (ref 0.1–0.9)
MONOCYTES # BLD AUTO: 0.5 10*3/MM3 (ref 0.1–0.9)
MONOCYTES # BLD AUTO: 0.55 10*3/MM3 (ref 0.1–0.9)
MONOCYTES # BLD AUTO: 0.57 10*3/MM3 (ref 0.1–0.9)
MONOCYTES # BLD AUTO: 0.72 10*3/MM3 (ref 0.1–0.9)
MONOCYTES # BLD AUTO: 0.72 10*3/MM3 (ref 0.1–0.9)
MONOCYTES NFR BLD AUTO: 6.3 % (ref 5–12)
MONOCYTES NFR BLD AUTO: 6.7 % (ref 5–12)
MONOCYTES NFR BLD AUTO: 7.4 % (ref 5–12)
MONOCYTES NFR BLD AUTO: 7.5 % (ref 5–12)
MONOCYTES NFR BLD AUTO: 7.6 % (ref 5–12)
MONOCYTES NFR BLD AUTO: 8 % (ref 5–12)
NEUTROPHILS NFR BLD AUTO: 3.95 10*3/MM3 (ref 1.7–7)
NEUTROPHILS NFR BLD AUTO: 4.12 10*3/MM3 (ref 1.7–7)
NEUTROPHILS NFR BLD AUTO: 4.36 10*3/MM3 (ref 1.7–7)
NEUTROPHILS NFR BLD AUTO: 4.84 10*3/MM3 (ref 1.7–7)
NEUTROPHILS NFR BLD AUTO: 49.7 % (ref 42.7–76)
NEUTROPHILS NFR BLD AUTO: 57.8 % (ref 42.7–76)
NEUTROPHILS NFR BLD AUTO: 6.5 10*3/MM3 (ref 1.7–7)
NEUTROPHILS NFR BLD AUTO: 62.1 % (ref 42.7–76)
NEUTROPHILS NFR BLD AUTO: 62.2 % (ref 42.7–76)
NEUTROPHILS NFR BLD AUTO: 7.03 10*3/MM3 (ref 1.7–7)
NEUTROPHILS NFR BLD AUTO: 72.2 % (ref 42.7–76)
NEUTROPHILS NFR BLD AUTO: 74.3 % (ref 42.7–76)
NITRITE UR QL STRIP: NEGATIVE
NOROVIRUS GI+II RNA STL QL NAA+NON-PROBE: DETECTED
NRBC BLD AUTO-RTO: 0 /100 WBC (ref 0–0.2)
P SHIGELLOIDES DNA STL QL NAA+NON-PROBE: NOT DETECTED
PH UR STRIP.AUTO: 6 [PH] (ref 5–8)
PHOSPHATE SERPL-MCNC: 1.9 MG/DL (ref 2.5–4.5)
PHOSPHATE SERPL-MCNC: 2.5 MG/DL (ref 2.5–4.5)
PHOSPHATE SERPL-MCNC: 2.8 MG/DL (ref 2.5–4.5)
PHOSPHATE SERPL-MCNC: 3 MG/DL (ref 2.5–4.5)
PHOSPHATE SERPL-MCNC: 3.2 MG/DL (ref 2.5–4.5)
PHOSPHATE SERPL-MCNC: 3.2 MG/DL (ref 2.5–4.5)
PLATELET # BLD AUTO: 193 10*3/MM3 (ref 140–450)
PLATELET # BLD AUTO: 219 10*3/MM3 (ref 140–450)
PLATELET # BLD AUTO: 221 10*3/MM3 (ref 140–450)
PLATELET # BLD AUTO: 227 10*3/MM3 (ref 140–450)
PLATELET # BLD AUTO: 227 10*3/MM3 (ref 140–450)
PLATELET # BLD AUTO: 255 10*3/MM3 (ref 140–450)
PMV BLD AUTO: 10.1 FL (ref 6–12)
PMV BLD AUTO: 9 FL (ref 6–12)
PMV BLD AUTO: 9.1 FL (ref 6–12)
PMV BLD AUTO: 9.3 FL (ref 6–12)
PMV BLD AUTO: 9.6 FL (ref 6–12)
PMV BLD AUTO: 9.9 FL (ref 6–12)
POTASSIUM SERPL-SCNC: 3.4 MMOL/L (ref 3.5–5.2)
POTASSIUM SERPL-SCNC: 3.5 MMOL/L (ref 3.5–5.2)
POTASSIUM SERPL-SCNC: 3.7 MMOL/L (ref 3.5–5.2)
POTASSIUM SERPL-SCNC: 3.8 MMOL/L (ref 3.5–5.2)
POTASSIUM SERPL-SCNC: 4 MMOL/L (ref 3.5–5.2)
POTASSIUM SERPL-SCNC: 4.6 MMOL/L (ref 3.5–5.2)
PROT SERPL-MCNC: 5.8 G/DL (ref 6–8.5)
PROT SERPL-MCNC: 6.1 G/DL (ref 6–8.5)
PROT UR QL STRIP: NEGATIVE
PROTHROMBIN TIME: 21.2 SECONDS (ref 11.7–14.2)
QT INTERVAL: 410 MS
QTC INTERVAL: 436 MS
RBC # BLD AUTO: 2.72 10*6/MM3 (ref 3.77–5.28)
RBC # BLD AUTO: 2.78 10*6/MM3 (ref 3.77–5.28)
RBC # BLD AUTO: 2.96 10*6/MM3 (ref 3.77–5.28)
RBC # BLD AUTO: 3.03 10*6/MM3 (ref 3.77–5.28)
RBC # BLD AUTO: 3.09 10*6/MM3 (ref 3.77–5.28)
RBC # BLD AUTO: 3.35 10*6/MM3 (ref 3.77–5.28)
RH BLD: NEGATIVE
RPR SER QL: NORMAL
RVA RNA STL QL NAA+NON-PROBE: NOT DETECTED
S ENT+BONG DNA STL QL NAA+NON-PROBE: NOT DETECTED
SAPO I+II+IV+V RNA STL QL NAA+NON-PROBE: NOT DETECTED
SHIGELLA SP+EIEC IPAH ST NAA+NON-PROBE: NOT DETECTED
SODIUM SERPL-SCNC: 143 MMOL/L (ref 136–145)
SODIUM SERPL-SCNC: 143 MMOL/L (ref 136–145)
SODIUM SERPL-SCNC: 144 MMOL/L (ref 136–145)
SODIUM SERPL-SCNC: 146 MMOL/L (ref 136–145)
SP GR UR STRIP: 1.02 (ref 1–1.03)
T&S EXPIRATION DATE: NORMAL
TSH SERPL DL<=0.05 MIU/L-ACNC: 1.76 UIU/ML (ref 0.27–4.2)
UROBILINOGEN UR QL STRIP: NORMAL
V CHOL+PARA+VUL DNA STL QL NAA+NON-PROBE: NOT DETECTED
V CHOLERAE DNA STL QL NAA+NON-PROBE: NOT DETECTED
VIT B12 BLD-MCNC: 963 PG/ML (ref 211–946)
WBC NRBC COR # BLD AUTO: 6.63 10*3/MM3 (ref 3.4–10.8)
WBC NRBC COR # BLD AUTO: 6.85 10*3/MM3 (ref 3.4–10.8)
WBC NRBC COR # BLD AUTO: 7.02 10*3/MM3 (ref 3.4–10.8)
WBC NRBC COR # BLD AUTO: 9.01 10*3/MM3 (ref 3.4–10.8)
WBC NRBC COR # BLD AUTO: 9.47 10*3/MM3 (ref 3.4–10.8)
WBC NRBC COR # BLD AUTO: 9.74 10*3/MM3 (ref 3.4–10.8)
Y ENTEROCOL DNA STL QL NAA+NON-PROBE: NOT DETECTED

## 2025-01-01 PROCEDURE — G0378 HOSPITAL OBSERVATION PER HR: HCPCS

## 2025-01-01 PROCEDURE — 25010000002 GLYCOPYRROLATE 0.2 MG/ML SOLUTION: Performed by: NURSE PRACTITIONER

## 2025-01-01 PROCEDURE — 99232 SBSQ HOSP IP/OBS MODERATE 35: CPT | Performed by: INTERNAL MEDICINE

## 2025-01-01 PROCEDURE — 25010000002 DIAZEPAM PER 5 MG: Performed by: PEDIATRICS

## 2025-01-01 PROCEDURE — 80048 BASIC METABOLIC PNL TOTAL CA: CPT | Performed by: STUDENT IN AN ORGANIZED HEALTH CARE EDUCATION/TRAINING PROGRAM

## 2025-01-01 PROCEDURE — 99232 SBSQ HOSP IP/OBS MODERATE 35: CPT | Performed by: NURSE PRACTITIONER

## 2025-01-01 PROCEDURE — 83735 ASSAY OF MAGNESIUM: CPT | Performed by: STUDENT IN AN ORGANIZED HEALTH CARE EDUCATION/TRAINING PROGRAM

## 2025-01-01 PROCEDURE — 25010000002 AMPICILLIN-SULBACTAM PER 1.5 G: Performed by: PHYSICIAN ASSISTANT

## 2025-01-01 PROCEDURE — 86901 BLOOD TYPING SEROLOGIC RH(D): CPT | Performed by: EMERGENCY MEDICINE

## 2025-01-01 PROCEDURE — 84100 ASSAY OF PHOSPHORUS: CPT | Performed by: STUDENT IN AN ORGANIZED HEALTH CARE EDUCATION/TRAINING PROGRAM

## 2025-01-01 PROCEDURE — 25010000002 LORAZEPAM PER 2 MG: Performed by: HOSPITALIST

## 2025-01-01 PROCEDURE — 25010000002 LORAZEPAM PER 2 MG: Performed by: PEDIATRICS

## 2025-01-01 PROCEDURE — 87185 SC STD ENZYME DETCJ PER NZM: CPT | Performed by: EMERGENCY MEDICINE

## 2025-01-01 PROCEDURE — 82948 REAGENT STRIP/BLOOD GLUCOSE: CPT

## 2025-01-01 PROCEDURE — 63710000001 INSULIN LISPRO (HUMAN) PER 5 UNITS: Performed by: STUDENT IN AN ORGANIZED HEALTH CARE EDUCATION/TRAINING PROGRAM

## 2025-01-01 PROCEDURE — 85610 PROTHROMBIN TIME: CPT | Performed by: EMERGENCY MEDICINE

## 2025-01-01 PROCEDURE — 25010000002 MORPHINE PER 10 MG: Performed by: PEDIATRICS

## 2025-01-01 PROCEDURE — 86850 RBC ANTIBODY SCREEN: CPT | Performed by: EMERGENCY MEDICINE

## 2025-01-01 PROCEDURE — 80053 COMPREHEN METABOLIC PANEL: CPT | Performed by: EMERGENCY MEDICINE

## 2025-01-01 PROCEDURE — 87493 C DIFF AMPLIFIED PROBE: CPT | Performed by: EMERGENCY MEDICINE

## 2025-01-01 PROCEDURE — 86900 BLOOD TYPING SEROLOGIC ABO: CPT | Performed by: EMERGENCY MEDICINE

## 2025-01-01 PROCEDURE — 25010000002 GLYCOPYRROLATE 0.2 MG/ML SOLUTION: Performed by: PEDIATRICS

## 2025-01-01 PROCEDURE — 85025 COMPLETE CBC W/AUTO DIFF WBC: CPT | Performed by: EMERGENCY MEDICINE

## 2025-01-01 PROCEDURE — 36415 COLL VENOUS BLD VENIPUNCTURE: CPT | Performed by: EMERGENCY MEDICINE

## 2025-01-01 PROCEDURE — 25010000002 MORPHINE PER 10 MG: Performed by: HOSPITALIST

## 2025-01-01 PROCEDURE — 87507 IADNA-DNA/RNA PROBE TQ 12-25: CPT | Performed by: EMERGENCY MEDICINE

## 2025-01-01 PROCEDURE — 84132 ASSAY OF SERUM POTASSIUM: CPT | Performed by: INTERNAL MEDICINE

## 2025-01-01 PROCEDURE — 82607 VITAMIN B-12: CPT | Performed by: INTERNAL MEDICINE

## 2025-01-01 PROCEDURE — 84100 ASSAY OF PHOSPHORUS: CPT | Performed by: EMERGENCY MEDICINE

## 2025-01-01 PROCEDURE — 99204 OFFICE O/P NEW MOD 45 MIN: CPT | Performed by: INTERNAL MEDICINE

## 2025-01-01 PROCEDURE — 97530 THERAPEUTIC ACTIVITIES: CPT

## 2025-01-01 PROCEDURE — 25010000002 DIAZEPAM PER 5 MG: Performed by: NURSE PRACTITIONER

## 2025-01-01 PROCEDURE — 99285 EMERGENCY DEPT VISIT HI MDM: CPT

## 2025-01-01 PROCEDURE — 25010000002 AMPICILLIN-SULBACTAM PER 1.5 G: Performed by: EMERGENCY MEDICINE

## 2025-01-01 PROCEDURE — 74177 CT ABD & PELVIS W/CONTRAST: CPT

## 2025-01-01 PROCEDURE — 87040 BLOOD CULTURE FOR BACTERIA: CPT | Performed by: EMERGENCY MEDICINE

## 2025-01-01 PROCEDURE — 25510000001 IOPAMIDOL 61 % SOLUTION: Performed by: EMERGENCY MEDICINE

## 2025-01-01 PROCEDURE — 93005 ELECTROCARDIOGRAM TRACING: CPT | Performed by: STUDENT IN AN ORGANIZED HEALTH CARE EDUCATION/TRAINING PROGRAM

## 2025-01-01 PROCEDURE — 82140 ASSAY OF AMMONIA: CPT | Performed by: INTERNAL MEDICINE

## 2025-01-01 PROCEDURE — 25010000002 OLANZAPINE 10 MG RECONSTITUTED SOLUTION: Performed by: INTERNAL MEDICINE

## 2025-01-01 PROCEDURE — 97162 PT EVAL MOD COMPLEX 30 MIN: CPT

## 2025-01-01 PROCEDURE — 87076 CULTURE ANAEROBE IDENT EACH: CPT | Performed by: EMERGENCY MEDICINE

## 2025-01-01 PROCEDURE — 83735 ASSAY OF MAGNESIUM: CPT | Performed by: EMERGENCY MEDICINE

## 2025-01-01 PROCEDURE — 85025 COMPLETE CBC W/AUTO DIFF WBC: CPT | Performed by: STUDENT IN AN ORGANIZED HEALTH CARE EDUCATION/TRAINING PROGRAM

## 2025-01-01 PROCEDURE — 25810000003 SODIUM CHLORIDE 0.9 % SOLUTION: Performed by: EMERGENCY MEDICINE

## 2025-01-01 PROCEDURE — 85730 THROMBOPLASTIN TIME PARTIAL: CPT | Performed by: EMERGENCY MEDICINE

## 2025-01-01 PROCEDURE — 84100 ASSAY OF PHOSPHORUS: CPT | Performed by: HOSPITALIST

## 2025-01-01 PROCEDURE — 86592 SYPHILIS TEST NON-TREP QUAL: CPT | Performed by: INTERNAL MEDICINE

## 2025-01-01 PROCEDURE — 84132 ASSAY OF SERUM POTASSIUM: CPT | Performed by: NURSE PRACTITIONER

## 2025-01-01 PROCEDURE — 81003 URINALYSIS AUTO W/O SCOPE: CPT | Performed by: STUDENT IN AN ORGANIZED HEALTH CARE EDUCATION/TRAINING PROGRAM

## 2025-01-01 PROCEDURE — 25810000003 SODIUM CHLORIDE 0.9 % SOLUTION: Performed by: HOSPITALIST

## 2025-01-01 PROCEDURE — 84443 ASSAY THYROID STIM HORMONE: CPT | Performed by: INTERNAL MEDICINE

## 2025-01-01 PROCEDURE — 87449 NOS EACH ORGANISM AG IA: CPT | Performed by: EMERGENCY MEDICINE

## 2025-01-01 PROCEDURE — 82746 ASSAY OF FOLIC ACID SERUM: CPT | Performed by: INTERNAL MEDICINE

## 2025-01-01 PROCEDURE — 93010 ELECTROCARDIOGRAM REPORT: CPT | Performed by: INTERNAL MEDICINE

## 2025-01-01 RX ORDER — MORPHINE SULFATE 20 MG/ML
5 SOLUTION ORAL
Refills: 0 | Status: DISCONTINUED | OUTPATIENT
Start: 2025-01-01 | End: 2025-01-01

## 2025-01-01 RX ORDER — MORPHINE SULFATE 20 MG/ML
20 SOLUTION ORAL
Refills: 0 | Status: DISCONTINUED | OUTPATIENT
Start: 2025-01-01 | End: 2025-01-01

## 2025-01-01 RX ORDER — HALOPERIDOL 5 MG/ML
1 INJECTION INTRAMUSCULAR
Status: CANCELLED | OUTPATIENT
Start: 2025-01-01

## 2025-01-01 RX ORDER — ONDANSETRON 2 MG/ML
4 INJECTION INTRAMUSCULAR; INTRAVENOUS EVERY 6 HOURS PRN
Status: DISCONTINUED | OUTPATIENT
Start: 2025-01-01 | End: 2025-01-01 | Stop reason: HOSPADM

## 2025-01-01 RX ORDER — GLYCOPYRROLATE 0.2 MG/ML
0.4 INJECTION INTRAMUSCULAR; INTRAVENOUS
Status: DISCONTINUED | OUTPATIENT
Start: 2025-01-01 | End: 2025-01-01

## 2025-01-01 RX ORDER — MORPHINE SULFATE 2 MG/ML
4 INJECTION, SOLUTION INTRAMUSCULAR; INTRAVENOUS
Status: DISCONTINUED | OUTPATIENT
Start: 2025-01-01 | End: 2025-01-01

## 2025-01-01 RX ORDER — MORPHINE SULFATE 2 MG/ML
2 INJECTION, SOLUTION INTRAMUSCULAR; INTRAVENOUS EVERY 4 HOURS
Status: DISCONTINUED | OUTPATIENT
Start: 2025-01-01 | End: 2025-01-01

## 2025-01-01 RX ORDER — OLANZAPINE 5 MG/1
5 TABLET, FILM COATED ORAL NIGHTLY
Status: DISCONTINUED | OUTPATIENT
Start: 2025-01-01 | End: 2025-01-01

## 2025-01-01 RX ORDER — ACETAMINOPHEN 160 MG/5ML
650 SOLUTION ORAL EVERY 4 HOURS PRN
Status: DISCONTINUED | OUTPATIENT
Start: 2025-01-01 | End: 2025-01-01

## 2025-01-01 RX ORDER — LORAZEPAM 2 MG/ML
2 CONCENTRATE ORAL
Status: DISCONTINUED | OUTPATIENT
Start: 2025-01-01 | End: 2025-01-01

## 2025-01-01 RX ORDER — MORPHINE SULFATE 4 MG/ML
4 INJECTION, SOLUTION INTRAMUSCULAR; INTRAVENOUS
Status: DISCONTINUED | OUTPATIENT
Start: 2025-01-01 | End: 2025-01-01 | Stop reason: HOSPADM

## 2025-01-01 RX ORDER — LORAZEPAM 2 MG/ML
0.5 INJECTION INTRAMUSCULAR
Status: DISPENSED | OUTPATIENT
Start: 2025-01-01 | End: 2025-01-01

## 2025-01-01 RX ORDER — ATORVASTATIN CALCIUM 20 MG/1
40 TABLET, FILM COATED ORAL NIGHTLY
Status: DISCONTINUED | OUTPATIENT
Start: 2025-01-01 | End: 2025-01-01

## 2025-01-01 RX ORDER — LORAZEPAM 2 MG/ML
1 INJECTION INTRAMUSCULAR
Status: DISCONTINUED | OUTPATIENT
Start: 2025-01-01 | End: 2025-01-01

## 2025-01-01 RX ORDER — DEXTROSE MONOHYDRATE 25 G/50ML
25 INJECTION, SOLUTION INTRAVENOUS
Status: DISCONTINUED | OUTPATIENT
Start: 2025-01-01 | End: 2025-01-01

## 2025-01-01 RX ORDER — IBUPROFEN 600 MG/1
1 TABLET ORAL
Status: DISCONTINUED | OUTPATIENT
Start: 2025-01-01 | End: 2025-01-01

## 2025-01-01 RX ORDER — OLANZAPINE 10 MG/2ML
4 INJECTION, POWDER, FOR SOLUTION INTRAMUSCULAR EVERY 8 HOURS PRN
Status: DISCONTINUED | OUTPATIENT
Start: 2025-01-01 | End: 2025-01-01

## 2025-01-01 RX ORDER — SODIUM CHLORIDE 9 MG/ML
40 INJECTION, SOLUTION INTRAVENOUS AS NEEDED
Status: DISCONTINUED | OUTPATIENT
Start: 2025-01-01 | End: 2025-01-01

## 2025-01-01 RX ORDER — INSULIN LISPRO 100 [IU]/ML
2-7 INJECTION, SOLUTION INTRAVENOUS; SUBCUTANEOUS
Status: DISCONTINUED | OUTPATIENT
Start: 2025-01-01 | End: 2025-01-01

## 2025-01-01 RX ORDER — GLYCOPYRROLATE 0.2 MG/ML
0.4 INJECTION INTRAMUSCULAR; INTRAVENOUS
Status: DISCONTINUED | OUTPATIENT
Start: 2025-01-01 | End: 2025-01-01 | Stop reason: HOSPADM

## 2025-01-01 RX ORDER — MORPHINE SULFATE 2 MG/ML
2 INJECTION, SOLUTION INTRAMUSCULAR; INTRAVENOUS
Status: DISCONTINUED | OUTPATIENT
Start: 2025-01-01 | End: 2025-01-01

## 2025-01-01 RX ORDER — VANCOMYCIN HYDROCHLORIDE 125 MG/1
125 CAPSULE ORAL EVERY 6 HOURS SCHEDULED
Status: DISCONTINUED | OUTPATIENT
Start: 2025-01-01 | End: 2025-01-01

## 2025-01-01 RX ORDER — LORAZEPAM 2 MG/ML
0.5 INJECTION INTRAMUSCULAR
Status: CANCELLED | OUTPATIENT
Start: 2025-01-01 | End: 2025-01-01

## 2025-01-01 RX ORDER — LORAZEPAM 2 MG/ML
0.5 INJECTION INTRAMUSCULAR ONCE
Status: DISCONTINUED | OUTPATIENT
Start: 2025-01-01 | End: 2025-01-01

## 2025-01-01 RX ORDER — LORAZEPAM 2 MG/ML
0.5 INJECTION INTRAMUSCULAR
Status: DISCONTINUED | OUTPATIENT
Start: 2025-01-01 | End: 2025-01-01 | Stop reason: HOSPADM

## 2025-01-01 RX ORDER — LORAZEPAM 2 MG/ML
0.5 INJECTION INTRAMUSCULAR
Status: ACTIVE | OUTPATIENT
Start: 2025-01-01 | End: 2025-01-01

## 2025-01-01 RX ORDER — BISACODYL 10 MG
10 SUPPOSITORY, RECTAL RECTAL DAILY PRN
Status: CANCELLED | OUTPATIENT
Start: 2025-01-01

## 2025-01-01 RX ORDER — POTASSIUM CHLORIDE 1.5 G/1.58G
40 POWDER, FOR SOLUTION ORAL EVERY 4 HOURS
Status: DISPENSED | OUTPATIENT
Start: 2025-01-01 | End: 2025-01-01

## 2025-01-01 RX ORDER — MORPHINE SULFATE 2 MG/ML
2 INJECTION, SOLUTION INTRAMUSCULAR; INTRAVENOUS
Status: CANCELLED | OUTPATIENT
Start: 2025-01-01 | End: 2025-08-06

## 2025-01-01 RX ORDER — HYDROCORTISONE ACETATE 25 MG/1
25 SUPPOSITORY RECTAL 2 TIMES DAILY
Status: DISCONTINUED | OUTPATIENT
Start: 2025-01-01 | End: 2025-01-01

## 2025-01-01 RX ORDER — IOPAMIDOL 612 MG/ML
100 INJECTION, SOLUTION INTRAVASCULAR
Status: COMPLETED | OUTPATIENT
Start: 2025-01-01 | End: 2025-01-01

## 2025-01-01 RX ORDER — HYDROMORPHONE HYDROCHLORIDE 1 MG/ML
0.5 INJECTION, SOLUTION INTRAMUSCULAR; INTRAVENOUS; SUBCUTANEOUS
Refills: 0 | Status: DISCONTINUED | OUTPATIENT
Start: 2025-01-01 | End: 2025-01-01

## 2025-01-01 RX ORDER — LORAZEPAM 2 MG/ML
1 CONCENTRATE ORAL
Status: ACTIVE | OUTPATIENT
Start: 2025-01-01 | End: 2025-01-01

## 2025-01-01 RX ORDER — DIAZEPAM 10 MG/2ML
5 INJECTION, SOLUTION INTRAMUSCULAR; INTRAVENOUS EVERY 4 HOURS
Status: DISCONTINUED | OUTPATIENT
Start: 2025-01-01 | End: 2025-01-01 | Stop reason: HOSPADM

## 2025-01-01 RX ORDER — ONDANSETRON 4 MG/1
4 TABLET, ORALLY DISINTEGRATING ORAL EVERY 6 HOURS PRN
Status: DISCONTINUED | OUTPATIENT
Start: 2025-01-01 | End: 2025-01-01

## 2025-01-01 RX ORDER — FENTANYL/ROPIVACAINE/NS/PF 2-625MCG/1
15 PLASTIC BAG, INJECTION (ML) EPIDURAL ONCE
Status: COMPLETED | OUTPATIENT
Start: 2025-01-01 | End: 2025-01-01

## 2025-01-01 RX ORDER — HALOPERIDOL 5 MG/ML
1 INJECTION INTRAMUSCULAR
Status: DISCONTINUED | OUTPATIENT
Start: 2025-01-01 | End: 2025-01-01 | Stop reason: HOSPADM

## 2025-01-01 RX ORDER — SODIUM CHLORIDE 0.9 % (FLUSH) 0.9 %
10 SYRINGE (ML) INJECTION AS NEEDED
Status: DISCONTINUED | OUTPATIENT
Start: 2025-01-01 | End: 2025-01-01

## 2025-01-01 RX ORDER — LORAZEPAM 2 MG/ML
0.5 INJECTION INTRAMUSCULAR
Status: DISCONTINUED | OUTPATIENT
Start: 2025-01-01 | End: 2025-01-01

## 2025-01-01 RX ORDER — LORAZEPAM 2 MG/ML
2 INJECTION INTRAMUSCULAR
Status: DISCONTINUED | OUTPATIENT
Start: 2025-01-01 | End: 2025-01-01

## 2025-01-01 RX ORDER — LORAZEPAM 2 MG/ML
2 CONCENTRATE ORAL
Status: ACTIVE | OUTPATIENT
Start: 2025-01-01 | End: 2025-01-01

## 2025-01-01 RX ORDER — PANTOPRAZOLE SODIUM 40 MG/10ML
80 INJECTION, POWDER, LYOPHILIZED, FOR SOLUTION INTRAVENOUS ONCE
Status: COMPLETED | OUTPATIENT
Start: 2025-01-01 | End: 2025-01-01

## 2025-01-01 RX ORDER — MORPHINE SULFATE 2 MG/ML
2 INJECTION, SOLUTION INTRAMUSCULAR; INTRAVENOUS
Status: DISCONTINUED | OUTPATIENT
Start: 2025-01-01 | End: 2025-01-01 | Stop reason: HOSPADM

## 2025-01-01 RX ORDER — GLYCOPYRROLATE 0.2 MG/ML
0.2 INJECTION INTRAMUSCULAR; INTRAVENOUS
Status: DISCONTINUED | OUTPATIENT
Start: 2025-01-01 | End: 2025-01-01

## 2025-01-01 RX ORDER — SCOPOLAMINE 1 MG/3D
1 PATCH, EXTENDED RELEASE TRANSDERMAL
Status: DISCONTINUED | OUTPATIENT
Start: 2025-01-01 | End: 2025-01-01 | Stop reason: HOSPADM

## 2025-01-01 RX ORDER — POTASSIUM CHLORIDE 1500 MG/1
40 TABLET, EXTENDED RELEASE ORAL EVERY 4 HOURS
Status: COMPLETED | OUTPATIENT
Start: 2025-01-01 | End: 2025-01-01

## 2025-01-01 RX ORDER — SODIUM CHLORIDE 0.9 % (FLUSH) 0.9 %
10 SYRINGE (ML) INJECTION EVERY 12 HOURS SCHEDULED
Status: DISCONTINUED | OUTPATIENT
Start: 2025-01-01 | End: 2025-01-01

## 2025-01-01 RX ORDER — PANTOPRAZOLE SODIUM 40 MG/1
40 TABLET, DELAYED RELEASE ORAL
Status: DISCONTINUED | OUTPATIENT
Start: 2025-01-01 | End: 2025-01-01

## 2025-01-01 RX ORDER — MORPHINE SULFATE 4 MG/ML
4 INJECTION, SOLUTION INTRAMUSCULAR; INTRAVENOUS EVERY 4 HOURS
Status: DISCONTINUED | OUTPATIENT
Start: 2025-01-01 | End: 2025-01-01 | Stop reason: HOSPADM

## 2025-01-01 RX ORDER — MORPHINE SULFATE 20 MG/ML
10 SOLUTION ORAL
Refills: 0 | Status: DISCONTINUED | OUTPATIENT
Start: 2025-01-01 | End: 2025-01-01

## 2025-01-01 RX ORDER — LORAZEPAM 2 MG/ML
0.5 CONCENTRATE ORAL
Status: ACTIVE | OUTPATIENT
Start: 2025-01-01 | End: 2025-01-01

## 2025-01-01 RX ORDER — LORAZEPAM 2 MG/ML
1 CONCENTRATE ORAL
Status: DISCONTINUED | OUTPATIENT
Start: 2025-01-01 | End: 2025-01-01

## 2025-01-01 RX ORDER — MORPHINE SULFATE 2 MG/ML
6 INJECTION, SOLUTION INTRAMUSCULAR; INTRAVENOUS
Status: DISCONTINUED | OUTPATIENT
Start: 2025-01-01 | End: 2025-01-01

## 2025-01-01 RX ORDER — BISACODYL 10 MG
10 SUPPOSITORY, RECTAL RECTAL DAILY PRN
Status: DISCONTINUED | OUTPATIENT
Start: 2025-01-01 | End: 2025-01-01 | Stop reason: HOSPADM

## 2025-01-01 RX ORDER — DIAZEPAM 10 MG/2ML
5 INJECTION, SOLUTION INTRAMUSCULAR; INTRAVENOUS
Status: DISCONTINUED | OUTPATIENT
Start: 2025-01-01 | End: 2025-01-01 | Stop reason: HOSPADM

## 2025-01-01 RX ORDER — OLANZAPINE 5 MG/1
2.5 TABLET, FILM COATED ORAL ONCE
Status: DISCONTINUED | OUTPATIENT
Start: 2025-01-01 | End: 2025-01-01

## 2025-01-01 RX ORDER — ACETAMINOPHEN 650 MG/1
650 SUPPOSITORY RECTAL EVERY 4 HOURS PRN
Status: DISCONTINUED | OUTPATIENT
Start: 2025-01-01 | End: 2025-01-01

## 2025-01-01 RX ORDER — GLYCOPYRROLATE 0.2 MG/ML
0.4 INJECTION INTRAMUSCULAR; INTRAVENOUS
Status: CANCELLED | OUTPATIENT
Start: 2025-01-01

## 2025-01-01 RX ORDER — DIAZEPAM 10 MG/2ML
10 INJECTION, SOLUTION INTRAMUSCULAR; INTRAVENOUS EVERY 4 HOURS PRN
Status: DISCONTINUED | OUTPATIENT
Start: 2025-01-01 | End: 2025-01-01

## 2025-01-01 RX ORDER — ONDANSETRON 2 MG/ML
4 INJECTION INTRAMUSCULAR; INTRAVENOUS EVERY 6 HOURS PRN
Status: DISCONTINUED | OUTPATIENT
Start: 2025-01-01 | End: 2025-01-01

## 2025-01-01 RX ORDER — ACETAMINOPHEN 325 MG/1
650 TABLET ORAL EVERY 4 HOURS PRN
Status: DISCONTINUED | OUTPATIENT
Start: 2025-01-01 | End: 2025-01-01

## 2025-01-01 RX ORDER — NICOTINE POLACRILEX 4 MG
15 LOZENGE BUCCAL
Status: DISCONTINUED | OUTPATIENT
Start: 2025-01-01 | End: 2025-01-01

## 2025-01-01 RX ORDER — ONDANSETRON 2 MG/ML
4 INJECTION INTRAMUSCULAR; INTRAVENOUS EVERY 6 HOURS PRN
Status: CANCELLED | OUTPATIENT
Start: 2025-01-01

## 2025-01-01 RX ORDER — GLYCOPYRROLATE 0.2 MG/ML
0.4 INJECTION INTRAMUSCULAR; INTRAVENOUS EVERY 4 HOURS
Status: DISCONTINUED | OUTPATIENT
Start: 2025-01-01 | End: 2025-01-01 | Stop reason: HOSPADM

## 2025-01-01 RX ORDER — DIAZEPAM 10 MG/2ML
5 INJECTION, SOLUTION INTRAMUSCULAR; INTRAVENOUS EVERY 4 HOURS PRN
Status: DISCONTINUED | OUTPATIENT
Start: 2025-01-01 | End: 2025-01-01

## 2025-01-01 RX ORDER — METOPROLOL TARTRATE 25 MG/1
25 TABLET, FILM COATED ORAL 2 TIMES DAILY
Status: DISCONTINUED | OUTPATIENT
Start: 2025-01-01 | End: 2025-01-01

## 2025-01-01 RX ORDER — LORAZEPAM 2 MG/ML
0.5 CONCENTRATE ORAL
Status: DISCONTINUED | OUTPATIENT
Start: 2025-01-01 | End: 2025-01-01

## 2025-01-01 RX ORDER — FAMOTIDINE 20 MG/1
20 TABLET, FILM COATED ORAL DAILY
Status: DISCONTINUED | OUTPATIENT
Start: 2025-01-01 | End: 2025-01-01

## 2025-01-01 RX ORDER — DIPHENOXYLATE HYDROCHLORIDE AND ATROPINE SULFATE 2.5; .025 MG/1; MG/1
1 TABLET ORAL
Status: DISCONTINUED | OUTPATIENT
Start: 2025-01-01 | End: 2025-01-01

## 2025-01-01 RX ADMIN — IOPAMIDOL 85 ML: 612 INJECTION, SOLUTION INTRAVENOUS at 14:05

## 2025-01-01 RX ADMIN — ATORVASTATIN CALCIUM 40 MG: 20 TABLET, FILM COATED ORAL at 22:04

## 2025-01-01 RX ADMIN — MORPHINE SULFATE 4 MG: 4 INJECTION, SOLUTION INTRAMUSCULAR; INTRAVENOUS at 17:08

## 2025-01-01 RX ADMIN — GLYCOPYRROLATE 0.4 MG: 0.2 SOLUTION INTRAMUSCULAR; INTRAVENOUS at 00:43

## 2025-01-01 RX ADMIN — MORPHINE SULFATE 2 MG: 2 INJECTION, SOLUTION INTRAMUSCULAR; INTRAVENOUS at 12:58

## 2025-01-01 RX ADMIN — ATORVASTATIN CALCIUM 40 MG: 20 TABLET, FILM COATED ORAL at 20:50

## 2025-01-01 RX ADMIN — MORPHINE SULFATE 4 MG: 4 INJECTION, SOLUTION INTRAMUSCULAR; INTRAVENOUS at 20:23

## 2025-01-01 RX ADMIN — DIAZEPAM 5 MG: 10 INJECTION, SOLUTION INTRAMUSCULAR; INTRAVENOUS at 20:36

## 2025-01-01 RX ADMIN — MORPHINE SULFATE 2 MG: 2 INJECTION, SOLUTION INTRAMUSCULAR; INTRAVENOUS at 08:08

## 2025-01-01 RX ADMIN — METOPROLOL TARTRATE 25 MG: 25 TABLET, FILM COATED ORAL at 20:57

## 2025-01-01 RX ADMIN — METOPROLOL TARTRATE 25 MG: 25 TABLET, FILM COATED ORAL at 11:34

## 2025-01-01 RX ADMIN — GLYCOPYRROLATE 0.4 MG: 0.2 SOLUTION INTRAMUSCULAR; INTRAVENOUS at 23:11

## 2025-01-01 RX ADMIN — METOPROLOL TARTRATE 25 MG: 25 TABLET, FILM COATED ORAL at 09:15

## 2025-01-01 RX ADMIN — MORPHINE SULFATE 4 MG: 4 INJECTION, SOLUTION INTRAMUSCULAR; INTRAVENOUS at 01:08

## 2025-01-01 RX ADMIN — LORAZEPAM 0.5 MG: 2 INJECTION INTRAMUSCULAR; INTRAVENOUS at 00:08

## 2025-01-01 RX ADMIN — DIAZEPAM 5 MG: 10 INJECTION, SOLUTION INTRAMUSCULAR; INTRAVENOUS at 08:35

## 2025-01-01 RX ADMIN — FAMOTIDINE 20 MG: 20 TABLET, FILM COATED ORAL at 10:08

## 2025-01-01 RX ADMIN — Medication 10 ML: at 11:09

## 2025-01-01 RX ADMIN — LORAZEPAM 1 MG: 2 INJECTION INTRAMUSCULAR; INTRAVENOUS at 20:58

## 2025-01-01 RX ADMIN — METOPROLOL TARTRATE 25 MG: 25 TABLET, FILM COATED ORAL at 22:04

## 2025-01-01 RX ADMIN — FAMOTIDINE 20 MG: 20 TABLET, FILM COATED ORAL at 09:01

## 2025-01-01 RX ADMIN — MORPHINE SULFATE 2 MG: 2 INJECTION, SOLUTION INTRAMUSCULAR; INTRAVENOUS at 10:50

## 2025-01-01 RX ADMIN — DIAZEPAM 5 MG: 10 INJECTION, SOLUTION INTRAMUSCULAR; INTRAVENOUS at 05:51

## 2025-01-01 RX ADMIN — LORAZEPAM 0.5 MG: 2 INJECTION INTRAMUSCULAR; INTRAVENOUS at 08:56

## 2025-01-01 RX ADMIN — SODIUM CHLORIDE 1000 ML: 9 INJECTION, SOLUTION INTRAVENOUS at 12:59

## 2025-01-01 RX ADMIN — LORAZEPAM 0.5 MG: 2 INJECTION INTRAMUSCULAR; INTRAVENOUS at 20:00

## 2025-01-01 RX ADMIN — HYDROCORTISONE ACETATE 25 MG: 25 SUPPOSITORY RECTAL at 09:08

## 2025-01-01 RX ADMIN — ACETAMINOPHEN 650 MG: 325 TABLET, FILM COATED ORAL at 20:24

## 2025-01-01 RX ADMIN — OLANZAPINE 4 MG: 10 INJECTION, POWDER, FOR SOLUTION INTRAMUSCULAR at 16:24

## 2025-01-01 RX ADMIN — METOPROLOL TARTRATE 25 MG: 25 TABLET, FILM COATED ORAL at 20:52

## 2025-01-01 RX ADMIN — MORPHINE SULFATE 4 MG: 4 INJECTION, SOLUTION INTRAMUSCULAR; INTRAVENOUS at 16:24

## 2025-01-01 RX ADMIN — VANCOMYCIN HYDROCHLORIDE 125 MG: 125 CAPSULE ORAL at 14:16

## 2025-01-01 RX ADMIN — GLYCOPYRROLATE 0.4 MG: 0.2 SOLUTION INTRAMUSCULAR; INTRAVENOUS at 09:35

## 2025-01-01 RX ADMIN — ATORVASTATIN CALCIUM 40 MG: 20 TABLET, FILM COATED ORAL at 22:36

## 2025-01-01 RX ADMIN — POTASSIUM CHLORIDE 40 MEQ: 1500 TABLET, EXTENDED RELEASE ORAL at 11:51

## 2025-01-01 RX ADMIN — MORPHINE SULFATE 2 MG: 2 INJECTION, SOLUTION INTRAMUSCULAR; INTRAVENOUS at 04:03

## 2025-01-01 RX ADMIN — AMPICILLIN SODIUM AND SULBACTAM SODIUM 3 G: 2; 1 INJECTION, POWDER, FOR SOLUTION INTRAMUSCULAR; INTRAVENOUS at 11:10

## 2025-01-01 RX ADMIN — AMPICILLIN SODIUM AND SULBACTAM SODIUM 3 G: 2; 1 INJECTION, POWDER, FOR SOLUTION INTRAMUSCULAR; INTRAVENOUS at 10:39

## 2025-01-01 RX ADMIN — GLYCOPYRROLATE 0.4 MG: 0.2 SOLUTION INTRAMUSCULAR; INTRAVENOUS at 20:23

## 2025-01-01 RX ADMIN — AMPICILLIN SODIUM AND SULBACTAM SODIUM 3 G: 2; 1 INJECTION, POWDER, FOR SOLUTION INTRAMUSCULAR; INTRAVENOUS at 22:04

## 2025-01-01 RX ADMIN — GLYCOPYRROLATE 0.4 MG: 0.2 SOLUTION INTRAMUSCULAR; INTRAVENOUS at 02:31

## 2025-01-01 RX ADMIN — AMPICILLIN SODIUM AND SULBACTAM SODIUM 3 G: 2; 1 INJECTION, POWDER, FOR SOLUTION INTRAMUSCULAR; INTRAVENOUS at 22:35

## 2025-01-01 RX ADMIN — FAMOTIDINE 20 MG: 20 TABLET, FILM COATED ORAL at 09:15

## 2025-01-01 RX ADMIN — METOPROLOL TARTRATE 25 MG: 25 TABLET, FILM COATED ORAL at 20:54

## 2025-01-01 RX ADMIN — METOPROLOL TARTRATE 25 MG: 25 TABLET, FILM COATED ORAL at 10:00

## 2025-01-01 RX ADMIN — DIAZEPAM 5 MG: 10 INJECTION, SOLUTION INTRAMUSCULAR; INTRAVENOUS at 04:44

## 2025-01-01 RX ADMIN — DIAZEPAM 5 MG: 10 INJECTION, SOLUTION INTRAMUSCULAR; INTRAVENOUS at 20:23

## 2025-01-01 RX ADMIN — MORPHINE SULFATE 4 MG: 4 INJECTION, SOLUTION INTRAMUSCULAR; INTRAVENOUS at 04:40

## 2025-01-01 RX ADMIN — MORPHINE SULFATE 4 MG: 4 INJECTION, SOLUTION INTRAMUSCULAR; INTRAVENOUS at 12:14

## 2025-01-01 RX ADMIN — METOPROLOL TARTRATE 25 MG: 25 TABLET, FILM COATED ORAL at 10:08

## 2025-01-01 RX ADMIN — LORAZEPAM 1 MG: 2 INJECTION INTRAMUSCULAR; INTRAVENOUS at 09:59

## 2025-01-01 RX ADMIN — DIAZEPAM 5 MG: 10 INJECTION, SOLUTION INTRAMUSCULAR; INTRAVENOUS at 17:08

## 2025-01-01 RX ADMIN — LORAZEPAM 0.5 MG: 2 INJECTION INTRAMUSCULAR; INTRAVENOUS at 09:59

## 2025-01-01 RX ADMIN — METOPROLOL TARTRATE 25 MG: 25 TABLET, FILM COATED ORAL at 21:08

## 2025-01-01 RX ADMIN — GLYCOPYRROLATE 0.4 MG: 0.2 SOLUTION INTRAMUSCULAR; INTRAVENOUS at 18:52

## 2025-01-01 RX ADMIN — Medication 10 ML: at 22:37

## 2025-01-01 RX ADMIN — GLYCOPYRROLATE 0.4 MG: 0.2 SOLUTION INTRAMUSCULAR; INTRAVENOUS at 15:57

## 2025-01-01 RX ADMIN — Medication 10 ML: at 09:11

## 2025-01-01 RX ADMIN — GLYCOPYRROLATE 0.4 MG: 0.2 SOLUTION INTRAMUSCULAR; INTRAVENOUS at 05:51

## 2025-01-01 RX ADMIN — AMPICILLIN SODIUM AND SULBACTAM SODIUM 3 G: 2; 1 INJECTION, POWDER, FOR SOLUTION INTRAMUSCULAR; INTRAVENOUS at 22:59

## 2025-01-01 RX ADMIN — MORPHINE SULFATE 4 MG: 4 INJECTION, SOLUTION INTRAMUSCULAR; INTRAVENOUS at 17:59

## 2025-01-01 RX ADMIN — AMPICILLIN SODIUM AND SULBACTAM SODIUM 3 G: 2; 1 INJECTION, POWDER, FOR SOLUTION INTRAMUSCULAR; INTRAVENOUS at 06:44

## 2025-01-01 RX ADMIN — HYDROCORTISONE ACETATE 25 MG: 25 SUPPOSITORY RECTAL at 23:05

## 2025-01-01 RX ADMIN — INSULIN LISPRO 2 UNITS: 100 INJECTION, SOLUTION INTRAVENOUS; SUBCUTANEOUS at 21:47

## 2025-01-01 RX ADMIN — METOPROLOL TARTRATE 25 MG: 25 TABLET, FILM COATED ORAL at 11:10

## 2025-01-01 RX ADMIN — HYDROCORTISONE ACETATE 25 MG: 25 SUPPOSITORY RECTAL at 20:50

## 2025-01-01 RX ADMIN — METOPROLOL TARTRATE 25 MG: 25 TABLET, FILM COATED ORAL at 09:03

## 2025-01-01 RX ADMIN — MORPHINE SULFATE 4 MG: 4 INJECTION, SOLUTION INTRAMUSCULAR; INTRAVENOUS at 08:35

## 2025-01-01 RX ADMIN — DIAZEPAM 5 MG: 10 INJECTION, SOLUTION INTRAMUSCULAR; INTRAVENOUS at 12:42

## 2025-01-01 RX ADMIN — Medication 10 ML: at 20:50

## 2025-01-01 RX ADMIN — DIAZEPAM 5 MG: 10 INJECTION, SOLUTION INTRAMUSCULAR; INTRAVENOUS at 15:57

## 2025-01-01 RX ADMIN — PANTOPRAZOLE SODIUM 8 MG/HR: 40 INJECTION, POWDER, FOR SOLUTION INTRAVENOUS at 13:19

## 2025-01-01 RX ADMIN — DEXTROSE MONOHYDRATE 25 G: 25 INJECTION, SOLUTION INTRAVENOUS at 16:13

## 2025-01-01 RX ADMIN — OLANZAPINE 5 MG: 5 TABLET, FILM COATED ORAL at 22:36

## 2025-01-01 RX ADMIN — DIAZEPAM 5 MG: 10 INJECTION, SOLUTION INTRAMUSCULAR; INTRAVENOUS at 09:35

## 2025-01-01 RX ADMIN — HYDROCORTISONE ACETATE 25 MG: 25 SUPPOSITORY RECTAL at 22:37

## 2025-01-01 RX ADMIN — MORPHINE SULFATE 2 MG: 2 INJECTION, SOLUTION INTRAMUSCULAR; INTRAVENOUS at 08:56

## 2025-01-01 RX ADMIN — AMPICILLIN SODIUM AND SULBACTAM SODIUM 3 G: 2; 1 INJECTION, POWDER, FOR SOLUTION INTRAMUSCULAR; INTRAVENOUS at 00:06

## 2025-01-01 RX ADMIN — AMPICILLIN SODIUM AND SULBACTAM SODIUM 3 G: 2; 1 INJECTION, POWDER, FOR SOLUTION INTRAMUSCULAR; INTRAVENOUS at 12:45

## 2025-01-01 RX ADMIN — MORPHINE SULFATE 4 MG: 4 INJECTION, SOLUTION INTRAMUSCULAR; INTRAVENOUS at 05:51

## 2025-01-01 RX ADMIN — GLYCOPYRROLATE 0.4 MG: 0.2 SOLUTION INTRAMUSCULAR; INTRAVENOUS at 12:14

## 2025-01-01 RX ADMIN — OLANZAPINE 5 MG: 5 TABLET, FILM COATED ORAL at 20:49

## 2025-01-01 RX ADMIN — MORPHINE SULFATE 2 MG: 2 INJECTION, SOLUTION INTRAMUSCULAR; INTRAVENOUS at 19:59

## 2025-01-01 RX ADMIN — DIAZEPAM 5 MG: 10 INJECTION, SOLUTION INTRAMUSCULAR; INTRAVENOUS at 13:14

## 2025-01-01 RX ADMIN — Medication 10 ML: at 23:50

## 2025-01-01 RX ADMIN — INSULIN LISPRO 2 UNITS: 100 INJECTION, SOLUTION INTRAVENOUS; SUBCUTANEOUS at 20:58

## 2025-01-01 RX ADMIN — MORPHINE SULFATE 2 MG: 2 INJECTION, SOLUTION INTRAMUSCULAR; INTRAVENOUS at 08:21

## 2025-01-01 RX ADMIN — GLYCOPYRROLATE 0.4 MG: 0.2 SOLUTION INTRAMUSCULAR; INTRAVENOUS at 17:09

## 2025-01-01 RX ADMIN — VANCOMYCIN HYDROCHLORIDE 125 MG: 125 CAPSULE ORAL at 20:24

## 2025-01-01 RX ADMIN — ATORVASTATIN CALCIUM 40 MG: 20 TABLET, FILM COATED ORAL at 20:25

## 2025-01-01 RX ADMIN — DIAZEPAM 5 MG: 10 INJECTION, SOLUTION INTRAMUSCULAR; INTRAVENOUS at 00:43

## 2025-01-01 RX ADMIN — GLYCOPYRROLATE 0.4 MG: 0.2 SOLUTION INTRAMUSCULAR; INTRAVENOUS at 12:47

## 2025-01-01 RX ADMIN — VANCOMYCIN HYDROCHLORIDE 125 MG: 125 CAPSULE ORAL at 23:04

## 2025-01-01 RX ADMIN — AMPICILLIN SODIUM AND SULBACTAM SODIUM 3 G: 2; 1 INJECTION, POWDER, FOR SOLUTION INTRAMUSCULAR; INTRAVENOUS at 05:15

## 2025-01-01 RX ADMIN — MORPHINE SULFATE 2 MG: 2 INJECTION, SOLUTION INTRAMUSCULAR; INTRAVENOUS at 00:03

## 2025-01-01 RX ADMIN — FAMOTIDINE 20 MG: 20 TABLET, FILM COATED ORAL at 11:10

## 2025-01-01 RX ADMIN — LORAZEPAM 0.5 MG: 2 INJECTION INTRAMUSCULAR; INTRAVENOUS at 21:05

## 2025-01-01 RX ADMIN — DIAZEPAM 5 MG: 10 INJECTION, SOLUTION INTRAMUSCULAR; INTRAVENOUS at 12:13

## 2025-01-01 RX ADMIN — OLANZAPINE 5 MG: 5 TABLET, FILM COATED ORAL at 23:05

## 2025-01-01 RX ADMIN — MORPHINE SULFATE 2 MG: 2 INJECTION, SOLUTION INTRAMUSCULAR; INTRAVENOUS at 04:43

## 2025-01-01 RX ADMIN — MORPHINE SULFATE 4 MG: 4 INJECTION, SOLUTION INTRAMUSCULAR; INTRAVENOUS at 20:50

## 2025-01-01 RX ADMIN — METOPROLOL TARTRATE 25 MG: 25 TABLET, FILM COATED ORAL at 10:37

## 2025-01-01 RX ADMIN — AMPICILLIN SODIUM AND SULBACTAM SODIUM 3 G: 2; 1 INJECTION, POWDER, FOR SOLUTION INTRAMUSCULAR; INTRAVENOUS at 12:06

## 2025-01-01 RX ADMIN — HYDROCORTISONE ACETATE 25 MG: 25 SUPPOSITORY RECTAL at 10:08

## 2025-01-01 RX ADMIN — Medication 10 ML: at 20:54

## 2025-01-01 RX ADMIN — MORPHINE SULFATE 2 MG: 2 INJECTION, SOLUTION INTRAMUSCULAR; INTRAVENOUS at 21:05

## 2025-01-01 RX ADMIN — Medication 10 ML: at 22:10

## 2025-01-01 RX ADMIN — MORPHINE SULFATE 4 MG: 4 INJECTION, SOLUTION INTRAMUSCULAR; INTRAVENOUS at 20:36

## 2025-01-01 RX ADMIN — HYDROCORTISONE ACETATE 25 MG: 25 SUPPOSITORY RECTAL at 09:15

## 2025-01-01 RX ADMIN — METOPROLOL TARTRATE 25 MG: 25 TABLET, FILM COATED ORAL at 22:36

## 2025-01-01 RX ADMIN — MORPHINE SULFATE 2 MG: 2 INJECTION, SOLUTION INTRAMUSCULAR; INTRAVENOUS at 12:45

## 2025-01-01 RX ADMIN — MORPHINE SULFATE 4 MG: 4 INJECTION, SOLUTION INTRAMUSCULAR; INTRAVENOUS at 05:11

## 2025-01-01 RX ADMIN — MORPHINE SULFATE 2 MG: 2 INJECTION, SOLUTION INTRAMUSCULAR; INTRAVENOUS at 00:10

## 2025-01-01 RX ADMIN — MORPHINE SULFATE 4 MG: 4 INJECTION, SOLUTION INTRAMUSCULAR; INTRAVENOUS at 00:44

## 2025-01-01 RX ADMIN — MORPHINE SULFATE 4 MG: 4 INJECTION, SOLUTION INTRAMUSCULAR; INTRAVENOUS at 13:21

## 2025-01-01 RX ADMIN — AMPICILLIN SODIUM AND SULBACTAM SODIUM 3 G: 2; 1 INJECTION, POWDER, FOR SOLUTION INTRAMUSCULAR; INTRAVENOUS at 06:08

## 2025-01-01 RX ADMIN — OLANZAPINE 5 MG: 5 TABLET, FILM COATED ORAL at 20:24

## 2025-01-01 RX ADMIN — SCOPOLAMINE 1 PATCH: 1.5 PATCH, EXTENDED RELEASE TRANSDERMAL at 12:43

## 2025-01-01 RX ADMIN — MORPHINE SULFATE 2 MG: 2 INJECTION, SOLUTION INTRAMUSCULAR; INTRAVENOUS at 03:51

## 2025-01-01 RX ADMIN — POTASSIUM CHLORIDE 40 MEQ: 1500 TABLET, EXTENDED RELEASE ORAL at 09:15

## 2025-01-01 RX ADMIN — MORPHINE SULFATE 2 MG: 2 INJECTION, SOLUTION INTRAMUSCULAR; INTRAVENOUS at 00:07

## 2025-01-01 RX ADMIN — HYDROCORTISONE ACETATE 25 MG: 25 SUPPOSITORY RECTAL at 20:54

## 2025-01-01 RX ADMIN — POTASSIUM PHOSPHATE, MONOBASIC AND POTASSIUM PHOSPHATE, DIBASIC 15 MMOL: 224; 236 INJECTION, SOLUTION, CONCENTRATE INTRAVENOUS at 10:08

## 2025-01-01 RX ADMIN — METOPROLOL TARTRATE 25 MG: 25 TABLET, FILM COATED ORAL at 20:50

## 2025-01-01 RX ADMIN — PANTOPRAZOLE SODIUM 80 MG: 40 INJECTION, POWDER, FOR SOLUTION INTRAVENOUS at 13:15

## 2025-01-01 RX ADMIN — ATORVASTATIN CALCIUM 40 MG: 20 TABLET, FILM COATED ORAL at 20:52

## 2025-01-01 RX ADMIN — AMPICILLIN SODIUM AND SULBACTAM SODIUM 3 G: 2; 1 INJECTION, POWDER, FOR SOLUTION INTRAMUSCULAR; INTRAVENOUS at 16:14

## 2025-01-01 RX ADMIN — MORPHINE SULFATE 2 MG: 2 INJECTION, SOLUTION INTRAMUSCULAR; INTRAVENOUS at 21:18

## 2025-01-01 RX ADMIN — AMPICILLIN SODIUM AND SULBACTAM SODIUM 3 G: 2; 1 INJECTION, POWDER, FOR SOLUTION INTRAMUSCULAR; INTRAVENOUS at 15:41

## 2025-01-01 RX ADMIN — MORPHINE SULFATE 2 MG: 2 INJECTION, SOLUTION INTRAMUSCULAR; INTRAVENOUS at 17:40

## 2025-01-01 RX ADMIN — DIAZEPAM 5 MG: 10 INJECTION, SOLUTION INTRAMUSCULAR; INTRAVENOUS at 04:40

## 2025-01-01 RX ADMIN — DIAZEPAM 5 MG: 10 INJECTION, SOLUTION INTRAMUSCULAR; INTRAVENOUS at 16:24

## 2025-01-01 RX ADMIN — MORPHINE SULFATE 2 MG: 2 INJECTION, SOLUTION INTRAMUSCULAR; INTRAVENOUS at 17:28

## 2025-01-01 RX ADMIN — DIAZEPAM 5 MG: 10 INJECTION, SOLUTION INTRAMUSCULAR; INTRAVENOUS at 05:11

## 2025-01-01 RX ADMIN — METOPROLOL TARTRATE 25 MG: 25 TABLET, FILM COATED ORAL at 08:19

## 2025-01-01 RX ADMIN — DIAZEPAM 5 MG: 10 INJECTION, SOLUTION INTRAMUSCULAR; INTRAVENOUS at 01:08

## 2025-01-01 RX ADMIN — OLANZAPINE 5 MG: 5 TABLET, FILM COATED ORAL at 22:03

## 2025-01-01 RX ADMIN — MORPHINE SULFATE 4 MG: 4 INJECTION, SOLUTION INTRAMUSCULAR; INTRAVENOUS at 12:43

## 2025-01-01 RX ADMIN — MORPHINE SULFATE 4 MG: 4 INJECTION, SOLUTION INTRAMUSCULAR; INTRAVENOUS at 13:14

## 2025-01-01 RX ADMIN — MORPHINE SULFATE 2 MG: 2 INJECTION, SOLUTION INTRAMUSCULAR; INTRAVENOUS at 21:02

## 2025-01-01 RX ADMIN — LORAZEPAM 0.5 MG: 2 INJECTION INTRAMUSCULAR; INTRAVENOUS at 16:53

## 2025-01-01 RX ADMIN — DIAZEPAM 5 MG: 10 INJECTION, SOLUTION INTRAMUSCULAR; INTRAVENOUS at 20:50

## 2025-01-01 RX ADMIN — MORPHINE SULFATE 2 MG: 2 INJECTION, SOLUTION INTRAMUSCULAR; INTRAVENOUS at 16:49

## 2025-01-01 RX ADMIN — HYDROCORTISONE ACETATE 25 MG: 25 SUPPOSITORY RECTAL at 12:39

## 2025-01-01 RX ADMIN — MORPHINE SULFATE 4 MG: 4 INJECTION, SOLUTION INTRAMUSCULAR; INTRAVENOUS at 15:57

## 2025-01-01 RX ADMIN — MORPHINE SULFATE 4 MG: 4 INJECTION, SOLUTION INTRAMUSCULAR; INTRAVENOUS at 09:35

## 2025-01-01 RX ADMIN — Medication 10 ML: at 09:01

## 2025-01-01 RX ADMIN — LORAZEPAM 0.5 MG: 2 INJECTION INTRAMUSCULAR; INTRAVENOUS at 17:40

## 2025-01-01 RX ADMIN — GLYCOPYRROLATE 0.4 MG: 0.2 SOLUTION INTRAMUSCULAR; INTRAVENOUS at 06:23

## 2025-01-01 RX ADMIN — METOPROLOL TARTRATE 25 MG: 25 TABLET, FILM COATED ORAL at 09:01

## 2025-01-01 RX ADMIN — POTASSIUM CHLORIDE 40 MEQ: 1.5 POWDER, FOR SOLUTION ORAL at 11:10

## 2025-01-01 RX ADMIN — DIAZEPAM 5 MG: 10 INJECTION, SOLUTION INTRAMUSCULAR; INTRAVENOUS at 13:21

## 2025-01-01 RX ADMIN — HYDROCORTISONE ACETATE 25 MG: 25 SUPPOSITORY RECTAL at 09:01

## 2025-01-01 RX ADMIN — INSULIN LISPRO 2 UNITS: 100 INJECTION, SOLUTION INTRAVENOUS; SUBCUTANEOUS at 12:06

## 2025-01-01 RX ADMIN — MORPHINE SULFATE 4 MG: 4 INJECTION, SOLUTION INTRAMUSCULAR; INTRAVENOUS at 04:43

## 2025-01-01 RX ADMIN — DIAZEPAM 5 MG: 10 INJECTION, SOLUTION INTRAMUSCULAR; INTRAVENOUS at 00:44

## 2025-01-01 RX ADMIN — AMPICILLIN SODIUM AND SULBACTAM SODIUM 3 G: 2; 1 INJECTION, POWDER, FOR SOLUTION INTRAMUSCULAR; INTRAVENOUS at 16:04

## 2025-01-01 RX ADMIN — LORAZEPAM 0.5 MG: 2 INJECTION INTRAMUSCULAR; INTRAVENOUS at 03:51

## 2025-01-01 RX ADMIN — MORPHINE SULFATE 4 MG: 4 INJECTION, SOLUTION INTRAMUSCULAR; INTRAVENOUS at 00:43

## 2025-01-13 ENCOUNTER — PATIENT OUTREACH (OUTPATIENT)
Dept: CASE MANAGEMENT | Facility: OTHER | Age: OVER 89
End: 2025-01-13
Payer: MEDICARE

## 2025-01-13 NOTE — OUTREACH NOTE
AMBULATORY CASE MANAGEMENT NOTE    Names and Relationships of Patient/Support Persons: Contact: Monica Scherer; Relationship: Self -     Patient Outreach    Call placed to patient for monthly check in. She states she is doing well. Review of AVS from cardiology and last visit with Dr Mauricio. She denies any issues. Long enjoyable conversation.     Education Documentation  Medication Management, taught by Fatou Crawford, RN at 1/13/2025 11:22 AM.  Learner: Patient  Readiness: Eager  Method: Explanation  Response: Verbalizes Understanding    Home Safety, taught by Fatou Crawford, RN at 1/13/2025 11:22 AM.  Learner: Patient  Readiness: Eager  Method: Explanation  Response: Verbalizes Understanding          Fatou WASHINGTON  Ambulatory Case Management    1/13/2025, 11:22 EST

## 2025-01-27 NOTE — OUTREACH NOTE
Patient Outreach    AMBULATORY CASE MANAGEMENT NOTE    Name and Relationship of Patient/Support Person: Monica Scherer L - Self    Call placed to patient who states she is doing well. She had been to the emergency room due to nausea and vomiting. She is now better. She states other family members has recently gotten the same.   Discussed her elevated blood pressure in ED. She has been monitoring at home and it has been with in normal limits. Long enjoyable conversation. No other questions or concerns at this time.     Education Documentation  Provider Follow-Up, taught by Fatou Crawford, RN at 3/27/2024 11:04 AM.  Learner: Patient  Readiness: Eager  Method: Explanation  Response: Verbalizes Understanding    Medication Management, taught by Fatou Crawford, RN at 3/27/2024 11:04 AM.  Learner: Patient  Readiness: Eager  Method: Explanation  Response: Verbalizes Understanding    Blood Pressure Monitoring, taught by Fatou Crawford, RN at 3/27/2024 11:04 AM.  Learner: Patient  Readiness: Eager  Method: Explanation  Response: Verbalizes Understanding          Fatou WASHINGTON  Ambulatory Case Management    3/27/2024, 11:05 EDT   Add 18809 Cpt? (Important Note: In 2017 The Use Of 50653 Is Being Tracked By Cms To Determine Future Global Period Reimbursement For Global Periods): no

## 2025-02-03 DIAGNOSIS — I10 ESSENTIAL HYPERTENSION: ICD-10-CM

## 2025-02-03 RX ORDER — FELODIPINE 10 MG/1
10 TABLET, EXTENDED RELEASE ORAL DAILY
Qty: 90 TABLET | Refills: 1 | Status: SHIPPED | OUTPATIENT
Start: 2025-02-03

## 2025-02-18 ENCOUNTER — PATIENT OUTREACH (OUTPATIENT)
Dept: CASE MANAGEMENT | Facility: OTHER | Age: OVER 89
End: 2025-02-18
Payer: MEDICARE

## 2025-02-18 NOTE — OUTREACH NOTE
AMBULATORY CASE MANAGEMENT NOTE    Names and Relationships of Patient/Support Persons: Contact: Monica Scherer; Relationship: Self -     Patient Outreach    Call placed to patient for monthly check in. She states she is doing well. No recent illness. We discussed need for flu vaccine and she will get this the next time she is in to see her PCP. Discussed graduation and she is not ready. Denies questions or concerns.     Education Documentation  Unresolved/Worsening Symptoms, taught by Fatou Crawford RN at 2/18/2025 11:16 AM.  Learner: Patient  Readiness: Eager  Method: Explanation  Response: Verbalizes Understanding    Safety, taught by Fatou Crawford RN at 2/18/2025 11:16 AM.  Learner: Patient  Readiness: Eager  Method: Explanation  Response: Verbalizes Understanding    Medication Management, taught by Fatou Crawford RN at 2/18/2025 11:16 AM.  Learner: Patient  Readiness: Eager  Method: Explanation  Response: Verbalizes Understanding    Home Safety, taught by Fatou Crawford RN at 2/18/2025 11:16 AM.  Learner: Patient  Readiness: Eager  Method: Explanation  Response: Verbalizes Understanding    Energy Conservation, taught by Fatou Crawford RN at 2/18/2025 11:16 AM.  Learner: Patient  Readiness: Eager  Method: Explanation  Response: Verbalizes Understanding    Risk Factors, taught by Fatou Crawford RN at 2/18/2025 11:16 AM.  Learner: Patient  Readiness: Eager  Method: Explanation  Response: Verbalizes Understanding          Fatou WASHINGTON  Ambulatory Case Management    2/18/2025, 11:18 EST

## 2025-02-26 ENCOUNTER — ANESTHESIA EVENT (OUTPATIENT)
Dept: INTERVENTIONAL RADIOLOGY/VASCULAR | Facility: HOSPITAL | Age: OVER 89
End: 2025-02-26
Payer: MEDICARE

## 2025-02-26 ENCOUNTER — APPOINTMENT (OUTPATIENT)
Dept: INTERVENTIONAL RADIOLOGY/VASCULAR | Facility: HOSPITAL | Age: OVER 89
DRG: 024 | End: 2025-02-26
Payer: MEDICARE

## 2025-02-26 ENCOUNTER — APPOINTMENT (OUTPATIENT)
Dept: GENERAL RADIOLOGY | Facility: HOSPITAL | Age: OVER 89
DRG: 024 | End: 2025-02-26
Payer: MEDICARE

## 2025-02-26 ENCOUNTER — HOSPITAL ENCOUNTER (INPATIENT)
Facility: HOSPITAL | Age: OVER 89
LOS: 5 days | Discharge: NURSING, REHAB OR LONG-TERM CARE | DRG: 024 | End: 2025-03-03
Attending: EMERGENCY MEDICINE | Admitting: INTERNAL MEDICINE
Payer: MEDICARE

## 2025-02-26 ENCOUNTER — APPOINTMENT (OUTPATIENT)
Dept: CT IMAGING | Facility: HOSPITAL | Age: OVER 89
DRG: 024 | End: 2025-02-26
Payer: MEDICARE

## 2025-02-26 DIAGNOSIS — R41.82 ALTERED MENTAL STATUS, UNSPECIFIED ALTERED MENTAL STATUS TYPE: Primary | ICD-10-CM

## 2025-02-26 DIAGNOSIS — I63.9 ACUTE CVA (CEREBROVASCULAR ACCIDENT): ICD-10-CM

## 2025-02-26 DIAGNOSIS — R41.4 HEMI-NEGLECT OF RIGHT SIDE: ICD-10-CM

## 2025-02-26 DIAGNOSIS — I69.331 MONOPLEGIA OF UPPER EXTREMITY FOLLOWING CEREBRAL INFARCTION AFFECTING RIGHT DOMINANT SIDE: ICD-10-CM

## 2025-02-26 DIAGNOSIS — R29.818 FOCAL NEUROLOGICAL DEFICIT PRESENT: ICD-10-CM

## 2025-02-26 LAB
ABO GROUP BLD: NORMAL
ALBUMIN SERPL-MCNC: 3.5 G/DL (ref 3.5–5.2)
ALBUMIN/GLOB SERPL: 1.2 G/DL
ALP SERPL-CCNC: 106 U/L (ref 39–117)
ALT SERPL W P-5'-P-CCNC: 7 U/L (ref 1–33)
ANION GAP SERPL CALCULATED.3IONS-SCNC: 13.1 MMOL/L (ref 5–15)
APTT PPP: 25.6 SECONDS (ref 22.7–35.4)
AST SERPL-CCNC: 12 U/L (ref 1–32)
BASOPHILS # BLD AUTO: 0.05 10*3/MM3 (ref 0–0.2)
BASOPHILS NFR BLD AUTO: 0.4 % (ref 0–1.5)
BILIRUB SERPL-MCNC: 0.7 MG/DL (ref 0–1.2)
BLD GP AB SCN SERPL QL: NEGATIVE
BUN SERPL-MCNC: 18 MG/DL (ref 8–23)
BUN/CREAT SERPL: 19.4 (ref 7–25)
CALCIUM SPEC-SCNC: 8.4 MG/DL (ref 8.2–9.6)
CHLORIDE SERPL-SCNC: 99 MMOL/L (ref 98–107)
CO2 SERPL-SCNC: 23.9 MMOL/L (ref 22–29)
CREAT SERPL-MCNC: 0.93 MG/DL (ref 0.57–1)
DEPRECATED RDW RBC AUTO: 48.3 FL (ref 37–54)
EGFRCR SERPLBLD CKD-EPI 2021: 58.1 ML/MIN/1.73
EOSINOPHIL # BLD AUTO: 0.13 10*3/MM3 (ref 0–0.4)
EOSINOPHIL NFR BLD AUTO: 1 % (ref 0.3–6.2)
ERYTHROCYTE [DISTWIDTH] IN BLOOD BY AUTOMATED COUNT: 14.6 % (ref 12.3–15.4)
GLOBULIN UR ELPH-MCNC: 2.9 GM/DL
GLUCOSE BLDC GLUCOMTR-MCNC: 124 MG/DL (ref 70–130)
GLUCOSE SERPL-MCNC: 135 MG/DL (ref 65–99)
HCT VFR BLD AUTO: 38.6 % (ref 34–46.6)
HGB BLD-MCNC: 12.6 G/DL (ref 12–15.9)
HOLD SPECIMEN: NORMAL
HOLD SPECIMEN: NORMAL
IMM GRANULOCYTES # BLD AUTO: 0.09 10*3/MM3 (ref 0–0.05)
IMM GRANULOCYTES NFR BLD AUTO: 0.7 % (ref 0–0.5)
INR PPP: 1.2 (ref 0.9–1.1)
LYMPHOCYTES # BLD AUTO: 2.49 10*3/MM3 (ref 0.7–3.1)
LYMPHOCYTES NFR BLD AUTO: 20 % (ref 19.6–45.3)
MAGNESIUM SERPL-MCNC: 1.7 MG/DL (ref 1.7–2.3)
MCH RBC QN AUTO: 29.5 PG (ref 26.6–33)
MCHC RBC AUTO-ENTMCNC: 32.6 G/DL (ref 31.5–35.7)
MCV RBC AUTO: 90.4 FL (ref 79–97)
MONOCYTES # BLD AUTO: 0.71 10*3/MM3 (ref 0.1–0.9)
MONOCYTES NFR BLD AUTO: 5.7 % (ref 5–12)
NEUTROPHILS NFR BLD AUTO: 72.2 % (ref 42.7–76)
NEUTROPHILS NFR BLD AUTO: 8.99 10*3/MM3 (ref 1.7–7)
NRBC BLD AUTO-RTO: 0 /100 WBC (ref 0–0.2)
PLATELET # BLD AUTO: 250 10*3/MM3 (ref 140–450)
PMV BLD AUTO: 9 FL (ref 6–12)
POTASSIUM SERPL-SCNC: 2.9 MMOL/L (ref 3.5–5.2)
POTASSIUM SERPL-SCNC: 3.6 MMOL/L (ref 3.5–5.2)
PROT SERPL-MCNC: 6.4 G/DL (ref 6–8.5)
PROTHROMBIN TIME: 15.1 SECONDS (ref 11.7–14.2)
RBC # BLD AUTO: 4.27 10*6/MM3 (ref 3.77–5.28)
RH BLD: NEGATIVE
SODIUM SERPL-SCNC: 136 MMOL/L (ref 136–145)
T&S EXPIRATION DATE: NORMAL
WBC NRBC COR # BLD AUTO: 12.46 10*3/MM3 (ref 3.4–10.8)
WHOLE BLOOD HOLD COAG: NORMAL
WHOLE BLOOD HOLD SPECIMEN: NORMAL

## 2025-02-26 PROCEDURE — 25010000002 LIDOCAINE 2% SOLUTION: Performed by: ANESTHESIOLOGY

## 2025-02-26 PROCEDURE — 61645 PERQ ART M-THROMBECT &/NFS: CPT

## 2025-02-26 PROCEDURE — 25010000002 SUGAMMADEX 200 MG/2ML SOLUTION: Performed by: ANESTHESIOLOGY

## 2025-02-26 PROCEDURE — 25010000002 PROPOFOL 10 MG/ML EMULSION: Performed by: ANESTHESIOLOGY

## 2025-02-26 PROCEDURE — C1894 INTRO/SHEATH, NON-LASER: HCPCS

## 2025-02-26 PROCEDURE — 61645 PERQ ART M-THROMBECT &/NFS: CPT | Performed by: STUDENT IN AN ORGANIZED HEALTH CARE EDUCATION/TRAINING PROGRAM

## 2025-02-26 PROCEDURE — 70498 CT ANGIOGRAPHY NECK: CPT

## 2025-02-26 PROCEDURE — C1887 CATHETER, GUIDING: HCPCS

## 2025-02-26 PROCEDURE — 85730 THROMBOPLASTIN TIME PARTIAL: CPT | Performed by: EMERGENCY MEDICINE

## 2025-02-26 PROCEDURE — 99291 CRITICAL CARE FIRST HOUR: CPT

## 2025-02-26 PROCEDURE — 0042T HC CT CEREBRAL PERFUSION W/WO CONTRAST: CPT

## 2025-02-26 PROCEDURE — 71045 X-RAY EXAM CHEST 1 VIEW: CPT

## 2025-02-26 PROCEDURE — C1769 GUIDE WIRE: HCPCS

## 2025-02-26 PROCEDURE — C1760 CLOSURE DEV, VASC: HCPCS

## 2025-02-26 PROCEDURE — 86900 BLOOD TYPING SEROLOGIC ABO: CPT | Performed by: EMERGENCY MEDICINE

## 2025-02-26 PROCEDURE — 31500 INSERT EMERGENCY AIRWAY: CPT

## 2025-02-26 PROCEDURE — 70496 CT ANGIOGRAPHY HEAD: CPT

## 2025-02-26 PROCEDURE — 25010000002 PHENYLEPHRINE 10 MG/ML SOLUTION 5 ML VIAL: Performed by: ANESTHESIOLOGY

## 2025-02-26 PROCEDURE — 36415 COLL VENOUS BLD VENIPUNCTURE: CPT

## 2025-02-26 PROCEDURE — B3141ZZ FLUOROSCOPY OF LEFT COMMON CAROTID ARTERY USING LOW OSMOLAR CONTRAST: ICD-10-PCS | Performed by: STUDENT IN AN ORGANIZED HEALTH CARE EDUCATION/TRAINING PROGRAM

## 2025-02-26 PROCEDURE — 84132 ASSAY OF SERUM POTASSIUM: CPT

## 2025-02-26 PROCEDURE — 85610 PROTHROMBIN TIME: CPT | Performed by: EMERGENCY MEDICINE

## 2025-02-26 PROCEDURE — 82948 REAGENT STRIP/BLOOD GLUCOSE: CPT

## 2025-02-26 PROCEDURE — 25510000002 IODIXANOL PER 1 ML: Performed by: INTERNAL MEDICINE

## 2025-02-26 PROCEDURE — 83735 ASSAY OF MAGNESIUM: CPT

## 2025-02-26 PROCEDURE — 25510000001 IOPAMIDOL PER 1 ML: Performed by: EMERGENCY MEDICINE

## 2025-02-26 PROCEDURE — 80053 COMPREHEN METABOLIC PANEL: CPT | Performed by: EMERGENCY MEDICINE

## 2025-02-26 PROCEDURE — 99222 1ST HOSP IP/OBS MODERATE 55: CPT | Performed by: STUDENT IN AN ORGANIZED HEALTH CARE EDUCATION/TRAINING PROGRAM

## 2025-02-26 PROCEDURE — 86901 BLOOD TYPING SEROLOGIC RH(D): CPT | Performed by: EMERGENCY MEDICINE

## 2025-02-26 PROCEDURE — 86850 RBC ANTIBODY SCREEN: CPT | Performed by: EMERGENCY MEDICINE

## 2025-02-26 PROCEDURE — 25010000002 HEPARIN (PORCINE) PER 1000 UNITS: Performed by: STUDENT IN AN ORGANIZED HEALTH CARE EDUCATION/TRAINING PROGRAM

## 2025-02-26 PROCEDURE — 85025 COMPLETE CBC W/AUTO DIFF WBC: CPT | Performed by: EMERGENCY MEDICINE

## 2025-02-26 PROCEDURE — 25810000003 SODIUM CHLORIDE 0.9 % SOLUTION 250 ML FLEX CONT: Performed by: ANESTHESIOLOGY

## 2025-02-26 PROCEDURE — 93010 ELECTROCARDIOGRAM REPORT: CPT | Performed by: INTERNAL MEDICINE

## 2025-02-26 PROCEDURE — 93005 ELECTROCARDIOGRAM TRACING: CPT | Performed by: EMERGENCY MEDICINE

## 2025-02-26 PROCEDURE — 03CG3ZZ EXTIRPATION OF MATTER FROM INTRACRANIAL ARTERY, PERCUTANEOUS APPROACH: ICD-10-PCS | Performed by: STUDENT IN AN ORGANIZED HEALTH CARE EDUCATION/TRAINING PROGRAM

## 2025-02-26 PROCEDURE — 25010000002 ONDANSETRON PER 1 MG: Performed by: ANESTHESIOLOGY

## 2025-02-26 PROCEDURE — 25010000002 PHENYLEPHRINE 10 MG/ML SOLUTION: Performed by: ANESTHESIOLOGY

## 2025-02-26 RX ORDER — LIDOCAINE HYDROCHLORIDE 20 MG/ML
INJECTION, SOLUTION INFILTRATION; PERINEURAL AS NEEDED
Status: DISCONTINUED | OUTPATIENT
Start: 2025-02-26 | End: 2025-02-26 | Stop reason: SURG

## 2025-02-26 RX ORDER — PROPOFOL 10 MG/ML
VIAL (ML) INTRAVENOUS AS NEEDED
Status: DISCONTINUED | OUTPATIENT
Start: 2025-02-26 | End: 2025-02-26 | Stop reason: SURG

## 2025-02-26 RX ORDER — PHENYLEPHRINE HYDROCHLORIDE 10 MG/ML
INJECTION INTRAVENOUS AS NEEDED
Status: DISCONTINUED | OUTPATIENT
Start: 2025-02-26 | End: 2025-02-26 | Stop reason: SURG

## 2025-02-26 RX ORDER — ATORVASTATIN CALCIUM 80 MG/1
80 TABLET, FILM COATED ORAL NIGHTLY
Status: DISCONTINUED | OUTPATIENT
Start: 2025-02-26 | End: 2025-02-28

## 2025-02-26 RX ORDER — IODIXANOL 320 MG/ML
100 INJECTION, SOLUTION INTRAVASCULAR
Status: COMPLETED | OUTPATIENT
Start: 2025-02-26 | End: 2025-02-26

## 2025-02-26 RX ORDER — SODIUM CHLORIDE 0.9 % (FLUSH) 0.9 %
10 SYRINGE (ML) INJECTION AS NEEDED
Status: DISCONTINUED | OUTPATIENT
Start: 2025-02-26 | End: 2025-03-03 | Stop reason: HOSPADM

## 2025-02-26 RX ORDER — SODIUM CHLORIDE 9 MG/ML
40 INJECTION, SOLUTION INTRAVENOUS AS NEEDED
Status: DISCONTINUED | OUTPATIENT
Start: 2025-02-26 | End: 2025-03-03 | Stop reason: HOSPADM

## 2025-02-26 RX ORDER — ONDANSETRON 2 MG/ML
INJECTION INTRAMUSCULAR; INTRAVENOUS AS NEEDED
Status: DISCONTINUED | OUTPATIENT
Start: 2025-02-26 | End: 2025-02-26 | Stop reason: SURG

## 2025-02-26 RX ORDER — PANTOPRAZOLE SODIUM 40 MG/10ML
40 INJECTION, POWDER, LYOPHILIZED, FOR SOLUTION INTRAVENOUS
Status: DISCONTINUED | OUTPATIENT
Start: 2025-02-27 | End: 2025-02-27

## 2025-02-26 RX ORDER — SODIUM CHLORIDE 0.9 % (FLUSH) 0.9 %
10 SYRINGE (ML) INJECTION EVERY 12 HOURS SCHEDULED
Status: DISCONTINUED | OUTPATIENT
Start: 2025-02-26 | End: 2025-03-03 | Stop reason: HOSPADM

## 2025-02-26 RX ORDER — IOPAMIDOL 755 MG/ML
150 INJECTION, SOLUTION INTRAVASCULAR
Status: COMPLETED | OUTPATIENT
Start: 2025-02-26 | End: 2025-02-26

## 2025-02-26 RX ORDER — HEPARIN SODIUM 5000 [USP'U]/ML
5000 INJECTION, SOLUTION INTRAVENOUS; SUBCUTANEOUS EVERY 8 HOURS SCHEDULED
Status: DISCONTINUED | OUTPATIENT
Start: 2025-02-27 | End: 2025-03-02

## 2025-02-26 RX ORDER — HYDRALAZINE HYDROCHLORIDE 20 MG/ML
10 INJECTION INTRAMUSCULAR; INTRAVENOUS EVERY 4 HOURS PRN
Status: DISCONTINUED | OUTPATIENT
Start: 2025-02-26 | End: 2025-03-03 | Stop reason: HOSPADM

## 2025-02-26 RX ORDER — ROCURONIUM BROMIDE 10 MG/ML
INJECTION, SOLUTION INTRAVENOUS AS NEEDED
Status: DISCONTINUED | OUTPATIENT
Start: 2025-02-26 | End: 2025-02-26 | Stop reason: SURG

## 2025-02-26 RX ORDER — ASPIRIN 300 MG/1
300 SUPPOSITORY RECTAL DAILY
Status: DISCONTINUED | OUTPATIENT
Start: 2025-02-26 | End: 2025-02-28

## 2025-02-26 RX ORDER — ASPIRIN 325 MG
325 TABLET ORAL DAILY
Status: DISCONTINUED | OUTPATIENT
Start: 2025-02-26 | End: 2025-02-28

## 2025-02-26 RX ADMIN — IOPAMIDOL 150 ML: 755 INJECTION, SOLUTION INTRAVENOUS at 17:05

## 2025-02-26 RX ADMIN — HEPARIN SODIUM: 1000 INJECTION INTRAVENOUS; SUBCUTANEOUS at 18:03

## 2025-02-26 RX ADMIN — ROCURONIUM BROMIDE 50 MG: 10 INJECTION INTRAVENOUS at 17:51

## 2025-02-26 RX ADMIN — PHENYLEPHRINE HYDROCHLORIDE 100 MCG: 10 INJECTION INTRAVENOUS at 17:56

## 2025-02-26 RX ADMIN — PHENYLEPHRINE HYDROCHLORIDE 100 MCG: 10 INJECTION INTRAVENOUS at 18:00

## 2025-02-26 RX ADMIN — METOPROLOL TARTRATE 5 MG: 1 INJECTION, SOLUTION INTRAVENOUS at 20:31

## 2025-02-26 RX ADMIN — PHENYLEPHRINE HYDROCHLORIDE 0.5 MCG/KG/MIN: 10 INJECTION, SOLUTION INTRAVENOUS at 17:57

## 2025-02-26 RX ADMIN — PROPOFOL 150 MG: 10 INJECTION, EMULSION INTRAVENOUS at 17:51

## 2025-02-26 RX ADMIN — HEPARIN SODIUM: 1000 INJECTION INTRAVENOUS; SUBCUTANEOUS at 18:04

## 2025-02-26 RX ADMIN — SUGAMMADEX 400 MG: 100 INJECTION, SOLUTION INTRAVENOUS at 18:12

## 2025-02-26 RX ADMIN — ONDANSETRON 4 MG: 2 INJECTION INTRAMUSCULAR; INTRAVENOUS at 18:12

## 2025-02-26 RX ADMIN — LIDOCAINE HYDROCHLORIDE 100 MG: 20 INJECTION, SOLUTION INFILTRATION; PERINEURAL at 17:51

## 2025-02-26 RX ADMIN — PHENYLEPHRINE HYDROCHLORIDE 100 MCG: 10 INJECTION INTRAVENOUS at 18:04

## 2025-02-26 RX ADMIN — IODIXANOL 24 ML: 320 INJECTION, SOLUTION INTRAVASCULAR at 19:16

## 2025-02-26 RX ADMIN — Medication 10 ML: at 20:35

## 2025-02-26 NOTE — OP NOTE
"SURGEON: Micheline Singh MD     SERVICE: Neurosurgery     ASSISTANT/FELLOW: None    DATE: 02/26/25     PATIENT NAME: Monica Shcerer    MRN: [id number]     CLINICAL HISTORY:  The patient is an 89 year-old female who presents with aphasia and right sided weakness  (NIHSS 21) starting at n 02/26/25.  Initial CT head reveals no infarct. TNK was not given.  CTA demonstrates a left M1 large vessel occlusion. CT Perfusion demonstrates a 132mL penumbra with 32ml core infarct and a mismatch ratio of 5.1. Cerebral angiography was requested for consideration of mechanical thrombectomy.     INFORMED CONSENT: Consent was emergent.     PROCEDURE:    1) cerebral angiogram  2) Intra-arterial thrombectomy for revascularization of acute left M1 occlusion  3) Ultrasound guidance for vascular access of right femoral artery     CONTRAST: 44cc     ESTIMATED BLOOD LOSS:  50cc    ANESTHESIA: GETA    DISPOSITION: ICU/critical    SPECIMEN: none    DRAINS: None    MATERIALS EMPLOYED:  Micropuncture 5Fr set  15J wire length, 260 J  5 Fr sheath  5 Fr 125 cm VTK catheter  6 Fr Neuron Max, 90 cm  Phoebe 6F 131  0 .038\" glidewire  Marskman microcatheter 150cm  robert 014 0.014\" 200 cm   Closure Device Angio seal 6F  Visipaque 320mg/ml     INJECTIONS:   Left common carotid artery  Left internal carotid artery  Right femoral artery     TECHNIQUE: The patient was emergently brought to the neuroangiography suite and placed supine on the fluoroscopy table. General Anesthesia was overseen and administered by the anesthesia service.  Bilateral groins were prepped and draped in the usual sterile fashion. A timeout and team briefing were performed to confirm patient identification and the planned procedure with all staff present in the procedure room. The right common femoral artery was accessed using a micropuncture set and direct ultrasound guidance. 5F sheath was used to dilate the artery over a 260 J and the Neuron Max was attached to a Touhy " Austin adapter with inner dilator inserted attached to a heparinized flush and advanced over a VTK and 260 J into the descending aorta. The left common carotid artery was catheterized using the diagnostic catheter and Terumo guide wire. Working angles were obtained and used as a roadmap. A trial-axial system consisting of the sameer, marksman microcatheter, and robert 014 wire were advanced into the cavernous carotid and the marksman microcatheter, robert 014 wire were advanced to the occlusion. The aspiration catheter was advanced to the proximal clot and the marksman was removed. Continuous aspiration with a 60cc locking syringe was applied to the aspiration catheter during this process with continuous mechanical aspiration on the neuronmax guide catheter. There was a large clot in the catheter.  Follow up control run showed TICI 3  flow.    Post-thrombectomy attempt #1:  Angiography of occluded vessel after thrombectomy attempt reveals TICI3 result.    Total number of passes: 1    Post-thrombectomy attempt #1 final injection: Final control angiography of the left common carotid artery reveals good flow in the middle cerebral arterial and anterior cerebral artery without evidence of iatrogenic injury. Final result: TICI 3    I then pulled the NeuronMax into the descending aorta and placed the 260 J into it. I pulled the guide catheter to the iliac artery and performed a run which did not show any evidence of iatrogenic injury. I removed the catheter and sheath and deployed an Angioseal device at 1812.  The puncture site was covered with a sterile dressing.  The patient was transferred to the Neuro ICU and the patient's family was informed of the angiographic results.  Direct hand-off was given to the Neurointensive Care service.         Pre-procedure NIH:21  Decision time:1714  In room:1743  Procedure start:1800  Arterial access puncture time:1800  Guide catheter placement time:1805  First thrombectomy device  placement time (first pass):1808 TICI 3  Subsequent device placement time:-  Time of recanalization:1808  Final TICI Flow: 3  Procedure end time:1812          FINDINGS:    left common carotid: cervical  minimal stenosis at the carotid bifurcation or internal carotid origin. The cervical ICA is unremarkable. Visualized external carotid artery branches are normal.      Left internal carotid: cranial, including magnified oblique projections  There is good filling of the distal ICA into the anterior cerebral artery.  There is occlusion of the M1 segment of the middle cerebral artery. The ophthalmic, posterior communicating and anterior choroidal arteries have normal origin from the ICA.   There is no intracranial aneurysm, arteriovenous shunting or evidence of other vascular malformation. The parenchymal and venous phases are unremarkable, with opacification of cortical veins and the major venous sinuses     Right femoral artery: No evidence of iatrogenic injury, vessel occlusion, or perforation. Access point is below the equator of the femoral head in the common femoral artery.    Aortic arch type/Variant: Type III    Ultrasound guidance for vascular access of right femoral artery: Right femoral artery found to be patent and ultrasound was used to directly visualize the puncture with the 21-gauge needle above the femoral bifurcation. Permanent sonographic image of right femoral artery saved.        IMPRESSION:  Pre-thrombectomy angiography revealed left M1 MCA occlusion (TICI 0).  Pt underwent an acute stroke intervention.  Successful recanalization occurred after 1 passes of manual aspiration thrombectomy angiographic result of TICI 3 result.  There were no immediate complications.  Groin closure was at 1812.        Micheline Singh MD, ANEL  Cerebrovascular and Endovascular Neurosurgery  Department of Neurosurgery    [address], [address]  [place] [address]  Office: [phone]  Fax:  [phone]    _________________________________________  Reference:  NASCET criteria for carotid stenosis  0%=none  1-49%=mild  50-74%=moderate  75-89%=severe  90-99%=critical     TICI Score  Grade 0- No perfusion.  No antegrade flow beyond occlusion  Grade 1- Perfusion past the initial obstruction but limited distal branch filling with little or slow distal perfusion (ie, no parenchymal blush)  Grade 2a - Perfusion of less than half of the vascular distribution of the occluded artery (eg, filling and perfusion through 1 M2 division)  Grade 2b- Perfusion of half or greater of the vascular distribution of the occluded artery (eg, filling and perfusion through 2 or more M2 divisions)  Grade 2c -Near-complete perfusion except for slow flow in a few distal cortical vessels or presence of small distal cortical emboli  Grade 3- Full perfusion with filling of all distal branches

## 2025-02-26 NOTE — ED PROVIDER NOTES
EMERGENCY DEPARTMENT ENCOUNTER    Room Number:  NIRAV/NIRAV  PCP: Amilcar Mauricio DO  Independent Historians: EMS    HPI:  Chief Complaint: had concerns including Stroke (Right sided weakness, aphasia).      A complete HPI/ROS/PMH/PSH/SH/FH are unobtainable due to: Altered Mental Status and Acutely ill and not able to provide history    Chronic or social conditions impacting patient care (Social Determinants of Health): Uncertain      Context: Monica Scherer is a 89 y.o. female with a medical history of Afib,  who presents to the ED c/o acute altered mentation and concern for stroke.  Per EMS patient with history of stroke with right-sided deficits but per family is usually able to use the right side, hold objects, and ambulate.  Patient nonverbal inroute with right sided neglect.  EMS and family did not report any recent illnesses or falls.  Additional history will not be available until family arrives but they are reportedly in route as well.        Review of prior external notes (non-ED) -and- Review of prior external test results outside of this encounter: Patient seen by cardiology December 9.  Patient is followed by Dr. Bassett.  Noted to be on metoprolol and Eliquis.  Previously she had been on aspirin as well but that was moved to every other day due to bruising.    Prescription drug monitoring program review:     N/A    PAST MEDICAL HISTORY  Active Ambulatory Problems     Diagnosis Date Noted    Vertigo 06/14/2016    Temporary cerebral vascular dysfunction 06/14/2016    Gastroesophageal reflux disease with esophagitis 06/14/2016    Degeneration of intervertebral disc of cervical region 06/14/2016    Prediabetes 06/14/2016    S/P cholecystectomy 06/14/2016    Osteoarthritis of knee 10/31/2016    Herpes zoster without complication 11/08/2016    Hypertension 02/18/2018    Duodenal ulcer 03/13/2018    History of pancreatitis 03/22/2018    Hyperlipidemia 07/19/2019    TIA (transient ischemic attack) 01/04/2020     Cerebrovascular accident (CVA) due to thrombosis of left posterior cerebral artery 04/07/2020    Paroxysmal atrial fibrillation 04/07/2020    General weakness 09/10/2021    History of CVA (cerebrovascular accident) 09/10/2021    Anticoagulated 09/10/2021    Hematuria 09/11/2021    Dizziness and giddiness 09/11/2021    Rectal bleeding 09/12/2021     Resolved Ambulatory Problems     Diagnosis Date Noted    Acute cholecystitis 06/07/2016    Benign essential hypertension 06/14/2016    Indigestion 10/31/2016    Acute viral bronchitis 01/23/2018    Acute biliary pancreatitis 02/18/2018     Past Medical History:   Diagnosis Date    Atrial fibrillation     Lacunar infarct, acute 01/2020    New onset atrial fibrillation 01/2020    Prolapsed uterus     Stroke     Weakness          PAST SURGICAL HISTORY  Past Surgical History:   Procedure Laterality Date    APPENDECTOMY      CHOLECYSTECTOMY N/A 6/8/2016    Procedure: CHOLECYSTECTOMY LAPAROSCOPIC;  Surgeon: Russ Gar MD;  [place]  [facility] [place]:     COLONOSCOPY N/A 9/16/2021    Procedure: COLONOSCOPY TO CECUM WITH HOT SNARE POLYPECTOMIES AND COLD BX POLYPECTOMY;  Surgeon: Emerson Izaguirre MD;  Location:  [facility];  Service: Gastroenterology;  Laterality: N/A;  pre: RECTAL BLEEDING, FAMILY HX OF COLON CANCER  post: INTERNAL AND EXTERNAL HEMORRHOIDS, DIVERTICULOSIS, POLYPS    ENDOSCOPY N/A 2/22/2018    Z-line regular, 35 cm from incisors, normal esophagus, gastritis, bilious gastric fluid, one non-bleeding duodenal ulcer w/no stigmata of bleeding, Path: DUODENUM: FRAGMENTS OF GASTRIC TYPE MUCOSA WITH HYPERPLASIA (FOVEOLAR) AND PATCHY MIXED INFLAMMATION IN THE LAMINA PROPRIA WITH FOCAL FIBROSIS, COMMENT: 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     EYE SURGERY      tre cataracts         FAMILY HISTORY  History reviewed. No pertinent family history.      SOCIAL HISTORY  Social History     Socioeconomic History    Marital status: Single   Tobacco  "Use    Smoking status: Never     Passive exposure: Never    Smokeless tobacco: Never    Tobacco comments:     caffeine use- \"rare\"   Vaping Use    Vaping status: Never Used   Substance and Sexual Activity    Alcohol use: No    Drug use: No    Sexual activity: Defer         ALLERGIES  Cephalexin and Latex        REVIEW OF SYSTEMS  Review of Systems  Included in HPI  All systems reviewed and negative except for those discussed in HPI.      PHYSICAL EXAM    I have reviewed the triage vital signs and nursing notes.    ED Triage Vitals [02/26/25 1631]   Temp Heart Rate Resp BP SpO2   -- 67 12 137/61 92 %      Temp src Heart Rate Source Patient Position BP Location FiO2 (%)   -- Monitor -- -- --       Physical Exam  GENERAL: Awake and makes eye contact with me when I am at left side of the bed, attempts to answer my questions and points but is unable to form any words, pronounced right facial droop, alert, marked right-sided neglect   SKIN: Warm, dry  HENT: Normocephalic, atraumatic  EYES: no scleral icterus  CV: regular rhythm, regular rate  RESPIRATORY: normal effort, lungs clear, no wheezing  ABDOMEN: soft, nontender, nondistended  MUSCULOSKELETAL: no deformity  NEURO: Awake and eyes open but significant right-sided neglect, right arm and right leg flaccid, significant right facial droop, aphasic    NIH:  Interval: baseline  1a. Level of Consciousness: 1-->Not alert, but arousable by minor stimulation to obey, answer, or respond  1b. LOC Questions: 2-->Answers neither question correctly  1c. LOC Commands: 1-->Performs one task correctly  2. Best Gaze: 2-->Forced deviation, or total gaze paresis not overcome by the oculocephalic maneuver  3. Visual: 0-->No visual loss  4. Facial Palsy: 1-->Minor paralysis (flattened nasolabial fold, asymmetry on smiling)  5a. Motor Arm, Left: 0-->No drift, limb holds 90 (or 45) degrees for full 10 secs  5b. Motor Arm, Right: 3-->No effort against gravity, limb falls  6a. Motor Leg, " Left: 2-->Some effort against gravity, leg falls to bed by 5 secs, but has some effort against gravity  6b. Motor Leg, Right: 3-->No effort against gravity, leg falls to bed immediately  7. Limb Ataxia: 0-->Absent  8. Sensory: 2-->Severe to total sensory loss, patient is not aware of being touched in the face, arm, and leg  9. Best Language: 3-->Mute, global aphasia, no usable speech or auditory comprehension  10. Dysarthria: 0-->Normal  11. Extinction and Inattention (formerly Neglect): 2-->Profound hannah-inattention/extinction more than 1 modality    Total (NIH Stroke Scale): 22        LAB RESULTS  Recent Results (from the past 24 hours)   Protime-INR    Collection Time: 02/26/25  4:47 PM    Specimen: Blood   Result Value Ref Range    Protime 15.1 (H) 11.7 - 14.2 Seconds    INR 1.20 (H) 0.90 - 1.10   aPTT    Collection Time: 02/26/25  4:47 PM    Specimen: Blood   Result Value Ref Range    PTT 25.6 22.7 - 35.4 seconds   Type & Screen    Collection Time: 02/26/25  4:47 PM    Specimen: Blood   Result Value Ref Range    ABO Type A     RH type Negative     Antibody Screen Negative     T&S Expiration Date 3/1/2025 11:59:59 PM    Green Top (Gel)    Collection Time: 02/26/25  4:47 PM   Result Value Ref Range    Extra Tube Hold for add-ons.    Lavender Top    Collection Time: 02/26/25  4:47 PM   Result Value Ref Range    Extra Tube hold for add-on    Gold Top - SST    Collection Time: 02/26/25  4:47 PM   Result Value Ref Range    Extra Tube Hold for add-ons.    Light Blue Top    Collection Time: 02/26/25  4:47 PM   Result Value Ref Range    Extra Tube Hold for add-ons.    CBC Auto Differential    Collection Time: 02/26/25  4:47 PM    Specimen: Blood   Result Value Ref Range    WBC 12.46 (H) 3.40 - 10.80 10*3/mm3    RBC 4.27 3.77 - 5.28 10*6/mm3    Hemoglobin 12.6 12.0 - 15.9 g/dL    Hematocrit 38.6 34.0 - 46.6 %    MCV 90.4 79.0 - 97.0 fL    MCH 29.5 26.6 - 33.0 pg    MCHC 32.6 31.5 - 35.7 g/dL    RDW 14.6 12.3 - 15.4 %     RDW-SD 48.3 37.0 - 54.0 fl    MPV 9.0 6.0 - 12.0 fL    Platelets 250 140 - 450 10*3/mm3    Neutrophil % 72.2 42.7 - 76.0 %    Lymphocyte % 20.0 19.6 - 45.3 %    Monocyte % 5.7 5.0 - 12.0 %    Eosinophil % 1.0 0.3 - 6.2 %    Basophil % 0.4 0.0 - 1.5 %    Immature Grans % 0.7 (H) 0.0 - 0.5 %    Neutrophils, Absolute 8.99 (H) 1.70 - 7.00 10*3/mm3    Lymphocytes, Absolute 2.49 0.70 - 3.10 10*3/mm3    Monocytes, Absolute 0.71 0.10 - 0.90 10*3/mm3    Eosinophils, Absolute 0.13 0.00 - 0.40 10*3/mm3    Basophils, Absolute 0.05 0.00 - 0.20 10*3/mm3    Immature Grans, Absolute 0.09 (H) 0.00 - 0.05 10*3/mm3    nRBC 0.0 0.0 - 0.2 /100 WBC   ECG 12 Lead Stroke Evaluation    Collection Time: 02/26/25  5:21 PM   Result Value Ref Range    QT Interval 410 ms    QTC Interval 476 ms   Comprehensive Metabolic Panel    Collection Time: 02/26/25  5:29 PM    Specimen: Blood   Result Value Ref Range    Glucose 135 (H) 65 - 99 mg/dL    BUN 18 8 - 23 mg/dL    Creatinine 0.93 0.57 - 1.00 mg/dL    Sodium 136 136 - 145 mmol/L    Potassium 2.9 (L) 3.5 - 5.2 mmol/L    Chloride 99 98 - 107 mmol/L    CO2 23.9 22.0 - 29.0 mmol/L    Calcium 8.4 8.2 - 9.6 mg/dL    Total Protein 6.4 6.0 - 8.5 g/dL    Albumin 3.5 3.5 - 5.2 g/dL    ALT (SGPT) 7 1 - 33 U/L    AST (SGOT) 12 1 - 32 U/L    Alkaline Phosphatase 106 39 - 117 U/L    Total Bilirubin 0.7 0.0 - 1.2 mg/dL    Globulin 2.9 gm/dL    A/G Ratio 1.2 g/dL    BUN/Creatinine Ratio 19.4 7.0 - 25.0    Anion Gap 13.1 5.0 - 15.0 mmol/L    eGFR 58.1 (L) >60.0 mL/min/1.73         RADIOLOGY  XR Chest 1 View    Result Date: 2/26/2025  AP CHEST  HISTORY: Acute stroke  COMPARISON: 3/19/2024  FINDINGS: Lung apices are somewhat obscured by chin position, limiting the study. The visualized lung fields appear clear. Heart size stable. Atherosclerotic calcification of the aorta.      No definite acute findings    This report was finalized on 2/26/2025 5:40 PM by Dr. Nick Owen M.D on Workstation: [id number]       CT Angiogram Head w AI Analysis of LVO, CT Angiogram Neck, CT CEREBRAL PERFUSION WITH & WITHOUT CONTRAST    Result Date: 2/26/2025  CT HEAD, CTA HEAD AND NECK, CT PERFUSION  HISTORY: Acute neurological deficit, stroke suspected    COMPARISON: 3/19/2024, 1/4/2020  TECHNIQUE: Initial noncontrast head CT was performed. Next, CT perfusion study was performed after the dynamic bolus of IV contrast. Standard perfusion maps were constructed with RAPID software. A CT angiogram of the head and neck was then performed. Delayed postcontrast head CT was then performed. Sagittal, coronal, and 3-dimensional reconstructed images were obtained. Radiation dose reduction techniques were utilized, including automated exposure control and exposure modulation based on body size. Vessel stenoses were calculated using NASCET criteria. AI analysis of LVO was utilized.  FINDINGS:  CT HEAD: There is no evidence of intracranial hemorrhage. There is no hydrocephalus. There is no mass effect or midline shift. There is stable patchy and confluent areas of low-attenuation in the cerebral white matter bilaterally which are nonspecific but likely reflect sequela of chronic small vessel ischemic change. Prior cataract surgeries are noted. The visualized sinuses and mastoids are clear. Postcontrast imaging demonstrates no evidence of enhancing mass. The dural venous sinuses appear patent. No acute bony abnormality.  CTA NECK: Standard three-vessel arch. The innominate artery is patent. The right common carotid artery is patent. There is some mild atherosclerotic plaque at the carotid bulb on the right without stenosis. The right internal carotid artery is patent. The left common carotid artery is patent. There is mild atherosclerotic plaque at the carotid bulb without stenosis. The left internal carotid artery is patent. Both vertebral arteries are patent. The visualized lung apices appear clear. Visualized mediastinal structures appear  unremarkable. The surrounding soft tissue structures of the neck appear unremarkable. Cervical spine degenerative changes  CTA HEAD: There is abrupt cut off of the left MCA M1 segment, consistent with occlusion. The bilateral anterior cerebral arteries are patent. The right middle cerebral artery is patent. The distal vertebral arteries are patent. The basilar artery is patent. The posterior cerebral arteries are patent however there is small caliber of the right posterior cerebral artery with respect to the left again noted which may indicate underlying stenosis. No evidence of aneurysm. Carotid siphon calcifications are demonstrated.  CT PERFUSION: TOTAL HYPOPERFUSION: Using the threshold of Tmax greater than 6 seconds, there is an area of hypoperfusion in the left MCA territory with a total volume of hypoperfusion of 164 mL.  CORE INFARCT: Using the threshold of CBF less than 30%, there is an area of ischemic core in the left MCA territory with a total volume of ischemic core of 32 mL.  PENUMBRA: The penumbra volume (mismatch volume) is 132 mL. The mismatch ratio is 5.1.      Occluded left MCA M1 segment. Hypoperfusion in the left MCA territory with a central ischemic core of 32 mL, total volume of hypoperfusion of 164 mL, and penumbra of 132 mL.  The findings were discussed with the on-call stroke neurologist during interpretation of the study    This report was finalized on 2/26/2025 5:34 PM by Dr. Nick Owen M.D on Workstation: [id number]         MEDICATIONS GIVEN IN ER  Medications   sodium chloride 0.9 % flush 10 mL (has no administration in time range)   sodium chloride 0.9 % flush 10 mL (has no administration in time range)   sodium chloride 0.9 % flush 10 mL (has no administration in time range)   sodium chloride 0.9 % infusion 40 mL (has no administration in time range)   atorvastatin (LIPITOR) tablet 80 mg (has no administration in time range)   aspirin tablet 325 mg (has no administration in  time range)     Or   aspirin suppository 300 mg (has no administration in time range)   sodium chloride 1,000 mL with heparin (porcine) 2,000 Units mixture ( Intracatheter Given 2/26/25 6223)   iopamidol (ISOVUE-370) 76 % injection 150 mL (150 mL Intravenous Given by Other 2/26/25 1705)         ORDERS PLACED DURING THIS VISIT:  Orders Placed This Encounter   Procedures    CT Angiogram Head w AI Analysis of LVO    CT Angiogram Neck    CT CEREBRAL PERFUSION WITH & WITHOUT CONTRAST    XR Chest 1 View    IR Perc Mech Thromb Prim Nonc Art Ini    MRI Brain Without Contrast    [place] Draw    Comprehensive Metabolic Panel    Protime-INR    aPTT    CBC Auto Differential    Hemoglobin A1c    Lipid Panel    NPO Diet NPO Type: Strict NPO    Initiate Department's Acute Stroke Process (Team D, Code 19, etc.)    Perform NIH Stroke Scale    Measure Actual Weight    Notify Provider    Notify Provider for SBP Greater Than 140 for Hemorrhagic Stroke Patient    Head of Bed 30 Degrees or Less    Undress and Gown    Vital Signs    Neuro Checks    No Hypotonic Fluids    Nursing Dysphagia Screening (Complete Prior to Giving anything PO)    RN to Place Order SLP Consult (IF swallow screen failed) - Eval & Treat Choosing Reason of RN Dysphagia Screen Failed    Vital Signs    Continuous Pulse Oximetry    Telemetry - Place Orders & Notify Provider of Results When Patient Experiences Acute Chest Pain, Dysrhythmia or Respiratory Distress    Notify Provider    Nursing Dysphagia Screening (Complete Prior to Giving Anything By Mouth)    RN to Place Order SLP Consult - Eval & Treat Choosing Reason of RN Dysphagia Screen Failed    Nurse to Call MD or Nutrition Services for Diet if Patient Passes Dysphagia Screen    Intake and Output    Neuro Checks    NIHSS Assessment    Order CT Head Without Contrast for Neurological Decline    Provide Stroke Education Material    Saline Lock & Maintain IV Access    Place Sequential Compression Device    Maintain  Sequential Compression Device    Activity As Tolerated    Inpatient Neurology Consult Stroke    Inpatient Neurology Consult Stroke    Pulmonology (on-call MD unless specified)    Notify Stroke Coordinator    Inpatient Case Management  Consult    Inpatient Diabetes Educator Consult    OT Consult: Eval & Treat    PT Consult: Eval & Treat As Tolerated    Oxygen Therapy- Nasal Cannula; Titrate 1-6 LPM Per SpO2; 90 - 95%    SLP Consult: Eval & Treat Communication Disorder    POC Glucose Once    POC Glucose Q6H    ECG 12 Lead Stroke Evaluation    Adult Transthoracic Echo Complete W/ Cont if Necessary Per Protocol (With Agitated Saline)    Type & Screen    Insert Large-Bore Peripheral IV - RIGHT AC Preferred    Insert Peripheral IV    Inpatient Admission    CBC & Differential    Green Top (Gel)    Lavender Top    Gold Top - SST    Light Blue Top         OUTPATIENT MEDICATION MANAGEMENT:  Current Facility-Administered Medications Ordered in Epic   Medication Dose Route Frequency Provider Last Rate Last Admin    aspirin tablet 325 mg  325 mg Oral Daily Viktor, ALAN Rothman        Or    aspirin suppository 300 mg  300 mg Rectal Daily Viktor, ALAN Rothman        atorvastatin (LIPITOR) tablet 80 mg  80 mg Oral Nightly Viktor, ALAN Rothman        lidocaine (XYLOCAINE) 2% injection   Injection PRN Danni Busby MD   100 mg at 02/26/25 1751    phenylephrine (CARLEEN-SYNEPHRINE) 50 mg in sodium chloride 0.9 % 250 mL infusion   Intravenous Continuous PRN Danni Busby MD 21.12 mL/hr at 02/26/25 1800 1 mcg/kg/min at 02/26/25 1800    phenylephrine (CARLEEN-SYNEPHRINE) injection   Intravenous PRN Danni Busby MD   100 mcg at 02/26/25 1804    propofol (DIPRIVAN) injection   Intravenous PRN Danni Busby MD   150 mg at 02/26/25 1751    rocuronium (ZEMURON) injection   Intravenous PRN Danni Busby MD   50 mg at 02/26/25 1751    sodium chloride 0.9 % flush 10 mL  10 mL Intravenous  Sissy Lau MD        sodium chloride 0.9 % flush 10 mL  10 mL Intravenous Q12H Lorna Hoang PA-C        sodium chloride 0.9 % flush 10 mL  10 mL Intravenous Lorna Kelly PA-C        sodium chloride 0.9 % infusion 40 mL  40 mL Intravenous PRN Lorna Hoang PA-C        sodium chloride 1,000 mL with heparin (porcine) 2,000 Units mixture    PRN Micheline Singh MD   Given at 02/26/25 180     Current Outpatient Medications Ordered in Epic   Medication Sig Dispense Refill    apixaban (Eliquis) 5 MG tablet tablet Take 1 tablet by mouth Every 12 (Twelve) Hours. 180 tablet 3    atorvastatin (LIPITOR) 80 MG tablet Take 1 tablet by mouth Daily. 90 tablet 3    felodipine (PLENDIL) 10 MG 24 hr tablet Take 1 tablet by mouth Daily. 90 tablet 1    ferrous gluconate (FERGON) 324 MG tablet Take 1 tablet by mouth Daily. 30 tablet 4    metoprolol tartrate (LOPRESSOR) 25 MG tablet Take 1 tablet by mouth Every 12 (Twelve) Hours. 180 tablet 3    omeprazole (priLOSEC) 20 MG capsule Take 1 capsule by mouth Daily. 90 capsule 0    vitamin D3 (D-3-5) 125 MCG (5000 UT) capsule capsule Take 1 capsule by mouth Daily. 90 capsule 3         PROCEDURES  Procedures      Critical care provider statement:    Critical care time (minutes): 36.   Critical care time was exclusive of:  Separately billable procedures and treating other patients   Critical care was necessary to treat or prevent imminent or life-threatening deterioration of the following conditions:  CNS Failure   Critical care was time spent personally by me on the following activities:  Development of treatment plan with patient or surrogate, discussions with consultants, evaluation of patient's response to treatment, examination of patient, obtaining history from patient or surrogate, ordering and performing treatments and interventions, ordering and review of laboratory studies, ordering and review of radiographic studies, pulse oximetry, re-evaluation of  "patient's condition and review of old charts. Critical Care indicators: Stroke       PROGRESS, DATA ANALYSIS, CONSULTS, AND MEDICAL DECISION MAKING  All labs have been independently interpreted by me.  All radiology studies have been reviewed by me. All EKG's have been independently viewed and interpreted by me.  Discussion below represents my analysis of pertinent findings related to patient's condition, differential diagnosis, treatment plan and final disposition.    This patient presents with symptoms concerning for CVA versus TIA. Other possibilities on the differential diagnosis list include: dissection, AMI, hypoglycemia or other metabolic derangement such as hepatic/uremic encephalopathy, medication side effect, complex migraine, or post-ictal Epifanio's paralysis. However, presentation most concerning for a CVA. CTA head and neck + perfusion ordered as well.  Neurology consulted as part of \"team D\" status/classification for expedited care and decision on OR intervention as patient not a systemic TNKase candidate due to chronic eliquis.    Clinical Scores:              NIH 22    ED Course as of 02/26/25 1806   Wed Feb 26, 2025   1640 Patient's daughter is here and able to provide additional history.  She was on the phone with patient at 2:30 PM and she was conversant and herself.  Thereafter at some point she talk to her nephew on the phone and then right after she hung up the phone acted difference and aphasic with son.  Son and daughter talked by phone and daughter urged him to call EMS.  Patient is anticoagulated on Eliquis [AR]   1643 Team D called given LNK 2:30pm with some features of LVO. Pt is not a systemic TNKase candidate due to chronic anticoagulation with eliquis.  However, patient still an interventional candidate if LVO.  Discussed patient's case with Dr. Kaye who recommended Team D imaging with concern for seizure due to history of CVA  as well. [AR]   1717 Neurology has evaluated patient at " bedside and reviewed images.  Patient is an LVO and will be going to interventional radiology for procedure.  Will contact pulmonology for ICU admission post procedure [AR]   1724 EKG ER MD interpretation   Time: 17: 21  Rhythm and rate: Atrial fibrillation at a rate of 81  Axis: Normal  P waves: Normal  QRS complexes: Normal  ST segments: no elevation nor depressions  Comparison EKG is from March 19, 2024 and patient had atrial fibrillation at that time at a rate of 88 [AR]   1805 Has gone for interventional procedure.  However, I discussed patient's case with Dr. Molina as she will need ICU admission postprocedure.  He was amenable to admitting the patient [AR]      ED Course User Index  [AR] Sissy Luque MD             AS OF 18:06 EST VITALS:    BP - 138/77  HR - 80  TEMP - 97.1 °F (36.2 °C)  O2 SATS - 98%      COMPLEXITY OF CARE  The patient requires admission.    DIAGNOSIS  Final diagnoses:   Altered mental status, unspecified altered mental status type   Focal neurological deficit present   Acute CVA (cerebrovascular accident)   Rogelio-neglect of right side         DISPOSITION  ED Disposition       ED Disposition   Decision to Admit    Condition   --    Comment   Level of Care: Critical Care [6]   Diagnosis: Acute CVA (cerebrovascular accident) [[id number]]   Admitting Physician: FERDINAND MOLINA [[id number]]   Attending Physician: FERDINAND MOLINA [[id number]]   Certification: I Certify That Inpatient Hospital Services Are Medically Necessary For Greater Than 2 Midnights                  Please note that portions of this document were completed with a voice recognition program.    Note Disclaimer: At [facility], we believe that sharing information builds trust and better relationships. You are receiving this note because you recently visited [facility]. It is possible you will see health information before a provider has talked with you about it. This kind of information can be easy to misunderstand. To help you  fully understand what it means for your health, we urge you to discuss this note with your provider.         Sissy Luque MD  02/26/25 9088

## 2025-02-26 NOTE — CONSULTS
Neurology Consult Note    Consult Date: 2/26/2025    Referring MD: No ref. provider found    Reason for Consult I have been asked to see the patient in neurological consultation to render advice and opinion regarding stroke.     Monica Scherer is a 89 y.o. female with past medical history of atrial fibrillation on Eliquis, hyperlipidemia, hypertension, stroke with residual right-sided weakness but could walk and talk presents to the ED with acute on chronic right-sided weakness, global aphasia, right-sided facial droop that began suddenly at 1520.  Patient right arm completely flaccid on exam and right leg has some effort against gravity but unable to hold up.  Patient has gaze deviation to the left and not blinking to threat in right visual fields.  Team D imaging activated.  CT head demonstrates no acute hemorrhage or hypodensity.  Patient not given TNK as she is on Eliquis for atrial fibrillation.  CTA head and neck demonstrate left proximal M1 MCA occlusion. CTP demonstrates core of 32 mL and penumbra of 160 mL with a mismatch ratio of 5.1.    Family deny any memory deficits at baseline.  They state that patient ambulates with a cane and is able to dress, bathe, toilet and dress herself.  Family helps her with cooking cleaning primarily because she is afraid of falling since her last stroke but she is capable of doing so.  She will go out to doctors appointments and out shopping occasionally.  She is able to carry on a normal conversation and pay her bills.  She lives at home with her son.    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    New onset atrial fibrillation 01/2020    now on rate control along with Eliquis    Prolapsed uterus     per patient    Stroke     TIA (transient ischemic attack) 01/2020    Weakness      Past Surgical  "History:   Procedure Laterality Date    APPENDECTOMY      CHOLECYSTECTOMY N/A 6/8/2016    Procedure: CHOLECYSTECTOMY LAPAROSCOPIC;  Surgeon: Russ Gar MD;  Location:  [facility] [place];  Service:     COLONOSCOPY N/A 9/16/2021    Procedure: COLONOSCOPY TO CECUM WITH HOT SNARE POLYPECTOMIES AND COLD BX POLYPECTOMY;  Surgeon: Emerson Izaguirre MD;  Location:  [facility];  Service: Gastroenterology;  Laterality: N/A;  pre: RECTAL BLEEDING, FAMILY HX OF COLON CANCER  post: INTERNAL AND EXTERNAL HEMORRHOIDS, DIVERTICULOSIS, POLYPS    ENDOSCOPY N/A 2/22/2018    Z-line regular, 35 cm from incisors, normal esophagus, gastritis, bilious gastric fluid, one non-bleeding duodenal ulcer w/no stigmata of bleeding, Path: DUODENUM: FRAGMENTS OF GASTRIC TYPE MUCOSA WITH HYPERPLASIA (FOVEOLAR) AND PATCHY MIXED INFLAMMATION IN THE LAMINA PROPRIA WITH FOCAL FIBROSIS, COMMENT: 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     EYE SURGERY      tre cataracts       Medications On Admission  (Not in a hospital admission)      Allergies:  Allergies   Allergen Reactions    Cephalexin Rash    Latex Itching       Social Hx:  Social History     Socioeconomic History    Marital status: Single   Tobacco Use    Smoking status: Never     Passive exposure: Never    Smokeless tobacco: Never    Tobacco comments:     caffeine use- \"rare\"   Vaping Use    Vaping status: Never Used   Substance and Sexual Activity    Alcohol use: No    Drug use: No    Sexual activity: Defer       Family Hx:  No family history on file.    Exam    /61   Pulse 67   Resp 12   SpO2 92%   gen: NAD, vitals reviewed  MS: Nonverbal and not following commands, neglect to right digitally  CN: Decreased blink to threat in right visual fields, PERRL, EOMI, facial sensation equal, right facial droop  Motor: 1/5 right upper extremity, 2/5 right lower extremity, 5/5 left upper and lower extremity  Sensation: Withdraws to noxious stimuli throughout  Coordination: Unable to " assess due to severe aphasia    DATA:    Lab Results   Component Value Date    GLUCOSE 156 (H) 09/30/2024    CALCIUM 9.6 09/30/2024     09/30/2024    K 4.2 09/30/2024    CO2 26.8 09/30/2024     09/30/2024    BUN 27 (H) 09/30/2024    CREATININE 1.21 (H) 09/30/2024    EGFRIFAFRI 57 (L) 09/24/2021    EGFRIFNONA 50 (L) 09/24/2021    BCR 22.3 09/30/2024    ANIONGAP 18.0 (H) 03/19/2024     Lab Results   Component Value Date    WBC 12.46 (H) 02/26/2025    HGB 12.6 02/26/2025    HCT 38.6 02/26/2025    MCV 90.4 02/26/2025     02/26/2025     Lab Results   Component Value Date    LDL 76 09/30/2024    LDL 59 06/06/2023    LDL 67 11/28/2022     Lab Results   Component Value Date    HGBA1C 6.40 (H) 09/30/2024     Lab Results   Component Value Date    INR 1.20 (H) 02/26/2025    INR 1.40 (H) 01/29/2020    INR 1.02 01/04/2020    PROTIME 15.1 (H) 02/26/2025    PROTIME 16.9 (H) 01/29/2020    PROTIME 13.1 01/04/2020       Lab review:       Imaging review:   CT head: Demonstrates no acute hemorrhage or hypodensity  CTA head and neck: Left proximal M1 MCA occlusion  CTP:    Diagnoses:  Acute ischemic left MCA occlusion  History of previous left-sided stroke with residual right-sided deficits but ambulatory and conversive at baseline  Atrial fibrillation on Eliquis  Hyperlipidemia  Hypertension    Comment: Patient is a 89 y.o. female with past medical history of atrial fibrillation on Eliquis, hyperlipidemia, hypertension, stroke with residual right-sided weakness but could walk and talk presents to the ED with acute on chronic right-sided weakness, global aphasia, right-sided facial droop that began suddenly at 1520.  Patient right arm completely flaccid on exam and right leg has some effort against gravity but unable to hold up.  Patient has gaze deviation to the left and not blinking to threat in right visual fields.  Team D imaging activated.  CT head demonstrates no acute hemorrhage or hypodensity.  Patient not  given TNK as she is on Eliquis for atrial fibrillation.  CTA head and neck demonstrate left proximal M1 MCA occlusion. CTP demonstrates core of 32 mL and penumbra of 160 mL with a mismatch ratio of 5.1.  Risk and benefits of mechanical thrombectomy discussed with family and they want to go ahead and proceed. MT successfully performed with TICI 3.     Prestroke modified [place] scale: 2-3  NIHSS: 21    PLAN:   -MRI brain without contrast  -TTE  -TNK not given due to patient being on Eliquis.  -Admit to ICU  -Maintain BP <220/110 if no contraindication (until after thrombectomy is performed will require BP most likely less than 140 systolic BP, will defer to neuroradiologist dependent TICI score), can give cardene if needed to maintain blood pressure  -Perform bedside swallow test  -Telemetry to monitor for arrhythmia  -sequential pressure device for vte prophylaxis  -Neuro checks, if change in exam call neuro oncall  -PT/OT when appropriate   -Repeat Head CT in 24 hours  -Hemoglobin A1c and lipid panel  -Aspirin 325 mg po or 300 mg MA  -Hold eliquis until results of MRI and possibly for a few days depending on size of stroke.   -Atorvastatin 80 mg     Recommendations discussed with Dr. Kaye who is in agreement with plan.     I was present in the ED for greater than 30 minutes performing critical care including patient assessment, imaging review, care coordination, and tenecteplase/endovascular decision making.

## 2025-02-26 NOTE — ED TRIAGE NOTES
Patient c/o right sided weakness, aphasia, and right sided facial droop since 1520. Patient has had a previous CVA and has right sided deficits but per EMS family reported she could walk and talk.

## 2025-02-26 NOTE — NURSING NOTE
Pre-procedure NIH:21  Decision time:1714  In room:1743  Procedure start:1800  Arterial access puncture time:1800  Guide catheter placement time:1805  First thrombectomy device placement time (first pass):1808 TICI 3  Subsequent device placement time:-  Time of recanalization:1808  Final TICI Flow: 3  Procedure end time:1815    **These values were verbally verified with physician performing procedure.**

## 2025-02-26 NOTE — ANESTHESIA PROCEDURE NOTES
Airway  Urgency: elective    Date/Time: 2/26/2025 5:53 PM  Airway not difficult    General Information and Staff    Patient location during procedure: OR  Anesthesiologist: Danni Busby MD    Indications and Patient Condition  Indications for airway management: airway protection    Preoxygenated: yes  Mask difficulty assessment: 0 - not attempted    Final Airway Details  Final airway type: endotracheal airway      Successful airway: ETT  Cuffed: yes   Successful intubation technique: direct laryngoscopy and RSI  Endotracheal tube insertion site: oral  Blade: Ruth  Blade size: 2  ETT size (mm): 7.0  Cormack-Lehane Classification: grade I - full view of glottis  Placement verified by: chest auscultation   Cuff volume (mL): 6  Measured from: teeth  ETT/EBT  to teeth (cm): 21  Number of attempts at approach: 1  Assessment: lips, teeth, and gum same as pre-op and atraumatic intubation

## 2025-02-26 NOTE — ANESTHESIA PREPROCEDURE EVALUATION
" Anesthesia Evaluation     Patient summary reviewed and Nursing notes reviewed   NPO Solid Status: Waived due to emergency  NPO Liquid Status: Waived due to emergency           Airway   Dental    (+) poor dentition    Pulmonary    Cardiovascular     (+) hypertension, dysrhythmias Atrial Fib, hyperlipidemia      Neuro/Psych  (+) TIA, CVA, dizziness/light headedness, weakness  GI/Hepatic/Renal/Endo    (+) PUD, GI bleeding     Musculoskeletal     Abdominal    Substance History      OB/GYN          Other   arthritis,                   Anesthesia Plan    ASA 4 - emergent     general   Rapid sequence  (/77   Pulse 80   Temp 36.2 °C (97.1 °F)   Resp 18   Ht 162.6 cm (64\")   Wt 70.4 kg (155 lb 4.8 oz)   SpO2 98%   BMI 26.66 kg/m²   )  intravenous induction       CODE STATUS:         " MRI checklist completed by APN with pt and updated in pt chart

## 2025-02-26 NOTE — PROGRESS NOTES
Clinical Pharmacy Services: Medication History    Monica Scherer is a 89 y.o. female presenting to [facility] for   Chief Complaint   Patient presents with    Stroke     Right sided weakness, aphasia       She  has a past medical history of Atrial fibrillation, Hyperlipidemia, Hypertension, Lacunar infarct, acute (01/2020), New onset atrial fibrillation (01/2020), Prolapsed uterus, Stroke, TIA (transient ischemic attack) (01/2020), and Weakness.    Allergies as of 02/26/2025 - Reviewed 02/26/2025   Allergen Reaction Noted    Cephalexin Rash 01/23/2018    Latex Itching 06/07/2016       Medication information was obtained from: Family   Pharmacy and Phone Number:     Prior to Admission Medications       Prescriptions Last Dose Informant Patient Reported? Taking?    apixaban (Eliquis) 5 MG tablet tablet  Family Member No Yes    Take 1 tablet by mouth Every 12 (Twelve) Hours.    atorvastatin (LIPITOR) 80 MG tablet  Family Member No Yes    Take 1 tablet by mouth Daily.    felodipine (PLENDIL) 10 MG 24 hr tablet  Family Member No Yes    Take 1 tablet by mouth Daily.    ferrous gluconate (FERGON) 324 MG tablet  Family Member No Yes    Take 1 tablet by mouth Daily.    metoprolol tartrate (LOPRESSOR) 25 MG tablet  Family Member No Yes    Take 1 tablet by mouth Every 12 (Twelve) Hours.    omeprazole (priLOSEC) 20 MG capsule  Family Member No Yes    Take 1 capsule by mouth Daily.    vitamin D3 (D-3-5) 125 MCG (5000 UT) capsule capsule  Family Member No Yes    Take 1 capsule by mouth Daily.              Medication notes: Family stated patient never received alendronate.     This medication list is complete to the best of my knowledge as of 2/26/2025    Please call if questions.    Leatha Bo  Medication History Technician   [phone]    2/26/2025 17:17 EST

## 2025-02-27 ENCOUNTER — APPOINTMENT (OUTPATIENT)
Dept: CARDIOLOGY | Facility: HOSPITAL | Age: OVER 89
DRG: 024 | End: 2025-02-27
Payer: MEDICARE

## 2025-02-27 ENCOUNTER — APPOINTMENT (OUTPATIENT)
Dept: CT IMAGING | Facility: HOSPITAL | Age: OVER 89
DRG: 024 | End: 2025-02-27
Payer: MEDICARE

## 2025-02-27 ENCOUNTER — APPOINTMENT (OUTPATIENT)
Dept: MRI IMAGING | Facility: HOSPITAL | Age: OVER 89
DRG: 024 | End: 2025-02-27
Payer: MEDICARE

## 2025-02-27 LAB
ALBUMIN SERPL-MCNC: 3.2 G/DL (ref 3.5–5.2)
ANION GAP SERPL CALCULATED.3IONS-SCNC: 12.2 MMOL/L (ref 5–15)
ASCENDING AORTA: 3.1 CM
AV MEAN PRESS GRAD SYS DOP V1V2: 2.7 MMHG
AV VMAX SYS DOP: 111.2 CM/SEC
BH CV ECHO MEAS - ACS: 1.43 CM
BH CV ECHO MEAS - AI P1/2T: 552.8 MSEC
BH CV ECHO MEAS - AO MAX PG: 4.9 MMHG
BH CV ECHO MEAS - AO ROOT DIAM: 3 CM
BH CV ECHO MEAS - AO V2 VTI: 26.9 CM
BH CV ECHO MEAS - AVA(I,D): 1.88 CM2
BH CV ECHO MEAS - EDV(CUBED): 69.8 ML
BH CV ECHO MEAS - EDV(MOD-SP2): 46 ML
BH CV ECHO MEAS - EDV(MOD-SP4): 55 ML
BH CV ECHO MEAS - EF(MOD-SP2): 60.9 %
BH CV ECHO MEAS - EF(MOD-SP4): 67.3 %
BH CV ECHO MEAS - ESV(CUBED): 29.5 ML
BH CV ECHO MEAS - ESV(MOD-SP2): 18 ML
BH CV ECHO MEAS - ESV(MOD-SP4): 18 ML
BH CV ECHO MEAS - FS: 25 %
BH CV ECHO MEAS - IVS/LVPW: 1.05 CM
BH CV ECHO MEAS - IVSD: 1.12 CM
BH CV ECHO MEAS - LAT PEAK E' VEL: 5.6 CM/SEC
BH CV ECHO MEAS - LV MASS(C)D: 150.9 GRAMS
BH CV ECHO MEAS - LV MAX PG: 1.91 MMHG
BH CV ECHO MEAS - LV MEAN PG: 1.01 MMHG
BH CV ECHO MEAS - LV V1 MAX: 69.2 CM/SEC
BH CV ECHO MEAS - LV V1 VTI: 16.2 CM
BH CV ECHO MEAS - LVIDD: 4.1 CM
BH CV ECHO MEAS - LVIDS: 3.1 CM
BH CV ECHO MEAS - LVOT AREA: 3.1 CM2
BH CV ECHO MEAS - LVOT DIAM: 1.99 CM
BH CV ECHO MEAS - LVPWD: 1.07 CM
BH CV ECHO MEAS - MED PEAK E' VEL: 6.4 CM/SEC
BH CV ECHO MEAS - MR MAX PG: 107.4 MMHG
BH CV ECHO MEAS - MR MAX VEL: 518.3 CM/SEC
BH CV ECHO MEAS - MV DEC SLOPE: 739.8 CM/SEC2
BH CV ECHO MEAS - MV DEC TIME: 0.2 SEC
BH CV ECHO MEAS - MV E MAX VEL: 120 CM/SEC
BH CV ECHO MEAS - MV P1/2T: 51.4 MSEC
BH CV ECHO MEAS - MVA(P1/2T): 4.3 CM2
BH CV ECHO MEAS - PA ACC TIME: 0.08 SEC
BH CV ECHO MEAS - PA V2 MAX: 67.8 CM/SEC
BH CV ECHO MEAS - RAP SYSTOLE: 3 MMHG
BH CV ECHO MEAS - RV MAX PG: 1.28 MMHG
BH CV ECHO MEAS - RV V1 MAX: 56.6 CM/SEC
BH CV ECHO MEAS - RV V1 VTI: 13.9 CM
BH CV ECHO MEAS - RVSP: 25 MMHG
BH CV ECHO MEAS - SV(LVOT): 50.6 ML
BH CV ECHO MEAS - SV(MOD-SP2): 28 ML
BH CV ECHO MEAS - SV(MOD-SP4): 37 ML
BH CV ECHO MEAS - TAPSE (>1.6): 1.79 CM
BH CV ECHO MEAS - TR MAX PG: 22.1 MMHG
BH CV ECHO MEAS - TR MAX VEL: 235.3 CM/SEC
BH CV ECHO MEASUREMENTS AVERAGE E/E' RATIO: 20
BH CV ECHO SHUNT ASSESSMENT PERFORMED (HIDDEN SCRIPTING): 1
BH CV XLRA - RV BASE: 3 CM
BH CV XLRA - RV MID: 2.4 CM
BH CV XLRA - TDI S': 8.8 CM/SEC
BUN SERPL-MCNC: 13 MG/DL (ref 8–23)
BUN/CREAT SERPL: 18.1 (ref 7–25)
CALCIUM SPEC-SCNC: 8.6 MG/DL (ref 8.2–9.6)
CHLORIDE SERPL-SCNC: 104 MMOL/L (ref 98–107)
CHOLEST SERPL-MCNC: 107 MG/DL (ref 0–200)
CO2 SERPL-SCNC: 23.8 MMOL/L (ref 22–29)
CREAT SERPL-MCNC: 0.72 MG/DL (ref 0.57–1)
DEPRECATED RDW RBC AUTO: 49.9 FL (ref 37–54)
EGFRCR SERPLBLD CKD-EPI 2021: 79 ML/MIN/1.73
ERYTHROCYTE [DISTWIDTH] IN BLOOD BY AUTOMATED COUNT: 14.8 % (ref 12.3–15.4)
GLUCOSE BLDC GLUCOMTR-MCNC: 129 MG/DL (ref 70–130)
GLUCOSE BLDC GLUCOMTR-MCNC: 140 MG/DL (ref 70–130)
GLUCOSE BLDC GLUCOMTR-MCNC: 151 MG/DL (ref 70–130)
GLUCOSE BLDC GLUCOMTR-MCNC: 157 MG/DL (ref 70–130)
GLUCOSE BLDC GLUCOMTR-MCNC: 159 MG/DL (ref 70–130)
GLUCOSE SERPL-MCNC: 128 MG/DL (ref 65–99)
HBA1C MFR BLD: 6.5 % (ref 4.8–5.6)
HCT VFR BLD AUTO: 38 % (ref 34–46.6)
HDLC SERPL-MCNC: 38 MG/DL (ref 40–60)
HGB BLD-MCNC: 12 G/DL (ref 12–15.9)
LDLC SERPL CALC-MCNC: 54 MG/DL (ref 0–100)
LDLC/HDLC SERPL: 1.44 {RATIO}
LEFT ATRIUM VOLUME INDEX: 26.7 ML/M2
LV EF BIPLANE MOD: 62.9 %
MAGNESIUM SERPL-MCNC: 1.7 MG/DL (ref 1.7–2.3)
MCH RBC QN AUTO: 28.7 PG (ref 26.6–33)
MCHC RBC AUTO-ENTMCNC: 31.6 G/DL (ref 31.5–35.7)
MCV RBC AUTO: 90.9 FL (ref 79–97)
PHOSPHATE SERPL-MCNC: 2.8 MG/DL (ref 2.5–4.5)
PLATELET # BLD AUTO: 218 10*3/MM3 (ref 140–450)
PMV BLD AUTO: 9.1 FL (ref 6–12)
POTASSIUM SERPL-SCNC: 3.1 MMOL/L (ref 3.5–5.2)
POTASSIUM SERPL-SCNC: 6.2 MMOL/L (ref 3.5–5.2)
QT INTERVAL: 410 MS
QT INTERVAL: 426 MS
QTC INTERVAL: 468 MS
QTC INTERVAL: 476 MS
RBC # BLD AUTO: 4.18 10*6/MM3 (ref 3.77–5.28)
SINUS: 2.6 CM
SODIUM SERPL-SCNC: 140 MMOL/L (ref 136–145)
STJ: 2.8 CM
TRIGL SERPL-MCNC: 72 MG/DL (ref 0–150)
VLDLC SERPL-MCNC: 15 MG/DL (ref 5–40)
WBC NRBC COR # BLD AUTO: 10.87 10*3/MM3 (ref 3.4–10.8)

## 2025-02-27 PROCEDURE — 80061 LIPID PANEL: CPT

## 2025-02-27 PROCEDURE — 63710000001 INSULIN REGULAR HUMAN PER 5 UNITS

## 2025-02-27 PROCEDURE — 25010000002 HEPARIN (PORCINE) PER 1000 UNITS: Performed by: STUDENT IN AN ORGANIZED HEALTH CARE EDUCATION/TRAINING PROGRAM

## 2025-02-27 PROCEDURE — 99231 SBSQ HOSP IP/OBS SF/LOW 25: CPT

## 2025-02-27 PROCEDURE — 97530 THERAPEUTIC ACTIVITIES: CPT

## 2025-02-27 PROCEDURE — 70450 CT HEAD/BRAIN W/O DYE: CPT

## 2025-02-27 PROCEDURE — 97166 OT EVAL MOD COMPLEX 45 MIN: CPT

## 2025-02-27 PROCEDURE — 70551 MRI BRAIN STEM W/O DYE: CPT

## 2025-02-27 PROCEDURE — 83036 HEMOGLOBIN GLYCOSYLATED A1C: CPT

## 2025-02-27 PROCEDURE — 82948 REAGENT STRIP/BLOOD GLUCOSE: CPT

## 2025-02-27 PROCEDURE — 25010000002 POTASSIUM CHLORIDE 10 MEQ/100ML SOLUTION

## 2025-02-27 PROCEDURE — 93010 ELECTROCARDIOGRAM REPORT: CPT | Performed by: STUDENT IN AN ORGANIZED HEALTH CARE EDUCATION/TRAINING PROGRAM

## 2025-02-27 PROCEDURE — 85027 COMPLETE CBC AUTOMATED: CPT | Performed by: INTERNAL MEDICINE

## 2025-02-27 PROCEDURE — 83735 ASSAY OF MAGNESIUM: CPT | Performed by: INTERNAL MEDICINE

## 2025-02-27 PROCEDURE — 93005 ELECTROCARDIOGRAM TRACING: CPT

## 2025-02-27 PROCEDURE — 25010000002 CALCIUM GLUCONATE-NACL 1-0.675 GM/50ML-% SOLUTION

## 2025-02-27 PROCEDURE — 93306 TTE W/DOPPLER COMPLETE: CPT

## 2025-02-27 PROCEDURE — 97162 PT EVAL MOD COMPLEX 30 MIN: CPT

## 2025-02-27 PROCEDURE — 93306 TTE W/DOPPLER COMPLETE: CPT | Performed by: INTERNAL MEDICINE

## 2025-02-27 PROCEDURE — 92610 EVALUATE SWALLOWING FUNCTION: CPT

## 2025-02-27 PROCEDURE — 80069 RENAL FUNCTION PANEL: CPT | Performed by: INTERNAL MEDICINE

## 2025-02-27 PROCEDURE — 84132 ASSAY OF SERUM POTASSIUM: CPT

## 2025-02-27 RX ORDER — POTASSIUM CHLORIDE 7.45 MG/ML
10 INJECTION INTRAVENOUS
Status: COMPLETED | OUTPATIENT
Start: 2025-02-27 | End: 2025-02-27

## 2025-02-27 RX ORDER — CALCIUM GLUCONATE 20 MG/ML
1000 INJECTION, SOLUTION INTRAVENOUS ONCE
Status: COMPLETED | OUTPATIENT
Start: 2025-02-27 | End: 2025-02-27

## 2025-02-27 RX ORDER — POTASSIUM CHLORIDE 7.45 MG/ML
10 INJECTION INTRAVENOUS
Status: DISCONTINUED | OUTPATIENT
Start: 2025-02-27 | End: 2025-02-27

## 2025-02-27 RX ORDER — DEXTROSE MONOHYDRATE 25 G/50ML
25 INJECTION, SOLUTION INTRAVENOUS ONCE
Status: COMPLETED | OUTPATIENT
Start: 2025-02-27 | End: 2025-02-27

## 2025-02-27 RX ORDER — NITROGLYCERIN 0.4 MG/1
0.4 TABLET SUBLINGUAL
Status: DISCONTINUED | OUTPATIENT
Start: 2025-02-27 | End: 2025-03-03 | Stop reason: HOSPADM

## 2025-02-27 RX ORDER — PANTOPRAZOLE SODIUM 40 MG/1
40 TABLET, DELAYED RELEASE ORAL
Status: DISCONTINUED | OUTPATIENT
Start: 2025-02-28 | End: 2025-03-03 | Stop reason: HOSPADM

## 2025-02-27 RX ADMIN — HEPARIN SODIUM 5000 UNITS: 5000 INJECTION INTRAVENOUS; SUBCUTANEOUS at 05:49

## 2025-02-27 RX ADMIN — ATORVASTATIN CALCIUM 80 MG: 80 TABLET, FILM COATED ORAL at 21:38

## 2025-02-27 RX ADMIN — Medication 10 ML: at 21:39

## 2025-02-27 RX ADMIN — CALCIUM GLUCONATE 1000 MG: 20 INJECTION, SOLUTION INTRAVENOUS at 21:26

## 2025-02-27 RX ADMIN — HEPARIN SODIUM 5000 UNITS: 5000 INJECTION INTRAVENOUS; SUBCUTANEOUS at 21:38

## 2025-02-27 RX ADMIN — ASPIRIN 325 MG ORAL TABLET 325 MG: 325 PILL ORAL at 09:38

## 2025-02-27 RX ADMIN — POTASSIUM CHLORIDE 10 MEQ: 7.46 INJECTION, SOLUTION INTRAVENOUS at 12:34

## 2025-02-27 RX ADMIN — SODIUM BICARBONATE 50 MEQ: 84 INJECTION INTRAVENOUS at 21:38

## 2025-02-27 RX ADMIN — POTASSIUM CHLORIDE 10 MEQ: 7.46 INJECTION, SOLUTION INTRAVENOUS at 09:38

## 2025-02-27 RX ADMIN — POTASSIUM CHLORIDE 10 MEQ: 7.46 INJECTION, SOLUTION INTRAVENOUS at 10:44

## 2025-02-27 RX ADMIN — HEPARIN SODIUM 5000 UNITS: 5000 INJECTION INTRAVENOUS; SUBCUTANEOUS at 14:33

## 2025-02-27 RX ADMIN — Medication 10 ML: at 08:13

## 2025-02-27 RX ADMIN — DEXTROSE MONOHYDRATE 25 G: 25 INJECTION, SOLUTION INTRAVENOUS at 21:30

## 2025-02-27 RX ADMIN — POTASSIUM CHLORIDE 10 MEQ: 7.46 INJECTION, SOLUTION INTRAVENOUS at 06:50

## 2025-02-27 RX ADMIN — POTASSIUM CHLORIDE 10 MEQ: 7.46 INJECTION, SOLUTION INTRAVENOUS at 05:49

## 2025-02-27 RX ADMIN — POTASSIUM CHLORIDE 10 MEQ: 7.46 INJECTION, SOLUTION INTRAVENOUS at 08:11

## 2025-02-27 RX ADMIN — INSULIN HUMAN 10 UNITS: 100 INJECTION, SOLUTION PARENTERAL at 21:32

## 2025-02-27 RX ADMIN — MUPIROCIN 1 APPLICATION: 20 OINTMENT TOPICAL at 05:49

## 2025-02-27 RX ADMIN — PANTOPRAZOLE SODIUM 40 MG: 40 INJECTION, POWDER, FOR SOLUTION INTRAVENOUS at 05:49

## 2025-02-27 RX ADMIN — MUPIROCIN 1 APPLICATION: 20 OINTMENT TOPICAL at 21:38

## 2025-02-27 NOTE — PROGRESS NOTES
"  PROGRESS NOTE  Patient Name: Monica Scherer  Age/Sex: 89 y.o. female  : 1933  MRN: [id number]    Date of Admission: 2025  Date of Encounter Visit: 25   LOS: 1 day   Patient Care Team:  Amilcar Mauricio DO as PCP - General (Family Medicine)  Fatou Crawford RN as Ambulatory  ([facility])  Omar Bassett MD as Consulting Physician (Cardiology)    Chief Complaint: Acute CVA status post thrombectomy    Hospital course: Patient had left MCA stroke  presenting with right-sided weakness, had thrombectomy with good clinical improvement.  No seizure, no bleeding, no fever or chills, no hemodynamic issues.          REVIEW OF SYSTEMS:   CONSTITUTIONAL: no fever or chills  CARDIOVASCULAR: No chest pain, chest pressure or chest discomfort. No palpitations or edema.   RESPIRATORY: No shortness of breath, cough or sputum.   GASTROINTESTINAL: No anorexia, nausea, vomiting or diarrhea. No abdominal pain or blood.   HEMATOLOGIC: No bleeding or bruising.     Ventilator/Non-Invasive Ventilation Settings (From admission, onward)      None              Vital Signs  Temp:  [97.1 °F (36.2 °C)-98.2 °F (36.8 °C)] 98.2 °F (36.8 °C)  Heart Rate:  [52-92] 74  Resp:  [12-18] 18  BP: ()/() 131/76  SpO2:  [92 %-100 %] 93 %  on  Flow (L/min) (Oxygen Therapy):  [2] 2 Device (Oxygen Therapy): room air    Intake/Output Summary (Last 24 hours) at 2025 1545  Last data filed at 2025 1200  Gross per 24 hour   Intake 350 ml   Output 900 ml   Net -550 ml     Flowsheet Rows      Flowsheet Row First Filed Value   Admission Height 162.6 cm (64\") Documented at 2025 173   Admission Weight 70.4 kg (155 lb 4.8 oz) Documented at 2025 173          Body mass index is 26.61 kg/m².      25  1731 25  1345   Weight: 70.4 kg (155 lb 4.8 oz) 70.3 kg (155 lb)       Physical Exam:  GEN:  No acute distress, alert, cooperative, well developed   EYES:   Sclerae clear. No icterus. PERRL. " Normal EOM  ENT:   External ears/nose normal, no oral lesions, no thrush, mucous membranes moist  NECK:  Supple, midline trachea, no JVD  LUNGS: Normal chest on inspection, CTAB, no wheezes. No rhonchi. No crackles. Respirations regular, even and unlabored.   CV:  Regular rhythm and rate. Normal S1/S2. No murmurs, gallops, or rubs noted.  ABD:  Soft, nontender and nondistended. Normal bowel sounds. No guarding  EXT:  Moves all extremities well with mild residual right-sided weakness and. No cyanosis. No redness. No edema.   Skin: Dry, intact, no bleeding    Results Review:    Results From Last 14 Days   Lab Units 02/27/25  0330   TRIGLYCERIDES mg/dL 72     Results from last 7 days   Lab Units 02/27/25 0330 02/26/25 2127 02/26/25  1729   SODIUM mmol/L 140  --  136   POTASSIUM mmol/L 3.1* 3.6 2.9*   CHLORIDE mmol/L 104  --  99   CO2 mmol/L 23.8  --  23.9   BUN mg/dL 13  --  18   CREATININE mg/dL 0.72  --  0.93   CALCIUM mg/dL 8.6  --  8.4   AST (SGOT) U/L  --   --  12   ALT (SGPT) U/L  --   --  7   ANION GAP mmol/L 12.2  --  13.1   ALBUMIN g/dL 3.2*  --  3.5                 Results from last 7 days   Lab Units 02/27/25  0330 02/26/25  1647   WBC 10*3/mm3 10.87* 12.46*   HEMOGLOBIN g/dL 12.0 12.6   HEMATOCRIT % 38.0 38.6   PLATELETS 10*3/mm3 218 250   MCV fL 90.9 90.4   NEUTROPHIL % %  --  72.2   LYMPHOCYTE % %  --  20.0   MONOCYTES % %  --  5.7   EOSINOPHIL % %  --  1.0   BASOPHIL % %  --  0.4   IMM GRAN % %  --  0.7*     Results from last 7 days   Lab Units 02/26/25  1647   INR  1.20*   APTT seconds 25.6     Results from last 7 days   Lab Units 02/27/25  0330 02/26/25  1729   MAGNESIUM mg/dL 1.7 1.7     Results from last 7 days   Lab Units 02/27/25  0330   CHOLESTEROL mg/dL 107   TRIGLYCERIDES mg/dL 72   HDL CHOL mg/dL 38*         Results from last 7 days   Lab Units 02/27/25  0330   HEMOGLOBIN A1C % 6.50*     Glucose   Date/Time Value Ref Range Status   02/27/2025 1138 140 (H) 70 - 130 mg/dL Final   02/27/2025  0612 129 70 - 130 mg/dL Final   02/26/2025 1845 124 70 - 130 mg/dL Final                               Imaging:   Imaging Results (All)       Procedure Component Value Units Date/Time    MRI Brain Without Contrast [[id number]] Resulted: 02/27/25 1544     Updated: 02/27/25 1525    CT Head Without Contrast [[id number]] Collected: 02/27/25 0614     Updated: 02/27/25 0614    Narrative:        Patient: SABRA HAJI  Time Out: 06:13  Exam(s): CT HEAD Without Contrast     EXAM:    CT Head Without Intravenous Contrast    CLINICAL HISTORY:    Post thrombectomy.    TECHNIQUE:    Axial computed tomography images of the head brain without intravenous   contrast.  CTDI is 56.23 mGy and DLP is 958.3 mGy-cm.  This CT exam was   performed according to the principle of ALARA (As Low As Reasonably   Achievable) by using one or more of the following dose reduction   techniques: automated exposure control, adjustment of the mA and or kV   according to patient size, and or use of iterative reconstruction   technique.    COMPARISON:    CT head 2 26 2025 and 3 19 2024.    FINDINGS:    Brain:  There is edema of the right MCA territory consistent with   subacute infarct.  No hemorrhage.  Mild periventricular white matter   hypodensities are most consistent with chronic microangiopathy.    Midline shift:  There is persistent mild rightward midline shift,   however this is not appear significant change since the examination   performed on 3 19 2024.    Ventricles:  Unremarkable.  No ventriculomegaly.    Bones joints:  No acute fracture.    Soft tissues:  Unremarkable.    Sinuses:  Unremarkable as visualized.  No acute sinusitis.    Mastoid air cells: Moderate left mastoid effusion.    IMPRESSION:       1.  There is edema of the right MCA territory consistent with subacute   infarct.  No hemorrhage.  2.  There is persistent mild rightward midline shift, however this is not   appear significant changed since the examination performed on 3 19  2024.      Impression:          Electronically signed by Aparna Valdez MD on 02-27-25 at 0613    IR Perc [facility] Thromb Prim Nonc Art Ini [[id number]] Resulted: 02/26/25 1917     Updated: 02/26/25 1917    Narrative:      Please see performing physician's note for result.    XR Chest 1 View [[id number]] Collected: 02/26/25 1739     Updated: 02/26/25 1743    Narrative:      AP CHEST     HISTORY: Acute stroke     COMPARISON: 3/19/2024     FINDINGS: Lung apices are somewhat obscured by chin position, limiting  the study. The visualized lung fields appear clear. Heart size stable.  Atherosclerotic calcification of the aorta.       Impression:      No definite acute findings           This report was finalized on 2/26/2025 5:40 PM by Dr. Nick Owen M.D on Workstation: [id number]       CT Angiogram Head w AI Analysis of LVO [[id number]] Collected: 02/26/25 1704     Updated: 02/26/25 1737    Narrative:      CT HEAD, CTA HEAD AND NECK, CT PERFUSION     HISTORY: Acute neurological deficit, stroke suspected        COMPARISON: 3/19/2024, 1/4/2020     TECHNIQUE: Initial noncontrast head CT was performed. Next, CT perfusion  study was performed after the dynamic bolus of IV contrast. Standard  perfusion maps were constructed with RAPID software. A CT angiogram of  the head and neck was then performed. Delayed postcontrast head CT was  then performed. Sagittal, coronal, and 3-dimensional reconstructed  images were obtained. Radiation dose reduction techniques were utilized,  including automated exposure control and exposure modulation based on  body size. Vessel stenoses were calculated using NASCET criteria. AI  analysis of LVO was utilized.     FINDINGS:     CT HEAD:   There is no evidence of intracranial hemorrhage. There is no  hydrocephalus. There is no mass effect or midline shift. There is stable  patchy and confluent areas of low-attenuation in the cerebral white  matter bilaterally which are nonspecific but likely  reflect sequela of  chronic small vessel ischemic change. Prior cataract surgeries are  noted. The visualized sinuses and mastoids are clear. Postcontrast  imaging demonstrates no evidence of enhancing mass. The dural venous  sinuses appear patent. No acute bony abnormality.     CTA NECK:   Standard three-vessel arch. The innominate artery is patent. The right  common carotid artery is patent. There is some mild atherosclerotic  plaque at the carotid bulb on the right without stenosis. The right  internal carotid artery is patent. The left common carotid artery is  patent. There is mild atherosclerotic plaque at the carotid bulb without  stenosis. The left internal carotid artery is patent. Both vertebral  arteries are patent. The visualized lung apices appear clear. Visualized  mediastinal structures appear unremarkable. The surrounding soft tissue  structures of the neck appear unremarkable. Cervical spine degenerative  changes     CTA HEAD:   There is abrupt cut off of the left MCA M1 segment, consistent with  occlusion. The bilateral anterior cerebral arteries are patent. The  right middle cerebral artery is patent. The distal vertebral arteries  are patent. The basilar artery is patent. The posterior cerebral  arteries are patent however there is small caliber of the right  posterior cerebral artery with respect to the left again noted which may  indicate underlying stenosis. No evidence of aneurysm. Carotid siphon  calcifications are demonstrated.     CT PERFUSION:  TOTAL HYPOPERFUSION: Using the threshold of Tmax greater than 6 seconds,  there is an area of hypoperfusion in the left MCA territory with a total  volume of hypoperfusion of 164 mL.     CORE INFARCT: Using the threshold of CBF less than 30%, there is an area  of ischemic core in the left MCA territory with a total volume of  ischemic core of 32 mL.     PENUMBRA: The penumbra volume (mismatch volume) is 132 mL. The mismatch  ratio is 5.1.        Impression:      Occluded left MCA M1 segment. Hypoperfusion in the left MCA territory  with a central ischemic core of 32 mL, total volume of hypoperfusion of  164 mL, and penumbra of 132 mL.     The findings were discussed with the on-call stroke neurologist during  interpretation of the study           This report was finalized on 2/26/2025 5:34 PM by Dr. Nick Owen M.D on Workstation: [id number]       CT Angiogram Neck [[id number]] Collected: 02/26/25 1704     Updated: 02/26/25 1737    Narrative:      CT HEAD, CTA HEAD AND NECK, CT PERFUSION     HISTORY: Acute neurological deficit, stroke suspected        COMPARISON: 3/19/2024, 1/4/2020     TECHNIQUE: Initial noncontrast head CT was performed. Next, CT perfusion  study was performed after the dynamic bolus of IV contrast. Standard  perfusion maps were constructed with RAPID software. A CT angiogram of  the head and neck was then performed. Delayed postcontrast head CT was  then performed. Sagittal, coronal, and 3-dimensional reconstructed  images were obtained. Radiation dose reduction techniques were utilized,  including automated exposure control and exposure modulation based on  body size. Vessel stenoses were calculated using NASCET criteria. AI  analysis of LVO was utilized.     FINDINGS:     CT HEAD:   There is no evidence of intracranial hemorrhage. There is no  hydrocephalus. There is no mass effect or midline shift. There is stable  patchy and confluent areas of low-attenuation in the cerebral white  matter bilaterally which are nonspecific but likely reflect sequela of  chronic small vessel ischemic change. Prior cataract surgeries are  noted. The visualized sinuses and mastoids are clear. Postcontrast  imaging demonstrates no evidence of enhancing mass. The dural venous  sinuses appear patent. No acute bony abnormality.     CTA NECK:   Standard three-vessel arch. The innominate artery is patent. The right  common carotid artery is patent.  There is some mild atherosclerotic  plaque at the carotid bulb on the right without stenosis. The right  internal carotid artery is patent. The left common carotid artery is  patent. There is mild atherosclerotic plaque at the carotid bulb without  stenosis. The left internal carotid artery is patent. Both vertebral  arteries are patent. The visualized lung apices appear clear. Visualized  mediastinal structures appear unremarkable. The surrounding soft tissue  structures of the neck appear unremarkable. Cervical spine degenerative  changes     CTA HEAD:   There is abrupt cut off of the left MCA M1 segment, consistent with  occlusion. The bilateral anterior cerebral arteries are patent. The  right middle cerebral artery is patent. The distal vertebral arteries  are patent. The basilar artery is patent. The posterior cerebral  arteries are patent however there is small caliber of the right  posterior cerebral artery with respect to the left again noted which may  indicate underlying stenosis. No evidence of aneurysm. Carotid siphon  calcifications are demonstrated.     CT PERFUSION:  TOTAL HYPOPERFUSION: Using the threshold of Tmax greater than 6 seconds,  there is an area of hypoperfusion in the left MCA territory with a total  volume of hypoperfusion of 164 mL.     CORE INFARCT: Using the threshold of CBF less than 30%, there is an area  of ischemic core in the left MCA territory with a total volume of  ischemic core of 32 mL.     PENUMBRA: The penumbra volume (mismatch volume) is 132 mL. The mismatch  ratio is 5.1.       Impression:      Occluded left MCA M1 segment. Hypoperfusion in the left MCA territory  with a central ischemic core of 32 mL, total volume of hypoperfusion of  164 mL, and penumbra of 132 mL.     The findings were discussed with the on-call stroke neurologist during  interpretation of the study           This report was finalized on 2/26/2025 5:34 PM by Dr. Nick Owen M.D on Workstation:  [id number]       CT CEREBRAL PERFUSION WITH & WITHOUT CONTRAST [[id number]] Collected: 02/26/25 1704     Updated: 02/26/25 1737    Narrative:      CT HEAD, CTA HEAD AND NECK, CT PERFUSION     HISTORY: Acute neurological deficit, stroke suspected        COMPARISON: 3/19/2024, 1/4/2020     TECHNIQUE: Initial noncontrast head CT was performed. Next, CT perfusion  study was performed after the dynamic bolus of IV contrast. Standard  perfusion maps were constructed with RAPID software. A CT angiogram of  the head and neck was then performed. Delayed postcontrast head CT was  then performed. Sagittal, coronal, and 3-dimensional reconstructed  images were obtained. Radiation dose reduction techniques were utilized,  including automated exposure control and exposure modulation based on  body size. Vessel stenoses were calculated using NASCET criteria. AI  analysis of LVO was utilized.     FINDINGS:     CT HEAD:   There is no evidence of intracranial hemorrhage. There is no  hydrocephalus. There is no mass effect or midline shift. There is stable  patchy and confluent areas of low-attenuation in the cerebral white  matter bilaterally which are nonspecific but likely reflect sequela of  chronic small vessel ischemic change. Prior cataract surgeries are  noted. The visualized sinuses and mastoids are clear. Postcontrast  imaging demonstrates no evidence of enhancing mass. The dural venous  sinuses appear patent. No acute bony abnormality.     CTA NECK:   Standard three-vessel arch. The innominate artery is patent. The right  common carotid artery is patent. There is some mild atherosclerotic  plaque at the carotid bulb on the right without stenosis. The right  internal carotid artery is patent. The left common carotid artery is  patent. There is mild atherosclerotic plaque at the carotid bulb without  stenosis. The left internal carotid artery is patent. Both vertebral  arteries are patent. The visualized lung apices appear  clear. Visualized  mediastinal structures appear unremarkable. The surrounding soft tissue  structures of the neck appear unremarkable. Cervical spine degenerative  changes     CTA HEAD:   There is abrupt cut off of the left MCA M1 segment, consistent with  occlusion. The bilateral anterior cerebral arteries are patent. The  right middle cerebral artery is patent. The distal vertebral arteries  are patent. The basilar artery is patent. The posterior cerebral  arteries are patent however there is small caliber of the right  posterior cerebral artery with respect to the left again noted which may  indicate underlying stenosis. No evidence of aneurysm. Carotid siphon  calcifications are demonstrated.     CT PERFUSION:  TOTAL HYPOPERFUSION: Using the threshold of Tmax greater than 6 seconds,  there is an area of hypoperfusion in the left MCA territory with a total  volume of hypoperfusion of 164 mL.     CORE INFARCT: Using the threshold of CBF less than 30%, there is an area  of ischemic core in the left MCA territory with a total volume of  ischemic core of 32 mL.     PENUMBRA: The penumbra volume (mismatch volume) is 132 mL. The mismatch  ratio is 5.1.       Impression:      Occluded left MCA M1 segment. Hypoperfusion in the left MCA territory  with a central ischemic core of 32 mL, total volume of hypoperfusion of  164 mL, and penumbra of 132 mL.     The findings were discussed with the on-call stroke neurologist during  interpretation of the study           This report was finalized on 2/26/2025 5:34 PM by Dr. Nick Owen M.D on Workstation: [id number]               I reviewed the patient's new clinical results.  I personally viewed and interpreted the patient's imaging results:        Medication Review:   aspirin, 325 mg, Oral, Daily   Or  aspirin, 300 mg, Rectal, Daily  atorvastatin, 80 mg, Oral, Nightly  heparin (porcine), 5,000 Units, Subcutaneous, Q8H  mupirocin, 1 Application, Each Nare, BID  [START ON  2/28/2025] pantoprazole, 40 mg, Oral, Q AM  sodium chloride, 10 mL, Intravenous, Q12H        niCARdipine, 5-15 mg/hr        ASSESSMENT:   Acute ischemic left MCA occlusion  Right sided weakness  Right visual field cut  Aphasia  Atrial fibrillation  Chronic anticoagulation with apixiban  Hyperlipidemia  GERD  History of stroke with residual right sided weakness    PLAN:  Patient is doing well post thrombectomy with significant improvement in mild residual weakness and mild residual dysphagia.  History of A-fib on Eliquis, not sure if the patient was fully compliant with the medication.  Currently we will start the aspirin, will follow-up with repeat imaging with MRI.    Patient was seen by neurosurgery who deferred further management to the neurology  Had called the family the patient is very L[employer] day in taking her Eliquis and very unlikely to have been missing any doses.  Discussed with stroke team, discussed with the ICU rounding team and the nursing staff  Labs/Notes/films were independently reviewed and pertinent results are summarized above  The copied texts in this note were reviewed and they are accurate as of 02/27/25    Disposition: vivek Garcia MD  02/27/25  15:45 EST           Dictated utilizing Dragon dictation

## 2025-02-27 NOTE — THERAPY EVALUATION
Patient Name: Monica Scherer  : 1933    MRN: [id number]                              Today's Date: 2025       Admit Date: 2025    Visit Dx:     ICD-10-CM ICD-9-CM   1. Altered mental status, unspecified altered mental status type  R41.82 780.97   2. Focal neurological deficit present  R29.818 781.99   3. Acute CVA (cerebrovascular accident)  I63.9 434.91   4. Rogelio-neglect of right side  R41.4 781.8   5. 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  I69.331 438.31     Patient Active Problem List   Diagnosis    Vertigo    Temporary cerebral vascular dysfunction    Gastroesophageal reflux disease with esophagitis    Degeneration of intervertebral disc of cervical region    Prediabetes    S/P cholecystectomy    Osteoarthritis of knee    Herpes zoster without complication    Hypertension    Duodenal ulcer    History of pancreatitis    Hyperlipidemia    TIA (transient ischemic attack)    Cerebrovascular accident (CVA) due to thrombosis of left posterior cerebral artery    Paroxysmal atrial fibrillation    General weakness    History of CVA (cerebrovascular accident)    Anticoagulated    Hematuria    Dizziness and giddiness    Rectal bleeding    Acute CVA (cerebrovascular accident)     Past Medical History:   Diagnosis Date    Atrial fibrillation     Hyperlipidemia     Hypertension     Lacunar infarct, acute 2020    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    New onset atrial fibrillation 2020    now on rate control along with Eliquis    Prolapsed uterus     per patient    Stroke     TIA (transient ischemic attack) 2020    Weakness      Past Surgical History:   Procedure Laterality Date    APPENDECTOMY      CHOLECYSTECTOMY N/A 2016    Procedure: CHOLECYSTECTOMY LAPAROSCOPIC;  Surgeon: Russ Gar MD;  Location: [facility] OR [facility];  Service:     COLONOSCOPY N/A 2021    Procedure: COLONOSCOPY TO CECUM WITH HOT SNARE POLYPECTOMIES  AND COLD BX POLYPECTOMY;  Surgeon: Emerson Izaguirre MD;  Location: [facility] ENDOSCOPY;  Service: Gastroenterology;  Laterality: N/A;  pre: RECTAL BLEEDING, FAMILY HX OF COLON CANCER  post: INTERNAL AND EXTERNAL HEMORRHOIDS, DIVERTICULOSIS, POLYPS    ENDOSCOPY N/A 2/22/2018    Z-line regular, 35 cm from incisors, normal esophagus, gastritis, bilious gastric fluid, one non-bleeding duodenal ulcer w/no stigmata of bleeding, Path: DUODENUM: FRAGMENTS OF GASTRIC TYPE MUCOSA WITH HYPERPLASIA (FOVEOLAR) AND PATCHY MIXED INFLAMMATION IN THE LAMINA PROPRIA WITH FOCAL FIBROSIS, COMMENT: 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     EYE SURGERY      tre cataracts      General Information       Row Name 02/27/25 1253          OT Time and Intention    Subjective Information no complaints  -LE     Document Type evaluation  -LE     Mode of Treatment co-treatment;physical therapy;occupational therapy  -LE     Patient Effort good  -LE     Symptoms Noted During/After Treatment none  -LE       Row Name 02/27/25 1253          General Information    Patient Profile Reviewed yes  -LE     Prior Level of Function independent:;gait;ADL's  walker level.  not really alone at home.  family provides history.  -LE     Existing Precautions/Restrictions fall  -LE     Barriers to Rehab none identified  -LE       Row Name 02/27/25 1253          Living Environment    People in Home child(leona), adult  -LE       Row Name 02/27/25 1253          Cognition    Orientation Status (Cognition) unable/difficult to assess  -LE       Row Name 02/27/25 1253          Safety Issues/Impairments Affecting Functional Mobility    Safety Issues Affecting Function (Mobility) ability to follow commands;insight into deficits/self-awareness;judgment;problem-solving  -LE     Impairments Affecting Function (Mobility) balance;cognition;endurance/activity tolerance;coordination;grasp;postural/trunk control;range of motion (ROM);sensation/sensory  awareness;visual/perceptual;strength  -LE     Comment, Safety Issues/Impairments (Mobility) [place] medically appropriate and necessary due to pt acuity, activity tolerance, to maximize mobility efforts and safety of pt and staff. Focus on progression of care and goals established in plan of care.  -LE               User Key  (r) = Recorded By, (t) = Taken By, (c) = Cosigned By      Initials Name Provider Type    Ina Villar OTR Occupational Therapist                     Mobility/ADL's       Row Name 02/27/25 1254          Bed Mobility    Bed Mobility supine-sit;sit-supine;scooting/bridging  -LE     Scooting/Bridging [place] (Bed Mobility) set up;verbal cues;maximum assist (25% patient effort);2 person assist  -LE     Supine-Sit [place] (Bed Mobility) set up;verbal cues;maximum assist (25% patient effort);2 person assist  -LE     Sit-Supine [place] (Bed Mobility) set up;verbal cues;maximum assist (25% patient effort);2 person assist  -LE     Bed Mobility, Safety Issues cognitive deficits limit understanding;decreased use of arms for pushing/pulling;decreased use of legs for bridging/pushing;impaired trunk control for bed mobility  -LE     Assistive Device (Bed Mobility) bed rails;head of bed elevated  -LE       Row Name 02/27/25 1254          Transfers    Transfers sit-stand transfer;stand-sit transfer;bed-chair transfer;toilet transfer  -LE       Row Name 02/27/25 1254          Bed-Chair Transfer    Bed-Chair [place] (Transfers) unable to assess  -LE       Row Name 02/27/25 1254          Sit-Stand Transfer    Sit-Stand [place] (Transfers) maximum assist (25% patient effort);set up;verbal cues;2 person assist  -LE       Row Name 02/27/25 1254          Stand-Sit Transfer    Stand-Sit [place] (Transfers) set up;verbal cues;maximum assist (25% patient effort);2 person assist  -LE       Row Name 02/27/25 1254          Toilet Transfer    [place] Level (Toilet Transfer) not  tested;unable to assess  -MARJ       [facility] Name 02/27/25 1254          Functional Mobility    Functional Mobility- Ind. Level not appropriate to assess;unable to perform  -LE     Functional Mobility- Comment unable to do sidestep even with full assist by OT/PT  -MARJ       [facility] Name 02/27/25 1254          Activities of Daily Living    BADL Assessment/Intervention toileting;feeding;grooming;lower body dressing;upper body dressing;bathing  -MARJ       [facility] Name 02/27/25 1254          Self-Feeding Assessment/Training    Comment, (Feeding) NPO  -MARJ       [facility] Name 02/27/25 1254          Lower Body Dressing Assessment/Training    [place] Level (Lower Body Dressing) don;socks;dependent (less than 25% patient effort)  -LE     Position (Lower Body Dressing) supine  -MARJ       [facility] Name 02/27/25 1254          Toileting Assessment/Training    [place] Level (Toileting) dependent (less than 25% patient effort)  -LE     Comment, (Toileting) purwick.  -               User Key  (r) = Recorded By, (t) = Taken By, (c) = Cosigned By      Initials Name Provider Type    Ina Villar OTR Occupational Therapist                   Obj/Interventions       Row Name 02/27/25 1256          Sensory Assessment (Somatosensory)    Sensory Assessment (Somatosensory) unable/difficult to assess  -MARJ       Row Name 02/27/25 1256          Vision Assessment/Intervention    Vision Assessment Comment L gaze preference at rest and during session. pt does turn to look at R with delay when OT calls to her from R side.  -MARJ       [facility] Name 02/27/25 1256          Range of Motion Comprehensive    Comment, General Range of Motion doesn't move UE on command but  at least minimal movement R UE during assisting pt to sit EOB.  -MARJ       [facility] Name 02/27/25 1256          Strength Comprehensive (MMT)    Comment, General Manual Muscle Testing (MMT) Assessment does not follow MMT commands.  -[facility] Name 02/27/25 1256          Motor Skills    Therapeutic Exercise  shoulder;elbow/forearm;wrist  -LE       Row Name 02/27/25 1256          Balance    Balance Assessment sitting static balance;standing static balance;standing dynamic balance  -LE     Comment, Balance assist of 1 sitting balance EOB, Assist of 2 to stand.  -LE               User Key  (r) = Recorded By, (t) = Taken By, (c) = Cosigned By      Initials Name Provider Type    Ina Villar OTCOLE Occupational Therapist                   Goals/Plan       Row Name 02/27/25 1301          Transfer Goal 1 (OT)    Activity/Assistive Device (Transfer Goal 1, OT) sit-to-stand/stand-to-sit;bed-to-chair/chair-to-bed;toilet;commode, 3-in-1;walker, rolling  -LE     [place] Level/Cues Needed (Transfer Goal 1, OT) set-up required;minimum assist (75% or more patient effort);verbal cues required  -LE     Time Frame (Transfer Goal 1, OT) 2 weeks  -LE     Progress/Outcome (Transfer Goal 1, OT) goal ongoing  -LE       Row Name 02/27/25 1301          Dressing Goal 1 (OT)    Activity/Device (Dressing Goal 1, OT) upper body dressing;lower body dressing  -LE     [place]/Cues Needed (Dressing Goal 1, OT) set-up required;verbal cues required  -LE     Time Frame (Dressing Goal 1, OT) 2 weeks  -LE     Progress/Outcome (Dressing Goal 1, OT) goal ongoing  -LE       Row Name 02/27/25 1301          Toileting Goal 1 (OT)    Activity/Device (Toileting Goal 1, OT) toileting skills, all;commode, 3-in-1;grab bar/safety frame  -LE     [place] Level/Cues Needed (Toileting Goal 1, OT) set-up required;moderate assist (50-74% patient effort);verbal cues required  -LE     Time Frame (Toileting Goal 1, OT) 2 weeks  -LE     Progress/Outcome (Toileting Goal 1, OT) goal ongoing  -LE       Row Name 02/27/25 1301          Grooming Goal 1 (OT)    Activity/Device (Grooming Goal 1, OT) oral care  -LE     [place] (Grooming Goal 1, OT) standby assist;set-up required  -LE     Time Frame (Grooming Goal 1, OT) 2 weeks  -LE     Progress/Outcome (Grooming  Goal 1, OT) goal ongoing  -LE       Row Name 02/27/25 1301          Self-Feeding Goal 1 (OT)    [place] Level/Cues Needed (Self-Feeding Goal 1, OT) standby assist;set-up required;contact guard required  -LE     Time Frame (Self-Feeding Goal 1, OT) 2 weeks  -LE     Progress/Outcomes (Self-Feeding Goal 1, OT) goal ongoing  -       Row Name 02/27/25 1301          Strength Goal 1 (OT)    Strength Goal 1 (OT) Supervision for  R UE A/AROM to increase sustained reach and hold during ADL and mobility efforts.  -LE     Time Frame (Strength Goal 1, OT) 2 weeks  -LE     Progress/Outcome (Strength Goal 1, OT) goal ongoing  -       Row Name 02/27/25 1301          Problem Specific Goal 1 (OT)    Problem Specific Goal 1 (OT) MIn A for dynamic standing balance at walker to increase independence with clothing management.  -LE     Time Frame (Problem Specific Goal 1, OT) 2 weeks  -LE     Progress/Outcome (Problem Specific Goal 1, OT) goal ongoing  -[facility] Name 02/27/25 1301          Therapy Assessment/Plan (OT)    Planned Therapy Interventions (OT) activity tolerance training;adaptive equipment training;BADL retraining;functional balance retraining;patient/caregiver education/training;strengthening exercise;transfer/mobility retraining  -LE               User Key  (r) = Recorded By, (t) = Taken By, (c) = Cosigned By      Initials Name Provider Type    Ina Villar OTR Occupational Therapist                   Clinical Impression       Row Name 02/27/25 1259          Pain Assessment    Pre/Posttreatment Pain Comment no indications of pain  -[facility] Name 02/27/25 1259          Plan of Care Review    Plan of Care Reviewed With patient  -LE     Outcome Evaluation Pt admit with R side weakness, aphasia, droop.  S/p 2/26 mechanical thrombectomy.  Pt lives with family and h/o CVA with residual R deficits but can walk and talk at baseline. Pt in bed with family at bedside.  Pt mostly non verbal and seems to have  difficulty both speaking and understanding commands. Pt is assist of 2 to sit and stand at EOB.   Pt demo R side weakness and attention.  Anticipate assist with all ADL at this time.  Will cont to follow for skilled OT and recommend SNU at d/c.  -MARJ       Row Name 02/27/25 4233          Therapy Assessment/Plan (OT)    Patient/Family Therapy Goal Statement (OT) Return to prior level of function.  -LE     Rehab Potential (OT) good  -LE     Criteria for Skilled Therapeutic Interventions Met (OT) meets criteria;yes  -LE     Therapy Frequency (OT) 5 times/wk  -MARJ       Row Name 02/27/25 1256          Therapy Plan Review/Discharge Plan (OT)    Equipment Needs Upon Discharge (OT) --  walker, BSC.  -LE     Anticipated Discharge Disposition (OT) skilled nursing facility  -MARJ       Row Name 02/27/25 1256          Vital Signs    O2 Delivery Pre Treatment room air  -LE     O2 Delivery Intra Treatment room air  -LE     O2 Delivery Post Treatment room air  -       Row Name 02/27/25 5612          Positioning and Restraints    Pre-Treatment Position in bed  -LE     Post Treatment Position bed  -LE     In Bed notified nsg;fowlers;call light within reach;encouraged to call for assist;exit alarm on  son and grandson had stepped out of room when therapy begins to assist pt to EOB.   no family outside room when therapy left.  -LE               User Key  (r) = Recorded By, (t) = Taken By, (c) = Cosigned By      Initials Name Provider Type    Ina Villar OTR Occupational Therapist                   Outcome Measures       Row Name 02/27/25 7932          How much help from another is currently needed...    Putting on and taking off regular lower body clothing? 1  -LE     Bathing (including washing, rinsing, and drying) 1  -LE     Toileting (which includes using toilet bed pan or urinal) 1  -LE     Putting on and taking off regular upper body clothing 1  -LE     Taking care of personal grooming (such as brushing teeth) 2  -LE      Eating meals 1  -LE     AM-PAC 6 Clicks Score (OT) 7  -LE       Row Name 02/27/25 0800          How much help from another person do you currently need...    Turning from your back to your side while in flat bed without using bedrails? 2  -ST     Moving from lying on back to sitting on the side of a flat bed without bedrails? 2  -ST     Moving to and from a bed to a chair (including a wheelchair)? 2  -ST     Standing up from a chair using your arms (e.g., wheelchair, bedside chair)? 2  -ST     Climbing 3-5 steps with a railing? 1  -ST     To walk in hospital room? 1  -ST     AM-PAC 6 Clicks Score (PT) 10  -ST       Row Name 02/27/25 1303          Modified Todd Scale    Modified Todd Scale 5 - Severe disability.  Bedridden, incontinent, and requiring constant nursing care and attention.  -       Row Name 02/27/25 1303          Functional Assessment    Outcome Measure Options AM-PAC 6 Clicks Daily Activity (OT);Modified Radha  -LE               User Key  (r) = Recorded By, (t) = Taken By, (c) = Cosigned By      Initials Name Provider Type    Ina Villar OTR Occupational Therapist    Zenaida Pemberton, RN Registered Nurse                    Occupational Therapy Education       Title: PT OT SLP Therapies (In Progress)       Topic: Occupational Therapy (In Progress)       Point: ADL training (Done)       Description:   Instruct learner(s) on proper safety adaptation and remediation techniques during self care or transfers.   Instruct in proper use of assistive devices.                  Learning Progress Summary            Patient Acceptance, E, Bed IU by MARJ at 2/27/2025 1303    Comment: role of OT, plan of care, benefit of activity.   cognition limits ed for pt.   Family Acceptance, E, Bed IU by MARJ at 2/27/2025 1303    Comment: role of OT, plan of care, benefit of activity.   cognition limits ed for pt.                      Point: Home exercise program (Not Started)       Description:   Instruct learner(s) on  appropriate technique for monitoring, assisting and/or progressing therapeutic exercises/activities.                  Learner Progress:  Not documented in this visit.              Point: Precautions (Done)       Description:   Instruct learner(s) on prescribed precautions during self-care and functional transfers.                  Learning Progress Summary            Patient Acceptance, E, Bed IU by MARJ at 2/27/2025 1303    Comment: role of OT, plan of care, benefit of activity.   cognition limits ed for pt.   Family Acceptance, E, Bed IU by MARJ at 2/27/2025 1303    Comment: role of OT, plan of care, benefit of activity.   cognition limits ed for pt.                      Point: Body mechanics (Done)       Description:   Instruct learner(s) on proper positioning and spine alignment during self-care, functional mobility activities and/or exercises.                  Learning Progress Summary            Patient Acceptance, E, Bed IU by MARJ at 2/27/2025 1303    Comment: role of OT, plan of care, benefit of activity.   cognition limits ed for pt.   Family Acceptance, E, Bed IU by MARJ at 2/27/2025 1303    Comment: role of OT, plan of care, benefit of activity.   cognition limits ed for pt.                                      User Key       Initials Effective Dates Name Provider Type Discipline     06/16/21 -  Ina Hopkins, OTR Occupational Therapist OT                  OT Recommendation and Plan  Planned Therapy Interventions (OT): activity tolerance training, adaptive equipment training, BADL retraining, functional balance retraining, patient/caregiver education/training, strengthening exercise, transfer/mobility retraining  Therapy Frequency (OT): 5 times/wk  Plan of Care Review  Plan of Care Reviewed With: patient  Outcome Evaluation: Pt admit with R side weakness, aphasia, droop.  S/p 2/26 mechanical thrombectomy.  Pt lives with family and h/o CVA with residual R deficits but can walk and talk at baseline. Pt in bed  with family at bedside.  Pt mostly non verbal and seems to have difficulty both speaking and understanding commands. Pt is assist of 2 to sit and stand at EOB.   Pt demo R side weakness and attention.  Anticipate assist with all ADL at this time.  Will cont to follow for skilled OT and recommend SNU at d/c.     Time Calculation:   Evaluation Complexity (OT)  Review Occupational Profile/Medical/Therapy History Complexity: expanded/moderate complexity  Assessment, Occupational Performance/Identification of Deficit Complexity: 3-5 performance deficits  Clinical Decision Making Complexity (OT): detailed assessment/moderate complexity  Overall Complexity of Evaluation (OT): moderate complexity     Time Calculation- OT       Row Name 02/27/25 1304             Time Calculation- OT    OT Start Time 1010  -LE      OT Stop Time 1027  -LE      OT Time Calculation (min) 17 min  -LE      Total Timed Code Minutes- OT 8 minute(s)  -LE      OT Received On 02/27/25  -LE      OT - Next Appointment 02/28/25  -LE      OT Goal Re-Cert Due Date 03/14/25  -LE         Timed Charges    30577 - OT Therapeutic Activity Minutes 8  -LE         Total Minutes    Timed Charges Total Minutes 8  -LE       Total Minutes 8  -LE                User Key  (r) = Recorded By, (t) = Taken By, (c) = Cosigned By      Initials Name Provider Type    LE Ina Hopkins, OTR Occupational Therapist                  Therapy Charges for Today       Code Description Service Date Service Provider Modifiers Qty    [id number]  OT THERAPEUTIC ACT EA 15 MIN 2/27/2025 Ina Hopkins OTR GO 1    [id number]  OT EVAL MOD COMPLEXITY 3 2/27/2025 Ina Hopkins OTR GO 1                 MIO Pompa  2/27/2025

## 2025-02-27 NOTE — PLAN OF CARE
Goal Outcome Evaluation:  Plan of Care Reviewed With: patient           Outcome Evaluation: Pt is a 89 yo female who presents from home with R sided weakness, aphasia; dx with acute L MCA CVA, POD1 s/p thrombectomy. PMH of prior CVA with residual R sided deficits. Prior to admission, pt was living at home with family and independent with household mobility using rwx. Upon exam, pt demos significant global aphasia; minimal verbalization noted during therapy session and difficulty noted with sequencing and command following. Pt demos generalized weakness (R > L), impaired balance, impaired trunk control, and decreased endurance limiting mobility. Pt also appears to demo some degree of R intattention; however, cognition limits full assessment. Pt required max A x2 for bed mobility and STS transfers. Max A for sitting balance at EOB. Pt remains at increased risk of falls and presents with mobility below baseline. Rec DC to SNF for continued therapy when stable.    Anticipated Discharge Disposition (PT): skilled nursing facility

## 2025-02-27 NOTE — THERAPY EVALUATION
Acute Care - Speech Language Pathology   Swallow Initial Evaluation [facility]     Patient Name: Monica Scherer  : 1933  MRN: [id number]  Today's Date: 2025               Admit Date: 2025    Visit Dx:     ICD-10-CM ICD-9-CM   1. Altered mental status, unspecified altered mental status type  R41.82 780.97   2. Focal neurological deficit present  R29.818 781.99   3. Acute CVA (cerebrovascular accident)  I63.9 434.91   4. Rogelio-neglect of right side  R41.4 781.8     Patient Active Problem List   Diagnosis    Vertigo    Temporary cerebral vascular dysfunction    Gastroesophageal reflux disease with esophagitis    Degeneration of intervertebral disc of cervical region    Prediabetes    S/P cholecystectomy    Osteoarthritis of knee    Herpes zoster without complication    Hypertension    Duodenal ulcer    History of pancreatitis    Hyperlipidemia    TIA (transient ischemic attack)    Cerebrovascular accident (CVA) due to thrombosis of left posterior cerebral artery    Paroxysmal atrial fibrillation    General weakness    History of CVA (cerebrovascular accident)    Anticoagulated    Hematuria    Dizziness and giddiness    Rectal bleeding    Acute CVA (cerebrovascular accident)     Past Medical History:   Diagnosis Date    Atrial fibrillation     Hyperlipidemia     Hypertension     Lacunar infarct, acute 2020    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    New onset atrial fibrillation 2020    now on rate control along with Eliquis    Prolapsed uterus     per patient    Stroke     TIA (transient ischemic attack) 2020    Weakness      Past Surgical History:   Procedure Laterality Date    APPENDECTOMY      CHOLECYSTECTOMY N/A 2016    Procedure: CHOLECYSTECTOMY LAPAROSCOPIC;  Surgeon: Russ Gar MD;  [place] [facility] [place] [facility] [place]  Service:     COLONOSCOPY N/A 2021    Procedure: COLONOSCOPY TO CECUM WITH HOT SNARE POLYPECTOMIES AND COLD BX POLYPECTOMY;  Surgeon: Zina  Emerson RAMÍREZ MD;  Location: [facility] ENDOSCOPY;  Service: Gastroenterology;  Laterality: N/A;  pre: RECTAL BLEEDING, FAMILY HX OF COLON CANCER  post: INTERNAL AND EXTERNAL HEMORRHOIDS, DIVERTICULOSIS, POLYPS    ENDOSCOPY N/A 2/22/2018    Z-line regular, 35 cm from incisors, normal esophagus, gastritis, bilious gastric fluid, one non-bleeding duodenal ulcer w/no stigmata of bleeding, Path: DUODENUM: FRAGMENTS OF GASTRIC TYPE MUCOSA WITH HYPERPLASIA (FOVEOLAR) AND PATCHY MIXED INFLAMMATION IN THE LAMINA PROPRIA WITH FOCAL FIBROSIS, COMMENT: 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     EYE SURGERY      tre cataracts       SLP Recommendation and Plan  SLP Swallowing Diagnosis: suspected pharyngeal dysphagia, oral dysphagia (02/27/25 0900)  SLP Diet Recommendation: puree, nectar thick liquids (02/27/25 0900)  Recommended Precautions and Strategies: upright posture during/after eating, small bites of food and sips of liquid, 1:1 supervision, assist with feeding (02/27/25 0900)  SLP Rec. for Method of Medication Administration: meds crushed, with puree, as tolerated (whole in puree if contraindicated) (02/27/25 0900)     Monitor for Signs of Aspiration: yes, notify SLP if any concerns (02/27/25 0900)  Recommended Diagnostics: reassess via clinical swallow evaluation, SLE/Cog/Motor Speech Evaluation (02/27/25 0900)  Swallow Criteria for Skilled Therapeutic Interventions Met: demonstrates skilled criteria (02/27/25 0900)     Rehab Potential/Prognosis, Swallowing: good, to achieve stated therapy goals (02/27/25 0900)  Therapy Frequency (Swallow): PRN (02/27/25 0900)  Predicted Duration Therapy Intervention (Days): until discharge (02/27/25 0900)  Oral Care Recommendations: Oral Care BID/PRN (02/27/25 0900)                                        Outcome Evaluation: Clinical swallow evaluation comopleted. Awake and alert, globally aphasic. Right labial droop appreciated. Laughing at clinician periodically. Able to open mouth when  given tactile stimulation and visual demonstration. No overt s/s of aspiration with ice chips, thins via spoon/cup/straw, nectar via spoon/cup/straw, or puree trials. Audible swallows with thins, suspect delayed swallows. x2-3 spontaneous swallows with puree. No oral residue. Laryngeal elevation appeared age-appropriate upon palpation. Recommend puree diet with nectar thick liquids. Meds crushed in puree, whole in puree if contraindicated. 1:1 supervision, feeding assistance, sitting upright, slow rate, small bites/sips. With negative lung changes or clinical s/s of aspiration observed, make NPO and notify speech therapy. Will continue to follow for swallow re-evaluation and speech/language evaluation as appropriate.      SWALLOW EVALUATION (Last 72 Hours)       SLP Adult Swallow Evaluation       Row Name 02/27/25 0900                   Rehab Evaluation    Document Type evaluation  -CR        Subjective Information no complaints  -CR        Patient Observations alert;cooperative  -CR        Patient Effort adequate  -CR        Symptoms Noted During/After Treatment none  -CR           General Information    Patient Profile Reviewed yes  -CR        Pertinent History Of Current Problem 89 y/o female who presented with aphasia, right facial droop. L MCA s/p thrombectomy. Hx includes CVA.  -CR        Current Method of Nutrition NPO  -CR        Precautions/Limitations, Vision WFL;for purposes of eval  -CR        Precautions/Limitations, Hearing difficult to assess  -CR        Prior Level of Function-Communication unknown  -CR        Prior Level of Function-Swallowing unknown  no family present, pt globally aphasic. 2020 BSE recommended regular/thins.  -CR        Plans/Goals Discussed with patient  -CR        Barriers to Rehab medically complex  -CR           Pain    Pre/Posttreatment Pain Comment pt nonverbal during evaluation  -CR           Oral Motor Structure and Function    Dentition Assessment missing teeth  -CR         Secretion Management WNL/WFL  -CR        Mucosal Quality dry  -CR           Oral Musculature and Cranial Nerve Assessment    Oral Motor General Assessment --  unable to fully assess as pt not following commands  -CR        Oral Labial or Buccal Impairment, Detail, Cranial Nerve VII (Facial): right labial droop  at rest  -CR           General Eating/Swallowing Observations    Respiratory Support Currently in Use nasal cannula  -CR        O2 Liters 2L  -CR           Clinical Swallow Eval    Clinical Swallow Evaluation Summary Clinical swallow evaluation comopleted. Awake and alert, globally aphasic. Right labial droop appreciated. Laughing at clinician periodically. Able to open mouth when given tactile stimulation and visual demonstration. No overt s/s of aspiration with ice chips, thins via spoon/cup/straw, nectar via spoon/cup/straw, or puree trials. Audible swallows with thins, suspect delayed swallows. x2-3 spontaneous swallows with puree. No oral residue. Laryngeal elevation appeared age-appropriate upon palpation. Recommend puree diet with nectar thick liquids. Meds crushed in puree, whole in puree if contraindicated. 1:1 supervision, feeding assistance, sitting upright, slow rate, small bites/sips. With negative lung changes or clinical s/s of aspiration observed, make NPO and notify speech therapy. Will continue to follow for swallow re-evaluation and speech/language evaluation as appropriate.  -CR           SLP Evaluation Clinical Impression    SLP Swallowing Diagnosis suspected pharyngeal dysphagia;oral dysphagia  -CR        Functional Impact risk of aspiration/pneumonia  -CR        Rehab Potential/Prognosis, Swallowing good, to achieve stated therapy goals  -CR        Swallow Criteria for Skilled Therapeutic Interventions Met demonstrates skilled criteria  -CR           Recommendations    Therapy Frequency (Swallow) PRN  -CR        Predicted Duration Therapy Intervention (Days) until discharge  -CR         SLP Diet Recommendation puree;nectar thick liquids  -CR        Recommended Diagnostics reassess via clinical swallow evaluation;SLE/Cog/Motor Speech Evaluation  -CR        Recommended Precautions and Strategies upright posture during/after eating;small bites of food and sips of liquid;1:1 supervision;assist with feeding  -CR        Oral Care Recommendations Oral Care BID/PRN  -CR        SLP Rec. for Method of Medication Administration meds crushed;with puree;as tolerated  whole in puree if contraindicated  -CR        Monitor for Signs of Aspiration yes;notify SLP if any concerns  -CR           Swallow Goals (SLP)    Swallow LTGs Patient will demonstrate functional swallow for  -CR           (LTG) Patient will demonstrate functional swallow for    Diet Texture (Demonstrate functional swallow) mechanical ground textures  -CR        Liquid viscosity (Demonstrate functional swallow) thin liquids  -CR        Lynn (Demonstrate functional swallow) with moderate cues (50-74% accuracy)  -CR        Time Frame (Demonstrate functional swallow) by discharge  -CR        Progress/Outcomes (Demonstrate functional swallow) new goal  -CR                  User Key  (r) = Recorded By, (t) = Taken By, (c) = Cosigned By      Initials Name Effective Dates    CR Laney Richardson SLP 12/03/24 -                     EDUCATION  The patient has been educated in the following areas:   Dysphagia (Swallowing Impairment).        SLP GOALS       Row Name 02/27/25 0900             (LTG) Patient will demonstrate functional swallow for    Diet Texture (Demonstrate functional swallow) mechanical ground textures  -CR      Liquid viscosity (Demonstrate functional swallow) thin liquids  -CR      Lynn (Demonstrate functional swallow) with moderate cues (50-74% accuracy)  -CR      Time Frame (Demonstrate functional swallow) by discharge  -CR      Progress/Outcomes (Demonstrate functional swallow) new goal  -CR                User Key  (r)  = Recorded By, (t) = Taken By, (c) = Cosigned By      Initials Name Provider Type    Laney Marie SLP Speech and Language Pathologist                         Time Calculation:    Time Calculation- SLP       Row Name 02/27/25 0906             Time Calculation- SLP    SLP Start Time 0730  -CR      SLP Received On 02/27/25  -CR         Untimed Charges    [id number] Eval Oral Pharyng Swallow Minutes 45  -CR         Total Minutes    Untimed Charges Total Minutes 45  -CR       Total Minutes 45  -CR                User Key  (r) = Recorded By, (t) = Taken By, (c) = Cosigned By      Initials Name Provider Type    Laney Marie SLP Speech and Language Pathologist                    Therapy Charges for Today       Code Description Service Date Service Provider Modifiers Qty    [id number]  ST EVAL ORAL PHARYNG SWALLOW 3 2/27/2025 Laney Richardson SLP GN 1                 RODDY Damon  2/27/2025

## 2025-02-27 NOTE — PLAN OF CARE
Goal Outcome Evaluation:      Patient is alert but orientation level couldn't be assessed as pt is non verbal. Intermittently following commands but still is unable to follow the directions proper   Upgraded to pureed with nectar thick fluid from NPO. No change in neuro status. MRI head completed.

## 2025-02-27 NOTE — PROGRESS NOTES
[facility] NEUROSURGERY INTRACRANIAL POSTOP note    PATIENT IDENTIFICATION:   Name:  Monica Scherer      MRN:  [id number]     89 y.o.  female               CC:POD #1 s/p diagnostic cerebral angiogram for thrombectomy for left M1 occlusion.      Subjective     Interval History: No acute issues overnight.  Continues to experience global aphasia.  Speech therapy evaluated today.  Placing her on nectar thick liquids and puréed diet with supervision.  Repeat CT head completed this morning showing stable findings.  MRI brain not completed yet.    Objective     Vital signs in last 24 hours:  Temp:  [97.1 °F (36.2 °C)-98.1 °F (36.7 °C)] 97.5 °F (36.4 °C)  Heart Rate:  [52-92] 74  Resp:  [12-18] 18  BP: ()/() 129/66      Intake/Output this shift:  I/O this shift:  In: 150 [P.O.:150]  Out: -     Intake/Output last 3 shifts:  I/O last 3 completed shifts:  In: 0   Out: 900 [Urine:900]      LABS:  .  Results from last 7 days   Lab Units 02/27/25  0330 02/26/25  1647   WBC 10*3/mm3 10.87* 12.46*   HEMOGLOBIN g/dL 12.0 12.6   HEMATOCRIT % 38.0 38.6   PLATELETS 10*3/mm3 218 250     .  Results from last 7 days   Lab Units 02/27/25  0330   SODIUM mmol/L 140   POTASSIUM mmol/L 3.1*   CHLORIDE mmol/L 104   CO2 mmol/L 23.8   BUN mg/dL 13   CREATININE mg/dL 0.72   GLUCOSE mg/dL 128*   CALCIUM mg/dL 8.6          IMAGING STUDIES:  CT head without contrast 2/27/2025:  Edema in right MCA territory consistent with subacute infarct.  No hemorrhage.  Persistent mild left to right midline shift not significantly changed from previous examination.    I personally reviewed imaging and Dr. Singh evaluated 2/27/2025.  I personally viewed and interpreted the patient's labs, imaging study, medications and chart.    Meds reviewed/changed: Yes    Current Facility-Administered Medications:     aspirin tablet 325 mg, 325 mg, Oral, Daily, 325 mg at 02/27/25 0938 **OR** aspirin suppository 300 mg, 300 mg, Rectal, Daily, Tomeka Camp, APRN     atorvastatin (LIPITOR) tablet 80 mg, 80 mg, Oral, Nightly, Tomeka Camp APRN    Calcium Replacement - Follow Nurse / BPA Driven Protocol, , Not Applicable, PRN, Tomeka Camp APRN    heparin (porcine) 5000 UNIT/ML injection 5,000 Units, 5,000 Units, Subcutaneous, Q8H, Micheline Singh MD, 5,000 Units at 02/27/25 0549    hydrALAZINE (APRESOLINE) injection 10 mg, 10 mg, Intravenous, Q4H PRN, Tomeka Camp APRN    Magnesium Standard Dose Replacement - Follow Nurse / BPA Driven Protocol, , Not Applicable, PRN, Tomeka Camp APRN    metoprolol tartrate (LOPRESSOR) injection 2.5 mg, 2.5 mg, Intravenous, Q4H PRN, Tomeka Camp APRN    mupirocin (BACTROBAN) 2 % nasal ointment 1 Application, 1 Application, Each Nare, BID, Tomeka Camp APRN, 1 Application at 02/27/25 0549    niCARdipine (CARDENE) 20 mg in 200 mL NS infusion, 5-15 mg/hr, Intravenous, Titrated, Tomeka Camp APRN    pantoprazole (PROTONIX) injection 40 mg, 40 mg, Intravenous, Q AM, Tomeka Camp APRN, 40 mg at 02/27/25 0549    Phosphorus Replacement - Follow Nurse / BPA Driven Protocol, , Not Applicable, PRN, Tomeka Camp APRN    potassium chloride 10 mEq in 100 mL IVPB, 10 mEq, Intravenous, Q1H, Tomeka Camp APRN, Last Rate: 100 mL/hr at 02/27/25 1044, 10 mEq at 02/27/25 1044    Potassium Replacement - Follow Nurse / BPA Driven Protocol, , Not Applicable, PRN, Tomeka Camp APRN    sodium chloride 0.9 % flush 10 mL, 10 mL, Intravenous, PRN, Tomeka Camp APRN    sodium chloride 0.9 % flush 10 mL, 10 mL, Intravenous, Q12H, Tomeka Camp APRN, 10 mL at 02/27/25 0813    sodium chloride 0.9 % flush 10 mL, 10 mL, Intravenous, PRN, Conrado, Tomeka J, APRN    sodium chloride 0.9 % infusion 40 mL, 40 mL, Intravenous, PRN, Tomeka Camp, APRN      Physical Exam:    General:   Awake, alert, unable to assess orientation.  Not speaking seems to almost mumble but not saying any words.  Severely globally  aphasic    Neck:    supple  CN II:    Visual fields full to confrontation.  CN III IV VI:   Extraocular movements are full , PERRL 3 mm brisk to light  CN VII:    Mild facial droop right side which appears to be improving overnight.    Motor:    No drift in left upper extremity.  Right upper extremity drift.  Moves lower extremities spontaneously but not following commands for testing.  Appears to have strength in both legs but weaker on the right.    Reflexes:   Plantar responses are flexor.   Sensation:   Unable to test sensation  Station and Gait:  Per speech therapist note 2/27/2025:  Right labial droop appreciated. Laughing at clinician periodically. Able to open mouth when given tactile stimulation and visual demonstration. No overt s/s of aspiration with ice chips, thins via spoon/cup/straw, nectar via spoon/cup/straw, or puree trials. Audible swallows with thins, suspect delayed swallows. x2-3 spontaneous swallows with puree. No oral residue. Laryngeal elevation appeared age-appropriate upon palpation. Recommend puree diet with nectar thick liquids. Meds crushed in puree, whole in puree if contraindicated. 1:1 supervision, feeding assistance, sitting upright, slow rate, small bites/sips. With negative lung changes or clinical s/s of aspiration observed, make NPO and notify speech therapy. Will continue to follow for swallow re-evaluation and speech/language evaluation as appropriate.   Coordination:   Finger-to-nose and heel-to-shin test shows no dysmetria.  Rapid alternating movements are normal. Able to tandem walk. Romberg negative.  Extremities:   Wearing SCD  Right groin site without any evidence of hematoma.  Distal pulses 2+.  Distal lower extremity color well-appearing.  capillary refill less than 1 second.  Unable to assess sensation due to patient aphasia.  Assessment & Plan     ASSESSMENT:      Acute CVA (cerebrovascular accident)      89-year-old female presenting with right-sided weakness and  "aphasia found to have left M1 occlusion on DCA.  She underwent successful thrombectomy for revascularization with achievement of TICI 3 with Dr. Singh.  She continues to experience global aphasia today but she does have some movement on the right side.  Vision appears to be intact with full extraocular movements.  She has some mild right facial droop but this appears to be improving slightly overnight per nursing.  Neurosurgery will defer further medical management to neurology/ICU intensivist.  Neurosurgery will set up a 1 month follow-up visit with Dr. Singh.  We will call to notify the patient.  I spoke with the patient and family and notified them to call 911 for any acute neurologic changes and to notify neurosurgery.    PLAN:     Defer medical management to neurology  Supportive care per ICU intensivist  PT/OT/ST  Neurosurgery signing off  Follow-up with neurosurgery 1 month      I discussed the patient's findings and my recommendations with patient, family, nursing staff, and Dr. Singh.    During patient visit, I utilized appropriate personal protective equipment including mask and gloves.  Mask used was standard procedure mask. Appropriate PPE was worn during the entire visit.  Hand hygiene was completed before and after.      LOS: 1 day       Luis Alfredo Gonzalesadrienne, APRN  2/27/2025  11:33 EST    \"Dictated utilizing Dragon dictation\".    "

## 2025-02-27 NOTE — ANESTHESIA POSTPROCEDURE EVALUATION
"Patient: Monica Scherer    Procedure Summary       Date: 02/26/25 Room / Location: [facility] INTERVENTIONAL    Anesthesia Start: 1746 Anesthesia Stop: 1829    Procedure: IR PERC MECH THROMB PRIM NONC ART INI Diagnosis: (emergent stroke)    Scheduled Providers:  Provider: Danni Busby MD    Anesthesia Type: general ASA Status: 4 - Emergent            Anesthesia Type: general    Vitals  Vitals Value Taken Time   /81 02/26/25 1945   Temp     Pulse 75 02/26/25 1947   Resp 15 02/26/25 1935   SpO2 99 % 02/26/25 1947   Vitals shown include unfiled device data.        Post Anesthesia Care and Evaluation    Pain management: adequate    Airway patency: patent  Anesthetic complications: No anesthetic complications    Cardiovascular status: acceptable  Respiratory status: acceptable  Hydration status: acceptable    Comments: /68   Pulse 76   Temp 36.2 °C (97.1 °F)   Resp 15   Ht 162.6 cm (64\")   Wt 70.4 kg (155 lb 4.8 oz)   SpO2 100%   BMI 26.66 kg/m²       "

## 2025-02-27 NOTE — PLAN OF CARE
Goal Outcome Evaluation:  Plan of Care Reviewed With: patient           Outcome Evaluation: Pt admit with R side weakness, aphasia, droop.  S/p 2/26 mechanical thrombectomy.  Pt lives with family and h/o CVA with residual R deficits but can walk and talk at baseline. Pt in bed with family at bedside.  Pt mostly non verbal and seems to have difficulty both speaking and understanding commands. Pt is assist of 2 to sit and stand at EOB.   Pt demo R side weakness and attention.  Anticipate assist with all ADL at this time.  Will cont to follow for skilled OT and recommend SNU at d/c.    Anticipated Discharge Disposition (OT): skilled nursing facility

## 2025-02-27 NOTE — PLAN OF CARE
Goal Outcome Evaluation:              Outcome Evaluation: Clinical swallow evaluation comopleted. Awake and alert, globally aphasic. Right labial droop appreciated. Laughing at clinician periodically. Able to open mouth when given tactile stimulation and visual demonstration. No overt s/s of aspiration with ice chips, thins via spoon/cup/straw, nectar via spoon/cup/straw, or puree trials. Audible swallows with thins, suspect delayed swallows. x2-3 spontaneous swallows with puree. No oral residue. Laryngeal elevation appeared age-appropriate upon palpation. Recommend puree diet with nectar thick liquids. Meds crushed in puree, whole in puree if contraindicated. 1:1 supervision, feeding assistance, sitting upright, slow rate, small bites/sips. With negative lung changes or clinical s/s of aspiration observed, make NPO and notify speech therapy. Will continue to follow for swallow re-evaluation and speech/language evaluation as appropriate.

## 2025-02-27 NOTE — H&P
Group: [facility]        HISTORY AND PHYSICAL    Patient Identification:  Monica Scherer  89 y.o.  female  5/4/1933  [id number]            CC: right sided weakness, aphasia, facial droop    History of Present Illness:  Monica Scherer is a 89 year old female with a medical history including: atrial fibrillation, chronic anticoagulation with Eliquis, hyperlipidemia, hypertension, and prior stroke with chronic right sided weakness.    She presented to the emergency department on 2/26/2025 with complaints of acute altered mental status, concern for stroke.  According to family, patient developed acute onset of right arm placidity, right leg weakness, global aphasia and right-sided facial droop.  She has gaze deviation to the left and visual field cut on the right visual fields.  Code stroke was called upon arrival to the hospital-patient underwent CT imaging without contrast which showed no acute hemorrhage or hypodensity.  CTA head and neck demonstrated a left proximal M1 MCA occlusion, CTP demonstrated a core of 32 mL and a penumbra 160 mL with a mismatch ratio of 5.1.  Patient was not a candidate for TNK as she is on apixaban for atrial fibrillation.  Patient was taken for cerebral angiogram and intra-arterial thrombectomy for revascularization of an acute left M1 occlusion.  After 1 pass, TICI3 flow resulted.    Review of Systems:  Unable to obtain secondary to aphasia.    Past Medical History:  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    New onset atrial fibrillation 01/2020    now on rate control along with Eliquis    Prolapsed uterus     per patient    Stroke     TIA (transient ischemic attack) 01/2020    Weakness        Past Surgical History:  Past Surgical History:   Procedure Laterality Date    APPENDECTOMY      CHOLECYSTECTOMY N/A 6/8/2016     Procedure: CHOLECYSTECTOMY LAPAROSCOPIC;  Surgeon: Russ Gar MD;  Location:  [facility] [place];  Service:     COLONOSCOPY N/A 9/16/2021    Procedure: COLONOSCOPY TO CECUM WITH HOT SNARE POLYPECTOMIES AND COLD BX POLYPECTOMY;  Surgeon: Emerson Izaguirre MD;  Location: [facility] ENDOSCOPY;  Service: Gastroenterology;  Laterality: N/A;  pre: RECTAL BLEEDING, FAMILY HX OF COLON CANCER  post: INTERNAL AND EXTERNAL HEMORRHOIDS, DIVERTICULOSIS, POLYPS    ENDOSCOPY N/A 2/22/2018    Z-line regular, 35 cm from incisors, normal esophagus, gastritis, bilious gastric fluid, one non-bleeding duodenal ulcer w/no stigmata of bleeding, Path: DUODENUM: FRAGMENTS OF GASTRIC TYPE MUCOSA WITH HYPERPLASIA (FOVEOLAR) AND PATCHY MIXED INFLAMMATION IN THE LAMINA PROPRIA WITH FOCAL FIBROSIS, COMMENT: 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     EYE SURGERY      tre cataracts        Home Meds:  Medications Prior to Admission   Medication Sig Dispense Refill Last Dose/Taking    apixaban (Eliquis) 5 MG tablet tablet Take 1 tablet by mouth Every 12 (Twelve) Hours. 180 tablet 3 Taking    atorvastatin (LIPITOR) 80 MG tablet Take 1 tablet by mouth Daily. 90 tablet 3 Taking    felodipine (PLENDIL) 10 MG 24 hr tablet Take 1 tablet by mouth Daily. 90 tablet 1 Taking    ferrous gluconate (FERGON) 324 MG tablet Take 1 tablet by mouth Daily. 30 tablet 4 Taking    metoprolol tartrate (LOPRESSOR) 25 MG tablet Take 1 tablet by mouth Every 12 (Twelve) Hours. 180 tablet 3 Taking    omeprazole (priLOSEC) 20 MG capsule Take 1 capsule by mouth Daily. 90 capsule 0 Taking    vitamin D3 (D-3-5) 125 MCG (5000 UT) capsule capsule Take 1 capsule by mouth Daily. 90 capsule 3 Taking       Allergies:  Allergies   Allergen Reactions    Cephalexin Rash    Latex Itching       Social History:   Social History     Socioeconomic History    Marital status: Single   Tobacco Use    Smoking status: Never     Passive exposure: Never    Smokeless tobacco: Never    Tobacco  "comments:     caffeine use- \"rare\"   Vaping Use    Vaping status: Never Used   Substance and Sexual Activity    Alcohol use: No    Drug use: No    Sexual activity: Defer       Family History:  History reviewed. No pertinent family history.    Physical Exam:  /57   Pulse 74   Temp 97.1 °F (36.2 °C)   Resp 15   Ht 162.6 cm (64\")   Wt 70.4 kg (155 lb 4.8 oz)   SpO2 98%   BMI 26.66 kg/m²  Body mass index is 26.66 kg/m². 98% 70.4 kg (155 lb 4.8 oz)  Constitutional: Elderly female pt in bed, No acute respiratory distress, no accessory muscle use  Head: - NCAT  Eyes: No pallor, Anicteric conjunctiva, EOMI.  ENMT:  Mallampati 3, no oral thrush. Dry MM.   NECK: Trachea midline, No thyromegaly, no palpable cervical LNpathy, no JVD  Heart: Regular rate, irregular rhythm, no murmur. No pedal edema   Lungs: SUKI +, No wheezes/ crackles heard    Abdomen: Soft. No tenderness, guarding or rigidity. No palpable masses  Extremities: Extremities warm and well perfused. No cyanosis/ clubbing  Neuro: Conscious, purposeful movement of the right upper and lower extremity, not following commands, aphasic, right visual field cut with left gaze preference    PPE recommended per [facility] infectious disease Isolation protocol for the current clinical scenario(as mentioned below) was followed.      LABS:  COVID19   Date Value Ref Range Status   03/19/2024 Not Detected Not Detected - Ref. Range Final       Lab Results   Component Value Date    CALCIUM 8.4 02/26/2025     Results from last 7 days   Lab Units 02/26/25  1729 02/26/25  1647   SODIUM mmol/L 136  --    POTASSIUM mmol/L 2.9*  --    CHLORIDE mmol/L 99  --    CO2 mmol/L 23.9  --    BUN mg/dL 18  --    CREATININE mg/dL 0.93  --    GLUCOSE mg/dL 135*  --    CALCIUM mg/dL 8.4  --    WBC 10*3/mm3  --  12.46*   HEMOGLOBIN g/dL  --  12.6   PLATELETS 10*3/mm3  --  250   ALT (SGPT) U/L 7  --    AST (SGOT) U/L 12  --      Lab Results   Component Value Date    CKTOTAL 39 " 10/21/2016    TROPONINT 17 (H) 03/19/2024                         Results from last 7 days   Lab Units 02/26/25  1647   INR  1.20*         Lab Results   Component Value Date    TSH 4.460 (H) 09/30/2024     Estimated Creatinine Clearance: 37.9 mL/min (by C-G formula based on SCr of 0.93 mg/dL).         Imaging: I personally visualized the images of scans/x-rays performed within last 3 days.      Assessment:  Acute ischemic left MCA occlusion  Right sided weakness  Right visual field cut  Aphasia  Atrial fibrillation  Chronic anticoagulation with apixiban  Hyperlipidemia  GERD  History of stroke with residual right sided weakness      Recommendations:  Acute ischemic left MCA occlusion  Right sided weakness  Right visual field cut  Aphasia  CT head without contrast showed no evidence of acute hemorrhage or hypodensity.  CTA head and neck demonstrated left proximal M1 MCA occlusion.  CTP demonstrated 4 of 32 mL and a penumbra 160 mL with a mismatch ratio 5.1.  Not candidate for TNK due to chronic anticoagulation with apixaban.  Mechanical thrombectomy performed on 2/26/2025 with Dr. Singh.  Neurology consulted-recommendations were for MRI brain without contrast, TTE.  Neurochecks per protocol.  Aspirin/ atorvastatin.  OT/PT/SLP consult.    Atrial fibrillation  Chronic anticoagulation with apixiban  Home medication: metoprolol tartrate- modified to IV while patient is NPO.  Hold apixiban.    Hyperlipidemia  Atorvastatin.    GERD  Home medication: omeprazole- modified to IV protonix while NPO.    History of stroke with residual right sided weakness    SCDs  NPO  Full code    Patient was placed in face mask upon entering room and kept mask on throughout our encounter. I wore full protective equipment throughout this patient encounter including a face mask, gown and gloves. Hand hygiene was performed before donning protective equipment and after removal when leaving the room.    Tomeka Camp, APRN  2/26/2025  19:24  EST      Much of this encounter note is an electronic transcription/translation of spoken language to printed text using Dragon Software.

## 2025-02-27 NOTE — CASE MANAGEMENT/SOCIAL WORK
Discharge Planning Assessment  [facility]     Patient Name: Monica Scheerr  MRN: [id number]  Today's Date: 2/27/2025    Admit Date: 2/26/2025    Plan: HH vs SNF   Discharge Needs Assessment       Row Name 02/27/25 1330       Living Environment    People in Home child(leona), adult    Current Living Arrangements home    Primary Care Provided by self    Provides Primary Care For no one    Family Caregiver if Needed child(leona), adult    Able to Return to Prior Arrangements yes       Transition Planning    Patient/Family Anticipates Transition to home with help/services;inpatient rehabilitation facility    Patient/Family Anticipated Services at Transition home health care;skilled nursing;rehabilitation services    Transportation Anticipated family or friend will provide;health plan transportation       Discharge Needs Assessment    Equipment Currently Used at Home walker, rolling    Concerns to be Addressed discharge planning    Discharge Facility/Level of Care Needs nursing facility, skilled;home with home health                   Discharge Plan       Row Name 02/27/25 1338       Plan    Plan HH vs SNF    Plan Comments CCP spoke with patient's daughter Raquel; explained role, verified facesheet, and discussed dc plan. Patient uses a walker at home. She lives with her son in a 3 level home but resides on 1 level. There are 8 steps to enter. She has used HH but daughter cannot recall which agency. She has no history of SNF. OT is recommending SNF. CCP discussed this recommendation with family. CCP provided SNF list to daughter and enocuraged her to start looking it over. SHERI, JONIW                  Continued Care and Services - Admitted Since 2/26/2025    No active coordination exists for this encounter.       Selected Continued Care - Episodes Includes continued care and service providers with selected services from the active episodes listed below      High Risk Care Management Episode start date: 10/18/2023   There are  no active outsourced providers for this episode.                    Demographic Summary       Row Name 02/27/25 1330       General Information    Admission Type inpatient    Arrived From home    Referral Source admission list    Reason for Consult discharge planning    Preferred Language English       Contact Information    Permission Granted to Share Info With family/designee                   Functional Status       Row Name 02/27/25 1330       Functional Status    Usual Activity Tolerance moderate    Current Activity Tolerance fair       Functional Status, IADL    Medications assistive equipment    Meal Preparation assistive equipment    Housekeeping assistive equipment    Laundry assistive equipment    Shopping assistive equipment       Mental Status    General Appearance WDL WDL                   Psychosocial    No documentation.                  Abuse/Neglect    No documentation.                  Legal    No documentation.                  Substance Abuse    No documentation.                  Patient Forms    No documentation.                     LEONIE Stevens

## 2025-02-28 ENCOUNTER — APPOINTMENT (OUTPATIENT)
Dept: GENERAL RADIOLOGY | Facility: HOSPITAL | Age: OVER 89
DRG: 024 | End: 2025-02-28
Payer: MEDICARE

## 2025-02-28 ENCOUNTER — TELEPHONE (OUTPATIENT)
Dept: NEUROSURGERY | Facility: CLINIC | Age: OVER 89
End: 2025-02-28
Payer: MEDICARE

## 2025-02-28 LAB
ALBUMIN SERPL-MCNC: 3.2 G/DL (ref 3.5–5.2)
ANION GAP SERPL CALCULATED.3IONS-SCNC: 8.6 MMOL/L (ref 5–15)
BUN SERPL-MCNC: 14 MG/DL (ref 8–23)
BUN/CREAT SERPL: 14.7 (ref 7–25)
CALCIUM SPEC-SCNC: 8.8 MG/DL (ref 8.2–9.6)
CHLORIDE SERPL-SCNC: 103 MMOL/L (ref 98–107)
CO2 SERPL-SCNC: 28.4 MMOL/L (ref 22–29)
CREAT SERPL-MCNC: 0.95 MG/DL (ref 0.57–1)
DEPRECATED RDW RBC AUTO: 50.5 FL (ref 37–54)
EGFRCR SERPLBLD CKD-EPI 2021: 56.7 ML/MIN/1.73
ERYTHROCYTE [DISTWIDTH] IN BLOOD BY AUTOMATED COUNT: 14.8 % (ref 12.3–15.4)
GLUCOSE BLDC GLUCOMTR-MCNC: 148 MG/DL (ref 70–130)
GLUCOSE BLDC GLUCOMTR-MCNC: 150 MG/DL (ref 70–130)
GLUCOSE BLDC GLUCOMTR-MCNC: 201 MG/DL (ref 70–130)
GLUCOSE SERPL-MCNC: 103 MG/DL (ref 65–99)
HCT VFR BLD AUTO: 36.5 % (ref 34–46.6)
HGB BLD-MCNC: 11.3 G/DL (ref 12–15.9)
MAGNESIUM SERPL-MCNC: 1.7 MG/DL (ref 1.7–2.3)
MCH RBC QN AUTO: 28.5 PG (ref 26.6–33)
MCHC RBC AUTO-ENTMCNC: 31 G/DL (ref 31.5–35.7)
MCV RBC AUTO: 92.2 FL (ref 79–97)
PHOSPHATE SERPL-MCNC: 3.2 MG/DL (ref 2.5–4.5)
PLATELET # BLD AUTO: 205 10*3/MM3 (ref 140–450)
PMV BLD AUTO: 9.4 FL (ref 6–12)
POTASSIUM SERPL-SCNC: 3.9 MMOL/L (ref 3.5–5.2)
RBC # BLD AUTO: 3.96 10*6/MM3 (ref 3.77–5.28)
SODIUM SERPL-SCNC: 140 MMOL/L (ref 136–145)
WBC NRBC COR # BLD AUTO: 9.95 10*3/MM3 (ref 3.4–10.8)

## 2025-02-28 PROCEDURE — 92523 SPEECH SOUND LANG COMPREHEN: CPT

## 2025-02-28 PROCEDURE — 25010000002 HEPARIN (PORCINE) PER 1000 UNITS: Performed by: INTERNAL MEDICINE

## 2025-02-28 PROCEDURE — 80069 RENAL FUNCTION PANEL: CPT | Performed by: INTERNAL MEDICINE

## 2025-02-28 PROCEDURE — 83735 ASSAY OF MAGNESIUM: CPT | Performed by: INTERNAL MEDICINE

## 2025-02-28 PROCEDURE — 25010000002 HEPARIN (PORCINE) PER 1000 UNITS: Performed by: STUDENT IN AN ORGANIZED HEALTH CARE EDUCATION/TRAINING PROGRAM

## 2025-02-28 PROCEDURE — 99233 SBSQ HOSP IP/OBS HIGH 50: CPT | Performed by: STUDENT IN AN ORGANIZED HEALTH CARE EDUCATION/TRAINING PROGRAM

## 2025-02-28 PROCEDURE — 92526 ORAL FUNCTION THERAPY: CPT

## 2025-02-28 PROCEDURE — 85027 COMPLETE CBC AUTOMATED: CPT | Performed by: INTERNAL MEDICINE

## 2025-02-28 PROCEDURE — 97535 SELF CARE MNGMENT TRAINING: CPT

## 2025-02-28 PROCEDURE — 97112 NEUROMUSCULAR REEDUCATION: CPT

## 2025-02-28 PROCEDURE — 74230 X-RAY XM SWLNG FUNCJ C+: CPT

## 2025-02-28 PROCEDURE — 82948 REAGENT STRIP/BLOOD GLUCOSE: CPT

## 2025-02-28 PROCEDURE — 92611 MOTION FLUOROSCOPY/SWALLOW: CPT

## 2025-02-28 RX ORDER — ONDANSETRON 2 MG/ML
4 INJECTION INTRAMUSCULAR; INTRAVENOUS ONCE AS NEEDED
Status: DISCONTINUED | OUTPATIENT
Start: 2025-02-28 | End: 2025-03-03 | Stop reason: HOSPADM

## 2025-02-28 RX ORDER — PROMETHAZINE HYDROCHLORIDE 25 MG/1
25 TABLET ORAL ONCE AS NEEDED
Status: DISCONTINUED | OUTPATIENT
Start: 2025-02-28 | End: 2025-03-03 | Stop reason: HOSPADM

## 2025-02-28 RX ORDER — EPHEDRINE SULFATE 50 MG/ML
5 INJECTION, SOLUTION INTRAVENOUS ONCE AS NEEDED
Status: DISCONTINUED | OUTPATIENT
Start: 2025-02-28 | End: 2025-03-03 | Stop reason: HOSPADM

## 2025-02-28 RX ORDER — FENTANYL CITRATE 50 UG/ML
25 INJECTION, SOLUTION INTRAMUSCULAR; INTRAVENOUS
Status: DISCONTINUED | OUTPATIENT
Start: 2025-02-28 | End: 2025-03-03 | Stop reason: HOSPADM

## 2025-02-28 RX ORDER — ATROPINE SULFATE 0.4 MG/ML
0.4 INJECTION, SOLUTION INTRAMUSCULAR; INTRAVENOUS; SUBCUTANEOUS ONCE AS NEEDED
Status: DISCONTINUED | OUTPATIENT
Start: 2025-02-28 | End: 2025-03-03 | Stop reason: HOSPADM

## 2025-02-28 RX ORDER — NALOXONE HCL 0.4 MG/ML
0.2 VIAL (ML) INJECTION AS NEEDED
Status: DISCONTINUED | OUTPATIENT
Start: 2025-02-28 | End: 2025-03-03 | Stop reason: HOSPADM

## 2025-02-28 RX ORDER — HYDROMORPHONE HYDROCHLORIDE 1 MG/ML
0.25 INJECTION, SOLUTION INTRAMUSCULAR; INTRAVENOUS; SUBCUTANEOUS
Status: DISCONTINUED | OUTPATIENT
Start: 2025-02-28 | End: 2025-03-03 | Stop reason: HOSPADM

## 2025-02-28 RX ORDER — HYDROCODONE BITARTRATE AND ACETAMINOPHEN 5; 325 MG/1; MG/1
1 TABLET ORAL ONCE AS NEEDED
Status: DISCONTINUED | OUTPATIENT
Start: 2025-02-28 | End: 2025-03-03 | Stop reason: HOSPADM

## 2025-02-28 RX ORDER — ATORVASTATIN CALCIUM 20 MG/1
40 TABLET, FILM COATED ORAL NIGHTLY
Status: DISCONTINUED | OUTPATIENT
Start: 2025-02-28 | End: 2025-03-03 | Stop reason: HOSPADM

## 2025-02-28 RX ORDER — PROMETHAZINE HYDROCHLORIDE 25 MG/1
25 SUPPOSITORY RECTAL ONCE AS NEEDED
Status: DISCONTINUED | OUTPATIENT
Start: 2025-02-28 | End: 2025-03-03 | Stop reason: HOSPADM

## 2025-02-28 RX ORDER — FLUMAZENIL 0.1 MG/ML
0.2 INJECTION INTRAVENOUS AS NEEDED
Status: DISCONTINUED | OUTPATIENT
Start: 2025-02-28 | End: 2025-03-03 | Stop reason: HOSPADM

## 2025-02-28 RX ORDER — HYDROCODONE BITARTRATE AND ACETAMINOPHEN 7.5; 325 MG/1; MG/1
1 TABLET ORAL EVERY 4 HOURS PRN
Status: DISCONTINUED | OUTPATIENT
Start: 2025-02-28 | End: 2025-03-03 | Stop reason: HOSPADM

## 2025-02-28 RX ORDER — IPRATROPIUM BROMIDE AND ALBUTEROL SULFATE 2.5; .5 MG/3ML; MG/3ML
3 SOLUTION RESPIRATORY (INHALATION) ONCE AS NEEDED
Status: DISCONTINUED | OUTPATIENT
Start: 2025-02-28 | End: 2025-03-03 | Stop reason: HOSPADM

## 2025-02-28 RX ORDER — LABETALOL HYDROCHLORIDE 5 MG/ML
5 INJECTION, SOLUTION INTRAVENOUS
Status: DISCONTINUED | OUTPATIENT
Start: 2025-02-28 | End: 2025-03-03 | Stop reason: HOSPADM

## 2025-02-28 RX ORDER — ASPIRIN 81 MG/1
81 TABLET ORAL DAILY
Status: DISCONTINUED | OUTPATIENT
Start: 2025-03-01 | End: 2025-03-02

## 2025-02-28 RX ORDER — DIPHENHYDRAMINE HYDROCHLORIDE 50 MG/ML
12.5 INJECTION INTRAMUSCULAR; INTRAVENOUS
Status: DISCONTINUED | OUTPATIENT
Start: 2025-02-28 | End: 2025-03-03 | Stop reason: HOSPADM

## 2025-02-28 RX ORDER — HYDRALAZINE HYDROCHLORIDE 20 MG/ML
5 INJECTION INTRAMUSCULAR; INTRAVENOUS
Status: DISCONTINUED | OUTPATIENT
Start: 2025-02-28 | End: 2025-03-03 | Stop reason: HOSPADM

## 2025-02-28 RX ADMIN — ATORVASTATIN CALCIUM 40 MG: 20 TABLET, FILM COATED ORAL at 22:22

## 2025-02-28 RX ADMIN — Medication 10 ML: at 22:22

## 2025-02-28 RX ADMIN — MUPIROCIN 1 APPLICATION: 20 OINTMENT TOPICAL at 22:22

## 2025-02-28 RX ADMIN — METOPROLOL TARTRATE 2.5 MG: 1 INJECTION, SOLUTION INTRAVENOUS at 16:56

## 2025-02-28 RX ADMIN — HEPARIN SODIUM 5000 UNITS: 5000 INJECTION INTRAVENOUS; SUBCUTANEOUS at 14:01

## 2025-02-28 RX ADMIN — HEPARIN SODIUM 5000 UNITS: 5000 INJECTION INTRAVENOUS; SUBCUTANEOUS at 06:20

## 2025-02-28 RX ADMIN — METOPROLOL TARTRATE 2.5 MG: 1 INJECTION, SOLUTION INTRAVENOUS at 09:29

## 2025-02-28 RX ADMIN — MUPIROCIN 1 APPLICATION: 20 OINTMENT TOPICAL at 09:15

## 2025-02-28 RX ADMIN — BARIUM SULFATE 5 ML: 400 PASTE ORAL at 14:45

## 2025-02-28 RX ADMIN — Medication 10 ML: at 09:32

## 2025-02-28 RX ADMIN — HEPARIN SODIUM 5000 UNITS: 5000 INJECTION INTRAVENOUS; SUBCUTANEOUS at 22:22

## 2025-02-28 RX ADMIN — BARIUM SULFATE 55 ML: 0.81 POWDER, FOR SUSPENSION ORAL at 14:45

## 2025-02-28 RX ADMIN — ASPIRIN 325 MG ORAL TABLET 325 MG: 325 PILL ORAL at 09:15

## 2025-02-28 RX ADMIN — BARIUM SULFATE 4 ML: 980 POWDER, FOR SUSPENSION ORAL at 14:45

## 2025-02-28 RX ADMIN — BARIUM SULFATE 50 ML: 400 SUSPENSION ORAL at 14:45

## 2025-02-28 RX ADMIN — PANTOPRAZOLE SODIUM 40 MG: 40 TABLET, DELAYED RELEASE ORAL at 06:19

## 2025-02-28 NOTE — PROGRESS NOTES
"DOS: 2025  NAME: Monica Scherer   : 1933  PCP: Amilcar Mauricio DO  Chief Complaint   Patient presents with    Stroke     Right sided weakness, aphasia       Chief complaint: Stroke  Subjective: Patient seen and examined at the bedside this morning, no acute overnight event.  Per nursing staff discussed with the family and they stated the patient was religiously compliant with the Eliquis prior to her stroke.    Objective:  Vital signs: /58   Pulse 120   Temp 98 °F (36.7 °C) (Oral)   Resp 20   Ht 162.6 cm (64\")   Wt 70.3 kg (155 lb)   SpO2 96%   BMI 26.61 kg/m²    Gen: NAD, vitals reviewed  MS: Awake, tracks with her eyes, follows a few simple commands to gestures oriented x3, mixed aphasia, no neglect.  CN: Blinks to threat bilaterally, PERRL, EOMI, no facial droop, no dysarthria  Motor: 4/5 on the right, 5/5 on the left, normal tone  Sensory: Decreased to light touch on the right    NIH Stroke Scale :    (0_) 1a. Level of consciousness (LOC): 0=alert;1=arousable by minor stimulation;2=obtunded or needs strong stimulation to attend;3=unresponsive or reflex responses only  (2_) 1b. LOC Questions: 0=answers both;1=answers one;2=answers neither  (1_) 1c. LOC Commands: 0=performs both tasks;1=performs one task;2=performs neither task  (0_) 2. Best Gaze: 0=normal;1=partial gaze palsy;2=forced deviation or total gaze paresis  (0_) 3. Visual: 0=normal;1=partial hemianopia;2=complete hemianopia;3=blind  (0_) 4. Facial palsy: 0=normal;1=minor paresis;2=partial paralysis;3=complete paralysis  FOR 5 AND 6 BELOW: 0=normal;1=drifts but maintains in air;2=unable to maintain in air;3=moves but unable to lift against gravity;4=no movement  (0_)0 (_)1 (_)2 (_)3 (_)4 (_)NA 5a. Motor arm-left  (0_)0 (x)1 (_)2 (_)3 (_)4 (_)NA 5b. Motor arm-right  (0_)0 (_)1 (_)2 (_)3 (_)4 (_)NA 6a. Motor leg-left  (0_)0 (x)1 (_)2 (_)3 (_)4 (_)NA 6ba. Motor leg-right  (0_) 7. Limb ataxia:0=absent;1=unilateral;2=bilateral; " NA=unable to test  (1_) 8. Sensory: 0=normal;1=mild-moderate loss;2-severe or total loss  (2_) 9. Best language: 0=normal;1=mild-moderate aphasia, some deficits apparent but able to communicate;2=severe aphasia, fragmentary expression only, unable to communicate well;3=global aphasia, mute and no comprehension  (0_)10. Dysarthria: 0=normal;1=mild-moderate, slurs some words;2=severe, speech mostly unintelligible; NA=unable to test (e.g.,intubation)  (0_)11. Extinction/Inattention: 0=normal;1=visual,tactile,auditory or other extinction to bilateral simultaneous stimulation, but no severe neglect;2=answers neither      NIH Score: Complete  8      Laboratory results:  Lab Results   Component Value Date    GLUCOSE 103 (H) 02/28/2025    CALCIUM 8.8 02/28/2025     02/28/2025    K 3.9 02/28/2025    CO2 28.4 02/28/2025     02/28/2025    BUN 14 02/28/2025    CREATININE 0.95 02/28/2025    EGFRIFAFRI 57 (L) 09/24/2021    EGFRIFNONA 50 (L) 09/24/2021    BCR 14.7 02/28/2025    ANIONGAP 8.6 02/28/2025     Lab Results   Component Value Date    WBC 9.95 02/28/2025    HGB 11.3 (L) 02/28/2025    HCT 36.5 02/28/2025    MCV 92.2 02/28/2025     02/28/2025     Lab Results   Component Value Date    LDL 54 02/27/2025    LDL 76 09/30/2024    LDL 59 06/06/2023         Lab 02/27/25  0330   HEMOGLOBIN A1C 6.50*        Review of labs: A1c 6.5, LDL 54    Review and interpretation of imaging: I personally reviewed the MRI brain which showed moderate size acute ischemic stroke on the left temporal lobe and corona radiata, no hemorrhagic transformation.    2D echo: EF 63%, mildly dilated left atrium, saline test negative, no LVT.    Assessment:  -Left MCA acute ischemic stroke etiology cardioembolic  -Left M1 occlusion status post mechanical thrombectomy tiki 3  -Paroxysmal atrial fibrillation  -Essential hypertension  -Mixed hyperlipidemia     Plan:  -Aspirin 81 mg daily.  Will stop the aspirin when restarting  "anticoagulation.  -Patient developed acute ischemic stroke despite being compliant with Eliquis.  Will start the patient on a different anticoagulation with a different mechanism likely Pradaxa.  Will hold anticoagulation for 3 to 4 days.  Start anticoagulation on 3/2 at that time we will stop aspirin and DVT prophylaxis.  -Will start the patient on pantoprazole 40 mg daily when restarting anticoagulation.  -Blood pressure goal less than 140 systolic  -Continue Lipitor 40 mg daily with goal LDL less than 70  -Goal A1c less than 7 currently at 6.5 continue current management  -Okay to transfer out of the ICU from stroke standpoint  -Okay for out of bed, PT OT and speech evaluation  -Will continue to follow peripherally  -Follow-up with the stroke clinic in 6 to 8 weeks.  MA contacted to arrange the follow-up.    Management discussed with Dr. Garcia, nursing staff.    \"Dictated utilizing Dragon dictation\".  "

## 2025-02-28 NOTE — THERAPY EVALUATION
Patient Name: Monica Scherer  : 1933    MRN: [id number]                              Today's Date: 2025       Admit Date: 2025    Visit Dx:     ICD-10-CM ICD-9-CM   1. Altered mental status, unspecified altered mental status type  R41.82 780.97   2. Focal neurological deficit present  R29.818 781.99   3. Acute CVA (cerebrovascular accident)  I63.9 434.91   4. Rogelio-neglect of right side  R41.4 781.8   5. 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  I69.331 438.31     Patient Active Problem List   Diagnosis    Vertigo    Temporary cerebral vascular dysfunction    Gastroesophageal reflux disease with esophagitis    Degeneration of intervertebral disc of cervical region    Prediabetes    S/P cholecystectomy    Osteoarthritis of knee    Herpes zoster without complication    Hypertension    Duodenal ulcer    History of pancreatitis    Hyperlipidemia    TIA (transient ischemic attack)    Cerebrovascular accident (CVA) due to thrombosis of left posterior cerebral artery    Paroxysmal atrial fibrillation    General weakness    History of CVA (cerebrovascular accident)    Anticoagulated    Hematuria    Dizziness and giddiness    Rectal bleeding    Acute CVA (cerebrovascular accident)     Past Medical History:   Diagnosis Date    Atrial fibrillation     Hyperlipidemia     Hypertension     Lacunar infarct, acute 2020    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    New onset atrial fibrillation 2020    now on rate control along with Eliquis    Prolapsed uterus     per patient    Stroke     TIA (transient ischemic attack) 2020    Weakness      Past Surgical History:   Procedure Laterality Date    APPENDECTOMY      CHOLECYSTECTOMY N/A 2016    Procedure: CHOLECYSTECTOMY LAPAROSCOPIC;  Surgeon: Russ Gar MD;  [place] [facility] [place] [facility] [place]  Service:     COLONOSCOPY N/A 2021    Procedure: COLONOSCOPY TO CECUM WITH HOT SNARE POLYPECTOMIES  AND COLD BX POLYPECTOMY;  Surgeon: Emerson Izaguirre MD;  Location: [facility] ENDOSCOPY;  Service: Gastroenterology;  Laterality: N/A;  pre: RECTAL BLEEDING, FAMILY HX OF COLON CANCER  post: INTERNAL AND EXTERNAL HEMORRHOIDS, DIVERTICULOSIS, POLYPS    ENDOSCOPY N/A 2/22/2018    Z-line regular, 35 cm from incisors, normal esophagus, gastritis, bilious gastric fluid, one non-bleeding duodenal ulcer w/no stigmata of bleeding, Path: DUODENUM: FRAGMENTS OF GASTRIC TYPE MUCOSA WITH HYPERPLASIA (FOVEOLAR) AND PATCHY MIXED INFLAMMATION IN THE LAMINA PROPRIA WITH FOCAL FIBROSIS, COMMENT: 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     EYE SURGERY      tre cataracts      General Information       Row Name 02/28/25 1002          OT Time and Intention    Document Type therapy note (daily note)  -SM     Mode of Treatment occupational therapy;individual therapy  -SM     Patient Effort good  -SM       Row Name 02/28/25 1002          General Information    Patient Profile Reviewed yes  -SM     Existing Precautions/Restrictions fall;swallow precautions  -       Row Name 02/28/25 1002          Cognition    Orientation Status (Cognition) unable/difficult to assess  aphasia limiting, follows gestural cues, does report yes/OK at times but not 100% accurate  -SM       Row Name 02/28/25 1002          Safety Issues/Impairments Affecting Functional Mobility    Safety Issues Affecting Function (Mobility) ability to follow commands;sequencing abilities  attention  -     Impairments Affecting Function (Mobility) balance;cognition;endurance/activity tolerance;coordination;grasp;strength  -SM               User Key  (r) = Recorded By, (t) = Taken By, (c) = Cosigned By      Initials Name Provider Type    SM Julia Ernst OT Occupational Therapist                     Mobility/ADL's       Row Name 02/28/25 1003          Bed Mobility    Scooting/Bridging [place] (Bed Mobility) moderate assist (50% patient effort);verbal cues  -SM      Supine-Sit [place] (Bed Mobility) moderate assist (50% patient effort);2 person assist;verbal cues  -     Assistive Device (Bed Mobility) head of bed elevated;bed rails  -     Comment, (Bed Mobility) extra time to complete but good initation from pt  -[facility] Name 02/28/25 1003          Transfers    Comment, (Transfers) not attempted today, elevated HR with EOB sitting  -SM       Row Name 02/28/25 1003          Lower Body Dressing Assessment/Training    [place] Level (Lower Body Dressing) don;socks;maximum assist (25% patient effort)  -     Position (Lower Body Dressing) edge of bed sitting  -[facility] Name 02/28/25 1003          Grooming Assessment/Training    [place] Level (Grooming) oral care regimen;hair care, combing/brushing;moderate assist (50% patient effort);verbal cues;nonverbal cues (demo/gesture)  -     Position (Grooming) edge of bed sitting  -[facility] Name 02/28/25 1003          Toileting Assessment/Training    [place] Level (Toileting) dependent (less than 25% patient effort)  -     Position (Toileting) supine  -     Comment, (Toileting) brief  -               User Key  (r) = Recorded By, (t) = Taken By, (c) = Cosigned By      Initials Name Provider Type    Julia Carrera OT Occupational Therapist                   Obj/Interventions       [facility] Name 02/28/25 1006          Motor Skills    Therapeutic Exercise --  pt is able to open and close R hand, some pronation/supination. Max cues for following motor commands. Attempted reaching but pt unable to complete.  -[facility] Name 02/28/25 1006          Balance    Balance Assessment sitting dynamic balance  -     Static Sitting Balance standby assist  -     Dynamic Sitting Balance contact guard  -SM     Position, Sitting Balance sitting edge of bed  -     Comment, Balance sat EOB for 15+ min  -               User Key  (r) = Recorded By, (t) = Taken By, (c) = Cosigned By      Initials Name  Provider Type     Julia Ernst OT Occupational Therapist                   Goals/Plan    No documentation.                  Clinical Impression       Row Name 02/28/25 1008          Pain Assessment    Additional Documentation Pain Scale: FACES Pre/Post-Treatment (Group)  -       Row Name 02/28/25 1008          Pain Scale: FACES Pre/Post-Treatment    Pain: FACES Scale, Pretreatment 0-->no hurt  -SM     Posttreatment Pain Rating 0-->no hurt  -SM       Row Name 02/28/25 1008          Plan of Care Review    Plan of Care Reviewed With patient  -     Progress improving  -     Outcome Evaluation Pt is showing improvements with OT today. She requires very little to no assist for her sitting balance today. She was able to get herself to the EOB with only mod A. She engaged in oral care with toothbrush/toothpaste and brushed her hair. She needs cues for initation and sequencing all steps. Difficulty to attend to task today but very fixated on her lines and wires. Pt HR elevated throughout sitting 130s-160s so no attempt made to transfer pt OOB today. She remains aphasic. OT recommends ongoing rehab at [place].  -       Row Name 02/28/25 1008          Therapy Plan Review/Discharge Plan (OT)    Anticipated Discharge Disposition (OT) inpatient rehabilitation facility;skilled nursing facility  appears to be participating much better today and improved independence. May be more appropriate for acute rehab based on inital eval.  -       Row Name 02/28/25 1008          Vital Signs    Pre Systolic BP Rehab 132  -SM     Pre Treatment Diastolic BP 86  -SM     Post Systolic BP Rehab 122  -SM     Post Treatment Diastolic BP 79  -SM     Pretreatment Heart Rate (beats/min) 73  -SM     Intratreatment Heart Rate (beats/min) --  130s-164 at highest reading. RN notified.  -     Posttreatment Heart Rate (beats/min) 98  -SM     Pre SpO2 (%) 94  -SM     O2 Delivery Pre Treatment room air  -SM     O2 Delivery Intra Treatment room  air  -SM     Post SpO2 (%) 98  -SM     O2 Delivery Post Treatment room air  -SM       Row Name 02/28/25 1008          Positioning and Restraints    Pre-Treatment Position in bed  -SM     Post Treatment Position bed  -SM     In Bed fowlers;encouraged to call for assist;exit alarm on;notified nsg;call light within reach  -SM               User Key  (r) = Recorded By, (t) = Taken By, (c) = Cosigned By      Initials Name Provider Type    Julia Carrera OT Occupational Therapist                   Outcome Measures       Row Name 02/28/25 1012          How much help from another is currently needed...    Putting on and taking off regular lower body clothing? 2  -SM     Bathing (including washing, rinsing, and drying) 2  -SM     Toileting (which includes using toilet bed pan or urinal) 1  -SM     Putting on and taking off regular upper body clothing 2  -SM     Taking care of personal grooming (such as brushing teeth) 2  -SM     Eating meals 2  -SM     AM-PAC 6 Clicks Score (OT) 11  -SM       Row Name 02/28/25 0800          How much help from another person do you currently need...    Turning from your back to your side while in flat bed without using bedrails? 2  -ST     Moving from lying on back to sitting on the side of a flat bed without bedrails? 2  -ST     Moving to and from a bed to a chair (including a wheelchair)? 2  -ST     Standing up from a chair using your arms (e.g., wheelchair, bedside chair)? 2  -ST     Climbing 3-5 steps with a railing? 2  -ST     To walk in hospital room? 2  -ST     AM-PAC 6 Clicks Score (PT) 12  -ST       Row Name 02/28/25 1012          Functional Assessment    Outcome Measure Options AM-PAC 6 Clicks Daily Activity (OT)  -               User Key  (r) = Recorded By, (t) = Taken By, (c) = Cosigned By      Initials Name Provider Type    Julia Carrera OT Occupational Therapist    Zenaida Pemberton RN Registered Nurse                    Occupational Therapy Education        Title: PT OT SLP Therapies (In Progress)       Topic: Occupational Therapy (In Progress)       Point: ADL training (Done)       Description:   Instruct learner(s) on proper safety adaptation and remediation techniques during self care or transfers.   Instruct in proper use of assistive devices.                  Learning Progress Summary            Patient Acceptance, E, Bed IU by LE at 2/27/2025 1303    Comment: role of OT, plan of care, benefit of activity.   cognition limits ed for pt.   Family Acceptance, E, Bed IU by LE at 2/27/2025 1303    Comment: role of OT, plan of care, benefit of activity.   cognition limits ed for pt.                      Point: Home exercise program (Not Started)       Description:   Instruct learner(s) on appropriate technique for monitoring, assisting and/or progressing therapeutic exercises/activities.                  Learner Progress:  Not documented in this visit.              Point: Precautions (Done)       Description:   Instruct learner(s) on prescribed precautions during self-care and functional transfers.                  Learning Progress Summary            Patient Acceptance, E, Bed IU by LE at 2/27/2025 1303    Comment: role of OT, plan of care, benefit of activity.   cognition limits ed for pt.   Family Acceptance, E, Bed IU by LE at 2/27/2025 1303    Comment: role of OT, plan of care, benefit of activity.   cognition limits ed for pt.                      Point: Body mechanics (Done)       Description:   Instruct learner(s) on proper positioning and spine alignment during self-care, functional mobility activities and/or exercises.                  Learning Progress Summary            Patient Acceptance, E, Bed IU by LE at 2/27/2025 1303    Comment: role of OT, plan of care, benefit of activity.   cognition limits ed for pt.   Family Acceptance, E, Bed IU by LE at 2/27/2025 1303    Comment: role of OT, plan of care, benefit of activity.   cognition limits ed for pt.                                       User Key       Initials Effective Dates Name Provider Type Discipline    LE 06/16/21 -  Ina Hopkins, OTR Occupational Therapist OT                  OT Recommendation and Plan     Plan of Care Review  Plan of Care Reviewed With: patient  Progress: improving  Outcome Evaluation: Pt is showing improvements with OT today. She requires very little to no assist for her sitting balance today. She was able to get herself to the EOB with only mod A. She engaged in oral care with toothbrush/toothpaste and brushed her hair. She needs cues for initation and sequencing all steps. Difficulty to attend to task today but very fixated on her lines and wires. Pt HR elevated throughout sitting 130s-160s so no attempt made to transfer pt OOB today. She remains aphasic. OT recommends ongoing rehab at [place].     Time Calculation:         Time Calculation- OT       Row Name 02/28/25 1013             Time Calculation- OT    OT Start Time 0811  -SM      OT Stop Time 0834  -SM      OT Time Calculation (min) 23 min  -SM      OT Received On 02/28/25  -      OT - Next Appointment 03/03/25  -         Timed Charges    25956 -  OT Neuromuscular Reeducation Minutes 8  -SM      28279 - OT Self Care/Mgmt Minutes 15  -SM         Total Minutes    Timed Charges Total Minutes 23  -SM       Total Minutes 23  -SM                User Key  (r) = Recorded By, (t) = Taken By, (c) = Cosigned By      Initials Name Provider Type     Julia Ernst OT Occupational Therapist                  Therapy Charges for Today       Code Description Service Date Service Provider Modifiers Qty    90585779917 HC OT NEUROMUSC RE EDUCATION EA 15 MIN 2/28/2025 Julia Ernst OT GO 1    33616443869 HC OT SELF CARE/MGMT/TRAIN EA 15 MIN 2/28/2025 Julia Ernst OT GO 1                 Julia Ernst OT  2/28/2025

## 2025-02-28 NOTE — SIGNIFICANT NOTE
02/28/25 1417   OTHER   Discipline physical therapist   Rehab Time/Intention   Session Not Performed patient unavailable for treatment  (Pt off floor to swallow study; will follow up tomorrow as able for PT.)   Recommendation   PT - Next Appointment 03/01/25

## 2025-02-28 NOTE — MBS/VFSS/FEES
Acute Care - Speech Language Pathology   Swallow Initial Evaluation [facility]     Patient Name: Monica Scherer  : 1933  MRN: [id number]  Today's Date: 2025               Admit Date: 2025    Visit Dx:     ICD-10-CM ICD-9-CM   1. Altered mental status, unspecified altered mental status type  R41.82 780.97   2. Focal neurological deficit present  R29.818 781.99   3. Acute CVA (cerebrovascular accident)  I63.9 434.91   4. Rogelio-neglect of right side  R41.4 781.8   5. 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  I69.331 438.31     Patient Active Problem List   Diagnosis    Vertigo    Temporary cerebral vascular dysfunction    Gastroesophageal reflux disease with esophagitis    Degeneration of intervertebral disc of cervical region    Prediabetes    S/P cholecystectomy    Osteoarthritis of knee    Herpes zoster without complication    Hypertension    Duodenal ulcer    History of pancreatitis    Hyperlipidemia    TIA (transient ischemic attack)    Cerebrovascular accident (CVA) due to thrombosis of left posterior cerebral artery    Paroxysmal atrial fibrillation    General weakness    History of CVA (cerebrovascular accident)    Anticoagulated    Hematuria    Dizziness and giddiness    Rectal bleeding    Acute CVA (cerebrovascular accident)     Past Medical History:   Diagnosis Date    Atrial fibrillation     Hyperlipidemia     Hypertension     Lacunar infarct, acute 2020    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    New onset atrial fibrillation 2020    now on rate control along with Eliquis    Prolapsed uterus     per patient    Stroke     TIA (transient ischemic attack) 2020    Weakness      Past Surgical History:   Procedure Laterality Date    APPENDECTOMY      CHOLECYSTECTOMY N/A 2016    Procedure: CHOLECYSTECTOMY LAPAROSCOPIC;  Surgeon: Russ Gar MD;  Location: [facility] OR [facility];  Service:     COLONOSCOPY N/A 2021     Procedure: COLONOSCOPY TO CECUM WITH HOT SNARE POLYPECTOMIES AND COLD BX POLYPECTOMY;  Surgeon: Emerson Izaguirre MD;  Location: [facility] ENDOSCOPY;  Service: Gastroenterology;  Laterality: N/A;  pre: RECTAL BLEEDING, FAMILY HX OF COLON CANCER  post: INTERNAL AND EXTERNAL HEMORRHOIDS, DIVERTICULOSIS, POLYPS    ENDOSCOPY N/A 2/22/2018    Z-line regular, 35 cm from incisors, normal esophagus, gastritis, bilious gastric fluid, one non-bleeding duodenal ulcer w/no stigmata of bleeding, Path: DUODENUM: FRAGMENTS OF GASTRIC TYPE MUCOSA WITH HYPERPLASIA (FOVEOLAR) AND PATCHY MIXED INFLAMMATION IN THE LAMINA PROPRIA WITH FOCAL FIBROSIS, COMMENT: 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     EYE SURGERY      tre cataracts       SLP Recommendation and Plan     SLP Diet Recommendation: soft to chew textures, chopped, no mixed consistencies, thin liquids (02/28/25 1400)  Recommended Precautions and Strategies: no straw, upright posture during/after eating, small bites of food and sips of liquid, 1:1 supervision, assist with feeding, general aspiration precautions, fatigue precautions (02/28/25 1400)  SLP Rec. for Method of Medication Administration: meds whole, with thin liquids, as tolerated, meds crushed, with puree (02/28/25 1400)     Monitor for Signs of Aspiration: yes, notify SLP if any concerns (02/28/25 1400)        Anticipated Discharge Disposition (SLP): anticipate therapy at next level of care (02/28/25 1400)     Therapy Frequency (Swallow): PRN (02/28/25 1400)  Predicted Duration Therapy Intervention (Days): until discharge (02/28/25 1400)  Oral Care Recommendations: Oral Care BID/PRN (02/28/25 1400)                                        Outcome Evaluation: VFSS completed: Pt presents with mild oropharyngeal dysphagia characterized by mildly reduced base of tongue strength, piece meal deglutition, mildly reduced pharyngeal constriction, delayed swallow initiation, mistiming of swallow resulting in deep  penetration of thin liquids via straw and with mixed consistencies. Pt’s cognitive status also impacting swallow limiting pt’s individual safety with overall intake. Recommend soft to chew/chopped diet (NO MIXED CONSISTENCIES) with thin liquids NO STRAW, meds whole with thin liquids as tolerated, feeding assistance as needed. Compensatory strategies include upright positioning, small bites/sips, slow rate of intake to allow for double swallow.      SWALLOW EVALUATION (Last 72 Hours)       SLP Adult Swallow Evaluation       Row Name 02/28/25 1400       Rehab Evaluation    Document Type evaluation  -HT    Subjective Information --    Patient Observations --    Patient Effort --    Symptoms Noted During/After Treatment --       General Information    Patient Profile Reviewed yes  -HT    Pertinent History Of Current Problem L MCA stroke s/o thrombectomy. Pt with severe expressive and receptive aphasia. BSE with recommendation for puree diet with nectar thick liquids and VFSS to r/o pharyngeal dysphagia.  -HT    Current Method of Nutrition pureed;nectar/syrup-thick liquids  -HT    Precautions/Limitations, Vision difficult to assess  -HT    Precautions/Limitations, Hearing difficult to assess  -HT    Prior Level of Function-Communication unknown  -HT    Prior Level of Function-Swallowing unknown  -HT    Plans/Goals Discussed with patient  -HT    Barriers to Rehab medically complex  -HT       Pain    Pre/Posttreatment Pain Comment --       Pain Scale: FACES Pre/Post-Treatment    Pain: FACES Scale, Pretreatment 0-->no hurt  -HT    Posttreatment Pain Rating 0-->no hurt  -HT       Oral Motor Structure and Function    Dentition Assessment missing teeth;teeth are in poor condition  -HT    Secretion Management WNL/WFL  -HT    Mucosal Quality dry  -HT       Oral Musculature and Cranial Nerve Assessment    Oral Motor General Assessment unable to assess  -HT    Oral Labial or Buccal Impairment, Detail, Cranial Nerve VII (Facial):  --       General Eating/Swallowing Observations    Respiratory Support Currently in Use --    O2 Liters --    Positioning During Eating upright in bed  -       Clinical Swallow Eval    Clinical Swallow Evaluation Summary --       MBS/VFSS    Utensils Used straw;cup;spoon  -    Consistencies Trialed pudding thick;nectar/syrup-thick liquids;thin liquids;pureed;mixed consistency;soft to chew textures;regular textures  -       MBS/VFSS Interpretation    VFSS Summary CORTEZ Abraham  present. VFSS completed in the lateral position with patient upright in stretcher.  Study limited by pt positioning and inability to follow commands. Pt able to self feed cup drinks for exam.      Cognition: unable to assess. Pt able to follow simple concrete commands with visual cue.     Verbal Expression: severe expressive and receptive aphasia    Voice: unable to assess    Cough: unable to assess    Oral mechanism exam: missing teeth with remaining teeth in poor condition.     Objective: Consistencies trialed include nectar thick liquid via cup, thin liquid via cup and straw, pudding, puree, mechanical soft drained and mixed consistency, and regular dry solid.  Oral phase characterized by WFL bolus size for ind cup drinks with piecemeal deglutition consistent with advanced age. Base of tongue strength mildly reduced with mild anterior spillage to vallecula with all trials.  No tongue pumping observed. Bolus drive typical for advance age with trace oral residue present that was reduced with subsequent swallows. Pt unable to take bite of and masticate regular dry solid, but instead sucked the pudding thick barium off the top of the cookie.  Pharyngeal phase characterized by mildly reduced pharyngeal constriction resulting in mild residue coating epiglottis and pharynx that was reduced with subsequent swallows. Delayed swallow initiation resulted in premature spillage to the pyriform sinus with thin liquid and mixed consistency.  Observed mistiming of epiglottic inversion resulting in penetration to the vocal folds with thin liquid via straw and thin portion of mixed consistency during the swallow.  Upper esophageal sphincter opening WFL with adequate opening however visualization limited.     Summary and recommendations: Pt presents with mild oropharyngeal dysphagia characterized by mildly reduced base of tongue strength, piece meal deglutition, mildly reduced pharyngeal constriction, delayed swallow initiation, mistiming of swallow resulting in deep penetration of thin liquids via straw and with mixed consistencies. Pt's cognitive status also impacting swallow limiting pt's individual safety with overall intake. Recommend soft to chew/chopped diet (NO MIXED CONSISTENCIES) with thin liquids NO STRAW, meds whole with thin liquids as tolerated, feeding assistance as needed. Compensatory strategies include upright positioning, small bites/sips, slow rate of intake to allow for double swallow.        SLP Communication to Radiology    Severity Level of Dysphagia mild dysphagia;oral dysfunction;pharyngeal dysfunction  -HT    Consistencies Aspirated/Penetrated penetrated;thin liquids;mixed consistency  -HT    Summary Statement CORTEZ Abraham  present. VFSS completed in the lateral position with patient upright in stretcher.  Study limited by pt positioning and inability to follow commands. Pt able to self feed cup drinks for exam.   Objective: Consistencies trialed include nectar thick liquid via cup, thin liquid via cup and straw, pudding, puree, mechanical soft drained and mixed consistency, and regular dry solid.  Oral phase characterized by WFL bolus size for ind cup drinks with piecemeal deglutition consistent with advanced age. Base of tongue strength mildly reduced with mild anterior spillage to vallecula with all trials.  No tongue pumping observed. Bolus drive typical for advance age with trace oral residue present that was reduced with  subsequent swallows. Pt unable to take bite of and masticate regular dry solid, but instead sucked the pudding thick barium off the top of the cookie.  Pharyngeal phase characterized by mildly reduced pharyngeal constriction resulting in mild residue coating epiglottis and pharynx that was reduced with subsequent swallows. Delayed swallow initiation resulted in premature spillage to the pyriform sinus with thin liquid and mixed consistency. Observed mistiming of epiglottic inversion resulting in penetration to the vocal folds with thin liquid via straw and thin portion of mixed consistency during the swallow.  Upper esophageal sphincter opening WFL with adequate opening however visualization limited.  -       SLP Evaluation Clinical Impression    SLP Swallowing Diagnosis --    Functional Impact --    Rehab Potential/Prognosis, Swallowing --    Swallow Criteria for Skilled Therapeutic Interventions Met --       SLP Treatment Clinical Impressions    Barriers to Overall Progress (SLP) Cognitive status;Medically complex  -    Care Plan Review evaluation/treatment results reviewed  -       Recommendations    Therapy Frequency (Swallow) PRN  -HT    Predicted Duration Therapy Intervention (Days) until discharge  -    SLP Diet Recommendation soft to chew textures;chopped;no mixed consistencies;thin liquids  -    Recommended Diagnostics --    Recommended Precautions and Strategies no straw;upright posture during/after eating;small bites of food and sips of liquid;1:1 supervision;assist with feeding;general aspiration precautions;fatigue precautions  -    Oral Care Recommendations Oral Care BID/PRN  -HT    SLP Rec. for Method of Medication Administration meds whole;with thin liquids;as tolerated;meds crushed;with puree  -    Monitor for Signs of Aspiration yes;notify SLP if any concerns  -    Anticipated Discharge Disposition (SLP) anticipate therapy at next level of care  -       Swallow Goals (SLP)     Swallow LTGs Patient will demonstrate functional swallow for  -HT       (LTG) Patient will demonstrate functional swallow for    Diet Texture (Demonstrate functional swallow) soft to chew (chopped) textures  -HT    Liquid viscosity (Demonstrate functional swallow) thin liquids  -HT    Ashley (Demonstrate functional swallow) with moderate cues (50-74% accuracy)  -HT    Time Frame (Demonstrate functional swallow) by discharge  -HT    Progress/Outcomes (Demonstrate functional swallow) good progress toward goal  -HT              User Key  (r) = Recorded By, (t) = Taken By, (c) = Cosigned By      Initials Name Effective Dates    CR Laney Richardson, RODDY 12/03/24 -     HT Betsey Elam SLP 09/14/23 -                     EDUCATION  The patient has been educated in the following areas:   Dysphagia (Swallowing Impairment).        SLP GOALS       Row Name 02/28/25 1400 02/28/25 0930 02/27/25 0900       (LTG) Patient will demonstrate functional swallow for    Diet Texture (Demonstrate functional swallow) soft to chew (chopped) textures  -HT mechanical ground textures  -SH mechanical ground textures  -CR    Liquid viscosity (Demonstrate functional swallow) thin liquids  -HT thin liquids  -SH thin liquids  -CR    Ashley (Demonstrate functional swallow) with moderate cues (50-74% accuracy)  -HT with moderate cues (50-74% accuracy)  -SH with moderate cues (50-74% accuracy)  -CR    Time Frame (Demonstrate functional swallow) by discharge  -HT by discharge  -SH by discharge  -CR    Progress/Outcomes (Demonstrate functional swallow) good progress toward goal  -HT new goal  -SH new goal  -CR    Comment (Demonstrate functional swallow) -- Patient seen for clinical swallow re assessment and speech-language evaluation. No overt s/s of aspiration across trials of ice, thins via cup/straw, nectar via straw, puree, or mixed. No oral residue post swallow. Voice clear when patient able to phonate. SLP recs VFSS prior to diet upgrade  d/t difficulty voicing consistently after PO and need to rule out silent aspiration. Continue nectar and puree, straws okay. Patient presents with severe expressive and receptive aphasia. Able to state name and imitate some vowels and words. Minimal functional speech at this time. Y/N accurate for personal information, but yes bias noted with concrete y/n questions. Will continue to follow for dysphagia and aphasia tx.  -SH --       Words/Phrases/Sentences Goal 1 (SLP)    Improve Ability to Comprehend Words/Phrases/Sentences Through: Goal 1 (SLP) -- identify body part;80%  - --    Time Frame (Identify Objects and Pictures Goal 1, SLP) -- by discharge  - --       Word Retrieval Skills Goal 1 (SLP)    Improve Word Retrieval Skills By Goal 1 (SLP) -- completing automatic speech task, counting;completing automatic speech task, alphabet;completing automatic speech task, days of the week;completing automatic speech task, months;completing automatic speech task, Pledge of Allegiance;completing automatic speech task, sing “Happy Birthday”;repeating sounds;repeating words;80%;with minimal cues (75-90%)  - --    Time Frame (Word Retrieval Goal 1, SLP) -- by discharge  - --              User Key  (r) = Recorded By, (t) = Taken By, (c) = Cosigned By      Initials Name Provider Type     Julia Frank, SLP Speech and Language Pathologist     Laney Richardson, SLP Speech and Language Pathologist     Betsey Elam, SLP Speech and Language Pathologist                         Time Calculation:    Time Calculation- SLP       Row Name 02/28/25 1534 02/28/25 1328          Time Calculation- [facility]    SLP Start Time 1400  -HT 0930  -     SLP Received On 02/28/25  - 02/28/25  -        Untimed Charges    11242-HR Motion Fluoro Eval Swallow Minutes 105  -HT --        Total Minutes    Untimed Charges Total Minutes 105  -HT --      Total Minutes 105  -HT --               User Key  (r) = Recorded By, (t) = Taken By, (c) = Cosigned  By      Initials Name Provider Type     Julia Frank SLP Speech and Language Pathologist    Betsey Blevins SLP Speech and Language Pathologist                    Therapy Charges for Today       Code Description Service Date Service Provider Modifiers Qty    [id number] HC ST MOTION FLUORO EVAL SWALLOW 7 2/28/2025 Betsey Elam SLP GN 1                 RODDY Cho  2/28/2025

## 2025-02-28 NOTE — PLAN OF CARE
Goal Outcome Evaluation:              Outcome Evaluation: VFSS completed: Pt presents with mild oropharyngeal dysphagia characterized by mildly reduced base of tongue strength, piece meal deglutition, mildly reduced pharyngeal constriction, delayed swallow initiation, mistiming of swallow resulting in deep penetration of thin liquids via straw and with mixed consistencies. Pt’s cognitive status also impacting swallow limiting pt’s individual safety with overall intake. Recommend soft to chew/chopped diet (NO MIXED CONSISTENCIES) with thin liquids NO STRAW, meds whole with thin liquids as tolerated, feeding assistance as needed. Compensatory strategies include upright positioning, small bites/sips, slow rate of intake to allow for double swallow.    Anticipated Discharge Disposition (SLP): anticipate therapy at next level of care    SLP Diagnosis: severe, nonfluent, fluent, aphasia (02/28/25 2241)

## 2025-02-28 NOTE — PLAN OF CARE
Goal Outcome Evaluation:  Plan of Care Reviewed With: patient        Progress: improving  Outcome Evaluation: Pt is showing improvements with OT today. She requires very little to no assist for her sitting balance today. She was able to get herself to the EOB with only mod A. She engaged in oral care with toothbrush/toothpaste and brushed her hair. She needs cues for initation and sequencing all steps. Difficulty to attend to task today but very fixated on her lines and wires. Pt HR elevated throughout sitting 130s-160s so no attempt made to transfer pt OOB today. She remains aphasic. OT recommends ongoing rehab at [place].    Anticipated Discharge Disposition (OT): inpatient rehabilitation facility, skilled nursing facility (appears to be participating much better today and improved independence. May be more appropriate for acute rehab based on inital eval.)

## 2025-02-28 NOTE — TELEPHONE ENCOUNTER
----- Message from Luis Alfredo Pires sent at 2/27/2025  1:46 PM EST -----  Regarding: Hospital follow-up scheduling  Can we please schedule a 4-week hospital follow-up with Dr. Singh.  No imaging needed.      Diagnosis: Stroke status post mechanical thrombectomy

## 2025-02-28 NOTE — PROGRESS NOTES
"  PROGRESS NOTE  Patient Name: Monica Scherer  Age/Sex: 89 y.o. female  : 1933  MRN: [id number]    Date of Admission: 2025  Date of Encounter Visit: 25   LOS: 2 days   Patient Care Team:  Amilcar Mauricio DO as PCP - General (Family Medicine)  Fatou Crawford RN as Ambulatory  ([facility])  Omar Bassett MD as Consulting Physician (Cardiology)    Chief Complaint: Acute CVA status post thrombectomy    Hospital course: Patient had left MCA stroke  presenting with right-sided weakness, had thrombectomy with good clinical improvement.  No seizure, no bleeding, no fever or chills, no hemodynamic issues.  The only residual deficit is her aphasia        REVIEW OF SYSTEMS:   CONSTITUTIONAL: no fever or chills  CARDIOVASCULAR: No chest pain, chest pressure or chest discomfort. No palpitations or edema.   RESPIRATORY: No shortness of breath, cough or sputum.   GASTROINTESTINAL: No anorexia, nausea, vomiting or diarrhea. No abdominal pain or blood.   HEMATOLOGIC: No bleeding or bruising.     Ventilator/Non-Invasive Ventilation Settings (From admission, onward)      None              Vital Signs  Temp:  [97.7 °F (36.5 °C)-98.2 °F (36.8 °C)] 98.1 °F (36.7 °C)  Heart Rate:  [] 82  Resp:  [16-20] 18  BP: ()/(49-92) 132/63  SpO2:  [92 %-98 %] 97 %  on    Device (Oxygen Therapy): room air    Intake/Output Summary (Last 24 hours) at 2025 1133  Last data filed at 2025 0600  Gross per 24 hour   Intake 500 ml   Output 750 ml   Net -250 ml     Flowsheet Rows      Flowsheet Row First Filed Value   Admission Height 162.6 cm (64\") Documented at 2025 173   Admission Weight 70.4 kg (155 lb 4.8 oz) Documented at 2025 173          Body mass index is 26.61 kg/m².      25  1731 25  1345   Weight: 70.4 kg (155 lb 4.8 oz) 70.3 kg (155 lb)       Physical Exam:  GEN:  No acute distress, alert, cooperative, well developed   EYES:   Sclerae clear. No icterus. " PERRL. Normal EOM  ENT:   External ears/nose normal, no oral lesions, no thrush, mucous membranes moist  NECK:  Supple, midline trachea, no JVD  LUNGS: Normal chest on inspection, CTAB, no wheezes. No rhonchi. No crackles. Respirations regular, even and unlabored.   CV:  Regular rhythm and rate. Normal S1/S2. No murmurs, gallops, or rubs noted.  ABD:  Soft, nontender and nondistended. Normal bowel sounds. No guarding  EXT:  Moves all extremities well with almost no more residual right-sided weakness and. No cyanosis. No redness. No edema. Aphasia is persistent   Skin: Dry, intact, no bleeding    Results Review:    Results From Last 14 Days   Lab Units 02/27/25  0330   TRIGLYCERIDES mg/dL 72     Results from last 7 days   Lab Units 02/28/25  0350 02/27/25  1805 02/27/25  0330 02/26/25  2127 02/26/25  1729   SODIUM mmol/L 140  --  140  --  136   POTASSIUM mmol/L 3.9 6.2* 3.1* 3.6 2.9*   CHLORIDE mmol/L 103  --  104  --  99   CO2 mmol/L 28.4  --  23.8  --  23.9   BUN mg/dL 14  --  13  --  18   CREATININE mg/dL 0.95  --  0.72  --  0.93   CALCIUM mg/dL 8.8  --  8.6  --  8.4   AST (SGOT) U/L  --   --   --   --  12   ALT (SGPT) U/L  --   --   --   --  7   ANION GAP mmol/L 8.6  --  12.2  --  13.1   ALBUMIN g/dL 3.2*  --  3.2*  --  3.5                 Results from last 7 days   Lab Units 02/28/25  0350 02/27/25  0330 02/26/25  1647   WBC 10*3/mm3 9.95 10.87* 12.46*   HEMOGLOBIN g/dL 11.3* 12.0 12.6   HEMATOCRIT % 36.5 38.0 38.6   PLATELETS 10*3/mm3 205 218 250   MCV fL 92.2 90.9 90.4   NEUTROPHIL % %  --   --  72.2   LYMPHOCYTE % %  --   --  20.0   MONOCYTES % %  --   --  5.7   EOSINOPHIL % %  --   --  1.0   BASOPHIL % %  --   --  0.4   IMM GRAN % %  --   --  0.7*     Results from last 7 days   Lab Units 02/26/25  1647   INR  1.20*   APTT seconds 25.6     Results from last 7 days   Lab Units 02/28/25  0350 02/27/25  0330 02/26/25  1729   MAGNESIUM mg/dL 1.7 1.7 1.7     Results from last 7 days   Lab Units 02/27/25  0330    CHOLESTEROL mg/dL 107   TRIGLYCERIDES mg/dL 72   HDL CHOL mg/dL 38*         Results from last 7 days   Lab Units 02/27/25  0330   HEMOGLOBIN A1C % 6.50*     Glucose   Date/Time Value Ref Range Status   02/27/2025 2252 151 (H) 70 - 130 mg/dL Final   02/27/2025 1958 159 (H) 70 - 130 mg/dL Final   02/27/2025 1804 157 (H) 70 - 130 mg/dL Final   02/27/2025 1138 140 (H) 70 - 130 mg/dL Final   02/27/2025 0612 129 70 - 130 mg/dL Final   02/26/2025 1845 124 70 - 130 mg/dL Final                               Imaging:   Imaging Results (All)               I reviewed the patient's new clinical results.  I personally viewed and interpreted the patient's imaging results:        Medication Review:   [START ON 3/1/2025] aspirin, 81 mg, Oral, Daily  atorvastatin, 40 mg, Oral, Nightly  heparin (porcine), 5,000 Units, Subcutaneous, Q8H  mupirocin, 1 Application, Each Nare, BID  pantoprazole, 40 mg, Oral, Q AM  sodium chloride, 10 mL, Intravenous, Q12H        niCARdipine, 5-15 mg/hr        ASSESSMENT:   Acute ischemic left MCA occlusion  Right sided weakness  Right visual field cut  Aphasia  Atrial fibrillation  Chronic anticoagulation with apixiban  Hyperlipidemia  GERD  History of stroke with residual right sided weakness    PLAN:  Patient is doing well post thrombectomy with significant improvement in mild residual weakness and mild residual dysphagia.  History of A-fib on Eliquis, per family patient never skipped the dose of her Eliquis  Eliquis is on hold with the plan to resume in 3 to 4 days and may consider switching to Pradaxa if the stroke happened while on Eliquis.  She is good to transfer out of the intensive care unit  She is still on continuous A-fib with good blood pressure control  Discussed with stroke team, discussed with the ICU rounding team and the nursing staff  Labs/Notes/films were independently reviewed and pertinent results are summarized above  The copied texts in this note were reviewed and they are  accurate as of 02/28/25    Disposition: telemetry and hopefully home tomorrow    Karol Garcia MD  02/28/25  11:33 EST           Dictated utilizing Dragon dictation

## 2025-02-28 NOTE — PLAN OF CARE
Goal Outcome Evaluation:  Plan of Care Reviewed With: patient           Outcome Evaluation: Patient seen for clinical swallow re assessment and speech-language evaluation. No overt s/s of aspiration across trials of ice, thins via cup/straw, nectar via straw, puree, or mixed. No oral residue post swallow. Voice clear when patient able to phonate. SLP recs VFSS prior to diet upgrade d/t difficulty voicing consistently after PO and need to rule out silent aspiration. Continue nectar and puree, straws okay. Patient presents with severe expressive and receptive aphasia. Able to state name and imitate some vowels and words. Minimal functional speech at this time. Y/N accurate for personal information, but yes bias noted with concrete y/n questions. Will continue to follow for dysphagia and aphasia tx.

## 2025-02-28 NOTE — CASE MANAGEMENT/SOCIAL WORK
Continued Stay Note  [facility]     Patient Name: Monica Scherer  MRN: [id number]  Today's Date: 2/28/2025    Admit Date: 2/26/2025    Plan: SNF referral pending   Discharge Plan       Row Name 02/28/25 0955       Plan    Plan SNF referral pending    Plan Comments CCP spoke with patient's daughter Raquel who is requesting referral to [facility]. Daughter states she is going to look over referrals to see which addtional referrals she wants sent. SHERI, JONIW                   Discharge Codes    No documentation.                 Expected Discharge Date and Time       Expected Discharge Date Expected Discharge Time    Mar 4, 2025               LEONIE Stevens

## 2025-02-28 NOTE — THERAPY EVALUATION
Acute Care - Speech Language Pathology Initial Evaluation  [facility]     Patient Name: Monica Scherer  : 1933  MRN: [id number]  Today's Date: 2025               Admit Date: 2025     Visit Dx:    ICD-10-CM ICD-9-CM   1. Altered mental status, unspecified altered mental status type  R41.82 780.97   2. Focal neurological deficit present  R29.818 781.99   3. Acute CVA (cerebrovascular accident)  I63.9 434.91   4. Rogelio-neglect of right side  R41.4 781.8   5. 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  I69.331 438.31     Patient Active Problem List   Diagnosis    Vertigo    Temporary cerebral vascular dysfunction    Gastroesophageal reflux disease with esophagitis    Degeneration of intervertebral disc of cervical region    Prediabetes    S/P cholecystectomy    Osteoarthritis of knee    Herpes zoster without complication    Hypertension    Duodenal ulcer    History of pancreatitis    Hyperlipidemia    TIA (transient ischemic attack)    Cerebrovascular accident (CVA) due to thrombosis of left posterior cerebral artery    Paroxysmal atrial fibrillation    General weakness    History of CVA (cerebrovascular accident)    Anticoagulated    Hematuria    Dizziness and giddiness    Rectal bleeding    Acute CVA (cerebrovascular accident)     Past Medical History:   Diagnosis Date    Atrial fibrillation     Hyperlipidemia     Hypertension     Lacunar infarct, acute 2020    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    New onset atrial fibrillation 2020    now on rate control along with Eliquis    Prolapsed uterus     per patient    Stroke     TIA (transient ischemic attack) 2020    Weakness      Past Surgical History:   Procedure Laterality Date    APPENDECTOMY      CHOLECYSTECTOMY N/A 2016    Procedure: CHOLECYSTECTOMY LAPAROSCOPIC;  Surgeon: Russ Gar MD;  Location: [facility] OR [facility] [place];  Service:     COLONOSCOPY N/A 2021     Procedure: COLONOSCOPY TO CECUM WITH HOT SNARE POLYPECTOMIES AND COLD BX POLYPECTOMY;  Surgeon: Emerson Izaguirre MD;  Location: [facility] ENDOSCOPY;  Service: Gastroenterology;  Laterality: N/A;  pre: RECTAL BLEEDING, FAMILY HX OF COLON CANCER  post: INTERNAL AND EXTERNAL HEMORRHOIDS, DIVERTICULOSIS, POLYPS    ENDOSCOPY N/A 2/22/2018    Z-line regular, 35 cm from incisors, normal esophagus, gastritis, bilious gastric fluid, one non-bleeding duodenal ulcer w/no stigmata of bleeding, Path: DUODENUM: FRAGMENTS OF GASTRIC TYPE MUCOSA WITH HYPERPLASIA (FOVEOLAR) AND PATCHY MIXED INFLAMMATION IN THE LAMINA PROPRIA WITH FOCAL FIBROSIS, COMMENT: 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     EYE SURGERY      tre cataracts       SLP Recommendation and Plan  SLP Diagnosis: severe, nonfluent, fluent, aphasia (02/28/25 0930)                          Therapy Frequency (SLP SLC): PRN (02/28/25 0930)                                Outcome Evaluation: Patient seen for clinical swallow re assessment and speech-language evaluation. No overt s/s of aspiration across trials of ice, thins via cup/straw, nectar via straw, puree, or mixed. No oral residue post swallow. Voice clear when patient able to phonate. SLP recs VFSS prior to diet upgrade d/t difficulty voicing consistently after PO and need to rule out silent aspiration. Continue nectar and puree, straws okay. Patient presents with severe expressive and receptive aphasia. Able to state name and imitate some vowels and words. Minimal functional speech at this time. Y/N accurate for personal information, but yes bias noted with concrete y/n questions. Will continue to follow for dysphagia and aphasia tx. (02/28/25 1015)      SLP EVALUATION (Last 72 Hours)       SLP SLC Evaluation       Row Name 02/28/25 0930                   Communication Assessment/Intervention    Document Type evaluation  -SH        Patient Effort adequate  -SH           General Information    Patient Profile  Reviewed yes  -SH        Pertinent History Of Current Problem L MCA stroke s/o thrombectomy.  -        Precautions/Limitations, Vision difficult to assess  -        Precautions/Limitations, Hearing difficult to assess  -        Prior Level of Function-Communication unknown  -        Plans/Goals Discussed with patient  -        Barriers to Rehab medically complex  -        Patient's Goals for Discharge patient could not state  -           Pain    Pre/Posttreatment Pain Comment No distress noted, patient unable to rate pain  -           Comprehension Assessment/Intervention    Comprehension Assessment/Intervention Auditory Comprehension  -           Auditory Comprehension Assessment/Intervention    Auditory Comprehension (Communication) severe impairment  -        Able to Identify Objects/Pictures (Communication) body part;familiar objects;pictures of common objects;severe impairment  -        Answers Questions (Communication) yes/no;personal;mild impairment;concrete;severe impairment  -        Able to Follow Commands (Communication) 1-step;severe impairment  -        Narrative Discourse simple paragraph level;severe impairment  -SH           Expression Assessment/Intervention    Expression Assessment/Intervention verbal expression  -           Verbal Expression Assessment/Intervention    Verbal Expression severe impairment  -        Automatic Speech (Communication) counting 1-20;other (see comments)  Able to count 2-5 after model, but then stopped voicing  -        Repetition sounds;words;moderate impairment;severe impairment  -        Phrase Completion automatic/predictable;moderate impairment;severe impairment  -        Responsive Naming simple;severe impairment  -        Confrontational Naming high frequency;severe impairment  -        Spontaneous/Functional Words simple;severe impairment  -        Sentence Formulation simple;severe impairment  -        Verbal  Expression, Comment Oriented to name, would sing with SLP but just mouthed words, yes bias with concrete y/n  -           Oral Motor Structure and Function    Oral Motor Structure and Function other (see comments)  -           SLP Evaluation Clinical Impressions    SLP Diagnosis severe;nonfluent;fluent;aphasia  -           Recommendations    Therapy Frequency ([facility] SLC) PRN  -           Communication Treatment Objective and Progress Goals (SLP)    Auditory Comprehension Treatment Objectives Auditory Comprehension Treatment Objectives (Group)  -        Verbal Expression Treatment Objectives Verbal Expression Treatment Objectives (Group)  -           Auditory Comprehension Treatment Objectives    Words/Phrases/Sentences Selection words/phrases/sentences, SLP goal 1  -           Words/Phrases/Sentences Goal 1 (SLP)    Improve Ability to Comprehend Words/Phrases/Sentences Through: Goal 1 (SLP) identify body part;80%  -        Time Frame (Identify Objects and Pictures Goal 1, SLP) by discharge  -           Verbal Expression Treatment Objectives    Word Retrieval Skills Selection word retrieval, SLP goal 1  -           Word Retrieval Skills Goal 1 (SLP)    Improve Word Retrieval Skills By Goal 1 (SLP) completing automatic speech task, counting;completing automatic speech task, alphabet;completing automatic speech task, days of the week;completing automatic speech task, months;completing automatic speech task, Pledge of Allegiance;completing automatic speech task, sing “Happy Birthday”;repeating sounds;repeating words;80%;with minimal cues (75-90%)  -        Time Frame (Word Retrieval Goal 1, SLP) by discharge  -                  User Key  (r) = Recorded By, (t) = Taken By, (c) = Cosigned By      Initials Name Effective Dates     Julia Frank, SLP 01/05/24 -                        EDUCATION  The patient has been educated in the following areas:     Communication Impairment.           SLP GOALS        Row Name 02/28/25 0930 02/27/25 0900          (LTG) Patient will demonstrate functional swallow for    Diet Texture (Demonstrate functional swallow) mechanical ground textures  -SH mechanical ground textures  -CR     Liquid viscosity (Demonstrate functional swallow) thin liquids  -SH thin liquids  -CR     [place] (Demonstrate functional swallow) with moderate cues (50-74% accuracy)  -SH with moderate cues (50-74% accuracy)  -CR     Time Frame (Demonstrate functional swallow) by discharge  -SH by discharge  -CR     Progress/Outcomes (Demonstrate functional swallow) new goal  -SH new goal  -CR     Comment (Demonstrate functional swallow) Patient seen for clinical swallow re assessment and speech-language evaluation. No overt s/s of aspiration across trials of ice, thins via cup/straw, nectar via straw, puree, or mixed. No oral residue post swallow. Voice clear when patient able to phonate. SLP recs VFSS prior to diet upgrade d/t difficulty voicing consistently after PO and need to rule out silent aspiration. Continue nectar and puree, straws okay. Patient presents with severe expressive and receptive aphasia. Able to state name and imitate some vowels and words. Minimal functional speech at this time. Y/N accurate for personal information, but yes bias noted with concrete y/n questions. Will continue to follow for dysphagia and aphasia tx.  -SH --        Words/Phrases/Sentences Goal 1 (SLP)    Improve Ability to Comprehend Words/Phrases/Sentences Through: Goal 1 (SLP) identify body part;80%  -SH --     Time Frame (Identify Objects and Pictures Goal 1, SLP) by discharge  -SH --        Word Retrieval Skills Goal 1 (SLP)    Improve Word Retrieval Skills By Goal 1 (SLP) completing automatic speech task, counting;completing automatic speech task, alphabet;completing automatic speech task, days of the week;completing automatic speech task, months;completing automatic speech task, Pledge of Allegiance;completing  automatic speech task, sing “Happy Birthday”;repeating sounds;repeating words;80%;with minimal cues (75-90%)  - --     Time Frame (Word Retrieval Goal 1, SLP) by discharge  - --               User Key  (r) = Recorded By, (t) = Taken By, (c) = Cosigned By      Initials Name Provider Type     Julia Frank SLP Speech and Language Pathologist    Laney Marie SLP Speech and Language Pathologist                              Time Calculation:      Time Calculation- SLP       Row Name 25 1328             Time Calculation- SLP    SLP Start Time 0930  -      SLP Received On 25  -                User Key  (r) = Recorded By, (t) = Taken By, (c) = Cosigned By      Initials Name Provider Type     Julia Frank SLP Speech and Language Pathologist                    Therapy Charges for Today       Code Description Service Date Service Provider Modifiers Qty    [id number] HC ST EVAL SPEECH AND PROD W LANG  4 2025 Julia Frank SLP GN 1    [id number] HC ST TREATMENT SWALLOW 4 2025 Julia Frank SLP GN 1                       RODDY De Los Santos  2025   and Acute Care - Speech Language Pathology Treatment Note    [facility]     Patient Name: Monica Scherer  : 1933  MRN: [id number]    Today's Date: 2025                   Admit Date: 2025       Visit Dx:      ICD-10-CM ICD-9-CM   1. Altered mental status, unspecified altered mental status type  R41.82 780.97   2. Focal neurological deficit present  R29.818 781.99   3. Acute CVA (cerebrovascular accident)  I63.9 434.91   4. Rogelio-neglect of right side  R41.4 781.8   5. 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  I69.331 438.31       Patient Active Problem List   Diagnosis    Vertigo    Temporary cerebral vascular dysfunction    Gastroesophageal reflux disease with esophagitis    Degeneration of intervertebral disc of cervical region    Prediabetes    S/P  cholecystectomy    Osteoarthritis of knee    Herpes zoster without complication    Hypertension    Duodenal ulcer    History of pancreatitis    Hyperlipidemia    TIA (transient ischemic attack)    Cerebrovascular accident (CVA) due to thrombosis of left posterior cerebral artery    Paroxysmal atrial fibrillation    General weakness    History of CVA (cerebrovascular accident)    Anticoagulated    Hematuria    Dizziness and giddiness    Rectal bleeding    Acute CVA (cerebrovascular accident)       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    New onset atrial fibrillation 01/2020    now on rate control along with Eliquis    Prolapsed uterus     per patient    Stroke     TIA (transient ischemic attack) 01/2020    Weakness        Past Surgical History:   Procedure Laterality Date    APPENDECTOMY      CHOLECYSTECTOMY N/A 6/8/2016    Procedure: CHOLECYSTECTOMY LAPAROSCOPIC;  Surgeon: Russ Gar MD;  Location:  [facility];  Service:     COLONOSCOPY N/A 9/16/2021    Procedure: COLONOSCOPY TO CECUM WITH HOT SNARE POLYPECTOMIES AND COLD BX POLYPECTOMY;  Surgeon: Emerson Izaguirre MD;  Location:  [facility]OSCOPY;  Service: Gastroenterology;  Laterality: N/A;  pre: RECTAL BLEEDING, FAMILY HX OF COLON CANCER  post: INTERNAL AND EXTERNAL HEMORRHOIDS, DIVERTICULOSIS, POLYPS    ENDOSCOPY N/A 2/22/2018    Z-line regular, 35 cm from incisors, normal esophagus, gastritis, bilious gastric fluid, one non-bleeding duodenal ulcer w/no stigmata of bleeding, Path: DUODENUM: FRAGMENTS OF GASTRIC TYPE MUCOSA WITH HYPERPLASIA (FOVEOLAR) AND PATCHY MIXED INFLAMMATION IN THE LAMINA PROPRIA WITH FOCAL FIBROSIS, COMMENT: 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     EYE SURGERY      tre cataracts       SLP Recommendation and Plan    SLP Diagnosis: severe, nonfluent, fluent, aphasia  (02/28/25 6157)                                         Outcome Evaluation: Patient seen for clinical swallow re assessment and speech-language evaluation. No overt s/s of aspiration across trials of ice, thins via cup/straw, nectar via straw, puree, or mixed. No oral residue post swallow. Voice clear when patient able to phonate. SLP recs VFSS prior to diet upgrade d/t difficulty voicing consistently after PO and need to rule out silent aspiration. Continue nectar and puree, straws okay. Patient presents with severe expressive and receptive aphasia. Able to state name and imitate some vowels and words. Minimal functional speech at this time. Y/N accurate for personal information, but yes bias noted with concrete y/n questions. Will continue to follow for dysphagia and aphasia tx. (02/28/25 1015)                SLP EVALUATION (Last 72 Hours)       SLP SLC Evaluation       Row Name 02/28/25 1474                   Communication Assessment/Intervention    Document Type evaluation  -        Patient Effort adequate  -           General Information    Patient Profile Reviewed yes  -        Pertinent History Of Current Problem L MCA stroke s/o thrombectomy.  -        Precautions/Limitations, Vision difficult to assess  -        Precautions/Limitations, Hearing difficult to assess  -        Prior Level of Function-Communication unknown  -        Plans/Goals Discussed with patient  -        Barriers to Rehab medically complex  -        Patient's Goals for Discharge patient could not state  -           Pain    Pre/Posttreatment Pain Comment No distress noted, patient unable to rate pain  -           Comprehension Assessment/Intervention    Comprehension Assessment/Intervention Auditory Comprehension  -           Auditory Comprehension Assessment/Intervention    Auditory Comprehension (Communication) severe impairment  -        Able to Identify Objects/Pictures (Communication) body part;familiar  objects;pictures of common objects;severe impairment  -        Answers Questions (Communication) yes/no;personal;mild impairment;concrete;severe impairment  -        Able to Follow Commands (Communication) 1-step;severe impairment  -        Narrative Discourse simple paragraph level;severe impairment  -SH           Expression Assessment/Intervention    Expression Assessment/Intervention verbal expression  -           Verbal Expression Assessment/Intervention    Verbal Expression severe impairment  -SH        Automatic Speech (Communication) counting 1-20;other (see comments)  Able to count 2-5 after model, but then stopped voicing  -        Repetition sounds;words;moderate impairment;severe impairment  -        Phrase Completion automatic/predictable;moderate impairment;severe impairment  -SH        Responsive Naming simple;severe impairment  -        Confrontational Naming high frequency;severe impairment  -        Spontaneous/Functional Words simple;severe impairment  -        Sentence Formulation simple;severe impairment  -SH        Verbal Expression, Comment Oriented to name, would sing with SLP but just mouthed words, yes bias with concrete y/n  -           Oral Motor Structure and Function    Oral Motor Structure and Function other (see comments)  -           SLP Evaluation Clinical Impressions    SLP Diagnosis severe;nonfluent;fluent;aphasia  -           Recommendations    Therapy Frequency (SLP SLC) PRN  -           Communication Treatment Objective and Progress Goals (SLP)    Auditory Comprehension Treatment Objectives Auditory Comprehension Treatment Objectives (Group)  -        Verbal Expression Treatment Objectives Verbal Expression Treatment Objectives (Group)  -           Auditory Comprehension Treatment Objectives    Words/Phrases/Sentences Selection words/phrases/sentences, SLP goal 1  -           Words/Phrases/Sentences Goal 1 (SLP)    Improve Ability to Comprehend  Words/Phrases/Sentences Through: Goal 1 (SLP) identify body part;80%  -        Time Frame (Identify Objects and Pictures Goal 1, SLP) by discharge  -           Verbal Expression Treatment Objectives    Word Retrieval Skills Selection word retrieval, SLP goal 1  -           Word Retrieval Skills Goal 1 (SLP)    Improve Word Retrieval Skills By Goal 1 (SLP) completing automatic speech task, counting;completing automatic speech task, alphabet;completing automatic speech task, days of the week;completing automatic speech task, months;completing automatic speech task, Pledge of Allegiance;completing automatic speech task, sing “Happy Birthday”;repeating sounds;repeating words;80%;with minimal cues (75-90%)  -        Time Frame (Word Retrieval Goal 1, SLP) by discharge  -                  User Key  (r) = Recorded By, (t) = Taken By, (c) = Cosigned By      Initials Name Effective Dates     Julia Frank, SLP 01/05/24 -                        EDUCATION    The patient has been educated in the following areas:       Dysphagia (Swallowing Impairment).             SLP GOALS       Row Name 02/28/25 0930 02/27/25 0900          (LTG) Patient will demonstrate functional swallow for    Diet Texture (Demonstrate functional swallow) mechanical ground textures  - mechanical ground textures  -CR     Liquid viscosity (Demonstrate functional swallow) thin liquids  - thin liquids  -CR     Leslie (Demonstrate functional swallow) with moderate cues (50-74% accuracy)  - with moderate cues (50-74% accuracy)  -CR     Time Frame (Demonstrate functional swallow) by discharge  - by discharge  -CR     Progress/Outcomes (Demonstrate functional swallow) new goal  -SH new goal  -CR     Comment (Demonstrate functional swallow) Patient seen for clinical swallow re assessment and speech-language evaluation. No overt s/s of aspiration across trials of ice, thins via cup/straw, nectar via straw, puree, or mixed. No oral residue  post swallow. Voice clear when patient able to phonate. SLP recs VFSS prior to diet upgrade d/t difficulty voicing consistently after PO and need to rule out silent aspiration. Continue nectar and puree, straws okay. Patient presents with severe expressive and receptive aphasia. Able to state name and imitate some vowels and words. Minimal functional speech at this time. Y/N accurate for personal information, but yes bias noted with concrete y/n questions. Will continue to follow for dysphagia and aphasia tx.  -SH --        Words/Phrases/Sentences Goal 1 (SLP)    Improve Ability to Comprehend Words/Phrases/Sentences Through: Goal 1 (SLP) identify body part;80%  -SH --     Time Frame (Identify Objects and Pictures Goal 1, SLP) by discharge  - --        Word Retrieval Skills Goal 1 (SLP)    Improve Word Retrieval Skills By Goal 1 (SLP) completing automatic speech task, counting;completing automatic speech task, alphabet;completing automatic speech task, days of the week;completing automatic speech task, months;completing automatic speech task, Pledge of Allegiance;completing automatic speech task, sing “Happy Birthday”;repeating sounds;repeating words;80%;with minimal cues (75-90%)  -SH --     Time Frame (Word Retrieval Goal 1, SLP) by discharge  - --               User Key  (r) = Recorded By, (t) = Taken By, (c) = Cosigned By      Initials Name Provider Type    Julia Hurt SLP Speech and Language Pathologist    Laney Marie SLP Speech and Language Pathologist                                Time Calculation:        Time Calculation- SLP       Row Name 02/28/25 1328             Time Calculation- SLP    SLP Start Time 0930  -      SLP Received On 02/28/25  -                User Key  (r) = Recorded By, (t) = Taken By, (c) = Cosigned By      Initials Name Provider Type    Julia Hurt SLP Speech and Language Pathologist                      Therapy Charges for Today       Code Description  Service Date Service Provider Modifiers Qty    [id number] [facility] EVAL SPEECH AND PROD W LANG  4 2/28/2025 Julia Frank, SLP GN 1    [id number] [facility] TREATMENT SWALLOW 4 2/28/2025 Julia Frank, SLP GN 1                             Julia Frank, RODDY  2/28/2025

## 2025-03-01 LAB
GLUCOSE BLDC GLUCOMTR-MCNC: 133 MG/DL (ref 70–130)
GLUCOSE BLDC GLUCOMTR-MCNC: 148 MG/DL (ref 70–130)
GLUCOSE BLDC GLUCOMTR-MCNC: 220 MG/DL (ref 70–130)
MAGNESIUM SERPL-MCNC: 1.7 MG/DL (ref 1.7–2.3)

## 2025-03-01 PROCEDURE — 82948 REAGENT STRIP/BLOOD GLUCOSE: CPT

## 2025-03-01 PROCEDURE — 25010000002 HEPARIN (PORCINE) PER 1000 UNITS: Performed by: INTERNAL MEDICINE

## 2025-03-01 PROCEDURE — 97530 THERAPEUTIC ACTIVITIES: CPT

## 2025-03-01 PROCEDURE — 83735 ASSAY OF MAGNESIUM: CPT | Performed by: INTERNAL MEDICINE

## 2025-03-01 PROCEDURE — 99222 1ST HOSP IP/OBS MODERATE 55: CPT | Performed by: STUDENT IN AN ORGANIZED HEALTH CARE EDUCATION/TRAINING PROGRAM

## 2025-03-01 RX ORDER — METOPROLOL TARTRATE 25 MG/1
25 TABLET, FILM COATED ORAL 2 TIMES DAILY
Status: DISCONTINUED | OUTPATIENT
Start: 2025-03-01 | End: 2025-03-03 | Stop reason: HOSPADM

## 2025-03-01 RX ADMIN — PANTOPRAZOLE SODIUM 40 MG: 40 TABLET, DELAYED RELEASE ORAL at 08:05

## 2025-03-01 RX ADMIN — HEPARIN SODIUM 5000 UNITS: 5000 INJECTION INTRAVENOUS; SUBCUTANEOUS at 14:19

## 2025-03-01 RX ADMIN — HEPARIN SODIUM 5000 UNITS: 5000 INJECTION INTRAVENOUS; SUBCUTANEOUS at 20:05

## 2025-03-01 RX ADMIN — Medication 10 ML: at 20:07

## 2025-03-01 RX ADMIN — METOPROLOL TARTRATE 25 MG: 25 TABLET, FILM COATED ORAL at 20:04

## 2025-03-01 RX ADMIN — ASPIRIN 81 MG: 81 TABLET, COATED ORAL at 08:05

## 2025-03-01 RX ADMIN — MUPIROCIN 1 APPLICATION: 20 OINTMENT TOPICAL at 20:05

## 2025-03-01 RX ADMIN — HEPARIN SODIUM 5000 UNITS: 5000 INJECTION INTRAVENOUS; SUBCUTANEOUS at 08:05

## 2025-03-01 RX ADMIN — ATORVASTATIN CALCIUM 40 MG: 20 TABLET, FILM COATED ORAL at 20:05

## 2025-03-01 RX ADMIN — MUPIROCIN 1 APPLICATION: 20 OINTMENT TOPICAL at 08:05

## 2025-03-01 RX ADMIN — Medication 10 ML: at 08:05

## 2025-03-01 NOTE — PLAN OF CARE
Goal Outcome Evaluation:  Plan of Care Reviewed With: patient        Progress: improving  Outcome Evaluation: Pt agreeable to PT this PM, continuing to make slow and steady progress demonstrated by requiring less assistance during functional mobility compared with previous sessions. Pt required min-mod Ax1 for bed mobility, min Ax1 for STS transfers, and mod Ax2 for side steps to HOB with Rwx for 3 steps. Pt required assist to move R LE during side stepping, secondary to lack of motor function and strength in that limb. Pt presents today with below baseline strength, dec endurance, dec balance, and decreased functional mobility. Pt will benefit from skilled PT to address the previous deficits and improve overall safety with functional mobility. Anticipate pt will D/C to SNF pending progress.    Anticipated Discharge Disposition (PT): skilled nursing facility

## 2025-03-01 NOTE — PLAN OF CARE
VSS this shift. No complaints of pain. Patient with aphasia, sometimes able to answer questions. Pt worked with PT this shift. Bed alarm on and call light in reach. Pt tolerating diet well

## 2025-03-01 NOTE — CONSULTS
Cardiology Hospital Consult            Patient Name: Monica Scherer  Age/Sex: 89 y.o. female  : 1933  MRN: [id number]    Date of Admission: 2025  Date of Encounter Visit: 25  Encounter Provider: Emerson Batres MD  Referring Provider: Karol Garcia MD  Place of Service: [facility] CARDIOLOGY  Cardiologist: Emerson Batres MD    Admission Diagnosis: Focal neurological deficit present [R29.818]  Rogelio-neglect of right side [R41.4]  Acute CVA (cerebrovascular accident) [I63.9]  Altered mental status, unspecified altered mental status type [R41.82]    Subjective:   Cardiology Consultation for: A-fib, SVT    Chief Complaint:   Chief Complaint   Patient presents with    Stroke     Right sided weakness, aphasia       History of Present Illness:  Monica Scherer is a 89 y.o. female A-fib (on Eliquis, hyperlipidemia, hypertension, prior stroke with chronic right-sided weakness    Initially presented on 2025 with concerns of acute altered mental status.  Patient was diagnosed with new CVA with left proximal MCA occlusion she was taken for intra-arterial thrombectomy.    Patient now has been treated with GDMT for her stroke including aspirin.  Given that she developed a stroke on Eliquis, it was recommend that she may switch to a different anticoagulation such as Pradaxa.    Current Medications:    Current Facility-Administered Medications:     aspirin EC tablet 81 mg, 81 mg, Oral, Daily, Karol Garcia MD, 81 mg at 25 0805    atorvastatin (LIPITOR) tablet 40 mg, 40 mg, Oral, Nightly, Karol Garcia MD, 40 mg at 25 2222    Atropine Sulfate (PF) injection 0.4 mg, 0.4 mg, Intravenous, Once PRNQi Kelly Michael, MD    Calcium Replacement - Follow Nurse / BPA Driven Protocol, , Not Applicable, Jose SALAS Lebnan S, MD    diphenhydrAMINE (BENADRYL) injection 12.5 mg, 12.5 mg, Intravenous, Q15 Min PRN, Danni Busby MD    ePHEDrine injection 5 mg, 5 mg,  Intravenous, Once PRN, Danni Busby MD    fentaNYL citrate (PF) (SUBLIMAZE) injection 25 mcg, 25 mcg, Intravenous, Q5 Min PRN, Danni Busby MD    flumazenil (ROMAZICON) injection 0.2 mg, 0.2 mg, Intravenous, PRN, Danni Busby MD    heparin (porcine) 5000 UNIT/ML injection 5,000 Units, 5,000 Units, Subcutaneous, Q8H, Karol Garcia MD, 5,000 Units at 03/01/25 1419    hydrALAZINE (APRESOLINE) injection 10 mg, 10 mg, Intravenous, Q4H PRN, Karol Garcia MD    hydrALAZINE (APRESOLINE) injection 5 mg, 5 mg, Intravenous, Q10 Min PRN, Danni Busby MD    HYDROcodone-acetaminophen (NORCO) 5-325 MG per tablet 1 tablet, 1 tablet, Oral, Once PRN, Danni Busby MD    HYDROcodone-acetaminophen (NORCO) 7.5-325 MG per tablet 1 tablet, 1 tablet, Oral, Q4H PRN, Danni Busby MD    HYDROmorphone (DILAUDID) injection 0.25 mg, 0.25 mg, Intravenous, Q5 Min PRN, Danni Busby MD    ipratropium-albuterol (DUO-NEB) nebulizer solution 3 mL, 3 mL, Nebulization, Once PRN, Danni Busby MD    labetalol (NORMODYNE,TRANDATE) injection 5 mg, 5 mg, Intravenous, Q5 Min PRN, Danni Busby MD    Magnesium Standard Dose Replacement - Follow Nurse / BPA Driven Protocol, , Not Applicable, PRN, Karol Garcia MD    metoprolol tartrate (LOPRESSOR) injection 2.5 mg, 2.5 mg, Intravenous, Q4H PRN, Karol Garcia MD, 2.5 mg at 02/28/25 1656    mupirocin (BACTROBAN) 2 % nasal ointment 1 Application, 1 Application, Each Nare, BID, Karol Garcia MD, 1 Application at 03/01/25 0805    naloxone (NARCAN) injection 0.2 mg, 0.2 mg, Intravenous, PRN, Danni Busby MD    nitroglycerin (NITROSTAT) SL tablet 0.4 mg, 0.4 mg, Sublingual, Q5 Min PRN, Karol Garcia MD    ondansetron (ZOFRAN) injection 4 mg, 4 mg, Intravenous, Once PRN, Danni Busby MD    pantoprazole (PROTONIX) EC tablet 40 mg, 40 mg, Oral, Q AM, Karol Garcia MD, 40 mg at 03/01/25 0805    Phosphorus  Replacement - Follow Nurse / BPA Driven Protocol, , Not Applicable, Jose SALAS Lebnan S, MD    Potassium Replacement - Follow Nurse / BPA Driven Protocol, , Not Applicable, Jose SALAS Lebnan S, MD    promethazine (PHENERGAN) suppository 25 mg, 25 mg, Rectal, Once PRN **OR** promethazine (PHENERGAN) tablet 25 mg, 25 mg, Oral, Once PRN, Danni Busby MD    sodium chloride 0.9 % flush 10 mL, 10 mL, Intravenous, PRNJose Lebnan S, MD    sodium chloride 0.9 % flush 10 mL, 10 mL, Intravenous, Q12H, Karol Garcia MD, 10 mL at 03/01/25 0805    sodium chloride 0.9 % flush 10 mL, 10 mL, Intravenous, Jose SALAS Lebnan S, MD    sodium chloride 0.9 % infusion 40 mL, 40 mL, Intravenous, Jose SALAS Lebnan S, MD    Prior to Admission Medications:  Medications Prior to Admission   Medication Sig Dispense Refill Last Dose/Taking    apixaban (Eliquis) 5 MG tablet tablet Take 1 tablet by mouth Every 12 (Twelve) Hours. 180 tablet 3 Taking    atorvastatin (LIPITOR) 80 MG tablet Take 1 tablet by mouth Daily. 90 tablet 3 Taking    felodipine (PLENDIL) 10 MG 24 hr tablet Take 1 tablet by mouth Daily. 90 tablet 1 Taking    ferrous gluconate (FERGON) 324 MG tablet Take 1 tablet by mouth Daily. 30 tablet 4 Taking    metoprolol tartrate (LOPRESSOR) 25 MG tablet Take 1 tablet by mouth Every 12 (Twelve) Hours. 180 tablet 3 Taking    omeprazole (priLOSEC) 20 MG capsule Take 1 capsule by mouth Daily. 90 capsule 0 Taking    vitamin D3 (D-3-5) 125 MCG (5000 UT) capsule capsule Take 1 capsule by mouth Daily. 90 capsule 3 Taking       Past Medical History     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    New onset atrial fibrillation 01/2020    now on rate control along with Eliquis    Prolapsed uterus     per patient    Stroke     TIA (transient ischemic attack) 01/2020    Weakness      Past  "Surgical History:   Procedure Laterality Date    APPENDECTOMY      CHOLECYSTECTOMY N/A 6/8/2016    Procedure: CHOLECYSTECTOMY LAPAROSCOPIC;  Surgeon: Russ Gar MD;  Location:  [facility];  Service:     COLONOSCOPY N/A 9/16/2021    Procedure: COLONOSCOPY TO CECUM WITH HOT SNARE POLYPECTOMIES AND COLD BX POLYPECTOMY;  Surgeon: Emerson Izaguirre MD;  [facility];  Service: Gastroenterology;  Laterality: N/A;  pre: RECTAL BLEEDING, FAMILY HX OF COLON CANCER  post: INTERNAL AND EXTERNAL HEMORRHOIDS, DIVERTICULOSIS, POLYPS    ENDOSCOPY N/A 2/22/2018    Z-line regular, 35 cm from incisors, normal esophagus, gastritis, bilious gastric fluid, one non-bleeding duodenal ulcer w/no stigmata of bleeding, Path: DUODENUM: FRAGMENTS OF GASTRIC TYPE MUCOSA WITH HYPERPLASIA (FOVEOLAR) AND PATCHY MIXED INFLAMMATION IN THE LAMINA PROPRIA WITH FOCAL FIBROSIS, COMMENT: 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     EYE SURGERY      tre cataracts       Family History     History reviewed. No pertinent family history.      Social History     Social History     Socioeconomic History    Marital status: Single   Tobacco Use    Smoking status: Never     Passive exposure: Never    Smokeless tobacco: Never    Tobacco comments:     caffeine use- \"rare\"   Vaping Use    Vaping status: Never Used   Substance and Sexual Activity    Alcohol use: No    Drug use: No    Sexual activity: Defer       Allergies:  Allergies   Allergen Reactions    Cephalexin Rash    Latex Itching            Objective:     Objective:  Temp:  [97.5 °F (36.4 °C)-98.8 °F (37.1 °C)] 98.1 °F (36.7 °C)  Heart Rate:  [] 98  Resp:  [16-20] 16  BP: (127-165)/(62-79) 149/79  No intake or output data in the 24 hours ending 03/01/25 1457  Body mass index is 26.61 kg/m².      02/26/25  1731 02/27/25  1345   Weight: 70.4 kg (155 lb 4.8 oz) 70.3 kg (155 lb)                 Physical Exam:   Gen: Well nourished; Alert  Skin: no visible rashes and no " abnormalities with palpation  Eyes: no evidence of visual defects and normal sclera  Ears: no abnormalities.  Mouth: normal teeth and appropriately moist mucous membranes  Chest: clear to auscultation. There is normal respiratory effort and expansion.  CV: examination showed a irregular rhythm. S1 and S2 were normal.   Abd: no visible distension.   Neurologic:Cranial nerves appear intact; Sensation normal; no localizing signs    Lab Review:     Results from last 7 days   Lab Units 02/28/25  0350 02/27/25  1805 02/27/25  0330 02/26/25 2127 02/26/25  1729   SODIUM mmol/L 140  --  140  --  136   POTASSIUM mmol/L 3.9 6.2* 3.1*   < > 2.9*   CHLORIDE mmol/L 103  --  104  --  99   CO2 mmol/L 28.4  --  23.8  --  23.9   BUN mg/dL 14  --  13  --  18   CREATININE mg/dL 0.95  --  0.72  --  0.93   GLUCOSE mg/dL 103*  --  128*  --  135*   CALCIUM mg/dL 8.8  --  8.6  --  8.4    < > = values in this interval not displayed.           Results from last 7 days   Lab Units 02/28/25  0350   WBC 10*3/mm3 9.95   HEMOGLOBIN g/dL 11.3*   HEMATOCRIT % 36.5   PLATELETS 10*3/mm3 205     Results from last 7 days   Lab Units 02/26/25  1647   INR  1.20*   APTT seconds 25.6     Results from last 7 days   Lab Units 02/27/25  0330   CHOLESTEROL mg/dL 107     Results from last 7 days   Lab Units 03/01/25  0648   MAGNESIUM mg/dL 1.7     Results from last 7 days   Lab Units 02/27/25  0330   CHOLESTEROL mg/dL 107   TRIGLYCERIDES mg/dL 72   HDL CHOL mg/dL 38*   LDL CHOL mg/dL 54                   Imaging:    chest X-ray    Results for orders placed during the hospital encounter of 02/26/25    Adult Transthoracic Echo Complete W/ Cont if Necessary Per Protocol (With Agitated Saline)    Interpretation Summary    Left ventricular systolic function is normal. Calculated left ventricular EF = 62.9%    Left ventricular wall thickness is consistent with mild concentric hypertrophy. Sigmoid-shaped ventricular septum is present.    Left ventricular diastolic  function is consistent with (grade II w/high LAP) pseudonormalization.    Moderate mitral valve regurgitation is present with a centrally-directed jet noted.    The left atrial cavity is mildly dilated.    Saline test results are negative for right to left atrial level shunt.    There is moderate calcification of the aortic valve mainly affecting the non-coronary, left coronary and right coronary cusp(s).        I personally viewed and interpreted the patient's EKG/Telemetry data.            Assessment/Plan:       Acute CVA (cerebrovascular accident)      # Atrial fibrillation  - Echo with EF 63%, grade 2 diastolic dysfunction.  - Patient appears to be in rapid atrial fibrillation with some rate related aberrancy.  - Will recommend rate control for now.  - Patient was on metoprolol 25 twice daily at home for A-fib.  Will restart.  - Recommend continue anticoagulation per neurology team.    Thank you for allowing me to participate in the care of Monica DEEPTHI Scherer.    Emerson Batres MD  [facility]  03/01/25  14:57 EST

## 2025-03-01 NOTE — PROGRESS NOTES
"      [facility]         Dr Higuera Sayied   LOS: 3 days   Patient Care Team:  Amilcar Mauricio DO as PCP - General (Family Medicine)  Fatou Crawford, ALEJANDRA as Ambulatory  ([facility])  Omar Bassett MD as Consulting Physician (Cardiology)    Chief Complaint: Acute ischemic MCA occlusion with right-sided weakness and aphasia A-fib other issues as listed below    Interval History: Events noted chart reviewed.  Daughter tells me that she has been having episodes of high heart rate.  I question the nurse who stated that when patient does any activity her heart rate goes up significantly.  Patient is aphasic unable to give me any meaningful history as such    REVIEW OF SYSTEMS:   Limited by aphasia    Ventilator/Non-Invasive Ventilation Settings (From admission, onward)      None              Vital Signs  Temp:  [97.5 °F (36.4 °C)-98.8 °F (37.1 °C)] 98.1 °F (36.7 °C)  Heart Rate:  [] 98  Resp:  [16-20] 16  BP: (127-165)/(62-79) 149/79    Intake/Output Summary (Last 24 hours) at 3/1/2025 1336  Last data filed at 2/28/2025 1400  Gross per 24 hour   Intake --   Output 200 ml   Net -200 ml     Flowsheet Rows      Flowsheet Row First Filed Value   Admission Height 162.6 cm (64\") Documented at 02/26/2025 1731   Admission Weight 70.4 kg (155 lb 4.8 oz) Documented at 02/26/2025 1731                  Physical Exam:  Patient is examined using the personal protective equipment as per guidelines from infection control for this particular patient as enacted.  Hand hygiene was performed before and after patient interaction.   General Appearance:    Alert, cooperative, in no acute distress.  Following simple commands  ENT Mallampati between 3 and 4 no nasal congestion  Neck midline trachea, no thyromegaly   Lungs:   Diminished breath sounds bilaterally    Heart:    Regular rhythm and normal rate, normal S1 and S2, no            murmur, no gallop, no rub, no click   Chest Wall:    No " abnormalities observed   Abdomen:     Normal bowel sounds, no masses, no organomegaly, soft        nontender, nondistended, no guarding, no rebound                tenderness   Extremities:   Moves all extremities well, no edema, no cyanosis, no             redness  CNS aphasic  Skin no rashes no nodules  Musculoskeletal no cyanosis no clubbing normal range of motion     Results Review:        Results from last 7 days   Lab Units 02/28/25  0350 02/27/25  1805 02/27/25  0330 02/26/25  2127 02/26/25  1729   SODIUM mmol/L 140  --  140  --  136   POTASSIUM mmol/L 3.9 6.2* 3.1*   < > 2.9*   CHLORIDE mmol/L 103  --  104  --  99   CO2 mmol/L 28.4  --  23.8  --  23.9   BUN mg/dL 14  --  13  --  18   CREATININE mg/dL 0.95  --  0.72  --  0.93   GLUCOSE mg/dL 103*  --  128*  --  135*   CALCIUM mg/dL 8.8  --  8.6  --  8.4    < > = values in this interval not displayed.         Results from last 7 days   Lab Units 02/28/25  0350 02/27/25  0330 02/26/25  1647   WBC 10*3/mm3 9.95 10.87* 12.46*   HEMOGLOBIN g/dL 11.3* 12.0 12.6   HEMATOCRIT % 36.5 38.0 38.6   PLATELETS 10*3/mm3 205 218 250     Results from last 7 days   Lab Units 02/26/25  1647   INR  1.20*   APTT seconds 25.6     Results from last 7 days   Lab Units 02/27/25  0330   CHOLESTEROL mg/dL 107     Results from last 7 days   Lab Units 03/01/25  0648   MAGNESIUM mg/dL 1.7     Results from last 7 days   Lab Units 02/27/25  0330   CHOLESTEROL mg/dL 107   TRIGLYCERIDES mg/dL 72   HDL CHOL mg/dL 38*   LDL CHOL mg/dL 54           I reviewed the patient's new clinical results.  I personally viewed and interpreted the patient's chest x-ray.        Medication Review:   aspirin, 81 mg, Oral, Daily  atorvastatin, 40 mg, Oral, Nightly  heparin (porcine), 5,000 Units, Subcutaneous, Q8H  mupirocin, 1 Application, Each Nare, BID  pantoprazole, 40 mg, Oral, Q AM  sodium chloride, 10 mL, Intravenous, Q12H             ASSESSMENT:   Acute ischemic left MCA occlusion  Right sided  weakness  Right visual field cut  Aphasia  Atrial fibrillation  Chronic anticoagulation with apixiban  Hyperlipidemia  GERD  History of stroke with residual right sided weakness    PLAN:  Events noted chart reviewed  Episodes of SVT in the hospital noted.  History of A-fib on Eliquis with plans to resume Eliquis in the next 3 to 4 days per neurology.  I will consult her cardiologist to see her  PT OT to continue  Discussed with daughter she would like for patient to go to rehab at some point  Mobilize ambulate  Will continue to monitor clinical course closely.      Davida Caban MD  03/01/25  13:36 EST

## 2025-03-01 NOTE — THERAPY TREATMENT NOTE
Patient Name: Monica Scherer  : 1933    MRN: [id number]                              Today's Date: 3/1/2025       Admit Date: 2025    Visit Dx:     ICD-10-CM ICD-9-CM   1. Altered mental status, unspecified altered mental status type  R41.82 780.97   2. Focal neurological deficit present  R29.818 781.99   3. Acute CVA (cerebrovascular accident)  I63.9 434.91   4. Rogelio-neglect of right side  R41.4 781.8   5. 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  I69.331 438.31     Patient Active Problem List   Diagnosis    Vertigo    Temporary cerebral vascular dysfunction    Gastroesophageal reflux disease with esophagitis    Degeneration of intervertebral disc of cervical region    Prediabetes    S/P cholecystectomy    Osteoarthritis of knee    Herpes zoster without complication    Hypertension    Duodenal ulcer    History of pancreatitis    Hyperlipidemia    TIA (transient ischemic attack)    Cerebrovascular accident (CVA) due to thrombosis of left posterior cerebral artery    Paroxysmal atrial fibrillation    General weakness    History of CVA (cerebrovascular accident)    Anticoagulated    Hematuria    Dizziness and giddiness    Rectal bleeding    Acute CVA (cerebrovascular accident)     Past Medical History:   Diagnosis Date    Atrial fibrillation     Hyperlipidemia     Hypertension     Lacunar infarct, acute 2020    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    New onset atrial fibrillation 2020    now on rate control along with Eliquis    Prolapsed uterus     per patient    Stroke     TIA (transient ischemic attack) 2020    Weakness      Past Surgical History:   Procedure Laterality Date    APPENDECTOMY      CHOLECYSTECTOMY N/A 2016    Procedure: CHOLECYSTECTOMY LAPAROSCOPIC;  Surgeon: Russ Gar MD;  [place] [facility] [place] [facility] [place]  Service:     COLONOSCOPY N/A 2021    Procedure: COLONOSCOPY TO CECUM WITH HOT SNARE POLYPECTOMIES  AND COLD BX POLYPECTOMY;  Surgeon: Emerson Izaguirre MD;  Location: 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     EYE SURGERY      tre cataracts      General Information       Row Name 03/01/25 1557          Physical Therapy Time and Intention    Document Type therapy note (daily note)  -     Mode of Treatment individual therapy;physical therapy  -       Row Name 03/01/25 5752          General Information    Patient Profile Reviewed yes  -     Existing Precautions/Restrictions fall  -       Row Name 03/01/25 5052          Cognition    Orientation Status (Cognition) oriented to;person  -       Row Name 03/01/25 3012          Safety Issues/Impairments Affecting Functional Mobility    Impairments Affecting Function (Mobility) balance;cognition;endurance/activity tolerance;coordination;grasp;strength  -               User Key  (r) = Recorded By, (t) = Taken By, (c) = Cosigned By      Initials Name Provider Type    Ran Shirley, PT Physical Therapist                   Mobility       Row Name 03/01/25 9068          Bed Mobility    Supine-Sit [place] (Bed Mobility) minimum assist (75% patient effort);moderate assist (50% patient effort);verbal cues;1 person assist  -     Sit-Supine [place] (Bed Mobility) minimum assist (75% patient effort);moderate assist (50% patient effort);1 person assist;verbal cues  -     Assistive Device (Bed Mobility) head of bed elevated;bed rails  -        Row Name 03/01/25 1555          Transfers    Comment, (Transfers) not attempted today secondary to continued weakness of RLE with difficulty progressing steps  -       Row Name 03/01/25 1555          Bed-Chair Transfer    Bed-Chair [place] (Transfers) unable to assess  -       Row Name 03/01/25 1555          Sit-Stand Transfer    Sit-Stand [place] (Transfers) minimum assist (75% patient effort);1 person assist;verbal cues  -     Assistive Device (Sit-Stand Transfers) walker, front-wheeled  -     Comment, (Sit-Stand Transfer) STS x2 from EOB with RWx Margaret  -       Row Name 03/01/25 1555          Gait/Stairs (Locomotion)    [place] Level (Gait) verbal cues;2 person assist;moderate assist (50% patient effort)  -     Assistive Device (Gait) walker, front-wheeled  -     Patient was able to Ambulate yes  -DR     Distance in Feet (Gait) 2  3 steps to HOB  -DR     Deviations/Abnormal Patterns (Gait) right sided deviations;bilateral deviations;base of support, narrow;festinating/shuffling;gait speed decreased  -     Bilateral Gait Deviations forward flexed posture  -DR     Right Sided Gait Deviations weight shift ability decreased;leans right  -DR     [place] Level (Stairs) not tested  -               User Key  (r) = Recorded By, (t) = Taken By, (c) = Cosigned By      Initials Name Provider Type    Ran Shirley, PT Physical Therapist                   Obj/Interventions       Row Name 03/01/25 1601          Range of Motion Comprehensive    General Range of Motion bilateral lower extremity ROM WFL  -       Row Name 03/01/25 1601          Motor Skills    Therapeutic Exercise hip;ankle  seated SHAKIRA navas x10 B LE  -       Row Name 03/01/25 1601          Balance    Balance Assessment sitting static balance;sitting dynamic balance;standing static balance;standing dynamic balance  -     Static Sitting Balance standby assist  -     Dynamic Sitting Balance standby  assist;contact guard  -DR     Position, Sitting Balance supported;sitting edge of bed  -DR     Static Standing Balance moderate assist;2-person assist;verbal cues  -DR     Dynamic Standing Balance moderate assist;2-person assist;verbal cues  -DR     Position/Device Used, Standing Balance supported;walker, front-wheeled  -DR               User Key  (r) = Recorded By, (t) = Taken By, (c) = Cosigned By      Initials Name Provider Type    Ran Shirley, PT Physical Therapist                   Goals/Plan       Row Name 03/01/25 1602          Bed Mobility Goal 1 (PT)    Progress/Outcomes (Bed Mobility Goal 1, PT) goal met  -DR       Row Name 03/01/25 1602          Transfer Goal 1 (PT)    Progress/Outcome (Transfer Goal 1, PT) goal met  -DR       Row Name 03/01/25 1602          Gait Training Goal 1 (PT)    Activity/Assistive Device (Gait Training Goal 1, PT) gait (walking locomotion)  -DR     [place] Level (Gait Training Goal 1, PT) minimum assist (75% or more patient effort)  -DR     Distance (Gait Training Goal 1, PT) 15  -DR     Time Frame (Gait Training Goal 1, PT) long term goal (LTG);10 days  -DR               User Key  (r) = Recorded By, (t) = Taken By, (c) = Cosigned By      Initials Name Provider Type    Ran Shirley, PT Physical Therapist                   Clinical Impression       Row Name 03/01/25 1602          Pain    Pretreatment Pain Rating 0/10 - no pain  -DR     Posttreatment Pain Rating 0/10 - no pain  -DR     Pre/Posttreatment Pain Comment no pain verbalized  -DR       Row Name 03/01/25 1602          Plan of Care Review    Plan of Care Reviewed With patient  -DR     Progress improving  -DR     Outcome Evaluation Pt agreeable to PT this PM, continuing to make slow and steady progress demonstrated by requiring less assistance during functional mobility compared with previous sessions. Pt required min-mod Ax1 for bed mobility, min Ax1 for STS transfers, and mod Ax2 for side steps to HOB  with Rwx for 3 steps. Pt required assist to move R LE during side stepping, secondary to lack of motor function and strength in that limb. Pt presents today with below baseline strength, dec endurance, dec balance, and decreased functional mobility. Pt will benefit from skilled PT to address the previous deficits and improve overall safety with functional mobility. Anticipate pt will D/C to SNF pending progress.  -       Row Name 03/01/25 1602          Therapy Assessment/Plan (PT)    Rehab Potential (PT) fair  -     Criteria for Skilled Interventions Met (PT) yes;meets criteria;skilled treatment is necessary  -     Therapy Frequency (PT) 6 times/wk  -       Row Name 03/01/25 1602          Positioning and Restraints    Pre-Treatment Position in bed  -     Post Treatment Position bed  -DR     In Bed notified nsg;fowlers;call light within reach;encouraged to call for assist;exit alarm on;with family/caregiver  -               User Key  (r) = Recorded By, (t) = Taken By, (c) = Cosigned By      Initials Name Provider Type    Ran Shirley, PT Physical Therapist                   Outcome Measures       Row Name 03/01/25 1603 03/01/25 0805       How much help from another person do you currently need...    Turning from your back to your side while in flat bed without using bedrails? 3  -DR 2  -EB    Moving from lying on back to sitting on the side of a flat bed without bedrails? 2  -DR 2  -EB    Moving to and from a bed to a chair (including a wheelchair)? 2  -DR 2  -EB    Standing up from a chair using your arms (e.g., wheelchair, bedside chair)? 2  -DR 2  -EB    Climbing 3-5 steps with a railing? 1  -DR 1  -EB    To walk in hospital room? 2  -DR 2  -EB    AM-PAC 6 Clicks Score (PT) 12  -DR 11  -EB    Highest Level of Mobility Goal 4 --> Transfer to chair/commode  -DR 4 --> Transfer to chair/commode  -EB      Row Name 03/01/25 1603          Functional Assessment    Outcome Measure Options AM-PAC 6 Clicks  Basic Mobility (PT)  -               User Key  (r) = Recorded By, (t) = Taken By, (c) = Cosigned By      Initials Name Provider Type    Sofía Greene, RN Registered Nurse    Ran Shirley, PT Physical Therapist                                 Physical Therapy Education       Title: PT OT SLP Therapies (In Progress)       Topic: Physical Therapy (In Progress)       Point: Mobility training (In Progress)       Learning Progress Summary            Patient Acceptance, E,TB, NR by  at 3/1/2025 1603    Acceptance, E,D, NR by DASHAWN at 2/27/2025 1352                      Point: Home exercise program (In Progress)       Learning Progress Summary            Patient Acceptance, E,TB, NR by  at 3/1/2025 1603                      Point: Body mechanics (In Progress)       Learning Progress Summary            Patient Acceptance, E,TB, NR by  at 3/1/2025 1603                      Point: Precautions (In Progress)       Learning Progress Summary            Patient Acceptance, E,TB, NR by  at 3/1/2025 1603                                      User Key       Initials Effective Dates Name Provider Type Discipline    DASHAWN 05/31/24 -  Usha Archuleta, JEYSON Physical Therapist PT     05/24/23 -  Ran Chase, JEYSON Physical Therapist PT                  PT Recommendation and Plan     Progress: improving  Outcome Evaluation: Pt agreeable to PT this PM, continuing to make slow and steady progress demonstrated by requiring less assistance during functional mobility compared with previous sessions. Pt required min-mod Ax1 for bed mobility, min Ax1 for STS transfers, and mod Ax2 for side steps to HOB with Rwx for 3 steps. Pt required assist to move R LE during side stepping, secondary to lack of motor function and strength in that limb. Pt presents today with below baseline strength, dec endurance, dec balance, and decreased functional mobility. Pt will benefit from skilled PT to address the previous deficits and improve  overall safety with functional mobility. Anticipate pt will D/C to SNF pending progress.     Time Calculation:         PT Charges       Row Name 03/01/25 1603             Time Calculation    Start Time 1433  -DR      Stop Time 1446  -DR      Time Calculation (min) 13 min  -DR      PT Received On 03/01/25  -DR      PT - Next Appointment 03/03/25  -      PT Goal Re-Cert Due Date 03/13/25  -DR         Time Calculation- PT    Total Timed Code Minutes- PT 13 minute(s)  -DR         Timed Charges    76041 - PT Therapeutic Activity Minutes 13  -DR         Total Minutes    Timed Charges Total Minutes 13  -DR       Total Minutes 13  -DR                User Key  (r) = Recorded By, (t) = Taken By, (c) = Cosigned By      Initials Name Provider Type    Ran Shirley, PT Physical Therapist                  Therapy Charges for Today       Code Description Service Date Service Provider Modifiers Qty    [id number]  PT THERAPEUTIC ACT EA 15 MIN 3/1/2025 Ran Chase, PT GP 1            PT G-Codes  Outcome Measure Options: AM-PAC 6 Clicks Basic Mobility (PT)  AM-PAC 6 Clicks Score (PT): 12  AM-PAC 6 Clicks Score (OT): 11  Modified [place] Scale: 5 - Severe disability.  Bedridden, incontinent, and requiring constant nursing care and attention.  PT Discharge Summary  Anticipated Discharge Disposition (PT): skilled nursing facility    Ran Chase PT  3/1/2025

## 2025-03-01 NOTE — PLAN OF CARE
Goal Outcome Evaluation:  Plan of care reviewed with patient / family.   Awaiting rehab referral ; pending.

## 2025-03-02 LAB
GLUCOSE BLDC GLUCOMTR-MCNC: 132 MG/DL (ref 70–130)
GLUCOSE BLDC GLUCOMTR-MCNC: 178 MG/DL (ref 70–130)
GLUCOSE BLDC GLUCOMTR-MCNC: 188 MG/DL (ref 70–130)
GLUCOSE BLDC GLUCOMTR-MCNC: 190 MG/DL (ref 70–130)
MAGNESIUM SERPL-MCNC: 1.7 MG/DL (ref 1.7–2.3)

## 2025-03-02 PROCEDURE — 82948 REAGENT STRIP/BLOOD GLUCOSE: CPT

## 2025-03-02 PROCEDURE — 83735 ASSAY OF MAGNESIUM: CPT | Performed by: INTERNAL MEDICINE

## 2025-03-02 PROCEDURE — 99232 SBSQ HOSP IP/OBS MODERATE 35: CPT | Performed by: PHYSICIAN ASSISTANT

## 2025-03-02 PROCEDURE — 25010000002 HEPARIN (PORCINE) PER 1000 UNITS: Performed by: INTERNAL MEDICINE

## 2025-03-02 RX ORDER — BISACODYL 5 MG/1
10 TABLET, DELAYED RELEASE ORAL DAILY PRN
Status: DISCONTINUED | OUTPATIENT
Start: 2025-03-02 | End: 2025-03-03 | Stop reason: HOSPADM

## 2025-03-02 RX ORDER — AMOXICILLIN 250 MG
2 CAPSULE ORAL 2 TIMES DAILY PRN
Status: DISCONTINUED | OUTPATIENT
Start: 2025-03-02 | End: 2025-03-03 | Stop reason: HOSPADM

## 2025-03-02 RX ORDER — BISACODYL 10 MG
10 SUPPOSITORY, RECTAL RECTAL DAILY PRN
Status: DISCONTINUED | OUTPATIENT
Start: 2025-03-02 | End: 2025-03-03 | Stop reason: HOSPADM

## 2025-03-02 RX ORDER — DABIGATRAN ETEXILATE 150 MG/1
150 CAPSULE ORAL EVERY 12 HOURS SCHEDULED
Status: DISCONTINUED | OUTPATIENT
Start: 2025-03-02 | End: 2025-03-03 | Stop reason: HOSPADM

## 2025-03-02 RX ORDER — POLYETHYLENE GLYCOL 3350 17 G/17G
17 POWDER, FOR SOLUTION ORAL 2 TIMES DAILY PRN
Status: DISCONTINUED | OUTPATIENT
Start: 2025-03-02 | End: 2025-03-03 | Stop reason: HOSPADM

## 2025-03-02 RX ADMIN — DABIGATRAN ETEXILATE 150 MG: 150 CAPSULE ORAL at 09:12

## 2025-03-02 RX ADMIN — Medication 10 ML: at 20:31

## 2025-03-02 RX ADMIN — PANTOPRAZOLE SODIUM 40 MG: 40 TABLET, DELAYED RELEASE ORAL at 05:25

## 2025-03-02 RX ADMIN — HEPARIN SODIUM 5000 UNITS: 5000 INJECTION INTRAVENOUS; SUBCUTANEOUS at 05:25

## 2025-03-02 RX ADMIN — METOPROLOL TARTRATE 25 MG: 25 TABLET, FILM COATED ORAL at 09:12

## 2025-03-02 RX ADMIN — Medication 10 ML: at 09:12

## 2025-03-02 RX ADMIN — METOPROLOL TARTRATE 25 MG: 25 TABLET, FILM COATED ORAL at 20:31

## 2025-03-02 RX ADMIN — MUPIROCIN 1 APPLICATION: 20 OINTMENT TOPICAL at 09:12

## 2025-03-02 RX ADMIN — ATORVASTATIN CALCIUM 40 MG: 20 TABLET, FILM COATED ORAL at 20:31

## 2025-03-02 RX ADMIN — POLYETHYLENE GLYCOL 3350 17 G: 17 POWDER, FOR SOLUTION ORAL at 10:34

## 2025-03-02 RX ADMIN — DABIGATRAN ETEXILATE 150 MG: 150 CAPSULE ORAL at 20:31

## 2025-03-02 NOTE — PROGRESS NOTES
"[facility]    Patient Name: Monica Scherer  :1933  89 y.o.  LOS: 4  Encounter Provider: Al Dalton PA-C      Patient Care Team:  Amilcar Mauricio DO as PCP - General (Family Medicine)  Fatou Crawford RN as Ambulatory  ([facility])  Omar Bassett MD as Consulting Physician (Cardiology)    Chief Complaint:  Follow up for A-fib, SVT    Interval History: No events over night. Pt denies CP, SOA, or palpitations       Objective   Vital Signs  Temp:  [98.1 °F (36.7 °C)-98.2 °F (36.8 °C)] 98.2 °F (36.8 °C)  Heart Rate:  [] 62  Resp:  [16] 16  BP: (135-154)/(65-98) 154/98  No intake or output data in the 24 hours ending 25 0859  Flowsheet Rows      Flowsheet Row First Filed Value   Admission Height 162.6 cm (64\") Documented at 2025 1731   Admission Weight 70.4 kg (155 lb 4.8 oz) Documented at 2025 1731              Constitutional:       General: Awake. Not in acute distress.     Appearance: Not in distress.   Pulmonary:      Effort: Pulmonary effort is normal.      Breath sounds: Normal breath sounds.   Cardiovascular:      Normal rate. Irregularly irregular rhythm.      Murmurs: There is no murmur.   Skin:     General: Skin is warm.   Neurological:      Mental Status: Alert.   Psychiatric:         Behavior: Behavior is cooperative.           Pertinent Test Results:  Results from last 7 days   Lab Units 25  0350 25  1805 25  0330 25  2127 25  1729   SODIUM mmol/L 140  --  140  --  136   POTASSIUM mmol/L 3.9 6.2* 3.1* 3.6 2.9*   CHLORIDE mmol/L 103  --  104  --  99   CO2 mmol/L 28.4  --  23.8  --  23.9   BUN mg/dL 14  --  13  --  18   CREATININE mg/dL 0.95  --  0.72  --  0.93   GLUCOSE mg/dL 103*  --  128*  --  135*   CALCIUM mg/dL 8.8  --  8.6  --  8.4   AST (SGOT) U/L  --   --   --   --  12   ALT (SGPT) U/L  --   --   --   --  7         Results from last 7 days   Lab Units 25  0350 25  0330 " 02/26/25  1647   WBC 10*3/mm3 9.95 10.87* 12.46*   HEMOGLOBIN g/dL 11.3* 12.0 12.6   HEMATOCRIT % 36.5 38.0 38.6   PLATELETS 10*3/mm3 205 218 250     Results from last 7 days   Lab Units 02/26/25  1647   INR  1.20*   APTT seconds 25.6     Results from last 7 days   Lab Units 03/02/25  0537 03/01/25  0648 02/28/25  0350 02/27/25  0330 02/26/25  1729   MAGNESIUM mg/dL 1.7 1.7 1.7 1.7 1.7     Results from last 7 days   Lab Units 02/27/25  0330   CHOLESTEROL mg/dL 107   TRIGLYCERIDES mg/dL 72   HDL CHOL mg/dL 38*                   Medication Review:   atorvastatin, 40 mg, Oral, Nightly  dabigatran etexilate, 150 mg, Oral, Q12H  metoprolol tartrate, 25 mg, Oral, BID  mupirocin, 1 Application, Each Nare, BID  pantoprazole, 40 mg, Oral, Q AM  sodium chloride, 10 mL, Intravenous, Q12H       Adult Transthoracic Echo Complete W/ Cont if Necessary Per Protocol (With Agitated Saline) on 2-27-25  Interpretation Summary    Left ventricular systolic function is normal. Calculated left ventricular EF = 62.9%    Left ventricular wall thickness is consistent with mild concentric hypertrophy. Sigmoid-shaped ventricular septum is present.    Left ventricular diastolic function is consistent with (grade II w/high LAP) pseudonormalization.    Moderate mitral valve regurgitation is present with a centrally-directed jet noted.    The left atrial cavity is mildly dilated.    Saline test results are negative for right to left atrial level shunt.    There is moderate calcification of the aortic valve mainly affecting the non-coronary, left coronary and right coronary cusp(s).             Assessment & Plan     Active Hospital Problems    Diagnosis  POA    **Acute CVA (cerebrovascular accident) [I63.9]  Yes      Resolved Hospital Problems   No resolved problems to display.      # Atrial fibrillation  - Echo with EF 63%, grade 2 diastolic dysfunction.  - Patient appears to be in rapid atrial fibrillation with some rate related aberrancy.  -  Continue rate control for now. She developed a stroke on Eliquis, she was switched to pradaxa.   - Patient was on metoprolol 25 twice daily at home for A-fib.  HR currently controlled.  - Recommend continue anticoagulation per neurology team.           Al Dalton PA-C  [facility]   [facility]  [address]  [place] [address]  Office: [phone]    03/02/25  08:59 EST

## 2025-03-02 NOTE — PLAN OF CARE
Goal Outcome Evaluation:  Plan of care reviewed with patient / daughter.  Awaiting pending referrals.

## 2025-03-02 NOTE — PROGRESS NOTES
"      [facility]         Dr Higuera Sayied   LOS: 4 days   Patient Care Team:  Amilcar Mauricio DO as PCP - General (Family Medicine)  Fatou Crawford, ALEJANDRA as Ambulatory  ([facility])  Omar Bassett MD as Consulting Physician (Cardiology)    Chief Complaint: Acute ischemic MCA occlusion with right-sided weakness and aphasia A-fib other issues as listed below    Interval History: Resting comfortably.  No overnight issues reported.  Heart rate is much better controlled.  Patient aphasic unable to give any meaningful history    REVIEW OF SYSTEMS:   Limited by aphasia    Ventilator/Non-Invasive Ventilation Settings (From admission, onward)      None              Vital Signs  Temp:  [98.1 °F (36.7 °C)-98.2 °F (36.8 °C)] 98.2 °F (36.8 °C)  Heart Rate:  [] 62  Resp:  [16] 16  BP: (135-154)/(65-98) 154/98  No intake or output data in the 24 hours ending 03/02/25 1045    Flowsheet Rows      Flowsheet Row First Filed Value   Admission Height 162.6 cm (64\") Documented at 02/26/2025 1731   Admission Weight 70.4 kg (155 lb 4.8 oz) Documented at 02/26/2025 1731                  Physical Exam:  Patient is examined using the personal protective equipment as per guidelines from infection control for this particular patient as enacted.  Hand hygiene was performed before and after patient interaction.   General Appearance:    Alert, cooperative, in no acute distress.  Following simple commands  ENT Mallampati between 3 and 4 no nasal congestion  Neck midline trachea, no thyromegaly   Lungs:   Diminished breath sounds bilaterally    Heart:    Regular rhythm and normal rate, normal S1 and S2, no            murmur, no gallop, no rub, no click   Chest Wall:    No abnormalities observed   Abdomen:     Normal bowel sounds, no masses, no organomegaly, soft        nontender, nondistended, no guarding, no rebound                tenderness   Extremities:   Moves all extremities well, no edema, no " cyanosis, no             redness  CNS aphasic  Skin no rashes no nodules  Musculoskeletal no cyanosis no clubbing normal range of motion     Results Review:        Results from last 7 days   Lab Units 02/28/25  0350 02/27/25  1805 02/27/25  0330 02/26/25 2127 02/26/25  1729   SODIUM mmol/L 140  --  140  --  136   POTASSIUM mmol/L 3.9 6.2* 3.1*   < > 2.9*   CHLORIDE mmol/L 103  --  104  --  99   CO2 mmol/L 28.4  --  23.8  --  23.9   BUN mg/dL 14  --  13  --  18   CREATININE mg/dL 0.95  --  0.72  --  0.93   GLUCOSE mg/dL 103*  --  128*  --  135*   CALCIUM mg/dL 8.8  --  8.6  --  8.4    < > = values in this interval not displayed.         Results from last 7 days   Lab Units 02/28/25  0350 02/27/25  0330 02/26/25  1647   WBC 10*3/mm3 9.95 10.87* 12.46*   HEMOGLOBIN g/dL 11.3* 12.0 12.6   HEMATOCRIT % 36.5 38.0 38.6   PLATELETS 10*3/mm3 205 218 250     Results from last 7 days   Lab Units 02/26/25  1647   INR  1.20*   APTT seconds 25.6     Results from last 7 days   Lab Units 02/27/25  0330   CHOLESTEROL mg/dL 107     Results from last 7 days   Lab Units 03/02/25  0537   MAGNESIUM mg/dL 1.7     Results from last 7 days   Lab Units 02/27/25  0330   CHOLESTEROL mg/dL 107   TRIGLYCERIDES mg/dL 72   HDL CHOL mg/dL 38*   LDL CHOL mg/dL 54           I reviewed the patient's new clinical results.  I personally viewed and interpreted the patient's chest x-ray.        Medication Review:   atorvastatin, 40 mg, Oral, Nightly  dabigatran etexilate, 150 mg, Oral, Q12H  metoprolol tartrate, 25 mg, Oral, BID  mupirocin, 1 Application, Each Nare, BID  pantoprazole, 40 mg, Oral, Q AM  sodium chloride, 10 mL, Intravenous, Q12H             ASSESSMENT:   Acute ischemic left MCA occlusion  Right sided weakness  Right visual field cut  Aphasia  Atrial fibrillation  Chronic anticoagulation with apixiban  Hyperlipidemia  GERD  History of stroke with residual right sided weakness    PLAN:  Appreciate input from cardiology.  Heart rate now much  better controlled.  On Pradaxa to continue per neurology and cardiology  PT OT to continue  Discussed with daughter she would like for patient to go to rehab at some point  Mobilize ambulate  Will continue to monitor clinical course closely.  Discharge once cleared by all      Davida Caban MD  03/02/25  10:45 EST

## 2025-03-02 NOTE — PLAN OF CARE
VSS this shift. No complaints of pain. Patient with aphasia, sometimes able to answer questions. Pt tolerating diet well. Bed alarm on and call light in reach. Plan is SNF once ready.

## 2025-03-03 VITALS
HEART RATE: 86 BPM | DIASTOLIC BLOOD PRESSURE: 76 MMHG | RESPIRATION RATE: 18 BRPM | WEIGHT: 155 LBS | HEIGHT: 64 IN | TEMPERATURE: 98.8 F | BODY MASS INDEX: 26.46 KG/M2 | SYSTOLIC BLOOD PRESSURE: 158 MMHG | OXYGEN SATURATION: 98 %

## 2025-03-03 LAB
GLUCOSE BLDC GLUCOMTR-MCNC: 145 MG/DL (ref 70–130)
GLUCOSE BLDC GLUCOMTR-MCNC: 164 MG/DL (ref 70–130)
GLUCOSE BLDC GLUCOMTR-MCNC: 164 MG/DL (ref 70–130)
MAGNESIUM SERPL-MCNC: 1.7 MG/DL (ref 1.7–2.3)

## 2025-03-03 PROCEDURE — 99232 SBSQ HOSP IP/OBS MODERATE 35: CPT | Performed by: NURSE PRACTITIONER

## 2025-03-03 PROCEDURE — 82948 REAGENT STRIP/BLOOD GLUCOSE: CPT

## 2025-03-03 PROCEDURE — 92507 TX SP LANG VOICE COMM INDIV: CPT

## 2025-03-03 PROCEDURE — 97530 THERAPEUTIC ACTIVITIES: CPT

## 2025-03-03 PROCEDURE — 92526 ORAL FUNCTION THERAPY: CPT

## 2025-03-03 PROCEDURE — 83735 ASSAY OF MAGNESIUM: CPT | Performed by: INTERNAL MEDICINE

## 2025-03-03 PROCEDURE — 97535 SELF CARE MNGMENT TRAINING: CPT

## 2025-03-03 RX ORDER — ATORVASTATIN CALCIUM 40 MG/1
40 TABLET, FILM COATED ORAL NIGHTLY
Qty: 30 TABLET | Refills: 0 | Status: SHIPPED | OUTPATIENT
Start: 2025-03-03

## 2025-03-03 RX ORDER — DABIGATRAN ETEXILATE 150 MG/1
150 CAPSULE ORAL EVERY 12 HOURS SCHEDULED
Qty: 60 CAPSULE | Refills: 0 | Status: SHIPPED | OUTPATIENT
Start: 2025-03-03

## 2025-03-03 RX ORDER — PANTOPRAZOLE SODIUM 40 MG/1
40 TABLET, DELAYED RELEASE ORAL
Qty: 30 TABLET | Refills: 0 | Status: SHIPPED | OUTPATIENT
Start: 2025-03-04

## 2025-03-03 RX ORDER — METOPROLOL TARTRATE 25 MG/1
25 TABLET, FILM COATED ORAL 2 TIMES DAILY
Qty: 60 TABLET | Refills: 0 | Status: SHIPPED | OUTPATIENT
Start: 2025-03-03

## 2025-03-03 RX ADMIN — DABIGATRAN ETEXILATE 150 MG: 150 CAPSULE ORAL at 09:08

## 2025-03-03 RX ADMIN — METOPROLOL TARTRATE 25 MG: 25 TABLET, FILM COATED ORAL at 09:08

## 2025-03-03 RX ADMIN — MUPIROCIN 1 APPLICATION: 20 OINTMENT TOPICAL at 09:08

## 2025-03-03 RX ADMIN — Medication 10 ML: at 09:08

## 2025-03-03 RX ADMIN — PANTOPRAZOLE SODIUM 40 MG: 40 TABLET, DELAYED RELEASE ORAL at 05:06

## 2025-03-03 NOTE — PLAN OF CARE
Care Management Follow Up    Length of Stay (days): 4    Expected Discharge Date: 02/26/2025     Concerns to be Addressed:       Patient plan of care discussed at interdisciplinary rounds: Yes    Anticipated Discharge Disposition:                Anticipated Discharge Services:    Anticipated Discharge DME: None    Patient/family educated on Medicare website which has current facility and service quality ratings: yes  Education Provided on the Discharge Plan:    Patient/Family in Agreement with the Plan: unable to assess    Referrals Placed by CM/SW:    Private pay costs discussed: private room/amenity fees and transportation costs    Discussed  Partnership in Safe Discharge Planning  document with patient/family: Yes: Verbally Given     Handoff Completed: No, handoff not indicated or clinically appropriate    Additional Information:  Writer followed up with the patient at bedside after reviewing chart and finding TCU is recommended.       Patient consented to the discharge plan to a TCU.  Writer went over insurance coverage and options.     Under Medicare, a patient would need a 3 night IP stay in order to have benefits for TCU.  If they meet this criteria, a patient would have up to 100 days of benefits. The first 20 days are a full coverage plan. Day  would fall under a co-pay plan. Writer advised the patient and/or family member to contact the insurance company of the amount the co-pay would be. If the patient were to have a supplemental secondary plan, they could potentially have coverage during the co-pay period. However, writer stated he could not guarantee this and deferred the patient and/or family to contact that secondary plan.     Insurance would cover a patient's stay at a TCU in a shared or semi-private room. However, if the patient would prefer a private room they would typically have to privately pay for the preference. Private room cost would typically depend of the facility.     Depending on  Goal Outcome Evaluation:  Plan of Care Reviewed With: patient, son        Progress: improving  Outcome Evaluation: Pt tolerated OT session well this AM. Min A for bed mobility to sit EOB, ~5 min. Performed BUE exs seated, pt requires multiple VC to switch & perform the next exs. Max A to don socks, pt unable to fully bring up leg to knee. Stand-pivot to BSC, don/doff brief CGA/Min A. Pt able to ambulate from BSC to door and chair w/ rwx. Pt cont to shows improvement w/ functional mobility and UE fxn, but requires VC to follow simple commands and for sequencing. Pt also talking more saying a few words at a time. Will cont OT to improve (I) in ADLs, dc rec IRF/SNF.    Anticipated Discharge Disposition (OT): inpatient rehabilitation facility, skilled nursing facility                         the patient's assist level or whether or not family/friends are physically able to ambulate the patient, would determine if the patient is safe to be transported by family. Otherwise, the writer would set up transportation with [employer]. [employer] typically bills the patient's insurance first. Although the patient would not have to do an upfront payment for [employer], it does not mean the patient has transportation coverage. There are 2 types of methods for transporation depending on what the patient is able to manage. A W/C ride is a little 90$ flat fee with about $6 dollars per mile added. A Stretcher ride is about 1025$ with $25 added per mile. The writer will completed a PCS form but still cannot guarantee full coverage on the ride. Writer will consult with nursing staff at the time of discharge to determine what would be a safe transportation plan.     Patient stated they would like a private room and they would like their son to transport, but it would depend on when he is available. Patient stated he would like to remain in the [place] area.     Addendum 1621:  Per DOD, Referral was sent out to [facility] by previous CRISTIAN.     Writer followed up with [facility], who stated they would be able to admit the patient tomorrow or Wednesday.     Writer updated the patient and hospitalsit.     Next Steps: get SABRINA Younger  [facility]  Social Work

## 2025-03-03 NOTE — DISCHARGE PLACEMENT REQUEST
"Monica Scherer (89 y.o. Female)       Date of Birth   05/04/1933    Social Security Number       Address   [address] [address]    Home Phone   [phone]    MRN   [id number]       Anglican   Taoist    Marital Status   Single                            Admission Date   2/26/25    Admission Type   Emergency    Admitting Provider   Karol Garcia MD    Attending Provider   Karol Garcia MD    Department, Room/Bed   [address], P588/1       Discharge Date       Discharge Disposition       Discharge Destination                                 Attending Provider: Karol Garcia MD    Allergies: Cephalexin, Latex    Isolation: None   Infection: None   Code Status: CPR    Ht: 162.6 cm (64\")   Wt: 70.3 kg (155 lb)    Admission Cmt: None   Principal Problem: Acute CVA (cerebrovascular accident) [I63.9]                   Active Insurance as of 2/26/2025       Primary Coverage       Payor Plan Insurance Group Employer/Plan Group    HUMANA MEDICARE REPLACEMENT HU[employer]NA MEDICARE ADVANTAGE GROUP [id number]       Payor Plan Address Payor Plan Phone Number Payor Plan Fax Number Effective Dates    [address] [address] [phone]  1/1/2018 - None Entered    [facility] [address]         Subscriber Name Subscriber Birth Date Member ID       MONICA SCHERER 5/4/1933 [id number]                     Emergency Contacts        (Rel.) Home Phone Work Phone Mobile Phone    Raquel Lebron (Daughter) [phone] -- --    BarronElisa (Grandchild) [phone] -- --    KARYNAMUNA (Son) [phone] -- --              "

## 2025-03-03 NOTE — PLAN OF CARE
Goal Outcome Evaluation:  Plan of Care Reviewed With: (P) patient        Progress: (P) improving  Outcome Evaluation: (P) Upon entering room, pt UIC and agreeable to PT this AM. Performed shoulder active-assisted and LE exercises for functional strength and mobility training. Performed STS x 2 reps with CGA and HHA x 2. Ambulated 16 feet total with min. assist and HHA. Pt had a lateral trunk lean to the R while taking steps backwards. Throughout the entirety of the session, pt was able to follow simple commands. Pt continues to improve. Will continue to follow.    Anticipated Discharge Disposition (PT): (P) skilled nursing facility

## 2025-03-03 NOTE — THERAPY TREATMENT NOTE
Patient Name: Monica Scherer  : 1933    MRN: [id number]                              Today's Date: 3/3/2025       Admit Date: 2025    Visit Dx:     ICD-10-CM ICD-9-CM   1. Altered mental status, unspecified altered mental status type  R41.82 780.97   2. Focal neurological deficit present  R29.818 781.99   3. Acute CVA (cerebrovascular accident)  I63.9 434.91   4. Rogelio-neglect of right side  R41.4 781.8   5. 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  I69.331 438.31     Patient Active Problem List   Diagnosis    Vertigo    Temporary cerebral vascular dysfunction    Gastroesophageal reflux disease with esophagitis    Degeneration of intervertebral disc of cervical region    Prediabetes    S/P cholecystectomy    Osteoarthritis of knee    Herpes zoster without complication    Hypertension    Duodenal ulcer    History of pancreatitis    Hyperlipidemia    TIA (transient ischemic attack)    Cerebrovascular accident (CVA) due to thrombosis of left posterior cerebral artery    Paroxysmal atrial fibrillation    General weakness    History of CVA (cerebrovascular accident)    Anticoagulated    Hematuria    Dizziness and giddiness    Rectal bleeding    Acute CVA (cerebrovascular accident)     Past Medical History:   Diagnosis Date    Atrial fibrillation     Hyperlipidemia     Hypertension     Lacunar infarct, acute 2020    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    New onset atrial fibrillation 2020    now on rate control along with Eliquis    Prolapsed uterus     per patient    Stroke     TIA (transient ischemic attack) 2020    Weakness      Past Surgical History:   Procedure Laterality Date    APPENDECTOMY      CHOLECYSTECTOMY N/A 2016    Procedure: CHOLECYSTECTOMY LAPAROSCOPIC;  Surgeon: Russ Gar MD;  [facility];  Service:     COLONOSCOPY N/A 2021    Procedure: COLONOSCOPY TO CECUM WITH HOT SNARE POLYPECTOMIES  AND COLD BX POLYPECTOMY;  Surgeon: Emerson Izaguirre MD;  Location: [facility] ENDOSCOPY;  Service: Gastroenterology;  Laterality: N/A;  pre: RECTAL BLEEDING, FAMILY HX OF COLON CANCER  post: INTERNAL AND EXTERNAL HEMORRHOIDS, DIVERTICULOSIS, POLYPS    ENDOSCOPY N/A 2/22/2018    Z-line regular, 35 cm from incisors, normal esophagus, gastritis, bilious gastric fluid, one non-bleeding duodenal ulcer w/no stigmata of bleeding, Path: DUODENUM: FRAGMENTS OF GASTRIC TYPE MUCOSA WITH HYPERPLASIA (FOVEOLAR) AND PATCHY MIXED INFLAMMATION IN THE LAMINA PROPRIA WITH FOCAL FIBROSIS, COMMENT: 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     EYE SURGERY      tre cataracts      General Information       Row Name 03/03/25 1210          OT Time and Intention    Subjective Information no complaints  -SM (r) KL (t) SM (c)     Document Type therapy note (daily note)  -SM (r) KL (t) SM (c)     Mode of Treatment individual therapy;occupational therapy  -SM (r) KL (t) SM (c)     Patient Effort good  -SM (r) KL (t) SM (c)       Row Name 03/03/25 1210          General Information    Patient Profile Reviewed yes  -SM (r) KL (t) SM (c)     Existing Precautions/Restrictions fall  -SM (r) KL (t) SM (c)       Row Name 03/03/25 1210          Cognition    Orientation Status (Cognition) oriented to;person  -SM (r) KL (t) SM (c)       Row Name 03/03/25 1210          Safety Issues/Impairments Affecting Functional Mobility    Impairments Affecting Function (Mobility) balance;cognition;endurance/activity tolerance;coordination;grasp;strength  -SM (r) KL (t) SM (c)               User Key  (r) = Recorded By, (t) = Taken By, (c) = Cosigned By      Initials Name Provider Type    Julia Carrera, OT Occupational Therapist    Aniya Gauthier OT Student OT Student                     Mobility/ADL's       Row Name 03/03/25 1213          Bed Mobility    Bed Mobility supine-sit  -SM (r) KL (t) SM (c)     Supine-Sit [place] (Bed Mobility) minimum  assist (75% patient effort)  -SM (r) KL (t) SM (c)     Assistive Device (Bed Mobility) head of bed elevated;bed rails  -SM (r) KL (t) SM (c)       Row Name 03/03/25 1213          Transfers    Transfers sit-stand transfer;toilet transfer  -SM (r) KL (t) SM (c)       Row Name 03/03/25 1213          Sit-Stand Transfer    Sit-Stand [place] (Transfers) minimum assist (75% patient effort);1 person assist  -SM (r) KL (t) SM (c)     Assistive Device (Sit-Stand Transfers) walker, front-wheeled  -SM (r) KL (t) SM (c)       Row Name 03/03/25 1213          Toilet Transfer    Type (Toilet Transfer) stand pivot/stand step  -SM (r) KL (t) SM (c)     [place] Level (Toilet Transfer) minimum assist (75% patient effort);1 person assist  -SM (r) KL (t) SM (c)     Assistive Device (Toilet Transfer) walker, front-wheeled  -SM (r) KL (t) SM (c)       Row Name 03/03/25 1213          Functional Mobility    Functional Mobility- Ind. Level contact guard assist  -SM (r) KL (t) SM (c)     Functional Mobility- Device walker, front-wheeled  -SM (r) KL (t) SM (c)     Functional Mobility- Comment few steps from [id number].  -SM (r) KL (t) SM (c)     Patient was able to Ambulate yes  -SM (r) KL (t) SM (c)       Row Name 03/03/25 1213          Activities of Daily Living    BADL Assessment/Intervention toileting;lower body dressing  -SM (r) KL (t) SM (c)       Row Name 03/03/25 1213          Lower Body Dressing Assessment/Training    [place] Level (Lower Body Dressing) don;socks;maximum assist (25% patient effort)  -SM (r) KL (t) SM (c)     Position (Lower Body Dressing) edge of bed sitting  -SM (r) KL (t) SM (c)       Row Name 03/03/25 1213          Toileting Assessment/Training    [place] Level (Toileting) adjust/manage clothing;change pad/brief;perform perineal hygiene;minimum assist (75% patient effort);contact guard assist  -SM (r) KL (t) SM (c)     Assistive Devices (Toileting) commode, bedside without drop arms  -SM (r)  KL (t) SM (c)     Position (Toileting) supported sitting  -SM (r) KL (t) SM (c)     Comment, (Toileting) Min A don/doff brief, CGA performing jany hygiene  -SM (r) KL (t) SM (c)               User Key  (r) = Recorded By, (t) = Taken By, (c) = Cosigned By      Initials Name Provider Type    SM Julia Ernst, OT Occupational Therapist    Aniya Gauthier OT Student OT Student                   Obj/Interventions       Row Name 03/03/25 1232          Shoulder (Therapeutic Exercise)    Shoulder (Therapeutic Exercise) AROM (active range of motion)  -SM (r) KL (t) SM (c)     Shoulder AROM (Therapeutic Exercise) bilateral;flexion;extension  -SM (r) KL (t) SM (c)       Row Name 03/03/25 1232          Elbow/Forearm (Therapeutic Exercise)    Elbow/Forearm (Therapeutic Exercise) AROM (active range of motion)  -SM (r) KL (t) SM (c)     Elbow/Forearm AROM (Therapeutic Exercise) bilateral;flexion;extension  -SM (r) KL (t) SM (c)       Row Name 03/03/25 1232          Hand (Therapeutic Exercise)    Hand (Therapeutic Exercise) AROM (active range of motion)  -SM (r) KL (t) SM (c)     Hand AROM/AAROM (Therapeutic Exercise) bilateral;AROM (active range of motion);thumb opposition  -SM (r) KL (t) SM (c)       Row Name 03/03/25 1232          Motor Skills    Therapeutic Exercise shoulder;elbow/forearm;hand  -SM (r) KL (t) SM (c)       Row Name 03/03/25 1232          Balance    Balance Assessment sitting static balance;standing static balance;standing dynamic balance  -SM (r) KL (t) SM (c)     Static Sitting Balance standby assist  -SM (r) KL (t) SM (c)     Position, Sitting Balance sitting edge of bed  -SM (r) KL (t) SM (c)     Static Standing Balance contact guard;1-person assist  -SM (r) KL (t) SM (c)     Dynamic Standing Balance minimal assist;1-person assist  -SM (r) KL (t) SM (c)     Position/Device Used, Standing Balance walker, front-wheeled  -SM (r) KL (t) SM (c)               User Key  (r) = Recorded By, (t) = Taken By,  (c) = Cosigned By      Initials Name Provider Type    Julia Carrera, OT Occupational Therapist    Aniya Gauthier, OT Student OT Student                   Goals/Plan    No documentation.                  Clinical Impression       Row Name 03/03/25 1235          Pain Assessment    Pretreatment Pain Rating 0/10 - no pain  -SM (r) KL (t) SM (c)     Posttreatment Pain Rating 0/10 - no pain  -SM (r) KL (t) SM (c)       Row Name 03/03/25 1235          Plan of Care Review    Plan of Care Reviewed With patient;son  -SM (r) KL (t) SM (c)     Progress improving  -SM (r) KL (t) SM (c)     Outcome Evaluation Pt tolerated OT session well this AM. Min A for bed mobility to sit EOB, ~5 min. Performed BUE exs seated, pt requires multiple VC to switch & perform the next exs. Max A to don socks, pt unable to fully bring up leg to knee. Stand-pivot to BSC, don/doff brief CGA/Min A. Pt able to ambulate from BSC to door and chair w/ rwx. Pt cont to shows improvement w/ functional mobility and UE fxn, but requires VC to follow simple commands and for sequencing. Pt also talking more saying a few words at a time. Will cont OT to improve (I) in ADLs, dc rec IRF/SNF.  -SM (r) KL (t) SM (c)       Row Name 03/03/25 1235          Therapy Assessment/Plan (OT)    Rehab Potential (OT) good  -SM (r) KL (t) SM (c)       Row Name 03/03/25 1235          Therapy Plan Review/Discharge Plan (OT)    Anticipated Discharge Disposition (OT) inpatient rehabilitation facility;skilled nursing facility  -SM (r) KL (t) SM (c)       Row Name 03/03/25 1235          Vital Signs    Pretreatment Heart Rate (beats/min) 67  -SM (r) KL (t) SM (c)       Row Name 03/03/25 1235          Positioning and Restraints    Pre-Treatment Position in bed  -SM (r) KL (t) SM (c)     Post Treatment Position chair  -SM (r) KL (t) SM (c)     In Chair notified nsg;reclined;call light within reach;encouraged to call for assist;exit alarm on;with family/caregiver  -SM (r) KL  (t) SM (c)               User Key  (r) = Recorded By, (t) = Taken By, (c) = Cosigned By      Initials Name Provider Type    Julia Carrera, OT Occupational Therapist    Aniya Gauthier OT Student OT Student                   Outcome Measures       Row Name 03/03/25 1236          How much help from another is currently needed...    Putting on and taking off regular lower body clothing? 2  -SM (r) KL (t) SM (c)     Bathing (including washing, rinsing, and drying) 2  -SM (r) KL (t) SM (c)     Toileting (which includes using toilet bed pan or urinal) 2  -SM (r) KL (t) SM (c)     Putting on and taking off regular upper body clothing 2  -SM (r) KL (t) SM (c)     Taking care of personal grooming (such as brushing teeth) 3  -SM (r) KL (t) SM (c)     Eating meals 3  -SM (r) KL (t) SM (c)     AM-PAC 6 Clicks Score (OT) 14  -SM (r) KL (t)       Row Name 03/03/25 1141 03/03/25 0805       How much help from another person do you currently need...    Turning from your back to your side while in flat bed without using bedrails? 3  -MS (r) ZT (t) MS (c) 4  -MC    Moving from lying on back to sitting on the side of a flat bed without bedrails? 3  -MS (r) ZT (t) MS (c) 3  -MC    Moving to and from a bed to a chair (including a wheelchair)? 3  -MS (r) ZT (t) MS (c) 2  -MC    Standing up from a chair using your arms (e.g., wheelchair, bedside chair)? 3  -MS (r) ZT (t) MS (c) 2  -MC    Climbing 3-5 steps with a railing? 2  -MS (r) ZT (t) MS (c) 1  -MC    To walk in hospital room? 3  -MS (r) ZT (t) MS (c) 1  -MC    AM-PAC 6 Clicks Score (PT) 17  -MS (r) ZT (t) 13  -MC    Highest Level of Mobility Goal 5 --> Static standing  -MS (r) ZT (t) 4 --> Transfer to chair/commode  -      Row Name 03/03/25 1236 03/03/25 1141       Functional Assessment    Outcome Measure Options AM-PAC 6 Clicks Daily Activity (OT)  -SM (r) KL (t) SM (c) AM-PAC 6 Clicks Basic Mobility (PT)  -MS (r) ZT (t) MS (c)              User Key  (r) = Recorded  By, (t) = Taken By, (c) = Cosigned By      Initials Name Provider Type    Bladimir Fisher L, PT Physical Therapist    Julia Carrera, OT Occupational Therapist    Bouchra Gramajo, RN Registered Nurse    Aniya Gauthier, OT Student OT Student    Kan Camacho, PT Student PT Student                    Occupational Therapy Education       Title: PT OT SLP Therapies (In Progress)       Topic: Occupational Therapy (In Progress)       Point: ADL training (Done)       Description:   Instruct learner(s) on proper safety adaptation and remediation techniques during self care or transfers.   Instruct in proper use of assistive devices.                  Learning Progress Summary            Patient Acceptance, E, VU by FERNANDA at 3/3/2025 1237    Comment: OT POC, BUE arm exs throughout day, role of OT    Acceptance, E, Bed IU by MARJ at 2/27/2025 1303    Comment: role of OT, plan of care, benefit of activity.   cognition limits ed for pt.   Family Acceptance, E, VU by FERNANDA at 3/3/2025 1237    Comment: OT POC, BUE arm exs throughout day, role of OT    Acceptance, E, Bed IU by MARJ at 2/27/2025 1303    Comment: role of OT, plan of care, benefit of activity.   cognition limits ed for pt.                      Point: Home exercise program (Not Started)       Description:   Instruct learner(s) on appropriate technique for monitoring, assisting and/or progressing therapeutic exercises/activities.                  Learner Progress:  Not documented in this visit.              Point: Precautions (Done)       Description:   Instruct learner(s) on prescribed precautions during self-care and functional transfers.                  Learning Progress Summary            Patient Acceptance, E, Bed IU by MARJ at 2/27/2025 1303    Comment: role of OT, plan of care, benefit of activity.   cognition limits ed for pt.   Family Acceptance, E, Bed IU by MARJ at 2/27/2025 1303    Comment: role of OT, plan of care, benefit of activity.    cognition limits ed for pt.                      Point: Body mechanics (Done)       Description:   Instruct learner(s) on proper positioning and spine alignment during self-care, functional mobility activities and/or exercises.                  Learning Progress Summary            Patient Acceptance, E, Bed IU by  at 2/27/2025 1303    Comment: role of OT, plan of care, benefit of activity.   cognition limits ed for pt.   Family Acceptance, E, Bed IU by  at 2/27/2025 1303    Comment: role of OT, plan of care, benefit of activity.   cognition limits ed for pt.                                      User Key       Initials Effective Dates Name Provider Type Discipline    LE 06/16/21 -  Ina Hopkins, OTR Occupational Therapist OT     01/10/25 -  Aniya Pollard, OT Student OT Student OT                  OT Recommendation and Plan     Plan of Care Review  Plan of Care Reviewed With: patient, son  Progress: improving  Outcome Evaluation: Pt tolerated OT session well this AM. Min A for bed mobility to sit EOB, ~5 min. Performed BUE exs seated, pt requires multiple VC to switch & perform the next exs. Max A to don socks, pt unable to fully bring up leg to knee. Stand-pivot to BSC, don/doff brief CGA/Min A. Pt able to ambulate from BSC to door and chair w/ rwx. Pt cont to shows improvement w/ functional mobility and UE fxn, but requires VC to follow simple commands and for sequencing. Pt also talking more saying a few words at a time. Will cont OT to improve (I) in ADLs, dc rec IRF/SNF.     Time Calculation:         Time Calculation- OT       Row Name 03/03/25 1238             Time Calculation- OT    OT Start Time 0924  -SM (r) KL (t) SM (c)      OT Stop Time 0948  -SM (r) KL (t) SM (c)      OT Time Calculation (min) 24 min  -SM (r) KL (t)      OT Received On 03/03/25  -SM (r) KL (t) SM (c)      OT - Next Appointment 03/04/25  -SM (r) KL (t) SM (c)         Timed Charges    32499 - OT Therapeutic Activity Minutes 14   -SM (r) KL (t) SM (c)      40572 - OT Self Care/Mgmt Minutes 10  -SM (r) KL (t) SM (c)         Total Minutes    Timed Charges Total Minutes 24  -SM (r) KL (t)       Total Minutes 24  -SM (r) KL (t)                User Key  (r) = Recorded By, (t) = Taken By, (c) = Cosigned By      Initials Name Provider Type    Julia Carrera, OT Occupational Therapist    Aniya Gauthier, OT Student OT Student                           Aniya Pollard, OT Student  3/3/2025

## 2025-03-03 NOTE — CASE MANAGEMENT/SOCIAL WORK
Continued Stay Note  [facility]     Patient Name: Monica Scherer  MRN: [id number]  Today's Date: 3/3/2025    Admit Date: 2/26/2025    Plan: Pre-cert pending for [facility]   Discharge Plan       Row Name 03/03/25 1323       Plan    Plan Pre-cert pending for [facility]    Plan Comments CCP spoke with Angi/Veronica Guardado; able to accept pending pre-cert. [facility] sent email to initiate pre-cert. CCP updated patient's daughter, Raquel and she is in agreement to d/c plan. Packet in CCP office. Tricia HADLEY                   Discharge Codes    No documentation.                 Expected Discharge Date and Time       Expected Discharge Date Expected Discharge Time    Mar 4, 2025               LEONIE Wylie

## 2025-03-03 NOTE — THERAPY TREATMENT NOTE
Acute Care - Speech Language Pathology   Swallow Treatment Note [facility]     Patient Name: Monica Scherer  : 1933  MRN: [id number]  Today's Date: 3/3/2025               Admit Date: 2025    Visit Dx:     ICD-10-CM ICD-9-CM   1. Altered mental status, unspecified altered mental status type  R41.82 780.97   2. Focal neurological deficit present  R29.818 781.99   3. Acute CVA (cerebrovascular accident)  I63.9 434.91   4. Rogelio-neglect of right side  R41.4 781.8   5. 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  I69.331 438.31     Patient Active Problem List   Diagnosis    Vertigo    Temporary cerebral vascular dysfunction    Gastroesophageal reflux disease with esophagitis    Degeneration of intervertebral disc of cervical region    Prediabetes    S/P cholecystectomy    Osteoarthritis of knee    Herpes zoster without complication    Hypertension    Duodenal ulcer    History of pancreatitis    Hyperlipidemia    TIA (transient ischemic attack)    Cerebrovascular accident (CVA) due to thrombosis of left posterior cerebral artery    Paroxysmal atrial fibrillation    General weakness    History of CVA (cerebrovascular accident)    Anticoagulated    Hematuria    Dizziness and giddiness    Rectal bleeding    Acute CVA (cerebrovascular accident)     Past Medical History:   Diagnosis Date    Atrial fibrillation     Hyperlipidemia     Hypertension     Lacunar infarct, acute 2020    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    New onset atrial fibrillation 2020    now on rate control along with Eliquis    Prolapsed uterus     per patient    Stroke     TIA (transient ischemic attack) 2020    Weakness      Past Surgical History:   Procedure Laterality Date    APPENDECTOMY      CHOLECYSTECTOMY N/A 2016    Procedure: CHOLECYSTECTOMY LAPAROSCOPIC;  Surgeon: Russ Gar MD;  Location: [facility] OR [facility] [place];  Service:     COLONOSCOPY N/A 2021     Procedure: COLONOSCOPY TO CECUM WITH HOT SNARE POLYPECTOMIES AND COLD BX POLYPECTOMY;  Surgeon: Emerson Izaguirre MD;  Location: [facility] ENDOSCOPY;  Service: Gastroenterology;  Laterality: N/A;  pre: RECTAL BLEEDING, FAMILY HX OF COLON CANCER  post: INTERNAL AND EXTERNAL HEMORRHOIDS, DIVERTICULOSIS, POLYPS    ENDOSCOPY N/A 2/22/2018    Z-line regular, 35 cm from incisors, normal esophagus, gastritis, bilious gastric fluid, one non-bleeding duodenal ulcer w/no stigmata of bleeding, Path: DUODENUM: FRAGMENTS OF GASTRIC TYPE MUCOSA WITH HYPERPLASIA (FOVEOLAR) AND PATCHY MIXED INFLAMMATION IN THE LAMINA PROPRIA WITH FOCAL FIBROSIS, COMMENT: 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     EYE SURGERY      tre cataracts       SLP Recommendation and Plan                                                                                      SWALLOW EVALUATION (Last 72 Hours)       SLP Adult Swallow Evaluation       Row Name 03/03/25 1438                   Rehab Evaluation    Document Type therapy note (daily note)  -BB        Subjective Information no complaints  -BB        Patient Observations alert;cooperative;poorly cooperative  -BB        Patient Effort good  -BB        Symptoms Noted During/After Treatment none  -BB           General Information    Patient Profile Reviewed yes  -BB        Current Method of Nutrition soft to chew textures;chopped;thin liquids  -BB        Precautions/Limitations, Vision difficult to assess  -BB        Precautions/Limitations, Hearing difficult to assess  -BB        Prior Level of Function-Communication unknown  -BB        Prior Level of Function-Swallowing unknown  -BB        Plans/Goals Discussed with patient;family;agreed upon  -BB        Barriers to Rehab none identified  -BB           Pain    Pretreatment Pain Rating 0/10 - no pain  -BB        Posttreatment Pain Rating 0/10 - no pain  -BB                  User Key  (r) = Recorded By, (t) = Taken By, (c) = Cosigned By      Initials  Name Effective Dates    BB Donnie Adkins, RODDY 02/19/23 -                     EDUCATION  The patient has been educated in the following areas:   Cognitive Impairment Communication Impairment Dysphagia (Swallowing Impairment).        SLP GOALS       Row Name 03/03/25 1438             (LTG) Patient will demonstrate functional swallow for    Diet Texture (Demonstrate functional swallow) soft to chew (chopped) textures  -BB      Liquid viscosity (Demonstrate functional swallow) thin liquids  -BB      [place] (Demonstrate functional swallow) with moderate cues (50-74% accuracy)  -BB      Time Frame (Demonstrate functional swallow) by discharge  -BB      Progress/Outcomes (Demonstrate functional swallow) good progress toward goal  -BB      Comment (Demonstrate functional swallow) F/u visit for swallowing after VFSS completed on 2/28. Reviewed VFSS results and recs w pt and pt's dtr, Elisa. Pt and pt's dtr verbalized understanding. Pt agreeable to PO trials. No overt clinical signs of aspiration observed across PO trials of soft-to-chew (chopped) and thin liquids via cup.  -BB         Words/Phrases/Sentences Goal 1 (SLP)    Improve Ability to Comprehend Words/Phrases/Sentences Through: Goal 1 (SLP) identify body part;80%  -BB      Time Frame (Identify Objects and Pictures Goal 1, SLP) by discharge  -BB      Progress/Outcomes (Identify Objects and Pictures Goal 1, SLP) good progress toward goal  -BB      Comment (Words/Phrases/Sentences Goal 1, SLP) Pt agreeable to cognitive-communication tx. Identification of body parts: 100% acc. Following 1-step directions: 100% acc. Following basic 2-step directions (pointing to body parts): 40% acc - accuracy appeared to be negatively impacted by perseveration and impaired attention.  -BB         Word Retrieval Skills Goal 1 (SLP)    Improve Word Retrieval Skills By Goal 1 (SLP) completing automatic speech task, counting;completing automatic speech task, alphabet;completing  automatic speech task, days of the week;completing automatic speech task, months;completing automatic speech task, Pledge of Allegiance;completing automatic speech task, sing “Happy Birthday”;repeating sounds;repeating words;80%;with minimal cues (75-90%)  -BB      Time Frame (Word Retrieval Goal 1, SLP) by discharge  -BB      Progress/Outcomes (Word Retrieval Goal 1, SLP) good progress toward goal  -BB      Comment (Word Retrieval Goal 1, SLP) Automatic communication task: 60% acc wo cues and 90% acc w min cues (visual cues); accuracy appeared to be negatively impacted by impaired attention/perseveration. Phrase completion: 40% acc. Generative naming: pt unable. Semi-structured communication task with family education/training: pt able to answer basic wh-questions appropriately. Discussed topic transition strategies to minimize difficulties with attention/perseveration. discussed use of gestures and visual cues. Pt's dtr verbalized understanding. Discussed HEP tasks in room. All questions answered.  -BB                User Key  (r) = Recorded By, (t) = Taken By, (c) = Cosigned By      Initials Name Provider Type    Donnie Zepeda SLP Speech and Language Pathologist                         Time Calculation:    Time Calculation- SLP       Row Name 03/03/25 1446             Time Calculation- SLP    SLP Start Time 1330  -BB      SLP Stop Time 1445  -BB      SLP Time Calculation (min) 75 min  -BB      SLP Received On 03/03/25  -BB         Untimed Charges    16985-RF Treatment/ST Modification Prosth Aug Alter  45  -BB      96841-CX Treatment Swallow Minutes 30  -BB         Total Minutes    Untimed Charges Total Minutes 75  -BB       Total Minutes 75  -BB                User Key  (r) = Recorded By, (t) = Taken By, (c) = Cosigned By      Initials Name Provider Type    Donnie Zepeda SLP Speech and Language Pathologist                    Therapy Charges for Today       Code Description Service Date Service Provider  Modifiers Qty    22182500577 HC ST TREATMENT SPEECH 3 3/3/2025 Donnie Adkins SLP GN 1    31650765472 HC ST TREATMENT SWALLOW 2 3/3/2025 Donnie Adkins SLP GN 1                 RODDY Neely  3/3/2025   and Acute Care - Speech Language Pathology Treatment [facility]  [facility]     Patient Name: Monica Scherer  : 1933  MRN: [id number]  Today's Date: 3/3/2025               Admit Date: 2025     Visit Dx:    ICD-10-CM ICD-9-CM   1. Altered mental status, unspecified altered mental status type  R41.82 780.97   2. Focal neurological deficit present  R29.818 781.99   3. Acute CVA (cerebrovascular accident)  I63.9 434.91   4. Rogelio-neglect of right side  R41.4 781.8   5. 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  I69.331 438.31     Patient Active Problem List   Diagnosis    Vertigo    Temporary cerebral vascular dysfunction    Gastroesophageal reflux disease with esophagitis    Degeneration of intervertebral disc of cervical region    Prediabetes    S/P cholecystectomy    Osteoarthritis of knee    Herpes zoster without complication    Hypertension    Duodenal ulcer    History of pancreatitis    Hyperlipidemia    TIA (transient ischemic attack)    Cerebrovascular accident (CVA) due to thrombosis of left posterior cerebral artery    Paroxysmal atrial fibrillation    General weakness    History of CVA (cerebrovascular accident)    Anticoagulated    Hematuria    Dizziness and giddiness    Rectal bleeding    Acute CVA (cerebrovascular accident)     Past Medical History:   Diagnosis Date    Atrial fibrillation     Hyperlipidemia     Hypertension     Lacunar infarct, acute 2020    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    New onset atrial fibrillation 2020    now on rate control along with Eliquis    Prolapsed uterus     per patient    Stroke     TIA (transient ischemic attack) 2020    Weakness      Past Surgical History:   Procedure Laterality  Date    APPENDECTOMY      CHOLECYSTECTOMY N/A 6/8/2016    Procedure: CHOLECYSTECTOMY LAPAROSCOPIC;  Surgeon: Russ Gar MD;  [place]  [facility] [place] [facility] [place];  Service:     COLONOSCOPY N/A 9/16/2021    Procedure: COLONOSCOPY TO CECUM WITH HOT SNARE POLYPECTOMIES AND COLD BX POLYPECTOMY;  Surgeon: Emerson Izaguirre MD;  Location:  [facility] ENDOSCOPY;  Service: Gastroenterology;  Laterality: N/A;  pre: RECTAL BLEEDING, FAMILY HX OF COLON CANCER  post: INTERNAL AND EXTERNAL HEMORRHOIDS, DIVERTICULOSIS, POLYPS    ENDOSCOPY N/A 2/22/2018    Z-line regular, 35 cm from incisors, normal esophagus, gastritis, bilious gastric fluid, one non-bleeding duodenal ulcer w/no stigmata of bleeding, Path: DUODENUM: FRAGMENTS OF GASTRIC TYPE MUCOSA WITH HYPERPLASIA (FOVEOLAR) AND PATCHY MIXED INFLAMMATION IN THE LAMINA PROPRIA WITH FOCAL FIBROSIS, COMMENT: 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     EYE SURGERY      tre cataracts       SLP Recommendation and Plan                                                                            EDUCATION  The patient has been educated in the following areas:     Cognitive Impairment Communication Impairment Dysphagia (Swallowing Impairment).           SLP GOALS       Row Name 03/03/25 1438             (LTG) Patient will demonstrate functional swallow for    Diet Texture (Demonstrate functional swallow) soft to chew (chopped) textures  -BB      Liquid viscosity (Demonstrate functional swallow) thin liquids  -BB      [place] (Demonstrate functional swallow) with moderate cues (50-74% accuracy)  -BB      Time Frame (Demonstrate functional swallow) by discharge  -BB      Progress/Outcomes (Demonstrate functional swallow) good progress toward goal  -BB      Comment (Demonstrate functional swallow) F/u visit for swallowing after VFSS completed on 2/28. Reviewed VFSS results and recs w pt and pt's dtr, Elisa. Pt and pt's dtr verbalized understanding. Pt agreeable to PO trials. No overt  clinical signs of aspiration observed across PO trials of soft-to-chew (chopped) and thin liquids via cup.  -BB         Words/Phrases/Sentences Goal 1 (SLP)    Improve Ability to Comprehend Words/Phrases/Sentences Through: Goal 1 (SLP) identify body part;80%  -BB      Time Frame (Identify Objects and Pictures Goal 1, SLP) by discharge  -BB      Progress/Outcomes (Identify Objects and Pictures Goal 1, SLP) good progress toward goal  -BB      Comment (Words/Phrases/Sentences Goal 1, SLP) Pt agreeable to cognitive-communication tx. Identification of body parts: 100% acc. Following 1-step directions: 100% acc. Following basic 2-step directions (pointing to body parts): 40% acc - accuracy appeared to be negatively impacted by perseveration and impaired attention.  -BB         Word Retrieval Skills Goal 1 (SLP)    Improve Word Retrieval Skills By Goal 1 (SLP) completing automatic speech task, counting;completing automatic speech task, alphabet;completing automatic speech task, days of the week;completing automatic speech task, months;completing automatic speech task, Pledge of Allegiance;completing automatic speech task, sing “Happy Birthday”;repeating sounds;repeating words;80%;with minimal cues (75-90%)  -BB      Time Frame (Word Retrieval Goal 1, SLP) by discharge  -BB      Progress/Outcomes (Word Retrieval Goal 1, SLP) good progress toward goal  -BB      Comment (Word Retrieval Goal 1, SLP) Automatic communication task: 60% acc wo cues and 90% acc w min cues (visual cues); accuracy appeared to be negatively impacted by impaired attention/perseveration. Phrase completion: 40% acc. Generative naming: pt unable. Semi-structured communication task with family education/training: pt able to answer basic wh-questions appropriately. Discussed topic transition strategies to minimize difficulties with attention/perseveration. discussed use of gestures and visual cues. Pt's dtr verbalized understanding. Discussed HEP tasks in  room. All questions answered.  -BB                User Key  (r) = Recorded By, (t) = Taken By, (c) = Cosigned By      Initials Name Provider Type    Donnie Zepeda SLP Speech and Language Pathologist                              Time Calculation:      Time Calculation- SLP       Row Name 03/03/25 1446             Time Calculation- SLP    SLP Start Time 1330  -BB      SLP Stop Time 1445  -BB      SLP Time Calculation (min) 75 min  -BB      SLP Received On 03/03/25  -BB         Untimed Charges    43419-MM Treatment/ST Modification Prosth Aug Alter  45  -BB      26137-XM Treatment Swallow Minutes 30  -BB         Total Minutes    Untimed Charges Total Minutes 75  -BB       Total Minutes 75  -BB                User Key  (r) = Recorded By, (t) = Taken By, (c) = Cosigned By      Initials Name Provider Type    Donnie Zepeda SLP Speech and Language Pathologist                    Therapy Charges for Today       Code Description Service Date Service Provider Modifiers Qty    72925172496 HC ST TREATMENT SPEECH 3 3/3/2025 Donnie Adkins SLP GN 1    [id number] HC ST TREATMENT SWALLOW 2 3/3/2025 Donnie Adkins SLP GN 1                       RODDY Neely  3/3/2025

## 2025-03-03 NOTE — THERAPY TREATMENT NOTE
Patient Name: Monica Scherer  : 1933    MRN: [id number]                              Today's Date: 3/3/2025       Admit Date: 2025    Visit Dx:     ICD-10-CM ICD-9-CM   1. Altered mental status, unspecified altered mental status type  R41.82 780.97   2. Focal neurological deficit present  R29.818 781.99   3. Acute CVA (cerebrovascular accident)  I63.9 434.91   4. Rogelio-neglect of right side  R41.4 781.8   5. 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  I69.331 438.31     Patient Active Problem List   Diagnosis    Vertigo    Temporary cerebral vascular dysfunction    Gastroesophageal reflux disease with esophagitis    Degeneration of intervertebral disc of cervical region    Prediabetes    S/P cholecystectomy    Osteoarthritis of knee    Herpes zoster without complication    Hypertension    Duodenal ulcer    History of pancreatitis    Hyperlipidemia    TIA (transient ischemic attack)    Cerebrovascular accident (CVA) due to thrombosis of left posterior cerebral artery    Paroxysmal atrial fibrillation    General weakness    History of CVA (cerebrovascular accident)    Anticoagulated    Hematuria    Dizziness and giddiness    Rectal bleeding    Acute CVA (cerebrovascular accident)     Past Medical History:   Diagnosis Date    Atrial fibrillation     Hyperlipidemia     Hypertension     Lacunar infarct, acute 2020    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    New onset atrial fibrillation 2020    now on rate control along with Eliquis    Prolapsed uterus     per patient    Stroke     TIA (transient ischemic attack) 2020    Weakness      Past Surgical History:   Procedure Laterality Date    APPENDECTOMY      CHOLECYSTECTOMY N/A 2016    Procedure: CHOLECYSTECTOMY LAPAROSCOPIC;  Surgeon: Russ Gar MD;  Location: [facility] [place];  Service:     COLONOSCOPY N/A 2021    Procedure: COLONOSCOPY TO CECUM WITH HOT SNARE POLYPECTOMIES  AND COLD BX POLYPECTOMY;  Surgeon: Emerson Izaguirre MD;  Location: [facility] ENDOSCOPY;  Service: Gastroenterology;  Laterality: N/A;  pre: RECTAL BLEEDING, FAMILY HX OF COLON CANCER  post: INTERNAL AND EXTERNAL HEMORRHOIDS, DIVERTICULOSIS, POLYPS    ENDOSCOPY N/A 2/22/2018    Z-line regular, 35 cm from incisors, normal esophagus, gastritis, bilious gastric fluid, one non-bleeding duodenal ulcer w/no stigmata of bleeding, Path: DUODENUM: FRAGMENTS OF GASTRIC TYPE MUCOSA WITH HYPERPLASIA (FOVEOLAR) AND PATCHY MIXED INFLAMMATION IN THE LAMINA PROPRIA WITH FOCAL FIBROSIS, COMMENT: 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     EYE SURGERY      tre cataracts      General Information       Row Name 03/03/25 1129          Physical Therapy Time and Intention    Document Type therapy note (daily note) (P)   -ZT     Mode of Treatment individual therapy;physical therapy (P)   -ZT       Row Name 03/03/25 1129          General Information    Patient Profile Reviewed yes (P)   -ZT     Existing Precautions/Restrictions fall (P)   exit alarm  -ZT       Row Name 03/03/25 1129          Cognition    Orientation Status (Cognition) -- (P)   Pt able to follow one step commands 100% of time during session  -ZT       Row Name 03/03/25 1129          Safety Issues/Impairments Affecting Functional Mobility    Comment, Safety Issues/Impairments (Mobility) Gait belt used for safety. (P)   -ZT               User Key  (r) = Recorded By, (t) = Taken By, (c) = Cosigned By      Initials Name Provider Type    ZT Kan Molina, PT Student PT Student                   Mobility       Row Name 03/03/25 1130          Sit-Stand Transfer    Sit-Stand [place] (Transfers) contact guard;2 person assist (P)   -ZT     Assistive Device (Sit-Stand Transfers) -- (P)   HHA  -ZT     Comment, (Sit-Stand Transfer) STS x 2 reps with CGA and HHA. Ambulated 4 feet forwards and backwards with min. assist and HHA each time she stood. (P)   -ZT       Row Name  03/03/25 1130          Gait/Stairs (Locomotion)    [place] Level (Gait) minimum assist (75% patient effort);2 person assist (P)   -ZT     Assistive Device (Gait) -- (P)   HHA  -ZT     Patient was able to Ambulate yes (P)   -ZT     Distance in Feet (Gait) 16 (P)   -ZT     Deviations/Abnormal Patterns (Gait) weight shifting decreased (P)   -ZT     Right Sided Gait Deviations lateral trunk flexion (P)   trunk lean to the R with backwards stepping  -ZT               User Key  (r) = Recorded By, (t) = Taken By, (c) = Cosigned By      Initials Name Provider Type    ZT Kan Molina, PT Student PT Student                   Obj/Interventions       Row Name 03/03/25 1134          Motor Skills    Therapeutic Exercise other (see comments) (P)   Active-assisted overhead reaching x 10 reps, AP's, LAQ, marching, hip abduction, and heel slides x 10 reps  -ZT       Row Name 03/03/25 1134          Balance    Balance Assessment sitting static balance;sitting dynamic balance;standing static balance;standing dynamic balance (P)   -ZT     Static Sitting Balance standby assist (P)   -ZT     Dynamic Sitting Balance standby assist (P)   -ZT     Static Standing Balance contact guard;2-person assist (P)   -ZT     Dynamic Standing Balance minimal assist;2-person assist (P)   -ZT     Position/Device Used, Standing Balance -- (P)   HHA  -ZT               User Key  (r) = Recorded By, (t) = Taken By, (c) = Cosigned By      Initials Name Provider Type    ZT Kan Molina PT Student PT Student                   Goals/Plan    No documentation.                  Clinical Impression       Row Name 03/03/25 1136          Pain    Pretreatment Pain Rating 0/10 - no pain (P)   -ZT     Posttreatment Pain Rating 0/10 - no pain (P)   -ZT       Row Name 03/03/25 1136          Plan of Care Review    Plan of Care Reviewed With patient (P)   -ZT     Progress improving (P)   -ZT     Outcome Evaluation Upon entering room, pt UIC and agreeable to PT this  AM. Performed shoulder active-assisted and LE exercises for functional strength and mobility training. Performed STS x 2 reps with CGA and HHA x 2. Ambulated 16 feet total with min. assist and HHA. Pt had a lateral trunk lean to the R while taking steps backwards. Throughout the entirety of the session, pt was able to follow simple commands. Pt continues to improve. Will continue to follow. (P)   -ZT       Row Name 03/03/25 1136          Positioning and Restraints    Pre-Treatment Position sitting in chair/recliner (P)   -ZT     Post Treatment Position chair (P)   -ZT     In Chair notified nsg;reclined;call light within reach;encouraged to call for assist;exit alarm on (P)   -ZT               User Key  (r) = Recorded By, (t) = Taken By, (c) = Cosigned By      Initials Name Provider Type    Kan Camacho, JEYSON Student PT Student                   Outcome Measures       Row Name 03/03/25 1141 03/03/25 0805       How much help from another person do you currently need...    Turning from your back to your side while in flat bed without using bedrails? 3 (P)   -ZT 4  -MC    Moving from lying on back to sitting on the side of a flat bed without bedrails? 3 (P)   -ZT 3  -MC    Moving to and from a bed to a chair (including a wheelchair)? 3 (P)   -ZT 2  -MC    Standing up from a chair using your arms (e.g., wheelchair, bedside chair)? 3 (P)   -ZT 2  -MC    Climbing 3-5 steps with a railing? 2 (P)   -ZT 1  -MC    To walk in hospital room? 3 (P)   -ZT 1  -MC    AM-PAC 6 Clicks Score (PT) 17 (P)   -ZT 13  -MC    Highest Level of Mobility Goal 5 --> Static standing (P)   -ZT 4 --> Transfer to chair/commode  -      Row Name 03/03/25 1141          Functional Assessment    Outcome Measure Options AM-PAC 6 Clicks Basic Mobility (PT) (P)   -ZT               User Key  (r) = Recorded By, (t) = Taken By, (c) = Cosigned By      Initials Name Provider Type    Bouchra Gramajo RN Registered Nurse    Kan Camacho, JEYSON  Student PT Student                                 Physical Therapy Education       Title: PT OT SLP Therapies (In Progress)       Topic: Physical Therapy (Done)       Point: Mobility training (Done)       Learning Progress Summary            Patient Acceptance, E,D, DU,NR by SOPHIE at 3/3/2025 1142    Acceptance, E,TB, NR by DR at 3/1/2025 1603    Acceptance, E,D, NR by DASHAWN at 2/27/2025 1352                      Point: Home exercise program (Done)       Learning Progress Summary            Patient Acceptance, E,D, DU,NR by SOPHIE at 3/3/2025 1142    Acceptance, E,TB, NR by  at 3/1/2025 1603                      Point: Body mechanics (Done)       Learning Progress Summary            Patient Acceptance, E,D, DU,NR by SOPHIE at 3/3/2025 1142    Acceptance, E,TB, NR by  at 3/1/2025 1603                      Point: Precautions (Done)       Learning Progress Summary            Patient Acceptance, E,D, DU,NR by ZT at 3/3/2025 1142    Acceptance, E,TB, NR by  at 3/1/2025 1603                                      User Key       Initials Effective Dates Name Provider Type Discipline     05/31/24 -  Usha Archuleta, PT Physical Therapist PT     05/24/23 -  Ran Chase, PT Physical Therapist PT    ZT 01/29/25 -  Kan Molina, JEYSON Student PT Student PT                  PT Recommendation and Plan     Progress: (P) improving  Outcome Evaluation: (P) Upon entering room, pt UIC and agreeable to PT this AM. Performed shoulder active-assisted and LE exercises for functional strength and mobility training. Performed STS x 2 reps with CGA and HHA x 2. Ambulated 16 feet total with min. assist and HHA. Pt had a lateral trunk lean to the R while taking steps backwards. Throughout the entirety of the session, pt was able to follow simple commands. Pt continues to improve. Will continue to follow.     Time Calculation:         PT Charges       Row Name 03/03/25 1142             Time Calculation    Start Time 1052 (P)   -ZT      Stop  Time 1103 (P)   -ZT      Time Calculation (min) 11 min (P)   -ZT      PT Received On 03/03/25 (P)   -ZT      PT - Next Appointment 03/04/25 (P)   -ZT         Time Calculation- PT    Total Timed Code Minutes- PT 10 minute(s) (P)   -ZT                User Key  (r) = Recorded By, (t) = Taken By, (c) = Cosigned By      Initials Name Provider Type    ZT Kan Molina, PT Student PT Student                      PT G-Codes  Outcome Measure Options: (P) AM-PAC 6 Clicks Basic Mobility (PT)  AM-PAC 6 Clicks Score (PT): (P) 17  AM-PAC 6 Clicks Score (OT): 11  Modified Radha Scale: 5 - Severe disability.  Bedridden, incontinent, and requiring constant nursing care and attention.  PT Discharge Summary  Anticipated Discharge Disposition (PT): (P) skilled nursing facility    Kan Molina PT Student  3/3/2025

## 2025-03-03 NOTE — PROGRESS NOTES
"    Patient Name: Monica Scherer  :1933  89 y.o.      Patient Care Team:  Amilcar Mauricio DO as PCP - General (Family Medicine)  Fatou Crawford RN as Ambulatory  (Population Health)  Omra Bassett MD as Consulting Physician (Cardiology)    Chief Complaint:   Follow up stroke, AF     Interval History: sitting up in the chair. No complaints. Remains in AF. HR controlled.        Objective   Vital Signs  Temp:  [98.1 °F (36.7 °C)-98.6 °F (37 °C)] 98.2 °F (36.8 °C)  Heart Rate:  [56-85] 66  Resp:  [16-18] 18  BP: (130-154)/(66-88) 154/88    Intake/Output Summary (Last 24 hours) at 3/3/2025 1106  Last data filed at 3/2/2025 2031  Gross per 24 hour   Intake 240 ml   Output 300 ml   Net -60 ml     Flowsheet Rows      Flowsheet Row First Filed Value   Admission Height 162.6 cm (64\") Documented at 2025 1731   Admission Weight 70.4 kg (155 lb 4.8 oz) Documented at 2025 1731            Physical Exam:   General Appearance:    Alert, cooperative, in no acute distress   Lungs:     Clear to auscultation.  Normal respiratory effort and rate.      Heart:    Regular rhythm and normal rate, normal S1 and S2, no murmurs, gallops or rubs.     Chest Wall:    No abnormalities observed   Abdomen:     Soft, nontender, positive bowel sounds.     Extremities:   no cyanosis, clubbing or edema.  No marked joint deformities.  Adequate musculoskeletal strength.       Results Review:    Results from last 7 days   Lab Units 25  0350   SODIUM mmol/L 140   POTASSIUM mmol/L 3.9   CHLORIDE mmol/L 103   CO2 mmol/L 28.4   BUN mg/dL 14   CREATININE mg/dL 0.95   GLUCOSE mg/dL 103*   CALCIUM mg/dL 8.8         Results from last 7 days   Lab Units 25  0350   WBC 10*3/mm3 9.95   HEMOGLOBIN g/dL 11.3*   HEMATOCRIT % 36.5   PLATELETS 10*3/mm3 205     Results from last 7 days   Lab Units 25  1647   INR  1.20*   APTT seconds 25.6     Results from last 7 days   Lab Units 25  0705   MAGNESIUM mg/dL 1.7 "     Results from last 7 days   Lab Units 02/27/25  0330   CHOLESTEROL mg/dL 107   TRIGLYCERIDES mg/dL 72   HDL CHOL mg/dL 38*   LDL CHOL mg/dL 54               Medication Review:   atorvastatin, 40 mg, Oral, Nightly  dabigatran etexilate, 150 mg, Oral, Q12H  metoprolol tartrate, 25 mg, Oral, BID  mupirocin, 1 Application, Each Nare, BID  pantoprazole, 40 mg, Oral, Q AM  sodium chloride, 10 mL, Intravenous, Q12H              Assessment & Plan   Acute left MCA stroke, despite compliance of apixaban 5 mg BID. She has been transitioned to pradaxa per neurology.   Persistent atrial fibrillation, HR is controlled.   Hyperlipidemia  History of prior stroke     Continue pradaxa and metoprolol.   Cardiac status appears stable.   Keep appt with Dr. Bassett 6/23/25.   Will see as needed.    CORTEZ Espinoza  [facility]  03/03/25  11:06 EST

## 2025-03-03 NOTE — DISCHARGE SUMMARY
Date of Discharge:  3/3/2025    Discharge Diagnoses:  Acute ischemic left MCA M1 occlusion and stroke  Right hemiparesis secondary #1  Right visual field cut secondary #1  Aphasia secondary to #1  Atrial fibrillation, persistent with rapid ventricular response  Hyperlipidemia  Gastroesophageal reflux disease      Hospital Course  Patient is a 89 y.o. female presented with right-sided weakness aphasia few facial droop found to have occluded left MCA stroke.  She was on Eliquis and had apparently been compliant.  She has been evaluated.  She underwent thrombectomy..  Patient is work with speech therapy her diet has been upgraded some.  She is going to a skilled nursing rehab from here.  She is to follow-up with neurosurgery Dr. Singh in about a month she is to follow-up with neurology stroke clinic in about 6 to 8 weeks and to follow-up with Dr. Walker cardiology on a previously scheduled 6/23/2025 appointment she will follow-up with her primary care physician in the near future.  Because patient was compliant with Eliquis when this occurred she is being changed to Pradaxa for stroke prophylaxis.  She will need continued standard blood pressure and hyperlipidemia management.      I am dictating this note did not get notified until later this afternoon of bed for patient.  Note is based on review of chart discussed with Dr. Reno I have included his exam.  We got the notice light that patient got a bed for discharge I am trying to help get her to the appropriate facility.  Procedures Performed         Consults:   Consults       Date and Time Order Name Status Description    3/1/2025  1:40 PM Inpatient Cardiology Consult Completed     2/26/2025  7:28 PM Inpatient Intensivist Consult      2/26/2025  7:28 PM Inpatient Neurology Consult Stroke Completed     2/26/2025  6:51 PM Inpatient Pulmonology Consult      2/26/2025  5:17 PM Pulmonology (on-call MD unless specified)      2/26/2025  4:44 PM Inpatient Neurology  Consult Stroke Completed     2/26/2025  4:44 PM Inpatient Neurology Consult Stroke Completed             Pertinent Test Results:   Labs:  Results from last 7 days   Lab Units 03/03/25  0705 03/02/25  0537 03/01/25  0648 02/28/25  0350 02/27/25  1805 02/27/25  0330 02/26/25 2127 02/26/25  1729   GLUCOSE mg/dL  --   --   --  103*  --  128*  --  135*   SODIUM mmol/L  --   --   --  140  --  140  --  136   POTASSIUM mmol/L  --   --   --  3.9 6.2* 3.1*   < > 2.9*   MAGNESIUM mg/dL 1.7 1.7 1.7 1.7  --  1.7  --  1.7   CO2 mmol/L  --   --   --  28.4  --  23.8  --  23.9   CHLORIDE mmol/L  --   --   --  103  --  104  --  99   ANION GAP mmol/L  --   --   --  8.6  --  12.2  --  13.1   CREATININE mg/dL  --   --   --  0.95  --  0.72  --  0.93   BUN mg/dL  --   --   --  14  --  13  --  18   BUN / CREAT RATIO   --   --   --  14.7  --  18.1  --  19.4   CALCIUM mg/dL  --   --   --  8.8  --  8.6  --  8.4   ALK PHOS U/L  --   --   --   --   --   --   --  106   TOTAL PROTEIN g/dL  --   --   --   --   --   --   --  6.4   ALT (SGPT) U/L  --   --   --   --   --   --   --  7   AST (SGOT) U/L  --   --   --   --   --   --   --  12   BILIRUBIN mg/dL  --   --   --   --   --   --   --  0.7   ALBUMIN g/dL  --   --   --  3.2*  --  3.2*  --  3.5   GLOBULIN gm/dL  --   --   --   --   --   --   --  2.9    < > = values in this interval not displayed.     Estimated Creatinine Clearance: 37.1 mL/min (by C-G formula based on SCr of 0.95 mg/dL).      Results from last 7 days   Lab Units 02/28/25  0350 02/27/25  0330 02/26/25  1647   WBC 10*3/mm3 9.95 10.87* 12.46*   RBC 10*6/mm3 3.96 4.18 4.27   HEMOGLOBIN g/dL 11.3* 12.0 12.6   HEMATOCRIT % 36.5 38.0 38.6   MCV fL 92.2 90.9 90.4   MCH pg 28.5 28.7 29.5   MCHC g/dL 31.0* 31.6 32.6   RDW % 14.8 14.8 14.6   RDW-SD fl 50.5 49.9 48.3   MPV fL 9.4 9.1 9.0   PLATELETS 10*3/mm3 205 218 250   NEUTROPHIL % %  --   --  72.2   LYMPHOCYTE % %  --   --  20.0   MONOCYTES % %  --   --  5.7   EOSINOPHIL % %  --   --  1.0    BASOPHIL % %  --   --  0.4   IMM GRAN % %  --   --  0.7*   NEUTROS ABS 10*3/mm3  --   --  8.99*   LYMPHS ABS 10*3/mm3  --   --  2.49   MONOS ABS 10*3/mm3  --   --  0.71   EOS ABS 10*3/mm3  --   --  0.13   BASOS ABS 10*3/mm3  --   --  0.05   IMMATURE GRANS (ABS) 10*3/mm3  --   --  0.09*   NRBC /100 WBC  --   --  0.0                         Results from last 7 days   Lab Units 02/26/25  1647   INR  1.20*       Imaging Results (Last 72 Hours)       Procedure Component Value Units Date/Time    FL Video Swallow Single Contrast [[id number]] Collected: 02/28/25 1604     Updated: 03/03/25 1543    Narrative:      VIDEO SWALLOWING EXAMINATION BY SPEECH PATHOLOGY     Clinical: Dysphasia     Reference Air Kerma: 13.5 mGy     Video swallowing examination performed under the direction of speech  pathology. Imaging reviewed by radiologist who concurs with the  findings.     Speech pathology summary:     CORTEZ Abraham ?present. VFSS completed in the lateral position with  patient upright in stretcher. ?Study limited by pt positioning and  inability to follow commands. Pt able to self feed cup drinks for exam.  ? Objective: Consistencies trialed include nectar thick liquid via cup,  thin liquid via cup and straw, pudding, puree, mechanical soft drained  and mixed consistency, and regular dry solid. ?Oral phase characterized  by WFL bolus size for ind cup drinks with piecemeal deglutition  consistent with advanced age. Base of tongue strength mildly reduced  with mild anterior spillage to vallecula with all trials. ?No tongue  pumping observed. Bolus drive typical for advance age with trace oral  residue present that was reduced with subsequent swallows. Pt unable to  take bite of and masticate regular dry solid, but instead sucked the  pudding thick barium off the top of the cookie. ?Pharyngeal phase  characterized by mildly reduced pharyngeal constriction resulting in  mild residue coating epiglottis and pharynx that was  reduced with  subsequent swallows. Delayed swallow initiation resulted in premature  spillage to the pyriform sinus with thin liquid and mixed consistency.  Observed mistiming of epiglottic inversion resulting in penetration to  the vocal folds with thin liquid via straw and thin portion of mixed  consistency during the swallow. ?Upper esophageal sphincter opening WFL  with adequate opening however visualization limited.        This report was finalized on 3/3/2025 3:40 PM by Dr. Bladimir Vernon M.D on Workstation: [id number]             Results for orders placed during the hospital encounter of 02/26/25    Adult Transthoracic Echo Complete W/ Cont if Necessary Per Protocol (With Agitated Saline)    Interpretation Summary    Left ventricular systolic function is normal. Calculated left ventricular EF = 62.9%    Left ventricular wall thickness is consistent with mild concentric hypertrophy. Sigmoid-shaped ventricular septum is present.    Left ventricular diastolic function is consistent with (grade II w/high LAP) pseudonormalization.    Moderate mitral valve regurgitation is present with a centrally-directed jet noted.    The left atrial cavity is mildly dilated.    Saline test results are negative for right to left atrial level shunt.    There is moderate calcification of the aortic valve mainly affecting the non-coronary, left coronary and right coronary cusp(s).          Condition on Discharge: Improved    Vital Signs  Temp:  [97.3 °F (36.3 °C)-98.6 °F (37 °C)] 97.3 °F (36.3 °C)  Heart Rate:  [56-85] 71  Resp:  [17-18] 18  BP: (146-154)/(66-88) 146/87    Physical Exam:  General appearance: Nontoxic, conversant   Eyes: anicteric sclerae, moist conjunctivae; no lidlag;    HENT: Atraumatic; oropharynx clear with moist mucous membranes    Neck: Trachea midline;  supple   Lungs: Bilateral air entry without wheeze or rhonchi, with normal respiratory effort and no intercostal retractions  CV: RRR, no rub   Abdomen:  Bowel sounds positive no significant truncal obesity     Skin: WWP no diffuse visible rash  Psych: Appropriate affect, alert       Discharge Disposition  Skilled Nursing Facility (DC - External)    Discharge Medications     Discharge Medications        New Medications        Instructions Start Date   dabigatran etexilate 150 MG capsu  Commonly known as: PRADAXA   150 mg, Oral, Every 12 Hours Scheduled      pantoprazole 40 MG EC tablet  Commonly known as: PROTONIX   40 mg, Oral, Every Early Morning   Start Date: March 4, 2025            Changes to Medications        Instructions Start Date   atorvastatin 40 MG tablet  Commonly known as: LIPITOR  What changed:   medication strength  how much to take  when to take this   40 mg, Oral, Nightly      metoprolol tartrate 25 MG tablet  Commonly known as: LOPRESSOR  What changed: when to take this   25 mg, Oral, 2 Times Daily             Stop These Medications      Eliquis 5 MG tablet tablet  Generic drug: apixaban     felodipine 10 MG 24 hr tablet  Commonly known as: PLENDIL     ferrous gluconate 324 MG tablet  Commonly known as: FERGON     omeprazole 20 MG capsule  Commonly known as: priLOSEC     vitamin D3 125 MCG (5000 UT) capsule capsule  Commonly known as: D-3-5              Discharge Diet:   Diet Instructions       Diet: Regular/House Diet; Soft to Chew (NDD 3); Chopped Meat; Thin (IDDSI 0); No Straw, No Mixed Consistencies      Discharge Diet: Regular/House Diet    Texture: Soft to Chew (NDD 3)    Soft to Chew: Chopped Meat    Fluid Consistency: Thin (IDDSI 0)    Diet Modifiers / Additional Diets:  No Straw  No Mixed Consistencies               Activity at Discharge:     Follow-up Appointments  Future Appointments   Date Time Provider Department Center   3/26/2025 10:30 AM Micheline Singh MD [facility]   4/11/2025 10:00 AM Amilcar Mauricio DO [facility]   6/23/2025 11:00 AM Omar Bassett MD [facility]         Test Results Pending at Discharge        Terrell Bazan Jr, MD  03/03/25  15:50 EST    Time:

## 2025-03-03 NOTE — CASE MANAGEMENT/SOCIAL WORK
Post-Acute Authorization Submission      Post Acute Pre-Cert Documentation  Request Submitted by Facility - Type:: Hospital  Post-Acute Authorization Type Submitted:: SNF  Date Post Acute Pre-Cert Inititated per Facility: 03/03/25  Date Post Acute Pre-Cert Completed: 03/03/25  Accepting Facility: [facility] Discharge Date Requested: 03/03/25  All Clinicals Submitted?: Yes  Had Accepting Facility at Time of Submission: Yes  Response Communicated to:: , Accepting Facility Liaison, Accepting Facility Auth Department  Authorization Number:: [id number]  Post Acute Pre-Cert Initiated Comment: VALID TO ADMIT [facility] 3/8/25.              Alexandro Cordova, PCT

## 2025-03-03 NOTE — CASE MANAGEMENT/SOCIAL WORK
Continued Stay Note  [facility]     Patient Name: Monica Scherer  MRN: [id number]  Today's Date: 3/3/2025    Admit Date: 2/26/2025    Plan: Pre-cert approved for [facility]; WC van for 5:00 P.M   Discharge Plan       Row Name 03/03/25 1607       Plan    Plan Pre-cert approved for [facility]; WC van for 5:00 P.M    Plan Comments CCP received pre-cert approval for [facility]. Updated patient's daughter and she in agreement. CCP arranged [facility] van for 5:00 P.M. CCP updated RN and provided packet. Pharmacy updated. Tricia HADLEY      Row Name 03/03/25 1323       Plan    Plan Pre-cert pending for [facility]    Plan Comments CCP spoke with [facility]; able to accept pending pre-cert. CCP sent email to initiate pre-cert. CCP updated patient's daughter, Raquel and she is in agreement to d/c plan.  Packet in CCP office. Tricia HADLEY                   Discharge Codes    No documentation.                 Expected Discharge Date and Time       Expected Discharge Date Expected Discharge Time    Mar 3, 2025               LEONIE Wylie

## 2025-03-03 NOTE — PLAN OF CARE
Problem: Adult Inpatient Plan of Care  Goal: Plan of Care Review  Outcome: Progressing  Flowsheets (Taken 3/3/2025 1653)  Progress: improving  Goal: Patient-Specific Goal (Individualized)  Outcome: Progressing  Goal: Absence of Hospital-Acquired Illness or Injury  Outcome: Progressing  Intervention: Identify and Manage Fall Risk  Recent Flowsheet Documentation  Taken 3/3/2025 1605 by Bouchra Walsh RN  Safety Promotion/Fall Prevention:   activity supervised   assistive device/personal items within reach   clutter free environment maintained   fall prevention program maintained   nonskid shoes/slippers when out of bed   safety round/check completed   room organization consistent  Taken 3/3/2025 1405 by Bouchra Walsh RN  Safety Promotion/Fall Prevention:   activity supervised   assistive device/personal items within reach   clutter free environment maintained   fall prevention program maintained   nonskid shoes/slippers when out of bed   safety round/check completed   room organization consistent  Taken 3/3/2025 1205 by Bouchra Walsh RN  Safety Promotion/Fall Prevention:   activity supervised   assistive device/personal items within reach   clutter free environment maintained   fall prevention program maintained   nonskid shoes/slippers when out of bed   safety round/check completed   room organization consistent  Taken 3/3/2025 1005 by Bouchra Walsh RN  Safety Promotion/Fall Prevention:   activity supervised   assistive device/personal items within reach   clutter free environment maintained   fall prevention program maintained   nonskid shoes/slippers when out of bed   safety round/check completed   room organization consistent  Taken 3/3/2025 0805 by Bouchra Walsh RN  Safety Promotion/Fall Prevention:   activity supervised   assistive device/personal items within reach   clutter free environment maintained   fall prevention program maintained   nonskid shoes/slippers when out of bed    safety round/check completed   room organization consistent  Intervention: Prevent Skin Injury  Recent Flowsheet Documentation  Taken 3/3/2025 0805 by Bouchra Walsh RN  Body Position: sitting up in bed  Skin Protection: incontinence pads utilized  Intervention: Prevent and Manage VTE (Venous Thromboembolism) Risk  Recent Flowsheet Documentation  Taken 3/3/2025 0805 by Bouchra Walsh RN  VTE Prevention/Management: SCDs (sequential compression devices) on  Intervention: Prevent Infection  Recent Flowsheet Documentation  Taken 3/3/2025 1605 by Bouchra Walsh RN  Infection Prevention:   single patient room provided   hand hygiene promoted  Taken 3/3/2025 1405 by Bouchra Walsh RN  Infection Prevention:   single patient room provided   hand hygiene promoted  Taken 3/3/2025 1205 by Bouchra Walsh RN  Infection Prevention:   single patient room provided   hand hygiene promoted  Taken 3/3/2025 1005 by Bouchra Walsh RN  Infection Prevention:   single patient room provided   hand hygiene promoted  Taken 3/3/2025 0805 by Bouchra Walsh RN  Infection Prevention:   single patient room provided   hand hygiene promoted  Goal: Optimal Comfort and Wellbeing  Outcome: Progressing  Intervention: Provide Person-Centered Care  Recent Flowsheet Documentation  Taken 3/3/2025 0805 by Bouchra Walsh RN  Trust Relationship/Rapport:   care explained   thoughts/feelings acknowledged  Goal: Readiness for Transition of Care  Outcome: Progressing     Problem: Skin Injury Risk Increased  Goal: Skin Health and Integrity  Outcome: Progressing  Intervention: Optimize Skin Protection  Recent Flowsheet Documentation  Taken 3/3/2025 0805 by Bouchra Walsh RN  Activity Management: activity encouraged  Pressure Reduction Techniques: frequent weight shift encouraged  Head of Bed (HOB) Positioning: HOB at 30-45 degrees  Skin Protection: incontinence pads utilized     Problem: Fall Injury Risk  Goal: Absence of Fall and  Fall-Related Injury  Outcome: Progressing  Intervention: Identify and Manage Contributors  Recent Flowsheet Documentation  Taken 3/3/2025 0805 by Bouchra Walsh RN  Medication Review/Management: medications reviewed  Intervention: Promote Injury-Free Environment  Recent Flowsheet Documentation  Taken 3/3/2025 1605 by Bouchra Walsh RN  Safety Promotion/Fall Prevention:   activity supervised   assistive device/personal items within reach   clutter free environment maintained   fall prevention program maintained   nonskid shoes/slippers when out of bed   safety round/check completed   room organization consistent  Taken 3/3/2025 1405 by Bouchra Walsh RN  Safety Promotion/Fall Prevention:   activity supervised   assistive device/personal items within reach   clutter free environment maintained   fall prevention program maintained   nonskid shoes/slippers when out of bed   safety round/check completed   room organization consistent  Taken 3/3/2025 1205 by Bouchra Walsh RN  Safety Promotion/Fall Prevention:   activity supervised   assistive device/personal items within reach   clutter free environment maintained   fall prevention program maintained   nonskid shoes/slippers when out of bed   safety round/check completed   room organization consistent  Taken 3/3/2025 1005 by Bouchra Walsh RN  Safety Promotion/Fall Prevention:   activity supervised   assistive device/personal items within reach   clutter free environment maintained   fall prevention program maintained   nonskid shoes/slippers when out of bed   safety round/check completed   room organization consistent  Taken 3/3/2025 0805 by Bouchra Wlash RN  Safety Promotion/Fall Prevention:   activity supervised   assistive device/personal items within reach   clutter free environment maintained   fall prevention program maintained   nonskid shoes/slippers when out of bed   safety round/check completed   room organization consistent   Goal  Outcome Evaluation:           Progress: improving        Pt to be discharged to [facility]. Report called and transport set for 1700.

## 2025-03-03 NOTE — PLAN OF CARE
Problem: Adult Inpatient Plan of Care  Goal: Plan of Care Review  Outcome: Progressing  Flowsheets (Taken 3/3/2025 0409)  Progress: no change  Goal: Absence of Hospital-Acquired Illness or Injury  Outcome: Progressing  Intervention: Identify and Manage Fall Risk  Recent Flowsheet Documentation  Taken 3/3/2025 0408 by Lida Bautista RN  Safety Promotion/Fall Prevention:   assistive device/personal items within reach   clutter free environment maintained   fall prevention program maintained   nonskid shoes/slippers when out of bed   room organization consistent  Taken 3/3/2025 0227 by Lida Bautista RN  Safety Promotion/Fall Prevention:   assistive device/personal items within reach   clutter free environment maintained   nonskid shoes/slippers when out of bed   room organization consistent   safety round/check completed  Taken 3/3/2025 0024 by Lida Bautista RN  Safety Promotion/Fall Prevention:   assistive device/personal items within reach   clutter free environment maintained   fall prevention program maintained   nonskid shoes/slippers when out of bed   room organization consistent   safety round/check completed  Taken 3/2/2025 2221 by Lida Bautista RN  Safety Promotion/Fall Prevention:   assistive device/personal items within reach   clutter free environment maintained   fall prevention program maintained   nonskid shoes/slippers when out of bed   room organization consistent   safety round/check completed  Taken 3/2/2025 2031 by Lida Bautista RN  Safety Promotion/Fall Prevention:   activity supervised   assistive device/personal items within reach   clutter free environment maintained   fall prevention program maintained   lighting adjusted   nonskid shoes/slippers when out of bed   safety round/check completed   room organization consistent  Intervention: Prevent Skin Injury  Recent Flowsheet Documentation  Taken 3/3/2025 0408 by Lida Bautista RN  Body Position: position changed independently  Taken  3/3/2025 0227 by Lida Bautista RN  Body Position: position changed independently  Taken 3/3/2025 0024 by Lida Bautista RN  Body Position: position changed independently  Taken 3/2/2025 2221 by Lida Bautista RN  Body Position:   weight shifting   left  Taken 3/2/2025 2031 by Lida Bautista RN  Body Position: sitting up in bed  Skin Protection:   incontinence pads utilized   pectin skin barriers applied   transparent dressing maintained  Intervention: Prevent and Manage VTE (Venous Thromboembolism) Risk  Recent Flowsheet Documentation  Taken 3/3/2025 0227 by Lida Bautista RN  VTE Prevention/Management: patient refused intervention  Taken 3/2/2025 2031 by Lida Bautista RN  VTE Prevention/Management:   bilateral   SCDs (sequential compression devices) on  Intervention: Prevent Infection  Recent Flowsheet Documentation  Taken 3/2/2025 2031 by Lida Bautista RN  Infection Prevention:   environmental surveillance performed   hand hygiene promoted   personal protective equipment utilized   rest/sleep promoted   single patient room provided  Goal: Optimal Comfort and Wellbeing  Outcome: Progressing  Intervention: Provide Person-Centered Care  Recent Flowsheet Documentation  Taken 3/2/2025 2031 by Lida Bautista RN  Trust Relationship/Rapport:   care explained   questions answered   questions encouraged  Goal: Readiness for Transition of Care  Outcome: Progressing     Problem: Skin Injury Risk Increased  Goal: Skin Health and Integrity  Outcome: Progressing  Intervention: Optimize Skin Protection  Recent Flowsheet Documentation  Taken 3/3/2025 0408 by Lida Bautista RN  Activity Management: (asleep) other (see comments)  Taken 3/3/2025 0227 by Lida Bautista RN  Activity Management: (asleep) other (see comments)  Taken 3/3/2025 0024 by Lida Bautista RN  Activity Management: (asleep) other (see comments)  Taken 3/2/2025 2221 by Lida Bautista RN  Activity Management: activity minimized  Taken 3/2/2025 2031 by Michele  ALEJANDRA Hilton  Activity Management: activity encouraged  Pressure Reduction Techniques:   frequent weight shift encouraged   heels elevated off bed   pressure points protected   weight shift assistance provided  Pressure Reduction Devices:   alternating pressure pump (JOBY)   foam padding utilized   heel offloading device utilized   positioning supports utilized  Skin Protection:   incontinence pads utilized   pectin skin barriers applied   transparent dressing maintained     Problem: Fall Injury Risk  Goal: Absence of Fall and Fall-Related Injury  Outcome: Progressing  Intervention: Identify and Manage Contributors  Recent Flowsheet Documentation  Taken 3/2/2025 2031 by Lida Bautista RN  Medication Review/Management: medications reviewed  Intervention: Promote Injury-Free Environment  Recent Flowsheet Documentation  Taken 3/3/2025 0408 by Lida Bautista, RN  Safety Promotion/Fall Prevention:   assistive device/personal items within reach   clutter free environment maintained   fall prevention program maintained   nonskid shoes/slippers when out of bed   room organization consistent  Taken 3/3/2025 0227 by Lida Bautista, ALEJANDRA  Safety Promotion/Fall Prevention:   assistive device/personal items within reach   clutter free environment maintained   nonskid shoes/slippers when out of bed   room organization consistent   safety round/check completed  Taken 3/3/2025 0024 by Lida Bautista, ALEJANDRA  Safety Promotion/Fall Prevention:   assistive device/personal items within reach   clutter free environment maintained   fall prevention program maintained   nonskid shoes/slippers when out of bed   room organization consistent   safety round/check completed  Taken 3/2/2025 2221 by Lida Bautista, ALEJANDRA  Safety Promotion/Fall Prevention:   assistive device/personal items within reach   clutter free environment maintained   fall prevention program maintained   nonskid shoes/slippers when out of bed   room organization consistent   safety  round/check completed  Taken 3/2/2025 2031 by Lida Bautista, RN  Safety Promotion/Fall Prevention:   activity supervised   assistive device/personal items within reach   clutter free environment maintained   fall prevention program maintained   lighting adjusted   nonskid shoes/slippers when out of bed   safety round/check completed   room organization consistent   Goal Outcome Evaluation:           Progress: no change

## 2025-03-03 NOTE — PROGRESS NOTES
"  Daily Progress Note.   [address]  3/3/2025    Patient:  Name:  Monica Scherer  MRN:  [id number]  5/4/1933  89 y.o.  female         CC:Acute ischemic MCA occlusion with right-sided weakness and aphasia A-fib other issues as listed below       Interval History:  No acute complaints no acute events overnight patient denies any chest pain shortness of breath cough or sputum production.  Tolerating a diet no emesis no abdominal pain  No worsening lethargy or confusion      Physical Exam:  /88   Pulse 66   Temp 98.2 °F (36.8 °C) (Oral)   Resp 18   Ht 162.6 cm (64\")   Wt 70.3 kg (155 lb)   SpO2 100%   BMI 26.61 kg/m²   Body mass index is 26.61 kg/m².    Intake/Output Summary (Last 24 hours) at 3/3/2025 1004  Last data filed at 3/2/2025 2031  Gross per 24 hour   Intake 240 ml   Output 300 ml   Net -60 ml     General appearance: Nontoxic, conversant   Eyes: anicteric sclerae, moist conjunctivae; no lidlag;    HENT: Atraumatic; oropharynx clear with moist mucous membranes    Neck: Trachea midline;  supple   Lungs: Bilateral air entry without wheeze or rhonchi, with normal respiratory effort and no intercostal retractions  CV: RRR, no rub   Abdomen: Bowel sounds positive no significant truncal obesity    Skin: WWP no diffuse visible rash  Psych: Appropriate affect, alert       Data Review:  Notable Labs:  Results from last 7 days   Lab Units 02/28/25  0350 02/27/25  0330 02/26/25  1647   WBC 10*3/mm3 9.95 10.87* 12.46*   HEMOGLOBIN g/dL 11.3* 12.0 12.6   PLATELETS 10*3/mm3 205 218 250     Results from last 7 days   Lab Units 03/03/25  0705 03/02/25  0537 03/01/25  0648 02/28/25  0350 02/27/25  1805 02/27/25  0330 02/26/25 2127 02/26/25  1729   SODIUM mmol/L  --   --   --  140  --  140  --  136   POTASSIUM mmol/L  --   --   --  3.9 6.2* 3.1* 3.6 2.9*   CHLORIDE mmol/L  --   --   --  103  --  104  --  99   CO2 mmol/L  --   --   --  28.4  --  23.8  --  23.9   BUN mg/dL  --   --   --  14  " "--  13  --  18   CREATININE mg/dL  --   --   --  0.95  --  0.72  --  0.93   GLUCOSE mg/dL  --   --   --  103*  --  128*  --  135*   CALCIUM mg/dL  --   --   --  8.8  --  8.6  --  8.4   MAGNESIUM mg/dL 1.7 1.7 1.7 1.7  --  1.7  --  1.7   PHOSPHORUS mg/dL  --   --   --  3.2  --  2.8  --   --    Estimated Creatinine Clearance: 37.1 mL/min (by C-G formula based on SCr of 0.95 mg/dL).    Results from last 7 days   Lab Units 02/28/25  0350 02/27/25  0330 02/26/25  1729 02/26/25  1647   AST (SGOT) U/L  --   --  12  --    ALT (SGPT) U/L  --   --  7  --    PLATELETS 10*3/mm3 205 218  --  250             Imaging:  Reviewed chest images personally from past 3 days    ASSESSMENT  /  PLAN:  Acute ischemic left MCA occlusion  Right sided weakness  Right visual field cut  Aphasia  Atrial fibrillation  Chronic anticoagulation with apixiban  Hyperlipidemia  GERD  History of stroke with residual right sided weakness      Reviewed last neurology note from 2/28/2025  Follow-up stroke clinic 6 to 8 weeks  Glycemic and lipid control blood pressure control systolic less than 140  Pradaxa restarted 3/2 and stop aspirin and DVT prophylaxis per Dr. Kaye      Note speech therapy evaluation Rx from 2/28:  \"SLP Recommendation and Plan  SLP Diet Recommendation: soft to chew textures, chopped, no mixed consistencies, thin liquids (02/28/25 1400)  Recommended Precautions and Strategies: no straw, upright posture during/after eating, small bites of food and sips of liquid, 1:1 supervision, assist with feeding, general aspiration precautions, fatigue precautions (02/28/25 1400)  SLP Rec. for Method of Medication Administration: meds whole, with thin liquids, as tolerated, meds crushed, with puree (02/28/25 1400)  Monitor for Signs of Aspiration: yes, notify SLP if any concerns (02/28/25 1400)  Anticipated Discharge Disposition (SLP): anticipate therapy at next level of care (02/28/25 1400)  Therapy Frequency (Swallow): PRN (02/28/25 " "1400)  Predicted Duration Therapy Intervention (Days): until discharge (02/28/25 1400)  Oral Care Recommendations: Oral Care BID/PRN (02/28/25 1400)     Outcome Evaluation: VFSS completed: Pt presents with mild oropharyngeal dysphagia characterized by mildly reduced base of tongue strength, piece meal deglutition, mildly reduced pharyngeal constriction, delayed swallow initiation, mistiming of swallow resulting in deep penetration of thin liquids via straw and with mixed consistencies. Pt’s cognitive status also impacting swallow limiting pt’s individual safety with overall intake. Recommend soft to chew/chopped diet (NO MIXED CONSISTENCIES) with thin liquids NO STRAW, meds whole with thin liquids as tolerated, feeding assistance as needed. Compensatory strategies include upright positioning, small bites/sips, slow rate of intake to allow for double swallow.   \"    Reviewed last case management note from 2/28/2025  PT OT discharge planning  Patient is medically stable awaiting discharge  Await placement    All issues new to me today.  Prior hospital course, labs and imaging reviewed.      Electronically signed by Monty Reno MD, 03/03/25, 10:04 AM [facility].  [facility]      "

## 2025-03-04 NOTE — CASE MANAGEMENT/SOCIAL WORK
Case Management Discharge Note      Final Note: Skilled bed at [facility]. Tricia HADLEY         Selected Continued Care - Discharged on 3/3/2025 Admission date: 2/26/2025 - Discharge disposition: Skilled Nursing Facility (DC - External)      Destination Coordination complete.      Service Provider Services Address Phone Fax Patient Preferred    [facility] [address] [facility] [id number] [phone] --              Durable Medical Equipment    No services have been selected for the patient.                Dialysis/Infusion    No services have been selected for the patient.                Home Medical Care    No services have been selected for the patient.                Therapy    No services have been selected for the patient.                Community Resources    No services have been selected for the patient.                Community & DME    No services have been selected for the patient.                    Selected Continued Care - Episodes Includes continued care and service providers with selected services from the active episodes listed below      High Risk Care Management Episode start date: 10/18/2023 (Paused)   There are no active outsourced providers for this episode.                      Final Discharge Disposition Code: 03 - skilled nursing facility (SNF)

## 2025-03-07 ENCOUNTER — PATIENT OUTREACH (OUTPATIENT)
Dept: CASE MANAGEMENT | Facility: OTHER | Age: OVER 89
End: 2025-03-07
Payer: MEDICARE

## 2025-03-07 NOTE — OUTREACH NOTE
AMBULATORY CASE MANAGEMENT NOTE    Names and Relationships of Patient/Support Persons: Contact: Surinder; Relationship:   Contact: Raquel Lebron; Relationship: Emergency Contact -     SNF Follow-up    Incoming call from patients daughter with an update of pt condition. She reports no anticpated DC date at this time. Long enjoyable conversation        Fatou WASHINGTON  Ambulatory Case Management    3/7/2025, 10:06 EST

## 2025-03-07 NOTE — PROGRESS NOTES
"Subjective   Patient ID: Monica Scherer is a 89 y.o. female is here today for follow-up for  Acute CVA (cerebrovascular accident)  Hospital follow up, in ED on 02/26/2025  SACHI PATEL PRIM NONC ART on 02/26/2025  MRI brain w/o contrast completed on 02/27/2025  CT head without contrast completed on 02/27/2025    Monica Scherer reports feeling confused.  Not able to confirm birthday.  Raquel patient's daughter denies patient having headaches, blurry vision, or balance issues.      Patient is currently living with her daughter.  Uses a walker to ambulate.  Needs assistance with her ADLs.  Having memory loss and some right-sided weakness but this is improving.      MODIFIED JENA SCALE (to be assessed for each patient having history of stroke) []Stroke history but not assessed  []0: No symptoms at all  []1: No significant disability despite symptoms  []2: Slight disability  []3: Moderate disability  [x]4: Moderately severe disability  []5: Severe disability  []6: Death                     Objective     Vitals:    03/26/25 1030   BP: 108/62   BP Location: Left arm   Patient Position: Sitting   Cuff Size: Adult   Pulse: 94   Resp: 16   Temp: 98.5 °F (36.9 °C)   TempSrc: Temporal   SpO2: 98%   Weight: 70.3 kg (155 lb)   Height: 162.6 cm (64.02\")   PainSc: 0-No pain     Body mass index is 26.59 kg/m².    Lab Results   Component Value Date    WBC 9.95 02/28/2025    HGB 11.3 (L) 02/28/2025    HCT 36.5 02/28/2025    MCV 92.2 02/28/2025     02/28/2025     Lab Results   Component Value Date    GLUCOSE 103 (H) 02/28/2025    CALCIUM 8.8 02/28/2025     02/28/2025    K 3.9 02/28/2025    CO2 28.4 02/28/2025     02/28/2025    BUN 14 02/28/2025    CREATININE 0.95 02/28/2025    EGFR 56.7 (L) 02/28/2025    BCR 14.7 02/28/2025    ANIONGAP 8.6 02/28/2025         Physical Exam:    In wheelchair  Alert and oriented x 2  Some paucity of speech but eloquent when questioned.  Right side is 4 out of 5.      Monica LACEY" Gilmer  reports that she has never smoked. She has never been exposed to tobacco smoke. She has never used smokeless tobacco.         Medical Decision Making:      I spent 30 minutes caring for Monica on this date of service. This time includes time spent by me in the following activities:preparing for the visit, reviewing tests, obtaining and/or reviewing a separately obtained history, performing a medically appropriate examination and/or evaluation , counseling and educating the patient/family/caregiver, ordering medications, tests, or procedures, documenting information in the medical record, independently interpreting results and communicating that information with the patient/family/caregiver, and care coordination        Assessment & Plan  Personal history of stroke with current residual effects  Patient is recovering from her stroke.  She is 1 month out.  She has follow-up scheduled with neurology.  She is on all the appropriate medications after hospitalization.  She is back at home.  I have encouraged her and her family members to keep her mind active and reach out for help if she seems depressed or is less interactive.  I would like to see her back in my clinic in 2 months for 90-day follow-up         No follow-ups on file.         This note was completed using Dragon Dictation software.

## 2025-03-14 ENCOUNTER — PATIENT OUTREACH (OUTPATIENT)
Dept: CASE MANAGEMENT | Facility: OTHER | Age: OVER 89
End: 2025-03-14
Payer: MEDICARE

## 2025-03-14 NOTE — OUTREACH NOTE
AMBULATORY CASE MANAGEMENT NOTE    Names and Relationships of Patient/Support Persons: Contact: Hayley; Relationship: Other -     SNF Follow-up    Per Hayley with [facility] there is no anticipated DC date at this time.         Fatou WASHINGTON  Ambulatory Case Management    3/14/2025, 10:32 EDT

## 2025-03-21 ENCOUNTER — PATIENT OUTREACH (OUTPATIENT)
Dept: CASE MANAGEMENT | Facility: OTHER | Age: OVER 89
End: 2025-03-21
Payer: MEDICARE

## 2025-03-21 ENCOUNTER — READMISSION MANAGEMENT (OUTPATIENT)
Dept: CALL CENTER | Facility: HOSPITAL | Age: OVER 89
End: 2025-03-21
Payer: MEDICARE

## 2025-03-21 NOTE — OUTREACH NOTE
Prep Survey      Flowsheet Row Responses   [facility] facility patient discharged from? Non-BH   Is LACE score < 7 ? Non- Discharge   Eligibility [facility]   Date of Discharge 03/21/25   Discharge Disposition Home or Self Care   Discharge diagnosis Acute ischemic left MCA M1 occlusion and stroke   Does the patient have one of the following disease processes/diagnoses(primary or secondary)? Stroke   Prep survey completed? Yes            Belkis CRENSHAW - Registered Nurse

## 2025-03-21 NOTE — OUTREACH NOTE
AMBULATORY CASE MANAGEMENT NOTE    Names and Relationships of Patient/Support Persons: Contact: Hayley; Relationship: Other -     SNF Follow-up    Questions/Answers      Flowsheet Row Responses   Acute Facility Discharged From [facility]   Acute Discharge Date 03/03/25   Acute Facility Diagnoses Discharge Diagnoses:  Acute ischemic left MCA M1 occlusion and stroke  Right hemiparesis secondary #1  Right visual field cut secondary #1  Aphasia secondary to #1  Atrial fibrillation, persistent with rapid ventricular response  Hyperlipidemia  Gastroesophageal reflux disease   Name of the Skilled Nursing Facility? [facility]   Skilled Nursing Discharge Date? 03/21/25   Home Health Agency at discharge? --  [unknown]            Per Juan Pablo at [facility] pt due to DC today. Will route to CHRIS WASHINGTON  Ambulatory Case Management    3/21/2025, 09:28 EDT

## 2025-03-24 ENCOUNTER — TRANSITIONAL CARE MANAGEMENT TELEPHONE ENCOUNTER (OUTPATIENT)
Dept: CALL CENTER | Facility: HOSPITAL | Age: OVER 89
End: 2025-03-24
Payer: MEDICARE

## 2025-03-24 NOTE — OUTREACH NOTE
Call Center TCM Note      Flowsheet Row Responses   [facility] facility patient discharged from? Non-  [[facility]]   Does the patient have one of the following disease processes/diagnoses(primary or secondary)? Stroke   TCM attempt successful? No   Unsuccessful attempts Attempt 1            Raquel Almaraz RN    3/24/2025, 11:48 EDT

## 2025-03-24 NOTE — OUTREACH NOTE
Call Center TCM Note      Flowsheet Row Responses   [facility] facility patient discharged from? Non-  [[facility]]   Does the patient have one of the following disease processes/diagnoses(primary or secondary)? Stroke   TCM attempt successful? No   Unsuccessful attempts Attempt 2            Raquel Almaraz RN    3/24/2025, 12:46 EDT

## 2025-03-25 ENCOUNTER — TRANSITIONAL CARE MANAGEMENT TELEPHONE ENCOUNTER (OUTPATIENT)
Dept: CALL CENTER | Facility: HOSPITAL | Age: OVER 89
End: 2025-03-25
Payer: MEDICARE

## 2025-03-25 NOTE — OUTREACH NOTE
Call Center TCM Note      Flowsheet Row Responses   [facility] facility patient discharged from? Non-BH   Does the patient have one of the following disease processes/diagnoses(primary or secondary)? Stroke   TCM attempt successful? No   Unsuccessful attempts Attempt 3            Roro H - Registered Nurse    3/25/2025, 09:58 EDT

## 2025-03-26 ENCOUNTER — OFFICE VISIT (OUTPATIENT)
Dept: NEUROSURGERY | Facility: CLINIC | Age: OVER 89
End: 2025-03-26
Payer: MEDICARE

## 2025-03-26 VITALS
DIASTOLIC BLOOD PRESSURE: 62 MMHG | RESPIRATION RATE: 16 BRPM | SYSTOLIC BLOOD PRESSURE: 108 MMHG | HEART RATE: 94 BPM | HEIGHT: 64 IN | BODY MASS INDEX: 26.46 KG/M2 | WEIGHT: 155 LBS | OXYGEN SATURATION: 98 % | TEMPERATURE: 98.5 F

## 2025-03-26 DIAGNOSIS — I69.30 PERSONAL HISTORY OF STROKE WITH CURRENT RESIDUAL EFFECTS: Primary | ICD-10-CM

## 2025-03-27 ENCOUNTER — PATIENT OUTREACH (OUTPATIENT)
Dept: CASE MANAGEMENT | Facility: OTHER | Age: OVER 89
End: 2025-03-27
Payer: MEDICARE

## 2025-03-27 NOTE — OUTREACH NOTE
AMBULATORY CASE MANAGEMENT NOTE    Names and Relationships of Patient/Support Persons: Contact: BernardinoRaquel; Relationship: Emergency Contact -     Patient Outreach    Call placed to patients daughter Raquel whom she has given permission to speak with and where she is currently living. Raquel is currently not at home. Agree AC will call again tomorrow.         Fatou WASHINGTON  Ambulatory Case Management    3/27/2025, 13:32 EDT

## 2025-03-28 ENCOUNTER — PATIENT OUTREACH (OUTPATIENT)
Dept: CASE MANAGEMENT | Facility: OTHER | Age: OVER 89
End: 2025-03-28
Payer: MEDICARE

## 2025-03-28 NOTE — OUTREACH NOTE
AMBULATORY CASE MANAGEMENT NOTE    Names and Relationships of Patient/Support Persons: Contact: Monica Scherer; Relationship: Self -     Patient Outreach    Call placed to patient now that she is home. She recognized and remembers this ACM by voice. Her daughter is with her and they are awaiting therapist to arrive. Call brief with patient but she was glad to talk and seemed to tire easily. Spoke with her daughter Raquel who reports she is doing better. She has followed up on all appointments. Patient asked that ACM call back in 2 weeks. She did not want to wait a month        Fatou WASHINGTON  Ambulatory Case Management    3/28/2025, 13:17 EDT

## 2025-04-03 ENCOUNTER — TELEPHONE (OUTPATIENT)
Dept: FAMILY MEDICINE CLINIC | Facility: CLINIC | Age: OVER 89
End: 2025-04-03
Payer: MEDICARE

## 2025-04-03 NOTE — TELEPHONE ENCOUNTER
Diana with [facility] called and said she did a visit with pt this morning and her blood pressure was 140/90 and recheck was 130/70 a little later. She stated pt used to be on Amlodipine and wondered if she should go back on it? She is still taking the Metoprolol as well. Please advise.    Diana - [phone]

## 2025-04-03 NOTE — TELEPHONE ENCOUNTER
Diana informed and voiced understanding. They ordered a BP cuff for pt which should be coming soon.

## 2025-04-11 ENCOUNTER — OFFICE VISIT (OUTPATIENT)
Dept: FAMILY MEDICINE CLINIC | Facility: CLINIC | Age: OVER 89
End: 2025-04-11
Payer: MEDICARE

## 2025-04-11 VITALS
OXYGEN SATURATION: 93 % | DIASTOLIC BLOOD PRESSURE: 90 MMHG | SYSTOLIC BLOOD PRESSURE: 140 MMHG | HEIGHT: 64 IN | WEIGHT: 148 LBS | HEART RATE: 84 BPM | BODY MASS INDEX: 25.27 KG/M2

## 2025-04-11 DIAGNOSIS — Z86.73 HISTORY OF STROKE: ICD-10-CM

## 2025-04-11 DIAGNOSIS — R47.01 EXPRESSIVE APHASIA: ICD-10-CM

## 2025-04-11 DIAGNOSIS — I48.0 PAROXYSMAL ATRIAL FIBRILLATION: ICD-10-CM

## 2025-04-11 DIAGNOSIS — I10 PRIMARY HYPERTENSION: Primary | ICD-10-CM

## 2025-04-11 DIAGNOSIS — R53.1 LEFT-SIDED WEAKNESS: ICD-10-CM

## 2025-04-11 DIAGNOSIS — E78.2 MIXED HYPERLIPIDEMIA: ICD-10-CM

## 2025-04-11 NOTE — PROGRESS NOTES
Subjective   Monica Scherer is a 89 y.o. female. Presents today for   Chief Complaint   Patient presents with    Hypertension    Hyperlipidemia    Hospital Follow Up Visit     [facility]     Cerebrovascular Accident       Hospital Course  Patient is a 89 y.o. female presented with left-sided weakness aphasia few facial droop found to have occluded right MCA stroke.  She was on Eliquis and had apparently been compliant.  She has been evaluated.  She underwent thrombectomy..  Patient is work with speech therapy her diet has been upgraded some.  She is going to a skilled nursing rehab from here.  She is to follow-up with neurosurgery Dr. Singh in about a month she is to follow-up with neurology stroke clinic in about 6 to 8 weeks and to follow-up with Dr. Walker cardiology on a previously scheduled 6/23/2025 appointment she will follow-up with her primary care physician in the near future.  Because patient was compliant with Eliquis when this occurred she is being changed to Pradaxa for stroke prophylaxis.  She will need continued standard blood pressure and hyperlipidemia management.  History of Present Illness    History of Present Illness  The patient is a 89-year-old female here for a hospital follow-up after a right MCA stroke that resulted in left-sided weakness, aphasia, and facial droop.    She was previously on Eliquis but was switched to Pradaxa and underwent thrombectomy. She received speech therapy and was discharged to a skilled nursing facility with follow-up arranged with neurology, stroke clinic, neurosurgery, and her cardiologist. Since her release, she has not received home health care due to the presence of a pet pit bull in the home, which they have been unwilling to confine to allow for therapy visits. Her facial droop has resolved, and her word-finding abilities have improved, though not completely returned to baseline. She currently reports no new focal neurological deficits, chest pain, shortness  "of breath, or palpitations. We discussed the possibility of therapy, and they are open to trying to get to [facility] for it.  Review of Systems   Respiratory:  Negative for shortness of breath.    Cardiovascular:  Negative for chest pain and palpitations.   Musculoskeletal:  Positive for gait problem.   Neurological:  Positive for weakness.       Patient Active Problem List   Diagnosis    Vertigo    Temporary cerebral vascular dysfunction    Gastroesophageal reflux disease with esophagitis    Degeneration of intervertebral disc of cervical region    Prediabetes    S/P cholecystectomy    Osteoarthritis of knee    Herpes zoster without complication    Hypertension    Duodenal ulcer    History of pancreatitis    Hyperlipidemia    TIA (transient ischemic attack)    Cerebrovascular accident (CVA) due to thrombosis of left posterior cerebral artery    Paroxysmal atrial fibrillation    General weakness    History of CVA (cerebrovascular accident)    Anticoagulated    Hematuria    Dizziness and giddiness    Rectal bleeding    Acute CVA (cerebrovascular accident)       Social History     Socioeconomic History    Marital status: Single   Tobacco Use    Smoking status: Never     Passive exposure: Never    Smokeless tobacco: Never    Tobacco comments:     caffeine use- \"rare\"   Vaping Use    Vaping status: Never Used   Substance and Sexual Activity    Alcohol use: No    Drug use: No    Sexual activity: Defer       Allergies   Allergen Reactions    Cephalexin Rash    Latex Itching         Objective   Vitals:    04/11/25 1007   BP: 140/90   Pulse: 84   SpO2: 93%   Weight: 67.1 kg (148 lb)   Height: 162.6 cm (64\")     Body mass index is 25.4 kg/m².    Physical Exam  Vitals and nursing note reviewed.   Constitutional:       Appearance: She is well-developed.   HENT:      Head: Normocephalic and atraumatic.   Neck:      Thyroid: No thyromegaly.      Vascular: No JVD.   Cardiovascular:      Rate and Rhythm: Normal rate and " regular rhythm.      Heart sounds: Normal heart sounds. No murmur heard.     No friction rub. No gallop.   Pulmonary:      Effort: Pulmonary effort is normal. No respiratory distress.      Breath sounds: Normal breath sounds. No wheezing or rales.   Abdominal:      General: Bowel sounds are normal. There is no distension.      Palpations: Abdomen is soft.      Tenderness: There is no abdominal tenderness. There is no guarding or rebound.   Musculoskeletal:      Right hand: Normal strength.      Left hand: Decreased strength.      Cervical back: Neck supple.      Right hip: Normal strength.      Left hip: Decreased strength.   Skin:     General: Skin is warm and dry.   Neurological:      Mental Status: She is alert.      Gait: Gait abnormal (WC bound today).   Psychiatric:         Behavior: Behavior normal.         Results  Component      Latest Ref Rng 2/26/2025 2/27/2025 2/28/2025 3/3/2025   Glucose      65 - 99 mg/dL   103 (H)     BUN      8 - 23 mg/dL   14     Creatinine      0.57 - 1.00 mg/dL   0.95     Sodium      136 - 145 mmol/L   140     Potassium      3.5 - 5.2 mmol/L   3.9     Chloride      98 - 107 mmol/L   103     CO2      22.0 - 29.0 mmol/L   28.4     Calcium      8.2 - 9.6 mg/dL   8.8     Albumin      3.5 - 5.2 g/dL   3.2 (L)     Phosphorus      2.5 - 4.5 mg/dL   3.2     Anion Gap      5.0 - 15.0 mmol/L   8.6     BUN/Creatinine Ratio      7.0 - 25.0    14.7     eGFR      >60.0 mL/min/1.73   56.7 (L)     WBC      3.40 - 10.80 10*3/mm3   9.95     RBC      3.77 - 5.28 10*6/mm3   3.96     Hemoglobin      12.0 - 15.9 g/dL   11.3 (L)     Hematocrit      34.0 - 46.6 %   36.5     MCV      79.0 - 97.0 fL   92.2     MCH      26.6 - 33.0 pg   28.5     MCHC      31.5 - 35.7 g/dL   31.0 (L)     RDW      12.3 - 15.4 %   14.8     RDW-SD      37.0 - 54.0 fl   50.5     MPV      6.0 - 12.0 fL   9.4     Platelets      140 - 450 10*3/mm3   205     Total Cholesterol      0 - 200 mg/dL  107      Triglycerides      0 - 150  mg/dL  72      HDL Cholesterol      40 - 60 mg/dL  38 (L)      LDL Cholesterol       0 - 100 mg/dL  54      VLDL Cholesterol      5 - 40 mg/dL  15      LDL/HDL Ratio  1.44      Protime      11.7 - 14.2 Seconds 15.1 (H)       INR      0.90 - 1.10  1.20 (H)       PTT      22.7 - 35.4 seconds 25.6       Hemoglobin A1C      4.80 - 5.60 %  6.50 (H)      Magnesium      1.7 - 2.3 mg/dL    1.7      Component      Latest Ref Rng 6/14/2021 5/23/2022 11/28/2022 6/6/2023 9/30/2024 2/27/2025   Hemoglobin A1C      4.80 - 5.60 % 6.5 (H)  6.6 (H)  6.40 (H)  6.6 (H)  6.40 (H)  6.50 (H)       Legend:  (H) High    Radiology Summary (2/26/25 - 2/28/25):    2/28/25 - Fluoroscopic Video Swallow Study: Swallowing study performed in upright position revealed mild oral and pharyngeal phase impairments, including mild pharyngeal residue, delayed swallow initiation with premature spillage, and penetration to the vocal folds with thin liquids. No aspiration was observed. Limited by patient’s inability to follow commands.    2/27/25 - MRI Brain Without Contrast: Findings consistent with acute to subacute infarcts in the left temporal lobe and left basal ganglia, with small additional infarcts in bilateral occipital lobes. Moderate cerebral atrophy and extensive small vessel white matter disease noted. No hemorrhage.    2/27/25 - CT Head Without Contrast: Demonstrated edema in the right MCA territory consistent with subacute infarct. No hemorrhage. Mild persistent rightward midline shift. Chronic microangiopathy also noted.    2/26/25 - CT Angiogram Head and Neck with AI Analysis + CT Perfusion: Confirmed left MCA M1 occlusion. CT perfusion showed ischemic core of 32 mL with penumbra of 132 mL (total hypoperfusion volume 164 mL). No hemorrhage or mass effect on noncontrast CT. Mild carotid atherosclerosis without significant stenosis. No aneurysm.    2/26/25 - IR Mechanical Thrombectomy (Non-coronary, Arterial): Interventional radiology  procedure performed for emergent stroke. Please refer to IR procedural note for details.    2/26/25 - Chest X-ray (Portable, AP View): No acute pulmonary findings. Study limited due to patient positioning. Heart size stable. Aortic calcification noted.     Assessment & Plan   Diagnoses and all orders for this visit:    1. Primary hypertension (Primary)    2. Mixed hyperlipidemia    3. Paroxysmal atrial fibrillation    4. History of stroke  -     Ambulatory Referral to Physical Therapy for Evaluation & Treatment    5. Expressive aphasia    6. Left-sided weakness  -     Ambulatory Referral to Physical Therapy for Evaluation & Treatment    HTN - bp not goal, will have keep log and monitor;  Recommend HHC, but has dog and very protective per daughter/son with her today  Hld - next ov - check lipid profiel  Aphasia - improving;  denies difficulty swallowing;    PAF - continue pradaxa  With residual weakness, highly recommend therapy to improve mobility and reduce falls, they are agreeable to try get at [facility].       Assessment & Plan       Current outpatient and discharge medications have been reconciled for the patient.  Reviewed by: Amilcar Mauricio DO       -Follow up: 3 months andprn     ________________________________________  Amilcar Mauricio DO, MS    Current Outpatient Medications on File Prior to Visit   Medication Sig Dispense Refill    atorvastatin (LIPITOR) 40 MG tablet Take 1 tablet by mouth Every Night. 30 tablet 0    dabigatran etexilate (PRADAXA) 150 MG capsu Take 1 capsule by mouth Every 12 (Twelve) Hours. Indications: Atrial Fibrillation 60 capsule 0    metoprolol tartrate (LOPRESSOR) 25 MG tablet Take 1 tablet by mouth 2 (Two) Times a Day. 60 tablet 0    pantoprazole (PROTONIX) 40 MG EC tablet Take 1 tablet by mouth Every Morning. 30 tablet 0     No current facility-administered medications on file prior to visit.

## 2025-04-14 ENCOUNTER — TELEPHONE (OUTPATIENT)
Dept: FAMILY MEDICINE CLINIC | Facility: CLINIC | Age: OVER 89
End: 2025-04-14
Payer: MEDICARE

## 2025-04-14 ENCOUNTER — PATIENT OUTREACH (OUTPATIENT)
Dept: CASE MANAGEMENT | Facility: OTHER | Age: OVER 89
End: 2025-04-14
Payer: MEDICARE

## 2025-04-14 NOTE — OUTREACH NOTE
AMBULATORY CASE MANAGEMENT NOTE    Names and Relationships of Patient/Support Persons: Contact: GilmerMonica; Relationship: Self -     Patient Outreach    Call placed to preferred number on chart which is Mrs Scherer's daughter Raquel. Mrs Scherer has given permission to speak with both her son and daughter. She has moved back home with her son providing daily care. Per Raquel now that she is home therapy has been unable to come see her due to her very protective dog. She is working with Dr Mauricio to set up outpt PT.     Call placed to Mrs Scherer. She did not recognize [facility] voice but did remember I was her nurse. Speaking in only 1 or 2 word sentences for the most part but acknowleges she just woke up when ACM called. Short call.         Fatou WASHINGTON  Ambulatory Case Management    4/14/2025, 16:55 EDT   Friday/ patient has not been seen since 2017. Wants to know if can become a patient again .  Patients  see's Dr. Trent Aldana

## 2025-04-29 RX ORDER — ATORVASTATIN CALCIUM 40 MG/1
40 TABLET, FILM COATED ORAL NIGHTLY
Qty: 30 TABLET | Refills: 2 | Status: SHIPPED | OUTPATIENT
Start: 2025-04-29

## 2025-04-29 RX ORDER — METOPROLOL TARTRATE 25 MG/1
25 TABLET, FILM COATED ORAL 2 TIMES DAILY
Qty: 60 TABLET | Refills: 2 | Status: SHIPPED | OUTPATIENT
Start: 2025-04-29

## 2025-04-29 RX ORDER — DABIGATRAN ETEXILATE 150 MG/1
150 CAPSULE ORAL EVERY 12 HOURS SCHEDULED
Qty: 60 CAPSULE | Refills: 2 | Status: SHIPPED | OUTPATIENT
Start: 2025-04-29

## 2025-04-29 RX ORDER — PANTOPRAZOLE SODIUM 40 MG/1
40 TABLET, DELAYED RELEASE ORAL
Qty: 30 TABLET | Refills: 2 | Status: SHIPPED | OUTPATIENT
Start: 2025-04-29

## 2025-05-19 RX ORDER — ATORVASTATIN CALCIUM 40 MG/1
40 TABLET, FILM COATED ORAL NIGHTLY
Qty: 30 TABLET | Refills: 2 | Status: SHIPPED | OUTPATIENT
Start: 2025-05-19

## 2025-05-21 ENCOUNTER — PATIENT OUTREACH (OUTPATIENT)
Dept: CASE MANAGEMENT | Facility: OTHER | Age: OVER 89
End: 2025-05-21
Payer: MEDICARE

## 2025-05-21 NOTE — OUTREACH NOTE
AMBULATORY CASE MANAGEMENT NOTE    Names and Relationships of Patient/Support Persons: Contact: Monica Scherer; Relationship: Self  Contact: Monica Scherer; Relationship: Self  Contact: Raquel Lebron; Relationship: Emergency Contact -     [facility] has attempted outreach to both pt and her daughter without success. Message left with ACM number in case of issues. Routed to PCP          Fatou WASHINGTON  Ambulatory Case Management    5/21/2025, 13:21 EDT

## 2025-05-27 ENCOUNTER — APPOINTMENT (OUTPATIENT)
Dept: CT IMAGING | Facility: HOSPITAL | Age: OVER 89
End: 2025-05-27
Payer: MEDICARE

## 2025-05-27 ENCOUNTER — HOSPITAL ENCOUNTER (INPATIENT)
Facility: HOSPITAL | Age: OVER 89
LOS: 11 days | Discharge: NURSING, REHAB OR LONG-TERM CARE | End: 2025-06-09
Attending: EMERGENCY MEDICINE | Admitting: INTERNAL MEDICINE
Payer: MEDICARE

## 2025-05-27 ENCOUNTER — APPOINTMENT (OUTPATIENT)
Dept: GENERAL RADIOLOGY | Facility: HOSPITAL | Age: OVER 89
End: 2025-05-27
Payer: MEDICARE

## 2025-05-27 ENCOUNTER — TELEPHONE (OUTPATIENT)
Dept: NEUROLOGY | Facility: CLINIC | Age: OVER 89
End: 2025-05-27
Payer: MEDICARE

## 2025-05-27 DIAGNOSIS — R53.1 GENERALIZED WEAKNESS: ICD-10-CM

## 2025-05-27 DIAGNOSIS — E87.6 HYPOKALEMIA: ICD-10-CM

## 2025-05-27 DIAGNOSIS — E86.0 DEHYDRATION: ICD-10-CM

## 2025-05-27 DIAGNOSIS — N39.0 ACUTE UTI: Primary | ICD-10-CM

## 2025-05-27 DIAGNOSIS — S09.90XA CLOSED HEAD INJURY, INITIAL ENCOUNTER: ICD-10-CM
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
## 2025-05-27 DIAGNOSIS — S32.10XA CLOSED FRACTURE OF SACRUM, UNSPECIFIED PORTION OF SACRUM, INITIAL ENCOUNTER: ICD-10-CM

## 2025-05-27 LAB
ALBUMIN SERPL-MCNC: 3.9 G/DL (ref 3.5–5.2)
ALBUMIN/GLOB SERPL: 1.2 G/DL
ALP SERPL-CCNC: 98 U/L (ref 39–117)
ALT SERPL W P-5'-P-CCNC: 13 U/L (ref 1–33)
ANION GAP SERPL CALCULATED.3IONS-SCNC: 19.4 MMOL/L (ref 5–15)
APTT PPP: 26.2 SECONDS (ref 22.7–35.4)
AST SERPL-CCNC: 25 U/L (ref 1–32)
BACTERIA UR QL AUTO: ABNORMAL /HPF
BASOPHILS # BLD AUTO: 0.04 10*3/MM3 (ref 0–0.2)
BASOPHILS NFR BLD AUTO: 0.2 % (ref 0–1.5)
BILIRUB SERPL-MCNC: 2.2 MG/DL (ref 0–1.2)
BILIRUB UR QL STRIP: NEGATIVE
BUN SERPL-MCNC: 17 MG/DL (ref 8–23)
BUN/CREAT SERPL: 15.2 (ref 7–25)
CALCIUM SPEC-SCNC: 9.6 MG/DL (ref 8.2–9.6)
CHLORIDE SERPL-SCNC: 100 MMOL/L (ref 98–107)
CLARITY UR: ABNORMAL
CO2 SERPL-SCNC: 26.6 MMOL/L (ref 22–29)
COLOR UR: YELLOW
CREAT SERPL-MCNC: 1.12 MG/DL (ref 0.57–1)
D-LACTATE SERPL-SCNC: 2 MMOL/L (ref 0.5–2)
DEPRECATED RDW RBC AUTO: 53.1 FL (ref 37–54)
EGFRCR SERPLBLD CKD-EPI 2021: 46.2 ML/MIN/1.73
EOSINOPHIL # BLD AUTO: 0.03 10*3/MM3 (ref 0–0.4)
EOSINOPHIL NFR BLD AUTO: 0.2 % (ref 0.3–6.2)
ERYTHROCYTE [DISTWIDTH] IN BLOOD BY AUTOMATED COUNT: 15.7 % (ref 12.3–15.4)
GEN 5 1HR TROPONIN T REFLEX: 63 NG/L
GLOBULIN UR ELPH-MCNC: 3.2 GM/DL
GLUCOSE SERPL-MCNC: 153 MG/DL (ref 65–99)
GLUCOSE UR STRIP-MCNC: ABNORMAL MG/DL
GRAN CASTS URNS QL MICRO: PRESENT /LPF
HCT VFR BLD AUTO: 42.3 % (ref 34–46.6)
HGB BLD-MCNC: 13.9 G/DL (ref 12–15.9)
HGB UR QL STRIP.AUTO: ABNORMAL
HYALINE CASTS UR QL AUTO: ABNORMAL /LPF
IMM GRANULOCYTES # BLD AUTO: 0.18 10*3/MM3 (ref 0–0.05)
IMM GRANULOCYTES NFR BLD AUTO: 1 % (ref 0–0.5)
INR PPP: 1.25 (ref 0.9–1.1)
KETONES UR QL STRIP: ABNORMAL
LEUKOCYTE ESTERASE UR QL STRIP.AUTO: ABNORMAL
LYMPHOCYTES # BLD AUTO: 1.28 10*3/MM3 (ref 0.7–3.1)
LYMPHOCYTES NFR BLD AUTO: 7.5 % (ref 19.6–45.3)
MAGNESIUM SERPL-MCNC: 1.7 MG/DL (ref 1.7–2.3)
MCH RBC QN AUTO: 30.3 PG (ref 26.6–33)
MCHC RBC AUTO-ENTMCNC: 32.9 G/DL (ref 31.5–35.7)
MCV RBC AUTO: 92.2 FL (ref 79–97)
MONOCYTES # BLD AUTO: 0.76 10*3/MM3 (ref 0.1–0.9)
MONOCYTES NFR BLD AUTO: 4.4 % (ref 5–12)
NEUTROPHILS NFR BLD AUTO: 14.89 10*3/MM3 (ref 1.7–7)
NEUTROPHILS NFR BLD AUTO: 86.7 % (ref 42.7–76)
NITRITE UR QL STRIP: POSITIVE
NRBC BLD AUTO-RTO: 0 /100 WBC (ref 0–0.2)
NT-PROBNP SERPL-MCNC: 2214 PG/ML (ref 0–1800)
PH UR STRIP.AUTO: 6 [PH] (ref 5–8)
PHOSPHATE SERPL-MCNC: 3.3 MG/DL (ref 2.5–4.5)
PLATELET # BLD AUTO: 261 10*3/MM3 (ref 140–450)
PMV BLD AUTO: 9.4 FL (ref 6–12)
POTASSIUM SERPL-SCNC: 2.6 MMOL/L (ref 3.5–5.2)
PROT SERPL-MCNC: 7.1 G/DL (ref 6–8.5)
PROT UR QL STRIP: ABNORMAL
PROTHROMBIN TIME: 15.7 SECONDS (ref 11.7–14.2)
RBC # BLD AUTO: 4.59 10*6/MM3 (ref 3.77–5.28)
RBC # UR STRIP: ABNORMAL /HPF
REF LAB TEST METHOD: ABNORMAL
SODIUM SERPL-SCNC: 146 MMOL/L (ref 136–145)
SP GR UR STRIP: 1.02 (ref 1–1.03)
SQUAMOUS #/AREA URNS HPF: ABNORMAL /HPF
TROPONIN T % DELTA: -7
TROPONIN T NUMERIC DELTA: -5 NG/L
TROPONIN T SERPL HS-MCNC: 68 NG/L
UROBILINOGEN UR QL STRIP: ABNORMAL
WBC # UR STRIP: ABNORMAL /HPF
WBC NRBC COR # BLD AUTO: 17.18 10*3/MM3 (ref 3.4–10.8)

## 2025-05-27 PROCEDURE — 84100 ASSAY OF PHOSPHORUS: CPT | Performed by: EMERGENCY MEDICINE

## 2025-05-27 PROCEDURE — 93005 ELECTROCARDIOGRAM TRACING: CPT | Performed by: EMERGENCY MEDICINE

## 2025-05-27 PROCEDURE — G0378 HOSPITAL OBSERVATION PER HR: HCPCS

## 2025-05-27 PROCEDURE — 87040 BLOOD CULTURE FOR BACTERIA: CPT | Performed by: PHYSICIAN ASSISTANT

## 2025-05-27 PROCEDURE — 72125 CT NECK SPINE W/O DYE: CPT

## 2025-05-27 PROCEDURE — 87086 URINE CULTURE/COLONY COUNT: CPT | Performed by: EMERGENCY MEDICINE

## 2025-05-27 PROCEDURE — 25510000001 IOPAMIDOL 61 % SOLUTION: Performed by: EMERGENCY MEDICINE

## 2025-05-27 PROCEDURE — 93010 ELECTROCARDIOGRAM REPORT: CPT | Performed by: INTERNAL MEDICINE

## 2025-05-27 PROCEDURE — 80053 COMPREHEN METABOLIC PANEL: CPT | Performed by: EMERGENCY MEDICINE

## 2025-05-27 PROCEDURE — 83735 ASSAY OF MAGNESIUM: CPT | Performed by: EMERGENCY MEDICINE

## 2025-05-27 PROCEDURE — 25010000002 POTASSIUM CHLORIDE 10 MEQ/100ML SOLUTION: Performed by: PHYSICIAN ASSISTANT

## 2025-05-27 PROCEDURE — 83880 ASSAY OF NATRIURETIC PEPTIDE: CPT | Performed by: EMERGENCY MEDICINE

## 2025-05-27 PROCEDURE — P9612 CATHETERIZE FOR URINE SPEC: HCPCS

## 2025-05-27 PROCEDURE — 25810000003 LACTATED RINGERS SOLUTION: Performed by: PHYSICIAN ASSISTANT

## 2025-05-27 PROCEDURE — 81001 URINALYSIS AUTO W/SCOPE: CPT | Performed by: EMERGENCY MEDICINE

## 2025-05-27 PROCEDURE — 85025 COMPLETE CBC W/AUTO DIFF WBC: CPT | Performed by: EMERGENCY MEDICINE

## 2025-05-27 PROCEDURE — 87088 URINE BACTERIA CULTURE: CPT | Performed by: EMERGENCY MEDICINE

## 2025-05-27 PROCEDURE — 36415 COLL VENOUS BLD VENIPUNCTURE: CPT

## 2025-05-27 PROCEDURE — 84484 ASSAY OF TROPONIN QUANT: CPT | Performed by: EMERGENCY MEDICINE

## 2025-05-27 PROCEDURE — 99285 EMERGENCY DEPT VISIT HI MDM: CPT

## 2025-05-27 PROCEDURE — 36415 COLL VENOUS BLD VENIPUNCTURE: CPT | Performed by: EMERGENCY MEDICINE

## 2025-05-27 PROCEDURE — 83605 ASSAY OF LACTIC ACID: CPT | Performed by: PHYSICIAN ASSISTANT

## 2025-05-27 PROCEDURE — 87186 SC STD MICRODIL/AGAR DIL: CPT | Performed by: EMERGENCY MEDICINE

## 2025-05-27 PROCEDURE — 85730 THROMBOPLASTIN TIME PARTIAL: CPT | Performed by: EMERGENCY MEDICINE

## 2025-05-27 PROCEDURE — 74177 CT ABD & PELVIS W/CONTRAST: CPT

## 2025-05-27 PROCEDURE — 71045 X-RAY EXAM CHEST 1 VIEW: CPT

## 2025-05-27 PROCEDURE — 85610 PROTHROMBIN TIME: CPT | Performed by: EMERGENCY MEDICINE

## 2025-05-27 PROCEDURE — 70450 CT HEAD/BRAIN W/O DYE: CPT

## 2025-05-27 RX ORDER — POTASSIUM CHLORIDE 7.45 MG/ML
10 INJECTION INTRAVENOUS ONCE
Status: COMPLETED | OUTPATIENT
Start: 2025-05-27 | End: 2025-05-28

## 2025-05-27 RX ORDER — POTASSIUM CHLORIDE 1.5 G/1.58G
20 POWDER, FOR SOLUTION ORAL ONCE
Status: COMPLETED | OUTPATIENT
Start: 2025-05-27 | End: 2025-05-27

## 2025-05-27 RX ORDER — POTASSIUM CHLORIDE 7.45 MG/ML
10 INJECTION INTRAVENOUS ONCE
Status: COMPLETED | OUTPATIENT
Start: 2025-05-27 | End: 2025-05-27

## 2025-05-27 RX ORDER — POTASSIUM CHLORIDE 7.45 MG/ML
10 INJECTION INTRAVENOUS ONCE
Status: COMPLETED | OUTPATIENT
Start: 2025-05-28 | End: 2025-05-28

## 2025-05-27 RX ORDER — POTASSIUM CHLORIDE 1500 MG/1
20 TABLET, EXTENDED RELEASE ORAL ONCE
Status: DISCONTINUED | OUTPATIENT
Start: 2025-05-27 | End: 2025-05-27

## 2025-05-27 RX ORDER — IOPAMIDOL 612 MG/ML
85 INJECTION, SOLUTION INTRAVASCULAR
Status: COMPLETED | OUTPATIENT
Start: 2025-05-27 | End: 2025-05-27

## 2025-05-27 RX ADMIN — POTASSIUM CHLORIDE 10 MEQ: 7.46 INJECTION, SOLUTION INTRAVENOUS at 20:39

## 2025-05-27 RX ADMIN — SODIUM CHLORIDE, POTASSIUM CHLORIDE, SODIUM LACTATE AND CALCIUM CHLORIDE 1000 ML: 600; 310; 30; 20 INJECTION, SOLUTION INTRAVENOUS at 20:43

## 2025-05-27 RX ADMIN — POTASSIUM CHLORIDE 20 MEQ: 1.5 POWDER, FOR SOLUTION ORAL at 20:40

## 2025-05-27 RX ADMIN — IOPAMIDOL 85 ML: 612 INJECTION, SOLUTION INTRAVENOUS at 21:07

## 2025-05-27 RX ADMIN — POTASSIUM CHLORIDE 10 MEQ: 7.46 INJECTION, SOLUTION INTRAVENOUS at 22:31

## 2025-05-27 NOTE — ED NOTES
"Pt to ED from home, lives there with family. Original call out was for a stroke due to increase in confusion. Pt has some confusion at baseline. EMS stated family had two contradicting stories stated she fell today the other family member stated she did not.     Pt is clearly confused at time of triage and stated she is here because she \"is sick all over\".  Pt is aware of the year and city and who she is.     Pt is on blood thinner, possibly hx of a-fib   "

## 2025-05-28 ENCOUNTER — APPOINTMENT (OUTPATIENT)
Dept: GENERAL RADIOLOGY | Facility: HOSPITAL | Age: OVER 89
End: 2025-05-28
Payer: MEDICARE

## 2025-05-28 LAB
ALBUMIN SERPL-MCNC: 3 G/DL (ref 3.5–5.2)
ALBUMIN/GLOB SERPL: 1.3 G/DL
ALP SERPL-CCNC: 72 U/L (ref 39–117)
ALT SERPL W P-5'-P-CCNC: 12 U/L (ref 1–33)
ANION GAP SERPL CALCULATED.3IONS-SCNC: 14.1 MMOL/L (ref 5–15)
AST SERPL-CCNC: 18 U/L (ref 1–32)
BASOPHILS # BLD AUTO: 0.04 10*3/MM3 (ref 0–0.2)
BASOPHILS NFR BLD AUTO: 0.3 % (ref 0–1.5)
BILIRUB SERPL-MCNC: 1.4 MG/DL (ref 0–1.2)
BUN SERPL-MCNC: 16 MG/DL (ref 8–23)
BUN/CREAT SERPL: 18.6 (ref 7–25)
CALCIUM SPEC-SCNC: 8 MG/DL (ref 8.2–9.6)
CHLORIDE SERPL-SCNC: 103 MMOL/L (ref 98–107)
CO2 SERPL-SCNC: 21.9 MMOL/L (ref 22–29)
CREAT SERPL-MCNC: 0.86 MG/DL (ref 0.57–1)
DEPRECATED RDW RBC AUTO: 55.6 FL (ref 37–54)
EGFRCR SERPLBLD CKD-EPI 2021: 63.5 ML/MIN/1.73
EOSINOPHIL # BLD AUTO: 0.03 10*3/MM3 (ref 0–0.4)
EOSINOPHIL NFR BLD AUTO: 0.3 % (ref 0.3–6.2)
ERYTHROCYTE [DISTWIDTH] IN BLOOD BY AUTOMATED COUNT: 16.5 % (ref 12.3–15.4)
GLOBULIN UR ELPH-MCNC: 2.4 GM/DL
GLUCOSE SERPL-MCNC: 100 MG/DL (ref 65–99)
HCT VFR BLD AUTO: 33.2 % (ref 34–46.6)
HGB BLD-MCNC: 10.9 G/DL (ref 12–15.9)
IMM GRANULOCYTES # BLD AUTO: 0.08 10*3/MM3 (ref 0–0.05)
IMM GRANULOCYTES NFR BLD AUTO: 0.7 % (ref 0–0.5)
LYMPHOCYTES # BLD AUTO: 2.46 10*3/MM3 (ref 0.7–3.1)
LYMPHOCYTES NFR BLD AUTO: 21 % (ref 19.6–45.3)
MAGNESIUM SERPL-MCNC: 1.5 MG/DL (ref 1.7–2.3)
MCH RBC QN AUTO: 30.3 PG (ref 26.6–33)
MCHC RBC AUTO-ENTMCNC: 32.8 G/DL (ref 31.5–35.7)
MCV RBC AUTO: 92.2 FL (ref 79–97)
MONOCYTES # BLD AUTO: 0.66 10*3/MM3 (ref 0.1–0.9)
MONOCYTES NFR BLD AUTO: 5.6 % (ref 5–12)
NEUTROPHILS NFR BLD AUTO: 72.1 % (ref 42.7–76)
NEUTROPHILS NFR BLD AUTO: 8.45 10*3/MM3 (ref 1.7–7)
NRBC BLD AUTO-RTO: 0 /100 WBC (ref 0–0.2)
PLATELET # BLD AUTO: 213 10*3/MM3 (ref 140–450)
PMV BLD AUTO: 9.4 FL (ref 6–12)
POTASSIUM SERPL-SCNC: 3.5 MMOL/L (ref 3.5–5.2)
POTASSIUM SERPL-SCNC: 4 MMOL/L (ref 3.5–5.2)
PROT SERPL-MCNC: 5.4 G/DL (ref 6–8.5)
QT INTERVAL: 358 MS
QTC INTERVAL: 414 MS
RBC # BLD AUTO: 3.6 10*6/MM3 (ref 3.77–5.28)
SODIUM SERPL-SCNC: 139 MMOL/L (ref 136–145)
WBC NRBC COR # BLD AUTO: 11.72 10*3/MM3 (ref 3.4–10.8)

## 2025-05-28 PROCEDURE — 85025 COMPLETE CBC W/AUTO DIFF WBC: CPT | Performed by: INTERNAL MEDICINE

## 2025-05-28 PROCEDURE — 72170 X-RAY EXAM OF PELVIS: CPT

## 2025-05-28 PROCEDURE — G0378 HOSPITAL OBSERVATION PER HR: HCPCS

## 2025-05-28 PROCEDURE — 25010000002 AMPICILLIN-SULBACTAM PER 1.5 G: Performed by: INTERNAL MEDICINE

## 2025-05-28 PROCEDURE — 25010000002 MAGNESIUM SULFATE 2 GM/50ML SOLUTION: Performed by: INTERNAL MEDICINE

## 2025-05-28 PROCEDURE — 83735 ASSAY OF MAGNESIUM: CPT | Performed by: INTERNAL MEDICINE

## 2025-05-28 PROCEDURE — 84132 ASSAY OF SERUM POTASSIUM: CPT | Performed by: INTERNAL MEDICINE

## 2025-05-28 PROCEDURE — 97530 THERAPEUTIC ACTIVITIES: CPT

## 2025-05-28 PROCEDURE — 72100 X-RAY EXAM L-S SPINE 2/3 VWS: CPT

## 2025-05-28 PROCEDURE — 80053 COMPREHEN METABOLIC PANEL: CPT | Performed by: INTERNAL MEDICINE

## 2025-05-28 PROCEDURE — 25810000003 SODIUM CHLORIDE 0.9 % SOLUTION: Performed by: INTERNAL MEDICINE

## 2025-05-28 PROCEDURE — 25010000002 PIPERACILLIN SOD-TAZOBACTAM PER 1 G: Performed by: PHYSICIAN ASSISTANT

## 2025-05-28 PROCEDURE — 92610 EVALUATE SWALLOWING FUNCTION: CPT

## 2025-05-28 PROCEDURE — 97162 PT EVAL MOD COMPLEX 30 MIN: CPT

## 2025-05-28 PROCEDURE — 25010000002 POTASSIUM CHLORIDE 10 MEQ/100ML SOLUTION: Performed by: PHYSICIAN ASSISTANT

## 2025-05-28 RX ORDER — DABIGATRAN ETEXILATE 150 MG/1
150 CAPSULE ORAL EVERY 12 HOURS SCHEDULED
Status: DISCONTINUED | OUTPATIENT
Start: 2025-05-28 | End: 2025-06-09 | Stop reason: HOSPADM

## 2025-05-28 RX ORDER — ATORVASTATIN CALCIUM 20 MG/1
40 TABLET, FILM COATED ORAL NIGHTLY
Status: DISCONTINUED | OUTPATIENT
Start: 2025-05-28 | End: 2025-06-09 | Stop reason: HOSPADM

## 2025-05-28 RX ORDER — METOPROLOL TARTRATE 25 MG/1
25 TABLET, FILM COATED ORAL 2 TIMES DAILY
Status: DISCONTINUED | OUTPATIENT
Start: 2025-05-28 | End: 2025-06-09 | Stop reason: HOSPADM

## 2025-05-28 RX ORDER — ONDANSETRON 2 MG/ML
4 INJECTION INTRAMUSCULAR; INTRAVENOUS EVERY 6 HOURS PRN
Status: DISCONTINUED | OUTPATIENT
Start: 2025-05-28 | End: 2025-06-09 | Stop reason: HOSPADM

## 2025-05-28 RX ORDER — POLYETHYLENE GLYCOL 3350 17 G/17G
17 POWDER, FOR SOLUTION ORAL DAILY PRN
Status: DISCONTINUED | OUTPATIENT
Start: 2025-05-28 | End: 2025-06-09 | Stop reason: HOSPADM

## 2025-05-28 RX ORDER — BISACODYL 5 MG/1
5 TABLET, DELAYED RELEASE ORAL DAILY PRN
Status: DISCONTINUED | OUTPATIENT
Start: 2025-05-28 | End: 2025-06-09 | Stop reason: HOSPADM

## 2025-05-28 RX ORDER — AMOXICILLIN 250 MG
2 CAPSULE ORAL 2 TIMES DAILY PRN
Status: DISCONTINUED | OUTPATIENT
Start: 2025-05-28 | End: 2025-06-09 | Stop reason: HOSPADM

## 2025-05-28 RX ORDER — SODIUM CHLORIDE 9 MG/ML
75 INJECTION, SOLUTION INTRAVENOUS CONTINUOUS
Status: ACTIVE | OUTPATIENT
Start: 2025-05-28 | End: 2025-05-29

## 2025-05-28 RX ORDER — PANTOPRAZOLE SODIUM 40 MG/1
40 TABLET, DELAYED RELEASE ORAL
Status: DISCONTINUED | OUTPATIENT
Start: 2025-05-28 | End: 2025-06-09 | Stop reason: HOSPADM

## 2025-05-28 RX ORDER — SODIUM CHLORIDE 9 MG/ML
40 INJECTION, SOLUTION INTRAVENOUS AS NEEDED
Status: DISCONTINUED | OUTPATIENT
Start: 2025-05-28 | End: 2025-06-09 | Stop reason: HOSPADM

## 2025-05-28 RX ORDER — SODIUM CHLORIDE 0.9 % (FLUSH) 0.9 %
10 SYRINGE (ML) INJECTION EVERY 12 HOURS SCHEDULED
Status: DISCONTINUED | OUTPATIENT
Start: 2025-05-28 | End: 2025-06-09 | Stop reason: HOSPADM

## 2025-05-28 RX ORDER — BISACODYL 10 MG
10 SUPPOSITORY, RECTAL RECTAL DAILY PRN
Status: DISCONTINUED | OUTPATIENT
Start: 2025-05-28 | End: 2025-06-09 | Stop reason: HOSPADM

## 2025-05-28 RX ORDER — POTASSIUM CHLORIDE 1.5 G/1.58G
40 POWDER, FOR SOLUTION ORAL EVERY 4 HOURS
Status: COMPLETED | OUTPATIENT
Start: 2025-05-28 | End: 2025-05-28

## 2025-05-28 RX ORDER — SODIUM CHLORIDE 0.9 % (FLUSH) 0.9 %
10 SYRINGE (ML) INJECTION AS NEEDED
Status: DISCONTINUED | OUTPATIENT
Start: 2025-05-28 | End: 2025-06-09 | Stop reason: HOSPADM

## 2025-05-28 RX ORDER — MAGNESIUM SULFATE HEPTAHYDRATE 40 MG/ML
2 INJECTION, SOLUTION INTRAVENOUS
Status: COMPLETED | OUTPATIENT
Start: 2025-05-28 | End: 2025-05-28

## 2025-05-28 RX ADMIN — MAGNESIUM SULFATE HEPTAHYDRATE 2 G: 40 INJECTION, SOLUTION INTRAVENOUS at 14:32

## 2025-05-28 RX ADMIN — SODIUM CHLORIDE 1.5 G: 900 INJECTION INTRAVENOUS at 10:32

## 2025-05-28 RX ADMIN — ATORVASTATIN CALCIUM 40 MG: 20 TABLET, FILM COATED ORAL at 21:54

## 2025-05-28 RX ADMIN — POTASSIUM CHLORIDE 10 MEQ: 7.46 INJECTION, SOLUTION INTRAVENOUS at 01:36

## 2025-05-28 RX ADMIN — POTASSIUM CHLORIDE 10 MEQ: 7.46 INJECTION, SOLUTION INTRAVENOUS at 03:22

## 2025-05-28 RX ADMIN — SODIUM CHLORIDE 75 ML/HR: 9 INJECTION, SOLUTION INTRAVENOUS at 01:36

## 2025-05-28 RX ADMIN — PIPERACILLIN AND TAZOBACTAM 3.38 G: 3; .375 INJECTION, POWDER, FOR SOLUTION INTRAVENOUS at 05:00

## 2025-05-28 RX ADMIN — POTASSIUM CHLORIDE 40 MEQ: 1.5 POWDER, FOR SOLUTION ORAL at 16:55

## 2025-05-28 RX ADMIN — DABIGATRAN ETEXILATE 150 MG: 150 CAPSULE ORAL at 08:20

## 2025-05-28 RX ADMIN — METOPROLOL TARTRATE 25 MG: 25 TABLET, FILM COATED ORAL at 08:20

## 2025-05-28 RX ADMIN — Medication 10 ML: at 22:11

## 2025-05-28 RX ADMIN — METOPROLOL TARTRATE 25 MG: 25 TABLET, FILM COATED ORAL at 21:45

## 2025-05-28 RX ADMIN — POTASSIUM CHLORIDE 10 MEQ: 7.46 INJECTION, SOLUTION INTRAVENOUS at 00:45

## 2025-05-28 RX ADMIN — DABIGATRAN ETEXILATE 150 MG: 150 CAPSULE ORAL at 21:45

## 2025-05-28 RX ADMIN — SODIUM CHLORIDE 1.5 G: 900 INJECTION INTRAVENOUS at 21:45

## 2025-05-28 RX ADMIN — SODIUM CHLORIDE 1.5 G: 900 INJECTION INTRAVENOUS at 16:54

## 2025-05-28 RX ADMIN — Medication 10 ML: at 01:36

## 2025-05-28 RX ADMIN — MAGNESIUM SULFATE HEPTAHYDRATE 2 G: 40 INJECTION, SOLUTION INTRAVENOUS at 12:33

## 2025-05-28 RX ADMIN — ATORVASTATIN CALCIUM 40 MG: 20 TABLET, FILM COATED ORAL at 04:48

## 2025-05-28 RX ADMIN — POTASSIUM CHLORIDE 10 MEQ: 7.46 INJECTION, SOLUTION INTRAVENOUS at 04:48

## 2025-05-28 RX ADMIN — MAGNESIUM SULFATE HEPTAHYDRATE 2 G: 40 INJECTION, SOLUTION INTRAVENOUS at 16:55

## 2025-05-28 RX ADMIN — Medication 10 ML: at 09:58

## 2025-05-28 RX ADMIN — POTASSIUM CHLORIDE 40 MEQ: 1.5 POWDER, FOR SOLUTION ORAL at 12:33

## 2025-05-28 RX ADMIN — SODIUM CHLORIDE 75 ML/HR: 9 INJECTION, SOLUTION INTRAVENOUS at 03:23

## 2025-05-28 RX ADMIN — PANTOPRAZOLE SODIUM 40 MG: 40 TABLET, DELAYED RELEASE ORAL at 05:00

## 2025-05-28 NOTE — PLAN OF CARE
Goal Outcome Evaluation:  Plan of Care Reviewed With: patient           Outcome Evaluation: Pt. is a 89 year old Female admitted with an Acute UTI (h/o CVA).  Pt.'s family reports that prior to admission she was using a Rwx for ambulation.  Pt. currently presents with decreased ROM, decreased balance, and decreased tolerance to functional activity.  This PM, pt. requires Min. assist x 1 for bed mobility.  Once sitting EOB, pt. became agitated and adamantly refused to attempt sit <-> stand transfers despite Max. verbal encouragement from P.T. staff and family member.  Pt. will benefit from skilled inpt. P.T. to address her functional deficits and to assist pt. in regaining her maximum level of independence with functional mobility. Pt.'s progress will depend on her ability to follow commands and cooperate with P.T.    Anticipated Discharge Disposition (PT): skilled nursing facility

## 2025-05-28 NOTE — PLAN OF CARE
Goal Outcome Evaluation:      Patient up to floor from ED before 0000. Patient alert and able to express needs. Pt potassium replaced. Family home for the evening. Bed alarm on. Bed in lowest position. Call light within reach                                       Pulses equal bilaterally, no edema present.

## 2025-05-28 NOTE — H&P
Patient Name:  Monica Scherer  YOB: 1933  MRN:  [id number]  Admit Date:  5/27/2025  Patient Care Team:  Amilcar Mauricio DO as PCP - General (Family Medicine)  Fatou Crawford RN as Ambulatory  ([facility])  Omar Bassett MD as Consulting Physician (Cardiology)      Subjective   History Present Illness     Chief Complaint   Patient presents with   • Altered Mental Status   • Weakness - Generalized         History of Present Illness  This is a 89-year-old female with history of atrial fibrillation, hyperlipidemia, hypertension, CVA, presents to the hospital, after a fall at home, patient appears to be at her baseline neurologic status, she had not been feeling well over the last few days.  Upon further evaluation in the emergency room, patient is found to have clinical findings consistent with UTI.  She was also found to have hypokalemia and hyponatremia.  CT scan of pelvis also shows S4 sacral fracture.  Patient will be admitted to hospitalist service for further workup of symptoms.        Review of Systems   Review of Systems   Constitutional: Negative for activity change and appetite change.   HENT: Negative for congestion and dental problem.    Eyes: Negative for discharge and itching.   Respiratory: Negative for apnea and chest tightness.    Cardiovascular: Negative for chest pain and palpitations.   Gastrointestinal: Negative for abdominal distention and abdominal pain.   Endocrine: Negative for cold intolerance and heat intolerance.   Genitourinary: Negative for difficulty urinating and frequency.   Musculoskeletal: Negative for arthralgias and back pain.   Skin: Negative for color change and pallor.   Allergic/Immunologic: Negative for environmental allergies and food allergies.   Neurological: Negative for dizziness and facial asymmetry.   Hematological: Negative for adenopathy. Does not bruise/bleed easily.   Psychiatric/Behavioral: Negative for agitation and  "suicidal ideas.       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;  Service:    • COLONOSCOPY N/A 9/16/2021    Procedure: COLONOSCOPY TO CECUM WITH HOT SNARE POLYPECTOMIES AND COLD BX POLYPECTOMY;  Surgeon: Emerson Izaguirre MD;  Location:  [facility];  Service: Gastroenterology;  Laterality: N/A;  pre: RECTAL BLEEDING, FAMILY HX OF COLON CANCER  post: INTERNAL AND EXTERNAL HEMORRHOIDS, DIVERTICULOSIS, POLYPS   • ENDOSCOPY N/A 2/22/2018    Z-line regular, 35 cm from incisors, normal esophagus, gastritis, bilious gastric fluid, one non-bleeding duodenal ulcer w/no stigmata of bleeding, Path: DUODENUM: FRAGMENTS OF GASTRIC TYPE MUCOSA WITH HYPERPLASIA (FOVEOLAR) AND PATCHY MIXED INFLAMMATION IN THE LAMINA PROPRIA WITH FOCAL FIBROSIS, COMMENT: 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    • EYE SURGERY      tre cataracts     History reviewed. No pertinent family history.  Social History     Tobacco Use   • Smoking status: Never     Passive exposure: Never   • Smokeless tobacco: Never   • Tobacco comments:     caffeine use- \"rare\"   Vaping Use   • Vaping status: Never Used   Substance Use Topics   • Alcohol use: No   • Drug use: No     No current facility-administered medications on file prior to encounter.     Current Outpatient Medications on File Prior to Encounter   Medication Sig Dispense Refill   • atorvastatin (LIPITOR) 40 MG tablet Take 1 tablet " by mouth Every Night. 30 tablet 2   • dabigatran etexilate (PRADAXA) 150 MG capsu Take 1 capsule by mouth Every 12 (Twelve) Hours. 60 capsule 2   • metoprolol tartrate (LOPRESSOR) 25 MG tablet Take 1 tablet by mouth 2 (Two) Times a Day. 60 tablet 2   • pantoprazole (PROTONIX) 40 MG EC tablet Take 1 tablet by mouth Every Morning. 30 tablet 2     Allergies   Allergen Reactions   • Cephalexin Rash   • Latex Itching       Objective    Objective     Vital Signs  Temp:  [97.7 °F (36.5 °C)] 97.7 °F (36.5 °C)  Heart Rate:  [] 84  Resp:  [15-16] 15  BP: (138-187)/() 164/94  SpO2:  [94 %-96 %] 95 %  on   ;   Device (Oxygen Therapy): room air  Body mass index is 25.39 kg/m².    Physical Exam  General, awake and alert.  Head and ENT, normocephalic and atraumatic.  Lungs, symmetric expansion, equal air entry bilaterally.  Heart, regular rate and rhythm.  Abdomen, soft and nontender.  Extremities, no clubbing or cyanosis.  Neuro, no focal deficits.  Skin: Warm and no rash.  Psych, normal mood and affect.  Musculoskeletal, joint examination is grossly normal.    Results Review:  I reviewed the patient's new clinical results.  I reviewed the patient's new imaging results and agree with the interpretation.  I reviewed the patient's other test results and agree with the interpretation  I personally viewed and interpreted the patient's EKG/Telemetry data  Discussed with ED provider.    Lab Results (last 24 hours)       Procedure Component Value Units Date/Time    CBC & Differential [[id number]]  (Abnormal) Collected: 05/27/25 1925    Specimen: Blood Updated: 05/27/25 1936    Narrative:      The following orders were created for panel order CBC & Differential.  Procedure                               Abnormality         Status                     ---------                               -----------         ------                     CBC Auto Differential[[id number]]        Abnormal            Final result                  Please view results for these tests on the individual orders.    Comprehensive Metabolic Panel [[id number]]  (Abnormal) Collected: 05/27/25 1925    Specimen: Blood Updated: 05/27/25 2010     Glucose 153 mg/dL      BUN 17.0 mg/dL      Creatinine 1.12 mg/dL      Sodium 146 mmol/L      Potassium 2.6 mmol/L      Chloride 100 mmol/L      CO2 26.6 mmol/L      Calcium 9.6 mg/dL      Total Protein 7.1 g/dL      Albumin 3.9 g/dL      ALT (SGPT) 13 U/L      AST (SGOT) 25 U/L      Alkaline Phosphatase 98 U/L      Total Bilirubin 2.2 mg/dL      Globulin 3.2 gm/dL      A/G Ratio 1.2 g/dL      BUN/Creatinine Ratio 15.2     Anion Gap 19.4 mmol/L      eGFR 46.2 mL/min/1.73     Narrative:      GFR Categories in Chronic Kidney Disease (CKD)              GFR Category          GFR (mL/min/1.73)    Interpretation  G1                    90 or greater        Normal or high (1)  G2                    60-89                Mild decrease (1)  G3a                   45-59                Mild to moderate decrease  G3b                   30-44                Moderate to severe decrease  G4                    15-29                Severe decrease  G5                    14 or less           Kidney failure    (1)In the absence of evidence of kidney disease, neither GFR category G1 or G2 fulfill the criteria for CKD.    eGFR calculation 2021 CKD-EPI creatinine equation, which does not include race as a factor    Protime-INR [[id number]]  (Abnormal) Collected: 05/27/25 1925    Specimen: Blood Updated: 05/27/25 2005     Protime 15.7 Seconds      INR 1.25    aPTT [[id number]]  (Normal) Collected: 05/27/25 1925    Specimen: Blood Updated: 05/27/25 2005     PTT 26.2 seconds     Magnesium [[id number]]  (Normal) Collected: 05/27/25 1925    Specimen: Blood Updated: 05/27/25 2006     Magnesium 1.7 mg/dL     Phosphorus [[id number]]  (Normal) Collected: 05/27/25 1925    Specimen: Blood Updated: 05/27/25 1959     Phosphorus 3.3 mg/dL     High Sensitivity Troponin T  [[id number]]  (Abnormal) Collected: 05/27/25 1925    Specimen: Blood Updated: 05/27/25 2011     HS Troponin T 68 ng/L     Narrative:      High Sensitive Troponin T Reference Range:  <14.0 ng/L- Negative Female for AMI  <22.0 ng/L- Negative Male for AMI  >=14 - Abnormal Female indicating possible myocardial injury.  >=22 - Abnormal Male indicating possible myocardial injury.   Clinicians would have to utilize clinical acumen, EKG, Troponin, and serial changes to determine if it is an Acute Myocardial Infarction or myocardial injury due to an underlying chronic condition.         BNP [[id number]]  (Abnormal) Collected: 05/27/25 1925    Specimen: Blood Updated: 05/27/25 1959     proBNP 2,214.0 pg/mL     Narrative:      This assay is used as an aid in the diagnosis of individuals suspected of having heart failure. It can be used as an aid in the diagnosis of acute decompensated heart failure (ADHF) in patients presenting with signs and symptoms of ADHF to the emergency department (ED). In addition, NT-proBNP of <300 pg/mL indicates ADHF is not likely.    Age Range Result Interpretation  NT-proBNP Concentration (pg/mL:      <50             Positive            >450                   Gray                 300-450                    Negative             <300    50-75           Positive            >900                  Gray                300-900                  Negative            <300      >75             Positive            >1800                  Gray                300-1800                  Negative            <300    CBC Auto Differential [[id number]]  (Abnormal) Collected: 05/27/25 1925    Specimen: Blood Updated: 05/27/25 1936     WBC 17.18 10*3/mm3      RBC 4.59 10*6/mm3      Hemoglobin 13.9 g/dL      Hematocrit 42.3 %      MCV 92.2 fL      MCH 30.3 pg      MCHC 32.9 g/dL      RDW 15.7 %      RDW-SD 53.1 fl      MPV 9.4 fL      Platelets 261 10*3/mm3      Neutrophil % 86.7 %      Lymphocyte % 7.5 %      Monocyte % 4.4 %       Eosinophil % 0.2 %      Basophil % 0.2 %      Immature Grans % 1.0 %      Neutrophils, Absolute 14.89 10*3/mm3      Lymphocytes, Absolute 1.28 10*3/mm3      Monocytes, Absolute 0.76 10*3/mm3      Eosinophils, Absolute 0.03 10*3/mm3      Basophils, Absolute 0.04 10*3/mm3      Immature Grans, Absolute 0.18 10*3/mm3      nRBC 0.0 /100 WBC     High Sensitivity Troponin T 1Hr [[id number]]  (Abnormal) Collected: 05/27/25 2125    Specimen: Blood from Arm, Left Updated: 05/27/25 2156     HS Troponin T 63 ng/L      Troponin T Numeric Delta -5 ng/L      Troponin T % Delta -7    Narrative:      High Sensitive Troponin T Reference Range:  <14.0 ng/L- Negative Female for AMI  <22.0 ng/L- Negative Male for AMI  >=14 - Abnormal Female indicating possible myocardial injury.  >=22 - Abnormal Male indicating possible myocardial injury.   Clinicians would have to utilize clinical acumen, EKG, Troponin, and serial changes to determine if it is an Acute Myocardial Infarction or myocardial injury due to an underlying chronic condition.         Urinalysis With Culture If Indicated - Urine, Catheter [[id number]]  (Abnormal) Collected: 05/27/25 2150    Specimen: Urine, Catheter Updated: 05/27/25 2209     Color, UA Yellow     Appearance, UA Cloudy     pH, UA 6.0     Specific Gravity, UA 1.024     Glucose,  mg/dL (Trace)     Ketones, UA 40 mg/dL (2+)     Bilirubin, UA Negative     Blood, UA Small (1+)     Protein, UA 30 mg/dL (1+)     Leuk Esterase, UA Trace     Nitrite, UA Positive     Urobilinogen, UA 1.0 E.U./dL    Narrative:      In absence of clinical symptoms, the presence of pyuria, bacteria, and/or nitrites on the urinalysis result does not correlate with infection.    Urinalysis, Microscopic Only - Urine, Catheter [[id number]]  (Abnormal) Collected: 05/27/25 2150    Specimen: Urine, Catheter Updated: 05/27/25 2244     RBC, UA 0-2 /HPF      WBC, UA 11-20 /HPF      Bacteria, UA 4+ /HPF      Squamous Epithelial Cells, UA 7-12  /HPF      Hyaline Casts, UA 21-30 /LPF      Granular Casts, UA Present /LPF      Methodology Automated Microscopy    Urine Culture - Urine, Urine, Catheter [[id number]] Collected: 05/27/25 2150    Specimen: Urine, Catheter Updated: 05/27/25 2244    Blood Culture - Blood, Arm, Right [[id number]] Collected: 05/27/25 2245    Specimen: Blood from Arm, Right Updated: 05/27/25 2250    Lactic Acid, Plasma [[id number]]  (Normal) Collected: 05/27/25 2247    Specimen: Blood from Arm, Left Updated: 05/27/25 2322     Lactate 2.0 mmol/L     Blood Culture - Blood, Arm, Left [[id number]] Collected: 05/27/25 2247    Specimen: Blood from Arm, Left Updated: 05/27/25 2251            Imaging Results (Last 24 Hours)       Procedure Component Value Units Date/Time    CT Abdomen Pelvis With Contrast [[id number]] Collected: 05/27/25 2124     Updated: 05/27/25 2139    Narrative:      CT OF THE ABDOMEN PELVIS WITH CONTRAST     HISTORY: Abdominal pain and diarrhea     COMPARISON: March 19, 2024     TECHNIQUE: Axial CT imaging was obtained through the abdomen and pelvis.  IV contrast was administered.     FINDINGS:  Images through the lung bases demonstrate dependent atelectasis. There  is lumbar scoliosis, with convexity to the right. No there may be some  focal fatty infiltration at the gallbladder fossa. The gallbladder is  absent. There is a small hiatal hernia. There is a duodenal  diverticulum. Calcified granulomata are noted within the spleen. The  pancreas is atrophic. Adrenal glands are normal. The kidneys enhance  symmetrically. No hydronephrosis is seen. There are simple appearing  left renal cysts. The patient is noted to have pelvic floor laxity.  Rectal prolapse and cystocele are again seen. There is no bowel  obstruction. There is colonic diverticulosis. Appendix is absent. The  patient does have some asymmetric thickening of the left piriformis  muscle, as well as some presacral stranding. This is favored to be  secondary to a  fracture through the S4 vertebral body, which is best  seen on the sagittal series. There are aortoiliac calcifications.       Impression:         1. The patient has presacral stranding and asymmetric enlargement of the  left piriformis muscle, which is favored to be secondary to some  hemorrhage from a fracture of S4. No additional acute findings are noted  within the abdomen or pelvis.     Radiation dose reduction techniques were utilized, including automated  exposure control and exposure modulation based on body size.        This report was finalized on 5/27/2025 9:36 PM by Dr. Loretta Coles M.D on Workstation: [id number]       CT Cervical Spine Without Contrast [[id number]] Collected: 05/27/25 2050     Updated: 05/27/25 2054    Narrative:      CT CERVICAL SPINE WO CONTRAST-     INDICATIONS: Trauma  TECHNIQUE: Radiation dose reduction techniques were utilized, including  automated exposure control and exposure modulation based on body size.  Noncontrast head CT, cervical spine CT  COMPARISON: 2/27/2025 at 0523 hours MRN 2/26/2025  FINDINGS:  Evolving post infarct changes are seen in left temporal lobe, left basal  ganglia.  No acute intracranial hemorrhage. Midline structures appear stable.  Moderate to prominent periventricular hypodensities suggest chronic  small vessel ischemic change in a patient this age.  Arterial calcifications are seen at the base of the brain.  Ventricles, cisterns, cerebral sulci are unremarkable for patient age.  Mild partial opacification of left mastoid air cells. The visualized  paranasal sinuses, orbits, mastoid air cells are otherwise unremarkable.  Cervical spine CT:  No acute fracture is identified. The ring of C1 is developmentally  incomplete at the posterior aspect.  Facet and uncovertebral joint hypertrophy contribute to neuroforaminal  narrowing, more prominent on the right at C5/6, and on the left at C3/4,  C4/5, C5/6, C6/7. Disc osteophyte complex appears to result  in at least  mild central stenosis at the levels C3/4 through C6/7.  Mild anterolisthesis of C7 on T1.  Bilateral carotid arterial calcifications are present.  Small atelectasis is apparent in the partly included upper lungs.    Impression:      Evolving left temporal lobe and basal ganglia post infarct changes. No  acute hemorrhage, continued follow-up advised.  No acute cervical fracture identified; degenerative changes in the  cervical spine. If further imaging evaluation of the spine is indicated,  MRI could be considered.     This report was finalized on 5/27/2025 8:51 PM by Dr. Sai Plasencia M.D on Workstation: [id number]       CT Head Without Contrast [[id number]] Collected: 05/27/25 2020     Updated: 05/27/25 2032    Narrative:      CT HEAD WO CONTRAST-     INDICATIONS: Trauma     TECHNIQUE: Radiation dose reduction techniques were utilized, including  automated exposure control and exposure modulation based on body size.  Noncontrast head CT, cervical spine CT     COMPARISON: 2/27/2025 at 0523 hours MRN 2/26/2025     FINDINGS:     Evolving post infarct changes are seen in left temporal lobe, left basal  ganglia.     No acute intracranial hemorrhage. Midline structures appear stable.     Moderate to prominent periventricular hypodensities suggest chronic  small vessel ischemic change in a patient this age.     Arterial calcifications are seen at the base of the brain.     Ventricles, cisterns, cerebral sulci are unremarkable for patient age.     Mild partial opacification of left mastoid air cells. The visualized  paranasal sinuses, orbits, mastoid air cells are otherwise unremarkable.        Cervical spine CT:     No acute fracture is identified. The ring of C1 is developmentally  incomplete at the posterior aspect.     Facet and uncovertebral joint hypertrophy contribute to neuroforaminal  narrowing, more prominent on the right at C5/6, and on the left at C3/4,  C4/5, C5/6, C6/7. Disc osteophyte complex  appears to result in at least  mild central stenosis at the levels C3/4 through C6/7.     Mild anterolisthesis of C7 on T1.     Bilateral carotid arterial calcifications are present.     Small atelectasis is apparent in the partly included upper lungs.          Impression:         Evolving left temporal lobe and basal ganglia post infarct changes. No  acute hemorrhage, continued follow-up advised.     No acute cervical fracture identified; degenerative changes in the  cervical spine. If further imaging evaluation of the spine is indicated,  MRI could be considered.     This report was finalized on 5/27/2025 8:29 PM by Dr. Sai Plasencia M.D on Workstation: [id number]       XR Chest 1 View [[id number]] Collected: 05/27/25 1943     Updated: 05/27/25 1948    Narrative:      XR CHEST 1 VW-     HISTORY: Female who is 89 years-old, altered mental status, weakness     TECHNIQUE: Frontal view of the chest     COMPARISON: 2/28/2025     FINDINGS: The heart appears mildly enlarged. Pulmonary vasculature is  unremarkable. Aorta is tortuous, calcified. No focal pulmonary  consolidation, pleural effusion, or pneumothorax. No acute osseous  process.       Impression:      No focal pulmonary consolidation. Mild cardiomegaly.  Tortuous aorta. Follow-up as clinically indicated.     This report was finalized on 5/27/2025 7:45 PM by Dr. Sai Plasencia M.D on Workstation: [id number]               Results for orders placed during the hospital encounter of 02/26/25    Adult Transthoracic Echo Complete W/ Cont if Necessary Per Protocol (With Agitated Saline)    Interpretation Summary  •  Left ventricular systolic function is normal. Calculated left ventricular EF = 62.9%  •  Left ventricular wall thickness is consistent with mild concentric hypertrophy. Sigmoid-shaped ventricular septum is present.  •  Left ventricular diastolic function is consistent with (grade II w/high LAP) pseudonormalization.  •  Moderate mitral valve  regurgitation is present with a centrally-directed jet noted.  •  The left atrial cavity is mildly dilated.  •  Saline test results are negative for right to left atrial level shunt.  •  There is moderate calcification of the aortic valve mainly affecting the non-coronary, left coronary and right coronary cusp(s).      ECG 12 Lead Altered Mental Status   Preliminary Result   HEART RATE=80  bpm   RR Apuhdykd=726  ms   AZ Interval=  ms   P Horizontal Axis=  deg   P Front Axis=  deg   QRSD Interval=92  ms   QT Tgbbxamw=348  ms   ZMqB=918  ms   QRS Axis=-9  deg   T Wave Axis=-89  deg   - ABNORMAL ECG -   Atrial fibrillation   Borderline repolarization abnormality   Date and Time of Study:2025-05-27 19:15:41      Telemetry Scan   Final Result           Assessment/Plan     Active Hospital Problems    Diagnosis  POA   • **Acute UTI [N39.0]  Yes   • History of CVA (cerebrovascular accident) [Z86.73]  Not Applicable   • Paroxysmal atrial fibrillation [I48.0]  Yes   • Hyperlipidemia [E78.5]  Yes   • Hypertension [I10]  Yes      Resolved Hospital Problems   No resolved problems to display.       Assessment and plan  1.  Acute UTI, associated with acute encephalopathy and fall, patient has been initiated on IV antibiotics in the emergency room.  Blood and urine cultures have been collected in the emergency room which would need to be followed.    2.  Hyponatremia, likely due to dehydration, initiate IV fluids.    3.  Hypokalemia, replacement of electrolytes per protocol today.    4.  S4 fracture, continue pain control.  May need orthopedics evaluation in the morning.    5.  CODE STATUS is full code.  Further plans will be based on hospital course.  Discussed plan of care with patient and her daughter-in-law at bedside.        Jimmy Michel MD  [facility]  05/28/25  00:29 EDT

## 2025-05-28 NOTE — TELEPHONE ENCOUNTER
Provider: RENATA    Caller: Raquel Lebron    Relationship to Patient: Emergency Contact    Phone Number: [phone]     Reason for Call: PATIENTS DAUGHTER CANCELLED PATIENTS APPOINTMENT FOR 5/28/25 AND WOULD LIEK TO RESCHEDULE. NO SLOTS AVAILABLE AT THE MOMENT.    PLEASE REVIEW    
Will call to schedule once provider's clinic days are open.  
No

## 2025-05-28 NOTE — THERAPY EVALUATION
Acute Care - Speech Language Pathology   Swallow Initial Evaluation [facility]     Patient Name: Monica Scherer  : 1933  MRN: [id number]  Today's Date: 2025               Admit Date: 2025    Visit Dx:     ICD-10-CM ICD-9-CM   1. Acute UTI  N39.0 599.0   2. Generalized weakness  R53.1 780.79   3. Fall, initial encounter  W19.XXXA E888.9   4. Closed head injury, initial encounter  S09.90XA 959.01   5. Closed fracture of sacrum, unspecified portion of sacrum, initial encounter  S32.10XA 805.6   6. Hypokalemia  E87.6 276.8   7. Dehydration  E86.0 276.51     Patient Active Problem List   Diagnosis    Vertigo    Temporary cerebral vascular dysfunction    Gastroesophageal reflux disease with esophagitis    Degeneration of intervertebral disc of cervical region    Prediabetes    S/P cholecystectomy    Osteoarthritis of knee    Herpes zoster without complication    Hypertension    Duodenal ulcer    History of pancreatitis    Hyperlipidemia    TIA (transient ischemic attack)    Cerebrovascular accident (CVA) due to thrombosis of left posterior cerebral artery    Paroxysmal atrial fibrillation    General weakness    History of CVA (cerebrovascular accident)    Anticoagulated    Hematuria    Dizziness and giddiness    Rectal bleeding    Acute CVA (cerebrovascular accident)    Acute UTI     Past Medical History:   Diagnosis Date    Atrial fibrillation     Hyperlipidemia     Hypertension     Lacunar infarct, acute 2020    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    New onset atrial fibrillation 2020    now on rate control along with Eliquis    Prolapsed uterus     per patient    Stroke     TIA (transient ischemic attack) 2020    Weakness      Past Surgical History:   Procedure Laterality Date    APPENDECTOMY      CHOLECYSTECTOMY N/A 2016    Procedure: CHOLECYSTECTOMY LAPAROSCOPIC;  Surgeon: Russ Gar MD;  [place] [facility] [place] [facility] [place]  Service:     COLONOSCOPY N/A 2021     Procedure: COLONOSCOPY TO CECUM WITH HOT SNARE POLYPECTOMIES AND COLD BX POLYPECTOMY;  Surgeon: Emerson Izaguirre MD;  Location: [facility] ENDOSCOPY;  Service: Gastroenterology;  Laterality: N/A;  pre: RECTAL BLEEDING, FAMILY HX OF COLON CANCER  post: INTERNAL AND EXTERNAL HEMORRHOIDS, DIVERTICULOSIS, POLYPS    ENDOSCOPY N/A 2/22/2018    Z-line regular, 35 cm from incisors, normal esophagus, gastritis, bilious gastric fluid, one non-bleeding duodenal ulcer w/no stigmata of bleeding, Path: DUODENUM: FRAGMENTS OF GASTRIC TYPE MUCOSA WITH HYPERPLASIA (FOVEOLAR) AND PATCHY MIXED INFLAMMATION IN THE LAMINA PROPRIA WITH FOCAL FIBROSIS, COMMENT: 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     EYE SURGERY      tre cataracts       SLP Recommendation and Plan  SLP Swallowing Diagnosis: oral dysphagia, pharyngeal dysphagia (05/28/25 1045)  SLP Diet Recommendation: puree, thin liquids (05/28/25 1045)  Recommended Precautions and Strategies: upright posture during/after eating, small bites of food and sips of liquid, 1:1 supervision, general aspiration precautions, alternate between small bites of food and sips of liquid (05/28/25 1045)  SLP Rec. for Method of Medication Administration: meds whole, meds crushed, with puree (check for pocketing if whole) (05/28/25 1045)     Monitor for Signs of Aspiration: yes, notify SLP if any concerns (05/28/25 1045)  Recommended Diagnostics: reassess via clinical swallow evaluation (05/28/25 1045)  Swallow Criteria for Skilled Therapeutic Interventions Met: demonstrates skilled criteria (05/28/25 1045)  Anticipated Discharge Disposition (SLP): unknown (05/28/25 1045)  Rehab Potential/Prognosis, Swallowing: good, to achieve stated therapy goals (05/28/25 1045)  Therapy Frequency (Swallow): PRN (05/28/25 1045)  Predicted Duration Therapy Intervention (Days): until discharge (05/28/25 1045)  Oral Care Recommendations: Oral Care BID/PRN (05/28/25 1045)                                         Outcome Evaluation: Clinical swallow eval completed. Recommend puree textures and thin liquids. Meds crushed/whole with puree, if whole check for pcoketing. Recommend upright, slow rate, intermittent supervision, and alternate liquids/solids. SLP to follow for diet tolerance re-evaluation.      SWALLOW EVALUATION (Last 72 Hours)       SLP Adult Swallow Evaluation       Row Name 05/28/25 1045                   Rehab Evaluation    Document Type evaluation  -OC        Subjective Information no complaints  -OC        Patient Observations alert;cooperative;agree to therapy  -OC        Patient Effort good  -OC        Symptoms Noted During/After Treatment none  -OC           General Information    Patient Profile Reviewed yes  -OC        Pertinent History Of Current Problem Patient admitted with acute UTI.  -OC        Current Method of Nutrition NPO  -OC        Precautions/Limitations, Vision WFL  -OC        Precautions/Limitations, Hearing hearing impairment, bilaterally  -OC        Prior Level of Function-Communication unknown  -OC        Prior Level of Function-Swallowing unknown  -OC        Plans/Goals Discussed with patient;agreed upon  -OC        Barriers to Rehab medically complex  -OC        Patient's Goals for Discharge patient did not state  -OC           Pain    Pretreatment Pain Rating 0/10 - no pain  -OC        Posttreatment Pain Rating 0/10 - no pain  -OC           Oral Motor Structure and Function    Dentition Assessment missing teeth  -OC        Secretion Management WNL/WFL  -OC        Volitional Swallow WFL  -OC           Oral Musculature and Cranial Nerve Assessment    Oral Motor General Assessment generalized oral motor weakness  -OC           Clinical Swallow Eval    Clinical Swallow Evaluation Summary Patient demonstrated no overt s/s aspiration with ice chips, thin via cup/straw, and puree. Inadequate mastication and oral residue with soft solids. Able to cllear with puree bolus and thin liquid  wash.  -OC           SLP Evaluation Clinical Impression    SLP Swallowing Diagnosis oral dysphagia;pharyngeal dysphagia  -OC        Functional Impact risk of aspiration/pneumonia  -OC        Rehab Potential/Prognosis, Swallowing good, to achieve stated therapy goals  -OC        Swallow Criteria for Skilled Therapeutic Interventions Met demonstrates skilled criteria  -OC           Recommendations    Therapy Frequency (Swallow) PRN  -OC        Predicted Duration Therapy Intervention (Days) until discharge  -OC        SLP Diet Recommendation puree;thin liquids  -OC        Recommended Diagnostics reassess via clinical swallow evaluation  -OC        Recommended Precautions and Strategies upright posture during/after eating;small bites of food and sips of liquid;1:1 supervision;general aspiration precautions;alternate between small bites of food and sips of liquid  -OC        Oral Care Recommendations Oral Care BID/PRN  -OC        SLP Rec. for Method of Medication Administration meds whole;meds crushed;with puree  check for pocketing if whole  -OC        Monitor for Signs of Aspiration yes;notify SLP if any concerns  -OC        Anticipated Discharge Disposition (SLP) unknown  -OC           Swallow Goals (SLP)    Swallow LTGs Swallow Long Term Goal (free text)  -OC           (LTG) Swallow    (LTG) Swallow Tolerate least restrictive diet with no overt s/s aspiration.  -OC        [place] (Swallow Long Term Goal) with minimal cues (75-90% accuracy)  -OC        Time Frame (Swallow Long Term Goal) by discharge  -OC                  User Key  (r) = Recorded By, (t) = Taken By, (c) = Cosigned By      Initials Name Effective Dates    OC Card, RODDY Cardoza 08/28/23 -                     EDUCATION  The patient has been educated in the following areas:   Dysphagia (Swallowing Impairment).        SLP GOALS       Row Name 05/28/25 1045             (LTG) Swallow    (LTG) Swallow Tolerate least restrictive diet with no overt s/s  aspiration.  -OC      Elaine (Swallow Long Term Goal) with minimal cues (75-90% accuracy)  -OC      Time Frame (Swallow Long Term Goal) by discharge  -OC                User Key  (r) = Recorded By, (t) = Taken By, (c) = Cosigned By      Initials Name Provider Type    Nani Boone SLP Speech and Language Pathologist                         Time Calculation:    Time Calculation- SLP       Row Name 05/28/25 1318             Time Calculation- SLP    SLP Start Time 1318  -OC      SLP Received On 05/28/25  -OC         Untimed Charges    SLP Eval/Re-eval  ST Eval Oral Pharyng Swallow - 09681  -OC      38799-RC Eval Oral Pharyng Swallow Minutes 60  -OC         Total Minutes    Untimed Charges Total Minutes 60  -OC       Total Minutes 60  -OC                User Key  (r) = Recorded By, (t) = Taken By, (c) = Cosigned By      Initials Name Provider Type    Nani Boone SLP Speech and Language Pathologist                    Therapy Charges for Today       Code Description Service Date Service Provider Modifiers Qty    57138048668 HC ST EVAL ORAL PHARYNG SWALLOW 4 5/28/2025 Nani Whitman SLP GN 1                 RODDY Resendez  5/28/2025

## 2025-05-28 NOTE — THERAPY EVALUATION
Patient Name: Monica Scherer  : 1933    MRN: [id number]                              Today's Date: 2025       Admit Date: 2025    Visit Dx:     ICD-10-CM ICD-9-CM   1. Acute UTI  N39.0 599.0   2. Generalized weakness  R53.1 780.79   3. Fall, initial encounter  W19.XXXA E888.9   4. Closed head injury, initial encounter  S09.90XA 959.01   5. Closed fracture of sacrum, unspecified portion of sacrum, initial encounter  S32.10XA 805.6   6. Hypokalemia  E87.6 276.8   7. Dehydration  E86.0 276.51     Patient Active Problem List   Diagnosis    Vertigo    Temporary cerebral vascular dysfunction    Gastroesophageal reflux disease with esophagitis    Degeneration of intervertebral disc of cervical region    Prediabetes    S/P cholecystectomy    Osteoarthritis of knee    Herpes zoster without complication    Hypertension    Duodenal ulcer    History of pancreatitis    Hyperlipidemia    TIA (transient ischemic attack)    Cerebrovascular accident (CVA) due to thrombosis of left posterior cerebral artery    Paroxysmal atrial fibrillation    General weakness    History of CVA (cerebrovascular accident)    Anticoagulated    Hematuria    Dizziness and giddiness    Rectal bleeding    Acute CVA (cerebrovascular accident)    Acute UTI     Past Medical History:   Diagnosis Date    Atrial fibrillation     Hyperlipidemia     Hypertension     Lacunar infarct, acute 2020    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    New onset atrial fibrillation 2020    now on rate control along with Eliquis    Prolapsed uterus     per patient    Stroke     TIA (transient ischemic attack) 2020    Weakness      Past Surgical History:   Procedure Laterality Date    APPENDECTOMY      CHOLECYSTECTOMY N/A 2016    Procedure: CHOLECYSTECTOMY LAPAROSCOPIC;  Surgeon: Russ Gar MD;  [place] [facility] [place] [facility] [place]  Service:     COLONOSCOPY N/A 2021    Procedure: COLONOSCOPY TO CECUM WITH HOT SNARE POLYPECTOMIES AND  "COLD BX POLYPECTOMY;  Surgeon: Emerson Izaguirre MD;  Location: [facility] ENDOSCOPY;  Service: Gastroenterology;  Laterality: N/A;  pre: RECTAL BLEEDING, FAMILY HX OF COLON CANCER  post: INTERNAL AND EXTERNAL HEMORRHOIDS, DIVERTICULOSIS, POLYPS    ENDOSCOPY N/A 2/22/2018    Z-line regular, 35 cm from incisors, normal esophagus, gastritis, bilious gastric fluid, one non-bleeding duodenal ulcer w/no stigmata of bleeding, Path: DUODENUM: FRAGMENTS OF GASTRIC TYPE MUCOSA WITH HYPERPLASIA (FOVEOLAR) AND PATCHY MIXED INFLAMMATION IN THE LAMINA PROPRIA WITH FOCAL FIBROSIS, COMMENT: 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     EYE SURGERY      tre cataracts      General Information       Row Name 05/28/25 1536          Physical Therapy Time and Intention    Document Type evaluation  -MS     Mode of Treatment physical therapy;individual therapy  -MS       Row Name 05/28/25 1538          General Information    Patient Profile Reviewed yes  -MS     Prior Level of Function --  Use of Rwx for ambulation prior to admission per family report  -MS     Existing Precautions/Restrictions fall   Exit alarm  -MS     Barriers to Rehab cognitive status  -MS       Row Name 05/28/25 1534          Cognition    Orientation Status (Cognition) oriented to;person  -MS               User Key  (r) = Recorded By, (t) = Taken By, (c) = Cosigned By      Initials Name Provider Type    MS Bladimir Akins, PT Physical Therapist                   Mobility       Row Name 05/28/25 0989          Bed Mobility    Bed Mobility supine-sit;sit-supine  -MS     Supine-Sit [place] (Bed Mobility) minimum assist (75% patient effort)  -MS     Sit-Supine [place] (Bed Mobility) minimum assist (75% patient effort)  -MS     Comment, (Bed Mobility) Once sitting EOB, pt. became agitated and adamantly refused to attempt sit <-> stand transfers despite verbal encouraged from P.T. staff and family.  Pt. reports \"I know what my body can do\"   -MS               " User Key  (r) = Recorded By, (t) = Taken By, (c) = Cosigned By      Initials Name Provider Type    Bladimir Fisher L, PT Physical Therapist                   Obj/Interventions       Row Name 05/28/25 1534          Range of Motion Comprehensive    Comment, General Range of Motion BUE/LE (Imp. 25%);  Pt. inconsistent with ROM commands  -MS       Row Name 05/28/25 1534          Strength Comprehensive (MMT)    Comment, General Manual Muscle Testing (MMT) Assessment BUE/LE (3-/5)  -MS               User Key  (r) = Recorded By, (t) = Taken By, (c) = Cosigned By      Initials Name Provider Type    Bladimir Fisher L, PT Physical Therapist                   Goals/Plan       Row Name 05/28/25 1536          Bed Mobility Goal 1 (PT)    Activity/Assistive Device (Bed Mobility Goal 1, PT) bed mobility activities, all  -MS     [place] Level/Cues Needed (Bed Mobility Goal 1, PT) contact guard required  -MS     Time Frame (Bed Mobility Goal 1, PT) long term goal (LTG);1 week  -MS       Row Name 05/28/25 1536          Transfer Goal 1 (PT)    Activity/Assistive Device (Transfer Goal 1, PT) transfers, all;walker, rolling  -MS     [place] Level/Cues Needed (Transfer Goal 1, PT) contact guard required  -MS     Time Frame (Transfer Goal 1, PT) long term goal (LTG);1 week  -MS       [facility] Name 05/28/25 1536          Gait Training Goal 1 (PT)    Activity/Assistive Device (Gait Training Goal 1, PT) gait (walking locomotion);walker, rolling  -MS     [place] Level (Gait Training Goal 1, PT) contact guard required  -MS     Distance (Gait Training Goal 1, PT) 30 feet  -MS     Time Frame (Gait Training Goal 1, PT) long term goal (LTG);1 week  -MS       Row Name 05/28/25 1536          Therapy Assessment/Plan (PT)    Planned Therapy Interventions (PT) balance training;bed mobility training;gait training;home exercise program;patient/family education;postural re-education;transfer training;strengthening;ROM (range of motion)  -MS                User Key  (r) = Recorded By, (t) = Taken By, (c) = Cosigned By      Initials Name Provider Type    Bladimir Fisher, PT Physical Therapist                   Clinical Impression       Row Name 05/28/25 1535          Pain    Pretreatment Pain Rating 0/10 - no pain  -MS     Posttreatment Pain Rating 0/10 - no pain  -MS       Row Name 05/28/25 1535          Plan of Care Review    Plan of Care Reviewed With patient;family  -MS       Row Name 05/28/25 1535          Therapy Assessment/Plan (PT)    Rehab Potential (PT) fair  -MS     Criteria for Skilled Interventions Met (PT) meets criteria  -MS     Therapy Frequency (PT) 5 times/wk  -MS       Row Name 05/28/25 1535          Positioning and Restraints    Pre-Treatment Position in bed  -MS     Post Treatment Position bed  -MS     In Bed notified nsg;supine;call light within reach;encouraged to call for assist;exit alarm on;with family/caregiver  All lines intact.  -MS               User Key  (r) = Recorded By, (t) = Taken By, (c) = Cosigned By      Initials Name Provider Type    Bladimir Fisher, PT Physical Therapist                   Outcome Measures       Row Name 05/28/25 1536          How much help from another person do you currently need...    Turning from your back to your side while in flat bed without using bedrails? 3  -MS     Moving from lying on back to sitting on the side of a flat bed without bedrails? 3  -MS     Moving to and from a bed to a chair (including a wheelchair)? 2  -MS     Standing up from a chair using your arms (e.g., wheelchair, bedside chair)? 2  -MS     Climbing 3-5 steps with a railing? 2  -MS     To walk in hospital room? 2  -MS     AM-PAC 6 Clicks Score (PT) 14  -MS     Highest Level of Mobility Goal Move to Chair/Commode-4  -MS       Row Name 05/28/25 1536          Functional Assessment    Outcome Measure Options AM-PAC 6 Clicks Basic Mobility (PT)  -MS               User Key  (r) = Recorded By, (t) = Taken By, (c) =  Cosigned By      Initials Name Provider Type    MS Bladimir Akins, PT Physical Therapist                                 Physical Therapy Education       Title: PT OT SLP Therapies (In Progress)       Topic: Physical Therapy (In Progress)       Point: Mobility training (In Progress)       Learning Progress Summary            Patient Nonacceptance, E,D, NR by MS at 5/28/2025 1536                      Point: Home exercise program (Not Started)       Learner Progress:  Not documented in this visit.              Point: Body mechanics (In Progress)       Learning Progress Summary            Patient Nonacceptance, E,D, NR by MS at 5/28/2025 1536                      Point: Precautions (In Progress)       Learning Progress Summary            Patient Nonacceptance, E,D, NR by MS at 5/28/2025 1536                                      User Key       Initials Effective Dates Name Provider Type Discipline    MS 06/16/21 -  Bladimir Akins PT Physical Therapist PT                  PT Recommendation and Plan  Planned Therapy Interventions (PT): balance training, bed mobility training, gait training, home exercise program, patient/family education, postural re-education, transfer training, strengthening, ROM (range of motion)  Outcome Evaluation: Pt. is a 89 year old Female admitted with an Acute UTI (h/o CVA).  Pt.'s family reports that prior to admission she was using a Rwx for ambulation.  Pt. currently presents with decreased ROM, decreased balance, and decreased tolerance to functional activity.  This PM, pt. requires Min. assist x 1 for bed mobility.  Once sitting EOB, pt. became agitated and adamantly refused to attempt sit <-> stand transfers despite Max. verbal encouragement from P.T. staff and family member.  Pt. will benefit from skilled inpt. P.T. to address her functional deficits and to assist pt. in regaining her maximum level of independence with functional mobility. Pt.'s progress will depend on her ability  to follow commands and cooperate with P.T.     Time Calculation:         PT Charges       Row Name 05/28/25 1539             Time Calculation    Start Time 1425  -MS      Stop Time 1437  -MS      Time Calculation (min) 12 min  -MS      PT Received On 05/28/25  -MS      PT - Next Appointment 05/29/25  -MS      PT Goal Re-Cert Due Date 06/04/25  -MS         Time Calculation- PT    Total Timed Code Minutes- PT 12 minute(s)  -MS                User Key  (r) = Recorded By, (t) = Taken By, (c) = Cosigned By      Initials Name Provider Type    Bladimir Fisher, PT Physical Therapist                  Therapy Charges for Today       Code Description Service Date Service Provider Modifiers Qty    25545593474 HC PT EVAL MOD COMPLEXITY 2 5/28/2025 Bladimir Akins, PT GP 1    20436738660 HC PT THERAPEUTIC ACT EA 15 MIN 5/28/2025 Bladimir Akins, PT GP 1            PT G-Codes  Outcome Measure Options: AM-PAC 6 Clicks Basic Mobility (PT)  AM-PAC 6 Clicks Score (PT): 14  PT Discharge Summary  Anticipated Discharge Disposition (PT): skilled nursing facility    Bladimir Akins, PT  5/28/2025

## 2025-05-28 NOTE — ED PROVIDER NOTES
Pt presents to the ED c/o confusion and a fall.  Patient does have some baseline confusion.  Patient just reports feeling sick all over.  No recent vomiting or diarrhea.     On exam,   General: No acute distress, nontoxic  HEENT: EOMI  Pulm: Symmetric chest rise, nonlabored breathing  CV: Regular rate and rhythm  GI: Nondistended  MSK: No deformity  Skin: Warm, dry  Neuro: Awake, alert, moving all extremities, no focal deficits  Psych: Calm, cooperative    Vital signs and nursing notes reviewed.           Plan: Initial concern for UTI, dehydration, renal failure, electrolyte imbalance, intracranial hemorrhage from fall, among others.  Plan for labs, CT scan of the head, C-spine, abdomen pelvis, chest x-ray, supportive care, and reevaluate.    ED Course as of 05/27/25 2244   Tue May 27, 2025   2007 WBC(!): 17.18 [CC]   2007 Hemoglobin: 13.9 [CC]   2007 proBNP(!): 2,214.0 [CC]   2007 Magnesium: 1.7 [CC]   2007 CT Head Without Contrast  My independent interpretation the CT of the head is no intracranial hemorrhage [CC]   2019 Potassium(!!): 2.6  Patient has had diarrhea, will replace and hydrate [CC]   2019 Anion Gap(!): 19.4 [CC]   2020 I discussed the case with Dr. Tsai and he agrees to evaluate the patient at the bedside.    [CC]   2021 HS Troponin T(!!): 68  Patient has generalized weakness, she is not having any chest pain.  She has A-fib and EKG reflects A-fib but no ischemic changes.  I have a low suspicion for ACS, will trend. [CC]   2021 XR Chest 1 View  My independent interpretation of the chest x-ray is no obvious acute infiltrate [CC]   2022 Sodium(!): 146  hydrating [CC]   2127 CT Abdomen Pelvis With Contrast  My independent interpretation of the CT of abdomen pelvis is no bowel obstruction [CC]   2208 Troponin T Numeric [place]: -5  flat [CC]   2210 Nitrite, UA(!): Positive [CC]   2210 Leukocytes, UA(!): Trace [CC]   2226 Spoke with Babita, APC with [facility].  Reviewed history, exam, results, treatments.  She  agrees admit the patient to Dr. Michel.  Blood cultures pending at the time of admission    [CC]      ED Course User Index  [CC] [place] Trish Mathews, ALAN       Possible S4 fracture with presacral hemorrhage, otherwise mild leukocytosis, mild hypokalemia, mild hypernatremia will plan for rehydration, and hospitalization.     MD Attestation Note    SHARED VISIT: This visit was performed by BOTH a physician and an APC. The substantive portion of the medical decision making was performed by this attesting physician who made or approved the management plan and takes responsibility for patient management. All studies in the APC note (if performed) were independently interpreted by me.                   Nathaniel Tsai MD  05/27/25 9232       Nathaniel Tsai MD  05/27/25 2222       Nathaniel Tsai MD  05/27/25 5920

## 2025-05-28 NOTE — PROGRESS NOTES
Name: Monica Scherer ADMIT: 2025   : 1933  PCP: Amilcar Mauricio DO    MRN: [id number] LOS: 0 days   AGE/SEX: 89 y.o. female  ROOM: [facility]   Subjective   Chief Complaint   Patient presents with    Altered Mental Status    Weakness - Generalized     This is a 89-year-old female with history of atrial fibrillation, hyperlipidemia, hypertension, CVA, presents to the hospital, after a fall at home. She came to [facility] ER and found to have UTI, hypokalemia, hypernatremia, and S4 Sacral fracture    She denies any back/hip pain    ROS  No f/c  No n/v  No cp/palp  No soa/cough    Objective   Vital Signs  Temp:  [97.7 °F (36.5 °C)-97.9 °F (36.6 °C)] 97.9 °F (36.6 °C)  Heart Rate:  [] 107  Resp:  [14-18] 18  BP: (138-187)/() 147/77  SpO2:  [94 %-98 %] 97 %  on   ;   Device (Oxygen Therapy): room air  Body mass index is 25.39 kg/m².    Physical Exam  Constitutional:       General: She is not in acute distress.  HENT:      Head: Normocephalic and atraumatic.   Eyes:      General: No scleral icterus.  Cardiovascular:      Rate and Rhythm: Regular rhythm.      Heart sounds: Normal heart sounds.   Pulmonary:      Effort: Pulmonary effort is normal. No respiratory distress.   Abdominal:      General: There is no distension.      Palpations: Abdomen is soft.   Musculoskeletal:      Cervical back: Neck supple.     Elderly, chronically ill     Results Review:       I reviewed the patient's new clinical results.  Results from last 7 days   Lab Units 25  0557 25   WBC 10*3/mm3 11.72* 17.18*   HEMOGLOBIN g/dL 10.9* 13.9   PLATELETS 10*3/mm3 213 261     Results from last 7 days   Lab Units 25  0557 25   SODIUM mmol/L 139 146*   POTASSIUM mmol/L 3.5 2.6*   CHLORIDE mmol/L 103 100   CO2 mmol/L 21.9* 26.6   BUN mg/dL 16.0 17.0   CREATININE mg/dL 0.86 1.12*   GLUCOSE mg/dL 100* 153*   Estimated Creatinine Clearance: 39.3 mL/min (by C-G formula based on SCr of 0.86  mg/dL).  Results from last 7 days   Lab Units 05/28/25  0557 05/27/25  1925   ALBUMIN g/dL 3.0* 3.9   BILIRUBIN mg/dL 1.4* 2.2*   ALK PHOS U/L 72 98   AST (SGOT) U/L 18 25   ALT (SGPT) U/L 12 13     Results from last 7 days   Lab Units 05/28/25  0557 05/27/25  1925   CALCIUM mg/dL 8.0* 9.6   ALBUMIN g/dL 3.0* 3.9   MAGNESIUM mg/dL 1.5* 1.7   PHOSPHORUS mg/dL  --  3.3     Results from last 7 days   Lab Units 05/27/25  2247   LACTATE mmol/L 2.0       Coag   Results from last 7 days   Lab Units 05/27/25  1925   INR  1.25*   APTT seconds 26.2     HbA1C   Lab Results   Component Value Date    HGBA1C 6.50 (H) 02/27/2025    HGBA1C 6.40 (H) 09/30/2024    HGBA1C 6.6 (H) 06/06/2023     Infection     Radiology(recent) CT Abdomen Pelvis With Contrast  Result Date: 5/27/2025   1. The patient has presacral stranding and asymmetric enlargement of the left piriformis muscle, which is favored to be secondary to some hemorrhage from a fracture of S4. No additional acute findings are noted within the abdomen or pelvis.  Radiation dose reduction techniques were utilized, including automated exposure control and exposure modulation based on body size.   This report was finalized on 5/27/2025 9:36 PM by Dr. Loretta Coles M.D on Workstation: [id number]      CT Cervical Spine Without Contrast  Result Date: 5/27/2025  Evolving left temporal lobe and basal ganglia post infarct changes. No acute hemorrhage, continued follow-up advised. No acute cervical fracture identified; degenerative changes in the cervical spine. If further imaging evaluation of the spine is indicated, MRI could be considered.  This report was finalized on 5/27/2025 8:51 PM by Dr. Sai Plasencia M.D on Workstation: [id number]      CT Head Without Contrast  Result Date: 5/27/2025   Evolving left temporal lobe and basal ganglia post infarct changes. No acute hemorrhage, continued follow-up advised.  No acute cervical fracture identified; degenerative changes in the  "cervical spine. If further imaging evaluation of the spine is indicated, MRI could be considered.  This report was finalized on 5/27/2025 8:29 PM by Dr. Sai Plasencia M.D on Workstation: [id number]      XR Chest 1 View  Result Date: 5/27/2025  No focal pulmonary consolidation. Mild cardiomegaly. Tortuous aorta. Follow-up as clinically indicated.  This report was finalized on 5/27/2025 7:45 PM by Dr. Sai Plasencia M.D on Workstation: [id number]      HS Troponin T   Date Value Ref Range Status   05/27/2025 63 (C) <14 ng/L Final   05/27/2025 68 (C) <14 ng/L Final     No components found for: \"TSH;2\"    ampicillin-sulbactam, 1.5 g, Intravenous, Q6H  atorvastatin, 40 mg, Oral, Nightly  dabigatran etexilate, 150 mg, Oral, Q12H  metoprolol tartrate, 25 mg, Oral, BID  pantoprazole, 40 mg, Oral, Q AM  sodium chloride, 10 mL, Intravenous, Q12H      sodium chloride, 75 mL/hr, Last Rate: 75 mL/hr (05/28/25 0323)    Diet: Regular/House; Texture: Pureed (NDD 1); Fluid Consistency: Thin (IDDSI 0)      Assessment & Plan      Active Hospital Problems    Diagnosis  POA    **Acute UTI [N39.0]  Yes    History of CVA (cerebrovascular accident) [Z86.73]  Not Applicable    Paroxysmal atrial fibrillation [I48.0]  Yes    Hyperlipidemia [E78.5]  Yes    Hypertension [I10]  Yes      Resolved Hospital Problems   No resolved problems to display.     This is a 89-year-old female with history of atrial fibrillation, hyperlipidemia, hypertension, CVA, presents to the hospital, after a fall at home. She came to [facility] ER and found to have UTI, hypokalemia, hypernatremia, and S4 Sacral fracture    1.  Acute UTI- on IV ABX, follow up cultures     2.  Hypernatremia, improved, likely due to dehydration     3.  Hypokalemia, hypomag- replacement of electrolytes per protocol     4.  S4 fracture, continue pain control.  Denies any pain. Will get lumbar spine xray as well.     5. P.Afib- on dabigatran and metoprolol    6. H/o stroke- ST evaluation to make " sure safe for diet      DW RN      Eduardo Scherer MD  [facility]  05/28/25  08:01 EDT

## 2025-05-28 NOTE — PLAN OF CARE
Goal Outcome Evaluation:  Plan of Care Reviewed With: patient           Outcome Evaluation: Clinical swallow eval completed. Recommend puree textures and thin liquids. Meds crushed/whole with puree, if whole check for pcoketing. Recommend upright, slow rate, intermittent supervision, and alternate liquids/solids. SLP to follow for diet tolerance re-evaluation.    Anticipated Discharge Disposition (SLP): unknown          SLP Swallowing Diagnosis: oral dysphagia, pharyngeal dysphagia (05/28/25 3016)

## 2025-05-28 NOTE — ED PROVIDER NOTES
EMERGENCY DEPARTMENT ENCOUNTER  Room Number:  N543/1  PCP: Amilcar Mauricio DO  Independent Historians: Patient and Family      HPI:  Chief Complaint: had concerns including Altered Mental Status and Weakness - Generalized.     A complete HPI/ROS/PMH/PSH/SH/FH are unobtainable due to: Poor historian    Chronic or social conditions impacting patient care (Social Determinants of Health): None      Context: Monica Scherer is a 89 y.o. female with a medical history of stroke, hyperlipidemia, and A-fib presents emergency department today with generalized bodyaches and generally feeling unwell since yesterday.  According to the family patient had a fall today where she hit her head on a bedside table.  They had to help her up.  Patient is a bit confused which the family says is at baseline since her prior stroke.  She says that she has had a headache since yesterday and does not believe the headache she is having now is from the fall.  She denies LOC.  She is anticoagulated.  She denies neck or back pain.  She reports that she does has generalized bodyaches.  She denies chest pain or shortness of breath.  She denies a cough.  She denies urinary symptoms.  She says she has had diarrhea since yesterday.  She has not been on antibiotics recently.  She typically ambulates with a walker and lives with her brother.  She is fairly independent at baseline.  Somewhat limited as the family who is here was not with her over the last several days and the patient is a poor historian.      Review of prior external notes (non-ED) -and- Review of prior external test results outside of this encounter:   Patient was admitted to the hospital on 2/26/2025 and discharged on 3/3/2025 for right sided weakness, aphasia, and a facial droop.  She was found to have an occluded left MCA stroke.  She was on Eliquis at the time of the stroke.  She underwent thrombectomy.  She was transition to Pradaxa for anticoagulation.  Echo while inpatient  showed an EF of 62%, grade 2 diastolic dysfunction.    PAST MEDICAL HISTORY  Active Ambulatory Problems     Diagnosis Date Noted    Vertigo 06/14/2016    Temporary cerebral vascular dysfunction 06/14/2016    Gastroesophageal reflux disease with esophagitis 06/14/2016    Degeneration of intervertebral disc of cervical region 06/14/2016    Prediabetes 06/14/2016    S/P cholecystectomy 06/14/2016    Osteoarthritis of knee 10/31/2016    Herpes zoster without complication 11/08/2016    Hypertension 02/18/2018    Duodenal ulcer 03/13/2018    History of pancreatitis 03/22/2018    Hyperlipidemia 07/19/2019    TIA (transient ischemic attack) 01/04/2020    Cerebrovascular accident (CVA) due to thrombosis of left posterior cerebral artery 04/07/2020    Paroxysmal atrial fibrillation 04/07/2020    General weakness 09/10/2021    History of CVA (cerebrovascular accident) 09/10/2021    Anticoagulated 09/10/2021    Hematuria 09/11/2021    Dizziness and giddiness 09/11/2021    Rectal bleeding 09/12/2021    Acute CVA (cerebrovascular accident) 02/26/2025     Resolved Ambulatory Problems     Diagnosis Date Noted    Acute cholecystitis 06/07/2016    Benign essential hypertension 06/14/2016    Indigestion 10/31/2016    Acute viral bronchitis 01/23/2018    Acute biliary pancreatitis 02/18/2018     Past Medical History:   Diagnosis Date    Atrial fibrillation     Lacunar infarct, acute 01/2020    New onset atrial fibrillation 01/2020    Prolapsed uterus     Stroke     Weakness          PAST SURGICAL HISTORY  Past Surgical History:   Procedure Laterality Date    APPENDECTOMY      CHOLECYSTECTOMY N/A 6/8/2016    Procedure: CHOLECYSTECTOMY LAPAROSCOPIC;  Surgeon: Russ Gar MD;  [place] [facility] [place] [facility] [place]  Service:     COLONOSCOPY N/A 9/16/2021    Procedure: COLONOSCOPY TO CECUM WITH HOT SNARE POLYPECTOMIES AND COLD BX POLYPECTOMY;  Surgeon: Emerson Izaguirre MD;  Location: [facility] ENDOSCOPY;  Service: Gastroenterology;  Laterality:  "N/A;  pre: RECTAL BLEEDING, FAMILY HX OF COLON CANCER  post: INTERNAL AND EXTERNAL HEMORRHOIDS, DIVERTICULOSIS, POLYPS    ENDOSCOPY N/A 2/22/2018    Z-line regular, 35 cm from incisors, normal esophagus, gastritis, bilious gastric fluid, one non-bleeding duodenal ulcer w/no stigmata of bleeding, Path: DUODENUM: FRAGMENTS OF GASTRIC TYPE MUCOSA WITH HYPERPLASIA (FOVEOLAR) AND PATCHY MIXED INFLAMMATION IN THE LAMINA PROPRIA WITH FOCAL FIBROSIS, COMMENT: 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     EYE SURGERY      tre cataracts         FAMILY HISTORY  No family history on file.      SOCIAL HISTORY  Social History     Socioeconomic History    Marital status: Single   Tobacco Use    Smoking status: Never     Passive exposure: Never    Smokeless tobacco: Never    Tobacco comments:     caffeine use- \"rare\"   Vaping Use    Vaping status: Never Used   Substance and Sexual Activity    Alcohol use: No    Drug use: No    Sexual activity: Defer         ALLERGIES  Cephalexin and Latex      REVIEW OF SYSTEMS  Included in HPI  All systems reviewed and negative except for those discussed in HPI.      PHYSICAL EXAM    I have reviewed the triage vital signs and nursing notes.    ED Triage Vitals [05/27/25 1857]   Temp Heart Rate Resp BP SpO2   97.7 °F (36.5 °C) 100 16 138/66 96 %      Temp src Heart Rate Source Patient Position BP Location FiO2 (%)   Oral Monitor -- -- --       Physical Exam  Constitutional:       General: She is not in acute distress.     Appearance: Normal appearance.      Comments: Somewhat malodorous and unkept.  Chronically ill-appearing but nontoxic   HENT:      Head: Normocephalic.      Comments: Faint bruising to the right temporal region of the face.  No large wounds or lacerations noted.     Nose: Nose normal.      Mouth/Throat:      Mouth: Mucous membranes are moist.   Eyes:      Extraocular Movements: Extraocular movements intact.      Pupils: Pupils are equal, round, and reactive to light. "   Cardiovascular:      Rate and Rhythm: Normal rate and regular rhythm.      Pulses: Normal pulses.      Heart sounds: Normal heart sounds.   Pulmonary:      Effort: Pulmonary effort is normal. No respiratory distress.      Breath sounds: Normal breath sounds. No wheezing, rhonchi or rales.   Abdominal:      General: Abdomen is flat. There is no distension.      Palpations: Abdomen is soft.      Tenderness: There is no abdominal tenderness. There is no guarding or rebound.   Musculoskeletal:         General: Normal range of motion.      Cervical back: Normal range of motion and neck supple.      Comments: No midline C, T, L-spine tenderness.  Spontaneously moving all extremities, sensorimotor grossly intact.   Skin:     General: Skin is warm and dry.      Capillary Refill: Capillary refill takes less than 2 seconds.   Neurological:      General: No focal deficit present.      Mental Status: She is alert and oriented to person, place, and time.      Comments: Cranial nerves II-XII are intact.  Sensation is intact and symmetric throughout, no deficit.  Motor function is 5/5 and symmetric in the extremities x 4.  There is no facial droop, aphasia, dysarthria, speech is clear and coherent.  Normal FTN.      Patient is alert and oriented x 3 but does get confused about some details of the history from today which is apparently baseline according to the family since her prior stroke.     Psychiatric:         Mood and Affect: Mood normal.         Behavior: Behavior normal.           LAB RESULTS  Recent Results (from the past 24 hours)   ECG 12 Lead Altered Mental Status    Collection Time: 05/27/25  7:15 PM   Result Value Ref Range    QT Interval 358 ms    QTC Interval 414 ms   Comprehensive Metabolic Panel    Collection Time: 05/27/25  7:25 PM    Specimen: Blood   Result Value Ref Range    Glucose 153 (H) 65 - 99 mg/dL    BUN 17.0 8.0 - 23.0 mg/dL    Creatinine 1.12 (H) 0.57 - 1.00 mg/dL    Sodium 146 (H) 136 - 145 mmol/L     Potassium 2.6 (C) 3.5 - 5.2 mmol/L    Chloride 100 98 - 107 mmol/L    CO2 26.6 22.0 - 29.0 mmol/L    Calcium 9.6 8.2 - 9.6 mg/dL    Total Protein 7.1 6.0 - 8.5 g/dL    Albumin 3.9 3.5 - 5.2 g/dL    ALT (SGPT) 13 1 - 33 U/L    AST (SGOT) 25 1 - 32 U/L    Alkaline Phosphatase 98 39 - 117 U/L    Total Bilirubin 2.2 (H) 0.0 - 1.2 mg/dL    Globulin 3.2 gm/dL    A/G Ratio 1.2 g/dL    BUN/Creatinine Ratio 15.2 7.0 - 25.0    Anion Gap 19.4 (H) 5.0 - 15.0 mmol/L    eGFR 46.2 (L) >60.0 mL/min/1.73   Protime-INR    Collection Time: 05/27/25  7:25 PM    Specimen: Blood   Result Value Ref Range    Protime 15.7 (H) 11.7 - 14.2 Seconds    INR 1.25 (H) 0.90 - 1.10   aPTT    Collection Time: 05/27/25  7:25 PM    Specimen: Blood   Result Value Ref Range    PTT 26.2 22.7 - 35.4 seconds   Magnesium    Collection Time: 05/27/25  7:25 PM    Specimen: Blood   Result Value Ref Range    Magnesium 1.7 1.7 - 2.3 mg/dL   Phosphorus    Collection Time: 05/27/25  7:25 PM    Specimen: Blood   Result Value Ref Range    Phosphorus 3.3 2.5 - 4.5 mg/dL   High Sensitivity Troponin T    Collection Time: 05/27/25  7:25 PM    Specimen: Blood   Result Value Ref Range    HS Troponin T 68 (C) <14 ng/L   BNP    Collection Time: 05/27/25  7:25 PM    Specimen: Blood   Result Value Ref Range    proBNP 2,214.0 (H) 0.0 - 1,800.0 pg/mL   CBC Auto Differential    Collection Time: 05/27/25  7:25 PM    Specimen: Blood   Result Value Ref Range    WBC 17.18 (H) 3.40 - 10.80 10*3/mm3    RBC 4.59 3.77 - 5.28 10*6/mm3    Hemoglobin 13.9 12.0 - 15.9 g/dL    Hematocrit 42.3 34.0 - 46.6 %    MCV 92.2 79.0 - 97.0 fL    MCH 30.3 26.6 - 33.0 pg    MCHC 32.9 31.5 - 35.7 g/dL    RDW 15.7 (H) 12.3 - 15.4 %    RDW-SD 53.1 37.0 - 54.0 fl    MPV 9.4 6.0 - 12.0 fL    Platelets 261 140 - 450 10*3/mm3    Neutrophil % 86.7 (H) 42.7 - 76.0 %    Lymphocyte % 7.5 (L) 19.6 - 45.3 %    Monocyte % 4.4 (L) 5.0 - 12.0 %    Eosinophil % 0.2 (L) 0.3 - 6.2 %    Basophil % 0.2 0.0 - 1.5 %     Immature Grans % 1.0 (H) 0.0 - 0.5 %    Neutrophils, Absolute 14.89 (H) 1.70 - 7.00 10*3/mm3    Lymphocytes, Absolute 1.28 0.70 - 3.10 10*3/mm3    Monocytes, Absolute 0.76 0.10 - 0.90 10*3/mm3    Eosinophils, Absolute 0.03 0.00 - 0.40 10*3/mm3    Basophils, Absolute 0.04 0.00 - 0.20 10*3/mm3    Immature Grans, Absolute 0.18 (H) 0.00 - 0.05 10*3/mm3    nRBC 0.0 0.0 - 0.2 /100 WBC   High Sensitivity Troponin T 1Hr    Collection Time: 05/27/25  9:25 PM    Specimen: Arm, Left; Blood   Result Value Ref Range    HS Troponin T 63 (C) <14 ng/L    Troponin T Numeric Delta -5 ng/L    Troponin T % Delta -7 Abnormal if >/= 20%   Urinalysis With Culture If Indicated - Urine, Catheter    Collection Time: 05/27/25  9:50 PM    Specimen: Urine, Catheter   Result Value Ref Range    Color, UA Yellow Yellow, Straw    Appearance, UA Cloudy (A) Clear    pH, UA 6.0 5.0 - 8.0    Specific Gravity, UA 1.024 1.005 - 1.030    Glucose,  mg/dL (Trace) (A) Negative    Ketones, UA 40 mg/dL (2+) (A) Negative    Bilirubin, UA Negative Negative    Blood, UA Small (1+) (A) Negative    Protein, UA 30 mg/dL (1+) (A) Negative    Leuk Esterase, UA Trace (A) Negative    Nitrite, UA Positive (A) Negative    Urobilinogen, UA 1.0 E.U./dL 0.2 - 1.0 E.U./dL   Urinalysis, Microscopic Only - Urine, Catheter    Collection Time: 05/27/25  9:50 PM    Specimen: Urine, Catheter   Result Value Ref Range    RBC, UA 0-2 None Seen, 0-2 /HPF    WBC, UA 11-20 (A) None Seen, 0-2 /HPF    Bacteria, UA 4+ (A) None Seen /HPF    Squamous Epithelial Cells, UA 7-12 (A) None Seen, 0-2 /HPF    Hyaline Casts, UA 21-30 None Seen /LPF    Granular Casts, UA Present None Seen /LPF    Methodology Automated Microscopy    Lactic Acid, Plasma    Collection Time: 05/27/25 10:47 PM    Specimen: Arm, Left; Blood   Result Value Ref Range    Lactate 2.0 0.5 - 2.0 mmol/L         RADIOLOGY  CT Abdomen Pelvis With Contrast  Result Date: 5/27/2025  CT OF THE ABDOMEN PELVIS WITH CONTRAST   HISTORY: Abdominal pain and diarrhea  COMPARISON: March 19, 2024  TECHNIQUE: Axial CT imaging was obtained through the abdomen and pelvis. IV contrast was administered.  FINDINGS: Images through the lung bases demonstrate dependent atelectasis. There is lumbar scoliosis, with convexity to the right. No there may be some focal fatty infiltration at the gallbladder fossa. The gallbladder is absent. There is a small hiatal hernia. There is a duodenal diverticulum. Calcified granulomata are noted within the spleen. The pancreas is atrophic. Adrenal glands are normal. The kidneys enhance symmetrically. No hydronephrosis is seen. There are simple appearing left renal cysts. The patient is noted to have pelvic floor laxity. Rectal prolapse and cystocele are again seen. There is no bowel obstruction. There is colonic diverticulosis. Appendix is absent. The patient does have some asymmetric thickening of the left piriformis muscle, as well as some presacral stranding. This is favored to be secondary to a fracture through the S4 vertebral body, which is best seen on the sagittal series. There are aortoiliac calcifications.       1. The patient has presacral stranding and asymmetric enlargement of the left piriformis muscle, which is favored to be secondary to some hemorrhage from a fracture of S4. No additional acute findings are noted within the abdomen or pelvis.  Radiation dose reduction techniques were utilized, including automated exposure control and exposure modulation based on body size.   This report was finalized on 5/27/2025 9:36 PM by Dr. Loretta Coles M.D on Workstation: [id number]      CT Cervical Spine Without Contrast  Result Date: 5/27/2025  CT CERVICAL SPINE WO CONTRAST-  INDICATIONS: Trauma TECHNIQUE: Radiation dose reduction techniques were utilized, including automated exposure control and exposure modulation based on body size. Noncontrast head CT, cervical spine CT COMPARISON: 2/27/2025 at 0523  hours MRN 2/26/2025 FINDINGS: Evolving post infarct changes are seen in left temporal lobe, left basal ganglia. No acute intracranial hemorrhage. Midline structures appear stable. Moderate to prominent periventricular hypodensities suggest chronic small vessel ischemic change in a patient this age. Arterial calcifications are seen at the base of the brain. Ventricles, cisterns, cerebral sulci are unremarkable for patient age. Mild partial opacification of left mastoid air cells. The visualized paranasal sinuses, orbits, mastoid air cells are otherwise unremarkable. Cervical spine CT: No acute fracture is identified. The ring of C1 is developmentally incomplete at the posterior aspect. Facet and uncovertebral joint hypertrophy contribute to neuroforaminal narrowing, more prominent on the right at C5/6, and on the left at C3/4, C4/5, C5/6, C6/7. Disc osteophyte complex appears to result in at least mild central stenosis at the levels C3/4 through C6/7. Mild anterolisthesis of C7 on T1. Bilateral carotid arterial calcifications are present. Small atelectasis is apparent in the partly included upper lungs.    Evolving left temporal lobe and basal ganglia post infarct changes. No acute hemorrhage, continued follow-up advised. No acute cervical fracture identified; degenerative changes in the cervical spine. If further imaging evaluation of the spine is indicated, MRI could be considered.  This report was finalized on 5/27/2025 8:51 PM by Dr. Sai Plasencia M.D on Workstation: [id number]      CT Head Without Contrast  Result Date: 5/27/2025  CT HEAD WO CONTRAST-  INDICATIONS: Trauma  TECHNIQUE: Radiation dose reduction techniques were utilized, including automated exposure control and exposure modulation based on body size. Noncontrast head CT, cervical spine CT  COMPARISON: 2/27/2025 at 0523 hours MRN 2/26/2025  FINDINGS:  Evolving post infarct changes are seen in left temporal lobe, left basal ganglia.  No acute  intracranial hemorrhage. Midline structures appear stable.  Moderate to prominent periventricular hypodensities suggest chronic small vessel ischemic change in a patient this age.  Arterial calcifications are seen at the base of the brain.  Ventricles, cisterns, cerebral sulci are unremarkable for patient age.  Mild partial opacification of left mastoid air cells. The visualized paranasal sinuses, orbits, mastoid air cells are otherwise unremarkable.   Cervical spine CT:  No acute fracture is identified. The ring of C1 is developmentally incomplete at the posterior aspect.  Facet and uncovertebral joint hypertrophy contribute to neuroforaminal narrowing, more prominent on the right at C5/6, and on the left at C3/4, C4/5, C5/6, C6/7. Disc osteophyte complex appears to result in at least mild central stenosis at the levels C3/4 through C6/7.  Mild anterolisthesis of C7 on T1.  Bilateral carotid arterial calcifications are present.  Small atelectasis is apparent in the partly included upper lungs.        Evolving left temporal lobe and basal ganglia post infarct changes. No acute hemorrhage, continued follow-up advised.  No acute cervical fracture identified; degenerative changes in the cervical spine. If further imaging evaluation of the spine is indicated, MRI could be considered.  This report was finalized on 5/27/2025 8:29 PM by Dr. Sai Plasencia M.D on Workstation: [id number]      XR Chest 1 View  Result Date: 5/27/2025  XR CHEST 1 VW-  HISTORY: Female who is 89 years-old, altered mental status, weakness  TECHNIQUE: Frontal view of the chest  COMPARISON: 2/28/2025  FINDINGS: The heart appears mildly enlarged. Pulmonary vasculature is unremarkable. Aorta is tortuous, calcified. No focal pulmonary consolidation, pleural effusion, or pneumothorax. No acute osseous process.      No focal pulmonary consolidation. Mild cardiomegaly. Tortuous aorta. Follow-up as clinically indicated.  This report was finalized on  5/27/2025 7:45 PM by Dr. Sai Plasencia M.D on Workstation: [id number]          MEDICATIONS GIVEN IN ER  Medications   potassium chloride 10 mEq in 100 mL IVPB (0 mEq Intravenous Stopped 5/27/25 2231)     And   potassium chloride 10 mEq in 100 mL IVPB (10 mEq Intravenous New Bag 5/27/25 2231)     And   potassium chloride 10 mEq in 100 mL IVPB (has no administration in time range)     And   potassium chloride 10 mEq in 100 mL IVPB (has no administration in time range)     And   potassium chloride 10 mEq in 100 mL IVPB (has no administration in time range)     And   potassium chloride 10 mEq in 100 mL IVPB (has no administration in time range)   piperacillin-tazobactam (ZOSYN) 3.375 g IVPB in 100 mL NS MBP (CD) (has no administration in time range)   lactated ringers bolus 1,000 mL (1,000 mL Intravenous New Bag 5/27/25 2043)   potassium chloride (KLOR-CON) packet 20 mEq (20 mEq Oral Given 5/27/25 2040)   iopamidol (ISOVUE-300) 61 % injection 85 mL (85 mL Intravenous Given by Other 5/27/25 2107)           OUTPATIENT MEDICATION MANAGEMENT:  Current Facility-Administered Medications Ordered in Epic   Medication Dose Route Frequency Provider Last Rate Last Admin    piperacillin-tazobactam (ZOSYN) 3.375 g IVPB in 100 mL NS MBP (CD)  3.375 g Intravenous Once Trish Perea PA-C        potassium chloride 10 mEq in 100 mL IVPB  10 mEq Intravenous Once Trish Perea PA-C 100 mL/hr at 05/27/25 2231 10 mEq at 05/27/25 2231    And    potassium chloride 10 mEq in 100 mL IVPB  10 mEq Intravenous Once Trish Perea PA-C        And    potassium chloride 10 mEq in 100 mL IVPB  10 mEq Intravenous Once Trish Perea PA-C        And    [START ON 5/28/2025] potassium chloride 10 mEq in 100 mL IVPB  10 mEq Intravenous Once Trish Perea PA-C        And    [START ON 5/28/2025] potassium chloride 10 mEq in 100 mL IVPB  10 mEq Intravenous Once Trish Perea PA-C         No current  [facility]-ordered outpatient medications on file.           PROGRESS, DATA ANALYSIS, CONSULTS, AND MEDICAL DECISION MAKING  ORDERS PLACED DURING THIS VISIT:  Orders Placed This Encounter   Procedures    Blood Culture - Blood,    Blood Culture - Blood,    Urine Culture - Urine,    CT Head Without Contrast    CT Cervical Spine Without Contrast    XR Chest 1 View    CT Abdomen Pelvis With Contrast    Comprehensive Metabolic Panel    Protime-INR    aPTT    Urinalysis With Culture If Indicated - Urine, Catheter    Magnesium    Phosphorus    High Sensitivity Troponin T    BNP    CBC Auto Differential    High Sensitivity Troponin T 1Hr    Potassium    Urinalysis, Microscopic Only - Urine, Clean Catch    Lactic Acid, Plasma    [facility] (on-call MD unless specified) Details    ECG 12 Lead Altered Mental Status    Initiate Observation Status    CBC & Differential       All labs have been independently interpreted by me.  All radiology studies have been reviewed by me. All EKG's have been independently viewed and interpreted by me.  Discussion below represents my analysis of pertinent findings related to patient's condition, differential diagnosis, treatment plan and final disposition.    Differential diagnosis includes but is not limited to:   My differential diagnosis for generalized weakness includes but is not limited to:  Neuromuscular weakness   CVA  Hemorrhagic stroke  Multiple sclerosis  Amyotrophic Lateral Sclerosis (ALS) (UMN & LMN)  Spinal and bulbar muscular atrophy (Jovan's syndrome)  Spinal cord disease: Infection (Epidural abscess)  Infarction/ischemia  Trauma (Spinal Cord Syndromes)  Inflammation (Transverse Myelitis)  Degenerative (Spinal muscular atrophy)  Tumor  Peripheral nerve disease: Guillain-[place] syndrome  Toxins ([place]a)  Tick paralysis  Diabetic peripheral neuropathy  NMJ disease: Myasthenia gravis crisis  Botulism  Organophosphate toxicity  Lambert-Eaton myasthenic  syndrome  Rhabdomyolysis  Dermatomyositis  Polymyositis  Alcoholic myopathy  Non-neuromuscular weakness   ACS  Arrhythmia/Syncope  Severe infection/Sepsis  Hypoglycemia  Periodic paralysis (electrolyte disturbance, K, Mg, Ca)   Hypokalemic periodic paralysis  Thyrotoxic periodic paralysis  Respiratory failure  Symptomatic Anemia  Severe dehydration  Hypothyroidism  Polypharmacy  Malignancy          ED Course:  ED Course as of 05/27/25 2323 Tue May 27, 2025   2007 WBC(!): 17.18 [CC]   2007 Hemoglobin: 13.9 [CC]   2007 proBNP(!): 2,214.0 [CC]   2007 Magnesium: 1.7 [CC]   2007 CT Head Without Contrast  My independent interpretation the CT of the head is no intracranial hemorrhage [CC]   2019 Potassium(!!): 2.6  Patient has had diarrhea, will replace and hydrate [CC]   2019 Anion Gap(!): 19.4 [CC]   2020 I discussed the case with Dr. Tsai and he agrees to evaluate the patient at the bedside.    [CC]   2021 HS Troponin T(!!): 68  Patient has generalized weakness, she is not having any chest pain.  She has A-fib and EKG reflects A-fib but no ischemic changes.  I have a low suspicion for ACS, will trend. [CC]   2021 XR Chest 1 View  My independent interpretation of the chest x-ray is no obvious acute infiltrate [CC]   2022 Sodium(!): 146  hydrating [CC]   2127 CT Abdomen Pelvis With Contrast  My independent interpretation of the CT of abdomen pelvis is no bowel obstruction [CC]   2208 Troponin T Numeric Delta: -5  flat [CC]   2210 Nitrite, UA(!): Positive [CC]   2210 Leukocytes, UA(!): Trace [CC]   2226 Spoke with Babita, APC with [facility].  Reviewed history, exam, results, treatments.  She agrees admit the patient to Dr. Michel.  Blood cultures pending at the time of admission    [CC]      ED Course User Index  [CC] Trish Perea PA-C           AS OF 23:23 EDT VITALS:    BP - 154/75  HR - 85  TEMP - 97.7 °F (36.5 °C) (Oral)  O2 SATS - 94%        MDM:  Patient is a 89-year-old female presents emergency department  today after a fall at home.  She reports feeling unwell since yesterday.  On arrival here in the emergency department vitals are reassuring, she is afebrile.  On my exam the patient is chronically ill-appearing, she is malodorous and smells of urine.  Otherwise  exam is generally benign.  Patient was evaluated for traumatic injuries with CT of the head and cervical spine.  No traumatic injuries were identified.  She was evaluated with labs which revealed a leukocytosis which I suspect to be secondary to urinary tract infection.  She is treated with Zosyn.  She is also found to have multiple electrolyte abnormalities with a hypokalemia which I suspect to be secondary to diarrhea.  This was replaced both orally and intravenously.  He was also hyponatremic to suspect to be secondary to dehydration as she has an elevated anion gap.  She was hydrated with IV fluids.  Given the diarrhea and leukocytosis she was subsequent evaluated with a CT of the abdomen pelvis which revealed signs of cystitis but also signs of an S4 fracture, probably from her fall today.  She really has no lower back pain and has no red flag symptoms concerning for cord compression.  Given the patient's electrolyte disturbances as well as the UTI and generalized weakness she will require admission to the hospital for further management.  She is agreeable to plan and stable at time of admission.      COMPLEXITY OF CARE  The patient requires admission.        DIAGNOSIS  Final diagnoses:   Generalized weakness   Fall, initial encounter   Closed head injury, initial encounter   Acute UTI   Closed fracture of sacrum, unspecified portion of sacrum, initial encounter   Hypokalemia   Dehydration         DISPOSITION  ED Disposition       ED Disposition   Decision to Admit    Condition   --    Comment   Level of Care: Telemetry [5]   Diagnosis: Acute UTI [975090]   Admitting Physician: GIN ALLAN [[id number]]   Attending Physician: GIN ALLAN [[id number]]    Is patient appropriate for Inpatient Observation Unit?: No [0]                        Please note that portions of this document were completed with a voice recognition program.    Note Disclaimer: At [facility], we believe that sharing information builds trust and better relationships. You are receiving this note because you recently visited [facility]. It is possible you will see health information before a provider has talked with you about it. This kind of information can be easy to misunderstand. To help you fully understand what it means for your health, we urge you to discuss this note with your provider.     Trish Perea PA-C  05/27/25 8528

## 2025-05-28 NOTE — DISCHARGE PLACEMENT REQUEST
"Monica Scherer (89 y.o. Female)       Date of Birth   05/04/1933    Social Security Number       Address   [address] [address]    Home Phone   [phone]    MRN   [id number]       Amish   Confucianist    Marital Status   Single                            Admission Date   5/27/2025    Admission Type   Emergency    Admitting Provider   Jimmy Michel MD    Attending Provider   Eduardo Scherer MD    Department, Room/Bed   [address], [address]/1       Discharge Date       Discharge Disposition       Discharge Destination                                 Attending Provider: Eduardo Scherer MD    Allergies: Cephalexin, Latex    Isolation: None   Infection: None   Code Status: CPR    Ht: 162.6 cm (64\")   Wt: 67.1 kg (147 lb 14.9 oz)    Admission Cmt: None   Principal Problem: Acute UTI [N39.0]                   Active Insurance as of 5/27/2025       Primary Coverage       Payor Plan Insurance Group Employer/Plan Group    [employer] MEDICARE REPLACEMENT [employer]vantage [employer] PPO [id number]       Payor Plan Address Payor Plan Phone Number Payor Plan Fax Number Effective Dates    [address] [address] [phone]  1/1/2018 - None Entered    [facility] [address]         Subscriber Name Subscriber Birth Date Member ID       MONICA SCHERER 5/4/1933 [id number]                     Emergency Contacts        (Rel.) Home Phone Work Phone Mobile Phone    Raquel Lebron (Daughter) [phone] -- --    BarronElisa (Grandchild) [phone] -- --    MUNA TRONCOSO (Son) [phone] -- --                "

## 2025-05-28 NOTE — CASE MANAGEMENT/SOCIAL WORK
Discharge Planning Assessment  [facility]     Patient Name: Monica Scherer  MRN: [id number]  Today's Date: 5/28/2025    Admit Date: 5/27/2025    Plan: Referral pending for [facility]   Discharge Needs Assessment       Row Name 05/28/25 1445       Living Environment    People in Home child(leona), adult    Current Living Arrangements home    Potentially Unsafe Housing Conditions none    In the past 12 months has the electric, gas, oil, or water company threatened to shut off services in your home? No    Primary Care Provided by child(leona)    Provides Primary Care For no one, unable/limited ability to care for self    Family Caregiver if Needed child(leona), adult    Quality of Family Relationships helpful;involved;supportive    Able to Return to Prior Arrangements yes       Resource/Environmental Concerns    Resource/Environmental Concerns none    Transportation Concerns none       Transportation Needs    In the past 12 months, has lack of transportation kept you from medical appointments or from getting medications? no    In the past 12 months, has lack of transportation kept you from meetings, work, or from getting things needed for daily living? No       Food Insecurity    Within the past 12 months, you worried that your food would run out before you got the money to buy more. Never true    Within the past 12 months, the food you bought just didn't last and you didn't have money to get more. Never true       Transition Planning    Patient/Family Anticipates Transition to inpatient rehabilitation facility    Patient/Family Anticipated Services at Transition skilled nursing    Transportation Anticipated family or friend will provide;health plan transportation       Discharge Needs Assessment    Readmission Within the Last 30 Days no previous admission in last 30 days    Equipment Currently Used at Home walker, standard;wheelchair;shower chair    Concerns to be Addressed discharge planning    Do you want help  finding or keeping work or a job? I do not need or want help    Do you want help with school or training? For example, starting or completing job training or getting a high school diploma, GED or equivalent No                   Discharge Plan       Row Name 05/28/25 1443       Plan    Plan Referral pending for [facility]    Roadmap to Recovery Yes    Patient/Family in Agreement with Plan yes    Plan Comments Spoke with patient's daughter at bedside. Introduced self and explained CCP role, verified face sheet and local pharmacy is [facility], patient enrolled in M2B. Family denies difficulty with medication cost/ management. Patient lives with her son in a 3 level home but resides on 1 level. There are 8 steps to enter. She has used HH but daughter cannot recall which agency. She recently at [facility], family requested a referral. Patient uses a rolling walker at baseline.                  Continued Care and Services - Admitted Since 5/27/2025       Destination       Service Provider Request Status Services Address Phone Fax Patient Preferred    [facility] Pending - Request Sent -- [address] [facility] [facility] [phone] --                  Selected Continued Care - Episodes Includes continued care and service providers with selected services from the active episodes listed below          Selected Continued Care - Prior Encounters Includes continued care and service providers with selected services from prior encounters from 2/26/2025 to 5/28/2025      Discharged on 3/3/2025 Admission date: 2/26/2025 - Discharge disposition: Skilled Nursing Facility (DC - External)      Destination       Service Provider Services Address Phone Fax Patient [facility] [address] [phone] --                             Demographic Summary       Row Name 05/28/25 1444       General  Information    Admission Type observation    Required Notices Provided Observation Status Notice    Referral Source admission list    Reason for Consult discharge planning    Preferred Language English                   Functional Status       Row Name 05/28/25 1444       Functional Status    Usual Activity Tolerance moderate    Current Activity Tolerance fair       Physical Activity    On average, how many days per week do you engage in moderate to strenuous exercise (like a brisk walk)? 0 days    On average, how many minutes do you engage in exercise at this level? 0 min    Number of minutes of exercise per week 0       Functional Status, IADL    Medications assistive person    Meal Preparation assistive person    Housekeeping assistive person    Laundry assistive person    Shopping assistive person    If for any reason you need help with day-to-day activities such as bathing, preparing meals, shopping, managing finances, etc., do you get the help you need? I get all the help I need       Employment/    Employment Status retired                   Psychosocial    No documentation.                  Abuse/Neglect    No documentation.                  Legal       Row Name 05/28/25 1445       Financial Resource Strain    How hard is it for you to pay for the very basics like food, housing, medical care, and heating? Not very       Financial/Legal    Financial/Environmental Concerns none       Legal    Criminal Activity/Legal Involvement none                   Substance Abuse    No documentation.                  Patient Forms    No documentation.                     Shaila Spaulding RN

## 2025-05-29 ENCOUNTER — CALL CENTER PROGRAMS (OUTPATIENT)
Dept: CALL CENTER | Facility: HOSPITAL | Age: OVER 89
End: 2025-05-29
Payer: MEDICARE

## 2025-05-29 LAB
ANION GAP SERPL CALCULATED.3IONS-SCNC: 15 MMOL/L (ref 5–15)
BUN SERPL-MCNC: 13 MG/DL (ref 8–23)
BUN/CREAT SERPL: 14.8 (ref 7–25)
CALCIUM SPEC-SCNC: 8.8 MG/DL (ref 8.2–9.6)
CHLORIDE SERPL-SCNC: 106 MMOL/L (ref 98–107)
CO2 SERPL-SCNC: 24 MMOL/L (ref 22–29)
CREAT SERPL-MCNC: 0.88 MG/DL (ref 0.57–1)
EGFRCR SERPLBLD CKD-EPI 2021: 61.7 ML/MIN/1.73
GLUCOSE SERPL-MCNC: 165 MG/DL (ref 65–99)
MAGNESIUM SERPL-MCNC: 2.6 MG/DL (ref 1.7–2.3)
PHOSPHATE SERPL-MCNC: 1.9 MG/DL (ref 2.5–4.5)
PHOSPHATE SERPL-MCNC: 2 MG/DL (ref 2.5–4.5)
POTASSIUM SERPL-SCNC: 3.5 MMOL/L (ref 3.5–5.2)
SODIUM SERPL-SCNC: 145 MMOL/L (ref 136–145)

## 2025-05-29 PROCEDURE — 25010000002 AMPICILLIN-SULBACTAM PER 1.5 G: Performed by: INTERNAL MEDICINE

## 2025-05-29 PROCEDURE — 25010000002 OLANZAPINE 10 MG RECONSTITUTED SOLUTION: Performed by: STUDENT IN AN ORGANIZED HEALTH CARE EDUCATION/TRAINING PROGRAM

## 2025-05-29 PROCEDURE — 83735 ASSAY OF MAGNESIUM: CPT | Performed by: INTERNAL MEDICINE

## 2025-05-29 PROCEDURE — 84100 ASSAY OF PHOSPHORUS: CPT | Performed by: INTERNAL MEDICINE

## 2025-05-29 PROCEDURE — 80048 BASIC METABOLIC PNL TOTAL CA: CPT | Performed by: INTERNAL MEDICINE

## 2025-05-29 RX ORDER — OLANZAPINE 10 MG/2ML
2.5 INJECTION, POWDER, FOR SOLUTION INTRAMUSCULAR ONCE
Status: COMPLETED | OUTPATIENT
Start: 2025-05-29 | End: 2025-05-29

## 2025-05-29 RX ORDER — OLANZAPINE 5 MG/1
2.5 TABLET, FILM COATED ORAL NIGHTLY
Status: DISCONTINUED | OUTPATIENT
Start: 2025-05-29 | End: 2025-05-29

## 2025-05-29 RX ORDER — OLANZAPINE 5 MG/1
5 TABLET, FILM COATED ORAL
Status: DISCONTINUED | OUTPATIENT
Start: 2025-05-29 | End: 2025-06-06

## 2025-05-29 RX ADMIN — DABIGATRAN ETEXILATE 150 MG: 150 CAPSULE ORAL at 20:33

## 2025-05-29 RX ADMIN — OLANZAPINE 2.5 MG: 10 INJECTION, POWDER, LYOPHILIZED, FOR SOLUTION INTRAMUSCULAR at 06:50

## 2025-05-29 RX ADMIN — Medication 10 ML: at 20:36

## 2025-05-29 RX ADMIN — OLANZAPINE 5 MG: 5 TABLET, FILM COATED ORAL at 17:45

## 2025-05-29 RX ADMIN — Medication 2 PACKET: at 12:57

## 2025-05-29 RX ADMIN — ATORVASTATIN CALCIUM 40 MG: 20 TABLET, FILM COATED ORAL at 20:33

## 2025-05-29 RX ADMIN — Medication 2 PACKET: at 21:44

## 2025-05-29 RX ADMIN — METOPROLOL TARTRATE 25 MG: 25 TABLET, FILM COATED ORAL at 09:09

## 2025-05-29 RX ADMIN — PANTOPRAZOLE SODIUM 40 MG: 40 TABLET, DELAYED RELEASE ORAL at 09:09

## 2025-05-29 RX ADMIN — METOPROLOL TARTRATE 25 MG: 25 TABLET, FILM COATED ORAL at 20:33

## 2025-05-29 RX ADMIN — DABIGATRAN ETEXILATE 150 MG: 150 CAPSULE ORAL at 09:09

## 2025-05-29 RX ADMIN — AMOXICILLIN AND CLAVULANATE POTASSIUM 1 TABLET: 875; 125 TABLET, COATED ORAL at 17:45

## 2025-05-29 NOTE — PLAN OF CARE
Goal Outcome Evaluation:          Patient given nursing care per MD orders and hospital policies and showed no signs or symptoms of distress this shift. Spent most of the shift without an IV because 5 nurses attempted to get one placed but she kept on yelling and moving when we would attempt to place one. Doctor then switched antibiotic to PO.

## 2025-05-29 NOTE — PROGRESS NOTES
Name: Monica Scherer ADMIT: 2025   : 1933  PCP: Amilcar Mauricio DO    MRN: [id number] LOS: 0 days   AGE/SEX: 89 y.o. female  ROOM: [facility]   Subjective   Chief Complaint   Patient presents with    Altered Mental Status    Weakness - Generalized     This is a 89-year-old female with history of atrial fibrillation, hyperlipidemia, hypertension, CVA, presents to the hospital, after a fall at home. She came to [facility] ER and found to have UTI, hypokalemia, hypernatremia, and S4 Sacral fracture    She had increased confusion and restlessness and possible hallucinations overnight  Given zyprexa early this morning, now resting more comfortably    ROS  No f/c  No n/v  No cp/palp  No soa/cough    Objective   Vital Signs  Temp:  [97.2 °F (36.2 °C)-97.7 °F (36.5 °C)] 97.7 °F (36.5 °C)  Heart Rate:  [72-84] 84  Resp:  [18] 18  BP: (163-169)/(70-81) 169/70  SpO2:  [94 %-97 %] 94 %  on   ;   Device (Oxygen Therapy): room air  Body mass index is 25.39 kg/m².    Physical Exam  Constitutional:       General: She is not in acute distress.  HENT:      Head: Normocephalic and atraumatic.   Eyes:      General: No scleral icterus.  Cardiovascular:      Rate and Rhythm: Regular rhythm.      Heart sounds: Normal heart sounds.   Pulmonary:      Effort: Pulmonary effort is normal. No respiratory distress.   Abdominal:      General: There is no distension.      Palpations: Abdomen is soft.   Musculoskeletal:      Cervical back: Neck supple.     Elderly, chronically ill   Drowsy, confused    Results Review:       I reviewed the patient's new clinical results.  Results from last 7 days   Lab Units 25  0557 25   WBC 10*3/mm3 11.72* 17.18*   HEMOGLOBIN g/dL 10.9* 13.9   PLATELETS 10*3/mm3 213 261     Results from last 7 days   Lab Units 25  0824 25  0557 25   SODIUM mmol/L 145  --  139 146*   POTASSIUM mmol/L 3.5 4.0 3.5 2.6*   CHLORIDE mmol/L 106  --  103 100   CO2 mmol/L  24.0  --  21.9* 26.6   BUN mg/dL 13.0  --  16.0 17.0   CREATININE mg/dL 0.88  --  0.86 1.12*   GLUCOSE mg/dL 165*  --  100* 153*   Estimated Creatinine Clearance: 38.4 mL/min (by C-G formula based on SCr of 0.88 mg/dL).  Results from last 7 days   Lab Units 05/28/25  0557 05/27/25  1925   ALBUMIN g/dL 3.0* 3.9   BILIRUBIN mg/dL 1.4* 2.2*   ALK PHOS U/L 72 98   AST (SGOT) U/L 18 25   ALT (SGPT) U/L 12 13     Results from last 7 days   Lab Units 05/29/25  0824 05/28/25  0557 05/27/25  1925   CALCIUM mg/dL 8.8 8.0* 9.6   ALBUMIN g/dL  --  3.0* 3.9   MAGNESIUM mg/dL 2.6* 1.5* 1.7   PHOSPHORUS mg/dL 1.9*  --  3.3     Results from last 7 days   Lab Units 05/27/25  2247   LACTATE mmol/L 2.0       Coag   Results from last 7 days   Lab Units 05/27/25  1925   INR  1.25*   APTT seconds 26.2     HbA1C   Lab Results   Component Value Date    HGBA1C 6.50 (H) 02/27/2025    HGBA1C 6.40 (H) 09/30/2024    HGBA1C 6.6 (H) 06/06/2023     Infection   Results from last 7 days   Lab Units 05/27/25  2247 05/27/25 2245 05/27/25  2150   BLOODCX  No growth at 24 hours No growth at 24 hours  --    URINECX   --   --  >100,000 CFU/mL Escherichia coli*     Radiology(recent) XR Spine Lumbar 2 or 3 View  Result Date: 5/28/2025   Pelvis: Exam is limited by low bone mineralization. S4 fracture best seen on recent CT. Normal alignment. 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 Prior IV contrast within the urinary bladder.  Lumbar spine: Exam is limited by low bone mineralization. Mild lumbar spine dextrocurvature. No subluxation. Anterior wedging L1 is similar to recent CT. Remaining vertebral body heights are maintained. Multilevel disc degeneration most advanced at L1-L3. Moderate lower lumbar facet arthropathy.  This report was finalized on 5/28/2025 4:05 PM by Dr. Bladimir Vernon M.D on Workstation: [id number]      XR Pelvis 1 or 2 View  Result Date: 5/28/2025   Pelvis: Exam is limited by low bone mineralization. S4 fracture best seen on  recent CT. Normal alignment. 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 Prior IV contrast within the urinary bladder.  Lumbar spine: Exam is limited by low bone mineralization. Mild lumbar spine dextrocurvature. No subluxation. Anterior wedging L1 is similar to recent CT. Remaining vertebral body heights are maintained. Multilevel disc degeneration most advanced at L1-L3. Moderate lower lumbar facet arthropathy.  This report was finalized on 5/28/2025 4:05 PM by Dr. Bladimir Vernon M.D on Workstation: [id number]      CT Abdomen Pelvis With Contrast  Result Date: 5/27/2025   1. The patient has presacral stranding and asymmetric enlargement of the left piriformis muscle, which is favored to be secondary to some hemorrhage from a fracture of S4. No additional acute findings are noted within the abdomen or pelvis.  Radiation dose reduction techniques were utilized, including automated exposure control and exposure modulation based on body size.   This report was finalized on 5/27/2025 9:36 PM by Dr. Loretta Coles M.D on Workstation: [id number]      CT Cervical Spine Without Contrast  Result Date: 5/27/2025  Evolving left temporal lobe and basal ganglia post infarct changes. No acute hemorrhage, continued follow-up advised. No acute cervical fracture identified; degenerative changes in the cervical spine. If further imaging evaluation of the spine is indicated, MRI could be considered.  This report was finalized on 5/27/2025 8:51 PM by Dr. Sai Plasencia M.D on Workstation: [id number]      CT Head Without Contrast  Result Date: 5/27/2025   Evolving left temporal lobe and basal ganglia post infarct changes. No acute hemorrhage, continued follow-up advised.  No acute cervical fracture identified; degenerative changes in the cervical spine. If further imaging evaluation of the spine is indicated, MRI could be considered.  This report was finalized on 5/27/2025 8:29 PM by Dr. Sai Plasencia M.D on  "Workstation: [id number]      XR Chest 1 View  Result Date: 5/27/2025  No focal pulmonary consolidation. Mild cardiomegaly. Tortuous aorta. Follow-up as clinically indicated.  This report was finalized on 5/27/2025 7:45 PM by Dr. Sai Palsencia M.D on Workstation: [email or web address]      HS Troponin T   Date Value Ref Range Status   05/27/2025 63 (C) <14 ng/L Final   05/27/2025 68 (C) <14 ng/L Final     No components found for: \"TSH;2\"    ampicillin-sulbactam, 1.5 g, Intravenous, Q6H  atorvastatin, 40 mg, Oral, Nightly  dabigatran etexilate, 150 mg, Oral, Q12H  metoprolol tartrate, 25 mg, Oral, BID  pantoprazole, 40 mg, Oral, Q AM  potassium & sodium phosphates, 2 packet, Oral, Once  sodium chloride, 10 mL, Intravenous, Q12H         Diet: Regular/House; Texture: Pureed (NDD 1); Fluid Consistency: Thin (IDDSI 0)      Assessment & Plan      Active Hospital Problems    Diagnosis  POA    **Acute UTI [N39.0]  Yes    History of CVA (cerebrovascular accident) [Z86.73]  Not Applicable    Paroxysmal atrial fibrillation [I48.0]  Yes    Hyperlipidemia [E78.5]  Yes    Hypertension [I10]  Yes      Resolved Hospital Problems   No resolved problems to display.     This is a 89-year-old female with history of atrial fibrillation, hyperlipidemia, hypertension, CVA, presents to the hospital, after a fall at home. She came to [facility] ER and found to have UTI, hypokalemia, hypernatremia, and S4 Sacral fracture    1.  Acute UTI- on IV ABX, follow up cultures     2.  Hypernatremia, improved, likely due to dehydration     3.  Hypokalemia, hypomag- replacement of electrolytes per protocol     4.  S4 fracture, continue pain control.  Denies any pain.     5. P.Afib- on dabigatran and metoprolol    6. H/o stroke- ST evaluated and on modified diet    7. Delirium- delirium precautions including trying to have good rest at night and awake and active during the day. Had needed a prn dose of zyprexa overnight/early this morning. Will have low dose scheduled " zyprexa qhs to aid in sleep and improve symptoms     Reviewed records       Eduardo Scherer MD  [facility]  05/29/25  08:01 EDT

## 2025-05-29 NOTE — CASE MANAGEMENT/SOCIAL WORK
Continued Stay Note  [facility]     Patient Name: Monica Scherer  MRN: [id number]  Today's Date: 5/29/2025    Admit Date: 5/27/2025    Plan: [facility] pending pre-cert, bed availability, and medical clearance.   Discharge Plan       Row Name 05/29/25 1814       Plan    Plan [facility] pending pre-cert, bed availability, and medical clearance.    Plan Comments Chart reviewed. Patient accepted at [facility]; patient will need medical clearance and pre-cert once bed available.                   Discharge Codes    No documentation.                 Expected Discharge Date and Time       Expected Discharge Date Expected Discharge Time    May 30, 2025               Shaila Spaulding RN

## 2025-05-29 NOTE — PLAN OF CARE
Goal Outcome Evaluation:  Pt is alert but confused. Pt restless through out the night. Pt noncompliant with care. Pt educated on the importance of care. Safety measure maintained. Call light within reach. Will continue plan of care. Pt very agitated this am. Refused medication, labs and iv insertion with multiple attempts to exit bed. Combative at times. MD notified. Orders received. Sitter will be provided for day shift.

## 2025-05-29 NOTE — NURSING NOTE
Educated patient on need for IV access to obtain her antibiotics and other medications so she can get better.  Patient refuses and IV at this time or lab work.  Primary RN notified and to update family and MD.

## 2025-05-29 NOTE — OUTREACH NOTE
Stroke [place] Survey      Flowsheet Row Responses   Facility patient discharged from? [facility]   Attempt successful Yes   Call start time 0828   Person spoke with today (if not patient) and relationship Caregiver  [daughter Rohit]   Call end time 0838   Patient location 30 days post discharge if known Home  [Daughter reports patient went to [facility] for 28 days and then discharged to home with family]   Was the patient readmitted within 30 days of discharge? No   Could you live alone without any help from another person? No  [Daughter reports patient requires help with getting to bathroom, bathing and dressing, maintaining household, meal prep.]   Can you do everything that you were doing right before your stroke even if slower and not as much? No  [Daughter reports patient was previously independepent with ADL's prior to the stroke.]   Are you completely back to the way you were right before your stroke? No  [Daughter states patient continues to have weakness, increased confusion.]   Can you walk from one room to another without help from another person? No  [Daughter reports that patient regressed after leaving rehab because she could not get HH PT due to patient having a vicious dog in home. She reports patient able to take few steps with walker and then needs assistance.]   Can the patient sit up in bed without any help? Yes   Call Center Radha Score 4   [place] score call completed Yes   Comments Daughter reports that patient had improved while at rehab to be able to ambulate with walker, but she worsened after discharge due to being unable to have home PT. Patient is currently admitted to hospital with Acute UTI, associated with acute encephalopathy and fall.            ROHIT RODRIGUEZ - Registered Nurse

## 2025-05-29 NOTE — NURSING NOTE
Pt refusing iv placement. Russ winchester RN @ [facility] encouraging pt and pt still refusing. No iv placed.

## 2025-05-30 LAB
ANION GAP SERPL CALCULATED.3IONS-SCNC: 10.8 MMOL/L (ref 5–15)
BACTERIA SPEC AEROBE CULT: ABNORMAL
BUN SERPL-MCNC: 13 MG/DL (ref 8–23)
BUN/CREAT SERPL: 16 (ref 7–25)
CALCIUM SPEC-SCNC: 8.2 MG/DL (ref 8.2–9.6)
CHLORIDE SERPL-SCNC: 108 MMOL/L (ref 98–107)
CO2 SERPL-SCNC: 25.2 MMOL/L (ref 22–29)
CREAT SERPL-MCNC: 0.81 MG/DL (ref 0.57–1)
EGFRCR SERPLBLD CKD-EPI 2021: 68.2 ML/MIN/1.73
GLUCOSE SERPL-MCNC: 122 MG/DL (ref 65–99)
MAGNESIUM SERPL-MCNC: 2 MG/DL (ref 1.7–2.3)
PHOSPHATE SERPL-MCNC: 2.7 MG/DL (ref 2.5–4.5)
POTASSIUM SERPL-SCNC: 3 MMOL/L (ref 3.5–5.2)
POTASSIUM SERPL-SCNC: 6.2 MMOL/L (ref 3.5–5.2)
SODIUM SERPL-SCNC: 144 MMOL/L (ref 136–145)

## 2025-05-30 PROCEDURE — 84132 ASSAY OF SERUM POTASSIUM: CPT | Performed by: INTERNAL MEDICINE

## 2025-05-30 PROCEDURE — 83735 ASSAY OF MAGNESIUM: CPT | Performed by: INTERNAL MEDICINE

## 2025-05-30 PROCEDURE — 25810000003 SODIUM CHLORIDE 0.9 % SOLUTION: Performed by: INTERNAL MEDICINE

## 2025-05-30 PROCEDURE — 97530 THERAPEUTIC ACTIVITIES: CPT

## 2025-05-30 PROCEDURE — 80048 BASIC METABOLIC PNL TOTAL CA: CPT | Performed by: INTERNAL MEDICINE

## 2025-05-30 PROCEDURE — 84100 ASSAY OF PHOSPHORUS: CPT | Performed by: INTERNAL MEDICINE

## 2025-05-30 RX ORDER — POTASSIUM CHLORIDE 1.5 G/1.58G
40 POWDER, FOR SOLUTION ORAL EVERY 4 HOURS
Status: COMPLETED | OUTPATIENT
Start: 2025-05-30 | End: 2025-05-30

## 2025-05-30 RX ORDER — SODIUM CHLORIDE 9 MG/ML
100 INJECTION, SOLUTION INTRAVENOUS CONTINUOUS
Status: ACTIVE | OUTPATIENT
Start: 2025-05-30 | End: 2025-05-31

## 2025-05-30 RX ADMIN — POTASSIUM CHLORIDE 40 MEQ: 1.5 POWDER, FOR SOLUTION ORAL at 10:18

## 2025-05-30 RX ADMIN — METOPROLOL TARTRATE 25 MG: 25 TABLET, FILM COATED ORAL at 10:18

## 2025-05-30 RX ADMIN — SODIUM CHLORIDE 100 ML/HR: 9 INJECTION, SOLUTION INTRAVENOUS at 19:07

## 2025-05-30 RX ADMIN — DABIGATRAN ETEXILATE 150 MG: 150 CAPSULE ORAL at 10:18

## 2025-05-30 RX ADMIN — PANTOPRAZOLE SODIUM 40 MG: 40 TABLET, DELAYED RELEASE ORAL at 10:18

## 2025-05-30 RX ADMIN — POTASSIUM CHLORIDE 40 MEQ: 1.5 POWDER, FOR SOLUTION ORAL at 17:50

## 2025-05-30 RX ADMIN — OLANZAPINE 5 MG: 5 TABLET, FILM COATED ORAL at 17:50

## 2025-05-30 RX ADMIN — ATORVASTATIN CALCIUM 40 MG: 20 TABLET, FILM COATED ORAL at 20:48

## 2025-05-30 RX ADMIN — Medication 10 ML: at 10:18

## 2025-05-30 RX ADMIN — AMOXICILLIN AND CLAVULANATE POTASSIUM 1 TABLET: 875; 125 TABLET, COATED ORAL at 10:18

## 2025-05-30 RX ADMIN — METOPROLOL TARTRATE 25 MG: 25 TABLET, FILM COATED ORAL at 20:48

## 2025-05-30 RX ADMIN — AMOXICILLIN AND CLAVULANATE POTASSIUM 1 TABLET: 875; 125 TABLET, COATED ORAL at 20:48

## 2025-05-30 RX ADMIN — POTASSIUM CHLORIDE 40 MEQ: 1.5 POWDER, FOR SOLUTION ORAL at 15:01

## 2025-05-30 RX ADMIN — DABIGATRAN ETEXILATE 150 MG: 150 CAPSULE ORAL at 20:48

## 2025-05-30 NOTE — PROGRESS NOTES
Name: Monica Scherer ADMIT: 2025   : 1933  PCP: Amilcar Mauricio DO    MRN: [id number] LOS: 1 days   AGE/SEX: 89 y.o. female  ROOM: [facility]   Subjective   Chief Complaint   Patient presents with    Altered Mental Status    Weakness - Generalized     This is a 89-year-old female with history of atrial fibrillation, hyperlipidemia, hypertension, CVA, presents to the hospital, after a fall at home. [facility] found to have UTI, hypokalemia, hypernatremia, and S4 Sacral fracture    Less confused  On abx  Worked with therapy    ROS  No f/c  No n/v  No cp/palp  No soa/cough    Objective   Vital Signs  Temp:  [97.3 °F (36.3 °C)-97.9 °F (36.6 °C)] 97.6 °F (36.4 °C)  Heart Rate:  [54-68] 67  Resp:  [16-18] 16  BP: (151-184)/() 154/104  SpO2:  [92 %-99 %] 96 %  on   ;   Device (Oxygen Therapy): room air  Body mass index is 25.39 kg/m².    Physical Exam  Constitutional:       General: She is not in acute distress.  HENT:      Head: Normocephalic and atraumatic.   Eyes:      General: No scleral icterus.  Cardiovascular:      Rate and Rhythm: Regular rhythm.      Heart sounds: Normal heart sounds.   Pulmonary:      Effort: Pulmonary effort is normal. No respiratory distress.   Abdominal:      General: There is no distension.      Palpations: Abdomen is soft.   Musculoskeletal:      Cervical back: Neck supple.     Elderly, chronically ill   Awake. A little drowsy but more alert today     Results Review:       I reviewed the patient's new clinical results.  Results from last 7 days   Lab Units 25  0557 25   WBC 10*3/mm3 11.72* 17.18*   HEMOGLOBIN g/dL 10.9* 13.9   PLATELETS 10*3/mm3 213 261     Results from last 7 days   Lab Units 25  0624 25  0824 25  2045 25  0557 25  1925   SODIUM mmol/L 144 145  --  139 146*   POTASSIUM mmol/L 3.0* 3.5 4.0 3.5 2.6*   CHLORIDE mmol/L 108* 106  --  103 100   CO2 mmol/L 25.2 24.0  --  21.9* 26.6   BUN mg/dL 13.0  "13.0  --  16.0 17.0   CREATININE mg/dL 0.81 0.88  --  0.86 1.12*   GLUCOSE mg/dL 122* 165*  --  100* 153*   Estimated Creatinine Clearance: 41.8 mL/min (by C-G formula based on SCr of 0.81 mg/dL).  Results from last 7 days   Lab Units 05/28/25  0557 05/27/25  1925   ALBUMIN g/dL 3.0* 3.9   BILIRUBIN mg/dL 1.4* 2.2*   ALK PHOS U/L 72 98   AST (SGOT) U/L 18 25   ALT (SGPT) U/L 12 13     Results from last 7 days   Lab Units 05/30/25  0624 05/29/25  1947 05/29/25  0824 05/28/25  0557 05/27/25  1925   CALCIUM mg/dL 8.2  --  8.8 8.0* 9.6   ALBUMIN g/dL  --   --   --  3.0* 3.9   MAGNESIUM mg/dL 2.0  --  2.6* 1.5* 1.7   PHOSPHORUS mg/dL 2.7 2.0* 1.9*  --  3.3     Results from last 7 days   Lab Units 05/27/25 2247   LACTATE mmol/L 2.0       Coag   Results from last 7 days   Lab Units 05/27/25  1925   INR  1.25*   APTT seconds 26.2     HbA1C   Lab Results   Component Value Date    HGBA1C 6.50 (H) 02/27/2025    HGBA1C 6.40 (H) 09/30/2024    HGBA1C 6.6 (H) 06/06/2023     Infection   Results from last 7 days   Lab Units 05/27/25 2247 05/27/25 2245 05/27/25  2150   BLOODCX  No growth at 2 days No growth at 2 days  --    URINECX   --   --  >100,000 CFU/mL Escherichia coli*     Radiology(recent) No radiology results for the last day    HS Troponin T   Date Value Ref Range Status   05/27/2025 63 (C) <14 ng/L Final   05/27/2025 68 (C) <14 ng/L Final     No components found for: \"TSH;2\"    amoxicillin-clavulanate, 1 tablet, Oral, Q12H  atorvastatin, 40 mg, Oral, Nightly  dabigatran etexilate, 150 mg, Oral, Q12H  metoprolol tartrate, 25 mg, Oral, BID  OLANZapine, 5 mg, Oral, Daily With Dinner  pantoprazole, 40 mg, Oral, Q AM  potassium chloride, 40 mEq, Oral, Q4H  sodium chloride, 10 mL, Intravenous, Q12H         Diet: Regular/House; Texture: Pureed (NDD 1); Fluid Consistency: Thin (IDDSI 0)      Assessment & Plan      Active Hospital Problems    Diagnosis  POA    **Acute UTI [N39.0]  Yes    History of CVA (cerebrovascular accident) " [Z86.73]  Not Applicable    Paroxysmal atrial fibrillation [I48.0]  Yes    Hyperlipidemia [E78.5]  Yes    Hypertension [I10]  Yes      Resolved Hospital Problems   No resolved problems to display.     This is a 89-year-old female with history of atrial fibrillation, hyperlipidemia, hypertension, CVA, presents to the hospital, after a fall at home. She came to [facility] ER and found to have UTI, hypokalemia, hypernatremia, and S4 Sacral fracture    1.  Acute UTI- on ABX     2.  Hypernatremia, improved, likely due to dehydration     3.  Hypokalemia, hypomag- replacement of electrolytes per protocol     4.  S4 fracture, continue pain control.  Denies any pain.     5. P.Afib- on dabigatran and metoprolol    6. H/o stroke- ST evaluated and on modified diet    7. Delirium- delirium precautions including trying to have good rest at night and awake and active during the day. This is improving with management and treatment of UTI    Dispo- suspect can discharge tomorrow to rehab facility if set up.     Reviewed records       Eduardo Scherer MD  [facility]  05/30/25  08:01 EDT

## 2025-05-30 NOTE — THERAPY TREATMENT NOTE
Patient Name: Monica Scherer  : 1933    MRN: [id number]                              Today's Date: 2025       Admit Date: 2025    Visit Dx:     ICD-10-CM ICD-9-CM   1. Acute UTI  N39.0 599.0   2. Generalized weakness  R53.1 780.79   3. Fall, initial encounter  W19.XXXA E888.9   4. Closed head injury, initial encounter  S09.90XA 959.01   5. Closed fracture of sacrum, unspecified portion of sacrum, initial encounter  S32.10XA 805.6   6. Hypokalemia  E87.6 276.8   7. Dehydration  E86.0 276.51     Patient Active Problem List   Diagnosis    Vertigo    Temporary cerebral vascular dysfunction    Gastroesophageal reflux disease with esophagitis    Degeneration of intervertebral disc of cervical region    Prediabetes    S/P cholecystectomy    Osteoarthritis of knee    Herpes zoster without complication    Hypertension    Duodenal ulcer    History of pancreatitis    Hyperlipidemia    TIA (transient ischemic attack)    Cerebrovascular accident (CVA) due to thrombosis of left posterior cerebral artery    Paroxysmal atrial fibrillation    General weakness    History of CVA (cerebrovascular accident)    Anticoagulated    Hematuria    Dizziness and giddiness    Rectal bleeding    Acute CVA (cerebrovascular accident)    Acute UTI     Past Medical History:   Diagnosis Date    Atrial fibrillation     Hyperlipidemia     Hypertension     Lacunar infarct, acute 2020    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    New onset atrial fibrillation 2020    now on rate control along with Eliquis    Prolapsed uterus     per patient    Stroke     TIA (transient ischemic attack) 2020    Weakness      Past Surgical History:   Procedure Laterality Date    APPENDECTOMY      CHOLECYSTECTOMY N/A 2016    Procedure: CHOLECYSTECTOMY LAPAROSCOPIC;  Surgeon: Russ Gar MD;  Location: [facility] OR [facility];  Service:     COLONOSCOPY N/A 2021    Procedure: COLONOSCOPY TO CECUM WITH HOT SNARE POLYPECTOMIES AND  COLD BX POLYPECTOMY;  Surgeon: Emerson Izaguirre MD;  Location: [facility] ENDOSCOPY;  Service: Gastroenterology;  Laterality: N/A;  pre: RECTAL BLEEDING, FAMILY HX OF COLON CANCER  post: INTERNAL AND EXTERNAL HEMORRHOIDS, DIVERTICULOSIS, POLYPS    ENDOSCOPY N/A 2/22/2018    Z-line regular, 35 cm from incisors, normal esophagus, gastritis, bilious gastric fluid, one non-bleeding duodenal ulcer w/no stigmata of bleeding, Path: DUODENUM: FRAGMENTS OF GASTRIC TYPE MUCOSA WITH HYPERPLASIA (FOVEOLAR) AND PATCHY MIXED INFLAMMATION IN THE LAMINA PROPRIA WITH FOCAL FIBROSIS, COMMENT: 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     EYE SURGERY      tre cataracts      General Information       Row Name 05/30/25 1011          Physical Therapy Time and Intention    Document Type therapy note (daily note)  -MS     Mode of Treatment physical therapy;individual therapy  -MS       Row Name 05/30/25 1011          General Information    Patient Profile Reviewed yes  -MS     Existing Precautions/Restrictions fall   Exit alarm  -MS     Barriers to Rehab cognitive status  -MS       Row Name 05/30/25 1011          Cognition    Orientation Status (Cognition) unable/difficult to assess  -MS       Row Name 05/30/25 1011          Safety Issues/Impairments Affecting Functional Mobility    Safety Issues Affecting Function (Mobility) ability to follow commands;insight into deficits/self-awareness;judgment  -MS               User Key  (r) = Recorded By, (t) = Taken By, (c) = Cosigned By      Initials Name Provider Type    MS Bladimir Akins, PT Physical Therapist                   Mobility       Row Name 05/30/25 1012          Bed Mobility    Supine-Sit Geary (Bed Mobility) dependent (less than 25% patient effort);maximum assist (25% patient effort)  -MS     Comment, (Bed Mobility) Max-Dep. assist x 2;  Could not achieve a full upright sitting position due to pt.'s resistance to mobility this AM.  Pt. requires Max. verbal cues to try and  keep her eyes open during therapy session.  Pt. becomes agitated at times when attempted to provide encouragement to work with P.T.   -MS               User Key  (r) = Recorded By, (t) = Taken By, (c) = Cosigned By      Initials Name Provider Type    Bladimir Fisher, PT Physical Therapist                   Obj/Interventions       Row Name 05/30/25 1013          Motor Skills    Therapeutic Exercise --  BUE/LE (AAROM) ther. ex. program x 10 reps completed (Shld Flexion, Ankle pumps, Heel Slides);  Slight resistance at times.  -MS               User Key  (r) = Recorded By, (t) = Taken By, (c) = Cosigned By      Initials Name Provider Type    Bladimir Fisher, PT Physical Therapist                   Goals/Plan    No documentation.                  Clinical Impression       Row Name 05/30/25 1014          Pain    Pretreatment Pain Rating 0/10 - no pain  -MS     Posttreatment Pain Rating 0/10 - no pain  -MS     Pre/Posttreatment Pain Comment No verbal/visual signs of pain noted.  -MS       Row Name 05/30/25 1014          Positioning and Restraints    Pre-Treatment Position in bed  -MS     Post Treatment Position bed  -MS     In Bed notified nsg;call light within reach;encouraged to call for assist;exit alarm on;side lying right;pillow between legs  All lines intact.  -MS               User Key  (r) = Recorded By, (t) = Taken By, (c) = Cosigned By      Initials Name Provider Type    Bladimir Fisher, PT Physical Therapist                   Outcome Measures       Row Name 05/30/25 1014          How much help from another person do you currently need...    Turning from your back to your side while in flat bed without using bedrails? 2  -MS     Moving from lying on back to sitting on the side of a flat bed without bedrails? 2  -MS     Moving to and from a bed to a chair (including a wheelchair)? 1  -MS     Standing up from a chair using your arms (e.g., wheelchair, bedside chair)? 1  -MS     Climbing 3-5 steps  "with a railing? 1  -MS     To walk in hospital room? 1  -MS     AM-PAC 6 Clicks Score (PT) 8  -MS     Highest Level of Mobility Goal Sit at Edge of Bed-3  -MS       Row Name 05/30/25 1014          Functional Assessment    Outcome Measure Options AM-PAC 6 Clicks Basic Mobility (PT)  -MS               User Key  (r) = Recorded By, (t) = Taken By, (c) = Cosigned By      Initials Name Provider Type    MS Bladimir Akins, PT Physical Therapist                                 Physical Therapy Education       Title: PT OT SLP Therapies (Done)       Topic: Physical Therapy (Done)       Point: Mobility training (Done)       Learning Progress Summary            Patient Acceptance, E,D, VU,NR by MS at 5/30/2025 1014    Nonacceptance, E,D, NR by MS at 5/28/2025 1536                      Point: Home exercise program (Done)       Learning Progress Summary            Patient Acceptance, E,D, VU,NR by MS at 5/30/2025 1014                      Point: Body mechanics (Done)       Learning Progress Summary            Patient Acceptance, E,D, VU,NR by MS at 5/30/2025 1014    Nonacceptance, E,D, NR by MS at 5/28/2025 1536                      Point: Precautions (Done)       Learning Progress Summary            Patient Acceptance, E,D, VU,NR by MS at 5/30/2025 1014    Nonacceptance, E,D, NR by MS at 5/28/2025 1536                                      User Key       Initials Effective Dates Name Provider Type Discipline    MS 06/16/21 -  Bladimir Akins PT Physical Therapist PT                  PT Recommendation and Plan  Planned Therapy Interventions (PT): balance training, bed mobility training, gait training, home exercise program, patient/family education, postural re-education, transfer training, strengthening, ROM (range of motion)  Outcome Evaluation: Upon entering room, pt. supine in bed, eyes closed, but pt. verbal/awake when asked how she is doing.  Pt. reports she is tired and \"cold\" (several blankets covering pt. this AM). " Pt. would barely open her eyes throughout therapy session despite verbal cues.  This AM, pt. requires Max-Dep. assist x 2 for bed mobility.  Pt. was unable to sit fully upright due to weakness and resistance to mobility.  BUE/LE (AAROM) ther. ex. program x 10 reps completed for general strengthening. Slightly resistant to ther. ex. at times.  Will continue to progress functional mobility as tolerated.     Time Calculation:         PT Charges       Row Name 05/30/25 1019             Time Calculation    Start Time 0920  -MS      Stop Time 0935  -MS      Time Calculation (min) 15 min  -MS      PT Received On 05/30/25  -MS      PT - Next Appointment 06/02/25  -MS         Time Calculation- PT    Total Timed Code Minutes- PT 14 minute(s)  -MS                User Key  (r) = Recorded By, (t) = Taken By, (c) = Cosigned By      Initials Name Provider Type    Bladimir Fisher, PT Physical Therapist                  Therapy Charges for Today       Code Description Service Date Service Provider Modifiers Qty    52552885897 HC PT THERAPEUTIC ACT EA 15 MIN 5/30/2025 Bladimir Akins, PT GP 1    30972313404  PT THER SUPP EA 15 MIN 5/30/2025 Bladimir Akins, PT GP 1            PT G-Codes  Outcome Measure Options: AM-PAC 6 Clicks Basic Mobility (PT)  AM-PAC 6 Clicks Score (PT): 8  PT Discharge Summary  Anticipated Discharge Disposition (PT): skilled nursing facility    Bladimir Akins PT  5/30/2025

## 2025-05-30 NOTE — PLAN OF CARE
"Goal Outcome Evaluation:  Plan of Care Reviewed With: patient           Outcome Evaluation: Upon entering room, pt. supine in bed, eyes closed, but pt. verbal/awake when asked how she is doing.  Pt. reports she is tired and \"cold\" (several blankets covering pt. this AM). Pt. would barely open her eyes throughout therapy session despite verbal cues.  This AM, pt. requires Max-Dep. assist x 2 for bed mobility.  Pt. was unable to sit fully upright due to weakness and resistance to mobility.  MARYJO/MARJ (AAROM) ther. ex. program x 10 reps completed for general strengthening. Slightly resistant to ther. ex. at times.  Will continue to progress functional mobility as tolerated.    Anticipated Discharge Disposition (PT): skilled nursing facility                        "

## 2025-05-30 NOTE — PLAN OF CARE
Goal Outcome Evaluation:   Pt continues to be confused. Pt compliance with plan of care. Phosphorus replacement given per protocol. Pt slept throughout the night with no acute changes. Safety rounds maintained. Plan of care continues.

## 2025-05-30 NOTE — PLAN OF CARE
Goal Outcome Evaluation:  VSS, pt calm and cooperative today. Oriented to self. RA. K replaced via PO powder per protocol. Recheck tonight.  Worked with PT. Possible D/C tomorrow. CTM

## 2025-05-31 LAB
ANION GAP SERPL CALCULATED.3IONS-SCNC: 12 MMOL/L (ref 5–15)
BUN SERPL-MCNC: 15 MG/DL (ref 8–23)
BUN/CREAT SERPL: 18.5 (ref 7–25)
CALCIUM SPEC-SCNC: 8.5 MG/DL (ref 8.2–9.6)
CHLORIDE SERPL-SCNC: 110 MMOL/L (ref 98–107)
CO2 SERPL-SCNC: 23 MMOL/L (ref 22–29)
CREAT SERPL-MCNC: 0.81 MG/DL (ref 0.57–1)
EGFRCR SERPLBLD CKD-EPI 2021: 68.2 ML/MIN/1.73
GLUCOSE SERPL-MCNC: 140 MG/DL (ref 65–99)
MAGNESIUM SERPL-MCNC: 1.7 MG/DL (ref 1.7–2.3)
PHOSPHATE SERPL-MCNC: 2.3 MG/DL (ref 2.5–4.5)
POTASSIUM SERPL-SCNC: 4.1 MMOL/L (ref 3.5–5.2)
POTASSIUM SERPL-SCNC: 5.3 MMOL/L (ref 3.5–5.2)
SODIUM SERPL-SCNC: 145 MMOL/L (ref 136–145)

## 2025-05-31 PROCEDURE — 83735 ASSAY OF MAGNESIUM: CPT | Performed by: INTERNAL MEDICINE

## 2025-05-31 PROCEDURE — 80048 BASIC METABOLIC PNL TOTAL CA: CPT | Performed by: INTERNAL MEDICINE

## 2025-05-31 PROCEDURE — 84100 ASSAY OF PHOSPHORUS: CPT | Performed by: INTERNAL MEDICINE

## 2025-05-31 RX ADMIN — Medication 10 ML: at 21:51

## 2025-05-31 RX ADMIN — DABIGATRAN ETEXILATE 150 MG: 150 CAPSULE ORAL at 21:05

## 2025-05-31 RX ADMIN — METOPROLOL TARTRATE 25 MG: 25 TABLET, FILM COATED ORAL at 09:13

## 2025-05-31 RX ADMIN — OLANZAPINE 5 MG: 5 TABLET, FILM COATED ORAL at 17:42

## 2025-05-31 RX ADMIN — ATORVASTATIN CALCIUM 40 MG: 20 TABLET, FILM COATED ORAL at 21:05

## 2025-05-31 RX ADMIN — METOPROLOL TARTRATE 25 MG: 25 TABLET, FILM COATED ORAL at 21:06

## 2025-05-31 RX ADMIN — AMOXICILLIN AND CLAVULANATE POTASSIUM 1 TABLET: 875; 125 TABLET, COATED ORAL at 21:06

## 2025-05-31 RX ADMIN — Medication 10 ML: at 09:14

## 2025-05-31 RX ADMIN — PANTOPRAZOLE SODIUM 40 MG: 40 TABLET, DELAYED RELEASE ORAL at 09:13

## 2025-05-31 RX ADMIN — AMOXICILLIN AND CLAVULANATE POTASSIUM 1 TABLET: 875; 125 TABLET, COATED ORAL at 09:13

## 2025-05-31 RX ADMIN — DABIGATRAN ETEXILATE 150 MG: 150 CAPSULE ORAL at 09:13

## 2025-05-31 NOTE — PLAN OF CARE
Goal Outcome Evaluation:   Pt is alert and oriented to self. Pt compliance with care beginning of the shift but become noncompliance throughout the night. VSS. No acute changes overnight. Safety round maintained. Plan of care ongoing.

## 2025-05-31 NOTE — PLAN OF CARE
Goal Outcome Evaluation:  VSS, RA, pt has been pleasantly confused and oriented to self. No new complaints by pt. CTM.

## 2025-06-01 LAB
BACTERIA SPEC AEROBE CULT: NORMAL
BACTERIA SPEC AEROBE CULT: NORMAL

## 2025-06-01 RX ADMIN — DABIGATRAN ETEXILATE 150 MG: 150 CAPSULE ORAL at 10:23

## 2025-06-01 RX ADMIN — DABIGATRAN ETEXILATE 150 MG: 150 CAPSULE ORAL at 20:35

## 2025-06-01 RX ADMIN — Medication 10 ML: at 10:23

## 2025-06-01 RX ADMIN — Medication 10 ML: at 20:46

## 2025-06-01 RX ADMIN — ATORVASTATIN CALCIUM 40 MG: 20 TABLET, FILM COATED ORAL at 20:35

## 2025-06-01 RX ADMIN — METOPROLOL TARTRATE 25 MG: 25 TABLET, FILM COATED ORAL at 20:35

## 2025-06-01 RX ADMIN — METOPROLOL TARTRATE 25 MG: 25 TABLET, FILM COATED ORAL at 10:22

## 2025-06-01 RX ADMIN — AMOXICILLIN AND CLAVULANATE POTASSIUM 1 TABLET: 875; 125 TABLET, COATED ORAL at 10:23

## 2025-06-01 RX ADMIN — PANTOPRAZOLE SODIUM 40 MG: 40 TABLET, DELAYED RELEASE ORAL at 05:23

## 2025-06-01 RX ADMIN — OLANZAPINE 5 MG: 5 TABLET, FILM COATED ORAL at 18:06

## 2025-06-01 NOTE — PROGRESS NOTES
Name: Monica Scherer ADMIT: 2025   : 1933  PCP: Amilcar Mauricio DO    MRN: [id number] LOS: 3 days   AGE/SEX: 89 y.o. female  ROOM: [facility]   Subjective   Chief Complaint   Patient presents with    Altered Mental Status    Weakness - Generalized     This is a 89-year-old female with history of atrial fibrillation, hyperlipidemia, hypertension, CVA, presents to the hospital, after a fall at home. She came to [facility] ER and found to have UTI, hypokalemia, hypernatremia, and S4 Sacral fracture      On abx  Sleeping this morning         Objective   Vital Signs  Temp:  [97.5 °F (36.4 °C)-98.9 °F (37.2 °C)] 98.9 °F (37.2 °C)  Heart Rate:  [62-92] 92  Resp:  [16-18] 16  BP: (124-156)/(74-86) 136/74  SpO2:  [96 %-99 %] 99 %  on   ;   Device (Oxygen Therapy): room air  Body mass index is 25.35 kg/m².    Physical Exam  Constitutional:       General: She is not in acute distress.  HENT:      Head: Normocephalic and atraumatic.   Eyes:      General: No scleral icterus.  Cardiovascular:      Rate and Rhythm: Regular rhythm.      Heart sounds: Normal heart sounds.   Pulmonary:      Effort: Pulmonary effort is normal. No respiratory distress.   Abdominal:      General: There is no distension.      Palpations: Abdomen is soft.   Musculoskeletal:      Cervical back: Neck supple.     Elderly, chronically ill   Sleeping this morning     Results Review:       I reviewed the patient's new clinical results.  Results from last 7 days   Lab Units 25  0557 25  192   WBC 10*3/mm3 11.72* 17.18*   HEMOGLOBIN g/dL 10.9* 13.9   PLATELETS 10*3/mm3 213 261     Results from last 7 days   Lab Units 25  0617 25  2344 257 25  0624 25  0824 25  2045 25  0557   SODIUM mmol/L 145  --   --  144 145  --  139   POTASSIUM mmol/L 4.1 5.3* 6.2* 3.0* 3.5   < > 3.5   CHLORIDE mmol/L 110*  --   --  108* 106  --  103   CO2 mmol/L 23.0  --   --  25.2 24.0  --  21.9*   BUN mg/dL 15.0  --    "--  13.0 13.0  --  16.0   CREATININE mg/dL 0.81  --   --  0.81 0.88  --  0.86   GLUCOSE mg/dL 140*  --   --  122* 165*  --  100*    < > = values in this interval not displayed.   Estimated Creatinine Clearance: 41.7 mL/min (by C-G formula based on SCr of 0.81 mg/dL).  Results from last 7 days   Lab Units 05/28/25  0557 05/27/25  1925   ALBUMIN g/dL 3.0* 3.9   BILIRUBIN mg/dL 1.4* 2.2*   ALK PHOS U/L 72 98   AST (SGOT) U/L 18 25   ALT (SGPT) U/L 12 13     Results from last 7 days   Lab Units 05/31/25  0617 05/30/25  0624 05/29/25 1947 05/29/25  0824 05/28/25 0557 05/27/25 1925   CALCIUM mg/dL 8.5 8.2  --  8.8 8.0* 9.6   ALBUMIN g/dL  --   --   --   --  3.0* 3.9   MAGNESIUM mg/dL 1.7 2.0  --  2.6* 1.5* 1.7   PHOSPHORUS mg/dL 2.3* 2.7 2.0* 1.9*  --  3.3     Results from last 7 days   Lab Units 05/27/25 2247   LACTATE mmol/L 2.0       Coag   Results from last 7 days   Lab Units 05/27/25 1925   INR  1.25*   APTT seconds 26.2     HbA1C   Lab Results   Component Value Date    HGBA1C 6.50 (H) 02/27/2025    HGBA1C 6.40 (H) 09/30/2024    HGBA1C 6.6 (H) 06/06/2023     Infection   Results from last 7 days   Lab Units 05/27/25 2247 05/27/25 2245 05/27/25  2150   BLOODCX  No growth at 4 days No growth at 4 days  --    URINECX   --   --  >100,000 CFU/mL Escherichia coli*     Radiology(recent) No radiology results for the last day    No results found for: \"TROPONINT\", \"TROPONINI\", \"BNP\"    No components found for: \"TSH;2\"    atorvastatin, 40 mg, Oral, Nightly  dabigatran etexilate, 150 mg, Oral, Q12H  metoprolol tartrate, 25 mg, Oral, BID  OLANZapine, 5 mg, Oral, Daily With Dinner  pantoprazole, 40 mg, Oral, Q AM  sodium chloride, 10 mL, Intravenous, Q12H         Diet: Regular/House; Texture: Pureed (NDD 1); Fluid Consistency: Thin (IDDSI 0)      Assessment & Plan      Active Hospital Problems    Diagnosis  POA    **Acute UTI [N39.0]  Yes    History of CVA (cerebrovascular accident) [Z86.73]  Not Applicable    Paroxysmal " atrial fibrillation [I48.0]  Yes    Hyperlipidemia [E78.5]  Yes    Hypertension [I10]  Yes      Resolved Hospital Problems   No resolved problems to display.     This is a 89-year-old female with history of atrial fibrillation, hyperlipidemia, hypertension, CVA, P.Afib, presents to the hospital, after a fall at home. She came to [facility] ER and found to have UTI, hypokalemia, hypernatremia, and S4 Sacral fracture    1.  Acute UTI- today finishes 5 days of ABX     2.  Hypernatremia, improved, likely due to dehydration     3.  Hypokalemia, hypomag- replacement of electrolytes per protocol     4.  S4 fracture, continue pain control.  Has not been a issue    5. P.Afib- on dabigatran and metoprolol    6. H/o stroke- ST evaluated and on modified diet    7. Delirium- delirium precautions including trying to have good rest at night and awake and active during the day. This is improving with management and treatment of UTI    Dispo- suspect can discharge to rehab facility if set up. DW case management today and still needs bed/precert though facility has accepted. So most likely dc on Tuesday.     Reviewed records       Eduardo Scherer MD  [facility]  06/01/25  08:01 EDT

## 2025-06-01 NOTE — CASE MANAGEMENT/SOCIAL WORK
Post-Acute Authorization Submission      Post Acute Pre-Cert Documentation  Request Submitted by Facility - Type:: Hospital  Post-Acute Authorization Type Submitted:: SNF  Date Post Acute Pre-Cert Inititated per Facility: 06/01/25  Verification from Payer: Yes  Accepting Facility: [facility] Discharge Date Requested: 06/02/25  All Clinicals Submitted?: Yes  Had Accepting Facility at Time of Submission: Yes  Response Communicated to:: , Accepting Facility Liaison  Authorization Number:: Pending [id number]  Post Acute Pre-Cert Initiated Comment: Spoke with Jenny/Edita and confirmed [facility] has a bed for pt and CCP can initiate pre-cert              Saranya Vines RN

## 2025-06-01 NOTE — PLAN OF CARE
Goal Outcome Evaluation:            Patient is A+Ox3, disoriented to situation. Patient is confused at times and speech is somewhat erratic at times. No complaints throughout the shift. Skin is in good condition. Episode of bowel incontinence during the night. 450 ml of urine during the shift. Weight shifted by staff. Per case management, patient has been accepted at [facility]; patient will need medical clearance and pre-cert once bed available.

## 2025-06-01 NOTE — PLAN OF CARE
Goal Outcome Evaluation:              Outcome Evaluation: VSS.Pt sleep almost all day,delirium precaution on but she still sleeping.Meds was given whole in  vanilla pudding.Waiting for placement.Plan of care on going.

## 2025-06-02 PROCEDURE — 97110 THERAPEUTIC EXERCISES: CPT

## 2025-06-02 RX ADMIN — METOPROLOL TARTRATE 25 MG: 25 TABLET, FILM COATED ORAL at 10:11

## 2025-06-02 RX ADMIN — OLANZAPINE 5 MG: 5 TABLET, FILM COATED ORAL at 18:00

## 2025-06-02 RX ADMIN — DABIGATRAN ETEXILATE 150 MG: 150 CAPSULE ORAL at 10:11

## 2025-06-02 RX ADMIN — ATORVASTATIN CALCIUM 40 MG: 20 TABLET, FILM COATED ORAL at 21:46

## 2025-06-02 RX ADMIN — Medication 10 ML: at 09:00

## 2025-06-02 RX ADMIN — Medication 10 ML: at 22:21

## 2025-06-02 RX ADMIN — DABIGATRAN ETEXILATE 150 MG: 150 CAPSULE ORAL at 21:46

## 2025-06-02 RX ADMIN — METOPROLOL TARTRATE 25 MG: 25 TABLET, FILM COATED ORAL at 21:46

## 2025-06-02 NOTE — THERAPY TREATMENT NOTE
Patient Name: Monica Scherer  : 1933    MRN: [id number]                              Today's Date: 2025       Admit Date: 2025    Visit Dx:     ICD-10-CM ICD-9-CM   1. Acute UTI  N39.0 599.0   2. Generalized weakness  R53.1 780.79   3. Fall, initial encounter  W19.XXXA E888.9   4. Closed head injury, initial encounter  S09.90XA 959.01   5. Closed fracture of sacrum, unspecified portion of sacrum, initial encounter  S32.10XA 805.6   6. Hypokalemia  E87.6 276.8   7. Dehydration  E86.0 276.51     Patient Active Problem List   Diagnosis    Vertigo    Temporary cerebral vascular dysfunction    Gastroesophageal reflux disease with esophagitis    Degeneration of intervertebral disc of cervical region    Prediabetes    S/P cholecystectomy    Osteoarthritis of knee    Herpes zoster without complication    Hypertension    Duodenal ulcer    History of pancreatitis    Hyperlipidemia    TIA (transient ischemic attack)    Cerebrovascular accident (CVA) due to thrombosis of left posterior cerebral artery    Paroxysmal atrial fibrillation    General weakness    History of CVA (cerebrovascular accident)    Anticoagulated    Hematuria    Dizziness and giddiness    Rectal bleeding    Acute CVA (cerebrovascular accident)    Acute UTI     Past Medical History:   Diagnosis Date    Atrial fibrillation     Hyperlipidemia     Hypertension     Lacunar infarct, acute 2020    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    New onset atrial fibrillation 2020    now on rate control along with Eliquis    Prolapsed uterus     per patient    Stroke     TIA (transient ischemic attack) 2020    Weakness      Past Surgical History:   Procedure Laterality Date    APPENDECTOMY      CHOLECYSTECTOMY N/A 2016    Procedure: CHOLECYSTECTOMY LAPAROSCOPIC;  Surgeon: Russ Gar MD;  [place] [facility] [place] [facility] [place]  Service:     COLONOSCOPY N/A 2021    Procedure: COLONOSCOPY TO CECUM WITH HOT SNARE POLYPECTOMIES AND  COLD BX POLYPECTOMY;  Surgeon: Emerson Izaguirre MD;  Location: [facility] ENDOSCOPY;  Service: Gastroenterology;  Laterality: N/A;  pre: RECTAL BLEEDING, FAMILY HX OF COLON CANCER  post: INTERNAL AND EXTERNAL HEMORRHOIDS, DIVERTICULOSIS, POLYPS    ENDOSCOPY N/A 2/22/2018    Z-line regular, 35 cm from incisors, normal esophagus, gastritis, bilious gastric fluid, one non-bleeding duodenal ulcer w/no stigmata of bleeding, Path: DUODENUM: FRAGMENTS OF GASTRIC TYPE MUCOSA WITH HYPERPLASIA (FOVEOLAR) AND PATCHY MIXED INFLAMMATION IN THE LAMINA PROPRIA WITH FOCAL FIBROSIS, COMMENT: 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     EYE SURGERY      tre cataracts      General Information       Row Name 06/02/25 1512          Physical Therapy Time and Intention    Document Type therapy note (daily note)  -PC     Mode of Treatment physical therapy  -PC       Row Name 06/02/25 1512          General Information    Existing Precautions/Restrictions fall  -PC       Row Name 06/02/25 1512          Cognition    Orientation Status (Cognition) oriented to;person;disoriented to;place;situation;time  -PC               User Key  (r) = Recorded By, (t) = Taken By, (c) = Cosigned By      Initials Name Provider Type    PC Jennifer Booker PT Physical Therapist                   Mobility       Row Name 06/02/25 1512          Bed Mobility    Supine-Sit Pleasants (Bed Mobility) moderate assist (50% patient effort)  -PC     Sit-Supine Pleasants (Bed Mobility) moderate assist (50% patient effort)  -PC     Assistive Device (Bed Mobility) bed rails;head of bed elevated  -PC       Row Name 06/02/25 1512          Transfers    Comment, (Transfers) once seated edge of bed pt was adamant about not trying to stand, despite multiple attempts to encourage her to, able to sit for a few minutes  -PC               User Key  (r) = Recorded By, (t) = Taken By, (c) = Cosigned By      Initials Name Provider Type    PC Jennifer Booker PT Physical Therapist                    Obj/Interventions       Row Name 06/02/25 1514          Motor Skills    Therapeutic Exercise --  AP, LAQ 5-10 reps  -PC       Row Name 06/02/25 1514          Balance    Comment, Balance pt initially leaning posteriorly, max inflated mattress so pt could sit on firm surface and work on sitting balance, midline, trunk control  -PC               User Key  (r) = Recorded By, (t) = Taken By, (c) = Cosigned By      Initials Name Provider Type    Jennifer Sykes, PT Physical Therapist                   Goals/Plan    No documentation.                  Clinical Impression       Row Name 06/02/25 1515          Pain    Pretreatment Pain Rating 0/10 - no pain  -PC     Posttreatment Pain Rating 0/10 - no pain  -PC       Row Name 06/02/25 1515          Plan of Care Review    Plan of Care Reviewed With patient  -PC     Outcome Evaluation Pt initially agreeable and able to come to sit edge of bed, once sitting she declined to attempt to stand, worked in sitting on balance and trunk control, pt remains appropriate to go to SNF at discharge  -PC       Row Name 06/02/25 1515          Positioning and Restraints    Pre-Treatment Position in bed  -PC     Post Treatment Position bed  -PC     In Bed supine;call light within reach;encouraged to call for assist  no bed alarm on the current bed she is  on  -PC               User Key  (r) = Recorded By, (t) = Taken By, (c) = Cosigned By      Initials Name Provider Type    Jennifer Sykes, PT Physical Therapist                   Outcome Measures       Row Name 06/02/25 1517          How much help from another person do you currently need...    Turning from your back to your side while in flat bed without using bedrails? 3  -PC     Moving from lying on back to sitting on the side of a flat bed without bedrails? 2  -PC     Moving to and from a bed to a chair (including a wheelchair)? 2  -PC     Standing up from a chair using your arms (e.g., wheelchair, bedside chair)? 2   -PC     Climbing 3-5 steps with a railing? 1  -PC     To walk in hospital room? 2  -PC     AM-PAC 6 Clicks Score (PT) 12  -PC     Highest Level of Mobility Goal Move to Chair/Commode-4  -PC               User Key  (r) = Recorded By, (t) = Taken By, (c) = Cosigned By      Initials Name Provider Type    PC Jennifer Booker, PT Physical Therapist                                 Physical Therapy Education       Title: PT OT SLP Therapies (In Progress)       Topic: Physical Therapy (In Progress)       Point: Mobility training (In Progress)       Learning Progress Summary            Patient Acceptance, E,D, NR by PC at 6/2/2025 1518    Acceptance, E, NR by NM at 6/1/2025 0258    Acceptance, E,D, VU,NR by MS at 5/30/2025 1014    Nonacceptance, E,D, NR by MS at 5/28/2025 1536                      Point: Home exercise program (In Progress)       Learning Progress Summary            Patient Acceptance, E,D, NR by PC at 6/2/2025 1518    Acceptance, E, NR by NM at 6/1/2025 0258    Acceptance, E,D, VU,NR by MS at 5/30/2025 1014                      Point: Body mechanics (In Progress)       Learning Progress Summary            Patient Acceptance, E,D, NR by PC at 6/2/2025 1518    Acceptance, E, NR by NM at 6/1/2025 0258    Acceptance, E,D, VU,NR by MS at 5/30/2025 1014    Nonacceptance, E,D, NR by MS at 5/28/2025 1536                      Point: Precautions (In Progress)       Learning Progress Summary            Patient Acceptance, E,D, NR by PC at 6/2/2025 1518    Acceptance, E, NR by NM at 6/1/2025 0258    Acceptance, E,D, VU,NR by MS at 5/30/2025 1014    Nonacceptance, E,D, NR by MS at 5/28/2025 1536                                      User Key       Initials Effective Dates Name Provider Type Discipline    PC 06/16/21 -  Jennifer Booker, PT Physical Therapist PT    MS 06/16/21 -  Bladimir Akins, PT Physical Therapist PT    NM 07/09/24 -  Al Woods, RN Registered Nurse Nurse                  PT Recommendation and  Plan     Outcome Evaluation: Pt initially agreeable and able to come to sit edge of bed, once sitting she declined to attempt to stand, worked in sitting on balance and trunk control, pt remains appropriate to go to SNF at discharge     Time Calculation:         PT Charges       Row Name 06/02/25 1518             Time Calculation    Start Time 1427  -PC      Stop Time 1441  -PC      Time Calculation (min) 14 min  -PC      PT Received On 06/02/25  -PC      PT - Next Appointment 06/03/25  -PC                User Key  (r) = Recorded By, (t) = Taken By, (c) = Cosigned By      Initials Name Provider Type    PC Jennifer Booker, PT Physical Therapist                  Therapy Charges for Today       Code Description Service Date Service Provider Modifiers Qty    90676566860 HC PT THER PROC EA 15 MIN 6/2/2025 Jennifer Booker PT GP 1            PT G-Codes  Outcome Measure Options: AM-PAC 6 Clicks Basic Mobility (PT)  AM-PAC 6 Clicks Score (PT): 12  PT Discharge Summary  Anticipated Discharge Disposition (PT): skilled nursing facility    Jennifer Booker PT  6/2/2025

## 2025-06-02 NOTE — PLAN OF CARE
Problem: Adult Inpatient Plan of Care  Goal: Absence of Hospital-Acquired Illness or Injury  Intervention: Identify and Manage Fall Risk  Recent Flowsheet Documentation  Taken 6/2/2025 0400 by Aurea Garcia RN  Safety Promotion/Fall Prevention:   assistive device/personal items within reach   clutter free environment maintained   room organization consistent   safety round/check completed  Taken 6/2/2025 0200 by Aurea Garcia RN  Safety Promotion/Fall Prevention:   assistive device/personal items within reach   clutter free environment maintained   room organization consistent   safety round/check completed  Taken 6/2/2025 0000 by Aurea Garcia RN  Safety Promotion/Fall Prevention:   assistive device/personal items within reach   clutter free environment maintained   room organization consistent   safety round/check completed  Taken 6/1/2025 2000 by Aurea Garcia RN  Safety Promotion/Fall Prevention:   assistive device/personal items within reach   clutter free environment maintained   room organization consistent   safety round/check completed     Problem: Adult Inpatient Plan of Care  Goal: Absence of Hospital-Acquired Illness or Injury  6/2/2025 0548 by Aurea Garcia RN  Outcome: Progressing  6/2/2025 0548 by Aurea Garcia RN  Outcome: Progressing  Intervention: Identify and Manage Fall Risk  Recent Flowsheet Documentation  Taken 6/2/2025 0400 by Aurea Garcia RN  Safety Promotion/Fall Prevention:   assistive device/personal items within reach   clutter free environment maintained   room organization consistent   safety round/check completed  Taken 6/2/2025 0200 by Aurea Garcia RN  Safety Promotion/Fall Prevention:   assistive device/personal items within reach   clutter free environment maintained   room organization consistent   safety round/check completed  Taken 6/2/2025 0000 by Aurea Garcia RN  Safety Promotion/Fall Prevention:   assistive device/personal items within  reach   clutter free environment maintained   room organization consistent   safety round/check completed  Taken 6/1/2025 2000 by Aurea Garcia RN  Safety Promotion/Fall Prevention:   assistive device/personal items within reach   clutter free environment maintained   room organization consistent   safety round/check completed  Intervention: Prevent Skin Injury  Recent Flowsheet Documentation  Taken 6/1/2025 2000 by Aurea Garcia RN  Skin Protection:   incontinence pads utilized   transparent dressing maintained  Intervention: Prevent Infection  Recent Flowsheet Documentation  Taken 6/2/2025 0400 by Aurea Garcia RN  Infection Prevention:   equipment surfaces disinfected   environmental surveillance performed   rest/sleep promoted   single patient room provided  Taken 6/2/2025 0200 by Aurea Garcia RN  Infection Prevention:   equipment surfaces disinfected   environmental surveillance performed   rest/sleep promoted   single patient room provided  Taken 6/2/2025 0000 by Aurea Garcia RN  Infection Prevention:   equipment surfaces disinfected   environmental surveillance performed   rest/sleep promoted   single patient room provided  Taken 6/1/2025 2000 by Aurea Garcia RN  Infection Prevention:   equipment surfaces disinfected   environmental surveillance performed   hand hygiene promoted   personal protective equipment utilized   rest/sleep promoted   single patient room provided     Problem: Adult Inpatient Plan of Care  Goal: Absence of Hospital-Acquired Illness or Injury  Intervention: Prevent Skin Injury  Recent Flowsheet Documentation  Taken 6/1/2025 2000 by Aurea Garcia RN  Skin Protection:   incontinence pads utilized   transparent dressing maintained     Problem: Adult Inpatient Plan of Care  Goal: Absence of Hospital-Acquired Illness or Injury  Intervention: Prevent Infection  Recent Flowsheet Documentation  Taken 6/2/2025 0400 by Aurea Garcia RN  Infection Prevention:    equipment surfaces disinfected   environmental surveillance performed   rest/sleep promoted   single patient room provided  Taken 6/2/2025 0200 by Aurea Garcia RN  Infection Prevention:   equipment surfaces disinfected   environmental surveillance performed   rest/sleep promoted   single patient room provided  Taken 6/2/2025 0000 by Aurea Garcia RN  Infection Prevention:   equipment surfaces disinfected   environmental surveillance performed   rest/sleep promoted   single patient room provided  Taken 6/1/2025 2000 by Aurea Garcia RN  Infection Prevention:   equipment surfaces disinfected   environmental surveillance performed   hand hygiene promoted   personal protective equipment utilized   rest/sleep promoted   single patient room provided     Problem: Adult Inpatient Plan of Care  Goal: Optimal Comfort and Wellbeing  6/2/2025 0548 by Aurea Garcia RN  Outcome: Progressing  6/2/2025 0548 by Aurea Garcia RN  Outcome: Progressing  Intervention: Provide Person-Centered Care  Recent Flowsheet Documentation  Taken 6/1/2025 2000 by Aurea Garcia RN  Trust Relationship/Rapport:   care explained   reassurance provided   thoughts/feelings acknowledged   empathic listening provided   questions answered     Problem: Adult Inpatient Plan of Care  Goal: Optimal Comfort and Wellbeing  Intervention: Provide Person-Centered Care  Recent Flowsheet Documentation  Taken 6/1/2025 2000 by Aurea Garcia RN  Trust Relationship/Rapport:   care explained   reassurance provided   thoughts/feelings acknowledged   empathic listening provided   questions answered     Problem: Adult Inpatient Plan of Care  Goal: Readiness for Transition of Care  6/2/2025 0548 by Aurea Garcia RN  Outcome: Progressing  6/2/2025 0548 by Aurea Garcia RN  Outcome: Progressing     Problem: Skin Injury Risk Increased  Goal: Skin Health and Integrity  6/2/2025 0548 by Aurea Garcia RN  Outcome: Progressing  6/2/2025 0548  by Aurea Garcia RN  Outcome: Progressing  Intervention: Optimize Skin Protection  Recent Flowsheet Documentation  Taken 6/1/2025 2000 by Aurea Garcia RN  Pressure Reduction Techniques:   frequent weight shift encouraged   weight shift assistance provided  Pressure Reduction Devices:   positioning supports utilized   pressure-redistributing mattress utilized   foam padding utilized  Skin Protection:   incontinence pads utilized   transparent dressing maintained     Problem: Skin Injury Risk Increased  Goal: Skin Health and Integrity  Intervention: Optimize Skin Protection  Recent Flowsheet Documentation  Taken 6/1/2025 2000 by Aurea Garcia RN  Pressure Reduction Techniques:   frequent weight shift encouraged   weight shift assistance provided  Pressure Reduction Devices:   positioning supports utilized   pressure-redistributing mattress utilized   foam padding utilized  Skin Protection:   incontinence pads utilized   transparent dressing maintained     Problem: Fall Injury Risk  Goal: Absence of Fall and Fall-Related Injury  6/2/2025 0548 by Aurea Garcia RN  Outcome: Progressing  6/2/2025 0548 by Aurea Garcia RN  Outcome: Progressing  Intervention: Identify and Manage Contributors  Recent Flowsheet Documentation  Taken 6/2/2025 0400 by Aurea Garcia RN  Medication Review/Management: medications reviewed  Taken 6/2/2025 0200 by Aurea Garcia RN  Medication Review/Management: medications reviewed  Taken 6/2/2025 0000 by Aurea Garcia RN  Medication Review/Management: medications reviewed  Taken 6/1/2025 2000 by Aurea Garcia RN  Medication Review/Management: medications reviewed  Intervention: Promote Injury-Free Environment  Recent Flowsheet Documentation  Taken 6/2/2025 0400 by Aurea Garcia RN  Safety Promotion/Fall Prevention:   assistive device/personal items within reach   clutter free environment maintained   room organization consistent   safety round/check  completed  Taken 6/2/2025 0200 by Aurea Garcia RN  Safety Promotion/Fall Prevention:   assistive device/personal items within reach   clutter free environment maintained   room organization consistent   safety round/check completed  Taken 6/2/2025 0000 by Aurea Garcia RN  Safety Promotion/Fall Prevention:   assistive device/personal items within reach   clutter free environment maintained   room organization consistent   safety round/check completed  Taken 6/1/2025 2000 by Aurea Garcia RN  Safety Promotion/Fall Prevention:   assistive device/personal items within reach   clutter free environment maintained   room organization consistent   safety round/check completed     Problem: Fall Injury Risk  Goal: Absence of Fall and Fall-Related Injury  Intervention: Identify and Manage Contributors  Recent Flowsheet Documentation  Taken 6/2/2025 0400 by Aurea Garcia RN  Medication Review/Management: medications reviewed  Taken 6/2/2025 0200 by Aurea Garcia RN  Medication Review/Management: medications reviewed  Taken 6/2/2025 0000 by Aurea Garcia RN  Medication Review/Management: medications reviewed  Taken 6/1/2025 2000 by Aurea Garcia RN  Medication Review/Management: medications reviewed     Problem: Fall Injury Risk  Goal: Absence of Fall and Fall-Related Injury  Intervention: Promote Injury-Free Environment  Recent Flowsheet Documentation  Taken 6/2/2025 0400 by Aurea Garcia RN  Safety Promotion/Fall Prevention:   assistive device/personal items within reach   clutter free environment maintained   room organization consistent   safety round/check completed  Taken 6/2/2025 0200 by Aurea Garcia RN  Safety Promotion/Fall Prevention:   assistive device/personal items within reach   clutter free environment maintained   room organization consistent   safety round/check completed  Taken 6/2/2025 0000 by Aurea Garcia RN  Safety Promotion/Fall Prevention:   assistive  device/personal items within reach   clutter free environment maintained   room organization consistent   safety round/check completed  Taken 6/1/2025 2000 by Aurea Garcia RN  Safety Promotion/Fall Prevention:   assistive device/personal items within reach   clutter free environment maintained   room organization consistent   safety round/check completed   Goal Outcome Evaluation:

## 2025-06-02 NOTE — PLAN OF CARE
Goal Outcome Evaluation:  Plan of Care Reviewed With: patient           Outcome Evaluation: Pt initially agreeable and able to come to sit edge of bed, once sitting she declined to attempt to stand, worked in sitting on balance and trunk control, pt remains appropriate to go to SNF at discharge    Anticipated Discharge Disposition (PT): skilled nursing facility

## 2025-06-02 NOTE — PROGRESS NOTES
Dedicated to Hospital Care    [phone]   LOS: 4 days     Name: Monica Scherer  Age/Sex: 89 y.o. female  :  1933        PCP: Amilcar Mauricio DO  Chief Complaint   Patient presents with    Altered Mental Status    Weakness - Generalized      Subjective   Awake and alert eating breakfast on my arrival.  Appetites been good she slept well overnight.  Denies new issues or complaints no new neurologic symptoms.      atorvastatin, 40 mg, Oral, Nightly  dabigatran etexilate, 150 mg, Oral, Q12H  metoprolol tartrate, 25 mg, Oral, BID  OLANZapine, 5 mg, Oral, Daily With Dinner  pantoprazole, 40 mg, Oral, Q AM  sodium chloride, 10 mL, Intravenous, Q12H           Objective   Vital Signs  Temp:  [97.9 °F (36.6 °C)-98.9 °F (37.2 °C)] 98.6 °F (37 °C)  Heart Rate:  [74-93] 93  Resp:  [15-18] 18  BP: (136-164)/(74-89) 153/89  Body mass index is 25.35 kg/m².    Intake/Output Summary (Last 24 hours) at 2025 1141  Last data filed at 2025 0851  Gross per 24 hour   Intake 564 ml   Output 575 ml   Net -11 ml       Physical Exam  On exam she is awake alert sitting up in the bed in no distress  Oriented to self  Respirations are even nonlabored clear to auscultation anteriorly  Regular rate and rhythm  Abdomen soft nontender  No peripheral edema    Results Review:       I reviewed the patient's new clinical results.  Results from last 7 days   Lab Units 2557 25   WBC 10*3/mm3 11.72* 17.18*   HEMOGLOBIN g/dL 10.9* 13.9   PLATELETS 10*3/mm3 213 261     Results from last 7 days   Lab Units 25  0617 25  2344 25  0624 25  19425  0824 2557 25   SODIUM mmol/L 145  --   --  144  --  145  --  139 146*   POTASSIUM mmol/L 4.1 5.3* 6.2* 3.0*  --  3.5 4.0 3.5 2.6*   CHLORIDE mmol/L 110*  --   --  108*  --  106  --  103 100   CO2 mmol/L 23.0  --   --  25.2  --  24.0  --  21.9* 26.6   BUN mg/dL 15.0  --   --  13.0   --  13.0  --  16.0 17.0   CREATININE mg/dL 0.81  --   --  0.81  --  0.88  --  0.86 1.12*   CALCIUM mg/dL 8.5  --   --  8.2  --  8.8  --  8.0* 9.6   MAGNESIUM mg/dL 1.7  --   --  2.0  --  2.6*  --  1.5* 1.7   PHOSPHORUS mg/dL 2.3*  --   --  2.7 2.0* 1.9*  --   --  3.3   Estimated Creatinine Clearance: 41.7 mL/min (by C-G formula based on SCr of 0.81 mg/dL).      Assessment & Plan   Active Hospital Problems    Diagnosis  POA    **Acute UTI [N39.0]  Yes    History of CVA (cerebrovascular accident) [Z86.73]  Not Applicable    Paroxysmal atrial fibrillation [I48.0]  Yes    Hyperlipidemia [E78.5]  Yes    Hypertension [I10]  Yes      Resolved Hospital Problems   No resolved problems to display.       ASSESMENT  This is a 89-year-old female with history of atrial fibrillation, hyperlipidemia, hypertension, CVA, P.Afib, presents to the hospital, after a fall at home. She came to [facility] ER and found to have UTI, hypokalemia, hypernatremia, and S4 Sacral fracture    PLAN  Completed treatment for the urinary tract infection.  Working on skilled nursing facility.    VTE Prophylaxis:  Pharmacologic & mechanical VTE prophylaxis orders are present.      Code Status and Medical Interventions: CPR (Attempt to Resuscitate); Full Support   Ordered at: 05/28/25 0028     Code Status (Patient has no pulse and is not breathing):    CPR (Attempt to Resuscitate)     Medical Interventions (Patient has pulse or is breathing):    Full Support          Disposition  Expected Discharge Date: 6/1/2025; Expected Discharge Time:        Florencio Galan MD  [facility]  06/02/25  11:41 EDT

## 2025-06-03 PROCEDURE — 97110 THERAPEUTIC EXERCISES: CPT

## 2025-06-03 RX ORDER — OLANZAPINE 2.5 MG/1
2.5 TABLET, FILM COATED ORAL
Start: 2025-06-03 | End: 2025-06-09 | Stop reason: HOSPADM

## 2025-06-03 RX ADMIN — DABIGATRAN ETEXILATE 150 MG: 150 CAPSULE ORAL at 12:47

## 2025-06-03 RX ADMIN — Medication 10 ML: at 21:45

## 2025-06-03 RX ADMIN — OLANZAPINE 5 MG: 5 TABLET, FILM COATED ORAL at 21:42

## 2025-06-03 RX ADMIN — ATORVASTATIN CALCIUM 40 MG: 20 TABLET, FILM COATED ORAL at 21:42

## 2025-06-03 RX ADMIN — METOPROLOL TARTRATE 25 MG: 25 TABLET, FILM COATED ORAL at 12:48

## 2025-06-03 RX ADMIN — Medication 10 ML: at 12:00

## 2025-06-03 RX ADMIN — METOPROLOL TARTRATE 25 MG: 25 TABLET, FILM COATED ORAL at 21:42

## 2025-06-03 RX ADMIN — DABIGATRAN ETEXILATE 150 MG: 150 CAPSULE ORAL at 21:42

## 2025-06-03 NOTE — PLAN OF CARE
Goal Outcome Evaluation:  Plan of Care Reviewed With: patient        Progress: improving  Outcome Evaluation: Pt agreeable today and demonstrated good effort with therapy, she is still somewhat confused but followed commands consistently, she was able to walk a short distance with moderate assist, she has a poor gait quality with forward flexed posture, shuffling steps, needed frequent verbal cues and constant hands on assist at a moderate assist level, she fatigued quickly, she is significantly below her baseline, a high fall risk and at current level I recommend SNF at discharge, PT will cont to follow    Anticipated Discharge Disposition (PT): skilled nursing facility

## 2025-06-03 NOTE — CASE MANAGEMENT/SOCIAL WORK
Case Management/Social Work    Patient Name:  Monica Scherer  YOB: 1933  MRN: [id number]  Admit Date:  5/27/2025        P2P REQUESTED  CCP/PA NOTIFIED  WILL CONTINUE TO FOLLOW      Electronically signed by:  KADEN Ledezma  06/03/25 11:48 EDT

## 2025-06-03 NOTE — THERAPY TREATMENT NOTE
Patient Name: Monica Scherer  : 1933    MRN: [id number]                              Today's Date: 6/3/2025       Admit Date: 2025    Visit Dx:     ICD-10-CM ICD-9-CM   1. Acute UTI  N39.0 599.0   2. Generalized weakness  R53.1 780.79   3. Fall, initial encounter  W19.XXXA E888.9   4. Closed head injury, initial encounter  S09.90XA 959.01   5. Closed fracture of sacrum, unspecified portion of sacrum, initial encounter  S32.10XA 805.6   6. Hypokalemia  E87.6 276.8   7. Dehydration  E86.0 276.51     Patient Active Problem List   Diagnosis    Vertigo    Temporary cerebral vascular dysfunction    Gastroesophageal reflux disease with esophagitis    Degeneration of intervertebral disc of cervical region    Prediabetes    S/P cholecystectomy    Osteoarthritis of knee    Herpes zoster without complication    Hypertension    Duodenal ulcer    History of pancreatitis    Hyperlipidemia    TIA (transient ischemic attack)    Cerebrovascular accident (CVA) due to thrombosis of left posterior cerebral artery    Paroxysmal atrial fibrillation    General weakness    History of CVA (cerebrovascular accident)    Anticoagulated    Hematuria    Dizziness and giddiness    Rectal bleeding    Acute CVA (cerebrovascular accident)    Acute UTI     Past Medical History:   Diagnosis Date    Atrial fibrillation     Hyperlipidemia     Hypertension     Lacunar infarct, acute 2020    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    New onset atrial fibrillation 2020    now on rate control along with Eliquis    Prolapsed uterus     per patient    Stroke     TIA (transient ischemic attack) 2020    Weakness      Past Surgical History:   Procedure Laterality Date    APPENDECTOMY      CHOLECYSTECTOMY N/A 2016    Procedure: CHOLECYSTECTOMY LAPAROSCOPIC;  Surgeon: Russ Gar MD;  Location: [facility] OR [facility] [place] [facility];  Service:     COLONOSCOPY N/A 2021    Procedure: COLONOSCOPY TO CECUM WITH HOT SNARE POLYPECTOMIES AND  COLD BX POLYPECTOMY;  Surgeon: Emerson Izaguirre MD;  Location: [facility] ENDOSCOPY;  Service: Gastroenterology;  Laterality: N/A;  pre: RECTAL BLEEDING, FAMILY HX OF COLON CANCER  post: INTERNAL AND EXTERNAL HEMORRHOIDS, DIVERTICULOSIS, POLYPS    ENDOSCOPY N/A 2/22/2018    Z-line regular, 35 cm from incisors, normal esophagus, gastritis, bilious gastric fluid, one non-bleeding duodenal ulcer w/no stigmata of bleeding, Path: DUODENUM: FRAGMENTS OF GASTRIC TYPE MUCOSA WITH HYPERPLASIA (FOVEOLAR) AND PATCHY MIXED INFLAMMATION IN THE LAMINA PROPRIA WITH FOCAL FIBROSIS, COMMENT: 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     EYE SURGERY      tre cataracts      General Information       Row Name 06/03/25 1349          Physical Therapy Time and Intention    Document Type therapy note (daily note)  -PC     Mode of Treatment physical therapy  -PC       Row Name 06/03/25 1349          General Information    Existing Precautions/Restrictions fall  -PC       Row Name 06/03/25 134          Cognition    Orientation Status (Cognition) oriented to;person;disoriented to;place;situation;time  -PC               User Key  (r) = Recorded By, (t) = Taken By, (c) = Cosigned By      Initials Name Provider Type    PC Jennifer Booker PT Physical Therapist                   Mobility       Row Name 06/03/25 1350          Bed Mobility    Supine-Sit [place] (Bed Mobility) moderate assist (50% patient effort)  -PC     Sit-Supine [place] (Bed Mobility) moderate assist (50% patient effort);2 person assist  -PC     Assistive Device (Bed Mobility) bed rails;head of bed elevated  -PC       Row Name 06/03/25 1350          Sit-Stand Transfer    Sit-Stand [place] (Transfers) moderate assist (50% patient effort)  -PC       Row Name 06/03/25 1350          Gait/Stairs (Locomotion)    [place] Level (Gait) moderate assist (50% patient effort)  -PC     Assistive Device (Gait) walker, front-wheeled  -PC     Distance in Feet (Gait) 20  -PC      Deviations/Abnormal Patterns (Gait) festinating/shuffling;gait speed decreased;stride length decreased  -PC     Bilateral Gait Deviations forward flexed posture;heel strike decreased  -PC     Comment, (Gait/Stairs) pt able to ambulate today however gait quality was poor, needed assist to steer walker and fatigued quickly needing to sit quickly once back at bed, shuffling gait with forward flexed posture, frequent verbal cues and constant hands on assist giving moderate assist  -PC               User Key  (r) = Recorded By, (t) = Taken By, (c) = Cosigned By      Initials Name Provider Type    PC Jennifer Booker, PT Physical Therapist                   Obj/Interventions       Row Name 06/03/25 1356          Balance    Balance Assessment sitting static balance;sitting dynamic balance;standing static balance;standing dynamic balance  -PC     Static Sitting Balance standby assist  -PC     Dynamic Sitting Balance minimal assist;contact guard  -PC     Position, Sitting Balance sitting edge of bed  -PC     Static Standing Balance minimal assist  -PC     Dynamic Standing Balance moderate assist  -PC     Position/Device Used, Standing Balance walker, rolling  -PC               User Key  (r) = Recorded By, (t) = Taken By, (c) = Cosigned By      Initials Name Provider Type    PC Jennifer Booker, PT Physical Therapist                   Goals/Plan    No documentation.                  Clinical Impression       Row Name 06/03/25 4507          Pain    Pretreatment Pain Rating 0/10 - no pain  -PC     Posttreatment Pain Rating 0/10 - no pain  -PC     Pre/Posttreatment Pain Comment no complaints of pain this session  -PC       Row Name 06/03/25 1353          Plan of Care Review    Plan of Care Reviewed With patient  -PC     Progress improving  -PC     Outcome Evaluation Pt agreeable today and demonstrated good effort with therapy, she is still somewhat confused but followed commands consistently, she was able to walk a short  distance with moderate assist, she has a poor gait quality with forward flexed posture, shuffling steps, needed frequent verbal cues and constant hands on assist at a moderate assist level, she fatigued quickly, she is significantly below her baseline, a high fall risk and at current level I recommend SNF at discharge, PT will cont to follow  -PC       Row Name 06/03/25 1353          Positioning and Restraints    Pre-Treatment Position in bed  -PC     Post Treatment Position bed  -PC     In Bed supine;call light within reach;encouraged to call for assist;exit alarm on  -PC               User Key  (r) = Recorded By, (t) = Taken By, (c) = Cosigned By      Initials Name Provider Type    PC Jennifer Booker, PT Physical Therapist                   Outcome Measures       Row Name 06/03/25 1400 06/03/25 0800       How much help from another person do you currently need...    Turning from your back to your side while in flat bed without using bedrails? 3  -PC 3  -RF    Moving from lying on back to sitting on the side of a flat bed without bedrails? 2  -PC 2  -RF    Moving to and from a bed to a chair (including a wheelchair)? 2  -PC 2  -RF    Standing up from a chair using your arms (e.g., wheelchair, bedside chair)? 2  -PC 2  -RF    Climbing 3-5 steps with a railing? 1  -PC 1  -RF    To walk in hospital room? 2  -PC 2  -RF    AM-PAC 6 Clicks Score (PT) 12  -PC 12  -RF    Highest Level of Mobility Goal Move to Chair/Commode-4  -PC Move to Chair/Commode-4  -RF              User Key  (r) = Recorded By, (t) = Taken By, (c) = Cosigned By      Initials Name Provider Type    PC Jennifer Booker PT Physical Therapist    RF Shaila Escobar RN Registered Nurse                                 Physical Therapy Education       Title: PT OT SLP Therapies (In Progress)       Topic: Physical Therapy (In Progress)       Point: Mobility training (Done)       Learning Progress Summary            Patient Acceptance, E,D, DU,NR by PC at  6/3/2025 1401    Acceptance, E,D, NR by PC at 6/2/2025 1518    Acceptance, E, NR by NM at 6/1/2025 0258    Acceptance, E,D, VU,NR by MS at 5/30/2025 1014    Nonacceptance, E,D, NR by MS at 5/28/2025 1536                      Point: Home exercise program (In Progress)       Learning Progress Summary            Patient Acceptance, E,D, NR by PC at 6/2/2025 1518    Acceptance, E, NR by NM at 6/1/2025 0258    Acceptance, E,D, VU,NR by MS at 5/30/2025 1014                      Point: Body mechanics (Done)       Learning Progress Summary            Patient Acceptance, E,D, DU,NR by PC at 6/3/2025 1401    Acceptance, E,D, NR by PC at 6/2/2025 1518    Acceptance, E, NR by NM at 6/1/2025 0258    Acceptance, E,D, VU,NR by MS at 5/30/2025 1014    Nonacceptance, E,D, NR by MS at 5/28/2025 1536                      Point: Precautions (Done)       Learning Progress Summary            Patient Acceptance, E,D, DU,NR by PC at 6/3/2025 1401    Acceptance, E,D, NR by PC at 6/2/2025 1518    Acceptance, E, NR by NM at 6/1/2025 0258    Acceptance, E,D, VU,NR by MS at 5/30/2025 1014    Nonacceptance, E,D, NR by MS at 5/28/2025 1536                                      User Key       Initials Effective Dates Name Provider Type Discipline    PC 06/16/21 -  Jennifer Booker, PT Physical Therapist PT    MS 06/16/21 -  Bladimir Akins, PT Physical Therapist PT    NM 07/09/24 -  Al Woods, RN Registered Nurse Nurse                  PT Recommendation and Plan     Progress: improving  Outcome Evaluation: Pt agreeable today and demonstrated good effort with therapy, she is still somewhat confused but followed commands consistently, she was able to walk a short distance with moderate assist, she has a poor gait quality with forward flexed posture, shuffling steps, needed frequent verbal cues and constant hands on assist at a moderate assist level, she fatigued quickly, she is significantly below her baseline, a high fall risk and at  current level I recommend SNF at discharge, PT will cont to follow     Time Calculation:         PT Charges       Row Name 06/03/25 1401             Time Calculation    Start Time 1327  -PC      Stop Time 1342  -PC      Time Calculation (min) 15 min  -PC      PT Received On 06/03/25  -PC      PT - Next Appointment 06/04/25  -PC                User Key  (r) = Recorded By, (t) = Taken By, (c) = Cosigned By      Initials Name Provider Type    PC Jennifer Booker, PT Physical Therapist                  Therapy Charges for Today       Code Description Service Date Service Provider Modifiers Qty    69710956390  PT THER PROC EA 15 MIN 6/2/2025 Jennifer Booker, PT GP 1    22054801035 HC PT THER PROC EA 15 MIN 6/3/2025 Jennifer Booker, PT GP 1            PT G-Codes  Outcome Measure Options: AM-PAC 6 Clicks Basic Mobility (PT)  AM-PAC 6 Clicks Score (PT): 12  PT Discharge Summary  Anticipated Discharge Disposition (PT): skilled nursing facility    Jennifer Booker PT  6/3/2025

## 2025-06-03 NOTE — CASE MANAGEMENT/SOCIAL WORK
Post-Acute Authorization Submission      Post Acute Pre-Cert Documentation  Request Submitted by Facility - Type:: Hospital  Post-Acute Authorization Type Submitted:: SNF  Date Post Acute Pre-Cert Inititated per Facility: 06/01/25  Verification from Payer: Yes  Accepting Facility: [facility] Discharge Date Requested: 06/02/25  All Clinicals Submitted?: Yes  Had Accepting Facility at Time of Submission: Yes  Action Taken by Requesting Facility:: Additional Clinicals Submitted  Response Communicated to::   Authorization Number:: [id number]  Post Acute Pre-Cert Initiated Comment: MOST RECENT PT & MD PROGRESS NOTES SENT TO INSURANCE.              Alexandro Cordova, PCT

## 2025-06-03 NOTE — CASE MANAGEMENT/SOCIAL WORK
Post-Acute Authorization Submission      Post Acute Pre-Cert Documentation  Request Submitted by Facility - Type:: Hospital  Post-Acute Authorization Type Submitted:: SNF  Date Post Acute Pre-Cert Inititated per Facility: 06/01/25  Verification from Payer: Yes  Date Post Acute Pre-Cert Completed: 06/03/25  Accepting Facility: [facility] Discharge Date Requested: 06/02/25  All Clinicals Submitted?: Yes  Had Accepting Facility at Time of Submission: Yes  Response Received from Insurance?: Denial  Action Taken by Requesting Facility:: P2P Completed  Response Communicated to::   Authorization Number:: DENIED [id number]  Post Acute Pre-Cert Initiated Comment: [employer]  # [phone]/FAX# [phone]              KADEN Ledezma

## 2025-06-03 NOTE — CASE MANAGEMENT/SOCIAL WORK
Continued Stay Note  [facility]     Patient Name: Monica Scherer  MRN: [id number]  Today's Date: 6/3/2025    Admit Date: 5/27/2025    Plan: Pending appeal   Discharge Plan       Row Name 06/03/25 1649       Plan    Plan Pending appeal    Plan Comments Patient P2P denied. Left message for daughter to determine next steps. Post acute auth and MD aware of current discharge barriers.                   Discharge Codes    No documentation.                 Expected Discharge Date and Time       Expected Discharge Date Expected Discharge Time    Herb 3, 2025               Shaila Spaulding RN

## 2025-06-03 NOTE — DISCHARGE SUMMARY
Patient Name: Monica Scherer  : 1933  MRN: [id number]    Date of Admission: 2025  Date of Discharge:  6/3/2025  Primary Care Physician: Amilcar Mauricio DO      Chief Complaint:   Altered Mental Status and Weakness - Generalized      Discharge Diagnoses     Active Hospital Problems    Diagnosis  POA   • **Acute UTI [N39.0]  Yes   • History of CVA (cerebrovascular accident) [Z86.73]  Not Applicable   • Paroxysmal atrial fibrillation [I48.0]  Yes   • Hyperlipidemia [E78.5]  Yes   • Hypertension [I10]  Yes      Resolved Hospital Problems   No resolved problems to display.        Hospital Course     Ms. Scherer is a 89 y.o. female with a history of previous stroke, paroxysmal A-fib, hypertension and hyperlipidemia who presented to [facility] initially complaining of weakness and altered mentation.  Please see the admitting history and physical for further details.  She was found to have urinary tract infection and was admitted to the hospital for further evaluation and treatment.  She was admitted to the hospital and started on antibiotic treatment and IV fluids.  Her electrolyte abnormalities were corrected.  She was also found to have a sacral fracture.  She responded well to conservative treatment and remains somewhat confused but is more awake alert and working with therapy.  The plan is to discharge to a skilled nursing facility.  The plan was discussed in patient remained stable to discharge.  Of note patient is 89 years old with no advance directive on file.  It strongly recommended that advance care planning be performed in the outpatient setting.  She is not a great candidate for cardiopulmonary resuscitation based on her age and comorbidities.  She would not likely survive the event.  Hopefully further conversations can be had in the outpatient setting.    Day of Discharge     Subjective:  She rested decently overnight did not eat any breakfast this morning.  Remains  confused but is awake and alert this morning.    Physical Exam:  Temp:  [97.5 °F (36.4 °C)-98.6 °F (37 °C)] 97.5 °F (36.4 °C)  Heart Rate:  [64-93] 64  Resp:  [16-18] 16  BP: (125-172)/(73-96) 159/73  Body mass index is 25.35 kg/m².  Physical Exam  On exam she is awake alert sitting up in the bed in no distress  Oriented to self  Respirations are even nonlabored clear to auscultation anteriorly  Regular rate and rhythm  Abdomen soft nontender  No peripheral edema  Consultants     Consult Orders (all) (From admission, onward)       Start     Ordered    05/28/25 0132  Inpatient Case Management  Consult  Once        Provider:  (Not yet assigned)    05/28/25 0131 05/27/25 2216  [facility] (on-call MD unless specified) Details  Once        Specialty:  Hospitalist  Provider:  (Not yet assigned)    05/27/25 2215                  Procedures     * Surgery not found *    Imaging Results (All)       Procedure Component Value Units Date/Time    XR Spine Lumbar 2 or 3 View [[id number]] Collected: 05/28/25 1558     Updated: 05/28/25 1609    Narrative:      XR PELVIS 1 OR 2 VW-, XR SPINE LUMBAR 2 OR 3 VW-     INDICATION: Post fall. Sacral fracture.     COMPARISON: CT abdomen pelvis 5/27/2025       Impression:         Pelvis: Exam is limited by low bone mineralization. S4 fracture best  seen on recent CT. Normal alignment. 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 Prior IV contrast within the urinary bladder.     Lumbar spine: Exam is limited by low bone mineralization. Mild lumbar  spine dextrocurvature. No subluxation. Anterior wedging L1 is similar to  recent CT. Remaining vertebral body heights are maintained. Multilevel  disc degeneration most advanced at L1-L3. Moderate lower lumbar facet  arthropathy.     This report was finalized on 5/28/2025 4:05 PM by Dr. Bladimir Vernon M.D on Workstation: [id number]       XR Pelvis 1 or 2 View [[id number]] Collected: 05/28/25 1558     Updated: 05/28/25 1609     Narrative:      XR PELVIS 1 OR 2 VW-, XR SPINE LUMBAR 2 OR 3 VW-     INDICATION: Post fall. Sacral fracture.     COMPARISON: CT abdomen pelvis 5/27/2025       Impression:         Pelvis: Exam is limited by low bone mineralization. S4 fracture best  seen on recent CT. Normal alignment. 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 Prior IV contrast within the urinary bladder.     Lumbar spine: Exam is limited by low bone mineralization. Mild lumbar  spine dextrocurvature. No subluxation. Anterior wedging L1 is similar to  recent CT. Remaining vertebral body heights are maintained. Multilevel  disc degeneration most advanced at L1-L3. Moderate lower lumbar facet  arthropathy.     This report was finalized on 5/28/2025 4:05 PM by Dr. Bladimir Vernon M.D on Workstation: [id number]       CT Abdomen Pelvis With Contrast [[id number]] Collected: 05/27/25 2124     Updated: 05/27/25 2139    Narrative:      CT OF THE ABDOMEN PELVIS WITH CONTRAST     HISTORY: Abdominal pain and diarrhea     COMPARISON: March 19, 2024     TECHNIQUE: Axial CT imaging was obtained through the abdomen and pelvis.  IV contrast was administered.     FINDINGS:  Images through the lung bases demonstrate dependent atelectasis. There  is lumbar scoliosis, with convexity to the right. No there may be some  focal fatty infiltration at the gallbladder fossa. The gallbladder is  absent. There is a small hiatal hernia. There is a duodenal  diverticulum. Calcified granulomata are noted within the spleen. The  pancreas is atrophic. Adrenal glands are normal. The kidneys enhance  symmetrically. No hydronephrosis is seen. There are simple appearing  left renal cysts. The patient is noted to have pelvic floor laxity.  Rectal prolapse and cystocele are again seen. There is no bowel  obstruction. There is colonic diverticulosis. Appendix is absent. The  patient does have some asymmetric thickening of the left piriformis  muscle, as well as some presacral  stranding. This is favored to be  secondary to a fracture through the S4 vertebral body, which is best  seen on the sagittal series. There are aortoiliac calcifications.       Impression:         1. The patient has presacral stranding and asymmetric enlargement of the  left piriformis muscle, which is favored to be secondary to some  hemorrhage from a fracture of S4. No additional acute findings are noted  within the abdomen or pelvis.     Radiation dose reduction techniques were utilized, including automated  exposure control and exposure modulation based on body size.        This report was finalized on 5/27/2025 9:36 PM by Dr. Loretta Coles M.D on Workstation: [id number]       CT Cervical Spine Without Contrast [[id number]] Collected: 05/27/25 2050     Updated: 05/27/25 2054    Narrative:      CT CERVICAL SPINE WO CONTRAST-     INDICATIONS: Trauma  TECHNIQUE: Radiation dose reduction techniques were utilized, including  automated exposure control and exposure modulation based on body size.  Noncontrast head CT, cervical spine CT  COMPARISON: 2/27/2025 at 0523 hours MRN 2/26/2025  FINDINGS:  Evolving post infarct changes are seen in left temporal lobe, left basal  ganglia.  No acute intracranial hemorrhage. Midline structures appear stable.  Moderate to prominent periventricular hypodensities suggest chronic  small vessel ischemic change in a patient this age.  Arterial calcifications are seen at the base of the brain.  Ventricles, cisterns, cerebral sulci are unremarkable for patient age.  Mild partial opacification of left mastoid air cells. The visualized  paranasal sinuses, orbits, mastoid air cells are otherwise unremarkable.  Cervical spine CT:  No acute fracture is identified. The ring of C1 is developmentally  incomplete at the posterior aspect.  Facet and uncovertebral joint hypertrophy contribute to neuroforaminal  narrowing, more prominent on the right at C5/6, and on the left at C3/4,  C4/5,  C5/6, C6/7. Disc osteophyte complex appears to result in at least  mild central stenosis at the levels C3/4 through C6/7.  Mild anterolisthesis of C7 on T1.  Bilateral carotid arterial calcifications are present.  Small atelectasis is apparent in the partly included upper lungs.    Impression:      Evolving left temporal lobe and basal ganglia post infarct changes. No  acute hemorrhage, continued follow-up advised.  No acute cervical fracture identified; degenerative changes in the  cervical spine. If further imaging evaluation of the spine is indicated,  MRI could be considered.     This report was finalized on 5/27/2025 8:51 PM by Dr. Sai Plasencia M.D on Workstation: [id number]       CT Head Without Contrast [[id number]] Collected: 05/27/25 2020     Updated: 05/27/25 2032    Narrative:      CT HEAD WO CONTRAST-     INDICATIONS: Trauma     TECHNIQUE: Radiation dose reduction techniques were utilized, including  automated exposure control and exposure modulation based on body size.  Noncontrast head CT, cervical spine CT     COMPARISON: 2/27/2025 at 0523 hours MRN 2/26/2025     FINDINGS:     Evolving post infarct changes are seen in left temporal lobe, left basal  ganglia.     No acute intracranial hemorrhage. Midline structures appear stable.     Moderate to prominent periventricular hypodensities suggest chronic  small vessel ischemic change in a patient this age.     Arterial calcifications are seen at the base of the brain.     Ventricles, cisterns, cerebral sulci are unremarkable for patient age.     Mild partial opacification of left mastoid air cells. The visualized  paranasal sinuses, orbits, mastoid air cells are otherwise unremarkable.        Cervical spine CT:     No acute fracture is identified. The ring of C1 is developmentally  incomplete at the posterior aspect.     Facet and uncovertebral joint hypertrophy contribute to neuroforaminal  narrowing, more prominent on the right at C5/6, and on the  left at C3/4,  C4/5, C5/6, C6/7. Disc osteophyte complex appears to result in at least  mild central stenosis at the levels C3/4 through C6/7.     Mild anterolisthesis of C7 on T1.     Bilateral carotid arterial calcifications are present.     Small atelectasis is apparent in the partly included upper lungs.          Impression:         Evolving left temporal lobe and basal ganglia post infarct changes. No  acute hemorrhage, continued follow-up advised.     No acute cervical fracture identified; degenerative changes in the  cervical spine. If further imaging evaluation of the spine is indicated,  MRI could be considered.     This report was finalized on 5/27/2025 8:29 PM by Dr. Sai Plasencia M.D on Workstation: [id number]       XR Chest 1 View [[id number]] Collected: 05/27/25 1943     Updated: 05/27/25 1948    Narrative:      XR CHEST 1 VW-     HISTORY: Female who is 89 years-old, altered mental status, weakness     TECHNIQUE: Frontal view of the chest     COMPARISON: 2/28/2025     FINDINGS: The heart appears mildly enlarged. Pulmonary vasculature is  unremarkable. Aorta is tortuous, calcified. No focal pulmonary  consolidation, pleural effusion, or pneumothorax. No acute osseous  process.       Impression:      No focal pulmonary consolidation. Mild cardiomegaly.  Tortuous aorta. Follow-up as clinically indicated.     This report was finalized on 5/27/2025 7:45 PM by Dr. Sai Plasencia M.D on Workstation: [id number]               Results for orders placed during the hospital encounter of 02/26/25    Adult Transthoracic Echo Complete W/ Cont if Necessary Per Protocol (With Agitated Saline)    Interpretation Summary  •  Left ventricular systolic function is normal. Calculated left ventricular EF = 62.9%  •  Left ventricular wall thickness is consistent with mild concentric hypertrophy. Sigmoid-shaped ventricular septum is present.  •  Left ventricular diastolic function is consistent with (grade II w/high LAP)  "pseudonormalization.  •  Moderate mitral valve regurgitation is present with a centrally-directed jet noted.  •  The left atrial cavity is mildly dilated.  •  Saline test results are negative for right to left atrial level shunt.  •  There is moderate calcification of the aortic valve mainly affecting the non-coronary, left coronary and right coronary cusp(s).    Pertinent Labs     Results from last 7 days   Lab Units 05/28/25 0557 05/27/25 1925   WBC 10*3/mm3 11.72* 17.18*   HEMOGLOBIN g/dL 10.9* 13.9   PLATELETS 10*3/mm3 213 261     Results from last 7 days   Lab Units 05/31/25 0617 05/30/25  2344 05/30/25 2117 05/30/25 0624 05/29/25 0824 05/28/25 2045 05/28/25 0557   SODIUM mmol/L 145  --   --  144 145  --  139   POTASSIUM mmol/L 4.1 5.3* 6.2* 3.0* 3.5   < > 3.5   CHLORIDE mmol/L 110*  --   --  108* 106  --  103   CO2 mmol/L 23.0  --   --  25.2 24.0  --  21.9*   BUN mg/dL 15.0  --   --  13.0 13.0  --  16.0   CREATININE mg/dL 0.81  --   --  0.81 0.88  --  0.86   GLUCOSE mg/dL 140*  --   --  122* 165*  --  100*   EGFR mL/min/1.73 68.2  --   --  68.2 61.7  --  63.5    < > = values in this interval not displayed.     Results from last 7 days   Lab Units 05/28/25 0557 05/27/25 1925   ALBUMIN g/dL 3.0* 3.9   BILIRUBIN mg/dL 1.4* 2.2*   ALK PHOS U/L 72 98   AST (SGOT) U/L 18 25   ALT (SGPT) U/L 12 13     Results from last 7 days   Lab Units 05/31/25 0617 05/30/25 0624 05/29/25  1947 05/29/25 0824 05/28/25 0557 05/27/25 1925   CALCIUM mg/dL 8.5 8.2  --  8.8 8.0* 9.6   ALBUMIN g/dL  --   --   --   --  3.0* 3.9   MAGNESIUM mg/dL 1.7 2.0  --  2.6* 1.5* 1.7   PHOSPHORUS mg/dL 2.3* 2.7 2.0* 1.9*  --  3.3       Results from last 7 days   Lab Units 05/27/25 2125 05/27/25 1925   HSTROP T ng/L 63* 68*   PROBNP pg/mL  --  2,214.0*           Invalid input(s): \"LDLCALC\"  Results from last 7 days   Lab Units 05/27/25 2247 05/27/25 2245 05/27/25 2150   BLOODCX  No growth at 5 days No growth at 5 days  --    URINECX "   --   --  >100,000 CFU/mL Escherichia coli*         Test Results Pending at Discharge     Pending Results       None              Discharge Details        Discharge Medications        New Medications        Instructions Start Date   OLANZapine 2.5 MG tablet  Commonly known as: zyPREXA   2.5 mg, Oral, Daily With Dinner             Continue These Medications        Instructions Start Date   atorvastatin 40 MG tablet  Commonly known as: LIPITOR   40 mg, Oral, Nightly      dabigatran etexilate 150 MG capsu  Commonly known as: PRADAXA   Take 1 capsule by mouth Every 12 (Twelve) Hours.      metoprolol tartrate 25 MG tablet  Commonly known as: LOPRESSOR   25 mg, Oral, 2 Times Daily      pantoprazole 40 MG EC tablet  Commonly known as: PROTONIX   40 mg, Oral, Every Early Morning               Allergies   Allergen Reactions   • Cephalexin Rash   • Latex Itching       Discharge Disposition:  Skilled Nursing Facility (DC - External)      Discharge Diet:  Diet Order   Procedures   • Diet: Regular/House; Texture: Pureed (NDD 1); Fluid Consistency: Thin (IDDSI 0)       Discharge Activity:   Activity Instructions       Activity as Tolerated              CODE STATUS:    Code Status and Medical Interventions: CPR (Attempt to Resuscitate); Full Support   Ordered at: 05/28/25 0028     Code Status (Patient has no pulse and is not breathing):    CPR (Attempt to Resuscitate)     Medical Interventions (Patient has pulse or is breathing):    Full Support       Future Appointments   Date Time Provider Department Center   6/12/2025  2:30 PM Micheline Singh MD [facility]   6/23/2025 11:00 AM Omar Bassett MD [id number]   7/11/2025 10:30 AM Amilcar Mauricio DO [facility]     Additional Instructions for the Follow-ups that You Need to Schedule       Discharge Follow-up with PCP   As directed       Currently Documented PCP:    Amilcar Mauricio DO    PCP Phone Number:    [phone]     Follow Up Details: 2-3 weeks                Contact information for follow-up providers       Amilcar Mauricio DO .    Specialties: Family Medicine, Urgent Care, Emergency Medicine  Why: 2-3 weeks  Contact information:  [address]  [address]  [facility] [address]  [phone]                       Contact information for after-discharge care       Destination       [facility] .    Service: Skilled Nursing  Contact information:  [address] [facility] [address] [phone]                                   Additional Instructions for the Follow-ups that You Need to Schedule       Discharge Follow-up with PCP   As directed       Currently Documented PCP:    Amilcar Mauricio DO    PCP Phone Number:    [phone]     Follow Up Details: 2-3 weeks            Time Spent on Discharge:  Greater than 30 minutes      Florencio Galan MD  [facility]  06/03/25  07:50 EDT

## 2025-06-04 RX ADMIN — DABIGATRAN ETEXILATE 150 MG: 150 CAPSULE ORAL at 11:59

## 2025-06-04 RX ADMIN — DABIGATRAN ETEXILATE 150 MG: 150 CAPSULE ORAL at 22:33

## 2025-06-04 RX ADMIN — Medication 10 ML: at 12:01

## 2025-06-04 RX ADMIN — ATORVASTATIN CALCIUM 40 MG: 20 TABLET, FILM COATED ORAL at 22:33

## 2025-06-04 RX ADMIN — METOPROLOL TARTRATE 25 MG: 25 TABLET, FILM COATED ORAL at 11:59

## 2025-06-04 RX ADMIN — METOPROLOL TARTRATE 25 MG: 25 TABLET, FILM COATED ORAL at 22:33

## 2025-06-04 RX ADMIN — PANTOPRAZOLE SODIUM 40 MG: 40 TABLET, DELAYED RELEASE ORAL at 05:58

## 2025-06-04 RX ADMIN — BISACODYL 10 MG: 10 SUPPOSITORY RECTAL at 22:33

## 2025-06-04 RX ADMIN — OLANZAPINE 5 MG: 5 TABLET, FILM COATED ORAL at 18:00

## 2025-06-04 NOTE — PLAN OF CARE
Goal Outcome Evaluation:      improving  Problem: Adult Inpatient Plan of Care  Goal: Plan of Care Review  Outcome: Progressing  Goal: Patient-Specific Goal (Individualized)  Outcome: Progressing  Goal: Absence of Hospital-Acquired Illness or Injury  Outcome: Progressing  Intervention: Identify and Manage Fall Risk  Recent Flowsheet Documentation  Taken 6/3/2025 1800 by Shaila Escobar RN  Safety Promotion/Fall Prevention: safety round/check completed  Taken 6/3/2025 1600 by Shaila Escobar RN  Safety Promotion/Fall Prevention: safety round/check completed  Taken 6/3/2025 1400 by Shaila Escobar RN  Safety Promotion/Fall Prevention: safety round/check completed  Taken 6/3/2025 1200 by Shaila Escobar RN  Safety Promotion/Fall Prevention: safety round/check completed  Taken 6/3/2025 1000 by Shaila Escobar RN  Safety Promotion/Fall Prevention: safety round/check completed  Taken 6/3/2025 0800 by Shaila Escobar RN  Safety Promotion/Fall Prevention: safety round/check completed  Intervention: Prevent Skin Injury  Recent Flowsheet Documentation  Taken 6/3/2025 1800 by Shaila Escobar RN  Body Position: position changed independently  Taken 6/3/2025 1600 by Shaila Escobar RN  Body Position: position changed independently  Taken 6/3/2025 1400 by Shaila Escobar RN  Body Position: position changed independently  Taken 6/3/2025 1200 by Shaila Escobar RN  Body Position: position changed independently  Taken 6/3/2025 1000 by Shaila Escobar RN  Body Position: position changed independently  Taken 6/3/2025 0800 by Shaila Escobar RN  Body Position: position changed independently  Skin Protection: incontinence pads utilized  Intervention: Prevent Infection  Recent Flowsheet Documentation  Taken 6/3/2025 1800 by Shaila Escobar RN  Infection Prevention: single patient room provided  Taken 6/3/2025 1600 by Shaila Escboar RN  Infection Prevention: single patient room provided  Taken  6/3/2025 1400 by Shaila Escobar RN  Infection Prevention: single patient room provided  Taken 6/3/2025 1200 by Shaila Escobar RN  Infection Prevention: single patient room provided  Taken 6/3/2025 1000 by Shaila Escobar RN  Infection Prevention: single patient room provided  Taken 6/3/2025 0800 by Shaila Escobar RN  Infection Prevention: single patient room provided  Goal: Optimal Comfort and Wellbeing  Outcome: Progressing  Intervention: Provide Person-Centered Care  Recent Flowsheet Documentation  Taken 6/3/2025 0800 by Shaila Escobar RN  Trust Relationship/Rapport: care explained  Goal: Readiness for Transition of Care  Outcome: Progressing     Problem: Skin Injury Risk Increased  Goal: Skin Health and Integrity  Outcome: Progressing  Intervention: Optimize Skin Protection  Recent Flowsheet Documentation  Taken 6/3/2025 1800 by Shaila Escobar RN  Head of Bed (HOB) Positioning: HOB elevated  Taken 6/3/2025 1600 by Shaila Escobar RN  Head of Bed (HOB) Positioning: HOB elevated  Taken 6/3/2025 1400 by Shaila Escobar RN  Head of Bed (HOB) Positioning: HOB elevated  Taken 6/3/2025 1200 by Shaila Escobar RN  Head of Bed (HOB) Positioning: HOB elevated  Taken 6/3/2025 1000 by Shaila Escobar RN  Head of Bed (HOB) Positioning: HOB lowered  Taken 6/3/2025 0800 by Shaila Escobar RN  Pressure Reduction Techniques: frequent weight shift encouraged  Head of Bed (HOB) Positioning: HOB lowered  Pressure Reduction Devices: positioning supports utilized  Skin Protection: incontinence pads utilized     Problem: Fall Injury Risk  Goal: Absence of Fall and Fall-Related Injury  Outcome: Progressing  Intervention: Identify and Manage Contributors  Recent Flowsheet Documentation  Taken 6/3/2025 1800 by Shaila Escobar RN  Medication Review/Management: medications reviewed  Taken 6/3/2025 1600 by Shaila Escobar RN  Medication Review/Management: medications reviewed  Taken 6/3/2025 1400  by Shaila Escobar RN  Medication Review/Management: medications reviewed  Taken 6/3/2025 1200 by Shaila Escobar RN  Medication Review/Management: medications reviewed  Taken 6/3/2025 1000 by Shaila Escobar RN  Medication Review/Management: medications reviewed  Taken 6/3/2025 0800 by Shaila Escobar RN  Medication Review/Management: medications reviewed  Intervention: Promote Injury-Free Environment  Recent Flowsheet Documentation  Taken 6/3/2025 1800 by Shaila Escobar RN  Safety Promotion/Fall Prevention: safety round/check completed  Taken 6/3/2025 1600 by Shaila Escobar RN  Safety Promotion/Fall Prevention: safety round/check completed  Taken 6/3/2025 1400 by Shaila Escobar RN  Safety Promotion/Fall Prevention: safety round/check completed  Taken 6/3/2025 1200 by Shaila Escobar RN  Safety Promotion/Fall Prevention: safety round/check completed  Taken 6/3/2025 1000 by Shaila Escobar RN  Safety Promotion/Fall Prevention: safety round/check completed  Taken 6/3/2025 0800 by Shaila Escobar RN  Safety Promotion/Fall Prevention: safety round/check completed     Problem: Sepsis/Septic Shock  Goal: Optimal Coping  Outcome: Progressing  Goal: Absence of Bleeding  Outcome: Progressing  Goal: Blood Glucose Level Within Target Range  Outcome: Progressing  Goal: Absence of Infection Signs and Symptoms  Outcome: Progressing  Intervention: Initiate Sepsis Management  Recent Flowsheet Documentation  Taken 6/3/2025 1800 by Shaila Escobar RN  Infection Prevention: single patient room provided  Taken 6/3/2025 1600 by Shaila Escobar RN  Infection Prevention: single patient room provided  Taken 6/3/2025 1400 by Shaila Escobar RN  Infection Prevention: single patient room provided  Taken 6/3/2025 1200 by Shaila Escobar RN  Infection Prevention: single patient room provided  Taken 6/3/2025 1000 by Shaila Escobar RN  Infection Prevention: single patient room provided  Taken  6/3/2025 0800 by Shaila Escobar RN  Infection Prevention: single patient room provided  Goal: Optimal Nutrition Delivery  Outcome: Progressing     Problem: Violence Risk or Actual  Goal: Anger and Impulse Control  Outcome: Progressing  Intervention: Minimize Safety Risk  Recent Flowsheet Documentation  Taken 6/3/2025 1800 by Shaila Escobar RN  Enhanced Safety Measures: bed alarm set  Taken 6/3/2025 1600 by Shaila Escobar RN  Enhanced Safety Measures: bed alarm set  Taken 6/3/2025 1400 by Shaila Escobar RN  Enhanced Safety Measures: bed alarm set  Taken 6/3/2025 1200 by Shaila Escobar RN  Enhanced Safety Measures: bed alarm set  Taken 6/3/2025 1000 by Shaila Escobar RN  Enhanced Safety Measures: bed alarm set  Taken 6/3/2025 0800 by Shaila Escobar RN  Enhanced Safety Measures: bed alarm set

## 2025-06-04 NOTE — CASE MANAGEMENT/SOCIAL WORK
Continued Stay Note  [facility]     Patient Name: Monica Scherer  MRN: [id number]  Today's Date: 6/4/2025    Admit Date: 5/27/2025    Plan: Appeal pending.   Discharge Plan       Row Name 06/04/25 1353       Plan    Plan Appeal pending.    Plan Comments AOR form signed by attending and faxed to [place]/ [facility] auth.                   Discharge Codes    No documentation.                 Expected Discharge Date and Time       Expected Discharge Date Expected Discharge Time    Herb 3, 2025               Shaila Spaulding RN

## 2025-06-04 NOTE — PROGRESS NOTES
Dedicated to Hospital Care    [phone]   LOS: 6 days     Name: Monica Scherer  Age/Sex: 89 y.o. female  :  1933        PCP: Amilcar Mauricio DO  Chief Complaint   Patient presents with    Altered Mental Status    Weakness - Generalized      Subjective   Sleeping soundly on my arrival but does wake up some.  She has not eaten but a couple bites of breakfast.  Remains a little lethargic at times but does seem to perk up more in the afternoons.  She did eat lunch and dinner yesterday per the nurse.    atorvastatin, 40 mg, Oral, Nightly  dabigatran etexilate, 150 mg, Oral, Q12H  metoprolol tartrate, 25 mg, Oral, BID  OLANZapine, 5 mg, Oral, Daily With Dinner  pantoprazole, 40 mg, Oral, Q AM  sodium chloride, 10 mL, Intravenous, Q12H           Objective   Vital Signs  Temp:  [97.9 °F (36.6 °C)-98.3 °F (36.8 °C)] 98.3 °F (36.8 °C)  Heart Rate:  [67-72] 67  Resp:  [16-18] 16  BP: (154-175)/(74-91) 154/91  Body mass index is 24.79 kg/m².    Intake/Output Summary (Last 24 hours) at 2025 1303  Last data filed at 2025 0638  Gross per 24 hour   Intake 170 ml   Output 900 ml   Net -730 ml       Physical Exam  Sleeping soundly Opens her eyes but falls back asleep.  Respirations even and nonlabored      Results Review:       I reviewed the patient's new clinical results.      Results from last 7 days   Lab Units 25  0617 25  2344 25  2117 25  0624 25  1947 25  0824 25  2045   SODIUM mmol/L 145  --   --  144  --  145  --    POTASSIUM mmol/L 4.1 5.3* 6.2* 3.0*  --  3.5 4.0   CHLORIDE mmol/L 110*  --   --  108*  --  106  --    CO2 mmol/L 23.0  --   --  25.2  --  24.0  --    BUN mg/dL 15.0  --   --  13.0  --  13.0  --    CREATININE mg/dL 0.81  --   --  0.81  --  0.88  --    CALCIUM mg/dL 8.5  --   --  8.2  --  8.8  --    MAGNESIUM mg/dL 1.7  --   --  2.0  --  2.6*  --    PHOSPHORUS mg/dL 2.3*  --   --  2.7 2.0* 1.9*  --    Estimated Creatinine Clearance: 45.8 mL/min  (by C-G formula based on SCr of 0.81 mg/dL).      Assessment & Plan   Active Hospital Problems    Diagnosis  POA    **Acute UTI [N39.0]  Yes    History of CVA (cerebrovascular accident) [Z86.73]  Not Applicable    Paroxysmal atrial fibrillation [I48.0]  Yes    Hyperlipidemia [E78.5]  Yes    Hypertension [I10]  Yes      Resolved Hospital Problems   No resolved problems to display.       ASSESMENT  This is a 89-year-old female with history of atrial fibrillation, hyperlipidemia, hypertension, CVA, P.Afib, presents to the hospital, after a fall at home. She came to [facility] ER and found to have UTI, hypokalemia, hypernatremia, and S4 Sacral fracture    PLAN  Working on placement issues.  Completed treatment for urinary tract infection  Continue to encourage out of bed activity as able, walked a short distance with moderate assistance yesterday.  Skilled nursing facility recommended to discharge, initially denied        VTE Prophylaxis:  Pharmacologic & mechanical VTE prophylaxis orders are present.      Code Status and Medical Interventions: CPR (Attempt to Resuscitate); Full Support   Ordered at: 05/28/25 0028     Code Status (Patient has no pulse and is not breathing):    CPR (Attempt to Resuscitate)     Medical Interventions (Patient has pulse or is breathing):    Full Support          Disposition  Expected Discharge Date: 6/3/2025; Expected Discharge Time:        Florencio Galan MD  [facility]  06/04/25  13:03 EDT

## 2025-06-05 LAB
ALBUMIN SERPL-MCNC: 3.1 G/DL (ref 3.5–5.2)
ANION GAP SERPL CALCULATED.3IONS-SCNC: 10.2 MMOL/L (ref 5–15)
BASOPHILS # BLD AUTO: 0.06 10*3/MM3 (ref 0–0.2)
BASOPHILS NFR BLD AUTO: 0.5 % (ref 0–1.5)
BUN SERPL-MCNC: 18 MG/DL (ref 8–23)
BUN/CREAT SERPL: 16.8 (ref 7–25)
CALCIUM SPEC-SCNC: 9.1 MG/DL (ref 8.2–9.6)
CHLORIDE SERPL-SCNC: 107 MMOL/L (ref 98–107)
CO2 SERPL-SCNC: 24.8 MMOL/L (ref 22–29)
CREAT SERPL-MCNC: 1.07 MG/DL (ref 0.57–1)
DEPRECATED RDW RBC AUTO: 53.1 FL (ref 37–54)
EGFRCR SERPLBLD CKD-EPI 2021: 48.8 ML/MIN/1.73
EOSINOPHIL # BLD AUTO: 0.17 10*3/MM3 (ref 0–0.4)
EOSINOPHIL NFR BLD AUTO: 1.5 % (ref 0.3–6.2)
ERYTHROCYTE [DISTWIDTH] IN BLOOD BY AUTOMATED COUNT: 15.4 % (ref 12.3–15.4)
GLUCOSE SERPL-MCNC: 144 MG/DL (ref 65–99)
HCT VFR BLD AUTO: 38.2 % (ref 34–46.6)
HGB BLD-MCNC: 12.4 G/DL (ref 12–15.9)
IMM GRANULOCYTES # BLD AUTO: 0.11 10*3/MM3 (ref 0–0.05)
IMM GRANULOCYTES NFR BLD AUTO: 0.9 % (ref 0–0.5)
LYMPHOCYTES # BLD AUTO: 2.15 10*3/MM3 (ref 0.7–3.1)
LYMPHOCYTES NFR BLD AUTO: 18.5 % (ref 19.6–45.3)
MAGNESIUM SERPL-MCNC: 1.9 MG/DL (ref 1.7–2.3)
MCH RBC QN AUTO: 30.5 PG (ref 26.6–33)
MCHC RBC AUTO-ENTMCNC: 32.5 G/DL (ref 31.5–35.7)
MCV RBC AUTO: 94.1 FL (ref 79–97)
MONOCYTES # BLD AUTO: 1.08 10*3/MM3 (ref 0.1–0.9)
MONOCYTES NFR BLD AUTO: 9.3 % (ref 5–12)
NEUTROPHILS NFR BLD AUTO: 69.3 % (ref 42.7–76)
NEUTROPHILS NFR BLD AUTO: 8.04 10*3/MM3 (ref 1.7–7)
NRBC BLD AUTO-RTO: 0 /100 WBC (ref 0–0.2)
PHOSPHATE SERPL-MCNC: 3.1 MG/DL (ref 2.5–4.5)
PLATELET # BLD AUTO: 241 10*3/MM3 (ref 140–450)
PMV BLD AUTO: 9.1 FL (ref 6–12)
POTASSIUM SERPL-SCNC: 3.8 MMOL/L (ref 3.5–5.2)
RBC # BLD AUTO: 4.06 10*6/MM3 (ref 3.77–5.28)
SODIUM SERPL-SCNC: 142 MMOL/L (ref 136–145)
WBC NRBC COR # BLD AUTO: 11.61 10*3/MM3 (ref 3.4–10.8)

## 2025-06-05 PROCEDURE — 80069 RENAL FUNCTION PANEL: CPT | Performed by: HOSPITALIST

## 2025-06-05 PROCEDURE — 83735 ASSAY OF MAGNESIUM: CPT | Performed by: HOSPITALIST

## 2025-06-05 PROCEDURE — 85025 COMPLETE CBC W/AUTO DIFF WBC: CPT | Performed by: HOSPITALIST

## 2025-06-05 RX ORDER — ACETAMINOPHEN 160 MG/5ML
650 SOLUTION ORAL EVERY 4 HOURS PRN
Status: DISCONTINUED | OUTPATIENT
Start: 2025-06-05 | End: 2025-06-09 | Stop reason: HOSPADM

## 2025-06-05 RX ORDER — ACETAMINOPHEN 325 MG/1
650 TABLET ORAL EVERY 4 HOURS PRN
Status: DISCONTINUED | OUTPATIENT
Start: 2025-06-05 | End: 2025-06-09 | Stop reason: HOSPADM

## 2025-06-05 RX ORDER — CALCIUM CARBONATE 500 MG/1
1 TABLET, CHEWABLE ORAL 3 TIMES DAILY PRN
Status: DISCONTINUED | OUTPATIENT
Start: 2025-06-05 | End: 2025-06-09 | Stop reason: HOSPADM

## 2025-06-05 RX ORDER — ACETAMINOPHEN 650 MG/1
650 SUPPOSITORY RECTAL EVERY 4 HOURS PRN
Status: DISCONTINUED | OUTPATIENT
Start: 2025-06-05 | End: 2025-06-09 | Stop reason: HOSPADM

## 2025-06-05 RX ADMIN — DABIGATRAN ETEXILATE 150 MG: 150 CAPSULE ORAL at 21:36

## 2025-06-05 RX ADMIN — METOPROLOL TARTRATE 25 MG: 25 TABLET, FILM COATED ORAL at 21:36

## 2025-06-05 RX ADMIN — Medication 10 ML: at 21:37

## 2025-06-05 RX ADMIN — OLANZAPINE 5 MG: 5 TABLET, FILM COATED ORAL at 21:36

## 2025-06-05 RX ADMIN — ATORVASTATIN CALCIUM 40 MG: 20 TABLET, FILM COATED ORAL at 21:36

## 2025-06-05 RX ADMIN — DABIGATRAN ETEXILATE 150 MG: 150 CAPSULE ORAL at 10:25

## 2025-06-05 RX ADMIN — METOPROLOL TARTRATE 25 MG: 25 TABLET, FILM COATED ORAL at 10:25

## 2025-06-05 NOTE — CASE MANAGEMENT/SOCIAL WORK
Post-Acute Authorization Submission      Post Acute Pre-Cert Documentation  Request Submitted by Facility - Type:: Hospital  Post-Acute Authorization Type Submitted:: SNF  Date Post Acute Pre-Cert Inititated per Facility: 06/01/25  Verification from Payer: Yes  Date Post Acute Pre-Cert Completed: 06/03/25  Accepting Facility: [facility] Discharge Date Requested: 06/02/25  All Clinicals Submitted?: Yes  Had Accepting Facility at Time of Submission: Yes  Response Received from Insurance?: Denial  Action Taken by Requesting Facility:: Facility Appeal  Response Communicated to::   Authorization Number:: EXPEDITED APPEAL REQUESTED/SNF DENIAL# [id number]  Post Acute Pre-Cert Initiated Comment: EXPEDITED APPEAL SENT TO FAX# [phone], WILL FOLLOW UP FOR APPEAL CASE# AT [facility] FAST APPEALS # [phone]              Alexandro Crodova, PCT

## 2025-06-05 NOTE — THERAPY TREATMENT NOTE
Patient Name: Monica Scherer  : 1933    MRN: [id number]                              Today's Date: 2025       Admit Date: 2025    Visit Dx:     ICD-10-CM ICD-9-CM   1. Acute UTI  N39.0 599.0   2. Generalized weakness  R53.1 780.79   3. Fall, initial encounter  W19.XXXA E888.9   4. Closed head injury, initial encounter  S09.90XA 959.01   5. Closed fracture of sacrum, unspecified portion of sacrum, initial encounter  S32.10XA 805.6   6. Hypokalemia  E87.6 276.8   7. Dehydration  E86.0 276.51     Patient Active Problem List   Diagnosis    Vertigo    Temporary cerebral vascular dysfunction    Gastroesophageal reflux disease with esophagitis    Degeneration of intervertebral disc of cervical region    Prediabetes    S/P cholecystectomy    Osteoarthritis of knee    Herpes zoster without complication    Hypertension    Duodenal ulcer    History of pancreatitis    Hyperlipidemia    TIA (transient ischemic attack)    Cerebrovascular accident (CVA) due to thrombosis of left posterior cerebral artery    Paroxysmal atrial fibrillation    General weakness    History of CVA (cerebrovascular accident)    Anticoagulated    Hematuria    Dizziness and giddiness    Rectal bleeding    Acute CVA (cerebrovascular accident)    Acute UTI     Past Medical History:   Diagnosis Date    Atrial fibrillation     Hyperlipidemia     Hypertension     Lacunar infarct, acute 2020    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    New onset atrial fibrillation 2020    now on rate control along with Eliquis    Prolapsed uterus     per patient    Stroke     TIA (transient ischemic attack) 2020    Weakness      Past Surgical History:   Procedure Laterality Date    APPENDECTOMY      CHOLECYSTECTOMY N/A 2016    Procedure: CHOLECYSTECTOMY LAPAROSCOPIC;  Surgeon: Russ Gar MD;  [place] [facility] [place] [facility] [place];  Service:     COLONOSCOPY N/A 2021    Procedure: COLONOSCOPY TO CECUM WITH HOT SNARE POLYPECTOMIES AND  COLD BX POLYPECTOMY;  Surgeon: Emerson Izaguirre MD;  Location: [facility] ENDOSCOPY;  Service: Gastroenterology;  Laterality: N/A;  pre: RECTAL BLEEDING, FAMILY HX OF COLON CANCER  post: INTERNAL AND EXTERNAL HEMORRHOIDS, DIVERTICULOSIS, POLYPS    ENDOSCOPY N/A 2/22/2018    Z-line regular, 35 cm from incisors, normal esophagus, gastritis, bilious gastric fluid, one non-bleeding duodenal ulcer w/no stigmata of bleeding, Path: DUODENUM: FRAGMENTS OF GASTRIC TYPE MUCOSA WITH HYPERPLASIA (FOVEOLAR) AND PATCHY MIXED INFLAMMATION IN THE LAMINA PROPRIA WITH FOCAL FIBROSIS, COMMENT: 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     EYE SURGERY      tre cataracts      General Information       Row Name 06/05/25 1353          Physical Therapy Time and Intention    Document Type therapy note (daily note)  -EF (r) AG (t) EF (c)     Mode of Treatment --  PT student  -EF (r) AG (t) EF (c)       Row Name 06/05/25 1353          General Information    Existing Precautions/Restrictions fall  -EF (r) AG (t) EF (c)       Row Name 06/05/25 135          Cognition    Orientation Status (Cognition) oriented to;person  -EF (r) AG (t) EF (c)               User Key  (r) = Recorded By, (t) = Taken By, (c) = Cosigned By      Initials Name Provider Type    EF Usha Archuleta, PT Physical Therapist    AG Faby Bates, PT Student PT Student                   Mobility       Row Name 06/05/25 1353          Bed Mobility    Bed Mobility supine-sit  -EF (r) AG (t) EF (c)     Supine-Sit [place] (Bed Mobility) minimum assist (75% patient effort)  -EF (r) AG (t) EF (c)     Assistive Device (Bed Mobility) bed rails  -EF (r) AG (t) EF (c)       Row Name 06/05/25 1350          Bed-Chair Transfer    Bed-Chair [place] (Transfers) minimum assist (75% patient effort)  -EF (r) AG (t) EF (c)     Assistive Device (Bed-Chair Transfers) walker, front-wheeled  -EF (r) AG (t) EF (c)       Row Name 06/05/25 1358          Sit-Stand Transfer    Sit-Stand  [place] (Transfers) contact guard  -EF (r) AG (t) EF (c)     Assistive Device (Sit-Stand Transfers) walker, front-wheeled  -EF (r) AG (t) EF (c)       Row Name 06/05/25 1354          Gait/Stairs (Locomotion)    [place] Level (Gait) contact guard  -EF (r) AG (t) EF (c)     Assistive Device (Gait) walker, front-wheeled  -EF (r) AG (t) EF (c)     Patient was able to Ambulate yes  -EF (r) AG (t) EF (c)     Distance in Feet (Gait) 20  -EF (r) AG (t) EF (c)     Deviations/Abnormal Patterns (Gait) festinating/shuffling;gait speed decreased;stride length decreased  -EF (r) AG (t) EF (c)     Bilateral Gait Deviations forward flexed posture;heel strike decreased  -EF (r) AG (t) EF (c)     Comment, (Gait/Stairs) pt able to ambulate today however gait was slow and pt fatigued quickly needing to sit on recliner, shuffling gait with forward flexed posture  -EF (r) AG (t) EF (c)               User Key  (r) = Recorded By, (t) = Taken By, (c) = Cosigned By      Initials Name Provider Type    EF Usha Archuleta, PT Physical Therapist    Faby Townsend, PT Student PT Student                   Obj/Interventions       Row Name 06/05/25 1400          Balance    Balance Assessment sitting static balance;standing static balance;standing dynamic balance  -EF (r) AG (t) EF (c)     Static Sitting Balance contact guard  -EF (r) AG (t) EF (c)     Position, Sitting Balance sitting edge of bed  -EF (r) AG (t) EF (c)     Static Standing Balance contact guard  -EF (r) AG (t) EF (c)     Dynamic Standing Balance contact guard  -EF (r) AG (t) EF (c)     Position/Device Used, Standing Balance walker, front-wheeled  -EF (r) AG (t) EF (c)     Balance Interventions sitting;standing;sit to stand;static;dynamic;core stability exercise;trunk training exercise  -EF (r) AG (t) EF (c)     Comment, Balance pt was able to sit up straight once bed was max inflated. Pt sat well balanced.  -EF (r) AG (t) EF (c)               User Key  (r) = Recorded  By, (t) = Taken By, (c) = Cosigned By      Initials Name Provider Type    EF Usha Archuleta, PT Physical Therapist    Faby Townsend, PT Student PT Student                   Goals/Plan    No documentation.                  Clinical Impression       Row Name 06/05/25 1402          Pain    Pretreatment Pain Rating 0/10 - no pain  -EF (r) AG (t) EF (c)     Posttreatment Pain Rating 0/10 - no pain  -EF (r) AG (t) EF (c)       Row Name 06/05/25 1402          Plan of Care Review    Plan of Care Reviewed With patient  -EF (r) AG (t) EF (c)     Progress improving  -EF (r) AG (t) EF (c)     Outcome Evaluation pt followed commands well and was able to ambulate short distances with CGA. Gait showed foward flexed posture and slow shuffling steps. pt fatigued quickly and was able to sit in chair with CGA. Pt will cont to follow and encourage recommendation to SNF at discharge.  -EF (r) AG (t) EF (c)       Row Name 06/05/25 1402          Positioning and Restraints    Pre-Treatment Position in bed  -EF (r) AG (t) EF (c)     Post Treatment Position chair  -EF (r) AG (t) EF (c)     In Chair notified nsg;reclined;call light within reach;encouraged to call for assist;exit alarm on  -EF (r) AG (t) EF (c)               User Key  (r) = Recorded By, (t) = Taken By, (c) = Cosigned By      Initials Name Provider Type    Usha Tidwell, PT Physical Therapist    Faby Townsend, PT Student PT Student                   Outcome Measures       Row Name 06/05/25 1406          How much help from another person do you currently need...    Turning from your back to your side while in flat bed without using bedrails? 3  -EF (r) AG (t) EF (c)     Moving from lying on back to sitting on the side of a flat bed without bedrails? 3  -EF (r) AG (t) EF (c)     Moving to and from a bed to a chair (including a wheelchair)? 3  -EF (r) AG (t) EF (c)     Standing up from a chair using your arms (e.g., wheelchair, bedside chair)? 3  -EF (r) AG  (t) EF (c)     Climbing 3-5 steps with a railing? 1  -EF (r) AG (t) EF (c)     To walk in hospital room? 3  -EF (r) AG (t) EF (c)     AM-PAC 6 Clicks Score (PT) 16  -EF (r) AG (t)     Highest Level of Mobility Goal Stand (1 or More Minutes)-5  -EF (r) AG (t)               User Key  (r) = Recorded By, (t) = Taken By, (c) = Cosigned By      Initials Name Provider Type    EF Usha Archuleta, PT Physical Therapist    AG Faby Bates, PT Student PT Student                                 Physical Therapy Education       Title: PT OT SLP Therapies (In Progress)       Topic: Physical Therapy (In Progress)       Point: Mobility training (Done)       Learning Progress Summary            Patient Acceptance, E, VU by AG at 6/5/2025 1407    Acceptance, E,D, DU,NR by PC at 6/3/2025 1401    Acceptance, E,D, NR by PC at 6/2/2025 1518    Acceptance, E, NR by NM at 6/1/2025 0258    Acceptance, E,D, VU,NR by MS at 5/30/2025 1014    Nonacceptance, E,D, NR by MS at 5/28/2025 1536                      Point: Home exercise program (In Progress)       Learning Progress Summary            Patient Acceptance, E,D, NR by PC at 6/2/2025 1518    Acceptance, E, NR by NM at 6/1/2025 0258    Acceptance, E,D, VU,NR by MS at 5/30/2025 1014                      Point: Body mechanics (Done)       Learning Progress Summary            Patient Acceptance, E, VU by AG at 6/5/2025 1407    Acceptance, E,D, DU,NR by PC at 6/3/2025 1401    Acceptance, E,D, NR by PC at 6/2/2025 1518    Acceptance, E, NR by NM at 6/1/2025 0258    Acceptance, E,D, VU,NR by MS at 5/30/2025 1014    Nonacceptance, E,D, NR by MS at 5/28/2025 1536                      Point: Precautions (Done)       Learning Progress Summary            Patient Acceptance, E, VU by AG at 6/5/2025 1407    Acceptance, E,D, DU,NR by PC at 6/3/2025 1401    Acceptance, E,D, NR by PC at 6/2/2025 1518    Acceptance, E, NR by NM at 6/1/2025 0258    Acceptance, E,D, VU,NR by MS at 5/30/2025 1014     Nonacceptance, E,D, NR by MS at 5/28/2025 1536                                      User Key       Initials Effective Dates Name Provider Type Discipline    PC 06/16/21 -  Jennifer Booker, PT Physical Therapist PT    MS 06/16/21 -  Bladimir Akins, PT Physical Therapist PT    NM 07/09/24 -  Al Woods, RN Registered Nurse Nurse     06/03/25 -  Faby Bates, PT Student PT Student PT                  PT Recommendation and Plan     Progress: improving  Outcome Evaluation: pt followed commands well and was able to ambulate short distances with CGA. Gait showed foward flexed posture and slow shuffling steps. pt fatigued quickly and was able to sit in chair with CGA. Pt will cont to follow and encourage recommendation to SNF at discharge.     Time Calculation:         PT Charges       Row Name 06/05/25 1551 06/05/25 1407          Time Calculation    Start Time -- 0128  -EF (r) AG (t) EF (c)     Stop Time -- 0147  -EF (r) AG (t) EF (c)     Time Calculation (min) -- 19 min  -EF (r) AG (t)     PT Received On 06/05/25 (P)   -AG --     PT - Next Appointment 06/06/25 (P)   -AG --        Timed Charges    01741 - PT Therapeutic Activity Minutes -- 19  -EF (r) AG (t) EF (c)        Total Minutes    Timed Charges Total Minutes -- 19  -EF (r) AG (t)      Total Minutes -- 19  -EF (r) AG (t)               User Key  (r) = Recorded By, (t) = Taken By, (c) = Cosigned By      Initials Name Provider Type    EF Usha Archuleta, PT Physical Therapist    AG Faby Bates, PT Student PT Student                      PT G-Codes  Outcome Measure Options: AM-PAC 6 Clicks Basic Mobility (PT)  AM-PAC 6 Clicks Score (PT): 16       Faby Bates, PT Student  6/5/2025

## 2025-06-05 NOTE — PLAN OF CARE
Problem: Adult Inpatient Plan of Care  Goal: Plan of Care Review  Outcome: Progressing  Flowsheets (Taken 6/4/2025 2103)  Progress: improving  Goal: Patient-Specific Goal (Individualized)  Outcome: Progressing  Goal: Absence of Hospital-Acquired Illness or Injury  Outcome: Progressing  Intervention: Identify and Manage Fall Risk  Recent Flowsheet Documentation  Taken 6/4/2025 1812 by Shaila Escobar RN  Safety Promotion/Fall Prevention: safety round/check completed  Taken 6/4/2025 1612 by Shaila Escobar RN  Safety Promotion/Fall Prevention: safety round/check completed  Taken 6/4/2025 1420 by Shaila Escobar RN  Safety Promotion/Fall Prevention: safety round/check completed  Taken 6/4/2025 1220 by Shaila Escobar RN  Safety Promotion/Fall Prevention: safety round/check completed  Taken 6/4/2025 1020 by Shaila Escobar RN  Safety Promotion/Fall Prevention: safety round/check completed  Taken 6/4/2025 0820 by Shaila Escobar RN  Safety Promotion/Fall Prevention: safety round/check completed  Intervention: Prevent Skin Injury  Recent Flowsheet Documentation  Taken 6/4/2025 1812 by Shaila Escobar RN  Body Position: position changed independently  Taken 6/4/2025 1612 by Shaila Escobar RN  Body Position:   turned   right  Taken 6/4/2025 1420 by Shaila Escobar RN  Body Position: position changed independently  Skin Protection: incontinence pads utilized  Taken 6/4/2025 1220 by Shaila Escobar RN  Body Position:   turned   left  Taken 6/4/2025 1020 by Shaila Escobar RN  Body Position: position changed independently  Taken 6/4/2025 0820 by Shaila Escobar RN  Body Position:   turned   left  Intervention: Prevent and Manage VTE (Venous Thromboembolism) Risk  Recent Flowsheet Documentation  Taken 6/4/2025 1420 by Shaila Escobar RN  VTE Prevention/Management:   bilateral   SCDs (sequential compression devices) off   patient refused intervention  Taken 6/4/2025 0820 by Shaila Escobar  ALEJANDRA Kaur  VTE Prevention/Management:   bilateral   SCDs (sequential compression devices) off   patient refused intervention  Goal: Optimal Comfort and Wellbeing  Outcome: Progressing  Intervention: Provide Person-Centered Care  Recent Flowsheet Documentation  Taken 6/4/2025 1420 by Shaila Escobar RN  Trust Relationship/Rapport:   care explained   choices provided  Taken 6/4/2025 0820 by Shaila Escobar RN  Trust Relationship/Rapport:   care explained   choices provided  Goal: Readiness for Transition of Care  Outcome: Progressing     Problem: Skin Injury Risk Increased  Goal: Skin Health and Integrity  Outcome: Progressing  Intervention: Optimize Skin Protection  Recent Flowsheet Documentation  Taken 6/4/2025 1812 by Shaila Escobar RN  Head of Bed (HOB) Positioning: HOB at 30-45 degrees  Taken 6/4/2025 1612 by Shaila Escobar RN  Head of Bed (HOB) Positioning: HOB at 15 degrees  Taken 6/4/2025 1420 by Shaila Escobar RN  Pressure Reduction Techniques: frequent weight shift encouraged  Head of Bed (HOB) Positioning: HOB elevated  Pressure Reduction Devices: positioning supports utilized  Skin Protection: incontinence pads utilized  Taken 6/4/2025 1220 by Shaila Escobar RN  Head of Bed (HOB) Positioning: HOB elevated  Taken 6/4/2025 1020 by Shaila Escobar RN  Head of Bed (HOB) Positioning: HOB elevated  Taken 6/4/2025 0820 by Shaila Escobar RN  Head of Bed (HOB) Positioning: HOB at 30-45 degrees     Problem: Fall Injury Risk  Goal: Absence of Fall and Fall-Related Injury  Outcome: Progressing  Intervention: Identify and Manage Contributors  Recent Flowsheet Documentation  Taken 6/4/2025 1812 by Shaila Escobar RN  Medication Review/Management: medications reviewed  Taken 6/4/2025 1612 by Shaila Escobar RN  Medication Review/Management: medications reviewed  Taken 6/4/2025 1420 by Shaila Escobar RN  Medication Review/Management: medications reviewed  Taken 6/4/2025 1220 by Luz  Shaila Kaur RN  Medication Review/Management: medications reviewed  Taken 6/4/2025 1020 by Shaila Escobar RN  Medication Review/Management: medications reviewed  Taken 6/4/2025 0820 by Shaila Escobar RN  Medication Review/Management: medications reviewed  Intervention: Promote Injury-Free Environment  Recent Flowsheet Documentation  Taken 6/4/2025 1812 by Shaila Escobar RN  Safety Promotion/Fall Prevention: safety round/check completed  Taken 6/4/2025 1612 by Shaila Escobar RN  Safety Promotion/Fall Prevention: safety round/check completed  Taken 6/4/2025 1420 by Shaila Escobar RN  Safety Promotion/Fall Prevention: safety round/check completed  Taken 6/4/2025 1220 by Shaila Escobar RN  Safety Promotion/Fall Prevention: safety round/check completed  Taken 6/4/2025 1020 by Shaila Escobar RN  Safety Promotion/Fall Prevention: safety round/check completed  Taken 6/4/2025 0820 by Shaila Escobar RN  Safety Promotion/Fall Prevention: safety round/check completed     Problem: Sepsis/Septic Shock  Goal: Optimal Coping  Outcome: Progressing  Goal: Absence of Bleeding  Outcome: Progressing  Goal: Blood Glucose Level Within Target Range  Outcome: Progressing  Goal: Absence of Infection Signs and Symptoms  Outcome: Progressing  Goal: Optimal Nutrition Delivery  Outcome: Progressing     Problem: Violence Risk or Actual  Goal: Anger and Impulse Control  Outcome: Progressing  Intervention: Minimize Safety Risk  Recent Flowsheet Documentation  Taken 6/4/2025 1812 by Shaila Escobar RN  Enhanced Safety Measures: bed alarm set  Taken 6/4/2025 1612 by Shaila Escobar RN  Enhanced Safety Measures: bed alarm set  Taken 6/4/2025 1420 by Shaila Escobar RN  Enhanced Safety Measures: bed alarm set  Taken 6/4/2025 1220 by Shaila Escobar RN  Enhanced Safety Measures: bed alarm set  Taken 6/4/2025 1020 by Shaila Escobar RN  Enhanced Safety Measures: bed alarm set  Taken 6/4/2025 0820 by Luz  Shaila Kaur RN  Enhanced Safety Measures: bed alarm set   Goal Outcome Evaluation:           Progress: improving

## 2025-06-05 NOTE — PLAN OF CARE
Goal Outcome Evaluation:  Plan of Care Reviewed With: patient        Progress: improving  Outcome Evaluation: pt followed commands well and was able to ambulate short distances with CGA. Gait showed foward flexed posture and slow shuffling steps. pt fatigued quickly and was able to sit in chair with CGA. Pt will cont to follow and encourage recommendation to SNF at discharge.

## 2025-06-05 NOTE — PLAN OF CARE
Problem: Fall Injury Risk  Goal: Absence of Fall and Fall-Related Injury  Intervention: Identify and Manage Contributors  Recent Flowsheet Documentation  Taken 6/4/2025 2100 by Sally Tobin, RN  Medication Review/Management: medications reviewed  Intervention: Promote Injury-Free Environment  Recent Flowsheet Documentation  Taken 6/4/2025 2100 by Sally Tobin, RN  Safety Promotion/Fall Prevention:   activity supervised   assistive device/personal items within reach   clutter free environment maintained   safety round/check completed   nonskid shoes/slippers when out of bed   Goal Outcome Evaluation:

## 2025-06-05 NOTE — PROGRESS NOTES
[facility]    [phone]   LOS: 7 days     Name: Monica Scherer  Age/Sex: 89 y.o. female  :  1933        PCP: Amilcar Mauricio DO  Chief Complaint   Patient presents with    Altered Mental Status    Weakness - Generalized      Subjective   Sleeping soundly on my arrival but does wake up some.  She has not eaten but a couple bites of breakfast.  Remains a little lethargic at times but does seem to perk up more in the afternoons.  She did eat lunch and dinner yesterday per the nurse.    atorvastatin, 40 mg, Oral, Nightly  dabigatran etexilate, 150 mg, Oral, Q12H  metoprolol tartrate, 25 mg, Oral, BID  OLANZapine, 5 mg, Oral, Daily With Dinner  pantoprazole, 40 mg, Oral, Q AM  sodium chloride, 10 mL, Intravenous, Q12H           Objective   Vital Signs  Temp:  [98.2 °F (36.8 °C)-99 °F (37.2 °C)] 98.4 °F (36.9 °C)  Heart Rate:  [64-90] 68  Resp:  [18] 18  BP: (134-149)/(71-89) 134/71  Body mass index is 23.61 kg/m².    Intake/Output Summary (Last 24 hours) at 2025 1115  Last data filed at 2025 0400  Gross per 24 hour   Intake --   Output 450 ml   Net -450 ml       Physical Exam  Sleeping soundly Opens her eyes but falls back asleep.  Respirations even and nonlabored      Results Review:       I reviewed the patient's new clinical results.  Results from last 7 days   Lab Units 25  0513   WBC 10*3/mm3 11.61*   HEMOGLOBIN g/dL 12.4   PLATELETS 10*3/mm3 241     Results from last 7 days   Lab Units 25  0513 25  0617 25  2344 25  2117 25  0624 25  1947   SODIUM mmol/L 142 145  --   --  144  --    POTASSIUM mmol/L 3.8 4.1 5.3* 6.2* 3.0*  --    CHLORIDE mmol/L 107 110*  --   --  108*  --    CO2 mmol/L 24.8 23.0  --   --  25.2  --    BUN mg/dL 18.0 15.0  --   --  13.0  --    CREATININE mg/dL 1.07* 0.81  --   --  0.81  --    CALCIUM mg/dL 9.1 8.5  --   --  8.2  --    MAGNESIUM mg/dL 1.9 1.7  --   --  2.0  --    PHOSPHORUS mg/dL 3.1 2.3*  --   --  2.7  2.0*   Estimated Creatinine Clearance: 33 mL/min (A) (by C-G formula based on SCr of 1.07 mg/dL (H)).      Assessment & Plan   Active Hospital Problems    Diagnosis  POA    **Acute UTI [N39.0]  Yes    History of CVA (cerebrovascular accident) [Z86.73]  Not Applicable    Paroxysmal atrial fibrillation [I48.0]  Yes    Hyperlipidemia [E78.5]  Yes    Hypertension [I10]  Yes      Resolved Hospital Problems   No resolved problems to display.       ASSESMENT  This is a 89-year-old female with history of atrial fibrillation, hyperlipidemia, hypertension, CVA, P.Afib, presents to the hospital, after a fall at home. She came to [facility] ER and found to have UTI, hypokalemia, hypernatremia, and S4 Sacral fracture    PLAN  Working on placement issues.  Completed treatment for urinary tract infection  Continue to encourage out of bed activity as able, walked a short distance with moderate assistance yesterday.  Skilled nursing facility recommended to discharge, initially denied        VTE Prophylaxis:  Pharmacologic & mechanical VTE prophylaxis orders are present.      Code Status and Medical Interventions: CPR (Attempt to Resuscitate); Full Support   Ordered at: 05/28/25 0028     Code Status (Patient has no pulse and is not breathing):    CPR (Attempt to Resuscitate)     Medical Interventions (Patient has pulse or is breathing):    Full Support          Disposition  Expected Discharge Date: 6/5/2025; Expected Discharge Time:        Florencio Galan MD  [facility]  06/05/25  11:15 EDT

## 2025-06-06 PROCEDURE — 25010000002 OLANZAPINE 10 MG RECONSTITUTED SOLUTION: Performed by: NURSE PRACTITIONER

## 2025-06-06 PROCEDURE — 93005 ELECTROCARDIOGRAM TRACING: CPT | Performed by: HOSPITALIST

## 2025-06-06 PROCEDURE — 93010 ELECTROCARDIOGRAM REPORT: CPT | Performed by: STUDENT IN AN ORGANIZED HEALTH CARE EDUCATION/TRAINING PROGRAM

## 2025-06-06 RX ORDER — OLANZAPINE 2.5 MG/1
5 TABLET, FILM COATED ORAL NIGHTLY
Status: DISCONTINUED | OUTPATIENT
Start: 2025-06-06 | End: 2025-06-09 | Stop reason: HOSPADM

## 2025-06-06 RX ORDER — OLANZAPINE 2.5 MG/1
5 TABLET, FILM COATED ORAL
Status: DISCONTINUED | OUTPATIENT
Start: 2025-06-06 | End: 2025-06-09 | Stop reason: HOSPADM

## 2025-06-06 RX ORDER — OLANZAPINE 10 MG/2ML
5 INJECTION, POWDER, FOR SOLUTION INTRAMUSCULAR ONCE
Status: COMPLETED | OUTPATIENT
Start: 2025-06-06 | End: 2025-06-06

## 2025-06-06 RX ADMIN — OLANZAPINE 5 MG: 2.5 TABLET, FILM COATED ORAL at 17:50

## 2025-06-06 RX ADMIN — ATORVASTATIN CALCIUM 40 MG: 20 TABLET, FILM COATED ORAL at 21:43

## 2025-06-06 RX ADMIN — Medication 10 ML: at 21:44

## 2025-06-06 RX ADMIN — METOPROLOL TARTRATE 25 MG: 25 TABLET, FILM COATED ORAL at 21:43

## 2025-06-06 RX ADMIN — DABIGATRAN ETEXILATE 150 MG: 150 CAPSULE ORAL at 11:36

## 2025-06-06 RX ADMIN — Medication 10 ML: at 11:36

## 2025-06-06 RX ADMIN — ACETAMINOPHEN 650 MG: 325 TABLET ORAL at 22:16

## 2025-06-06 RX ADMIN — DABIGATRAN ETEXILATE 150 MG: 150 CAPSULE ORAL at 21:43

## 2025-06-06 RX ADMIN — OLANZAPINE 5 MG: 2.5 TABLET, FILM COATED ORAL at 21:43

## 2025-06-06 RX ADMIN — OLANZAPINE 5 MG: 10 INJECTION, POWDER, LYOPHILIZED, FOR SOLUTION INTRAMUSCULAR at 04:29

## 2025-06-06 RX ADMIN — METOPROLOL TARTRATE 25 MG: 25 TABLET, FILM COATED ORAL at 11:36

## 2025-06-06 NOTE — PLAN OF CARE
Goal Outcome Evaluation:  Plan of Care Reviewed With: patient        Progress: no change  Outcome Evaluation: recd from [address] [place] [address] around 1730.   awake, alert, oriented to self only.  bed alarm activated.  reoriented.  med taken whole in pudding without difficulty.

## 2025-06-06 NOTE — PROGRESS NOTES
"  [facility] to Hospital Care    [phone]   LOS: 8 days     Name: Monica Scherer  Age/Sex: 89 y.o. female  :  1933        PCP: Amilcar Mauricio DO  Chief Complaint   Patient presents with    Altered Mental Status    Weakness - Generalized      Subjective   She got agitated overnight and required as needed medications.  Otherwise denies new issues or complaints right now other than her \"plastic underwear has fell down\" she is also currently in restraints  ROS unreliable    atorvastatin, 40 mg, Oral, Nightly  dabigatran etexilate, 150 mg, Oral, Q12H  metoprolol tartrate, 25 mg, Oral, BID  OLANZapine, 5 mg, Oral, Daily With Dinner   And  OLANZapine, 5 mg, Oral, Nightly  pantoprazole, 40 mg, Oral, Q AM  sodium chloride, 10 mL, Intravenous, Q12H           Objective   Vital Signs  Temp:  [97.5 °F (36.4 °C)-98.5 °F (36.9 °C)] 97.5 °F (36.4 °C)  Heart Rate:  [] 92  Resp:  [18] 18  BP: (114-165)/() 114/86  Body mass index is 24.26 kg/m².    Intake/Output Summary (Last 24 hours) at 2025 1019  Last data filed at 2025 0302  Gross per 24 hour   Intake 120 ml   Output 200 ml   Net -80 ml       Physical Exam  On exam she is awake and alert seems agitated and restless  Oriented only to self  Respirations are even and nonlabored  Abdomen soft nontender    Results Review:       I reviewed the patient's new clinical results.  Results from last 7 days   Lab Units 25  0513   WBC 10*3/mm3 11.61*   HEMOGLOBIN g/dL 12.4   PLATELETS 10*3/mm3 241     Results from last 7 days   Lab Units 25  0513 25  0617 25  2344 25  2117   SODIUM mmol/L 142 145  --   --    POTASSIUM mmol/L 3.8 4.1 5.3* 6.2*   CHLORIDE mmol/L 107 110*  --   --    CO2 mmol/L 24.8 23.0  --   --    BUN mg/dL 18.0 15.0  --   --    CREATININE mg/dL 1.07* 0.81  --   --    CALCIUM mg/dL 9.1 8.5  --   --    MAGNESIUM mg/dL 1.9 1.7  --   --    PHOSPHORUS mg/dL 3.1 2.3*  --   --    Estimated Creatinine Clearance: 33.9 " mL/min (A) (by C-G formula based on SCr of 1.07 mg/dL (H)).      Assessment & Plan   Active Hospital Problems    Diagnosis  POA    **Acute UTI [N39.0]  Yes    History of CVA (cerebrovascular accident) [Z86.73]  Not Applicable    Paroxysmal atrial fibrillation [I48.0]  Yes    Hyperlipidemia [E78.5]  Yes    Hypertension [I10]  Yes      Resolved Hospital Problems   No resolved problems to display.       ASSESMENT  This is a 89-year-old female with history of atrial fibrillation, hyperlipidemia, hypertension, CVA, P.Afib, presents to the hospital, after a fall at home. She came to [facility] ER and found to have UTI, hypokalemia, hypernatremia, and S4 Sacral fracture.  Her hospital stay has been complicated by insurance issues and now development of significant delirium    PLAN  I discontinued telemetry monitor a couple days ago I guess she still on it.  I also placed [facility].  Unfortunately this is probably going to limit our ability to discharge her to rehab given her agitation overnight.  We can try some scheduled Zyprexa in the evening and see if this helps with her symptoms  Aggressive delirium precautions during the day.  Would like to limit her lines and tubes.  Would like to try her out of restraints at some point today as well.  Encourage out of bed activity and up to chair  Continue delirium precautions limit lines and tubes encourage out of bed activity attend to keep awake during the day up in the chair is able    VTE Prophylaxis:  Pharmacologic & mechanical VTE prophylaxis orders are present.      Code Status and Medical Interventions: CPR (Attempt to Resuscitate); Full Support   Ordered at: 05/28/25 0028     Code Status (Patient has no pulse and is not breathing):    CPR (Attempt to Resuscitate)     Medical Interventions (Patient has pulse or is breathing):    Full Support          Disposition  Expected Discharge Date: 6/6/2025; Expected Discharge Time:        Florencio Galan MD  [facility]  06/06/25  10:19 EDT

## 2025-06-06 NOTE — PROGRESS NOTES
"Nutrition Services    Patient Name: Monica Scherer  YOB: 1933  MRN: [id number]  Admission date: 5/27/2025    Assessment Date:  06/06/25    NUTRITION EVALUATION      Reason for Encounter Length of Stay   Diagnosis/Problem Admission Diagnosis:  Dehydration [E86.0]  Hypokalemia [E87.6]  Acute UTI [N39.0]  Generalized weakness [R53.1]  Closed head injury, initial encounter [S09.90XA]  Fall, initial encounter [W19.XXXA]  Closed fracture of sacrum, unspecified portion of sacrum, initial encounter [S32.10XA]    Problem List:    Acute UTI    Hypertension    Hyperlipidemia    Paroxysmal atrial fibrillation    History of CVA (cerebrovascular accident)     Narrative 6/6: Pt admitted to [facility] after a fall at home. SLP following, modified diet in place. Awaiting placement.        PO Diet Diet: Regular/House; Texture: Pureed (NDD 1); Fluid Consistency: Thin (IDDSI 0)   Allergies NKFA   Supplements n/a   PO Intake % 50-75%       Chewing/Swallowing Difficulty SLP following and dysphagia       Medications reviewed   Labs  reviewed       Physical Findings room air     Edema no edema    GI Function last bowel movement: 6/5   Skin Status skin intact    Lines/Drains none   I/O net fluid loss and amount/timeframe:~3.2L since admission per I/O documentation         Height  Weight  BMI  Weight Trend     Height: 162.6 cm (64\")  Weight: 64.1 kg (141 lb 5 oz) (06/06/25 0502)  Body mass index is 24.26 kg/m².  Stable         NFPE Unable to perform due to: unable to see pt in person this date       Nutrition Problem (PES) Problem: Swallowing Difficulty  Etiology: Medical Diagnosis - dysphagia    Signs/Symptoms: SLP/Swallow Evaluation and Other (comment) modified diet for safe PO intake        Intervention/Plan Continue current diet. Encourage good PO intake.     RD to follow up per protocol.     Results from last 7 days   Lab Units 06/05/25  0513 05/31/25  0617 05/30/25  2344   SODIUM mmol/L 142 145  --    POTASSIUM mmol/L 3.8 4.1 " 5.3*   CHLORIDE mmol/L 107 110*  --    CO2 mmol/L 24.8 23.0  --    BUN mg/dL 18.0 15.0  --    CREATININE mg/dL 1.07* 0.81  --    CALCIUM mg/dL 9.1 8.5  --    GLUCOSE mg/dL 144* 140*  --      Results from last 7 days   Lab Units 06/05/25  0513 05/31/25  0617   MAGNESIUM mg/dL 1.9 1.7   PHOSPHORUS mg/dL 3.1 2.3*   HEMOGLOBIN g/dL 12.4  --    HEMATOCRIT % 38.2  --      Lab Results   Component Value Date    HGBA1C 6.50 (H) 02/27/2025     Wt Readings from Last 10 Encounters:   06/06/25 64.1 kg (141 lb 5 oz)   04/11/25 67.1 kg (148 lb)   03/26/25 70.3 kg (155 lb)   02/27/25 70.3 kg (155 lb)   12/09/24 68.9 kg (152 lb)   09/30/24 71.2 kg (157 lb)   05/24/24 71.9 kg (158 lb 9.6 oz)   03/19/24 79 kg (174 lb 2.6 oz)   10/16/23 78 kg (172 lb)   06/05/23 78 kg (172 lb)       Electronically signed by:  Merna Tee RD  06/06/25 16:26 EDT

## 2025-06-06 NOTE — PLAN OF CARE
Problem: Adult Inpatient Plan of Care  Goal: Plan of Care Review  Outcome: Progressing  Flowsheets (Taken 6/6/2025 0653)  Progress: no change  Outcome Evaluation: Remains confused. Cooperative at beginning of shift but became very agitated approx 0400. Attempting to get out of bed, hitting staff with telemetry box. Restraints required for safety. Additional 1 x dose zyprexa given with some improvement. VSS. Plan of care updated. Continue Safety measures and progress towards goals.  Plan of Care Reviewed With: patient  Goal: Patient-Specific Goal (Individualized)  Outcome: Progressing  Goal: Absence of Hospital-Acquired Illness or Injury  Outcome: Progressing  Intervention: Identify and Manage Fall Risk  Recent Flowsheet Documentation  Taken 6/6/2025 0600 by Venita Vaughn, RN  Safety Promotion/Fall Prevention:   safety round/check completed   activity supervised   assistive device/personal items within reach   fall prevention program maintained   nonskid shoes/slippers when out of bed  Taken 6/6/2025 0413 by Venita Vaughn, RN  Safety Promotion/Fall Prevention:   safety round/check completed   activity supervised   assistive device/personal items within reach   fall prevention program maintained   nonskid shoes/slippers when out of bed  Taken 6/6/2025 0200 by Venita Vaughn, RN  Safety Promotion/Fall Prevention:   safety round/check completed   activity supervised   assistive device/personal items within reach   fall prevention program maintained   nonskid shoes/slippers when out of bed  Taken 6/6/2025 0030 by Venita Vaughn, RN  Safety Promotion/Fall Prevention:   safety round/check completed   activity supervised   assistive device/personal items within reach   fall prevention program maintained   nonskid shoes/slippers when out of bed  Taken 6/5/2025 2230 by Venita Vaughn, RN  Safety Promotion/Fall Prevention:   safety round/check completed   activity supervised   assistive device/personal items within  reach   fall prevention program maintained   nonskid shoes/slippers when out of bed  Taken 6/5/2025 2000 by Venita Vaughn RN  Safety Promotion/Fall Prevention:   safety round/check completed   activity supervised   assistive device/personal items within reach   fall prevention program maintained   nonskid shoes/slippers when out of bed  Intervention: Prevent Infection  Recent Flowsheet Documentation  Taken 6/6/2025 0600 by Venita Vaughn RN  Infection Prevention:   rest/sleep promoted   personal protective equipment utilized  Taken 6/6/2025 0413 by Venita Vaughn RN  Infection Prevention:   rest/sleep promoted   personal protective equipment utilized  Taken 6/6/2025 0200 by Venita Vaughn RN  Infection Prevention:   rest/sleep promoted   personal protective equipment utilized  Taken 6/6/2025 0030 by Venita Vaughn RN  Infection Prevention:   rest/sleep promoted   personal protective equipment utilized  Taken 6/5/2025 2230 by Venita Vaughn RN  Infection Prevention:   rest/sleep promoted   personal protective equipment utilized  Taken 6/5/2025 2000 by Venita Vaughn RN  Infection Prevention:   rest/sleep promoted   personal protective equipment utilized  Goal: Optimal Comfort and Wellbeing  Outcome: Progressing  Intervention: Provide Person-Centered Care  Recent Flowsheet Documentation  Taken 6/6/2025 0030 by Venita Vaughn RN  Trust Relationship/Rapport:   care explained   choices provided   questions answered   reassurance provided   thoughts/feelings acknowledged  Taken 6/5/2025 2000 by Venita Vaughn RN  Trust Relationship/Rapport:   care explained   choices provided   questions answered   reassurance provided   thoughts/feelings acknowledged  Goal: Readiness for Transition of Care  Outcome: Progressing     Problem: Restraint, Nonviolent  Goal: Absence of Harm or Injury  Outcome: Progressing  Intervention: Implement Least Restrictive Safety Strategies  Recent Flowsheet Documentation  Taken  6/6/2025 0600 by Venita Vaughn RN  Medical Device Protection:   IV pole/bag removed from visual field   tubing secured  Diversional Activities:   television   toys  Taken 6/6/2025 0413 by Venita Vaughn RN  Medical Device Protection:   IV pole/bag removed from visual field   tubing secured  Diversional Activities:   television   toys  Taken 6/6/2025 0030 by Venita Vaughn RN  Diversional Activities:   toys   television  Taken 6/5/2025 2000 by Venita Vaughn RN  Diversional Activities:   television   toys  Intervention: Protect Dignity, Rights and Personal Wellbeing  Recent Flowsheet Documentation  Taken 6/6/2025 0030 by Venita Vaughn RN  Trust Relationship/Rapport:   care explained   choices provided   questions answered   reassurance provided   thoughts/feelings acknowledged  Taken 6/5/2025 2000 by Venita Vaughn RN  Trust Relationship/Rapport:   care explained   choices provided   questions answered   reassurance provided   thoughts/feelings acknowledged     Problem: Violence Risk or Actual  Goal: Anger and Impulse Control  Outcome: Progressing  Intervention: Minimize Safety Risk  Recent Flowsheet Documentation  Taken 6/6/2025 0600 by Venita Vaughn RN  De-Escalation Techniques:   appropriate behavior reinforced   increased round frequency   medication administered   quiet time facilitated   reoriented   stimulation decreased  Enhanced Safety Measures: bed alarm set  Taken 6/6/2025 0413 by Venita Vaughn RN  De-Escalation Techniques:   appropriate behavior reinforced   diversional activity encouraged   increased round frequency   reoriented   quiet time facilitated   stimulation decreased   verbally redirected  Enhanced Safety Measures: bed alarm set  Taken 6/6/2025 0200 by Venita Vaughn RN  Enhanced Safety Measures: bed alarm set  Taken 6/6/2025 0030 by Venita Vaughn RN  Enhanced Safety Measures: bed alarm set  Taken 6/5/2025 2230 by Venita Vaughn RN  Enhanced Safety Measures: bed alarm  set  Taken 6/5/2025 2000 by Venita Vaughn, RN  Enhanced Safety Measures: bed alarm set   Goal Outcome Evaluation:  Plan of Care Reviewed With: patient        Progress: no change  Outcome Evaluation: Remains confused. Cooperative at beginning of shift but became very agitated approx 0400. Attempting to get out of bed, hitting staff with telemetry box. Restraints required for safety. Additional 1 x dose zyprexa given with some improvement. VSS. Plan of care updated. Continue Safety measures and progress towards goals.

## 2025-06-06 NOTE — CASE MANAGEMENT/SOCIAL WORK
Post-Acute Authorization Submission      Post Acute Pre-Cert Documentation  Request Submitted by Facility - Type:: Hospital  Post-Acute Authorization Type Submitted:: SNF  Date Post Acute Pre-Cert Inititated per Facility: 06/01/25  Verification from Payer: Yes  Date Post Acute Pre-Cert Completed: 06/03/25  Accepting Facility: [facility] Discharge Date Requested: 06/02/25  All Clinicals Submitted?: Yes  Had Accepting Facility at Time of Submission: Yes  Response Received from Insurance?: Denial  Action Taken by Requesting Facility:: Facility Appeal  Response Communicated to::   Authorization Number:: EXPEDITED APPEAL IN PROGRESS - CASE# [id number] DENIAL# [id number]  Post Acute Pre-Cert Initiated Comment: SPOKE TO KEN DUBOIS/[facility] FAST APPEALS # [phone]; TENTATIVE DETERMINATION DUE DATE: 6/8/25. ALL CLINICALS & AOR FORM RECEIVED. FAX# [phone] (CALL REF# [id number])              Alexandro Cordova, KADEN

## 2025-06-07 LAB
ALBUMIN SERPL-MCNC: 3.1 G/DL (ref 3.5–5.2)
ANION GAP SERPL CALCULATED.3IONS-SCNC: 12 MMOL/L (ref 5–15)
BUN SERPL-MCNC: 25 MG/DL (ref 8–23)
BUN/CREAT SERPL: 26.9 (ref 7–25)
CALCIUM SPEC-SCNC: 9 MG/DL (ref 8.2–9.6)
CHLORIDE SERPL-SCNC: 105 MMOL/L (ref 98–107)
CO2 SERPL-SCNC: 22 MMOL/L (ref 22–29)
CREAT SERPL-MCNC: 0.93 MG/DL (ref 0.57–1)
EGFRCR SERPLBLD CKD-EPI 2021: 57.8 ML/MIN/1.73
GLUCOSE SERPL-MCNC: 145 MG/DL (ref 65–99)
PHOSPHATE SERPL-MCNC: 3.6 MG/DL (ref 2.5–4.5)
POTASSIUM SERPL-SCNC: 3.8 MMOL/L (ref 3.5–5.2)
SODIUM SERPL-SCNC: 139 MMOL/L (ref 136–145)

## 2025-06-07 PROCEDURE — 80069 RENAL FUNCTION PANEL: CPT | Performed by: HOSPITALIST

## 2025-06-07 RX ADMIN — Medication 10 ML: at 12:58

## 2025-06-07 RX ADMIN — ATORVASTATIN CALCIUM 40 MG: 20 TABLET, FILM COATED ORAL at 21:43

## 2025-06-07 RX ADMIN — ACETAMINOPHEN 650 MG: 325 TABLET ORAL at 22:57

## 2025-06-07 RX ADMIN — DABIGATRAN ETEXILATE 150 MG: 150 CAPSULE ORAL at 21:43

## 2025-06-07 RX ADMIN — METOPROLOL TARTRATE 25 MG: 25 TABLET, FILM COATED ORAL at 21:43

## 2025-06-07 RX ADMIN — METOPROLOL TARTRATE 25 MG: 25 TABLET, FILM COATED ORAL at 12:53

## 2025-06-07 RX ADMIN — OLANZAPINE 5 MG: 2.5 TABLET, FILM COATED ORAL at 21:43

## 2025-06-07 RX ADMIN — SENNOSIDES AND DOCUSATE SODIUM 2 TABLET: 50; 8.6 TABLET ORAL at 22:57

## 2025-06-07 RX ADMIN — DABIGATRAN ETEXILATE 150 MG: 150 CAPSULE ORAL at 12:53

## 2025-06-07 RX ADMIN — Medication 10 ML: at 21:44

## 2025-06-07 NOTE — PROGRESS NOTES
Dedicated to Hospital Care    [phone]   LOS: 9 days     Name: Monica Scherer  Age/Sex: 89 y.o. female  :  1933        PCP: Amilcar Mauricio DO  Chief Complaint   Patient presents with    Altered Mental Status    Weakness - Generalized      Subjective   Remains confused and intermittently agitated.  She is pretty sleepy this morning.    atorvastatin, 40 mg, Oral, Nightly  dabigatran etexilate, 150 mg, Oral, Q12H  metoprolol tartrate, 25 mg, Oral, BID  OLANZapine, 5 mg, Oral, Daily With Dinner   And  OLANZapine, 5 mg, Oral, Nightly  pantoprazole, 40 mg, Oral, Q AM  sodium chloride, 10 mL, Intravenous, Q12H           Objective   Vital Signs  Temp:  [96 °F (35.6 °C)-97 °F (36.1 °C)] 96.9 °F (36.1 °C)  Heart Rate:  [] 62  Resp:  [18] 18  BP: (119-171)/(69-93) 127/72  Body mass index is 24.26 kg/m².    Intake/Output Summary (Last 24 hours) at 2025 1241  Last data filed at 2025 1007  Gross per 24 hour   Intake 180 ml   Output --   Net 180 ml       Physical Exam  On exam she is awake and alert seems agitated and restless  Oriented only to self  Respirations are even and nonlabored  Abdomen soft nontender    Results Review:       I reviewed the patient's new clinical results.  Results from last 7 days   Lab Units 25  0513   WBC 10*3/mm3 11.61*   HEMOGLOBIN g/dL 12.4   PLATELETS 10*3/mm3 241     Results from last 7 days   Lab Units 25  0513   SODIUM mmol/L 142   POTASSIUM mmol/L 3.8   CHLORIDE mmol/L 107   CO2 mmol/L 24.8   BUN mg/dL 18.0   CREATININE mg/dL 1.07*   CALCIUM mg/dL 9.1   MAGNESIUM mg/dL 1.9   PHOSPHORUS mg/dL 3.1   Estimated Creatinine Clearance: 33.9 mL/min (A) (by C-G formula based on SCr of 1.07 mg/dL (H)).      Assessment & Plan   Active Hospital Problems    Diagnosis  POA    **Acute UTI [N39.0]  Yes    History of CVA (cerebrovascular accident) [Z86.73]  Not Applicable    Paroxysmal atrial fibrillation [I48.0]  Yes    Hyperlipidemia [E78.5]  Yes    Hypertension  [I10]  Yes      Resolved Hospital Problems   No resolved problems to display.       ASSESMENT  This is a 89-year-old female with history of atrial fibrillation, hyperlipidemia, hypertension, CVA, P.Afib, presents to the hospital, after a fall at home. She came to [facility] ER and found to have UTI, hypokalemia, hypernatremia, and S4 Sacral fracture.  Her hospital stay has been complicated by insurance issues and now development of significant delirium.  She completed treatment for urinary tract infection and plan was to discharge to skilled nursing facility for rehab.  Her hospitalization has been prolonged by insurance delays with approval.  She is now developed additional delirium.    PLAN  Unfortunately her recurrent behavioral issues are probably going to limit our ability to discharge her to rehab given her agitation .  Currently a Zyprexa ordered at dinner and bedtime  Aggressive delirium precautions during the day.  Would like to limit her lines and tubes.  She is out of restraints now..  Encourage out of bed activity and up to chair  Continue delirium precautions limit lines and tubes encourage out of bed activity attend to keep awake during the day up in the chair is able  Recheck labs again in the morning white blood cell count of been stable renal function have been stable but now that she has been more sedated I wonder if she is not getting a little dehydrated.    VTE Prophylaxis:  Pharmacologic & mechanical VTE prophylaxis orders are present.      Code Status and Medical Interventions: CPR (Attempt to Resuscitate); Full Support   Ordered at: 05/28/25 0028     Code Status (Patient has no pulse and is not breathing):    CPR (Attempt to Resuscitate)     Medical Interventions (Patient has pulse or is breathing):    Full Support          Disposition  Expected Discharge Date: 6/6/2025; Expected Discharge Time:        Florencio Galan MD  [facility]  06/07/25  12:41 EDT

## 2025-06-07 NOTE — PLAN OF CARE
Goal Outcome Evaluation:  Plan of Care Reviewed With: patient        Progress: no change  Outcome Evaluation: med with tylenol for discomfort, pure wick, falls protocol, various mood changes-calm/cooperative, then yelling, bladder scan-[id number]

## 2025-06-07 NOTE — PLAN OF CARE
Goal Outcome Evaluation:  Plan of Care Reviewed With: patient        Progress: improving  Outcome Evaluation: Went over the plan of care and answered all questions.Vitals stable and pain is well controlled .Patient tolerating her diet and all meds given without complications. No other issues this shift.

## 2025-06-08 LAB
ALBUMIN SERPL-MCNC: 2.7 G/DL (ref 3.5–5.2)
ANION GAP SERPL CALCULATED.3IONS-SCNC: 11 MMOL/L (ref 5–15)
BASOPHILS # BLD AUTO: 0.06 10*3/MM3 (ref 0–0.2)
BASOPHILS NFR BLD AUTO: 0.9 % (ref 0–1.5)
BUN SERPL-MCNC: 24 MG/DL (ref 8–23)
BUN/CREAT SERPL: 26.4 (ref 7–25)
CALCIUM SPEC-SCNC: 8.8 MG/DL (ref 8.2–9.6)
CHLORIDE SERPL-SCNC: 107 MMOL/L (ref 98–107)
CO2 SERPL-SCNC: 22 MMOL/L (ref 22–29)
CREAT SERPL-MCNC: 0.91 MG/DL (ref 0.57–1)
DEPRECATED RDW RBC AUTO: 54.3 FL (ref 37–54)
EGFRCR SERPLBLD CKD-EPI 2021: 59.3 ML/MIN/1.73
EOSINOPHIL # BLD AUTO: 0.15 10*3/MM3 (ref 0–0.4)
EOSINOPHIL NFR BLD AUTO: 2.2 % (ref 0.3–6.2)
ERYTHROCYTE [DISTWIDTH] IN BLOOD BY AUTOMATED COUNT: 15.3 % (ref 12.3–15.4)
GLUCOSE SERPL-MCNC: 105 MG/DL (ref 65–99)
HCT VFR BLD AUTO: 36.6 % (ref 34–46.6)
HGB BLD-MCNC: 11.6 G/DL (ref 12–15.9)
IMM GRANULOCYTES # BLD AUTO: 0.09 10*3/MM3 (ref 0–0.05)
IMM GRANULOCYTES NFR BLD AUTO: 1.3 % (ref 0–0.5)
LYMPHOCYTES # BLD AUTO: 2.83 10*3/MM3 (ref 0.7–3.1)
LYMPHOCYTES NFR BLD AUTO: 42.1 % (ref 19.6–45.3)
MCH RBC QN AUTO: 30.4 PG (ref 26.6–33)
MCHC RBC AUTO-ENTMCNC: 31.7 G/DL (ref 31.5–35.7)
MCV RBC AUTO: 95.8 FL (ref 79–97)
MONOCYTES # BLD AUTO: 0.48 10*3/MM3 (ref 0.1–0.9)
MONOCYTES NFR BLD AUTO: 7.1 % (ref 5–12)
NEUTROPHILS NFR BLD AUTO: 3.11 10*3/MM3 (ref 1.7–7)
NEUTROPHILS NFR BLD AUTO: 46.4 % (ref 42.7–76)
NRBC BLD AUTO-RTO: 0 /100 WBC (ref 0–0.2)
PHOSPHATE SERPL-MCNC: 3.7 MG/DL (ref 2.5–4.5)
PLATELET # BLD AUTO: 257 10*3/MM3 (ref 140–450)
PMV BLD AUTO: 9.2 FL (ref 6–12)
POTASSIUM SERPL-SCNC: 3.4 MMOL/L (ref 3.5–5.2)
POTASSIUM SERPL-SCNC: 5.2 MMOL/L (ref 3.5–5.2)
QT INTERVAL: 375 MS
QTC INTERVAL: 455 MS
RBC # BLD AUTO: 3.82 10*6/MM3 (ref 3.77–5.28)
SODIUM SERPL-SCNC: 140 MMOL/L (ref 136–145)
WBC NRBC COR # BLD AUTO: 6.72 10*3/MM3 (ref 3.4–10.8)

## 2025-06-08 PROCEDURE — 80069 RENAL FUNCTION PANEL: CPT | Performed by: HOSPITALIST

## 2025-06-08 PROCEDURE — 84132 ASSAY OF SERUM POTASSIUM: CPT | Performed by: STUDENT IN AN ORGANIZED HEALTH CARE EDUCATION/TRAINING PROGRAM

## 2025-06-08 PROCEDURE — 97530 THERAPEUTIC ACTIVITIES: CPT

## 2025-06-08 PROCEDURE — 85025 COMPLETE CBC W/AUTO DIFF WBC: CPT | Performed by: HOSPITALIST

## 2025-06-08 RX ORDER — POTASSIUM CHLORIDE 1500 MG/1
40 TABLET, EXTENDED RELEASE ORAL EVERY 4 HOURS
Status: COMPLETED | OUTPATIENT
Start: 2025-06-08 | End: 2025-06-08

## 2025-06-08 RX ADMIN — PANTOPRAZOLE SODIUM 40 MG: 40 TABLET, DELAYED RELEASE ORAL at 10:20

## 2025-06-08 RX ADMIN — ATORVASTATIN CALCIUM 40 MG: 20 TABLET, FILM COATED ORAL at 20:59

## 2025-06-08 RX ADMIN — Medication 10 ML: at 10:21

## 2025-06-08 RX ADMIN — ACETAMINOPHEN 650 MG: 325 TABLET ORAL at 20:59

## 2025-06-08 RX ADMIN — METOPROLOL TARTRATE 25 MG: 25 TABLET, FILM COATED ORAL at 10:20

## 2025-06-08 RX ADMIN — METOPROLOL TARTRATE 25 MG: 25 TABLET, FILM COATED ORAL at 20:59

## 2025-06-08 RX ADMIN — POTASSIUM CHLORIDE 40 MEQ: 1500 TABLET, EXTENDED RELEASE ORAL at 10:20

## 2025-06-08 RX ADMIN — OLANZAPINE 5 MG: 2.5 TABLET, FILM COATED ORAL at 20:59

## 2025-06-08 RX ADMIN — DABIGATRAN ETEXILATE 150 MG: 150 CAPSULE ORAL at 20:59

## 2025-06-08 RX ADMIN — Medication 10 ML: at 21:00

## 2025-06-08 RX ADMIN — BISACODYL 5 MG: 5 TABLET, COATED ORAL at 20:59

## 2025-06-08 RX ADMIN — POTASSIUM CHLORIDE 40 MEQ: 1500 TABLET, EXTENDED RELEASE ORAL at 12:56

## 2025-06-08 RX ADMIN — DABIGATRAN ETEXILATE 150 MG: 150 CAPSULE ORAL at 10:17

## 2025-06-08 NOTE — PLAN OF CARE
Goal Outcome Evaluation:  Plan of Care Reviewed With: patient        Progress: improving  Outcome Evaluation: total feed, but poor appitate/drink although encouraged, calm and cooperative this shift, falls protocol, tylenol for discomfort, confused, pro + bed

## 2025-06-08 NOTE — PLAN OF CARE
Goal Outcome Evaluation:  Plan of Care Reviewed With: patient        Progress: no change  Outcome Evaluation: Pt in bed resting and agreeable to PT this date following education on importance of mobility. Pt able to perform supinet to sit with Margaret and sidesteps to HOB with Rwx. Pt ref further amb and returned to bed despite encouragement to sit up in chair for lunch. Pt would benefit from continued skilled PT.    Anticipated Discharge Disposition (PT): skilled nursing facility

## 2025-06-08 NOTE — PROGRESS NOTES
Name: Monica Scherer ADMIT: 2025   : 1933  PCP: Amilcar Mauricio DO    MRN: [id number] LOS: 10 days   AGE/SEX: 89 y.o. female  ROOM: [facility]     Subjective   Subjective     No events overnight. She was sleeping when I went to see her and I opted not to wake her. Her RN tells me that she's been calm and cooperative today and that she's been eating.       Objective   Objective   Vital Signs  Temp:  [97.1 °F (36.2 °C)-97.9 °F (36.6 °C)] 97.1 °F (36.2 °C)  Heart Rate:  [60-72] 60  Resp:  [15-16] 16  BP: (123-134)/(57-78) 131/57  SpO2:  [96 %-99 %] 96 %  on   ;   Device (Oxygen Therapy): room air  Body mass index is 24.26 kg/m².  Physical Exam  Constitutional:       General: She is not in acute distress.     Appearance: She is not toxic-appearing.   Cardiovascular:      Rate and Rhythm: Normal rate and regular rhythm.      Heart sounds: Normal heart sounds.   Pulmonary:      Effort: Pulmonary effort is normal.      Breath sounds: Normal breath sounds.   Abdominal:      General: Bowel sounds are normal.      Palpations: Abdomen is soft.   Musculoskeletal:         General: No tenderness.      Right lower leg: No edema.      Left lower leg: No edema.         Results Review     I reviewed the patient's new clinical results.  Results from last 7 days   Lab Units 25  0409 25  0513   WBC 10*3/mm3 6.72 11.61*   HEMOGLOBIN g/dL 11.6* 12.4   PLATELETS 10*3/mm3 257 241     Results from last 7 days   Lab Units 25  0409 25  1523 25  0513   SODIUM mmol/L 140 139 142   POTASSIUM mmol/L 3.4* 3.8 3.8   CHLORIDE mmol/L 107 105 107   CO2 mmol/L 22.0 22.0 24.8   BUN mg/dL 24.0* 25.0* 18.0   CREATININE mg/dL 0.91 0.93 1.07*   GLUCOSE mg/dL 105* 145* 144*   Estimated Creatinine Clearance: 39.9 mL/min (by C-G formula based on SCr of 0.91 mg/dL).  Results from last 7 days   Lab Units 25  0409 25  1523 25  0513   ALBUMIN g/dL 2.7* 3.1* 3.1*     Results from last 7 days   Lab  "Units 06/08/25  0409 06/07/25  1523 06/05/25  0513   CALCIUM mg/dL 8.8 9.0 9.1   ALBUMIN g/dL 2.7* 3.1* 3.1*   MAGNESIUM mg/dL  --   --  1.9   PHOSPHORUS mg/dL 3.7 3.6 3.1       COVID19   Date Value Ref Range Status   03/19/2024 Not Detected Not Detected - Ref. Range Final   09/15/2021 Not Detected Not Detected - Ref. Range Final   09/10/2021 Not Detected Not Detected - Ref. Range Final   09/06/2021 Not Detected Not Detected - Ref. Range Final     No results found for: \"HGBA1C\", \"POCGLU\"    XR Spine Lumbar 2 or 3 View, XR Pelvis 1 or 2 View  Narrative: XR PELVIS 1 OR 2 VW-, XR SPINE LUMBAR 2 OR 3 VW-     INDICATION: Post fall. Sacral fracture.     COMPARISON: CT abdomen pelvis 5/27/2025     Impression:    Pelvis: Exam is limited by low bone mineralization. S4 fracture best  seen on recent CT. Normal alignment. 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 Prior IV contrast within the urinary bladder.     Lumbar spine: Exam is limited by low bone mineralization. Mild lumbar  spine dextrocurvature. No subluxation. Anterior wedging L1 is similar to  recent CT. Remaining vertebral body heights are maintained. Multilevel  disc degeneration most advanced at L1-L3. Moderate lower lumbar facet  arthropathy.     This report was finalized on 5/28/2025 4:05 PM by Dr. Bladimir Vernon M.D on Workstation: [id number]       Scheduled Medications  atorvastatin, 40 mg, Oral, Nightly  dabigatran etexilate, 150 mg, Oral, Q12H  metoprolol tartrate, 25 mg, Oral, BID  OLANZapine, 5 mg, Oral, Daily With Dinner   And  OLANZapine, 5 mg, Oral, Nightly  pantoprazole, 40 mg, Oral, Q AM  sodium chloride, 10 mL, Intravenous, Q12H    Infusions   Diet  Diet: Regular/House; Texture: Pureed (NDD 1); Fluid Consistency: Thin (IDDSI 0)       Assessment/Plan     Active Hospital Problems    Diagnosis  POA    **Acute UTI [N39.0]  Yes    History of CVA (cerebrovascular accident) [Z86.73]  Not Applicable    Paroxysmal atrial fibrillation [I48.0]  Yes    " Hyperlipidemia [E78.5]  Yes    Hypertension [I10]  Yes      Resolved Hospital Problems   No resolved problems to display.       89 y.o. female admitted with Acute UTI.    This is a 89-year-old female with history of atrial fibrillation, hyperlipidemia, hypertension, CVA, P.Afib, presents to the hospital, after a fall at home. She came to [facility] ER and found to have UTI, hypokalemia, hypernatremia, and S4 Sacral fracture.  Her hospital stay has been complicated by insurance issues and now development of significant delirium.  She completed treatment for urinary tract infection and plan was to discharge to skilled nursing facility for rehab.  Her hospitalization has been prolonged by insurance delays with approval.  She is now developed additional delirium.     E coli UTI-s/p a 5 day course of ceftriaxone  Hypernatremia-mild. Due to dehydration. Resolved   Hypokalemia/hypomagnesemia-improved after replacement  S4 fracture-pain has been controlled  Hospital delirium-improving with scheduled zyprexa and delirium precautions  Paroxysmal afib-dabigatran, metoprolol  Hyperlipidemia-statin  GERD-ppi  History of stroke  dabigatran  for DVT prophylaxis.  Full code.  Discussed with nursing staff.  Anticipate discharge to SNU facility once arrangements have been made.      Rip Akins MD  [facility]  06/08/25  14:00 EDT

## 2025-06-08 NOTE — PLAN OF CARE
Goal Outcome Evaluation:  Plan of Care Reviewed With: patient        Progress: improving  Outcome Evaluation: Went over the plan of care and answered all questions. Vitals stable and pain is well controlled. Patient taking all meds without complcations and tolerating her food. No behavior issues and no other issues this shift

## 2025-06-08 NOTE — THERAPY TREATMENT NOTE
Patient Name: Monica Scherer  : 1933    MRN: [id number]                              Today's Date: 2025       Admit Date: 2025    Visit Dx:     ICD-10-CM ICD-9-CM   1. Acute UTI  N39.0 599.0   2. Generalized weakness  R53.1 780.79   3. Fall, initial encounter  W19.XXXA E888.9   4. Closed head injury, initial encounter  S09.90XA 959.01   5. Closed fracture of sacrum, unspecified portion of sacrum, initial encounter  S32.10XA 805.6   6. Hypokalemia  E87.6 276.8   7. Dehydration  E86.0 276.51     Patient Active Problem List   Diagnosis    Vertigo    Temporary cerebral vascular dysfunction    Gastroesophageal reflux disease with esophagitis    Degeneration of intervertebral disc of cervical region    Prediabetes    S/P cholecystectomy    Osteoarthritis of knee    Herpes zoster without complication    Hypertension    Duodenal ulcer    History of pancreatitis    Hyperlipidemia    TIA (transient ischemic attack)    Cerebrovascular accident (CVA) due to thrombosis of left posterior cerebral artery    Paroxysmal atrial fibrillation    General weakness    History of CVA (cerebrovascular accident)    Anticoagulated    Hematuria    Dizziness and giddiness    Rectal bleeding    Acute CVA (cerebrovascular accident)    Acute UTI     Past Medical History:   Diagnosis Date    Atrial fibrillation     Hyperlipidemia     Hypertension     Lacunar infarct, acute 2020    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    New onset atrial fibrillation 2020    now on rate control along with Eliquis    Prolapsed uterus     per patient    Stroke     TIA (transient ischemic attack) 2020    Weakness      Past Surgical History:   Procedure Laterality Date    APPENDECTOMY      CHOLECYSTECTOMY N/A 2016    Procedure: CHOLECYSTECTOMY LAPAROSCOPIC;  Surgeon: Russ Gra MD;  [facility];  Service:     COLONOSCOPY N/A 2021    Procedure: COLONOSCOPY TO CECUM WITH HOT SNARE POLYPECTOMIES AND  COLD BX POLYPECTOMY;  Surgeon: Emerson Izaguirre MD;  Location: 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     EYE SURGERY      tre cataracts      General Information       Row Name 06/08/25 1200          Physical Therapy Time and Intention    Document Type therapy note (daily note)  -CN     Mode of Treatment individual therapy;physical therapy  -CN       Row Name 06/08/25 1200          General Information    Patient Profile Reviewed yes  -CN     Existing Precautions/Restrictions fall  -CN       Row Name 06/08/25 1200          Cognition    Orientation Status (Cognition) oriented to;person  -CN       Row Name 06/08/25 1200          Safety Issues/Impairments Affecting Functional Mobility    Safety Issues Affecting Function (Mobility) ability to follow commands;insight into deficits/self-awareness;judgment  -CN               User Key  (r) = Recorded By, (t) = Taken By, (c) = Cosigned By      Initials Name Provider Type    CN Melania Lujan, PT Physical Therapist                   Mobility       Row Name 06/08/25 1200          Bed Mobility    Bed Mobility supine-sit  -CN     Supine-Sit [place] (Bed Mobility) minimum assist (75% patient effort)  -CN     Sit-Supine [place] (Bed Mobility) minimum assist (75% patient effort);moderate assist (50% patient effort)  -CN     Assistive Device (Bed Mobility) bed rails  -CN       Row Name 06/08/25 1200          Sit-Stand Transfer    Sit-Stand [place]  (Transfers) contact guard  -CN     Assistive Device (Sit-Stand Transfers) walker, front-wheeled  -CN       Row Name 06/08/25 1200          Gait/Stairs (Locomotion)    [place] Level (Gait) contact guard  -CN     Assistive Device (Gait) walker, front-wheeled  -CN     Distance in Feet (Gait) 3  -CN     Deviations/Abnormal Patterns (Gait) festinating/shuffling;gait speed decreased;stride length decreased  -CN     Bilateral Gait Deviations forward flexed posture;heel strike decreased  -CN     Comment, (Gait/Stairs) sidesteps to HOB, however ref futher amb  -CN               User Key  (r) = Recorded By, (t) = Taken By, (c) = Cosigned By      Initials Name Provider Type    CN Melania Lujan, PT Physical Therapist                   Obj/Interventions    No documentation.                  Goals/Plan    No documentation.                  Clinical Impression       Row Name 06/08/25 1201          Pain    Pretreatment Pain Rating 0/10 - no pain  -CN     Posttreatment Pain Rating 0/10 - no pain  -CN       Row Name 06/08/25 1201          Plan of Care Review    Plan of Care Reviewed With patient  -CN     Progress no change  -CN     Outcome Evaluation Pt in bed resting and agreeable to PT this date following education on importance of mobility. Pt able to perform supinet to sit with Margaret and sidesteps to HOB with Rwx. Pt ref further amb and returned to bed despite encouragement to sit up in chair for lunch. Pt would benefit from continued skilled PT.  -CN       Row Name 06/08/25 1201          Therapy Assessment/Plan (PT)    Rehab Potential (PT) fair  -CN     Criteria for Skilled Interventions Met (PT) meets criteria  -CN     Therapy Frequency (PT) 5 times/wk  -CN       Row Name 06/08/25 1201          Positioning and Restraints    Pre-Treatment Position in bed  -CN     Post Treatment Position bed  -CN     In Bed fowlers;call light within reach;encouraged to call for assist;exit alarm on;legs elevated  -CN                User Key  (r) = Recorded By, (t) = Taken By, (c) = Cosigned By      Initials Name Provider Type    Melania Nelson, PT Physical Therapist                   Outcome Measures       Row Name 06/08/25 1203 06/08/25 0800       How much help from another person do you currently need...    Turning from your back to your side while in flat bed without using bedrails? 3  -CN 2  -RM    Moving from lying on back to sitting on the side of a flat bed without bedrails? 3  -CN 2  -RM    Moving to and from a bed to a chair (including a wheelchair)? 3  -CN 2  -RM    Standing up from a chair using your arms (e.g., wheelchair, bedside chair)? 3  -CN 2  -RM    Climbing 3-5 steps with a railing? 1  -CN 2  -RM    To walk in hospital room? 2  -CN 2  -RM    AM-PAC 6 Clicks Score (PT) 15  -CN 12  -RM    Highest Level of Mobility Goal Move to Chair/Commode-4  -CN Move to Chair/Commode-4  -RM      Row Name 06/08/25 1203          Functional Assessment    Outcome Measure Options AM-PAC 6 Clicks Basic Mobility (PT)  -CN               User Key  (r) = Recorded By, (t) = Taken By, (c) = Cosigned By      Initials Name Provider Type    Melania Nelson, PT Physical Therapist    Rocio Matias, RN Registered Nurse                                 Physical Therapy Education       Title: PT OT SLP Therapies (Done)       Topic: Physical Therapy (Done)       Point: Mobility training (Done)       Learning Progress Summary            Patient Acceptance, E, VU,NR by CN at 6/8/2025 1203    Acceptance, E, VU by AG at 6/5/2025 1407    Acceptance, E,D, DU,NR by PC at 6/3/2025 1401    Acceptance, E,D, NR by PC at 6/2/2025 1518    Acceptance, E, NR by NM at 6/1/2025 0258    Acceptance, E,D, VU,NR by MS at 5/30/2025 1014    Nonacceptance, E,D, NR by MS at 5/28/2025 1536                      Point: Home exercise program (Done)       Learning Progress Summary            Patient Acceptance, E, VU,NR by CN at 6/8/2025 1203    Acceptance, E,D,  NR by PC at 6/2/2025 1518    Acceptance, E, NR by NM at 6/1/2025 0258    Acceptance, E,D, VU,NR by MS at 5/30/2025 1014                      Point: Body mechanics (Done)       Learning Progress Summary            Patient Acceptance, E, VU,NR by CN at 6/8/2025 1203    Acceptance, E, VU by AG at 6/5/2025 1407    Acceptance, E,D, DU,NR by PC at 6/3/2025 1401    Acceptance, E,D, NR by PC at 6/2/2025 1518    Acceptance, E, NR by NM at 6/1/2025 0258    Acceptance, E,D, VU,NR by MS at 5/30/2025 1014    Nonacceptance, E,D, NR by MS at 5/28/2025 1536                      Point: Precautions (Done)       Learning Progress Summary            Patient Acceptance, E, VU,NR by CN at 6/8/2025 1203    Acceptance, E, VU by AG at 6/5/2025 1407    Acceptance, E,D, DU,NR by PC at 6/3/2025 1401    Acceptance, E,D, NR by PC at 6/2/2025 1518    Acceptance, E, NR by NM at 6/1/2025 0258    Acceptance, E,D, VU,NR by MS at 5/30/2025 1014    Nonacceptance, E,D, NR by MS at 5/28/2025 1536                                      User Key       Initials Effective Dates Name Provider Type Discipline    PC 06/16/21 -  Jennifer Booker, PT Physical Therapist PT    MS 06/16/21 -  Bladimir Akins, PT Physical Therapist PT    CN 06/16/21 -  Melania Lujan, PT Physical Therapist PT    NM 07/09/24 -  Al Woods, RN Registered Nurse Nurse    AG 06/03/25 -  Faby Bates, PT Student PT Student PT                  PT Recommendation and Plan     Progress: no change  Outcome Evaluation: Pt in bed resting and agreeable to PT this date following education on importance of mobility. Pt able to perform supinet to sit with Margaret and sidesteps to HOB with Rwx. Pt ref further amb and returned to bed despite encouragement to sit up in chair for lunch. Pt would benefit from continued skilled PT.     Time Calculation:         PT Charges       Row Name 06/08/25 1204             Time Calculation    Start Time 1136  -CN      Stop Time 1146  -CN      Time  Calculation (min) 10 min  -CN      PT Received On 06/08/25  -CN      PT - Next Appointment 06/09/25  -CN         Timed Charges    49432 - PT Therapeutic Activity Minutes 10  -CN         Total Minutes    Timed Charges Total Minutes 10  -CN       Total Minutes 10  -CN                User Key  (r) = Recorded By, (t) = Taken By, (c) = Cosigned By      Initials Name Provider Type    Melania Nelson, PT Physical Therapist                  Therapy Charges for Today       Code Description Service Date Service Provider Modifiers Qty    [id number]  PT THERAPEUTIC ACT EA 15 MIN 6/8/2025 Melania Lujan, PT GP 1            PT G-Codes  Outcome Measure Options: AM-PAC 6 Clicks Basic Mobility (PT)  AM-PAC 6 Clicks Score (PT): 15  PT Discharge Summary  Anticipated Discharge Disposition (PT): skilled nursing facility    Melania Lujan PT  6/8/2025

## 2025-06-09 VITALS
DIASTOLIC BLOOD PRESSURE: 72 MMHG | OXYGEN SATURATION: 96 % | RESPIRATION RATE: 18 BRPM | WEIGHT: 141.31 LBS | HEART RATE: 73 BPM | SYSTOLIC BLOOD PRESSURE: 126 MMHG | TEMPERATURE: 97.2 F | HEIGHT: 64 IN | BODY MASS INDEX: 24.13 KG/M2

## 2025-06-09 PROBLEM — N39.0 ACUTE UTI: Status: RESOLVED | Noted: 2025-05-27 | Resolved: 2025-06-09

## 2025-06-09 LAB
ANION GAP SERPL CALCULATED.3IONS-SCNC: 10.9 MMOL/L (ref 5–15)
BUN SERPL-MCNC: 23 MG/DL (ref 8–23)
BUN/CREAT SERPL: 22.3 (ref 7–25)
CALCIUM SPEC-SCNC: 9 MG/DL (ref 8.2–9.6)
CHLORIDE SERPL-SCNC: 107 MMOL/L (ref 98–107)
CO2 SERPL-SCNC: 23.1 MMOL/L (ref 22–29)
CREAT SERPL-MCNC: 1.03 MG/DL (ref 0.57–1)
EGFRCR SERPLBLD CKD-EPI 2021: 51.1 ML/MIN/1.73
GLUCOSE SERPL-MCNC: 122 MG/DL (ref 65–99)
MAGNESIUM SERPL-MCNC: 2.1 MG/DL (ref 1.7–2.3)
POTASSIUM SERPL-SCNC: 4.3 MMOL/L (ref 3.5–5.2)
SODIUM SERPL-SCNC: 141 MMOL/L (ref 136–145)

## 2025-06-09 PROCEDURE — 80048 BASIC METABOLIC PNL TOTAL CA: CPT | Performed by: STUDENT IN AN ORGANIZED HEALTH CARE EDUCATION/TRAINING PROGRAM

## 2025-06-09 PROCEDURE — 83735 ASSAY OF MAGNESIUM: CPT | Performed by: STUDENT IN AN ORGANIZED HEALTH CARE EDUCATION/TRAINING PROGRAM

## 2025-06-09 RX ORDER — ACETAMINOPHEN 325 MG/1
650 TABLET ORAL EVERY 4 HOURS PRN
Start: 2025-06-09

## 2025-06-09 RX ORDER — OLANZAPINE 5 MG/1
TABLET, FILM COATED ORAL
Start: 2025-06-09 | End: 2025-06-23 | Stop reason: SDUPTHER

## 2025-06-09 RX ADMIN — Medication 10 ML: at 12:26

## 2025-06-09 RX ADMIN — DABIGATRAN ETEXILATE 150 MG: 150 CAPSULE ORAL at 09:02

## 2025-06-09 RX ADMIN — PANTOPRAZOLE SODIUM 40 MG: 40 TABLET, DELAYED RELEASE ORAL at 12:26

## 2025-06-09 RX ADMIN — METOPROLOL TARTRATE 25 MG: 25 TABLET, FILM COATED ORAL at 09:02

## 2025-06-09 NOTE — DISCHARGE SUMMARY
Patient Name: Monica Scherer  : 1933  MRN: [id number]    Date of Admission: 2025  Date of Discharge:  2025  Primary Care Physician: Amilcar Mauricio DO      Chief Complaint:   Altered Mental Status and Weakness - Generalized      Discharge Diagnoses     Active Hospital Problems    Diagnosis  POA    History of CVA (cerebrovascular accident) [Z86.73]  Not Applicable    Paroxysmal atrial fibrillation [I48.0]  Yes    Hyperlipidemia [E78.5]  Yes    Hypertension [I10]  Yes      Resolved Hospital Problems    Diagnosis Date Resolved POA    **Acute UTI [N39.0] 2025 Yes        Hospital Course     Ms. Scherer is a 89 y.o. female with a history of A-fib, hypertension, hyperlipidemia, history of stroke who presented to [facility] initially complaining of a fall at home.  Please see the admitting history and physical for further details.  She was found to have an E. coli UTI, some mild hypernatremia, hypokalemia, hypomagnesemia, and S4 fracture and was admitted to the hospital for further evaluation and treatment.      Her pain was easily controlled with Tylenol, and she was started on ceftriaxone for the UTI.  She completed a 5-day course of therapy.  Her electrolytes were corrected while she was hospitalized as well.  She will be started on a calcium and vitamin D supplement for likely osteoporosis, and she will need follow-up with her PCP for a DEXA scan and consideration of bisphosphonate therapy.  Her course was complicated by some hospital delirium which resolved with the addition of scheduled Zyprexa.  She will be discharged to a skilled nursing facility today.    Day of Discharge     Subjective:  No events overnight.  She was asleep when I saw her, but she woke easily.  She has no complaints.    Physical Exam:  Temp:  [97.1 °F (36.2 °C)-97.3 °F (36.3 °C)] 97.2 °F (36.2 °C)  Heart Rate:  [68-77] 73  Resp:  [18] 18  BP: (103-126)/(66-81) 126/72  Body mass index is 24.26  kg/m².  Physical Exam  Constitutional:       General: She is not in acute distress.     Appearance: She is not toxic-appearing.   Cardiovascular:      Rate and Rhythm: Normal rate and regular rhythm.      Heart sounds: Normal heart sounds.   Pulmonary:      Effort: Pulmonary effort is normal.      Breath sounds: Normal breath sounds.   Abdominal:      General: Bowel sounds are normal.      Palpations: Abdomen is soft.   Musculoskeletal:         General: No tenderness.      Right lower leg: No edema.      Left lower leg: No edema.   Neurological:      Mental Status: She is alert.   Psychiatric:         Mood and Affect: Mood normal.         Behavior: Behavior normal.         Consultants     Consult Orders (all) (From admission, onward)       Start     Ordered    06/07/25 1417  Inpatient Palliative Care Team Consult  Once        Provider:  (Not yet assigned)    06/07/25 1417    05/28/25 0132  Inpatient Case Management  Consult  Once        Provider:  (Not yet assigned)    05/28/25 0131    05/27/25 2216  [facility] (on-call MD unless specified) Details  Once        Specialty:  Hospitalist  Provider:  (Not yet assigned)    05/27/25 2215                  Procedures     Imaging Results (All)       Procedure Component Value Units Date/Time    XR Spine Lumbar 2 or 3 View [[id number]] Collected: 05/28/25 1558     Updated: 05/28/25 1609    Narrative:      XR PELVIS 1 OR 2 VW-, XR SPINE LUMBAR 2 OR 3 VW-     INDICATION: Post fall. Sacral fracture.     COMPARISON: CT abdomen pelvis 5/27/2025       Impression:         Pelvis: Exam is limited by low bone mineralization. S4 fracture best  seen on recent CT. Normal alignment. 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 Prior IV contrast within the urinary bladder.     Lumbar spine: Exam is limited by low bone mineralization. Mild lumbar  spine dextrocurvature. No subluxation. Anterior wedging L1 is similar to  recent CT. Remaining vertebral body heights are maintained.  Multilevel  disc degeneration most advanced at L1-L3. Moderate lower lumbar facet  arthropathy.     This report was finalized on 5/28/2025 4:05 PM by Dr. Bladimir Vernon M.D on Workstation: [id number]       XR Pelvis 1 or 2 View [[id number]] Collected: 05/28/25 1558     Updated: 05/28/25 1609    Narrative:      XR PELVIS 1 OR 2 VW-, XR SPINE LUMBAR 2 OR 3 VW-     INDICATION: Post fall. Sacral fracture.     COMPARISON: CT abdomen pelvis 5/27/2025       Impression:         Pelvis: Exam is limited by low bone mineralization. S4 fracture best  seen on recent CT. Normal alignment. 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 Prior IV contrast within the urinary bladder.     Lumbar spine: Exam is limited by low bone mineralization. Mild lumbar  spine dextrocurvature. No subluxation. Anterior wedging L1 is similar to  recent CT. Remaining vertebral body heights are maintained. Multilevel  disc degeneration most advanced at L1-L3. Moderate lower lumbar facet  arthropathy.     This report was finalized on 5/28/2025 4:05 PM by Dr. Bladimir Vernon M.D on Workstation: [id number]       CT Abdomen Pelvis With Contrast [[id number]] Collected: 05/27/25 2124     Updated: 05/27/25 2139    Narrative:      CT OF THE ABDOMEN PELVIS WITH CONTRAST     HISTORY: Abdominal pain and diarrhea     COMPARISON: March 19, 2024     TECHNIQUE: Axial CT imaging was obtained through the abdomen and pelvis.  IV contrast was administered.     FINDINGS:  Images through the lung bases demonstrate dependent atelectasis. There  is lumbar scoliosis, with convexity to the right. No there may be some  focal fatty infiltration at the gallbladder fossa. The gallbladder is  absent. There is a small hiatal hernia. There is a duodenal  diverticulum. Calcified granulomata are noted within the spleen. The  pancreas is atrophic. Adrenal glands are normal. The kidneys enhance  symmetrically. No hydronephrosis is seen. There are simple appearing  left renal cysts.  The patient is noted to have pelvic floor laxity.  Rectal prolapse and cystocele are again seen. There is no bowel  obstruction. There is colonic diverticulosis. Appendix is absent. The  patient does have some asymmetric thickening of the left piriformis  muscle, as well as some presacral stranding. This is favored to be  secondary to a fracture through the S4 vertebral body, which is best  seen on the sagittal series. There are aortoiliac calcifications.       Impression:         1. The patient has presacral stranding and asymmetric enlargement of the  left piriformis muscle, which is favored to be secondary to some  hemorrhage from a fracture of S4. No additional acute findings are noted  within the abdomen or pelvis.     Radiation dose reduction techniques were utilized, including automated  exposure control and exposure modulation based on body size.        This report was finalized on 5/27/2025 9:36 PM by Dr. Loretta Coles M.D on Workstation: [id number]       CT Cervical Spine Without Contrast [[id number]] Collected: 05/27/25 2050     Updated: 05/27/25 2054    Narrative:      CT CERVICAL SPINE WO CONTRAST-     INDICATIONS: Trauma  TECHNIQUE: Radiation dose reduction techniques were utilized, including  automated exposure control and exposure modulation based on body size.  Noncontrast head CT, cervical spine CT  COMPARISON: 2/27/2025 at 0523 hours MRN 2/26/2025  FINDINGS:  Evolving post infarct changes are seen in left temporal lobe, left basal  ganglia.  No acute intracranial hemorrhage. Midline structures appear stable.  Moderate to prominent periventricular hypodensities suggest chronic  small vessel ischemic change in a patient this age.  Arterial calcifications are seen at the base of the brain.  Ventricles, cisterns, cerebral sulci are unremarkable for patient age.  Mild partial opacification of left mastoid air cells. The visualized  paranasal sinuses, orbits, mastoid air cells are otherwise  unremarkable.  Cervical spine CT:  No acute fracture is identified. The ring of C1 is developmentally  incomplete at the posterior aspect.  Facet and uncovertebral joint hypertrophy contribute to neuroforaminal  narrowing, more prominent on the right at C5/6, and on the left at C3/4,  C4/5, C5/6, C6/7. Disc osteophyte complex appears to result in at least  mild central stenosis at the levels C3/4 through C6/7.  Mild anterolisthesis of C7 on T1.  Bilateral carotid arterial calcifications are present.  Small atelectasis is apparent in the partly included upper lungs.    Impression:      Evolving left temporal lobe and basal ganglia post infarct changes. No  acute hemorrhage, continued follow-up advised.  No acute cervical fracture identified; degenerative changes in the  cervical spine. If further imaging evaluation of the spine is indicated,  MRI could be considered.     This report was finalized on 5/27/2025 8:51 PM by Dr. Sai Plasencia M.D on Workstation: [id number]       CT Head Without Contrast [[id number]] Collected: 05/27/25 2020     Updated: 05/27/25 2032    Narrative:      CT HEAD WO CONTRAST-     INDICATIONS: Trauma     TECHNIQUE: Radiation dose reduction techniques were utilized, including  automated exposure control and exposure modulation based on body size.  Noncontrast head CT, cervical spine CT     COMPARISON: 2/27/2025 at 0523 hours MRN 2/26/2025     FINDINGS:     Evolving post infarct changes are seen in left temporal lobe, left basal  ganglia.     No acute intracranial hemorrhage. Midline structures appear stable.     Moderate to prominent periventricular hypodensities suggest chronic  small vessel ischemic change in a patient this age.     Arterial calcifications are seen at the base of the brain.     Ventricles, cisterns, cerebral sulci are unremarkable for patient age.     Mild partial opacification of left mastoid air cells. The visualized  paranasal sinuses, orbits, mastoid air cells are  otherwise unremarkable.        Cervical spine CT:     No acute fracture is identified. The ring of C1 is developmentally  incomplete at the posterior aspect.     Facet and uncovertebral joint hypertrophy contribute to neuroforaminal  narrowing, more prominent on the right at C5/6, and on the left at C3/4,  C4/5, C5/6, C6/7. Disc osteophyte complex appears to result in at least  mild central stenosis at the levels C3/4 through C6/7.     Mild anterolisthesis of C7 on T1.     Bilateral carotid arterial calcifications are present.     Small atelectasis is apparent in the partly included upper lungs.          Impression:         Evolving left temporal lobe and basal ganglia post infarct changes. No  acute hemorrhage, continued follow-up advised.     No acute cervical fracture identified; degenerative changes in the  cervical spine. If further imaging evaluation of the spine is indicated,  MRI could be considered.     This report was finalized on 5/27/2025 8:29 PM by Dr. Sai Plasencia M.D on Workstation: [id number]       XR Chest 1 View [[id number]] Collected: 05/27/25 1943     Updated: 05/27/25 1948    Narrative:      XR CHEST 1 VW-     HISTORY: Female who is 89 years-old, altered mental status, weakness     TECHNIQUE: Frontal view of the chest     COMPARISON: 2/28/2025     FINDINGS: The heart appears mildly enlarged. Pulmonary vasculature is  unremarkable. Aorta is tortuous, calcified. No focal pulmonary  consolidation, pleural effusion, or pneumothorax. No acute osseous  process.       Impression:      No focal pulmonary consolidation. Mild cardiomegaly.  Tortuous aorta. Follow-up as clinically indicated.     This report was finalized on 5/27/2025 7:45 PM by Dr. Sai Plasencia M.D on Workstation: [id number]               Pertinent Labs     Results from last 7 days   Lab Units 06/08/25  0409 06/05/25  0513   WBC 10*3/mm3 6.72 11.61*   HEMOGLOBIN g/dL 11.6* 12.4   PLATELETS 10*3/mm3 257 241     Results from last 7  "days   Lab Units 06/09/25  0708 06/08/25  1708 06/08/25  0409 06/07/25  1523 06/05/25  0513   SODIUM mmol/L 141  --  140 139 142   POTASSIUM mmol/L 4.3 5.2 3.4* 3.8 3.8   CHLORIDE mmol/L 107  --  107 105 107   CO2 mmol/L 23.1  --  22.0 22.0 24.8   BUN mg/dL 23.0  --  24.0* 25.0* 18.0   CREATININE mg/dL 1.03*  --  0.91 0.93 1.07*   GLUCOSE mg/dL 122*  --  105* 145* 144*   Estimated Creatinine Clearance: 35.3 mL/min (A) (by C-G formula based on SCr of 1.03 mg/dL (H)).  Results from last 7 days   Lab Units 06/08/25  0409 06/07/25  1523 06/05/25  0513   ALBUMIN g/dL 2.7* 3.1* 3.1*     Results from last 7 days   Lab Units 06/09/25  0708 06/08/25  0409 06/07/25  1523 06/05/25  0513   CALCIUM mg/dL 9.0 8.8 9.0 9.1   ALBUMIN g/dL  --  2.7* 3.1* 3.1*   MAGNESIUM mg/dL 2.1  --   --  1.9   PHOSPHORUS mg/dL  --  3.7 3.6 3.1               Invalid input(s): \"LDLCALC\"        Test Results Pending at Discharge       Discharge Details        Discharge Medications        New Medications        Instructions Start Date   acetaminophen 325 MG tablet  Commonly known as: TYLENOL   650 mg, Oral, Every 4 Hours PRN      Calcium Citrate-Vitamin D 250-2.5 MG-MCG per tablet   2 tablets, Oral, 2 Times Daily      OLANZapine 5 MG tablet  Commonly known as: zyPREXA   Take 1 tablet by mouth Daily With Dinner AND 1 tablet Every Night.             Continue These Medications        Instructions Start Date   atorvastatin 40 MG tablet  Commonly known as: LIPITOR   40 mg, Oral, Nightly      dabigatran etexilate 150 MG capsu  Commonly known as: PRADAXA   Take 1 capsule by mouth Every 12 (Twelve) Hours.      metoprolol tartrate 25 MG tablet  Commonly known as: LOPRESSOR   25 mg, Oral, 2 Times Daily      pantoprazole 40 MG EC tablet  Commonly known as: PROTONIX   40 mg, Oral, Every Early Morning               Allergies   Allergen Reactions    Cephalexin Rash    Latex Itching         Discharge Disposition:  Skilled Nursing Facility (DC - External)    Discharge " Diet:  Diet Order   Procedures    Diet: Regular/House; Texture: Pureed (NDD 1); Fluid Consistency: Thin (IDDSI 0)       Discharge Activity:   Activity Instructions       Activity as Tolerated      Activity as Tolerated              CODE STATUS:    Code Status and Medical Interventions: CPR (Attempt to Resuscitate); Full Support   Ordered at: 05/28/25 0028     Code Status (Patient has no pulse and is not breathing):    CPR (Attempt to Resuscitate)     Medical Interventions (Patient has pulse or is breathing):    Full Support       Future Appointments   Date Time Provider Department Center   6/12/2025  2:30 PM Micheline Singh MD [facility]   6/23/2025 11:00 AM Omar Bassett MD [id number]   7/11/2025 10:30 AM Amilcar Mauricio DO [facility]     Additional Instructions for the Follow-ups that You Need to Schedule       Call MD With Problems / Concerns   As directed      Instructions: return to the hospital if you experience chest pain, shortness of breath, abdominal pain, nausea, vomiting, fevers, sweats, chills, or worsening of your symptoms    Order Comments: Instructions: return to the hospital if you experience chest pain, shortness of breath, abdominal pain, nausea, vomiting, fevers, sweats, chills, or worsening of your symptoms         Discharge Follow-up with PCP   As directed       Currently Documented PCP:    Amilcar Mauricio DO    PCP Phone Number:    [phone]     Follow Up Details: 2-3 weeks        Discharge Follow-up with PCP   As directed       Currently Documented PCP:    Amilcar Mauricio DO    PCP Phone Number:    [phone]     Follow Up Details: 1-2 weeks               Contact information for follow-up providers       Amilcar Mauricio DO .    Specialties: Family Medicine, Urgent Care, Emergency Medicine  Why: 2-3 weeks  Contact information:  [address]  [place] [address]  [phone]               Amilcar Mauricio DO .    Specialties: Family Medicine, Urgent  Care, Emergency Medicine  Why: 1-2 weeks  Contact information:  [address]  [address]  [facility] [address]  [phone]                       Contact information for after-discharge care       Destination       [facility] .    Service: Skilled Nursing  Contact information:  [address] [facility] [address] [phone]                                   Additional Instructions for the Follow-ups that You Need to Schedule       Call MD With Problems / Concerns   As directed      Instructions: return to the hospital if you experience chest pain, shortness of breath, abdominal pain, nausea, vomiting, fevers, sweats, chills, or worsening of your symptoms    Order Comments: Instructions: return to the hospital if you experience chest pain, shortness of breath, abdominal pain, nausea, vomiting, fevers, sweats, chills, or worsening of your symptoms         Discharge Follow-up with PCP   As directed       Currently Documented PCP:    Amilcar Mauricio DO    PCP Phone Number:    [phone]     Follow Up Details: 2-3 weeks        Discharge Follow-up with PCP   As directed       Currently Documented PCP:    Amilcar Mauricio DO    PCP Phone Number:    [phone]     Follow Up Details: 1-2 weeks            Time Spent on Discharge:  Greater than 30 minutes      Rip Akins MD  [facility]  06/09/25  13:21 EDT

## 2025-06-09 NOTE — PROGRESS NOTES
Name: Monica Scherer ADMIT: 2025   : 1933  PCP: Amilcar Mauricio DO    MRN: [id number] LOS: 11 days   AGE/SEX: 89 y.o. female  ROOM: [facility]/     Subjective   Subjective     No events overnight. She was sleeping again when I entered the room, but she woke easily. She has no complaints today and tells me that she feels well.       Objective   Objective   Vital Signs  Temp:  [97.1 °F (36.2 °C)-97.3 °F (36.3 °C)] 97.2 °F (36.2 °C)  Heart Rate:  [68-77] 73  Resp:  [18] 18  BP: (103-126)/(66-81) 126/72  SpO2:  [96 %-99 %] 96 %  on   ;   Device (Oxygen Therapy): room air  Body mass index is 24.26 kg/m².  Physical Exam  Constitutional:       General: She is not in acute distress.     Appearance: She is not toxic-appearing.   Cardiovascular:      Rate and Rhythm: Normal rate and regular rhythm.      Heart sounds: Normal heart sounds.   Pulmonary:      Effort: Pulmonary effort is normal.      Breath sounds: Normal breath sounds.   Abdominal:      General: Bowel sounds are normal.      Palpations: Abdomen is soft.   Musculoskeletal:         General: No tenderness.      Right lower leg: No edema.      Left lower leg: No edema.   Neurological:      Mental Status: She is alert.         Results Review     I reviewed the patient's new clinical results.  Results from last 7 days   Lab Units 25  0409 25  0513   WBC 10*3/mm3 6.72 11.61*   HEMOGLOBIN g/dL 11.6* 12.4   PLATELETS 10*3/mm3 257 241     Results from last 7 days   Lab Units 25  0708 25  1708 25  0409 25  1523 25  0513   SODIUM mmol/L 141  --  140 139 142   POTASSIUM mmol/L 4.3 5.2 3.4* 3.8 3.8   CHLORIDE mmol/L 107  --  107 105 107   CO2 mmol/L 23.1  --  22.0 22.0 24.8   BUN mg/dL 23.0  --  24.0* 25.0* 18.0   CREATININE mg/dL 1.03*  --  0.91 0.93 1.07*   GLUCOSE mg/dL 122*  --  105* 145* 144*   Estimated Creatinine Clearance: 35.3 mL/min (A) (by C-G formula based on SCr of 1.03 mg/dL (H)).  Results from last 7  "days   Lab Units 06/08/25  0409 06/07/25  1523 06/05/25  0513   ALBUMIN g/dL 2.7* 3.1* 3.1*     Results from last 7 days   Lab Units 06/09/25  0708 06/08/25  0409 06/07/25  1523 06/05/25  0513   CALCIUM mg/dL 9.0 8.8 9.0 9.1   ALBUMIN g/dL  --  2.7* 3.1* 3.1*   MAGNESIUM mg/dL 2.1  --   --  1.9   PHOSPHORUS mg/dL  --  3.7 3.6 3.1       COVID19   Date Value Ref Range Status   03/19/2024 Not Detected Not Detected - Ref. Range Final   09/15/2021 Not Detected Not Detected - Ref. Range Final   09/10/2021 Not Detected Not Detected - Ref. Range Final   09/06/2021 Not Detected Not Detected - Ref. Range Final     No results found for: \"HGBA1C\", \"POCGLU\"    XR Spine Lumbar 2 or 3 View, XR Pelvis 1 or 2 View  Narrative: XR PELVIS 1 OR 2 VW-, XR SPINE LUMBAR 2 OR 3 VW-     INDICATION: Post fall. Sacral fracture.     COMPARISON: CT abdomen pelvis 5/27/2025     Impression:    Pelvis: Exam is limited by low bone mineralization. S4 fracture best  seen on recent CT. Normal alignment. 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 Prior IV contrast within the urinary bladder.     Lumbar spine: Exam is limited by low bone mineralization. Mild lumbar  spine dextrocurvature. No subluxation. Anterior wedging L1 is similar to  recent CT. Remaining vertebral body heights are maintained. Multilevel  disc degeneration most advanced at L1-L3. Moderate lower lumbar facet  arthropathy.     This report was finalized on 5/28/2025 4:05 PM by Dr. Bladimir Vernon M.D on Workstation: [id number]       Scheduled Medications  atorvastatin, 40 mg, Oral, Nightly  dabigatran etexilate, 150 mg, Oral, Q12H  metoprolol tartrate, 25 mg, Oral, BID  OLANZapine, 5 mg, Oral, Daily With Dinner   And  OLANZapine, 5 mg, Oral, Nightly  pantoprazole, 40 mg, Oral, Q AM  sodium chloride, 10 mL, Intravenous, Q12H    Infusions   Diet  Diet: Regular/House; Texture: Pureed (NDD 1); Fluid Consistency: Thin (IDDSI 0)       Assessment/Plan     Active Hospital Problems    " Diagnosis  POA    **Acute UTI [N39.0]  Yes    History of CVA (cerebrovascular accident) [Z86.73]  Not Applicable    Paroxysmal atrial fibrillation [I48.0]  Yes    Hyperlipidemia [E78.5]  Yes    Hypertension [I10]  Yes      Resolved Hospital Problems   No resolved problems to display.       89 y.o. female admitted with Acute UTI.    This is a 89-year-old female with history of atrial fibrillation, hyperlipidemia, hypertension, CVA, P.Afib, presents to the hospital, after a fall at home. She came to [facility] ER and found to have UTI, hypokalemia, hypernatremia, and S4 Sacral fracture.  Her hospital stay has been complicated by insurance issues and now development of significant delirium.  She completed treatment for urinary tract infection and plan was to discharge to skilled nursing facility for rehab.  Her hospitalization has been prolonged by insurance delays with approval.  She is now developed additional delirium.     E coli UTI-s/p a 5 day course of ceftriaxone  Hypernatremia-mild. Due to dehydration. Resolved   Hypokalemia/hypomagnesemia-improved after replacement  S4 fracture-pain has been controlled  Hospital delirium-improving with scheduled zyprexa and delirium precautions  Paroxysmal afib-dabigatran, metoprolol  Hyperlipidemia-statin  GERD-ppi  History of stroke  dabigatran  for DVT prophylaxis.  Full code.  Discussed with nursing staff.  Anticipate discharge to SNU facility once arrangements have been made.      Rip Akins MD  [facility]  06/09/25  10:32 EDT

## 2025-06-09 NOTE — PLAN OF CARE
Goal Outcome Evaluation:  Plan of Care Reviewed With: patient        Progress: improving  Outcome Evaluation: sl, incont b/b, falls protocol, turns, more alert and verbalizing more-but still confused, bladder scan 4ml, incont stool

## 2025-06-09 NOTE — CASE MANAGEMENT/SOCIAL WORK
Case Management/Social Work    Patient Name:  Monica Scherer  YOB: 1933  MRN: [id number]  Admit Date:  5/27/2025        SPOKE TO HOME&COMMUNITY CARE REP THIS MORNING; APPEAL IS OVERTURNED/AUTH APPROVED, HOWEVER I AM AWCB FROM INSURANCE WITH THE AUTH APPROVAL DATE DETAILS & REVIEW DATE. CCP NOTIFIED AND ALL INFORMATION WILL BE PROVIDED TO TRILOGY/BEN ONCE IT IS AVAILABLE.    Electronically signed by:  KADEN Ledezma  06/09/25 10:32 EDT

## 2025-06-09 NOTE — CASE MANAGEMENT/SOCIAL WORK
Post-Acute Authorization Submission      Post Acute Pre-Cert Documentation  Request Submitted by Facility - Type:: Hospital  Post-Acute Authorization Type Submitted:: SNF  Date Post Acute Pre-Cert Inititated per Facility: 06/01/25  Verification from Payer: Yes  Date Post Acute Pre-Cert Completed: 06/03/25  Accepting Facility: [facility] Discharge Date Requested: 06/02/25  All Clinicals Submitted?: Yes  Had Accepting Facility at Time of Submission: Yes  Response Received from Insurance?: Approval  Action Taken by Requesting Facility:: Facility Appeal  Response Communicated to:: , Accepting Facility Liaison, Accepting Facility Auth Department  Authorization Number:: EXPEDITED APPEAL OVERTURNED - CASE# [id number] APPROVAL# [id number]  Post Acute Pre-Cert Initiated Comment: APPROVED 6/7/25 - 6/12/25              KADEN Ledezma

## 2025-06-11 NOTE — CASE MANAGEMENT/SOCIAL WORK
Case Management Discharge Note      Final Note: [facility] via [facility]         Selected Continued Care - Discharged on 6/9/2025 Admission date: 5/27/2025 - Discharge disposition: Skilled Nursing Facility (DC - External)      Destination Coordination complete.      Service Provider Services Address Phone Fax Patient Preferred    [facility] [address], [place] [address] [phone] --              Durable Medical Equipment    No services have been selected for the patient.                Dialysis/Infusion    No services have been selected for the patient.                Home Medical Care    No services have been selected for the patient.                Therapy    No services have been selected for the patient.                Community Resources    No services have been selected for the patient.                Community & DME    No services have been selected for the patient.                    Selected Continued Care - Episodes Includes continued care and service providers with selected services from the active episodes listed below          Selected Continued Care - Prior Encounters Includes continued care and service providers with selected services from prior encounters from 2/26/2025 to 6/9/2025      Discharged on 3/3/2025 Admission date: 2/26/2025 - Discharge disposition: Skilled Nursing Facility (DC - External)      Destination       Service Provider Services Address Phone Fax Patient Preferred    [facility] [address], [place] [address] [phone] --                          Transportation Services  Ambulance: [facility]    Final Discharge Disposition Code: 03 - skilled nursing facility (SNF)

## 2025-06-23 DIAGNOSIS — G30.1 MODERATE LATE ONSET ALZHEIMER'S DEMENTIA WITH OTHER BEHAVIORAL DISTURBANCE: Primary | ICD-10-CM

## 2025-06-23 DIAGNOSIS — F02.B18 MODERATE LATE ONSET ALZHEIMER'S DEMENTIA WITH OTHER BEHAVIORAL DISTURBANCE: Primary | ICD-10-CM

## 2025-06-23 RX ORDER — OLANZAPINE 5 MG/1
5 TABLET, FILM COATED ORAL NIGHTLY
Qty: 90 TABLET | Refills: 1 | Status: ON HOLD | OUTPATIENT
Start: 2025-06-23

## 2025-06-23 NOTE — PROGRESS NOTES
Reports mom arun sedated on olanzepine;  will cut to nightly when gets home; Getting as very aggressive.

## 2025-06-25 PROBLEM — K92.2 GI BLEED: Status: ACTIVE | Noted: 2025-06-25

## 2025-06-25 NOTE — ED PROVIDER NOTES
EMERGENCY DEPARTMENT ENCOUNTER  Room Number:  36/36  PCP: Amilcar Mauricio DO  Independent Historians: Historian: Patient and EMS  Date of encounter:  6/25/2025  Patient Care Team:  Amilcar Mauricio DO as PCP - General (Family Medicine)  Fatou Crawford RN as Ambulatory  (Population Health)  Omar Bassett MD as Consulting Physician (Cardiology)     HPI:  Chief Complaint: had concerns including Rectal Bleeding.     A complete HPI/ROS/PMH/PSH/SH/FH are unobtainable due to: EM_Unobtainable History : Dementia    Chronic or social conditions impacting patient care (Social Determinants of Health): None    Context: Monica Scherer is a 89 y.o. female with a medical history of dementia, GERD, hypertension, CVA, atrial fibrillation on Pradaxa who presents to the ED c/o acute rectal bleeding.  Patient cannot provide much history but per report from EMS, patient began having rectal bleeding this morning.  She reports intermittent lower abdominal pain.  No nausea, vomiting or fevers.  Patient is anticoagulated on Pradaxa.  Patient was not aware she was having rectal bleeding.    Review of prior external notes (non-ED) -and- Review of prior external test results outside of this encounter: Discharge summary from 6/9/2025 reviewed.  She was admitted for weakness and a fall secondary to E. coli UTI and electrolyte derangement.  She completed a 5-day course of antibiotics and electrolytes were repleted.    PAST MEDICAL HISTORY  Active Ambulatory Problems     Diagnosis Date Noted    Vertigo 06/14/2016    Temporary cerebral vascular dysfunction 06/14/2016    Gastroesophageal reflux disease with esophagitis 06/14/2016    Degeneration of intervertebral disc of cervical region 06/14/2016    Prediabetes 06/14/2016    S/P cholecystectomy 06/14/2016    Osteoarthritis of knee 10/31/2016    Herpes zoster without complication 11/08/2016    Hypertension 02/18/2018    Duodenal ulcer 03/13/2018    History of pancreatitis  03/22/2018    Hyperlipidemia 07/19/2019    TIA (transient ischemic attack) 01/04/2020    Cerebrovascular accident (CVA) due to thrombosis of left posterior cerebral artery 04/07/2020    Paroxysmal atrial fibrillation 04/07/2020    General weakness 09/10/2021    History of CVA (cerebrovascular accident) 09/10/2021    Anticoagulated 09/10/2021    Hematuria 09/11/2021    Dizziness and giddiness 09/11/2021    Rectal bleeding 09/12/2021    Acute CVA (cerebrovascular accident) 02/26/2025     Resolved Ambulatory Problems     Diagnosis Date Noted    Acute cholecystitis 06/07/2016    Benign essential hypertension 06/14/2016    Indigestion 10/31/2016    Acute viral bronchitis 01/23/2018    Acute biliary pancreatitis 02/18/2018    Acute UTI 05/27/2025     Past Medical History:   Diagnosis Date    Atrial fibrillation     Lacunar infarct, acute 01/2020    New onset atrial fibrillation 01/2020    Prolapsed uterus     Stroke     Weakness        PAST SURGICAL HISTORY  Past Surgical History:   Procedure Laterality Date    APPENDECTOMY      CHOLECYSTECTOMY N/A 6/8/2016    Procedure: CHOLECYSTECTOMY LAPAROSCOPIC;  Surgeon: Russ Gar MD;  Location:  [facility] [place];  Service:     COLONOSCOPY N/A 9/16/2021    Procedure: COLONOSCOPY TO CECUM WITH HOT SNARE POLYPECTOMIES AND COLD BX POLYPECTOMY;  Surgeon: Emerson Izaguirre MD;  [facility];  Service: Gastroenterology;  Laterality: N/A;  pre: RECTAL BLEEDING, FAMILY HX OF COLON CANCER  post: INTERNAL AND EXTERNAL HEMORRHOIDS, DIVERTICULOSIS, POLYPS    ENDOSCOPY N/A 2/22/2018    Z-line regular, 35 cm from incisors, normal esophagus, gastritis, bilious gastric fluid, one non-bleeding duodenal ulcer w/no stigmata of bleeding, Path: DUODENUM: FRAGMENTS OF GASTRIC TYPE MUCOSA WITH HYPERPLASIA (FOVEOLAR) AND PATCHY MIXED INFLAMMATION IN THE LAMINA PROPRIA WITH FOCAL FIBROSIS, COMMENT: 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     EYE SURGERY      tre cataracts       FAMILY  "HISTORY  No family history on file.    SOCIAL HISTORY  Social History     Socioeconomic History    Marital status: Single   Tobacco Use    Smoking status: Never     Passive exposure: Never    Smokeless tobacco: Never    Tobacco comments:     caffeine use- \"rare\"   Vaping Use    Vaping status: Never Used   Substance and Sexual Activity    Alcohol use: No    Drug use: No    Sexual activity: Not Currently       ALLERGIES  Cephalexin and Latex    REVIEW OF SYSTEMS  Review of Systems  Included in HPI  All systems reviewed and negative except for those discussed in HPI.    PHYSICAL EXAM    I have reviewed the triage vital signs and nursing notes.    ED Triage Vitals [06/25/25 1232]   Temp Heart Rate Resp BP SpO2   98.8 °F (37.1 °C) 60 16 110/47 95 %      Temp src Heart Rate Source Patient Position BP Location FiO2 (%)   -- -- -- -- --       Physical Exam  Exam conducted with a chaperone present (ALEJANDRA Ricketts).   Constitutional:       General: She is not in acute distress.     Appearance: Normal appearance. She is not ill-appearing or toxic-appearing.   HENT:      Head: Normocephalic and atraumatic.      Nose: Nose normal.      Mouth/Throat:      Mouth: Mucous membranes are moist.      Pharynx: Oropharynx is clear.   Eyes:      Extraocular Movements: Extraocular movements intact.      Conjunctiva/sclera: Conjunctivae normal.      Pupils: Pupils are equal, round, and reactive to light.   Cardiovascular:      Rate and Rhythm: Normal rate and regular rhythm.      Pulses: Normal pulses.      Heart sounds: Normal heart sounds. No murmur heard.     No friction rub. No gallop.   Pulmonary:      Effort: Pulmonary effort is normal. No respiratory distress.      Breath sounds: Normal breath sounds. No stridor. No wheezing, rhonchi or rales.   Abdominal:      General: Abdomen is flat. There is no distension.      Palpations: Abdomen is soft.      Tenderness: There is no abdominal tenderness. There is no guarding or rebound.      " Comments: No fissure, several large nonbleeding external hemorrhoids, normal rectal tone, no melena + dark red blood per rectum, bedside Hemoccult positive     Musculoskeletal:      Cervical back: Normal range of motion and neck supple.   Skin:     General: Skin is warm and dry.   Neurological:      General: No focal deficit present.      Mental Status: She is alert and oriented to person, place, and time. Mental status is at baseline.   Psychiatric:         Mood and Affect: Mood normal.         Behavior: Behavior normal.         Thought Content: Thought content normal.         Judgment: Judgment normal.         LAB RESULTS  Recent Results (from the past 24 hours)   Comprehensive Metabolic Panel    Collection Time: 06/25/25 12:59 PM    Specimen: Blood   Result Value Ref Range    Glucose 151 (H) 65 - 99 mg/dL    BUN 29.0 (H) 8.0 - 23.0 mg/dL    Creatinine 1.18 (H) 0.57 - 1.00 mg/dL    Sodium 143 136 - 145 mmol/L    Potassium 3.8 3.5 - 5.2 mmol/L    Chloride 108 (H) 98 - 107 mmol/L    CO2 24.7 22.0 - 29.0 mmol/L    Calcium 8.9 8.2 - 9.6 mg/dL    Total Protein 6.1 6.0 - 8.5 g/dL    Albumin 3.2 (L) 3.5 - 5.2 g/dL    ALT (SGPT) 7 1 - 33 U/L    AST (SGOT) 14 1 - 32 U/L    Alkaline Phosphatase 71 39 - 117 U/L    Total Bilirubin 0.5 0.0 - 1.2 mg/dL    Globulin 2.9 gm/dL    A/G Ratio 1.1 g/dL    BUN/Creatinine Ratio 24.6 7.0 - 25.0    Anion Gap 10.3 5.0 - 15.0 mmol/L    eGFR 43.4 (L) >60.0 mL/min/1.73   Protime-INR    Collection Time: 06/25/25 12:59 PM    Specimen: Blood   Result Value Ref Range    Protime 21.2 (H) 11.7 - 14.2 Seconds    INR 1.82 (H) 0.90 - 1.10   aPTT    Collection Time: 06/25/25 12:59 PM    Specimen: Blood   Result Value Ref Range    PTT 50.7 (H) 22.7 - 35.4 seconds   CBC Auto Differential    Collection Time: 06/25/25 12:59 PM    Specimen: Blood   Result Value Ref Range    WBC 9.47 3.40 - 10.80 10*3/mm3    RBC 3.35 (L) 3.77 - 5.28 10*6/mm3    Hemoglobin 10.2 (L) 12.0 - 15.9 g/dL    Hematocrit 31.9 (L) 34.0  - 46.6 %    MCV 95.2 79.0 - 97.0 fL    MCH 30.4 26.6 - 33.0 pg    MCHC 32.0 31.5 - 35.7 g/dL    RDW 15.4 12.3 - 15.4 %    RDW-SD 53.2 37.0 - 54.0 fl    MPV 9.9 6.0 - 12.0 fL    Platelets 219 140 - 450 10*3/mm3    Neutrophil % 74.3 42.7 - 76.0 %    Lymphocyte % 14.8 (L) 19.6 - 45.3 %    Monocyte % 7.6 5.0 - 12.0 %    Eosinophil % 2.5 0.3 - 6.2 %    Basophil % 0.3 0.0 - 1.5 %    Immature Grans % 0.5 0.0 - 0.5 %    Neutrophils, Absolute 7.03 (H) 1.70 - 7.00 10*3/mm3    Lymphocytes, Absolute 1.40 0.70 - 3.10 10*3/mm3    Monocytes, Absolute 0.72 0.10 - 0.90 10*3/mm3    Eosinophils, Absolute 0.24 0.00 - 0.40 10*3/mm3    Basophils, Absolute 0.03 0.00 - 0.20 10*3/mm3    Immature Grans, Absolute 0.05 0.00 - 0.05 10*3/mm3    nRBC 0.0 0.0 - 0.2 /100 WBC   Magnesium    Collection Time: 06/25/25 12:59 PM    Specimen: Blood   Result Value Ref Range    Magnesium 2.0 1.7 - 2.3 mg/dL   Phosphorus    Collection Time: 06/25/25 12:59 PM    Specimen: Blood   Result Value Ref Range    Phosphorus 3.2 2.5 - 4.5 mg/dL       RADIOLOGY  CT Abdomen Pelvis With Contrast  Result Date: 6/25/2025  CT ABDOMEN PELVIS W CONTRAST-  HISTORY: Lower abdominal pain with rectal bleeding. Rectal hemorrhage.  TECHNIQUE:  CT abdomen and pelvis includes axial imaging from the lung bases to the trochanters with intravenous contrast and with use of oral contrast. Data reconstructed in coronal and sagittal planes. Radiation dose reduction techniques were utilized, including automated exposure control and exposure modulation based on body size.  COMPARISON: CT abdomen and pelvis 03/18/2024, 05/27/2025  FINDINGS: Mild dependent lower lobe atelectasis. Cardiomegaly.  Liver, spleen, adrenal glands, pancreas appear within normal limits. Previous cholecystectomy. Stomach mostly decompressed.  Small renal cysts and renal cortical thinning. No hydronephrosis.  No bowel dilatation or evidence for bowel obstruction. Colonic diverticulosis without evidence for diver  reticulitis.  Abnormal rectal wall thickening with perirectal stranding and inflammation consistent with colitis/proctitis. Pelvic floor relaxation. Dense material within the rectal lumen distally though not possible to differentiate dense material within colon from extravasation.      1. Abnormal rectal wall thickening with perirectal stranding/inflammation consistent with colitis/proctitis. Pelvic floor relaxation. Dense material within the rectal lumen though not possible to differentiate dense material within the colon from extravasation of contrast. 2. Colonic diverticulosis without evidence for diverticulitis. 3. Mild dependent lower lobe atelectasis. 4. Previous cholecystectomy.         MEDICATIONS GIVEN IN ER  Medications   pantoprazole (PROTONIX) injection 80 mg (80 mg Intravenous Given 6/25/25 1315)     And   pantoprazole (PROTONIX) 40 mg in sodium chloride 0.9 % 100 mL (0.4 mg/mL) MBP (8 mg/hr Intravenous New Bag 6/25/25 1319)   ampicillin-sulbactam (UNASYN) 3 g in sodium chloride 0.9 % 100 mL MBP (has no administration in time range)   sodium chloride 0.9 % bolus 1,000 mL (0 mL Intravenous Stopped 6/25/25 1422)   iopamidol (ISOVUE-300) 61 % injection 100 mL (85 mL Intravenous Given by Other 6/25/25 1405)       ORDERS PLACED DURING THIS VISIT:  Orders Placed This Encounter   Procedures    Blood Culture - Blood,    Blood Culture - Blood,    CT Abdomen Pelvis With Contrast    Comprehensive Metabolic Panel    Protime-INR    aPTT    CBC Auto Differential    Magnesium    Phosphorus    Type & Screen    Initiate Emergency Department Observation Status    CBC & Differential       OUTPATIENT MEDICATION MANAGEMENT:  Current Facility-Administered Medications Ordered in Epic   Medication Dose Route Frequency Provider Last Rate Last Admin    ampicillin-sulbactam (UNASYN) 3 g in sodium chloride 0.9 % 100 mL MBP  3 g Intravenous Once Anish Adkins MD        pantoprazole (PROTONIX) 40 mg in sodium chloride 0.9 % 100  mL (0.4 mg/mL) MBP  8 mg/hr Intravenous Continuous Anish Adkins MD 20 mL/hr at 06/25/25 1319 8 mg/hr at 06/25/25 1319     Current Outpatient Medications Ordered in Epic   Medication Sig Dispense Refill    acetaminophen (TYLENOL) 325 MG tablet Take 2 tablets by mouth Every 4 (Four) Hours As Needed for Mild Pain.      atorvastatin (LIPITOR) 40 MG tablet Take 1 tablet by mouth Every Night. 30 tablet 2    Calcium Citrate-Vitamin D 250-2.5 MG-MCG per tablet Take 2 tablets by mouth 2 (Two) Times a Day.      dabigatran etexilate (PRADAXA) 150 MG capsu Take 1 capsule by mouth Every 12 (Twelve) Hours. 60 capsule 2    metoprolol tartrate (LOPRESSOR) 25 MG tablet Take 1 tablet by mouth 2 (Two) Times a Day. 60 tablet 2    OLANZapine (zyPREXA) 5 MG tablet Take 1 tablet by mouth Every Night. 90 tablet 1    pantoprazole (PROTONIX) 40 MG EC tablet Take 1 tablet by mouth Every Morning. (Patient taking differently: Take 1 tablet by mouth Daily.) 30 tablet 2       PROCEDURES  Procedures    PROGRESS, DATA ANALYSIS, CONSULTS, AND MEDICAL DECISION MAKING  All labs have been independently interpreted by me.  All radiology studies have been reviewed by me. All EKG's have been independently viewed and interpreted by me.  Discussion below represents my analysis of pertinent findings related to patient's condition, differential diagnosis, treatment plan and final disposition.    Differential diagnosis includes but is not limited to bleeding, colitis, diverticulitis, anemia.    Clinical Scores:                   ED Course as of 06/25/25 1443   Wed Jun 25, 2025   1312 Hemoglobin(!): 10.2  Down 1 point from prior [AB]   1338 INR(!): 1.82 [AB]   1338 Glucose(!): 151 [AB]   1338 BUN(!): 29.0 [AB]   1338 Creatinine(!): 1.18 [AB]   1409 CT Abdomen Pelvis With Contrast  My independent interpretation of the imaging study is no bowel obstruction or free air [AB]   1442 Phone call with Nilam with ED OBS.  Discussed the patient, relevant history,  exam, diagnostics, ED findings/progress, and concerns. They agree to admit the patient to ED OBS. Care assumed by the admitting physician at this time. [AB]   1443 MDM: Patient presents with rectal bleeding since this morning.  She is anticoagulated on Pradaxa.  Found to have colitis on CT imaging.  Hemoglobin is approximately a point lower than prior checks.  Patient is hemodynamically stable.  Given IV fluids and Protonix.  Treated with Unasyn.  Blood cultures collected.  Patient does not meet SIRS criteria, lactate not indicated at this time. [AB]      ED Course User Index  [AB] Anish Adkins MD             AS OF 14:43 EDT VITALS:    BP - 102/46  HR - 66  TEMP - 98.8 °F (37.1 °C)  O2 SATS - 98%    COMPLEXITY OF CARE  The patient requires admission.      DIAGNOSIS  Final diagnoses:   Rectal bleeding   Colitis         DISPOSITION  ED Disposition       ED Disposition   Decision to Admit    Condition   --    Comment   --                Please note that portions of this document were completed with a voice recognition program.    Note Disclaimer: At [facility], we believe that sharing information builds trust and better relationships. You are receiving this note because you recently visited [facility]. It is possible you will see health information before a provider has talked with you about it. This kind of information can be easy to misunderstand. To help you fully understand what it means for your health, we urge you to discuss this note with your provider.         Anish Adkins MD  06/25/25 [id number]

## 2025-06-25 NOTE — CONSULTS
[facility]  Initial Inpatient Consult Note    Referring Provider: Dr Watson    Reason for Consultation: Rectal bleeding    Subjective     History of present illness:    89 y.o. female with hx of dementia, Afib on Pradax, presents to ER with c/o rectal bleeding.   Symptoms onset this AM.  CT in ER reviewed, rectal wall thickening and perirectal stranding c/w proctocolitis.  WBC normal. Hb is 10, baseline around 11.   Last colonoscopy 2021 with Dr Izaguirre done for indication of rectal bleeding noted below:    COLONOSCOPY (09/16/2021 13:21)     Pt pleasantly confused, offers no collateral hx.     Past Medical History:  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    New onset atrial fibrillation 01/2020    now on rate control along with Eliquis    Prolapsed uterus     per patient    Stroke     TIA (transient ischemic attack) 01/2020    Weakness      Past Surgical History:  Past Surgical History:   Procedure Laterality Date    APPENDECTOMY      CHOLECYSTECTOMY N/A 6/8/2016    Procedure: CHOLECYSTECTOMY LAPAROSCOPIC;  Surgeon: Russ Gar MD;  [place]  [facility] [place] [facility] [place]:     COLONOSCOPY N/A 9/16/2021    Procedure: COLONOSCOPY TO CECUM WITH HOT SNARE POLYPECTOMIES AND COLD BX POLYPECTOMY;  Surgeon: Emerson Izaguirre MD;  [facility];  Service: Gastroenterology;  Laterality: N/A;  pre: RECTAL BLEEDING, FAMILY HX OF COLON CANCER  post: INTERNAL AND EXTERNAL HEMORRHOIDS, DIVERTICULOSIS, POLYPS    ENDOSCOPY N/A 2/22/2018    Z-line regular, 35 cm from incisors, normal esophagus, gastritis, bilious gastric fluid, one non-bleeding duodenal ulcer w/no stigmata of bleeding, Path: DUODENUM: FRAGMENTS OF GASTRIC TYPE MUCOSA WITH HYPERPLASIA (FOVEOLAR) AND PATCHY MIXED INFLAMMATION IN THE LAMINA PROPRIA WITH FOCAL FIBROSIS, COMMENT:  "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     EYE SURGERY      tre cataracts      Social History:   Social History     Tobacco Use    Smoking status: Never     Passive exposure: Never    Smokeless tobacco: Never    Tobacco comments:     caffeine use- \"rare\"   Substance Use Topics    Alcohol use: No      Family History:  No family history on file.    Home Meds:  (Not in a hospital admission)    Current Meds:   ampicillin-sulbactam, 3 g, Intravenous, Once  sodium chloride, 10 mL, Intravenous, Q12H      Allergies:  Allergies   Allergen Reactions    Cephalexin Rash    Latex Itching       Objective     Vital Signs  Temp:  [98.8 °F (37.1 °C)] 98.8 °F (37.1 °C)  Heart Rate:  [60-80] 66  Resp:  [14-16] 14  BP: (102-125)/(46-98) 102/46  Physical Exam:  General Appearance:     Alert, cooperative, in no acute distress   Abdomen:     Normal bowel sounds, no masses, no organomegaly, soft     nontender, nondistended, no guarding, no rebound                 tenderness   Rectal:     Deferred       Results Review:   I reviewed the patient's new clinical results.    Results from last 7 days   Lab Units 06/25/25  1259   WBC 10*3/mm3 9.47   HEMOGLOBIN g/dL 10.2*   HEMATOCRIT % 31.9*   PLATELETS 10*3/mm3 219     Results from last 7 days   Lab Units 06/25/25  1259   SODIUM mmol/L 143   POTASSIUM mmol/L 3.8   CHLORIDE mmol/L 108*   CO2 mmol/L 24.7   BUN mg/dL 29.0*   CREATININE mg/dL 1.18*   CALCIUM mg/dL 8.9   BILIRUBIN mg/dL 0.5   ALK PHOS U/L 71   ALT (SGPT) U/L 7   AST (SGOT) U/L 14   GLUCOSE mg/dL 151*     Results from last 7 days   Lab Units 06/25/25  1259   INR  1.82*     Lab Results   Lab Value Date/Time    LIPASE 10 (L) 03/19/2024 0736    LIPASE 11 (L) 10/16/2023 1642    LIPASE 202 (H) 02/24/2018 0508    LIPASE 146 (H) 02/23/2018 0614    LIPASE 145 (H) 02/22/2018 0302    LIPASE 193 (H) 02/21/2018 1456    LIPASE 547 (H) 02/20/2018 0410    LIPASE >600 (H) 02/19/2018 0408    LIPASE >600 (H) 02/18/2018 1349    LIPASE 11 (L) " 06/07/2016 2006       Radiology:  CT Abdomen Pelvis With Contrast   Preliminary Result   1. Abnormal rectal wall thickening with perirectal   stranding/inflammation consistent with colitis/proctitis. Pelvic floor   relaxation. Dense material within the rectal lumen though not possible   to differentiate dense material within the colon from extravasation of   contrast.   2. Colonic diverticulosis without evidence for diverticulitis.   3. Mild dependent lower lobe atelectasis.   4. Previous cholecystectomy.                  Assessment & Plan   Assessment:   Rectal bleeding  Protcolitis by CT scan  Afib on Pradaxa    Plan:   Abx started in ER, continue   Check stool studies  Ok for CLD  Hold DOAC  Trend H/H  Can DC protonix gtt, very low suspicion for upper GI source    I discussed the patients findings and my recommendations with patient.         Donnie Renteria M.D.  [facility]  [address] [place] [address]  Office: [phone]

## 2025-06-25 NOTE — PROGRESS NOTES
[facility] Clinical Pharmacy Services: [facility] Consult    Pt Name: Monica Scherer   : 1933  Weight: 64.1 kg  Antibiotic: ampicillin-sulbactam  Indication: Intra-Abdominal Infection    Relevant clinical data and objective history reviewed:    Past Medical History:   Diagnosis Date    Atrial fibrillation     Hyperlipidemia     Hypertension     Lacunar infarct, acute 2020    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    New onset atrial fibrillation 2020    now on rate control along with Eliquis    Prolapsed uterus     per patient    Stroke     TIA (transient ischemic attack) 2020    Weakness      Creatinine   Date Value Ref Range Status   2025 1.18 (H) 0.57 - 1.00 mg/dL Final   2025 1.03 (H) 0.57 - 1.00 mg/dL Final   2025 0.91 0.57 - 1.00 mg/dL Final   2020 0.90 0.60 - 1.30 mg/dL Final     Comment:     Serial Number: [id number]:  [id number]     BUN   Date Value Ref Range Status   2025 29.0 (H) 8.0 - 23.0 mg/dL Final     Estimated Creatinine Clearance: 30.8 mL/min (A) (by C-G formula based on SCr of 1.18 mg/dL (H)).    Lab Results   Component Value Date    WBC 9.47 2025     Temp Readings from Last 3 Encounters:   25 98.8 °F (37.1 °C)   25 97.2 °F (36.2 °C) (Oral)   25 98.5 °F (36.9 °C) (Temporal)      Assessment/Plan    Ordered ampicillin-sulbactam 3 g q6h for a total of 5 days. If renal fx worsens will need to extend interval to q12h; pharmacy will continue to follow. Will monitor and adjust if culture data or pertinent lab values indicate this is best for the patient.     Thank you for this consult and please contact pharmacy with any questions or concerns.     Marybeth Chaudhari [facility]  Clinical Pharmacist

## 2025-06-25 NOTE — PLAN OF CARE
Goal Outcome Evaluation:              Outcome Evaluation: pt admitted for GI bleed.

## 2025-06-25 NOTE — ED TRIAGE NOTES
Pt from home via ems, daughter called for bright red rectal bleeding, started yesterday with spotting but increased today, on pradaxa; hx of dementia, alert to self only

## 2025-06-25 NOTE — NURSING NOTE
Pt is refusing all blood draws. This nurse educated her that we need to trend her hgb levels because she is here for a GI bleed. Provider aware.

## 2025-06-25 NOTE — H&P
[facility]   HISTORY AND PHYSICAL    Patient Name: Monica Scherer  : 1933  MRN: [id number]  Primary Care Physician:  Amilcar Mauricio DO  Date of admission: 2025    Subjective   Subjective     Chief Complaint:   Chief Complaint   Patient presents with    Rectal Bleeding         HPI:    Monica Scherer is a 89 y.o. female with past medical history of A-fib on Pradaxa, hypertension, hyperlipidemia, dementia, and history of TIA and strokes who presents with reports of bright red blood per rectum that started on the evening of 2025 and saturated her adult diaper.  Daughter reports that she had another saturated adult diaper on the morning of 2025 that also saturated a pad underneath the patient.  Patient's daughter got her to the restroom and patient continued to have bright red blood while in the toilet.  Daughter reports that the patient's bowel movements have appeared liquidy as of this morning.  Daughter and patient deny any fevers or chills.  Patient said yes to abdominal pain but unable to give any further details; daughter reports that she has had generalized moans throughout the evening and reported that her stomach hurt throughout.  Daughter reports that the patient has a history of GERD and PUD but denies any history of GI bleeds.  Daughter reports that the patient was recently hospitalized for an acute UTI and was treated for antibiotics at the end of May; patient was discharged to a rehab she was started on more antibiotics for a new UTI 2 days after arrival and completed that therapy before discharge from the rehab center.  Patient's daughter does report that she held the patient's Pradaxa on the morning of 2025 secondary to the GI bleed.    Review of Systems   All systems were reviewed and negative except for: All pertinent findings listed in the above HPI.    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    New onset atrial fibrillation 01/2020    now on rate control along with Eliquis    Prolapsed uterus     per patient    Stroke     TIA (transient ischemic attack) 01/2020    Weakness        Past Surgical History:   Procedure Laterality Date    APPENDECTOMY      CHOLECYSTECTOMY N/A 6/8/2016    Procedure: CHOLECYSTECTOMY LAPAROSCOPIC;  Surgeon: Russ Gar MD;  [place] [facility] [place] [facility] [place]  Service:     COLONOSCOPY N/A 9/16/2021    Procedure: COLONOSCOPY TO CECUM WITH HOT SNARE POLYPECTOMIES AND COLD BX POLYPECTOMY;  Surgeon: Emerson Izaguirre MD;  Location: [facility] ENDOSCOPY;  Service: Gastroenterology;  Laterality: N/A;  pre: RECTAL BLEEDING, FAMILY HX OF COLON CANCER  post: INTERNAL AND EXTERNAL HEMORRHOIDS, DIVERTICULOSIS, POLYPS    ENDOSCOPY N/A 2/22/2018    Z-line regular, 35 cm from incisors, normal esophagus, gastritis, bilious gastric fluid, one non-bleeding duodenal ulcer w/no stigmata of bleeding, Path: DUODENUM: FRAGMENTS OF GASTRIC TYPE MUCOSA WITH HYPERPLASIA (FOVEOLAR) AND PATCHY MIXED INFLAMMATION IN THE LAMINA PROPRIA WITH FOCAL FIBROSIS, COMMENT: 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     EYE SURGERY      tre cataracts       Family History: family history is not on file. Otherwise pertinent FHx was reviewed and not pertinent to current issue.    Social History:  reports that she has never smoked. She has never been exposed to tobacco smoke. She has never used smokeless tobacco. She reports that she does not drink alcohol and does not use drugs.    Home Medications:  Calcium Citrate-Vitamin D, OLANZapine, acetaminophen, atorvastatin, dabigatran etexilate, metoprolol tartrate, and pantoprazole    Allergies:  Allergies   Allergen Reactions    Cephalexin Rash    Latex Itching       Objective   Objective     Vitals:   Temp:  [98.8 °F (37.1 °C)] 98.8 °F (37.1 °C)  Heart Rate:  [60-80] 70  Resp:   [14-19] 19  BP: (101-125)/(46-98) 101/59  Physical Exam    Constitutional: Awake, alert, chronically ill-appearing female but not acutely toxic   Eyes: PERRL, sclerae anicteric, no conjunctival injection, EOMI   HENT: NCAT, mucous membranes moist, normal hearing   Neck: Supple, nontender, trachea midline   Respiratory: Clear to auscultation bilaterally, nonlabored respirations on room air   Cardiovascular: RRR, no murmurs, palpable pedal pulses bilaterally   Gastrointestinal: Positive bowel sounds, soft, nontender, nondistended   Musculoskeletal: No bilateral ankle edema, no clubbing or cyanosis to extremities   Psychiatric: Appropriate affect, cooperative   Neurologic: Oriented x 1, strength symmetric in all extremities, Cranial Nerves grossly intact to confrontation, speech clear   Skin: No rashes     Result Review    Result Review:  I have personally reviewed the results from the time of this admission to 6/25/2025 15:38 EDT and agree with these findings:  [x]  Laboratory list / accordion  []  Microbiology  [x]  Radiology  []  EKG/Telemetry   []  Cardiology/Vascular   []  Pathology  []  Old records  []  Other:  Most notable findings include: Potassium 3.8, serum creatinine 1.18, magnesium 2, AST ALT within normal limits, INR 1.82, hemoglobin 10.2 (baseline appears to be about 12).  CT abdomen pelvis with shows abnormal rectal wall thickening with Perirectal stranding/inflammation consistent with colitis and proctitis, colonic diverticulosis without evidence of diverticulitis.  Blood cultures pending      Assessment & Plan   The ASCVD Risk score (Flatgap DK, et al., 2019) failed to calculate for the following reasons:    The 2019 ASCVD risk score is only valid for ages 40 to 79    Risk score cannot be calculated because patient has a medical history suggesting prior/existing ASCVD    Assessment / Plan     Brief Patient Summary:  Monica Scherer is a 89 y.o. female who is admitted for GI bleed    [facility]  Problems:  Active Hospital Problems    Diagnosis     **GI bleed      Plan:     GI bleed:  - CT abdomen pelvis shows perirectal stranding and inflammation consistent with colitis and proctitis  - WBC within normal limits (9.47)  - Hemoglobin 10.2; will repeat this evening and in the a.m.  - Holding Pradaxa  - GI PCR and C. difficile panels pending; patient has recently had 2 rounds of antibiotics during recent hospitalization and inpatient rehab  - GI following and okay with clear liquid diet, holding Pradaxa and recommends discontinuing PPI as this does not sound like an upper GI bleed and they will reevaluate in the morning.    Chronic hypertension  Chronic hyperlipidemia  Chronic A-fib:  - Continue home regimen other than holding Pradaxa per primary issue    Dementia:  - Continue home regimen  -Behavioral redirection as needed      VTE Prophylaxis:  Mechanical VTE prophylaxis orders are present.        CODE STATUS:    Code Status (Patient has no pulse and is not breathing): CPR (Attempt to Resuscitate)  Medical Interventions (Patient has pulse or is breathing): Full Support  Level Of Support Discussed With: Patient    Admission Status:  I believe this patient meets observation status.    Electronically signed by Nilam Ruth PA-C, 06/25/25, 3:38 PM EDT.        75 minutes has been spent by [facility] providers in the care of this patient while under observation status on this date 06/25/25        I have worn appropriate PPE during this patient encounter, sanitized my hands both with entering and exiting patient's room.    I have discussed plan of care with patient including advance care plan and/or surrogate decision maker.  Patient advises that their daughter Raquel will be their primary surrogate decision maker

## 2025-06-25 NOTE — PROGRESS NOTES
Clinical Pharmacy Services: Medication History    Monica Scherer is a 89 y.o. female presenting to [facility] for   Chief Complaint   Patient presents with    Rectal Bleeding       She  has a past medical history of Atrial fibrillation, Hyperlipidemia, Hypertension, Lacunar infarct, acute (01/2020), New onset atrial fibrillation (01/2020), Prolapsed uterus, Stroke, TIA (transient ischemic attack) (01/2020), and Weakness.    Allergies as of 06/25/2025 - Reviewed 06/25/2025   Allergen Reaction Noted    Cephalexin Rash 01/23/2018    Latex Itching 06/07/2016       Medication information was obtained from: Family Member   Pharmacy and Phone Number:     Prior to Admission Medications       Prescriptions Last Dose Informant Patient Reported? Taking?    acetaminophen (TYLENOL) 325 MG tablet  Family Member No Yes    Take 2 tablets by mouth Every 4 (Four) Hours As Needed for Mild Pain.    atorvastatin (LIPITOR) 40 MG tablet  Family Member No Yes    Take 1 tablet by mouth Every Night.    Calcium Citrate-Vitamin D 250-2.5 MG-MCG per tablet  Family Member No Yes    Take 2 tablets by mouth 2 (Two) Times a Day.    dabigatran etexilate (PRADAXA) 150 MG capsu  Family Member No Yes    Take 1 capsule by mouth Every 12 (Twelve) Hours.    metoprolol tartrate (LOPRESSOR) 25 MG tablet  Family Member No Yes    Take 1 tablet by mouth 2 (Two) Times a Day.    OLANZapine (zyPREXA) 5 MG tablet  Family Member No Yes    Take 1 tablet by mouth Every Night.    pantoprazole (PROTONIX) 40 MG EC tablet  Family Member No Yes    Take 1 tablet by mouth Every Morning.    Patient taking differently:  Take 1 tablet by mouth Daily.              Medication notes:     This medication list is complete to the best of my knowledge as of 6/25/2025    Please call if questions.    Leatha Bo  Medication History Technician   [phone]    6/25/2025 13:46 EDT

## 2025-06-25 NOTE — ED NOTES
Nursing report ED to floor  Monica Scherer  89 y.o.  female    HPI :  HPI  Stated Reason for Visit: rectal bleeding  History Obtained From: EMS    Chief Complaint  Chief Complaint   Patient presents with    Rectal Bleeding       Admitting doctor:   Ivan Watson MD    Admitting diagnosis:   The primary encounter diagnosis was Rectal bleeding. A diagnosis of Colitis was also pertinent to this visit.    Code status:   Current Code Status       Date Active Code Status Order ID Comments User Context       Prior            Allergies:   Cephalexin and Latex    Isolation:   No active isolations    Intake and Output    Intake/Output Summary (Last 24 hours) at 6/25/2025 1447  Last data filed at 6/25/2025 1422  Gross per 24 hour   Intake 1000 ml   Output --   Net 1000 ml       Weight:   There were no vitals filed for this visit.    Most recent vitals:   Vitals:    06/25/25 1330 06/25/25 1421 06/25/25 1430 06/25/25 1431   BP: 110/98 125/68 102/46    Pulse: 71 80  66   Resp: 15 14     Temp:       SpO2: 96% 96%  98%       Active LDAs/IV Access:   Lines, Drains & Airways       Active LDAs       Name Placement date Placement time Site Days    Peripheral IV 06/25/25 1259 20 G Left;Lower;Posterior Forearm 06/25/25  1259  Forearm  less than 1    External Urinary Catheter 05/29/25  0906  --  27                    Labs (abnormal labs have a star):   Labs Reviewed   COMPREHENSIVE METABOLIC PANEL - Abnormal; Notable for the following components:       Result Value    Glucose 151 (*)     BUN 29.0 (*)     Creatinine 1.18 (*)     Chloride 108 (*)     Albumin 3.2 (*)     eGFR 43.4 (*)     All other components within normal limits    Narrative:     GFR Categories in Chronic Kidney Disease (CKD)              GFR Category          GFR (mL/min/1.73)    Interpretation  G1                    90 or greater        Normal or high (1)  G2                    60-89                Mild decrease (1)  G3a                   45-59                Mild  to moderate decrease  G3b                   30-44                Moderate to severe decrease  G4                    15-29                Severe decrease  G5                    14 or less           Kidney failure    (1)In the absence of evidence of kidney disease, neither GFR category G1 or G2 fulfill the criteria for CKD.    eGFR calculation 2021 CKD-EPI creatinine equation, which does not include race as a factor   PROTIME-INR - Abnormal; Notable for the following components:    Protime 21.2 (*)     INR 1.82 (*)     All other components within normal limits   APTT - Abnormal; Notable for the following components:    PTT 50.7 (*)     All other components within normal limits   CBC WITH AUTO DIFFERENTIAL - Abnormal; Notable for the following components:    RBC 3.35 (*)     Hemoglobin 10.2 (*)     Hematocrit 31.9 (*)     Lymphocyte % 14.8 (*)     Neutrophils, Absolute 7.03 (*)     All other components within normal limits   MAGNESIUM - Normal   PHOSPHORUS - Normal   BLOOD CULTURE   BLOOD CULTURE   GASTROINTESTINAL PANEL, PCR (PREFERRED) DOES NOT INCLUDE CDIFF   TYPE AND SCREEN   CBC AND DIFFERENTIAL    Narrative:     The following orders were created for panel order CBC & Differential.  Procedure                               Abnormality         Status                     ---------                               -----------         ------                     CBC Auto Differential[[id number]]        Abnormal            Final result                 Please view results for these tests on the individual orders.       EKG:   No orders to display       Meds given in ED:   Medications   pantoprazole (PROTONIX) injection 80 mg (80 mg Intravenous Given 6/25/25 1315)     And   pantoprazole (PROTONIX) 40 mg in sodium chloride 0.9 % 100 mL (0.4 mg/mL) MBP (8 mg/hr Intravenous New Bag 6/25/25 1319)   ampicillin-sulbactam (UNASYN) 3 g in sodium chloride 0.9 % 100 mL MBP (has no administration in time range)   sodium chloride 0.9 %  flush 10 mL (has no administration in time range)   sodium chloride 0.9 % flush 10 mL (has no administration in time range)   sodium chloride 0.9 % infusion 40 mL (has no administration in time range)   Potassium Replacement - Follow Nurse / BPA Driven Protocol (has no administration in time range)   Magnesium Standard Dose Replacement - Follow Nurse / BPA Driven Protocol (has no administration in time range)   Phosphorus Replacement - Follow Nurse / BPA Driven Protocol (has no administration in time range)   Calcium Replacement - Follow Nurse / BPA Driven Protocol (has no administration in time range)   acetaminophen (TYLENOL) tablet 650 mg (has no administration in time range)     Or   acetaminophen (TYLENOL) 160 MG/5ML oral solution 650 mg (has no administration in time range)     Or   acetaminophen (TYLENOL) suppository 650 mg (has no administration in time range)   ondansetron ODT (ZOFRAN-ODT) disintegrating tablet 4 mg (has no administration in time range)     Or   ondansetron (ZOFRAN) injection 4 mg (has no administration in time range)   sodium chloride 0.9 % bolus 1,000 mL (0 mL Intravenous Stopped 6/25/25 1422)   iopamidol (ISOVUE-300) 61 % injection 100 mL (85 mL Intravenous Given by Other 6/25/25 1405)       Imaging results:  CT Abdomen Pelvis With Contrast  Result Date: 6/25/2025  1. Abnormal rectal wall thickening with perirectal stranding/inflammation consistent with colitis/proctitis. Pelvic floor relaxation. Dense material within the rectal lumen though not possible to differentiate dense material within the colon from extravasation of contrast. 2. Colonic diverticulosis without evidence for diverticulitis. 3. Mild dependent lower lobe atelectasis. 4. Previous cholecystectomy.         Ambulatory status:   - assist    Social issues:   Social History     Socioeconomic History    Marital status: Single   Tobacco Use    Smoking status: Never     Passive exposure: Never    Smokeless tobacco: Never     "Tobacco comments:     caffeine use- \"rare\"   Vaping Use    Vaping status: Never Used   Substance and Sexual Activity    Alcohol use: No    Drug use: No    Sexual activity: Not Currently       Peripheral Neurovascular  Peripheral Neurovascular (Adult)  Peripheral Neurovascular WDL: WDL    Neuro Cognitive  Neuro Cognitive (Adult)  Cognitive/Neuro/Behavioral WDL: orientation, speech, level of consciousness, WDL  Level of Consciousness: Alert  Orientation: disoriented to, place, time  Speech: clear, spontaneous, logical    Learning  Learning Assessment  Learning Readiness and Ability: cognitive limitation noted  Education Provided  Person Taught: patient  Teaching Method: verbal instruction  Teaching Focus: symptom/problem overview, diagnostic test  Education Outcome Evaluation: acceptance expressed, verbalizes understanding    Respiratory  Respiratory WDL  Respiratory WDL: WDL    Abdominal Pain       Pain Assessments  Pain (Adult)  (0-10) Pain Rating: Rest: 0    NIH Stroke Scale       Liliam Hernández RN  06/25/25 14:47 EDT   "

## 2025-06-26 PROBLEM — A04.72 C. DIFFICILE COLITIS: Status: ACTIVE | Noted: 2025-06-26

## 2025-06-26 NOTE — DISCHARGE SUMMARY
ED OBSERVATION PROGRESS/DISCHARGE SUMMARY    Date of Admission: 6/25/2025   LOS: 0 days   PCP: Amilcar Mauricio DO      Hospital Outcome:   89-year-old female with a past medical history including but not limited to atrial fibrillation on Pradaxa, hypertension, hyperlipidemia, dementia, history of TIA/stroke was admitted to the observation unit for further evaluation of rectal bleeding.  In the ED, CT abdomen pelvis showed abnormal rectal wall thickening with perirectal stranding consistent with colitis/proctitis.  Initial hemoglobin 10.2.  Creatinine 1.18.     GI saw and evaluated the patient planning to check stool studies and cleared for clear liquid diet.  Patient was started on IV Unasyn.    Patient has a history of dementia and has been uncooperative throughout the night.    6/26/25:  This morning the patient endorses some persistent diffuse lower abdominal discomfort. No fever or chills overnight.     Her stool panel this morning is positive for enteropathic e coli, norovirus and c diff toxin positive. Pharmacy consulted who recommended oral vancomycin 125mg QID and wait for c diff antigen to decide whether or not to discontinue unasyn.     She did have blood in her bowel movement this AM. She has dropped her hemoglobin from 10.2 to 9.3 overnight.     Given her persistent blood in stool, her positive stool panels and need for further monitoring including the potential need for a blood transfusion if she continues to drop her [facility] advises she meets criteria for inpatient management.   I have discussed case with Dr. Mtz on call for [facility] who has graciously accepted to admit to a tele bed.     ROS:  General: no fevers, chills  Respiratory: no cough, dyspnea  Cardiovascular: no chest pain, palpitations  Abdomen: + abdominal pain, nausea, vomiting, + diarrhea  Neurologic: No focal weakness    Objective   Physical Exam:  I have reviewed the vital signs.  Temp:  [97.9 °F (36.6 °C)-98.8 °F (37.1 °C)] 97.9 °F  (36.6 °C)  Heart Rate:  [53-93] 82  Resp:  [14-19] 18  BP: ()/(46-98) 150/69  General Appearance:    Alert, cooperative, no distress  Head:    Normocephalic, atraumatic  Eyes:    Sclerae anicteric, EOMI  Neck:   Supple, no mass  Lungs: Clear to auscultation bilaterally, respirations unlabored  Heart: Regular rate and rhythm, S1 and S2 normal, no murmur, rub or gallop  Abdomen:  Soft, mild diffuse lower abdominal tenderness to palpation without rebound or guarding, mild abdominal obesity present, bowel sounds active  Extremities: No clubbing, cyanosis, or edema to lower extremities  Pulses:  2+ and symmetric in distal lower extremities  Skin: No rashes, mild generalized pallor  Neurologic: Oriented to person and place, poor insight, intermittent appropriate/inappropriate answers to clinical questions, Normal strength to extremities    Results Review:    I have reviewed the labs, radiology results and diagnostic studies.    Results from last 7 days   Lab Units 06/25/25  1259   WBC 10*3/mm3 9.47   HEMOGLOBIN g/dL 10.2*   HEMATOCRIT % 31.9*   PLATELETS 10*3/mm3 219     Results from last 7 days   Lab Units 06/25/25  1259   SODIUM mmol/L 143   POTASSIUM mmol/L 3.8   CHLORIDE mmol/L 108*   CO2 mmol/L 24.7   BUN mg/dL 29.0*   CREATININE mg/dL 1.18*   CALCIUM mg/dL 8.9   BILIRUBIN mg/dL 0.5   ALK PHOS U/L 71   ALT (SGPT) U/L 7   AST (SGOT) U/L 14   GLUCOSE mg/dL 151*     Imaging Results (Last 24 Hours)       Procedure Component Value Units Date/Time    CT Abdomen Pelvis With Contrast [[id number]] Collected: 06/25/25 1434     Updated: 06/25/25 1504    Narrative:      CT ABDOMEN PELVIS W CONTRAST-     HISTORY: Lower abdominal pain with rectal bleeding. Rectal hemorrhage.     TECHNIQUE:  CT abdomen and pelvis includes axial imaging from the lung  bases to the trochanters with intravenous contrast and with use of oral  contrast. Data reconstructed in coronal and sagittal planes. Radiation  dose reduction techniques were  utilized, including automated exposure  control and exposure modulation based on body size.     COMPARISON: CT abdomen and pelvis 03/18/2024, 05/27/2025     FINDINGS: Mild dependent lower lobe atelectasis. Cardiomegaly.     Liver, spleen, adrenal glands, pancreas appear within normal limits.  Previous cholecystectomy. Stomach mostly decompressed.     Small renal cysts and renal cortical thinning. No hydronephrosis.     No bowel dilatation or evidence for bowel obstruction. Colonic  diverticulosis without evidence for diver reticulitis.     Abnormal rectal wall thickening with perirectal stranding and  inflammation consistent with colitis/proctitis. Pelvic floor relaxation.  Dense material within the rectal lumen distally though not possible to  differentiate dense material within colon from extravasation.       Impression:      1. Abnormal rectal wall thickening with perirectal  stranding/inflammation consistent with colitis/proctitis. Pelvic floor  relaxation. Dense material within the rectal lumen though not possible  to differentiate dense material within the colon from extravasation of  contrast.  2. Colonic diverticulosis without evidence for diverticulitis.  3. Mild dependent lower lobe atelectasis.  4. Previous cholecystectomy.        This report was finalized on 6/25/2025 3:01 PM by Medardo Quionnez M.D  on Workstation: [id number]               I have reviewed the medications.     Discharge Medications        ASK your doctor about these medications        Instructions Start Date   acetaminophen 325 MG tablet  Commonly known as: TYLENOL   650 mg, Oral, Every 4 Hours PRN      atorvastatin 40 MG tablet  Commonly known as: LIPITOR   40 mg, Oral, Nightly      Calcium Citrate-Vitamin D 250-2.5 MG-MCG per tablet   2 tablets, Oral, 2 Times Daily      dabigatran etexilate 150 MG capsu  Commonly known as: PRADAXA   Take 1 capsule by mouth Every 12 (Twelve) Hours.      metoprolol tartrate 25 MG tablet  Commonly  known as: LOPRESSOR   25 mg, Oral, 2 Times Daily      OLANZapine 5 MG tablet  Commonly known as: zyPREXA   5 mg, Oral, Nightly      pantoprazole 40 MG EC tablet  Commonly known as: PROTONIX   40 mg, Oral, Every Early Morning             ---------------------------------------------------------------------------------------------  Assessment & Plan   Assessment/Problem List    GI bleed      Plan:    GI bleed  - CT abdomen pelvis shows perirectal stranding and inflammation consistent with colitis and proctitis  -IV Unasyn  - WBC within normal limits (9.47)  - Hemoglobin 10.2; will repeat this evening and in the a.m.  - Holding Pradaxa  - GI PCR and C. difficile panel + c dif, norovirus, EPEC; patient has recently had 2 rounds of antibiotics during recent hospitalization and inpatient rehab  - GI following and okay with clear liquid diet, holding Pradaxa and recommends discontinuing PPI as this does not sound like an upper GI bleed and they will reevaluate in the morning.     Chronic hypertension  Chronic hyperlipidemia  Chronic A-fib  - Continue home regimen other than holding Pradaxa per primary issue     Dementia  - Continue home regimen  -Behavioral redirection as needed       Disposition: admitted to [facility], Dr. Mtz    This note will serve as a transfer and progress note      56 minutes have been spent by [facility] providers in the care of this patient while under observation status.    Appropriate PPE worn during patient encounter.  Hand hygeine performed before and after seeing the patient.      Ruchi Keyes PA-C 06/26/25 05:51 EDT

## 2025-06-26 NOTE — PLAN OF CARE
Problem: Violence Risk or Actual  Goal: Anger and Impulse Control  Intervention: Minimize Safety Risk  Recent Flowsheet Documentation  Taken 6/26/2025 0400 by Sonia Gomez RN  De-Escalation Techniques: stimulation decreased  Enhanced Safety Measures:   room near unit station   bed alarm set  Taken 6/26/2025 0200 by Sonia Gomez RN  De-Escalation Techniques: stimulation decreased  Enhanced Safety Measures: room near unit station  Taken 6/26/2025 0019 by Sonia Gomez RN  De-Escalation Techniques: stimulation decreased  Enhanced Safety Measures: room near unit station  Taken 6/25/2025 2256 by Sonia Gomez RN  De-Escalation Techniques: stimulation decreased  Enhanced Safety Measures: room near unit station  Taken 6/25/2025 2000 by Sonia Gomez RN  De-Escalation Techniques:   family involvement requested   reoriented   appropriate behavior reinforced  Enhanced Safety Measures: room near unit station     Problem: Adult Inpatient Plan of Care  Goal: Absence of Hospital-Acquired Illness or Injury  Intervention: Identify and Manage Fall Risk  Recent Flowsheet Documentation  Taken 6/26/2025 0400 by Sonia Gomez RN  Safety Promotion/Fall Prevention:   safety round/check completed   nonskid shoes/slippers when out of bed   clutter free environment maintained   fall prevention program maintained  Taken 6/26/2025 0200 by Sonia Gomez RN  Safety Promotion/Fall Prevention:   safety round/check completed   nonskid shoes/slippers when out of bed   clutter free environment maintained   fall prevention program maintained  Taken 6/26/2025 0019 by Sonia Gomez RN  Safety Promotion/Fall Prevention:   safety round/check completed   nonskid shoes/slippers when out of bed   clutter free environment maintained   fall prevention program maintained  Taken 6/25/2025 2256 by Sonia Gomez RN  Safety Promotion/Fall Prevention:   safety round/check completed   nonskid shoes/slippers when out of bed   clutter free  environment maintained   fall prevention program maintained  Taken 6/25/2025 2000 by Sonia Gomez RN  Safety Promotion/Fall Prevention:   nonskid shoes/slippers when out of bed   safety round/check completed   clutter free environment maintained   fall prevention program maintained   Goal Outcome Evaluation:  Plan of Care Reviewed With: patient        Progress: no change  Outcome Evaluation: Patient alert and oriented x4 , RA, assist x2, No bleed noted during shift. Patient refused x2 doses of IV ABT, patient encouraged educated by this nurse and other staff. Daughter aware.

## 2025-06-26 NOTE — NURSING NOTE
"Patient refused all bedtime meds and IV ABT Patient states \" I want my daughter to give me my medication\". Patient educated and encouraged x3, daughter called of the situation try to talk to patient but refused again. Provider aware.   "

## 2025-06-26 NOTE — PROGRESS NOTES
This is a 89-year-old female that has a history of dementia history of atrial fibrillation on Pradaxa, hypertension hyperlipidemia and history of TIA.  She was admitted to the observation unit for concerns of colitis and lower GI bleed.  She describes the blood as bright red per daughters visual inspection.  In talking with the nurse today she had a small amount of stool that was brown and was blood-streaked.  Patient has reported pain in her lower abdomen she described as crampy.  When I see her when I enter the room she is initially sleeping.  She is easily arousable.  She is in no acute cardiovascular respiratory distress  She is chronically ill elderly and frail.  Vital signs have been reviewed and unremarkable  Cardiovascular Teddy she is regular rate and rhythm and is not tachycardic  Lungs are clear to auscultation no respiratory distress  Abdomen is obese soft there is no guarding or rebound  Extremities has intact distal pulses with no coolness or cyanosis  Assessment plan lower GI bleed with signs of colitis\proctitis on CT scan of the abdomen pelvis.  Patient's hemoglobin this morning was 9.3.  Yesterday hemoglobin was 10.2.  Report of a small amount of rectal bleeding with small stool this morning.  Stool cultures have come back positive for C. difficile (patient has recently been on multiple antibiotics for UTIs and completed 5 days of Rocephin at the end of May 2025), norovirus, and enteropathogenic E. coli.  She is currently on Unasyn.  We are brigido add vancomycin.  We did consult the clinical pharmacy Jen and awaiting C. difficile antigen to confirm C. difficile infection.  Her white blood cell count has remained normal.  GI has seen her yesterday and I reviewed that note and has also seen her today.  We are holding her Pradaxa.  She will be transferred for admission to [facility].  No family present during my history and exam.  She does have a history of dementia and chronic memory impairment.  Informed her  about the results of the test.  All questions answered at this time.

## 2025-06-26 NOTE — PLAN OF CARE
Goal Outcome Evaluation: pt being transferred to . IV team called to be notified of continued need for IV after 5 total sticks from this RN and charge RN. Daughter called and updated on transfer. Pt continued to refuse tele even with encouragement.  Pt had incontinence throughout shift and had several bowel movements streaked with blood. Around 1600 pt started to get extremely agitated and uncooperative with staff. Urine specimen obtained. EKG obtained. Steve Mtz DO updated and made aware throughout shift. One time dose of Zyprexa 2.5mg for breakthrough agitation available if needed. Unable to give antibiotics due to lack of IV. Pt has been A/O x3 with confusion to time and place. Bed alarm on and call light within reach. Contact spore precautions maintained.       Problem: Violence Risk or Actual  Goal: Anger and Impulse Control  Outcome: Progressing  Intervention: Minimize Safety Risk  Recent Flowsheet Documentation  Taken 6/26/2025 1800 by Fara Sin RN  De-Escalation Techniques: stimulation decreased  Enhanced Safety Measures: room near unit station  Taken 6/26/2025 1600 by Fara Sin RN  De-Escalation Techniques: stimulation decreased  Enhanced Safety Measures: room near unit station  Taken 6/26/2025 1415 by Fara Sin RN  De-Escalation Techniques: stimulation decreased  Enhanced Safety Measures: bed alarm set  Taken 6/26/2025 1245 by Fara Sin RN  Enhanced Safety Measures: bed alarm set  Taken 6/26/2025 1140 by Fara Sin RN  De-Escalation Techniques: stimulation decreased  Enhanced Safety Measures:   room near unit station   bed alarm set  Taken 6/26/2025 1023 by Fara Sin RN  De-Escalation Techniques: stimulation decreased  Enhanced Safety Measures: bed alarm set  Taken 6/26/2025 0749 by Fara Sin RN  De-Escalation Techniques:   stimulation decreased   reoriented   quiet time facilitated  Enhanced Safety Measures:   room near unit station   bed alarm  set     Problem: Adult Inpatient Plan of Care  Goal: Plan of Care Review  Outcome: Progressing  Goal: Patient-Specific Goal (Individualized)  Outcome: Progressing  Goal: Absence of Hospital-Acquired Illness or Injury  Outcome: Progressing  Intervention: Identify and Manage Fall Risk  Recent Flowsheet Documentation  Taken 6/26/2025 1800 by Fara Sin RN  Safety Promotion/Fall Prevention:   room organization consistent   safety round/check completed  Taken 6/26/2025 1600 by Fara Sin RN  Safety Promotion/Fall Prevention:   room organization consistent   safety round/check completed  Taken 6/26/2025 1415 by Fara Sin RN  Safety Promotion/Fall Prevention:   room organization consistent   safety round/check completed  Taken 6/26/2025 1245 by Fara Sin RN  Safety Promotion/Fall Prevention:   room organization consistent   safety round/check completed  Taken 6/26/2025 1140 by Fara Sin RN  Safety Promotion/Fall Prevention:   room organization consistent   safety round/check completed  Taken 6/26/2025 1023 by Fara Sin RN  Safety Promotion/Fall Prevention:   room organization consistent   safety round/check completed  Taken 6/26/2025 0749 by Fara Sin RN  Safety Promotion/Fall Prevention:   room organization consistent   safety round/check completed  Intervention: Prevent Skin Injury  Recent Flowsheet Documentation  Taken 6/26/2025 1800 by Fara Sin RN  Body Position: position changed independently  Taken 6/26/2025 1600 by Fara Sin RN  Body Position: position changed independently  Taken 6/26/2025 1415 by Fara Sin RN  Body Position: position changed independently  Taken 6/26/2025 1245 by Fara Sin RN  Body Position: position changed independently  Taken 6/26/2025 1140 by Fara Sin RN  Body Position: position changed independently  Taken 6/26/2025 1023 by Fara Sin RN  Body Position: position changed independently  Taken  6/26/2025 0749 by Fara Sin RN  Body Position: position changed independently  Intervention: Prevent and Manage VTE (Venous Thromboembolism) Risk  Recent Flowsheet Documentation  Taken 6/26/2025 0749 by Fara Sin RN  VTE Prevention/Management: (pt educated)   SCDs (sequential compression devices) off   patient refused intervention   other (see comments)  Intervention: Prevent Infection  Recent Flowsheet Documentation  Taken 6/26/2025 1800 by Fara Sin RN  Infection Prevention: single patient room provided  Taken 6/26/2025 1600 by Fara Sin RN  Infection Prevention: single patient room provided  Taken 6/26/2025 1415 by Fara Sin RN  Infection Prevention: single patient room provided  Taken 6/26/2025 1245 by Fara Sin RN  Infection Prevention: single patient room provided  Taken 6/26/2025 1140 by Fara Sin RN  Infection Prevention: single patient room provided  Taken 6/26/2025 1023 by Fara Sin RN  Infection Prevention: single patient room provided  Taken 6/26/2025 0749 by Fara Sin RN  Infection Prevention: single patient room provided  Goal: Optimal Comfort and Wellbeing  Outcome: Progressing  Intervention: Provide Person-Centered Care  Recent Flowsheet Documentation  Taken 6/26/2025 1800 by Fara Sin RN  Trust Relationship/Rapport:   care explained   choices provided  Taken 6/26/2025 1415 by Fara Sin RN  Trust Relationship/Rapport:   care explained   choices provided  Taken 6/26/2025 1140 by Fara Sin RN  Trust Relationship/Rapport:   care explained   choices provided  Taken 6/26/2025 0749 by Fara Sin RN  Trust Relationship/Rapport:   care explained   choices provided   emotional support provided   empathic listening provided   questions answered   questions encouraged   reassurance provided   thoughts/feelings acknowledged  Goal: Readiness for Transition of Care  Outcome: Progressing     Problem: Skin Injury  Risk Increased  Goal: Skin Health and Integrity  Outcome: Progressing  Intervention: Optimize Skin Protection  Recent Flowsheet Documentation  Taken 6/26/2025 1800 by Fara Sin RN  Activity Management: activity minimized  Head of Bed (HOB) Positioning: HOB elevated  Taken 6/26/2025 1600 by Fara Sin RN  Activity Management: activity minimized  Head of Bed (HOB) Positioning: HOB elevated  Taken 6/26/2025 1415 by Fara Sin RN  Activity Management: activity minimized  Pressure Reduction Techniques: frequent weight shift encouraged  Head of Bed (HOB) Positioning: HOB elevated  Taken 6/26/2025 1245 by Fara Sin RN  Activity Management: activity minimized  Head of Bed (HOB) Positioning: HOB elevated  Taken 6/26/2025 1140 by Fara Sin RN  Activity Management: activity minimized  Head of Bed (HOB) Positioning: HOB elevated  Taken 6/26/2025 1023 by Fara Sin RN  Activity Management: activity minimized  Head of Bed (HOB) Positioning: HOB elevated  Taken 6/26/2025 0749 by Fara Sni RN  Activity Management: activity minimized  Pressure Reduction Techniques: frequent weight shift encouraged  Head of Bed (HOB) Positioning: HOB elevated     Problem: Fall Injury Risk  Goal: Absence of Fall and Fall-Related Injury  Outcome: Progressing  Intervention: Identify and Manage Contributors  Recent Flowsheet Documentation  Taken 6/26/2025 1800 by Fara Sin RN  Medication Review/Management: medications reviewed  Taken 6/26/2025 1600 by Fara Sin, RN  Medication Review/Management: medications reviewed  Taken 6/26/2025 1415 by Fara Sin, RN  Medication Review/Management: medications reviewed  Taken 6/26/2025 1245 by Fara Sin, RN  Medication Review/Management: medications reviewed  Taken 6/26/2025 1140 by Fara Sin, RN  Medication Review/Management: medications reviewed  Taken 6/26/2025 1023 by Fara Sin, RN  Medication Review/Management:  medications reviewed  Taken 6/26/2025 0749 by Fara Sin, RN  Medication Review/Management: medications reviewed  Intervention: Promote Injury-Free Environment  Recent Flowsheet Documentation  Taken 6/26/2025 1800 by Fara Sin RN  Safety Promotion/Fall Prevention:   room organization consistent   safety round/check completed  Taken 6/26/2025 1600 by Fara Sin RN  Safety Promotion/Fall Prevention:   room organization consistent   safety round/check completed  Taken 6/26/2025 1415 by Fara Sin RN  Safety Promotion/Fall Prevention:   room organization consistent   safety round/check completed  Taken 6/26/2025 1245 by Fara Sin RN  Safety Promotion/Fall Prevention:   room organization consistent   safety round/check completed  Taken 6/26/2025 1140 by Fara Sin RN  Safety Promotion/Fall Prevention:   room organization consistent   safety round/check completed  Taken 6/26/2025 1023 by Fara Sin RN  Safety Promotion/Fall Prevention:   room organization consistent   safety round/check completed  Taken 6/26/2025 0749 by Fara Sin, RN  Safety Promotion/Fall Prevention:   room organization consistent   safety round/check completed

## 2025-06-26 NOTE — PROGRESS NOTES
Name: Monica Scherer ADMIT: 2025   : 1933  PCP: Amilcar Mauricio DO    MRN: [id number] LOS: 0 days   AGE/SEX: 89 y.o. female  ROOM: 126/1     Subjective   Subjective   Patient awake and resting in bed, LHA asked to assume care out of ED observation unit. She is having ongoing abdominal discomfort at times.       Objective   Objective   Vital Signs  Temp:  [97.7 °F (36.5 °C)-98.2 °F (36.8 °C)] 97.7 °F (36.5 °C)  Heart Rate:  [53-93] 83  Resp:  [14-19] 15  BP: ()/(46-98) 103/57  SpO2:  [95 %-99 %] 96 %  on   ;   Device (Oxygen Therapy): room air  Body mass index is 24.03 kg/m².  Physical Exam  Vitals and nursing note reviewed.   Constitutional:       General: She is not in acute distress.  Cardiovascular:      Rate and Rhythm: Normal rate.   Pulmonary:      Effort: Pulmonary effort is normal.      Breath sounds: No wheezing.   Abdominal:      Palpations: Abdomen is soft.      Tenderness: There is abdominal tenderness.   Skin:     General: Skin is warm and dry.   Neurological:      Mental Status: She is alert.       Results Review     I reviewed the patient's new clinical results.  Results from last 7 days   Lab Units 25  0807 25  1259   WBC 10*3/mm3 9.01 9.47   HEMOGLOBIN g/dL 9.3* 10.2*   PLATELETS 10*3/mm3 227 219     Results from last 7 days   Lab Units 25  0807 25  1259   SODIUM mmol/L 144 143   POTASSIUM mmol/L 3.5 3.8   CHLORIDE mmol/L 110* 108*   CO2 mmol/L 23.7 24.7   BUN mg/dL 21.0 29.0*   CREATININE mg/dL 1.07* 1.18*   GLUCOSE mg/dL 138* 151*   EGFR mL/min/1.73 48.8* 43.4*     Results from last 7 days   Lab Units 25  0807 25  1259   ALBUMIN g/dL 2.9* 3.2*   BILIRUBIN mg/dL 0.7 0.5   ALK PHOS U/L 70 71   AST (SGOT) U/L 12 14   ALT (SGPT) U/L 7 7     Results from last 7 days   Lab Units 25  0807 25  1259   CALCIUM mg/dL 8.4 8.9   ALBUMIN g/dL 2.9* 3.2*   MAGNESIUM mg/dL  --  2.0   PHOSPHORUS mg/dL  --  3.2       No results found for:  "\"HGBA1C\", \"POCGLU\"    CT Abdomen Pelvis With Contrast  Result Date: 6/25/2025  1. Abnormal rectal wall thickening with perirectal stranding/inflammation consistent with colitis/proctitis. Pelvic floor relaxation. Dense material within the rectal lumen though not possible to differentiate dense material within the colon from extravasation of contrast. 2. Colonic diverticulosis without evidence for diverticulitis. 3. Mild dependent lower lobe atelectasis. 4. Previous cholecystectomy.   This report was finalized on 6/25/2025 3:01 PM by Medardo Quinonez M.D on Workstation: [id number]        I have personally reviewed all medications:  Scheduled Medications  ampicillin-sulbactam, 3 g, Intravenous, Q6H  atorvastatin, 40 mg, Oral, Nightly  famotidine, 20 mg, Oral, Daily  hydrocortisone, 25 mg, Rectal, BID  LORazepam, 0.5 mg, Intravenous, Once  metoprolol tartrate, 25 mg, Oral, BID  OLANZapine, 5 mg, Oral, Nightly  potassium chloride, 40 mEq, Oral, Q4H  sodium chloride, 10 mL, Intravenous, Q12H  vancomycin, 125 mg, Oral, Q6H    Infusions  Pharmacy Consult,     Diet  Diet: Liquid; Clear Liquid; Fluid Consistency: Thin (IDDSI 0)    I have personally reviewed:  [x]  Laboratory   [x]  Microbiology   [x]  Radiology   [x]  EKG/Telemetry  []  Cardiology/Vascular   []  Pathology    []  Records       Assessment/Plan     Active Hospital Problems    Diagnosis  POA    **GI bleed [K92.2]  Yes    C. difficile colitis [A04.72]  Yes      Resolved Hospital Problems   No resolved problems to display.       89 y.o. female admitted with GI bleed.    Generalized abdominal pain  EPEC/Norovirus/C diff colitis  GI bleeding-BRBPR  - GI PCR + for above; C. Diff PCR positive, Ag negative, but due to symptoms, she has been started on Vancomycin. Also remains on Ampicillin. Will review chart, adjust regimen as needed.  - GI consulted to evaluate. Appreciate their help.    Type 2 diabetes with hyperglycemia  - A1c 6.50% (02/27/25). Add SSI. Trend " glucose levels to guide management.    Anemia  - Noted on labs, slightly worse from prior.  - Order repeat CBC in AM for reassessment. Transfuse for hemoglobin <7.    Atrial fibrillation  - Continue Metoprolol. Pradaxa being held, SCD for now. Resume when okay with consultants.    CKD stage 3A  - Baseline ~.0.9 to 1 per chart review, close to this at present, continue monitoring.  - Order repeat BMP in AM for reassessment.    History of CVA  - Continue statin.    Transient alteration of awareness  - Patient intermittently restless, agitated. Zyprexa nightly ordered. Check EKG to ensure QTc not prolonged.  - Can add PRN Zyprexa as needed.    Hypertension  - BP acceptable, trend BP to guide management.    LHA can assume care out of observation unit.    SCDs for DVT prophylaxis.  Full code.  Discussed with patient, nursing staff, and ED provider.  Anticipate discharge TBD timing yet to be determined.  Expected Discharge Date: 6/26/2025; Expected Discharge Time:       Steve Mtz DO  [facility]  06/26/25

## 2025-06-26 NOTE — PROGRESS NOTES
[facility]  Inpatient Progress Note    Reason for Followup:  Rectal bleeding    Subjective     Interval History:   PT with some increasing confusion overnight.  Had BM in bedside commode, Stool mostly brown with red blood streaking outside of stool and in pan.      Current Facility-Administered Medications:     acetaminophen (TYLENOL) tablet 650 mg, 650 mg, Oral, Q4H PRN **OR** acetaminophen (TYLENOL) 160 MG/5ML oral solution 650 mg, 650 mg, Oral, Q4H PRN **OR** acetaminophen (TYLENOL) suppository 650 mg, 650 mg, Rectal, Q4H PRN, Ivan Watson MD    ampicillin-sulbactam (UNASYN) 3 g in sodium chloride 0.9 % 100 mL MBP, 3 g, Intravenous, Q6H, Nilam Ruth PA-C, 3 g at 06/25/25 2204    atorvastatin (LIPITOR) tablet 40 mg, 40 mg, Oral, Nightly, Ivan Watson MD, 40 mg at 06/25/25 2204    Calcium Replacement - Follow Nurse / BPA Driven Protocol, , Not Applicable, PRN, Ivan Watson MD    LORazepam (ATIVAN) injection 0.5 mg, 0.5 mg, Intravenous, Once, Samuel Baum MD    Magnesium Standard Dose Replacement - Follow Nurse / BPA Driven Protocol, , Not Applicable, PRWalter FITZPATRICK William D, MD    metoprolol tartrate (LOPRESSOR) tablet 25 mg, 25 mg, Oral, BID, Ivan Watson MD, 25 mg at 06/25/25 2204    OLANZapine (zyPREXA) tablet 5 mg, 5 mg, Oral, Nightly, Ivan Watson MD, 5 mg at 06/25/25 2203    ondansetron ODT (ZOFRAN-ODT) disintegrating tablet 4 mg, 4 mg, Oral, Q6H PRN **OR** ondansetron (ZOFRAN) injection 4 mg, 4 mg, Intravenous, Q6H PRN, Ivan Watson MD    pantoprazole (PROTONIX) EC tablet 40 mg, 40 mg, Oral, Q AM, Ivan Watson MD    Pharmacy To Dose: Ampicillin-sulbactam, , Not Applicable, Continuous PRN, Ivan Watson MD    Phosphorus Replacement - Follow Nurse / BPA Driven Protocol, , Not Applicable, Walter SALAS William D, MD    Potassium Replacement - Follow Nurse / BPA Driven Protocol, , Not Applicable, Walter SALAS William D, MD     sodium chloride 0.9 % flush 10 mL, 10 mL, Intravenous, Q12H, Ivan Watson MD, 10 mL at 06/25/25 2210    sodium chloride 0.9 % flush 10 mL, 10 mL, Intravenous, PRN, Ivan Watson MD    sodium chloride 0.9 % infusion 40 mL, 40 mL, Intravenous, PRN, Ivan Watson MD    Objective     Vital Signs  Temp:  [97.7 °F (36.5 °C)-98.8 °F (37.1 °C)] 97.7 °F (36.5 °C)  Heart Rate:  [53-93] 85  Resp:  [14-19] 16  BP: ()/(46-98) 127/82  Body mass index is 24.03 kg/m².    Intake/Output Summary (Last 24 hours) at 6/26/2025 0837  Last data filed at 6/25/2025 1539  Gross per 24 hour   Intake 1046.67 ml   Output --   Net 1046.67 ml     No intake/output data recorded.     Physical Exam:   General: patient awake, alert and cooperative   Abdomen: soft, nontender, nondistended; normal bowel sounds     Results Review:     I reviewed the patient's new clinical results.  I reviewed the patient's new imaging results and agree with the interpretation.    Results from last 7 days   Lab Units 06/25/25  1259   WBC 10*3/mm3 9.47   HEMOGLOBIN g/dL 10.2*   HEMATOCRIT % 31.9*   PLATELETS 10*3/mm3 219     Results from last 7 days   Lab Units 06/25/25  1259   SODIUM mmol/L 143   POTASSIUM mmol/L 3.8   CHLORIDE mmol/L 108*   CO2 mmol/L 24.7   BUN mg/dL 29.0*   CREATININE mg/dL 1.18*   CALCIUM mg/dL 8.9   BILIRUBIN mg/dL 0.5   ALK PHOS U/L 71   ALT (SGPT) U/L 7   AST (SGOT) U/L 14   GLUCOSE mg/dL 151*     Results from last 7 days   Lab Units 06/25/25  1259   INR  1.82*     Lab Results   Lab Value Date/Time    LIPASE 10 (L) 03/19/2024 0736    LIPASE 11 (L) 10/16/2023 1642    LIPASE 202 (H) 02/24/2018 0508    LIPASE 146 (H) 02/23/2018 0614    LIPASE 145 (H) 02/22/2018 0302    LIPASE 193 (H) 02/21/2018 1456    LIPASE 547 (H) 02/20/2018 0410    LIPASE >600 (H) 02/19/2018 0408    LIPASE >600 (H) 02/18/2018 1349    LIPASE 11 (L) 06/07/2016 2006       Radiology:         Assessment & Plan   Assessment:   Rectal bleeding  Protcolitis by CT  scan  Afib on Pradaxa      Plan:   Await AM labs if pt will allow  Continue abx  Stool studies  Anusol supp  Hold DOAC. INR is improving    I discussed the patient's findings and my recommendations with patient.          Donnie Renteria M.D.  [facility]  [address] [place] [address]  Office: [phone]

## 2025-06-26 NOTE — PROGRESS NOTES
[facility] Clinical Pharmacy Services: C. Difficile Medication Changes       Pharmacy has been consulted to look over Monica Scherer's profile to check patient's medications for changes due to C. Difficile diagnosis per Dr.Aleah Rena TORO's request.       Current C. Diff Regimen:     Assessment/Plan/Changes       1. Proton pump inhibitor: d/c protonix 40mg daily and change to pepcid 20mg po daily ( crcl <50ml/min)    2. Antiperistalic agents or stool softeners/laxatives: n/a       Thank you for allowing me to participate in your patient's care.  Please call pharmacy with any questions or concerns.     Griselda Bliss, [facility]  Clinical Pharmacist

## 2025-06-27 NOTE — DISCHARGE PLACEMENT REQUEST
"Monica Scherer (89 y.o. Female)       Date of Birth   05/04/1933    Social Security Number       Address   [address] [place] [address]    Home Phone   [phone]    MRN   [id number]       Religious   Yazdanism    Marital Status   Single                            Admission Date   6/25/2025    Admission Type   Emergency    Admitting Provider   Steve Mtz DO    Attending Provider   Steve Mtz DO    Department, Room/Bed   [address], [address]/1       Discharge Date       Discharge Disposition       Discharge Destination                                 Attending Provider: Steve Mtz DO    Allergies: Cephalexin, Latex    Isolation: Spore   Infection: Norovirus (06/26/25), C.difficile (06/26/25), Other (06/27/25)   Code Status: CPR    Ht: 162.6 cm (64\")   Wt: 68.1 kg (150 lb 3.2 oz)    Admission Cmt: None   Principal Problem: GI bleed [K92.2]                   Active Insurance as of 6/25/2025       Primary Coverage       Payor Plan Insurance Group Employer/Plan Group    [employer] MEDICARE REPLACEMENT [employer] Advantage GROUP PPO [id number]       Payor Plan Address Payor Plan Phone Number Payor Plan Fax Number Effective Dates    [address] [address] [phone]  1/1/2018 - None Entered    [facility] [id number]         Subscriber Name Subscriber Birth Date Member ID       MONICA SCHERER 5/4/1933 [id number]                     Emergency Contacts        (Rel.) Home Phone Work Phone Mobile Phone    Raquel Lebron (Daughter) [phone] -- --    lEisa Mart (Grandchild) [phone] -- --    MUNA TRONCOSO (Son) [phone] -- --    Amish Scherer (Son) -- -- [phone]                "

## 2025-06-27 NOTE — SIGNIFICANT NOTE
06/27/25 1000   OTHER   Discipline physical therapist   Rehab Time/Intention   Session Not Performed other (see comments)  (RN reports pt is not appropriate for PT at this time secondary to pt being confused and combative. PT will check back at a later date.)   Recommendation   PT - Next Appointment 06/28/25

## 2025-06-27 NOTE — CASE MANAGEMENT/SOCIAL WORK
Case Management Readmission Assessment Note    Case Management Readmission Assessment (all recorded)       Readmission Interview       Row Name 06/27/25 1306             Readmission Indications    Is the patient and/or family able to complete the readmission assessment questions? Yes  daughter      Is this hospitalization related to the prior hospital diagnosis? No        Row Name 06/27/25 1306             Recommendation for rehospitalization    Did you speak with your physician prior to coming to the hospital No        [facility] Name 06/27/25 1306             Follow-up Appointments    Do you have a PCP? Yes      Did you have an appointment with PCP after your hospitalization? No      Did you have an appointment with a Specialist? Yes      When was your appointment scheduled? 06/18/25  cancelled      Did you go to appointment? No      Are you current with the Pulmonary Clinic? No      Are you current with the CHF Clinic? No        [facility] Name 06/27/25 1306             Medications    Did you have newly prescribed medications at discharge? Yes      Did you understand the reasons for your medications at discharge and how to take them? Yes      Did you understand the side effects of your medications? Yes      Are you taking all of you prescribed medications? Yes      What pharmacy was used to fill prescription(s)? [facility]      Were medications picked up? Yes        [facility] Name 06/27/25 1306             Discharge Instructions    Did you understand your discharge instructions? Yes      Did your family/caregiver hear your instructions? Yes      Were you given a number of someone to call if you had questions or concerns? Yes        [facility] Name 06/27/25 1306             Index discharge location/services    Where did you go upon discharge? Home      Do you have supportive family or friends in the home? Yes        [facility] Name 06/27/25 1306             Discharge Readiness    On a scale of 1-5 (5 being well prepared), how ready were you for discharge  3      Recommendation based on interview Education on diagnosis/self management        Row Name 06/27/25 1306             Palliative Care/Hospice    Are you current with Palliative Care? No      Are you current with Hospice Care? No        Row Name 06/27/25 1306 06/25/25 1748          Advance Directives (For Healthcare)    Pre-existing AND/MOST/POLST Order No No     Advance Directive Status Patient does not have advance directive Patient does not have advance directive     Have you reviewed your Advance Directive and is it valid for this stay? No No     Literature Provided on Advance Directives No No     Patient Requests Assistance on Advance Directives Patient Declined Patient Declined       Row Name 06/27/25 1306             Readmission Assessment Final Comments    Final Comments Pt readmitted with GI Bleed. Pt has dementia with behavioral disturbance. Pt lives with her daughter, had been living with her son who can no longer care for her. Referral has been placed for SNF. During this hospitalization, CCP will discuss GOC/Palliative wth family.

## 2025-06-27 NOTE — PLAN OF CARE
Goal Outcome Evaluation:      Patient was transfer from observation unit. A&O to self only very confuse and combative. Obtain restrain order and family ws updated. Patient was able to take Zyprexa which later help come a little down. On IV and oral antibiotic. C.diff precaution in place. Patient had two smears BMS with blood in the stool. VSS will continued to monitor.

## 2025-06-27 NOTE — CASE MANAGEMENT/SOCIAL WORK
Discharge Planning Assessment  [facility]     Patient Name: Monica Scherer  MRN: [id number]  Today's Date: 6/27/2025    Admit Date: 6/25/2025    Plan: SNF vs home, will need transportation   Discharge Needs Assessment       Row Name 06/27/25 1148       Living Environment    People in Home child(leona), adult    Name(s) of People in Home Raquel Lebron [phone]    Current Living Arrangements home    Potentially Unsafe Housing Conditions none    In the past 12 months has the electric, gas, oil, or water company threatened to shut off services in your home? No    Primary Care Provided by child(leona)    Provides Primary Care For no one, unable/limited ability to care for self    Family Caregiver if Needed child(leona), adult    Family Caregiver Names Raquel Lebron    Quality of Family Relationships helpful;involved;supportive    Able to Return to Prior Arrangements other (see comments)  May need SNF       Resource/Environmental Concerns    Resource/Environmental Concerns none    Transportation Concerns none       Transportation Needs    In the past 12 months, has lack of transportation kept you from medical appointments or from getting medications? no    In the past 12 months, has lack of transportation kept you from meetings, work, or from getting things needed for daily living? No       Food Insecurity    Within the past 12 months, you worried that your food would run out before you got the money to buy more. Never true    Within the past 12 months, the food you bought just didn't last and you didn't have money to get more. Never true       Transition Planning    Patient/Family Anticipates Transition to other (see comments)  SNF    Patient/Family Anticipated Services at Transition skilled nursing    Transportation Anticipated health plan transportation       Discharge Needs Assessment    Current Outpatient/Agency/Support Group skilled nursing facility    Equipment Currently Used at Home walker, rolling    Concerns to  be Addressed discharge planning    Do you want help finding or keeping work or a job? Patient unable to answer    Do you want help with school or training? For example, starting or completing job training or getting a high school diploma, GED or equivalent Patient unable to answer    Equipment Needed After Discharge none    Outpatient/Agency/Support Group Needs skilled nursing facility                   Discharge Plan       Row Name 06/27/25 1869       Plan    Plan SNF vs home, will need transportation    Patient/Family in Agreement with Plan yes    Plan Comments Met with pt at bedside. Pt is oriented to self only. CCP contacted daughter, Raquel Lebron, and conducted interview via telephone. Introduced self and explained role of . Face sheet updated to daughter's address. PCP is Amilcar Mauricio. Pt has no difficulty paying for medications, and she obtains her medications from [facility]. Pt was living with her son, however, he is no longer able to care for  her, so she will be living with her daughter, Raquel. Prior to hospitalization, pt required verbal cueing to complete tasks and ADL's. Pt required assistance with transfers and mobility. Pt has had home health in the past, and she was at [facility]. Daughter requested a referral to [facility], referral placed in Epic, [facility] informed of referral. Upon discharge, pt will need transportation arranged. Explained that CCP would follow to assess for discharge needs.                  Continued Care and Services - Admitted Since 6/25/2025    No active coordination exists.       Selected Continued Care - Episodes Includes continued care and service providers with selected services from the active episodes listed below      High Risk Care Management Episode start date: 10/18/2023 (Paused)   There are no active outsourced providers for this episode.                 Selected Continued Care - Prior Encounters Includes continued care and service  providers with selected services from prior encounters from 3/27/2025 to 6/27/2025      Discharged on 6/9/2025 Admission date: 5/27/2025 - Discharge disposition: Skilled Nursing Facility (DC - External)      Destination       Service Provider Services Address Phone Fax Patient Critical access hospital Skilled Nursing [address] [facility] [id number] [phone] --                          Expected Discharge Date and Time       Expected Discharge Date Expected Discharge Time    Jun 30, 2025            Demographic Summary    No documentation.                  Functional Status    No documentation.                  Psychosocial    No documentation.                  Abuse/Neglect    No documentation.                  Legal    No documentation.                  Substance Abuse    No documentation.                  Patient Forms    No documentation.                     Sarah Lancaster RN

## 2025-06-27 NOTE — PLAN OF CARE
Goal Outcome Evaluation:  VSS, 2L NC, Afib, turn q2. Pt lethargic today however will arouse to voice and touch. This RN has kept room bright, TV on and pt still sleepy. Restraints removed from patient and patient has kept leads on and stayed in bed and displayed appropriate behavior. MD aware of lethargy. CTM and reinforce delirium precautions.

## 2025-06-27 NOTE — PROGRESS NOTES
Name: Monica Scherer ADMIT: 2025   : 1933  PCP: Amilcar Mauricio DO    MRN: [id number] LOS: 1 days   AGE/SEX: 89 y.o. female  ROOM: [facility]/     Subjective   Subjective   Patient resting in bed, sleeping, slightly more confused this morning. Did require restraints overnight per nursing, but these have since been removed.       Objective   Objective   Vital Signs  Temp:  [97.3 °F (36.3 °C)-98 °F (36.7 °C)] 97.7 °F (36.5 °C)  Heart Rate:  [] 67  Resp:  [15-17] 16  BP: (109-146)/(64-98) 120/64  SpO2:  [95 %-100 %] 95 %  on   ;   Device (Oxygen Therapy): nasal cannula  Body mass index is 25.78 kg/m².  Physical Exam  Vitals and nursing note reviewed.   Constitutional:       General: She is sleeping. She is not in acute distress.     Appearance: She is ill-appearing.      Comments: Awakens to voice, but confused at present   Cardiovascular:      Rate and Rhythm: Normal rate.   Pulmonary:      Effort: Pulmonary effort is normal. No respiratory distress.      Breath sounds: No wheezing.   Abdominal:      Palpations: Abdomen is soft.      Tenderness: There is abdominal tenderness.   Skin:     General: Skin is warm and dry.   Neurological:      Mental Status: She is disoriented.       Results Review     I reviewed the patient's new clinical results.  Results from last 7 days   Lab Units 25  0740 25  0807 25  1259   WBC 10*3/mm3 6.63 9.01 9.47   HEMOGLOBIN g/dL 8.3* 9.3* 10.2*   PLATELETS 10*3/mm3 227 227 219     Results from last 7 days   Lab Units 25  0740 25  1825 25  0807 25  1259   SODIUM mmol/L 144  --  144 143   POTASSIUM mmol/L 3.8 3.7 3.5 3.8   CHLORIDE mmol/L 111*  --  110* 108*   CO2 mmol/L 24.6  --  23.7 24.7   BUN mg/dL 15.0  --  21.0 29.0*   CREATININE mg/dL 1.00  --  1.07* 1.18*   GLUCOSE mg/dL 99  --  138* 151*   EGFR mL/min/1.73 53.0*  --  48.8* 43.4*     Results from last 7 days   Lab Units 25  0807 25  1259   ALBUMIN g/dL 2.9*  3.2*   BILIRUBIN mg/dL 0.7 0.5   ALK PHOS U/L 70 71   AST (SGOT) U/L 12 14   ALT (SGPT) U/L 7 7     Results from last 7 days   Lab Units 06/27/25  0740 06/26/25  0807 06/25/25  1259   CALCIUM mg/dL 8.1* 8.4 8.9   ALBUMIN g/dL  --  2.9* 3.2*   MAGNESIUM mg/dL 1.9  --  2.0   PHOSPHORUS mg/dL 3.0  --  3.2       Glucose   Date/Time Value Ref Range Status   06/27/2025 1056 99 70 - 130 mg/dL Final   06/27/2025 0818 116 70 - 130 mg/dL Final   06/27/2025 0531 107 70 - 130 mg/dL Final   06/26/2025 2015 119 70 - 130 mg/dL Final       CT Abdomen Pelvis With Contrast  Result Date: 6/25/2025  1. Abnormal rectal wall thickening with perirectal stranding/inflammation consistent with colitis/proctitis. Pelvic floor relaxation. Dense material within the rectal lumen though not possible to differentiate dense material within the colon from extravasation of contrast. 2. Colonic diverticulosis without evidence for diverticulitis. 3. Mild dependent lower lobe atelectasis. 4. Previous cholecystectomy.   This report was finalized on 6/25/2025 3:01 PM by Medardo Quinonez M.D on Workstation: [id number]        I have personally reviewed all medications:  Scheduled Medications  ampicillin-sulbactam, 3 g, Intravenous, Q6H  atorvastatin, 40 mg, Oral, Nightly  famotidine, 20 mg, Oral, Daily  hydrocortisone, 25 mg, Rectal, BID  insulin lispro, 2-7 Units, Subcutaneous, 4x Daily AC & at Bedtime  LORazepam, 0.5 mg, Intravenous, Once  metoprolol tartrate, 25 mg, Oral, BID  OLANZapine, 2.5 mg, Oral, Once  OLANZapine, 5 mg, Oral, Nightly  sodium chloride, 10 mL, Intravenous, Q12H  vancomycin, 125 mg, Oral, Q6H    Infusions  Pharmacy Consult,     Diet  Diet: Liquid; Clear Liquid; Fluid Consistency: Thin (IDDSI 0)    I have personally reviewed:  [x]  Laboratory   [x]  Microbiology   []  Radiology   [x]  EKG/Telemetry  []  Cardiology/Vascular   []  Pathology    []  Records       Assessment/Plan     Active Hospital Problems    Diagnosis  POA    **GI  "bleed [K92.2]  Yes    C. difficile colitis [A04.72]  Yes      Resolved Hospital Problems   No resolved problems to display.       89 y.o. female admitted with GI bleed.    Generalized abdominal pain  EPEC/Norovirus/C diff colitis  GI bleeding-BRBPR  - CT AP obtained, showing \"Abnormal rectal wall thickening with perirectal   stranding/inflammation consistent with colitis/proctitis.\" GI PCR + EPEC, Norovirus; C. Diff PCR positive, Ag negative, but due to diarrheal symptoms, she was started on PO Vancomycin. She is also on Unasyn for EPEC.  - Discussed with Dr. Renteria today (06/27) do not feel she has C. Diff, and that diarrhea likely explained by other organisms. Recommended stopping Vancomycin, but continue with Unasyn for now and monitor her course. Further management based on clinical course.    Type 2 diabetes with hyperglycemia  - A1c 6.50% (02/27/25). SSI added, continue as prescribed. Trend glucose levels to guide management.    Anemia  - Noted on labs, slightly worse from prior. She has some blood in BM this morning per report. Discussed with GI, considering further workup if bleeding persists and/or hemoglobin continues to worsen. Will continue to follow their plans/recommendations. Greatly appreciate their help.  - Order repeat CBC in AM for reassessment. Transfuse for hemoglobin <7.    Atrial fibrillation  - Continue Metoprolol. Pradaxa being held, SCD for now. Resume when okay with consultants.    CKD stage 3A  - Baseline ~.0.9 to 1 per chart review, close to this at present, continue monitoring.  - Order repeat BMP in AM for reassessment.    History of CVA  - Continue statin.    Transient alteration of awareness  - Patient intermittently restless, agitated. Zyprexa nightly ordered. Did receive PRN Zyprexa as well.  Suspect etiology likely multifactorial in setting of acute illness, hospitalization, impaired sleep/wake cycle, Etc..  - Treat for contributing etiologies. Ensure delirium precautions in " place. Elevate HOB, aspiration precautions. She has history of CVA, TIA per report, so need to closely monitor mental status. Consider imaging as indicated.     Hypertension  - BP acceptable, trend BP to guide management.        SCDs for DVT prophylaxis.  Full code.  Discussed with patient, nursing staff, and consulting provider.  Anticipate discharge TBD timing yet to be determined.  Expected Discharge Date: 6/30/2025; Expected Discharge Time:       Steve Mtz DO  [facility]  06/27/25

## 2025-06-27 NOTE — PROGRESS NOTES
[facility]  Inpatient Progress Note    Reason for Followup:  Rectal bleeding    Subjective     Interval History:   2 very small stool smears with scant red blood.  She has become increasingly confused and combative.  In restraints.     Current Facility-Administered Medications:     acetaminophen (TYLENOL) tablet 650 mg, 650 mg, Oral, Q4H PRN, 650 mg at 06/26/25 2024 **OR** acetaminophen (TYLENOL) 160 MG/5ML oral solution 650 mg, 650 mg, Oral, Q4H PRN **OR** acetaminophen (TYLENOL) suppository 650 mg, 650 mg, Rectal, Q4H PRN, Ivan Watson MD    ampicillin-sulbactam (UNASYN) 3 g in sodium chloride 0.9 % 100 mL MBP, 3 g, Intravenous, Q6H, Nilam Ruth PA-C, 3 g at 06/27/25 0515    atorvastatin (LIPITOR) tablet 40 mg, 40 mg, Oral, Nightly, Ivan Watson MD, 40 mg at 06/26/25 2025    Calcium Replacement - Follow Nurse / BPA Driven Protocol, , Not Applicable, PRN, Ivan Watson MD    dextrose (D50W) (25 g/50 mL) IV injection 25 g, 25 g, Intravenous, Q15 Min PRN, Steve Mtz DO    dextrose (GLUTOSE) oral gel 15 g, 15 g, Oral, Q15 Min PRN, Steve Mtz DO    famotidine (PEPCID) tablet 20 mg, 20 mg, Oral, Daily, Sarah Chase, APRN, 20 mg at 06/26/25 1110    glucagon (GLUCAGEN) injection 1 mg, 1 mg, Intramuscular, Q15 Min PRN, Steve Mtz DO    hydrocortisone (ANUSOL-HC) suppository 25 mg, 25 mg, Rectal, BID, Donnie Renteria MD, 25 mg at 06/26/25 2305    insulin lispro (HUMALOG/ADMELOG) injection 2-7 Units, 2-7 Units, Subcutaneous, 4x Daily AC & at Bedtime, Steve Mtz,     LORazepam (ATIVAN) injection 0.5 mg, 0.5 mg, Intravenous, Once, Samuel Baum MD    Magnesium Standard Dose Replacement - Follow Nurse / BPA Driven Protocol, , Not Applicable, PRN, Ivan Watson MD    metoprolol tartrate (LOPRESSOR) tablet 25 mg, 25 mg, Oral, BID, Ivan Watson MD, 25 mg at 06/26/25 1110    OLANZapine (zyPREXA) tablet 2.5 mg, 2.5 mg, Oral, Once, Steve Mtz, DO     OLANZapine (zyPREXA) tablet 5 mg, 5 mg, Oral, Nightly, Ivan Watson MD, 5 mg at 06/26/25 2024    ondansetron ODT (ZOFRAN-ODT) disintegrating tablet 4 mg, 4 mg, Oral, Q6H PRN **OR** ondansetron (ZOFRAN) injection 4 mg, 4 mg, Intravenous, Q6H PRN, Ivan Watson MD    Pharmacy Consult, , Not Applicable, Continuous PRN, Sarah Chase APRN    Phosphorus Replacement - Follow Nurse / BPA Driven Protocol, , Not Applicable, PRNWalter William D, MD    Potassium Replacement - Follow Nurse / BPA Driven Protocol, , Not Applicable, Walter SALAS William D, MD    sodium chloride 0.9 % flush 10 mL, 10 mL, Intravenous, Q12H, Ivan Watson MD, 10 mL at 06/26/25 1109    sodium chloride 0.9 % flush 10 mL, 10 mL, Intravenous, PRN, Ivan Watson MD    sodium chloride 0.9 % infusion 40 mL, 40 mL, Intravenous, PRN, Ivan Watson MD    vancomycin (VANCOCIN) capsule 125 mg, 125 mg, Oral, Q6H, Sarah Chase APRN, 125 mg at 06/26/25 2304    Objective     Vital Signs  Temp:  [97.3 °F (36.3 °C)-97.7 °F (36.5 °C)] 97.5 °F (36.4 °C)  Heart Rate:  [] 84  Resp:  [15-17] 15  BP: (103-146)/(57-98) 112/80  Body mass index is 25.78 kg/m².  No intake or output data in the 24 hours ending 06/27/25 0806    No intake/output data recorded.     Physical Exam:   General: patient awake, alert and cooperative   Abdomen: soft, nontender, nondistended; normal bowel sounds     Results Review:     I reviewed the patient's new clinical results.  I reviewed the patient's new imaging results and agree with the interpretation.    Results from last 7 days   Lab Units 06/27/25  0740 06/26/25  0807 06/25/25  1259   WBC 10*3/mm3 6.63 9.01 9.47   HEMOGLOBIN g/dL 8.3* 9.3* 10.2*   HEMATOCRIT % 26.2* 28.7* 31.9*   PLATELETS 10*3/mm3 227 227 219     Results from last 7 days   Lab Units 06/26/25  1825 06/26/25  0807 06/25/25  1259   SODIUM mmol/L  --  144 143   POTASSIUM mmol/L 3.7 3.5 3.8   CHLORIDE mmol/L  --  110* 108*   CO2 mmol/L  --   23.7 24.7   BUN mg/dL  --  21.0 29.0*   CREATININE mg/dL  --  1.07* 1.18*   CALCIUM mg/dL  --  8.4 8.9   BILIRUBIN mg/dL  --  0.7 0.5   ALK PHOS U/L  --  70 71   ALT (SGPT) U/L  --  7 7   AST (SGOT) U/L  --  12 14   GLUCOSE mg/dL  --  138* 151*     Results from last 7 days   Lab Units 06/25/25  1259   INR  1.82*     Lab Results   Lab Value Date/Time    LIPASE 10 (L) 03/19/2024 0736    LIPASE 11 (L) 10/16/2023 1642    LIPASE 202 (H) 02/24/2018 0508    LIPASE 146 (H) 02/23/2018 0614    LIPASE 145 (H) 02/22/2018 0302    LIPASE 193 (H) 02/21/2018 1456    LIPASE 547 (H) 02/20/2018 0410    LIPASE >600 (H) 02/19/2018 0408    LIPASE >600 (H) 02/18/2018 1349    LIPASE 11 (L) 06/07/2016 2006       Radiology:         Assessment & Plan   Assessment:   Rectal bleeding  Protcolitis by CT scan  Afib on Pradaxa    Plan:   Hb down a little but rectal bleeding seems to be minimal at this time  Anusol supp  Hold DOAC. Update INR  May need to consider flex sig if Hb continues to drop or further bleeding    I discussed the patient's findings and my recommendations with Dr Mtz  GI PCR returned with Norovirus and EPEC  C diff toxin positive, Ag negative    She is currently on vanc and Unasyn, I think we can DC vanc as low suspicion for C diff  Ultimately I think much of this may be secondary to Noroviurs, and we can probably DC unasyn in next 24hrs if improving.           Donnie Renteria M.D.  [facility]  [address] [place] [address]  [phone]

## 2025-06-28 NOTE — THERAPY EVALUATION
Patient Name: Monica Scherer  : 1933    MRN: [id number]                              Today's Date: 2025       Admit Date: 2025    Visit Dx:     ICD-10-CM ICD-9-CM   1. Rectal bleeding  K62.5 569.3   2. Colitis  K52.9 558.9     Patient Active Problem List   Diagnosis    Vertigo    Temporary cerebral vascular dysfunction    Gastroesophageal reflux disease with esophagitis    Degeneration of intervertebral disc of cervical region    Prediabetes    S/P cholecystectomy    Osteoarthritis of knee    Herpes zoster without complication    Hypertension    Duodenal ulcer    History of pancreatitis    Hyperlipidemia    TIA (transient ischemic attack)    Cerebrovascular accident (CVA) due to thrombosis of left posterior cerebral artery    Paroxysmal atrial fibrillation    General weakness    History of CVA (cerebrovascular accident)    Anticoagulated    Hematuria    Dizziness and giddiness    Rectal bleeding    Acute CVA (cerebrovascular accident)    GI bleed    C. difficile colitis     Past Medical History:   Diagnosis Date    Atrial fibrillation     Hyperlipidemia     Hypertension     Lacunar infarct, acute 2020    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    New onset atrial fibrillation 2020    now on rate control along with Eliquis    Prolapsed uterus     per patient    Stroke     TIA (transient ischemic attack) 2020    Weakness      Past Surgical History:   Procedure Laterality Date    APPENDECTOMY      CHOLECYSTECTOMY N/A 2016    Procedure: CHOLECYSTECTOMY LAPAROSCOPIC;  Surgeon: Russ Gar MD;  Location: [facility] OR [facility] [place];  Service:     COLONOSCOPY N/A 2021    Procedure: COLONOSCOPY TO CECUM WITH HOT SNARE POLYPECTOMIES AND COLD BX POLYPECTOMY;  Surgeon: Emerson Izaguirre MD;  [facility];  Service: Gastroenterology;  Laterality: N/A;  pre: RECTAL BLEEDING, FAMILY HX OF COLON CANCER  post: INTERNAL AND EXTERNAL HEMORRHOIDS, DIVERTICULOSIS, POLYPS     ENDOSCOPY N/A 2/22/2018    Z-line regular, 35 cm from incisors, normal esophagus, gastritis, bilious gastric fluid, one non-bleeding duodenal ulcer w/no stigmata of bleeding, Path: DUODENUM: FRAGMENTS OF GASTRIC TYPE MUCOSA WITH HYPERPLASIA (FOVEOLAR) AND PATCHY MIXED INFLAMMATION IN THE LAMINA PROPRIA WITH FOCAL FIBROSIS, COMMENT: 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     EYE SURGERY      tre cataracts      General Information       Row Name 06/28/25 1511          Physical Therapy Time and Intention    Document Type evaluation  -SHERI     Mode of Treatment individual therapy;physical therapy  -SHERI       Row Name 06/28/25 1511          General Information    Patient Profile Reviewed yes  -SHERI     Prior Level of Function --  Per chart review, pt poor historian and requires assist with transfers and mobility.  -SHERI     Existing Precautions/Restrictions fall  -SHERI     Barriers to Rehab cognitive status  -SHERI       Row Name 06/28/25 1511          Living Environment    Current Living Arrangements home  -SHERI     People in Home child(leona), adult  -SHERI       Row Name 06/28/25 1511          Cognition    Orientation Status (Cognition) oriented to;person  -SHERI       Row Name 06/28/25 1511          Safety Issues/Impairments Affecting Functional Mobility    Impairments Affecting Function (Mobility) balance;cognition;endurance/activity tolerance;strength  -               User Key  (r) = Recorded By, (t) = Taken By, (c) = Cosigned By      Initials Name Provider Type    SHERI Ritika Alfaro, PT Physical Therapist                   Mobility       Row Name 06/28/25 1512          Bed Mobility    Bed Mobility supine-sit;sit-supine  -SHERI     Supine-Sit [place] (Bed Mobility) moderate assist (50% patient effort);verbal cues;nonverbal cues (demo/gesture)  -SHERI     Sit-Supine [place] (Bed Mobility) moderate assist (50% patient effort);verbal cues;nonverbal cues (demo/gesture)  -     Assistive Device (Bed Mobility) bed rails;head of bed  "elevated  -[facility] Name 06/28/25 1512          Sit-Stand Transfer    Sit-Stand [place] (Transfers) not tested  -     Comment, (Sit-Stand Transfer) Pt declined STS today d/t being \"too weak\"  -[facility] Name 06/28/25 1512          Gait/Stairs (Locomotion)    Patient was able to Ambulate no, other medical factors prevent ambulation  -     Reason Patient was unable to Ambulate Cognitive Deficit/Severe Dementia;Excessive Weakness  -               User Key  (r) = Recorded By, (t) = Taken By, (c) = Cosigned By      Initials Name Provider Type    Ritika Garzon PT Physical Therapist                   Obj/Interventions       [facility] Name 06/28/25 1513          Range of Motion Comprehensive    General Range of Motion bilateral lower extremity ROM WFL  -[facility] Name 06/28/25 1513          Strength Comprehensive (MMT)    General Manual Muscle Testing (MMT) Assessment lower extremity strength deficits identified  -     Comment, General Manual Muscle Testing (MMT) Assessment Grossly assessed BLE strength >3+/5 MMT  -[facility] Name 06/28/25 1513          Motor Skills    Therapeutic Exercise other (see comments)  Supine and Seated BLE therex x 10 reps each/leg  -[facility] Name 06/28/25 1513          Balance    Balance Assessment sitting static balance;sitting dynamic balance  -     Static Sitting Balance standby assist  -     Dynamic Sitting Balance standby assist;contact guard  -     Position, Sitting Balance unsupported;sitting edge of bed  -     Balance Interventions sitting;static;dynamic;minimal challenge;weight shifting activity  -[facility] Name 06/28/25 1513          Sensory Assessment (Somatosensory)    Sensory Assessment (Somatosensory) sensation intact  -               User Key  (r) = Recorded By, (t) = Taken By, (c) = Cosigned By      Initials Name Provider Type    Ritika Garzon PT Physical Therapist                   Goals/Plan       Row Name 06/28/25 1517          Bed " Mobility Goal 1 (PT)    Activity/Assistive Device (Bed Mobility Goal 1, PT) bed mobility activities, all  -SHERI     [place] Level/Cues Needed (Bed Mobility Goal 1, PT) standby assist  -SHERI     Time Frame (Bed Mobility Goal 1, PT) short term goal (STG);1 week  -SHERI     Progress/Outcomes (Bed Mobility Goal 1, PT) new goal  -       Row Name 06/28/25 1517          Transfer Goal 1 (PT)    Activity/Assistive Device (Transfer Goal 1, PT) transfers, all;walker, rolling  -SHERI     [place] Level/Cues Needed (Transfer Goal 1, PT) contact guard required  -SHERI     Time Frame (Transfer Goal 1, PT) short term goal (STG);1 week  -SHERI     Progress/Outcome (Transfer Goal 1, PT) new goal  -       Row Name 06/28/25 1517          Gait Training Goal 1 (PT)    Activity/Assistive Device (Gait Training Goal 1, PT) gait (walking locomotion);decrease fall risk;walker, rolling  -SHERI     [place] Level (Gait Training Goal 1, PT) contact guard required  -SHERI     Distance (Gait Training Goal 1, PT) 25  -SHERI     Time Frame (Gait Training Goal 1, PT) short term goal (STG);1 week  -SHERI       Row Name 06/28/25 1517          Therapy Assessment/Plan (PT)    Planned Therapy Interventions (PT) balance training;bed mobility training;gait training;home exercise program;patient/family education;strengthening;stretching;transfer training  -               User Key  (r) = Recorded By, (t) = Taken By, (c) = Cosigned By      Initials Name Provider Type    Ritika Garzon, PT Physical Therapist                   Clinical Impression       Row Name 06/28/25 1514          Pain    Pretreatment Pain Rating 0/10 - no pain  -SHERI     Posttreatment Pain Rating 0/10 - no pain  -       Row Name 06/28/25 1514          Plan of Care Review    Plan of Care Reviewed With patient  -SHERI     Progress improving  -SHERI     Outcome Evaluation Pt is a 89 y/o female admitted to [facility] on 6/25 d/t GI bleed. PMH includes but not limited to dementia, GERD, HTN, CVA, Afib. Pt is poor  "historian, lives with adult son and DIL, and requred assist with transfer and mobility. On this date, pt agreeable to participate in PT eval revealing deficits with strength, balance, activity tolerance, and cognition indicating need for skilled PT services. She completed bed mobility requiring modA using bedrails, declined attempt of transfers stating she is \"too weak\", and required modA to return to supine. PT recommending SNF upon D/C from hospital.  -SHERI       Row Name 06/28/25 1514          Therapy Assessment/Plan (PT)    Rehab Potential (PT) fair  -SHERI     Criteria for Skilled Interventions Met (PT) yes  -SHERI     Therapy Frequency (PT) 5 times/wk  -SHERI               User Key  (r) = Recorded By, (t) = Taken By, (c) = Cosigned By      Initials Name Provider Type    Ritika Garzon PT Physical Therapist                   Outcome Measures       Row Name 06/28/25 1517          How much help from another person do you currently need...    Turning from your back to your side while in flat bed without using bedrails? 2  -SHERI     Moving from lying on back to sitting on the side of a flat bed without bedrails? 2  -SHERI     Moving to and from a bed to a chair (including a wheelchair)? 2  -SHERI     Standing up from a chair using your arms (e.g., wheelchair, bedside chair)? 2  -SHERI     Climbing 3-5 steps with a railing? 1  -SHERI     To walk in hospital room? 1  -SHERI     AM-PAC 6 Clicks Score (PT) 10  -SHERI               User Key  (r) = Recorded By, (t) = Taken By, (c) = Cosigned By      Initials Name Provider Type    Ritika Garzon PT Physical Therapist                                 Physical Therapy Education       Title: PT OT SLP Therapies (In Progress)       Topic: Physical Therapy (In Progress)       Point: Mobility training (In Progress)       Learning Progress Summary            Patient Acceptance, E, NR by SHERI at 6/28/2025 1518                      Point: Home exercise program (In Progress)       Learning Progress Summary  " "          Patient Acceptance, E, NR by  at 6/28/2025 1518                      Point: Body mechanics (In Progress)       Learning Progress Summary            Patient Acceptance, E, NR by  at 6/28/2025 1518                      Point: Precautions (In Progress)       Learning Progress Summary            Patient Acceptance, E, NR by  at 6/28/2025 1518                                      User Key       Initials Effective Dates Name Provider Type Discipline     08/24/23 -  Ritika Alfaro, PT Physical Therapist PT                  PT Recommendation and Plan  Planned Therapy Interventions (PT): balance training, bed mobility training, gait training, home exercise program, patient/family education, strengthening, stretching, transfer training  Progress: improving  Outcome Evaluation: Pt is a 89 y/o female admitted to [facility] on 6/25 d/t GI bleed. PMH includes but not limited to dementia, GERD, HTN, CVA, Afib. Pt is poor historian, lives with adult son and DIL, and requred assist with transfer and mobility. On this date, pt agreeable to participate in PT eval revealing deficits with strength, balance, activity tolerance, and cognition indicating need for skilled PT services. She completed bed mobility requiring modA using bedrails, declined attempt of transfers stating she is \"too weak\", and required modA to return to supine. PT recommending SNF upon D/C from hospital.     Time Calculation:         PT Charges       Row Name 06/28/25 1519             Time Calculation    Start Time 1422  -      Stop Time 1434  -      Time Calculation (min) 12 min  -      PT Received On 06/28/25  -      PT - Next Appointment 06/29/25  -      PT Goal Re-Cert Due Date 07/05/25  -         Time Calculation- PT    Total Timed Code Minutes- PT 11 minute(s)  -         Timed Charges    65394 - PT Therapeutic Exercise Minutes 3  -      43384 - PT Therapeutic Activity Minutes 8  -         Total Minutes    Timed Charges Total Minutes " 11  -SHERI       Total Minutes 11  -SHERI                User Key  (r) = Recorded By, (t) = Taken By, (c) = Cosigned By      Initials Name Provider Type    Ritika Garzon, PT Physical Therapist                  Therapy Charges for Today       Code Description Service Date Service Provider Modifiers Qty    [id number]  PT THERAPEUTIC ACT EA 15 MIN 6/28/2025 Ritika Alfaro, PT GP 1    [id number]  PT EVAL MOD COMPLEXITY 3 6/28/2025 Ritika Alfaro, PT GP 1            PT G-Codes  AM-PAC 6 Clicks Score (PT): 10  PT Discharge Summary  Anticipated Discharge Disposition (PT): skilled nursing facility    Ritika Alfaro PT  6/28/2025

## 2025-06-28 NOTE — PLAN OF CARE
Goal Outcome Evaluation:  Plan of Care Reviewed With: patient        Progress: no change  Outcome Evaluation: vss. telemetry monitor, a fib. confused. 2 lpm via nc. q2 turn. incontinence care. po encouraged, feeder. meds crushed. aspiration precautions. iv antibiotics continued.

## 2025-06-28 NOTE — PROGRESS NOTES
Name: Monica Scherer ADMIT: 2025   : 1933  PCP: Amilcar Mauricio DO    MRN: [id number] LOS: 2 days   AGE/SEX: 89 y.o. female  ROOM: [facility]/     Subjective   Subjective   No abdominal pain.  No nausea or vomiting.  Tolerating clear liquids well.  Denies any bowel movement today.  No fever or chills.    Review of Systems  .  No dysuria or hematuria.  Cardiovascular.  No chest pain/no shortness of breath/no cough/no wheeze/no palpitations/no ankle edema  CNS.  No focal neurological deficits.     Objective   Objective   Vital Signs  Temp:  [97.5 °F (36.4 °C)-98.4 °F (36.9 °C)] 98.4 °F (36.9 °C)  Heart Rate:  [58-87] 58  Resp:  [14-18] 16  BP: (109-150)/(54-72) 109/55  SpO2:  [93 %-100 %] 93 %  on   ;   Device (Oxygen Therapy): nasal cannula    Intake/Output Summary (Last 24 hours) at 2025 1507  Last data filed at 2025 1422  Gross per 24 hour   Intake 480 ml   Output --   Net 480 ml     Body mass index is 25.78 kg/m².      25  1750 25   Weight: 63.5 kg (140 lb) 68.1 kg (150 lb 3.2 oz)     Physical Exam  General.  Elderly female.  She is alert oriented to self only.  In no apparent pain/distress/diaphoresis.  Normal mood and affect  Eyes.  Pupils equal round and reactive.  Intact extraocular musculature.  No pallor or jaundice  Oral cavity.  Moist mucous membrane  Neck.  Supple.  No JVD.  No lymphadenopathy or thyromegaly.  Cardiovascular.  Controlled rate atrial fibrillation with grade 2 systolic murmur  Chest.  Clear to auscultation bilaterally with scattered bilateral rhonchi.  Abdomen.  No distention.  Soft lax.  No tenderness.  No organomegaly.  No guarding or rebound  Extremities.  No clubbing/cyanosis/edema  CNS.  No acute focal neurological deficits.      Results Review:      Results from last 7 days   Lab Units 25  0349 25  0740 25  1825 25  0807 25  1259   SODIUM mmol/L 143 144  --  144 143   POTASSIUM mmol/L 3.4* 3.8 3.7 3.5 3.8  "  CHLORIDE mmol/L 111* 111*  --  110* 108*   CO2 mmol/L 23.6 24.6  --  23.7 24.7   BUN mg/dL 14.0 15.0  --  21.0 29.0*   CREATININE mg/dL 0.96 1.00  --  1.07* 1.18*   GLUCOSE mg/dL 71 99  --  138* 151*   CALCIUM mg/dL 7.5* 8.1*  --  8.4 8.9   AST (SGOT) U/L  --   --   --  12 14   ALT (SGPT) U/L  --   --   --  7 7     Estimated Creatinine Clearance: 35.5 mL/min (by C-G formula based on SCr of 0.96 mg/dL).      Results from last 7 days   Lab Units 06/28/25  1104 06/28/25  0809 06/27/25  2048 06/27/25  1652 06/27/25  1618 06/27/25  1056 06/27/25  0818 06/27/25  0531   GLUCOSE mg/dL 72 75 90 88 94 99 116 107                 Results from last 7 days   Lab Units 06/28/25  0349 06/27/25  0740 06/25/25  1259   MAGNESIUM mg/dL 1.9 1.9 2.0   PHOSPHORUS mg/dL 2.5 3.0 3.2           Invalid input(s): \"LDLCALC\"  Results from last 7 days   Lab Units 06/28/25  0349 06/27/25  0740 06/26/25  0807 06/25/25  1259   WBC 10*3/mm3 6.85 6.63 9.01 9.47   HEMOGLOBIN g/dL 8.3* 8.3* 9.3* 10.2*   HEMATOCRIT % 26.3* 26.2* 28.7* 31.9*   PLATELETS 10*3/mm3 193 227 227 219   MCV fL 96.7 94.2 94.7 95.2   MCH pg 30.5 29.9 30.7 30.4   MCHC g/dL 31.6 31.7 32.4 32.0   RDW % 15.4 15.5* 15.4 15.4   RDW-SD fl 55.1* 53.1 52.9 53.2   MPV fL 9.3 10.1 9.6 9.9   NEUTROPHIL % % 57.8 62.2 72.2 74.3   LYMPHOCYTE % % 28.9 24.7 18.4* 14.8*   MONOCYTES % % 8.0 7.5 6.3 7.6   EOSINOPHIL % % 3.9 3.8 1.8 2.5   BASOPHIL % % 0.4 0.6 0.4 0.3   IMM GRAN % % 1.0* 1.2* 0.9* 0.5   NEUTROS ABS 10*3/mm3 3.95 4.12 6.50 7.03*   LYMPHS ABS 10*3/mm3 1.98 1.64 1.66 1.40   MONOS ABS 10*3/mm3 0.55 0.50 0.57 0.72   EOS ABS 10*3/mm3 0.27 0.25 0.16 0.24   BASOS ABS 10*3/mm3 0.03 0.04 0.04 0.03   IMMATURE GRANS (ABS) 10*3/mm3 0.07* 0.08* 0.08* 0.05   NRBC /100 WBC 0.0 0.0 0.0 0.0     Results from last 7 days   Lab Units 06/25/25  1259   INR  1.82*   APTT seconds 50.7*                     Results from last 7 days   Lab Units 06/25/25  1522 06/25/25  1508   BLOODCX  No growth at 2 days " Clostridium perfringens*     Results from last 7 days   Lab Units 06/26/25  0747   ADENOVIRUS  Not Detected     Results from last 7 days   Lab Units 06/26/25  1648   NITRITE UA  Negative           Imaging:  Imaging Results (Last 24 Hours)       ** No results found for the last 24 hours. **               I reviewed the patient's new clinical results / labs / tests / procedures      Assessment/Plan     Active Hospital Problems    Diagnosis  POA    **GI bleed [K92.2]  Yes    C. difficile colitis [A04.72]  Yes      Resolved Hospital Problems   No resolved problems to display.           Abdominal pain/bloody diarrhea secondary to norovirus, enteropathogenic E. coli prostatitis.  CT scan of the abdomen pelvis shows proctatitis, colitis with diverticulosis but no diverticulitis.  Stool PCR positive for norovirus and enteropathogenic E. coli.  Stool for C. difficile toxin is positive but antigen is negative (carrier status.).  GI examination is benign.  Tolerating clear liquid diet well.  Hemoglobin slowly declining.  Hemodynamically stable.  P.o. vancomycin DC'd and currently on Unasyn for E. coli that can be stopped after tomorrow's dose.  GI considering FOS if hemoglobin continues to decline.  GI started Anusol HC suppositories.  Hypertension/atrial fibrillation.  No clinical evidence of angina or heart failure.  Pradaxa on hold secondary to rectal bleed.  Rate and blood pressure are controlled.  Continue metoprolol.  Type 2 diabetes.  A1c 6.5.  Acceptable Accu-Cheks.  Continue sliding scale insulin.  GI blood loss anemia.  Iron at discharge  Stage IIIa chronic renal failure/hypokalemia.  Magnesium and phosphorus are normal.  Volume status is normal.  Will substitute potassium.  Avoid nephrotoxic agents.  Patient euvolemic with stable renal function at baseline.  Delirium in a patient with a history of CVA.  Anticoagulation on hold.  Will continue statin.  CNS examination without focal deficit.  Delirium is mostly  secondary to GI infection/sundowning.  Continue Zyprexa.  VTE prophylaxis.  Sequential compression device.        Discussed my findings and plan of treatment with the patient.  Disposition.  To SNF versus home with home health pending PT evaluation.      Wing Grant MD  [facility]  06/28/25  15:07 EDT

## 2025-06-28 NOTE — PROGRESS NOTES
[facility]  Inpatient Progress Note    Reason for Followup:  Rectal bleeding    Subjective     Interval History:     -No bleeding, no new complaints, diarrhea appears mildly better    Current Facility-Administered Medications:     acetaminophen (TYLENOL) tablet 650 mg, 650 mg, Oral, Q4H PRN, 650 mg at 06/26/25 2024 **OR** acetaminophen (TYLENOL) 160 MG/5ML oral solution 650 mg, 650 mg, Oral, Q4H PRN **OR** acetaminophen (TYLENOL) suppository 650 mg, 650 mg, Rectal, Q4H PRN, Ivan Watson MD    ampicillin-sulbactam (UNASYN) 3 g in sodium chloride 0.9 % 100 mL MBP, 3 g, Intravenous, Q6H, Nilam Ruth PA-C, 3 g at 06/28/25 0644    atorvastatin (LIPITOR) tablet 40 mg, 40 mg, Oral, Nightly, Ivan Watson MD, 40 mg at 06/27/25 2236    Calcium Replacement - Follow Nurse / BPA Driven Protocol, , Not Applicable, PRN, Ivan Watson MD    dextrose (D50W) (25 g/50 mL) IV injection 25 g, 25 g, Intravenous, Q15 Min PRN, Steve Mtz DO    dextrose (GLUTOSE) oral gel 15 g, 15 g, Oral, Q15 Min PRN, Steve Mtz DO    famotidine (PEPCID) tablet 20 mg, 20 mg, Oral, Daily, Sarah Chase, APRN, 20 mg at 06/26/25 1110    glucagon (GLUCAGEN) injection 1 mg, 1 mg, Intramuscular, Q15 Min PRN, Steve Mtz DO    hydrocortisone (ANUSOL-HC) suppository 25 mg, 25 mg, Rectal, BID, BesaqibleDonnie MD, 25 mg at 06/27/25 2237    insulin lispro (HUMALOG/ADMELOG) injection 2-7 Units, 2-7 Units, Subcutaneous, 4x Daily AC & at Bedtime, Steve Mtz DO    LORazepam (ATIVAN) injection 0.5 mg, 0.5 mg, Intravenous, Once, Samuel Baum MD    Magnesium Standard Dose Replacement - Follow Nurse / BPA Driven Protocol, , Not Applicable, PRN, Ivan Watson MD    metoprolol tartrate (LOPRESSOR) tablet 25 mg, 25 mg, Oral, BID, Ivan Watson MD, 25 mg at 06/27/25 2236    OLANZapine (zyPREXA) tablet 2.5 mg, 2.5 mg, Oral, Once, Steve Mtz,     OLANZapine (zyPREXA) tablet 5 mg, 5 mg, Oral,  Nightly, Ivan Watosn MD, 5 mg at 06/27/25 2236    ondansetron ODT (ZOFRAN-ODT) disintegrating tablet 4 mg, 4 mg, Oral, Q6H PRN **OR** ondansetron (ZOFRAN) injection 4 mg, 4 mg, Intravenous, Q6H PRN, Ivan Watson MD    Phosphorus Replacement - Follow Nurse / BPA Driven Protocol, , Not Applicable, PRNWalter William D, MD    Potassium Replacement - Follow Nurse / BPA Driven Protocol, , Not Applicable, Walter SALAS William D, MD    sodium chloride 0.9 % flush 10 mL, 10 mL, Intravenous, Q12H, Ivan Watson MD, 10 mL at 06/27/25 2237    sodium chloride 0.9 % flush 10 mL, 10 mL, Intravenous, Walter SALAS William D, MD    sodium chloride 0.9 % infusion 40 mL, 40 mL, Intravenous, Watler SALAS William D, MD    Objective     Vital Signs  Temp:  [97.5 °F (36.4 °C)-98.1 °F (36.7 °C)] 97.5 °F (36.4 °C)  Heart Rate:  [66-87] 87  Resp:  [16-18] 18  BP: (115-142)/(54-76) 115/54  Body mass index is 25.78 kg/m².    Intake/Output Summary (Last 24 hours) at 6/28/2025 0654  Last data filed at 6/27/2025 2236  Gross per 24 hour   Intake 240 ml   Output --   Net 240 ml       I/O this shift:  In: 240 [P.O.:240]  Out: -      Physical Exam:   General: patient awake, alert and cooperative   Abdomen: soft, nontender, nondistended; normal bowel sounds     Results Review:     I reviewed the patient's new clinical results.  I reviewed the patient's new imaging results and agree with the interpretation.    Results from last 7 days   Lab Units 06/28/25  0349 06/27/25  0740 06/26/25  0807   WBC 10*3/mm3 6.85 6.63 9.01   HEMOGLOBIN g/dL 8.3* 8.3* 9.3*   HEMATOCRIT % 26.3* 26.2* 28.7*   PLATELETS 10*3/mm3 193 227 227     Results from last 7 days   Lab Units 06/28/25  0349 06/27/25  0740 06/26/25  1825 06/26/25  0807 06/25/25  1259   SODIUM mmol/L 143 144  --  144 143   POTASSIUM mmol/L 3.4* 3.8 3.7 3.5 3.8   CHLORIDE mmol/L 111* 111*  --  110* 108*   CO2 mmol/L 23.6 24.6  --  23.7 24.7   BUN mg/dL 14.0 15.0  --  21.0 29.0*    CREATININE mg/dL 0.96 1.00  --  1.07* 1.18*   CALCIUM mg/dL 7.5* 8.1*  --  8.4 8.9   BILIRUBIN mg/dL  --   --   --  0.7 0.5   ALK PHOS U/L  --   --   --  70 71   ALT (SGPT) U/L  --   --   --  7 7   AST (SGOT) U/L  --   --   --  12 14   GLUCOSE mg/dL 71 99  --  138* 151*     Results from last 7 days   Lab Units 06/25/25  1259   INR  1.82*     Lab Results   Lab Value Date/Time    LIPASE 10 (L) 03/19/2024 0736    LIPASE 11 (L) 10/16/2023 1642    LIPASE 202 (H) 02/24/2018 0508    LIPASE 146 (H) 02/23/2018 0614    LIPASE 145 (H) 02/22/2018 0302    LIPASE 193 (H) 02/21/2018 1456    LIPASE 547 (H) 02/20/2018 0410    LIPASE >600 (H) 02/19/2018 0408    LIPASE >600 (H) 02/18/2018 1349    LIPASE 11 (L) 06/07/2016 2006       Radiology:         Assessment & Plan   Assessment:   Rectal bleeding  Protcolitis by CT scan  Afib on Pradaxa    Plan:   Hb down a little but rectal bleeding seems to be minimal at this time  Anusol supp  Hold DOAC. Update INR  May need to consider flex sig if Hb continues to drop or further bleeding    I discussed the patient's findings and my recommendations with Dr Mtz  GI PCR returned with Norovirus and EPEC  C diff toxin positive, Ag negative    She is currently on vanc and Unasyn, I think we can DC vanc as low suspicion for C diff  Ultimately I think much of this may be secondary to Noroviurs, and we can probably DC unasyn in next 24hrs if improving. '    -Diarrhea improving, Suspect norovirus, No bleeding. Appears better. Suspect self limited colitis viral.

## 2025-06-28 NOTE — PLAN OF CARE
Goal Outcome Evaluation:              Outcome Evaluation: VSS. AROUSES EASILY WHEN GIVING CARE. CALM AND COOPERATIVE. DISORIENTED TO TIME AND SITUATION BUT TOLD ME SHE WAS AT [facility]. NO ACTIVE GI BLEEDING NOTED.

## 2025-06-28 NOTE — PLAN OF CARE
"Goal Outcome Evaluation:  Plan of Care Reviewed With: patient        Progress: improving  Outcome Evaluation: Pt is a 89 y/o female admitted to [facility] on 6/25 d/t GI bleed. PMH includes but not limited to dementia, GERD, HTN, CVA, Afib. Pt is poor historian, lives with adult son and DIL, and requred assist with transfer and mobility. On this date, pt agreeable to participate in PT eval revealing deficits with strength, balance, activity tolerance, and cognition indicating need for skilled PT services. She completed bed mobility requiring modA using bedrails, declined attempt of transfers stating she is \"too weak\", and required modA to return to supine. PT recommending SNF upon D/C from hospital.    Anticipated Discharge Disposition (PT): skilled nursing facility                        "

## 2025-06-29 NOTE — PROGRESS NOTES
Name: Monica Scherer ADMIT: 2025   : 1933  PCP: Amilcar Mauricio DO    MRN: [id number] LOS: 3 days   AGE/SEX: 89 y.o. female  ROOM: [facility]     Subjective   Subjective   Most of the history is from the nurse as the patient is more confused today.  No abdominal pain.  No nausea or vomiting.  Tolerating clear liquids well.  Decreased diarrhea only 2 bowel movements today that are loose without fresh blood blood per rectum or melena.  No fever or chills.  No chest pain or shortness of breath.  Good urine output without hematuria.  No headache.  No seizures.  Moving all extremities.       Objective   Objective   Vital Signs  Temp:  [97.9 °F (36.6 °C)-98.4 °F (36.9 °C)] 97.9 °F (36.6 °C)  Heart Rate:  [68-88] 71  Resp:  [16-19] 16  BP: (127-151)/(51-80) 140/80  SpO2:  [97 %-99 %] 99 %  on   ;   Device (Oxygen Therapy): room air    Intake/Output Summary (Last 24 hours) at 2025 1440  Last data filed at 2025 1241  Gross per 24 hour   Intake 770 ml   Output --   Net 770 ml     Body mass index is 25.78 kg/m².      25  1750 25   Weight: 63.5 kg (140 lb) 68.1 kg (150 lb 3.2 oz)     Physical Exam  General.  Elderly female.  She is alert oriented to self only.  In no apparent pain/distress/diaphoresis.  Normal mood and affect  Eyes.  Pupils equal round and reactive.  Intact extraocular musculature.  No pallor or jaundice  Oral cavity.  Moist mucous membrane  Neck.  Supple.  No JVD.  No lymphadenopathy or thyromegaly.  Cardiovascular.  Controlled rate atrial fibrillation with grade 2 systolic murmur  Chest.  Clear to auscultation bilaterally with scattered bilateral rhonchi.  Abdomen.  No distention.  Soft lax.  No tenderness.  No organomegaly.  No guarding or rebound.  Normal bowel sounds.  Extremities.  No clubbing/cyanosis/edema  CNS.  No acute focal neurological deficits.      Results Review:      Results from last 7 days   Lab Units 25  1827 25  0349 25  0740  "06/26/25  1825 06/26/25  0807 06/25/25  1259   SODIUM mmol/L  --  143 144  --  144 143   POTASSIUM mmol/L 4.6 3.4* 3.8 3.7 3.5 3.8   CHLORIDE mmol/L  --  111* 111*  --  110* 108*   CO2 mmol/L  --  23.6 24.6  --  23.7 24.7   BUN mg/dL  --  14.0 15.0  --  21.0 29.0*   CREATININE mg/dL  --  0.96 1.00  --  1.07* 1.18*   GLUCOSE mg/dL  --  71 99  --  138* 151*   CALCIUM mg/dL  --  7.5* 8.1*  --  8.4 8.9   AST (SGOT) U/L  --   --   --   --  12 14   ALT (SGPT) U/L  --   --   --   --  7 7     Estimated Creatinine Clearance: 35.5 mL/min (by C-G formula based on SCr of 0.96 mg/dL).      Results from last 7 days   Lab Units 06/29/25  1154 06/29/25  0755 06/28/25  2027 06/28/25  1627 06/28/25  1607 06/28/25  1104 06/28/25  0809 06/27/25  2048   GLUCOSE mg/dL 170* 119 170* 196* 67* 72 75 90                 Results from last 7 days   Lab Units 06/28/25  0349 06/27/25  0740 06/25/25  1259   MAGNESIUM mg/dL 1.9 1.9 2.0   PHOSPHORUS mg/dL 2.5 3.0 3.2           Invalid input(s): \"LDLCALC\"  Results from last 7 days   Lab Units 06/28/25  0349 06/27/25  0740 06/26/25  0807 06/25/25  1259   WBC 10*3/mm3 6.85 6.63 9.01 9.47   HEMOGLOBIN g/dL 8.3* 8.3* 9.3* 10.2*   HEMATOCRIT % 26.3* 26.2* 28.7* 31.9*   PLATELETS 10*3/mm3 193 227 227 219   MCV fL 96.7 94.2 94.7 95.2   MCH pg 30.5 29.9 30.7 30.4   MCHC g/dL 31.6 31.7 32.4 32.0   RDW % 15.4 15.5* 15.4 15.4   RDW-SD fl 55.1* 53.1 52.9 53.2   MPV fL 9.3 10.1 9.6 9.9   NEUTROPHIL % % 57.8 62.2 72.2 74.3   LYMPHOCYTE % % 28.9 24.7 18.4* 14.8*   MONOCYTES % % 8.0 7.5 6.3 7.6   EOSINOPHIL % % 3.9 3.8 1.8 2.5   BASOPHIL % % 0.4 0.6 0.4 0.3   IMM GRAN % % 1.0* 1.2* 0.9* 0.5   NEUTROS ABS 10*3/mm3 3.95 4.12 6.50 7.03*   LYMPHS ABS 10*3/mm3 1.98 1.64 1.66 1.40   MONOS ABS 10*3/mm3 0.55 0.50 0.57 0.72   EOS ABS 10*3/mm3 0.27 0.25 0.16 0.24   BASOS ABS 10*3/mm3 0.03 0.04 0.04 0.03   IMMATURE GRANS (ABS) 10*3/mm3 0.07* 0.08* 0.08* 0.05   NRBC /100 WBC 0.0 0.0 0.0 0.0     Results from last 7 days   Lab " Units 06/25/25  1259   INR  1.82*   APTT seconds 50.7*                     Results from last 7 days   Lab Units 06/25/25  1522 06/25/25  1508   BLOODCX  No growth at 3 days Clostridium perfringens*     Results from last 7 days   Lab Units 06/26/25  0747   ADENOVIRUS  Not Detected     Results from last 7 days   Lab Units 06/26/25  1648   NITRITE UA  Negative           Imaging:  Imaging Results (Last 24 Hours)       ** No results found for the last 24 hours. **               I reviewed the patient's new clinical results / labs / tests / procedures      Assessment/Plan     Active Hospital Problems    Diagnosis  POA    **GI bleed [K92.2]  Yes    C. difficile colitis [A04.72]  Yes      Resolved Hospital Problems   No resolved problems to display.     Refused blood draw today.      Abdominal pain/bloody diarrhea secondary to norovirus, enteropathogenic E. coli prostatitis.  CT scan of the abdomen pelvis shows proctatitis, colitis with diverticulosis but no diverticulitis.  Stool PCR positive for norovirus and enteropathogenic E. coli.  Stool for C. difficile toxin is positive but antigen is negative (carrier status.).  GI examination is benign.  Tolerating clear liquid diet well.  GI advance the diet to as tolerated.  Hemoglobin slowly declining.  Hemodynamically stable.  P.o. vancomycin DC'd.  Was on Unasyn for E. coli that was stopped today by GI.  GI considering FOS if hemoglobin continues to decline.  GI started Anusol HC suppositories.  Hypertension/atrial fibrillation.  No clinical evidence of angina or heart failure.  Pradaxa on hold secondary to rectal bleed.  Rate and blood pressure are controlled.  Continue metoprolol.  Type 2 diabetes.  A1c 6.5.  Acceptable Accu-Cheks.  Continue sliding scale insulin.  GI blood loss anemia.  Iron at discharge.  Monitor CBC.  Stage IIIa chronic renal failure/hypokalemia.  Magnesium and phosphorus are normal.  Volume status is normal.  Will substitute potassium.  Avoid  nephrotoxic agents.  Patient euvolemic with stable renal function at baseline.  Delirium in a patient with a history of CVA.  Anticoagulation on hold.  Will continue statin.  CNS examination without focal deficit.  Delirium is mostly secondary to GI infection/sundowning.  Continue Zyprexa.  VTE prophylaxis.  Sequential compression device.            Discussed my findings and plan of treatment with the patient nurse..  Disposition.  To SNF per Physical therapy evaluation.  Will involve CCP.  Anticipate discharge in 2 days if hemoglobin is stable and diarrhea improves    Wing Grant MD  [facility]  06/29/25  14:40 EDT

## 2025-06-29 NOTE — PROGRESS NOTES
[facility]  Inpatient Progress Note    Reason for Followup:  Rectal bleeding    Subjective     Interval History:     -Pt confused this morning, she does answer back to say has not had diarrhea.     Current Facility-Administered Medications:     acetaminophen (TYLENOL) tablet 650 mg, 650 mg, Oral, Q4H PRN, 650 mg at 06/26/25 2024 **OR** acetaminophen (TYLENOL) 160 MG/5ML oral solution 650 mg, 650 mg, Oral, Q4H PRN **OR** acetaminophen (TYLENOL) suppository 650 mg, 650 mg, Rectal, Q4H PRN, Ivan Watson MD    ampicillin-sulbactam (UNASYN) 3 g in sodium chloride 0.9 % 100 mL MBP, 3 g, Intravenous, Q6H, Nilam Ruth PA-C, 3 g at 06/29/25 1206    atorvastatin (LIPITOR) tablet 40 mg, 40 mg, Oral, Nightly, Ivan Watson MD, 40 mg at 06/28/25 2050    Calcium Replacement - Follow Nurse / BPA Driven Protocol, , Not Applicable, PRN, Ivan Watson MD    dextrose (D50W) (25 g/50 mL) IV injection 25 g, 25 g, Intravenous, Q15 Min PRN, Steve Mtz DO, 25 g at 06/28/25 1613    dextrose (GLUTOSE) oral gel 15 g, 15 g, Oral, Q15 Min PRN, Steve Mtz DO    famotidine (PEPCID) tablet 20 mg, 20 mg, Oral, Daily, Sarah Chase, APRN, 20 mg at 06/29/25 0901    glucagon (GLUCAGEN) injection 1 mg, 1 mg, Intramuscular, Q15 Min PRN, Steve Mtz DO    hydrocortisone (ANUSOL-HC) suppository 25 mg, 25 mg, Rectal, BID, Donnie Renteria MD, 25 mg at 06/29/25 0901    insulin lispro (HUMALOG/ADMELOG) injection 2-7 Units, 2-7 Units, Subcutaneous, 4x Daily AC & at Bedtime, Steve Mtz DO, 2 Units at 06/29/25 1206    LORazepam (ATIVAN) injection 0.5 mg, 0.5 mg, Intravenous, Once, Samuel Baum MD    Magnesium Standard Dose Replacement - Follow Nurse / BPA Driven Protocol, , Not Applicable, PRN, Ivan Watson MD    metoprolol tartrate (LOPRESSOR) tablet 25 mg, 25 mg, Oral, BID, Ivan Watson MD, 25 mg at 06/29/25 0901    OLANZapine (zyPREXA) tablet 2.5 mg, 2.5 mg, Oral, Once, Bibi,  DO Steve    OLANZapine (zyPREXA) tablet 5 mg, 5 mg, Oral, Nightly, Ivan Watson MD, 5 mg at 06/28/25 2049    ondansetron ODT (ZOFRAN-ODT) disintegrating tablet 4 mg, 4 mg, Oral, Q6H PRN **OR** ondansetron (ZOFRAN) injection 4 mg, 4 mg, Intravenous, Q6H PRN, Ivan Watson MD    Phosphorus Replacement - Follow Nurse / BPA Driven Protocol, , Not Applicable, PRNWalter William D, MD    Potassium Replacement - Follow Nurse / BPA Driven Protocol, , Not Applicable, Walter SALAS William D, MD    sodium chloride 0.9 % flush 10 mL, 10 mL, Intravenous, Q12H, Ivan Watson MD, 10 mL at 06/29/25 0901    sodium chloride 0.9 % flush 10 mL, 10 mL, Intravenous, Walter SALAS William D, MD    sodium chloride 0.9 % infusion 40 mL, 40 mL, Intravenous, PRN, Ivan Watson MD    Objective     Vital Signs  Temp:  [97.9 °F (36.6 °C)-98.4 °F (36.9 °C)] 97.9 °F (36.6 °C)  Heart Rate:  [68-88] 71  Resp:  [16-19] 16  BP: (127-151)/(51-80) 140/80  Body mass index is 25.78 kg/m².    Intake/Output Summary (Last 24 hours) at 6/29/2025 1403  Last data filed at 6/29/2025 1241  Gross per 24 hour   Intake 1010 ml   Output --   Net 1010 ml       I/O this shift:  In: 210 [P.O.:210]  Out: -      Physical Exam:   General: patient awake, alert and cooperative   Abdomen: soft, nontender, nondistended; normal bowel sounds     Results Review:     I reviewed the patient's new clinical results.  I reviewed the patient's new imaging results and agree with the interpretation.    Results from last 7 days   Lab Units 06/28/25  0349 06/27/25  0740 06/26/25  0807   WBC 10*3/mm3 6.85 6.63 9.01   HEMOGLOBIN g/dL 8.3* 8.3* 9.3*   HEMATOCRIT % 26.3* 26.2* 28.7*   PLATELETS 10*3/mm3 193 227 227     Results from last 7 days   Lab Units 06/28/25  1827 06/28/25  0349 06/27/25  0740 06/26/25  1825 06/26/25  0807 06/25/25  1259   SODIUM mmol/L  --  143 144  --  144 143   POTASSIUM mmol/L 4.6 3.4* 3.8   < > 3.5 3.8   CHLORIDE mmol/L  --  111* 111*   --  110* 108*   CO2 mmol/L  --  23.6 24.6  --  23.7 24.7   BUN mg/dL  --  14.0 15.0  --  21.0 29.0*   CREATININE mg/dL  --  0.96 1.00  --  1.07* 1.18*   CALCIUM mg/dL  --  7.5* 8.1*  --  8.4 8.9   BILIRUBIN mg/dL  --   --   --   --  0.7 0.5   ALK PHOS U/L  --   --   --   --  70 71   ALT (SGPT) U/L  --   --   --   --  7 7   AST (SGOT) U/L  --   --   --   --  12 14   GLUCOSE mg/dL  --  71 99  --  138* 151*    < > = values in this interval not displayed.     Results from last 7 days   Lab Units 06/25/25  1259   INR  1.82*     Lab Results   Lab Value Date/Time    LIPASE 10 (L) 03/19/2024 0736    LIPASE 11 (L) 10/16/2023 1642    LIPASE 202 (H) 02/24/2018 0508    LIPASE 146 (H) 02/23/2018 0614    LIPASE 145 (H) 02/22/2018 0302    LIPASE 193 (H) 02/21/2018 1456    LIPASE 547 (H) 02/20/2018 0410    LIPASE >600 (H) 02/19/2018 0408    LIPASE >600 (H) 02/18/2018 1349    LIPASE 11 (L) 06/07/2016 2006       Radiology:         Assessment & Plan   Assessment:   Rectal bleeding  Protcolitis by CT scan  Afib on Pradaxa    Plan:   Hb down a little but rectal bleeding seems to be minimal at this time  Anusol supp  Hold DOAC. Update INR  May need to consider flex sig if Hb continues to drop or further bleeding    I discussed the patient's findings and my recommendations with Dr Mtz  GI PCR returned with Norovirus and EPEC  C diff toxin positive, Ag negative    -D/C unasyn.     -Diarrhea improving, Suspect norovirus, No bleeding. Appears better. Suspect self limited colitis viral. Confused today.

## 2025-06-29 NOTE — PLAN OF CARE
Goal Outcome Evaluation:              JESSICA. HAD HER ROUTINE ZYPREXA AT  BUT HAS BEEN AWAKE MOST OF THE NIGHT AND BECAME MORE RESTLESS TOWARDS MORNING TRYING TO GET OUT OF BED, SAYS  '' I HAVE TO GET UP TO TAKE CARE OF THE BABIES.'' THINKS SHE IS AT HOME.

## 2025-06-29 NOTE — PLAN OF CARE
Goal Outcome Evaluation:  Plan of Care Reviewed With: patient        Progress: no change  Outcome Evaluation: vss. refusing telemetry monitor currently, md aware. confused. room air. incontinence care. falls precautions. meds crushed in applesauce.

## 2025-06-30 ENCOUNTER — TELEPHONE (OUTPATIENT)
Dept: CASE MANAGEMENT | Facility: OTHER | Age: OVER 89
End: 2025-06-30
Payer: MEDICARE

## 2025-06-30 NOTE — CONSULTS
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    New onset atrial fibrillation 01/2020    now on rate control along with Eliquis    Prolapsed uterus     per patient    Stroke     TIA (transient ischemic attack) 01/2020    Weakness       Past Surgical History:   Procedure Laterality Date    APPENDECTOMY      CHOLECYSTECTOMY N/A 6/8/2016    Procedure: CHOLECYSTECTOMY LAPAROSCOPIC;  Surgeon: Russ Gar MD;  Location:  [facility];  Service:     COLONOSCOPY N/A 9/16/2021    Procedure: COLONOSCOPY TO CECUM WITH HOT SNARE POLYPECTOMIES AND COLD BX POLYPECTOMY;  Surgeon: Emerson Izaguirre MD;  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     EYE SURGERY      ter cataracts      Current Problems   Admission Diagnosis:  Colitis [K52.9]  Rectal bleeding [K62.5]  GI bleed [K92.2]  C. difficile colitis [A04.72]    Problem List:    GI bleed    C. difficile colitis      Other Applicable  "Nutrition Information:   [facility] Reviewed     Anthropometrics      BMI, Height, Weight Estimated body mass index is 25.78 kg/m² as calculated from the following:    Height as of this encounter: 162.6 cm (64\").    Weight as of this encounter: 68.1 kg (150 lb 3.2 oz).    Weight Method: Bed scale       Trending Weight Changes # +/- 5# in past 9 months       Weight History  Wt Readings from Last 20 Encounters:   06/26/25 2009 68.1 kg (150 lb 3.2 oz)   06/25/25 1750 63.5 kg (140 lb)   06/06/25 0502 64.1 kg (141 lb 5 oz)   06/05/25 0515 62.4 kg (137 lb 9.1 oz)   06/04/25 0511 65.5 kg (144 lb 6.4 oz)   06/01/25 0505 67 kg (147 lb 11.3 oz)   05/27/25 2333 67.1 kg (147 lb 14.9 oz)   04/11/25 1007 67.1 kg (148 lb)   03/26/25 1030 70.3 kg (155 lb)   02/27/25 1345 70.3 kg (155 lb)   02/26/25 1731 70.4 kg (155 lb 4.8 oz)   12/09/24 1121 68.9 kg (152 lb)   09/30/24 1040 71.2 kg (157 lb)   05/24/24 0921 71.9 kg (158 lb 9.6 oz)   03/19/24 0716 79 kg (174 lb 2.6 oz)   10/16/23 1604 78 kg (172 lb)   06/05/23 0844 78 kg (172 lb)   11/28/22 0841 78.9 kg (174 lb)   05/23/22 0914 78.5 kg (173 lb)   03/22/22 0916 79.4 kg (175 lb)   09/27/21 1321 76.2 kg (168 lb)   09/24/21 1431 76.2 kg (168 lb)   09/11/21 1231 77.1 kg (170 lb)   09/10/21 1733 77.1 kg (170 lb)   09/06/21 1130 77.1 kg (170 lb)   07/19/21 0851 79.7 kg (175 lb 9.6 oz)   06/14/21 1427 80.7 kg (178 lb)        Labs      Comment: Noted     Results from last 7 days   Lab Units 06/30/25  0437 06/28/25  1827 06/28/25  0349 06/27/25  0740 06/26/25  1825 06/26/25  0807 06/25/25  1259   SODIUM mmol/L 146*  --  143 144  --  144 143   POTASSIUM mmol/L 3.8 4.6 3.4* 3.8   < > 3.5 3.8   GLUCOSE mg/dL 119*  --  71 99  --  138* 151*   BUN mg/dL 11.0  --  14.0 15.0  --  21.0 29.0*   CREATININE mg/dL 0.90  --  0.96 1.00  --  1.07* 1.18*   CALCIUM mg/dL 8.6  --  7.5* 8.1*  --  8.4 8.9   PHOSPHORUS mg/dL 1.9*  --  2.5 3.0  --   --  3.2   MAGNESIUM mg/dL 2.0  --  1.9 1.9  --   --  2.0   ALBUMIN " g/dL  --   --   --   --   --  2.9* 3.2*   BILIRUBIN mg/dL  --   --   --   --   --  0.7 0.5   ALK PHOS U/L  --   --   --   --   --  70 71   AST (SGOT) U/L  --   --   --   --   --  12 14   ALT (SGPT) U/L  --   --   --   --   --  7 7    < > = values in this interval not displayed.     Results from last 7 days   Lab Units 06/30/25  0437 06/28/25  0349 06/27/25  0740   PLATELETS 10*3/mm3 255 193 227   HEMOGLOBIN g/dL 9.3* 8.3* 8.3*   HEMATOCRIT % 28.6* 26.3* 26.2*     Lab Results   Component Value Date    HGBA1C 6.50 (H) 02/27/2025          Medications       Scheduled Medications atorvastatin, 40 mg, Oral, Nightly  famotidine, 20 mg, Oral, Daily  hydrocortisone, 25 mg, Rectal, BID  insulin lispro, 2-7 Units, Subcutaneous, 4x Daily AC & at Bedtime  LORazepam, 0.5 mg, Intravenous, Once  metoprolol tartrate, 25 mg, Oral, BID  OLANZapine, 2.5 mg, Oral, Once  OLANZapine, 5 mg, Oral, Nightly  sodium chloride, 10 mL, Intravenous, Q12H        Infusions      PRN Medications   acetaminophen **OR** acetaminophen **OR** acetaminophen    Calcium Replacement - Follow Nurse / BPA Driven Protocol    dextrose    dextrose    glucagon (human recombinant)    Magnesium Standard Dose Replacement - Follow Nurse / BPA Driven Protocol    OLANZapine    ondansetron ODT **OR** ondansetron    Phosphorus Replacement - Follow Nurse / BPA Driven Protocol    Potassium Replacement - Follow Nurse / BPA Driven Protocol    sodium chloride    sodium chloride     Physical Findings      Chewing/Swallowing  Teeth Status: Mouth/Teeth WDL: .WDL except, teeth Teeth Symptoms: tooth/teeth missing  Chewing/Swallowing Issues: Dysphagia   Edema                            Bowel Function  Stool Output  Stool Unmeasured Occurrence: 0 (06/29/25 2135)  Bowel Incontinence: Yes (06/29/25 1751)  Stool Amount: Small (06/29/25 1751)  Stool Consistency: loose (06/29/25 1751)  Perineal Care: diaper changed, perineum cleansed (06/30/25 0104)  Last Bowel Movement: 06/29/25    I/Os  Intake & Output (last 3 days)         06/27 0701  06/28 0700 06/28 0701  06/29 0700 06/29 0701  06/30 0700 06/30 0701  07/01 0700    P.O. 240 800 420     Total Intake(mL/kg) 240 (3.5) 800 (11.7) 420 (6.2)     Net +240 +800 +420             Urine Unmeasured Occurrence 1 x 3 x 2 x     Stool Unmeasured Occurrence  3 x 3 x            Lines, Drains, Airways, & Wounds       Active LDAs       Name Placement date Placement time Site Days Last dressing change    Peripheral IV 06/26/25 2059 22 G Anterior;Left Forearm 06/26/25 2059  Forearm  3 06/27/25 0000 (88.20 hrs)    Peripheral IV 06/29/25 1112 20 G Left;Posterior Hand 06/29/25  1112  Hand  1                       Nutrition Focused Physical Exam     Trending NFPE Patient meets ASPEN/AND criteria for nutrition diagnosis of moderate malnutrition of chronic illness based on:     Moderate Loss of Muscle Mass  Moderate Loss of Subcutaneous Fat  Measurably reduced functional status based on ADL screen       Malnutrition Severity Assessment      Patient meets criteria for : Moderate (non-severe) Malnutrition  Malnutrition Type (Last 8 Hours)       Malnutrition Severity Assessment       Row Name 06/30/25 1609       Malnutrition Severity Assessment    Malnutrition Type Chronic Disease - Related Malnutrition      Row Name 06/30/25 1609       Muscle Loss    Loss of Muscle Mass Findings Moderate    [place] Region Severe - deep hollowing/scooping, lack of muscle to touch, facial bones well defined    Clavicle Bone Region Moderate - some protrusion in females, visible in males    Acromion Bone Region Moderate - acromion may slightly protrude    Dorsal Hand Region --  MEKA d/t brace/wrap    Anterior Thigh Region None    Posterior Calf Region None      Row Name 06/30/25 1603       Fat Loss    Subcutaneous Fat Loss Findings Moderate    Orbital Region  Severe - pronounced hollowness/depression, dark circles, loose saggy skin    Upper Arm Region None      Row Name 06/30/25 1608        Declining Functional Status    Declining Functional Status Findings Measurably Reduced      Row Name 06/30/25 7529       Criteria Met (Must meet criteria for severity in at least 2 of these categories: M Wasting, Fat Loss, Fluid, Secondary Signs, Wt. Status, Intake)    Patient meets criteria for  Moderate (non-severe) Malnutrition                   (1)   Current Nutrition Orders & Evaluation of Intake      Oral Nutrition     Food Allergies  and Intolerances NKFA   Current PO Diet Diet: Regular/House; Texture: Mechanical Ground (NDD 2); Fluid Consistency: Thin (IDDSI 0)   Oral Nutrition Supplement None     Trending % PO Intake 0-50%   (2)  Assessment & Plan   Nutrition Diagnosis and Goals       Nutrition Diagnosis 1 Moderate chronic disease or condition related malnutrition r/t recurrent colitis AEB NFPE      Nutrition Diagnosis 2 Inadequate Oral Intake        Goal(s) Increase PO Intake , Accepts Oral Nutrition Supplement, and Maintain Weight     Nutrition Intervention and Prescription       Intervention  Start oral nutrition supplement and Completed NFPE      Diet Prescription     Supplement Prescription     Education Provided     (3)  Monitoring/Evaluation       Monitor/Evaluation PO Intake, Oral Nutrition Supplement Intake, and Weight           Electronically signed by:  Russell Mauricio RD  06/30/25 16:12 EDT

## 2025-06-30 NOTE — TELEPHONE ENCOUNTER
Call placed to in patient CCP for patient after discussion with Mrs Scherer's daughter Elisa. She has been in contact with Hospice prior to admission with plans for enrollment. Due to this illness it was delayed. Offered to call Sarah to notify of this so arrangements could be made accordingly. Sarah notified and will contact Elisa for clarification of DC plans.

## 2025-06-30 NOTE — PLAN OF CARE
Goal Outcome Evaluation:              Outcome Evaluation: VSS. SHE HAD HER ROUTINE ZYPREXA AT HS BUT STILL  WAS AWAKE MOST OF THE NIGHT TALKING TO HERSELF. NO BOWEL MOVEMENTS OR ACTIVE BLEEDING OVERNIGHT.

## 2025-06-30 NOTE — PLAN OF CARE
Goal Outcome Evaluation:  Plan of Care Reviewed With: patient        Progress: no change  Outcome Evaluation: vss. telemetry monitor, a fib. confused. room air. q2 turn. incontinence are. encouraged po intake.

## 2025-06-30 NOTE — PROGRESS NOTES
[facility], [facility]  Inpatient Progress Note    Reason for Follow Up:  Rectal bleeding     Subjective     Interval History:     Patient sleeping in bed.  Opens her eyes and nods her head yes or no to my questions but then goes right back to sleep.  Denies abdominal pain.  Most  information provided by nursing staff.  She had 4 loose stools yesterday which is an improvement.  No further rectal bleeding.  Hemoglobin stable at 9.3.  She is eating about half of her meals.      Current Facility-Administered Medications:     acetaminophen (TYLENOL) tablet 650 mg, 650 mg, Oral, Q4H PRN, 650 mg at 06/26/25 2024 **OR** acetaminophen (TYLENOL) 160 MG/5ML oral solution 650 mg, 650 mg, Oral, Q4H PRN **OR** acetaminophen (TYLENOL) suppository 650 mg, 650 mg, Rectal, Q4H PRN, Ivan Watson MD    atorvastatin (LIPITOR) tablet 40 mg, 40 mg, Oral, Nightly, Ivan Watson MD, 40 mg at 06/29/25 2052    Calcium Replacement - Follow Nurse / BPA Driven Protocol, , Not Applicable, PRN, Ivan Watson MD    dextrose (D50W) (25 g/50 mL) IV injection 25 g, 25 g, Intravenous, Q15 Min PRN, Steve Mtz DO, 25 g at 06/28/25 1613    dextrose (GLUTOSE) oral gel 15 g, 15 g, Oral, Q15 Min PRN, Steve Mtz DO    famotidine (PEPCID) tablet 20 mg, 20 mg, Oral, Daily, Sarah Chase, CORTEZ, 20 mg at 06/30/25 1008    glucagon (GLUCAGEN) injection 1 mg, 1 mg, Intramuscular, Q15 Min PRN, Steve Mtz DO    hydrocortisone (ANUSOL-HC) suppository 25 mg, 25 mg, Rectal, BID, Donnie Renteria MD, 25 mg at 06/30/25 1008    insulin lispro (HUMALOG/ADMELOG) injection 2-7 Units, 2-7 Units, Subcutaneous, 4x Daily AC & at Bedtime, Steve Mtz DO, 2 Units at 06/29/25 2058    LORazepam (ATIVAN) injection 0.5 mg, 0.5 mg, Intravenous, Once, Samuel Baum MD    Magnesium Standard Dose Replacement - Follow Nurse / BPA Driven Protocol, , Not Applicable, PRN, Ivan Watson MD    metoprolol tartrate (LOPRESSOR) tablet 25 mg,  25 mg, Oral, BID, Ivan Watson MD, 25 mg at 06/30/25 1008    OLANZapine (zyPREXA) injection 4 mg, 4 mg, Intramuscular, Q8H PRN, Wing Grant MD, 4 mg at 06/29/25 1624    OLANZapine (zyPREXA) tablet 2.5 mg, 2.5 mg, Oral, Once, Steve Mtz,     OLANZapine (zyPREXA) tablet 5 mg, 5 mg, Oral, Nightly, Ivan Watson MD, 5 mg at 06/29/25 2305    ondansetron ODT (ZOFRAN-ODT) disintegrating tablet 4 mg, 4 mg, Oral, Q6H PRN **OR** ondansetron (ZOFRAN) injection 4 mg, 4 mg, Intravenous, Q6H PRN, Ivan Watson MD    Phosphorus Replacement - Follow Nurse / BPA Driven Protocol, , Not Applicable, PRN, Ivan Watson MD    potassium phosphate 15 mmol in 0.9% normal saline 250 mL IVPB, 15 mmol, Intravenous, Once, Florencio Galan MD, 15 mmol at 06/30/25 1008    Potassium Replacement - Follow Nurse / BPA Driven Protocol, , Not Applicable, PRWalter FITZPATRICK William D, MD    sodium chloride 0.9 % flush 10 mL, 10 mL, Intravenous, Q12H, Ivan Watson MD, 10 mL at 06/29/25 2054    sodium chloride 0.9 % flush 10 mL, 10 mL, Intravenous, PRN, Ivan Watson MD    sodium chloride 0.9 % infusion 40 mL, 40 mL, Intravenous, PRN, Ivan Watson MD    Objective     Vital Signs  Temp:  [97.3 °F (36.3 °C)-97.9 °F (36.6 °C)] 97.3 °F (36.3 °C)  Heart Rate:  [68-78] 68  Resp:  [16-20] 20  BP: (128-166)/(53-82) 166/82  Body mass index is 25.78 kg/m².    Intake/Output Summary (Last 24 hours) at 6/30/2025 1026  Last data filed at 6/30/2025 0524  Gross per 24 hour   Intake 420 ml   Output --   Net 420 ml     No intake/output data recorded.     Physical Exam:   General: patient drowsy but cooperative   Abdomen: soft, nontender, nondistended; normal bowel sounds      Results Review:     I reviewed the patient's new clinical results.    Results from last 7 days   Lab Units 06/30/25  0437 06/28/25  0349 06/27/25  0740   WBC 10*3/mm3 9.74 6.85 6.63   HEMOGLOBIN g/dL 9.3* 8.3* 8.3*   HEMATOCRIT % 28.6* 26.3* 26.2*    PLATELETS 10*3/mm3 255 193 227     Results from last 7 days   Lab Units 06/30/25  0437 06/28/25  1827 06/28/25  0349 06/27/25  0740 06/26/25  1825 06/26/25  0807 06/25/25  1259   SODIUM mmol/L 146*  --  143 144  --  144 143   POTASSIUM mmol/L 3.8 4.6 3.4* 3.8   < > 3.5 3.8   CHLORIDE mmol/L 112*  --  111* 111*  --  110* 108*   CO2 mmol/L 22.1  --  23.6 24.6  --  23.7 24.7   BUN mg/dL 11.0  --  14.0 15.0  --  21.0 29.0*   CREATININE mg/dL 0.90  --  0.96 1.00  --  1.07* 1.18*   CALCIUM mg/dL 8.6  --  7.5* 8.1*  --  8.4 8.9   BILIRUBIN mg/dL  --   --   --   --   --  0.7 0.5   ALK PHOS U/L  --   --   --   --   --  70 71   ALT (SGPT) U/L  --   --   --   --   --  7 7   AST (SGOT) U/L  --   --   --   --   --  12 14   GLUCOSE mg/dL 119*  --  71 99  --  138* 151*    < > = values in this interval not displayed.     Results from last 7 days   Lab Units 06/25/25  1259   INR  1.82*     Lab Results   Lab Value Date/Time    LIPASE 10 (L) 03/19/2024 0736    LIPASE 11 (L) 10/16/2023 1642    LIPASE 202 (H) 02/24/2018 0508    LIPASE 146 (H) 02/23/2018 0614    LIPASE 145 (H) 02/22/2018 0302    LIPASE 193 (H) 02/21/2018 1456    LIPASE 547 (H) 02/20/2018 0410    LIPASE >600 (H) 02/19/2018 0408    LIPASE >600 (H) 02/18/2018 1349    LIPASE 11 (L) 06/07/2016 2006       Radiology:  CT Abdomen Pelvis With Contrast   Final Result   1. Abnormal rectal wall thickening with perirectal   stranding/inflammation consistent with colitis/proctitis. Pelvic floor   relaxation. Dense material within the rectal lumen though not possible   to differentiate dense material within the colon from extravasation of   contrast.   2. Colonic diverticulosis without evidence for diverticulitis.   3. Mild dependent lower lobe atelectasis.   4. Previous cholecystectomy.           This report was finalized on 6/25/2025 3:01 PM by Medardo Quinonez M.D   on Workstation: [id number]              Assessment & Plan     Active Hospital Problems    Diagnosis     **GI  bleed     C. difficile colitis        Assessment:  Bloody diarrhea 2nd to norovirus and EPEC, stool for C. difficile toxin is positive but antigen is negative (carrier status)  Protcolitis by CT scan 2nd to #1  Afib on Pradaxa  Confusion, secondary to GI infection/sundowning    Plan:  Continue supportive care  Monitor H&H and transfuse as per needed per primary team  Diarrhea improving with no further rectal bleeding  We will sign off but remain available if needed    I discussed the patients findings and my recommendations with patient and nursing staff.    Ciera Lucero, APRN

## 2025-06-30 NOTE — PROGRESS NOTES
[facility]    [phone]   LOS: 4 days     Name: Monica Scherer  Age/Sex: 89 y.o. female  :  1933        PCP: Amilcar Mauricio DO  Chief Complaint   Patient presents with    Rectal Bleeding      Subjective   Sleepy and lethargic this morning.  Wakes up and moans but does not really answer any questions for me.  Unreliable review of systems      atorvastatin, 40 mg, Oral, Nightly  famotidine, 20 mg, Oral, Daily  hydrocortisone, 25 mg, Rectal, BID  insulin lispro, 2-7 Units, Subcutaneous, 4x Daily AC & at Bedtime  LORazepam, 0.5 mg, Intravenous, Once  metoprolol tartrate, 25 mg, Oral, BID  OLANZapine, 2.5 mg, Oral, Once  OLANZapine, 5 mg, Oral, Nightly  potassium phosphate, 15 mmol, Intravenous, Once  sodium chloride, 10 mL, Intravenous, Q12H         Objective   Vital Signs  Temp:  [97.5 °F (36.4 °C)-97.9 °F (36.6 °C)] 97.5 °F (36.4 °C)  Heart Rate:  [71-78] 78  Resp:  [16-20] 20  BP: (128-140)/(53-80) 128/53  Body mass index is 25.78 kg/m².    Intake/Output Summary (Last 24 hours) at 2025 0927  Last data filed at 2025 0524  Gross per 24 hour   Intake 420 ml   Output --   Net 420 ml       Physical Exam  On exam she is resting calmly does appear little restless at times  Abdomen is soft nontender  Respirations even and nonlabored clear to auscultation anteriorly  Regular rate and rhythm  No peripheral edema      Results Review:       I reviewed the patient's new clinical results.  Results from last 7 days   Lab Units 25  0437 25  0349 25  0740 25  0807 25  1259   WBC 10*3/mm3 9.74 6.85 6.63 9.01 9.47   HEMOGLOBIN g/dL 9.3* 8.3* 8.3* 9.3* 10.2*   PLATELETS 10*3/mm3 255 193 227 227 219     Results from last 7 days   Lab Units 25  0437 25  1827 25  0349 25  0740 25  1825 25  0807 25  1259   SODIUM mmol/L 146*  --  143 144  --  144 143   POTASSIUM mmol/L 3.8 4.6 3.4* 3.8 3.7 3.5 3.8   CHLORIDE mmol/L 112*  --   111* 111*  --  110* 108*   CO2 mmol/L 22.1  --  23.6 24.6  --  23.7 24.7   BUN mg/dL 11.0  --  14.0 15.0  --  21.0 29.0*   CREATININE mg/dL 0.90  --  0.96 1.00  --  1.07* 1.18*   CALCIUM mg/dL 8.6  --  7.5* 8.1*  --  8.4 8.9   MAGNESIUM mg/dL 2.0  --  1.9 1.9  --   --  2.0   PHOSPHORUS mg/dL 1.9*  --  2.5 3.0  --   --  3.2   Estimated Creatinine Clearance: 37.8 mL/min (by C-G formula based on SCr of 0.9 mg/dL).      Assessment & Plan   Active Hospital Problems    Diagnosis  POA    **GI bleed [K92.2]  Yes    C. difficile colitis [A04.72]  Yes      Resolved Hospital Problems   No resolved problems to display.       ASSESSMENT  This is a 89-year-old lady with a history of A-fib on chronic anticoagulation, hypertension, hyperlipidemia, dementia and previous stroke who presents to the hospital with bright red blood per rectum and found to have EPEC and norovirus.  Edited by: Florencio Galan MD at 6/30/2025 0905   PLAN  Care is mostly supportive for her viral and bacterial infections  Continue to monitor bleeding and blood loss.  Would hold off on endoscopic evaluation if at all possible.  Nightly Zyprexa.  I wonder if this might be part of her lethargy this morning continue to monitor and evaluate may need to adjust dosing or discontinue after behaviors are better managed      VTE Prophylaxis:  Mechanical VTE prophylaxis orders are present.      Code Status and Medical Interventions: CPR (Attempt to Resuscitate); Full Support   Ordered at: 06/25/25 1623     Code Status (Patient has no pulse and is not breathing):    CPR (Attempt to Resuscitate)     Medical Interventions (Patient has pulse or is breathing):    Full Support     Level Of Support Discussed With:    Patient          Disposition  Expected Discharge Date: 6/30/2025; Expected Discharge Time:        Florencio Galan MD  [facility]  06/30/25  09:27 EDT

## 2025-06-30 NOTE — CASE MANAGEMENT/SOCIAL WORK
Continued Stay Note  [facility]     Patient Name: Monica Scherer  MRN: [id number]  Today's Date: 6/30/2025    Admit Date: 6/25/2025    Plan: SNF vs home, will need transportation   Discharge Plan       Row Name 06/30/25 1438       Plan    Plan Comments DaughterRaquel, requested a referral to [facility], referral has been placed.      Row Name 06/30/25 1439       Plan    Plan SNF vs home, will need transportation    Plan Comments Spoke with daughterRaquel, who stated that she would like for pt to go to [facility] upon discharge. Contacted [facility], who stated that  as long as pt is receiving IM zyprexia, they are unable to accept. CCP will continue to follow.                   Discharge Codes    No documentation.                 Expected Discharge Date and Time       Expected Discharge Date Expected Discharge Time    Jul 3, 2025               Sarah Lancaster RN

## 2025-06-30 NOTE — CASE MANAGEMENT/SOCIAL WORK
Continued Stay Note  [facility]     Patient Name: Monica Scherer  MRN: [id number]  Today's Date: 6/30/2025    Admit Date: 6/25/2025    Plan: SNF vs home, will need transportation   Discharge Plan       Row Name 06/30/25 1431       Plan    Plan SNF vs home, will need transportation    Plan Comments Spoke with daughter, Raquel, who stated that she would like for pt to go to [facility] upon discharge. Contacted [facility], who stated that  as long as pt is receiving IM zyprexia, they are unable to accept. CCP will continue to follow.                   Discharge Codes    No documentation.                 Expected Discharge Date and Time       Expected Discharge Date Expected Discharge Time    Jul 3, 2025               Sarah Lancaster RN

## 2025-07-01 PROBLEM — E44.0 MODERATE PROTEIN-CALORIE MALNUTRITION: Status: ACTIVE | Noted: 2025-07-01

## 2025-07-01 NOTE — PLAN OF CARE
Goal Outcome Evaluation:              Outcome Evaluation: VSS, confused, slept whole shift. would not stay awake to take meds. Q 2 turn. maintain safety and continue to monitor.

## 2025-07-01 NOTE — PROGRESS NOTES
[facility]    [phone]   LOS: 5 days     Name: Monica Scherer  Age/Sex: 89 y.o. female  :  1933        PCP: Amilcar Maruicio DO  Chief Complaint   Patient presents with    Rectal Bleeding      Subjective   Lethargic this morning she does wake up but does not answer questions or follow commands.  Her dinner tray from last night actually still sitting in front of her on her table.  She slept most of the night shift and most of the day shift yesterday.  No as needed medications were utilized.    famotidine, 20 mg, Oral, Daily  hydrocortisone, 25 mg, Rectal, BID  insulin lispro, 2-7 Units, Subcutaneous, 4x Daily AC & at Bedtime  metoprolol tartrate, 25 mg, Oral, BID  OLANZapine, 5 mg, Oral, Nightly  sodium chloride, 10 mL, Intravenous, Q12H           Objective   Vital Signs  Temp:  [97.3 °F (36.3 °C)-97.9 °F (36.6 °C)] 97.3 °F (36.3 °C)  Heart Rate:  [59-80] 80  Resp:  [16] 16  BP: (148-183)/(60-90) 148/60  Body mass index is 25.78 kg/m².    Intake/Output Summary (Last 24 hours) at 2025 0933  Last data filed at 2025 0306  Gross per 24 hour   Intake 0 ml   Output --   Net 0 ml       Physical Exam  Sleeping soundly but does awaken to voice but quickly falls back asleep.  Lethargic not following commands chronic ill-appearing respirations even nonlabored some upper airway rhonchi noted  Irregularly irregular but rate controlled  Abdomen soft positive bowel sounds    Results Review:       I reviewed the patient's new clinical results.  Results from last 7 days   Lab Units 25  0457 25  0437 25  0349 25  0740 25  0807 25  1259   WBC 10*3/mm3 7.02 9.74 6.85 6.63 9.01 9.47   HEMOGLOBIN g/dL 9.0* 9.3* 8.3* 8.3* 9.3* 10.2*   PLATELETS 10*3/mm3 221 255 193 227 227 219     Results from last 7 days   Lab Units 25  0457 25  1705 25  0437 25  1827 25  0349 25  0740 25  1825 25  0807 25  1259   SODIUM  mmol/L 144  --  146*  --  143 144  --  144 143   POTASSIUM mmol/L 4.0  --  3.8 4.6 3.4* 3.8 3.7 3.5 3.8   CHLORIDE mmol/L 114*  --  112*  --  111* 111*  --  110* 108*   CO2 mmol/L 20.9*  --  22.1  --  23.6 24.6  --  23.7 24.7   BUN mg/dL 8.0  --  11.0  --  14.0 15.0  --  21.0 29.0*   CREATININE mg/dL 0.78  --  0.90  --  0.96 1.00  --  1.07* 1.18*   CALCIUM mg/dL 8.0*  --  8.6  --  7.5* 8.1*  --  8.4 8.9   MAGNESIUM mg/dL 2.0  --  2.0  --  1.9 1.9  --   --  2.0   PHOSPHORUS mg/dL 2.8 3.2 1.9*  --  2.5 3.0  --   --  3.2   Estimated Creatinine Clearance: 43.7 mL/min (by C-G formula based on SCr of 0.78 mg/dL).      Assessment & Plan   Active Hospital Problems    Diagnosis  POA    **GI bleed [K92.2]  Yes    Moderate protein-calorie malnutrition [E44.0]  Yes    C. difficile colitis [A04.72]  Yes      Resolved Hospital Problems   No resolved problems to display.       ASSESSMENT  This is a 89-year-old lady with a history of A-fib on chronic anticoagulation, hypertension, hyperlipidemia, dementia and previous stroke who presents to the hospital with bright red blood per rectum and found to have EPEC and norovirus.  Edited by: Florencio Galan MD at 6/30/2025 0905   PLAN  Hemoglobin is stabilized with no further active bleeding noted, care for these viral and bacterial infections are mostly supportive  At this point I think she is in the end stages of her dementia.  Her symptoms are becoming such that managing them has caused more problems and issues.  She has not been walking her appetite is decreased  I called and talked to her daughter today on the phone.  We spoke for about 30 minutes regarding the overall prognosis in this situation.  Oddly it does not sound like anyone really called to talk to her about things and how things have gone since her last hospitalization.  Prior to this hospitalization she had already begun discussions with hospice.  She had left rehab and really had not been great at home.  Her  behavioral issues have become more significant and difficult to manage.  Unfortunately the medications being utilized were either causing too much sedation or not enough behavioral control.  She really has not been walking much at home prior to this and really did not do great in rehab after her admission for fall and urinary tract infection  We discussed the overall prognosis and she was in agreement with a DNR.  We waited through some emotional issues regarding this.  Her mom and requested to be resuscitated for most of her life and had anything done that could be done.  I did explain that at this point I feel like we have honored her mom's wishes and that there is no fixing or saving this situation.  Even if we tried to resuscitate her the likelihood that she recovers to any meaningful quality of life would be extremely low.  We discussed palliative care and hospice and at this point I think it is probably beneficial to transfer to our palliative care unit and initiate symptom management here in the hospital.  She has not eaten anything in 48 hours she has not drank anything in 48 hours either.  She has been sleeping mostly.  My suspicion is that this will likely continue and we will likely need medications for comfort here in the hospital.  Her daughter is on board with transition to hospice scatter bed when appropriate  Based on her current palliative performance scale likely weeks but I suspect this will change significantly  Nonessential medications have been discontinued at this point and only meds for comfort ordered.      VTE Prophylaxis:  Mechanical VTE prophylaxis orders are present.      Code Status and Medical Interventions: No CPR (Do Not Attempt to Resuscitate); Comfort Measures   Ordered at: 07/01/25 0928     Code Status (Patient has no pulse and is not breathing):    No CPR (Do Not Attempt to Resuscitate)     Medical Interventions (Patient has pulse or is breathing):    Comfort Measures     Level  Of Support Discussed With:    Health Care Surrogate          Disposition  Expected Discharge Date: 7/3/2025; Expected Discharge Time:        Florencio Galan MD  [facility]  07/01/25  09:33 EDT

## 2025-07-01 NOTE — CONSULTS
This nurse met with daughter bedside to discuss in patient for hospice care. Patient was resting comfortably. Newly switched to comfort measures only and has had zero s/s management via IV as of yet. No medically eligible for GIP will f/u daily for GIP need.         Thank you for allowing us to help in caring for this patient.   Please call [phone] with any questions.

## 2025-07-01 NOTE — CASE MANAGEMENT/SOCIAL WORK
Continued Stay Note  [facility]     Patient Name: Monica Scherer  MRN: [id number]  Today's Date: 7/1/2025    Admit Date: 6/25/2025    Plan: Transfer to Palliative Care   Discharge Plan       Row Name 07/01/25 1038       Plan    Plan Transfer to Palliative Care    Plan Comments MD had [facility] discussion with daughter, decision has been made to transfer pt to palliative care. Informed [facility].                   Discharge Codes    No documentation.                 Expected Discharge Date and Time       Expected Discharge Date Expected Discharge Time    Jul 3, 2025               Sarah Lancaster RN

## 2025-07-01 NOTE — PLAN OF CARE
Goal Outcome Evaluation:  Plan of Care Reviewed With: patient        Progress: no change  Outcome Evaluation: Pt transferred to [facility] for palliative/comfort care. Pt alert to self, not oriented to place, time or situation. Pt is pleasant and calm. Pt denies pain at this time. No SOA noted, pt on 2L of O2 per NC. Pt ate dinner well. Pt assisted with turns and jany care. Will continue with comfort care.

## 2025-07-02 NOTE — PLAN OF CARE
Goal Outcome Evaluation:           Progress: no change  Outcome Evaluation: Patient awake throughout the night.  VSS.  PPS 30%.  No PRNs required this shift.  Alert to self only.  O2 2L via n/c.  Precautions remain for C-Diff/Norovirus.  Nursing will continue plan of care.

## 2025-07-02 NOTE — NURSING NOTE
Chart reviewed. Pt now moved to [facility] but has not yet required any symptom management. PPS 30% documented. VSS on 2L NC. Pt does not appear appropriate for inpatient hospice/scatter bed services at this time. [facility] will continue to follow remotely for changes vs d/c plans.  Please call with any questions.       Mary Mclain, RN, BSN  RN Coordinator  [facility]  [phone]

## 2025-07-02 NOTE — CONSULTS
[facility]   Palliative Care Social Work  Initial Assessment  Monica LACEY Gilmer   5/4/1933   Date: 7/2/2025         Who Provided Information: relative(s) Daughter, Raquel      What is most important to Family: Patient's comfort and quality of life.       Palliative Care Social Work Plan: JESSENIA spoke with patient's daughter, Raquel, over the phone this morning to confirm goals of care and provide support. Rauqel discussed meeting with [facility] yesterday and requested clarification on criteria for inpatient stay versus when LTC may be indicated. Discussed hospice criteria for [facility] and criteria for care at home or in LTC. Raquel confirmed that goal of care is comfort and noted that she has spoken with her two brothers, who are both in agreement. No additional needs identified at this time. Raquel is aware of support available from palliative team. Will remain available. Discussed with RN.         Follow Up Needed: Will remain available for continued support.       FRANCISCA Miller  7/2/2025 11:46 EDT

## 2025-07-02 NOTE — PROGRESS NOTES
"Discharge Planning Assessment  [facility]     Patient Name: Monica Scherer  MRN: [id number]  Today's Date: 7/2/2025    Admit Date: 6/25/2025    Plan: Palliative care and [facility] following. SOWMYA Danielson RN, CCP.   Discharge Needs Assessment    No documentation.                  Discharge Plan       Row Name 07/02/25 1542       Plan    Plan Palliative care and [facility] following. SOWMYA Danielson RN, CCP.    Plan Comments The patient transferred to [facility] from [address] [place] [address] on 7/1/25 @ 15:55 for palliative care. Per Dr. Galan \"Plan for now is to continue symptom management and monitor for decline.  If we get to the end of the week and we really have not changed much can start to look at long-term care options with hospice support or home with hospice but for now we will continue present management.\" CCP will continue to follow for any needs. NEMO Horan, RN, CDP, CCP                  Continued Care and Services - Admitted Since 6/25/2025       Destination       Service Provider Request Status Services Address Phone Fax Patient Preferred    [facility] Pending - Request Sent -- [address] [facility] [address] [phone] --    [facility] Declined -- [address] [facility] [address] [phone] --                  Selected Continued Care - Episodes Includes continued care and service providers with selected services from the active episodes listed below      High Risk Care Management Episode start date: 10/18/2023 (Paused)   There are no active outsourced providers for this episode.                 Selected Continued Care - Prior Encounters Includes continued care and service providers with selected services from prior encounters from 3/27/2025 to 7/2/2025      Discharged on 6/9/2025 Admission date: 5/27/2025 - Discharge disposition: Skilled Nursing Facility (DC - External)      Destination       Service Provider Services Address Phone Fax " Patient Preferred    [facility] Skilled Nursing [address], [facility] [id number] [phone] --                          Expected Discharge Date and Time       Expected Discharge Date Expected Discharge Time    Jul 8, 2025            Demographic Summary    No documentation.                  Functional Status    No documentation.                  Psychosocial    No documentation.                  Abuse/Neglect    No documentation.                  Legal    No documentation.                  Substance Abuse    No documentation.                  Patient Forms    No documentation.                     Eneida Danielson RN

## 2025-07-02 NOTE — PROGRESS NOTES
[facility] Hospital Care    [phone]   LOS: 6 days     Name: Monica Scherer  Age/Sex: 89 y.o. female  :  1933        PCP: Amilcar Mauricio DO  Chief Complaint   Patient presents with    Rectal Bleeding      Subjective   Shockingly she is awake and alert today.  She is in actually pretty good mood and she ate all of her breakfast.  She is requesting a Sprite and more to drink she feels very thirsty.  She remains confused and is oriented only to self    metoprolol tartrate, 25 mg, Oral, BID           Objective   Vital Signs  Temp:  [97.3 °F (36.3 °C)-97.9 °F (36.6 °C)] 97.3 °F (36.3 °C)  Heart Rate:  [59-78] 59  Resp:  [16] 16  BP: (124-139)/(63-73) 139/69  Body mass index is 25.78 kg/m².    Intake/Output Summary (Last 24 hours) at 2025 1046  Last data filed at 2025 0950  Gross per 24 hour   Intake 2560 ml   Output --   Net 2560 ml       Physical Exam  Awake alert sitting up in the bed and eating  Her respirations are even nonlabored  Abdomen is soft    Results Review:       I reviewed the patient's new clinical results.  Results from last 7 days   Lab Units 25  04525  0437 25  0349 25  0740 25  0807 25  1259   WBC 10*3/mm3 7.02 9.74 6.85 6.63 9.01 9.47   HEMOGLOBIN g/dL 9.0* 9.3* 8.3* 8.3* 9.3* 10.2*   PLATELETS 10*3/mm3 221 255 193 227 227 219     Results from last 7 days   Lab Units 25  0457 25  1705 25  0437 25  1827 25  0349 25  0740 25  1825 25  0807 25  1259   SODIUM mmol/L 144  --  146*  --  143 144  --  144 143   POTASSIUM mmol/L 4.0  --  3.8 4.6 3.4* 3.8 3.7 3.5 3.8   CHLORIDE mmol/L 114*  --  112*  --  111* 111*  --  110* 108*   CO2 mmol/L 20.9*  --  22.1  --  23.6 24.6  --  23.7 24.7   BUN mg/dL 8.0  --  11.0  --  14.0 15.0  --  21.0 29.0*   CREATININE mg/dL 0.78  --  0.90  --  0.96 1.00  --  1.07* 1.18*   CALCIUM mg/dL 8.0*  --  8.6  --  7.5* 8.1*  --  8.4 8.9   MAGNESIUM mg/dL 2.0  --   2.0  --  1.9 1.9  --   --  2.0   PHOSPHORUS mg/dL 2.8 3.2 1.9*  --  2.5 3.0  --   --  3.2   Estimated Creatinine Clearance: 43.7 mL/min (by C-G formula based on SCr of 0.78 mg/dL).      Assessment & Plan   Active Hospital Problems    Diagnosis  POA    **GI bleed [K92.2]  Yes    Moderate protein-calorie malnutrition [E44.0]  Yes    C. difficile colitis [A04.72]  Yes      Resolved Hospital Problems   No resolved problems to display.       ASSESSMENT  This is a 89-year-old lady with a history of A-fib on chronic anticoagulation, hypertension, hyperlipidemia, dementia and previous stroke who presents to the hospital with bright red blood per rectum and found to have EPEC and norovirus.  Edited by: Florencio Galan MD at 6/30/2025 0920   PLAN  I am pretty shocked at how she looks today.  She has not eaten or drinking anything in 3 or 4 days and today she is ate her whole breakfast and is awake and alert and talking to me.  This is probably just a rally.  We have stopped most of her medications and only working on her comfort and quality of life.  Will continue to monitor for symptom management most of this has been behavioral issues which I suspect will likely where they are headed later today.  Continue supportive care for viral infection and bacterial infection this seems to have run its course  Discussed with the nurse and we will continue to monitor  No medication is needed for now.  I still feel like she is at the end stages of her dementia and likely has weeks to months.  Plan for now is to continue symptom management and monitor for decline.  If we get to the end of the week and we really have not changed much can start to look at long-term care options with hospice support or home with hospice but for now we will continue present management.    VTE Prophylaxis:  Mechanical VTE prophylaxis orders are present.      Code Status and Medical Interventions: No CPR (Do Not Attempt to Resuscitate); Comfort Measures    Ordered at: 07/01/25 0928     Code Status (Patient has no pulse and is not breathing):    No CPR (Do Not Attempt to Resuscitate)     Medical Interventions (Patient has pulse or is breathing):    Comfort Measures     Level Of Support Discussed With:    Health Care Surrogate          Disposition  Expected Discharge Date: 7/3/2025; Expected Discharge Time:        Florencio Galan MD  [facility]  07/02/25  10:46 EDT

## 2025-07-03 NOTE — PROGRESS NOTES
[facility]   Palliative Care Social Work  Initial Assessment  Monica LACEY Gilmer   5/4/1933   Date: 7/3/2025         Who Provided Information: relative(s) Daughter-Elisa        Palliative Care Social Work Plan: JESSENIA met with patient's daughter, Raquel, who goes by Elisa, outside of patient's room this morning. Elisa shared that she has been starting to get patient's affairs in order and had questions regarding next steps. Answered questions as able and provided support. Elisa noted that patient is [employer]ism and enjoyed watching Taoist on television. She requested to have a  visit for Last Rites. JESSENIA contacted  team and spoke with Hermes, who is arranging for a  to visit. Support provided. Will remain available for continued support.         Follow Up Needed: Will remain available for continued support.       FRANCISCA Miller  7/3/2025 14:47 EDT

## 2025-07-03 NOTE — PLAN OF CARE
Goal Outcome Evaluation:           Progress: no change  Outcome Evaluation: Patient slept throughout the night.  PPS 20%.  Lorazepam 1mg x1 for symptom management.  Nursing will continue plan of care.

## 2025-07-03 NOTE — PROGRESS NOTES
[facility] Hospital Care    [phone]   LOS: 7 days     Name: Monica Scherer  Age/Sex: 89 y.o. female  :  1933        PCP: Amilcar Mauricio DO  Chief Complaint   Patient presents with    Rectal Bleeding      Subjective   She is lethargic again this morning.  She got a little bit rowdy yesterday afternoon and overnight requiring doses of Ativan.  At this point she has received 4 doses of IV Ativan    metoprolol tartrate, 25 mg, Oral, BID           Objective   Vital Signs  Temp:  [96.9 °F (36.1 °C)-97.2 °F (36.2 °C)] 96.9 °F (36.1 °C)  Heart Rate:  [69] 69  Resp:  [16] 16  BP: (121-146)/(70-76) 146/76  Body mass index is 25.78 kg/m².    Intake/Output Summary (Last 24 hours) at 7/3/2025 1538  Last data filed at 7/3/2025 0425  Gross per 24 hour   Intake 0 ml   Output --   Net 0 ml       Physical Exam  Sleeping soundly on my arrival does start to voice but not open eyes or follow commands  Her respirations are even nonlabored  Abdomen is soft    Results Review:       I reviewed the patient's new clinical results.  Results from last 7 days   Lab Units 25  0457 25  0437 25  0349 25  0740   WBC 10*3/mm3 7.02 9.74 6.85 6.63   HEMOGLOBIN g/dL 9.0* 9.3* 8.3* 8.3*   PLATELETS 10*3/mm3 221 255 193 227     Results from last 7 days   Lab Units 25  0457 25  1705 25  0437 25  1827 25  0349 25  0740 25  1825   SODIUM mmol/L 144  --  146*  --  143 144  --    POTASSIUM mmol/L 4.0  --  3.8 4.6 3.4* 3.8 3.7   CHLORIDE mmol/L 114*  --  112*  --  111* 111*  --    CO2 mmol/L 20.9*  --  22.1  --  23.6 24.6  --    BUN mg/dL 8.0  --  11.0  --  14.0 15.0  --    CREATININE mg/dL 0.78  --  0.90  --  0.96 1.00  --    CALCIUM mg/dL 8.0*  --  8.6  --  7.5* 8.1*  --    MAGNESIUM mg/dL 2.0  --  2.0  --  1.9 1.9  --    PHOSPHORUS mg/dL 2.8 3.2 1.9*  --  2.5 3.0  --    Estimated Creatinine Clearance: 43.7 mL/min (by C-G formula based on SCr of 0.78  mg/dL).      Assessment & Plan   Active Hospital Problems    Diagnosis  POA    **GI bleed [K92.2]  Yes    Moderate protein-calorie malnutrition [E44.0]  Yes    C. difficile colitis [A04.72]  Yes      Resolved Hospital Problems   No resolved problems to display.       ASSESSMENT  This is a 89-year-old lady with a history of A-fib on chronic anticoagulation, hypertension, hyperlipidemia, dementia and previous stroke who presents to the hospital with bright red blood per rectum and found to have EPEC and norovirus.  Edited by: Florencio Galan MD at 6/30/2025 0905   PLAN  Yesterday was probably a short rally we will see how things continue to progress.  She is eaten 1 meal in the last 4 days did not eat any lunch or dinner yesterday and has not eaten any breakfast today.  Continue as needed Ativan and pain medications for symptom management.  Continue supportive care for viral infection and bacterial infection this seems to have run its course  Discussed with the nurse and we will continue to monitor  No medication is needed for now.  I still feel like she is at the end stages of her dementia and likely has weeks to months.  My suspicion is she will have ongoing decline and meet for hospice scatter bed in the near future.  My only hope is that they re-evaluate her before she passes.    VTE Prophylaxis:  Mechanical VTE prophylaxis orders are present.      Code Status and Medical Interventions: No CPR (Do Not Attempt to Resuscitate); Comfort Measures   Ordered at: 07/01/25 0928     Code Status (Patient has no pulse and is not breathing):    No CPR (Do Not Attempt to Resuscitate)     Medical Interventions (Patient has pulse or is breathing):    Comfort Measures     Level Of Support Discussed With:    Health Care Surrogate          Disposition  Expected Discharge Date: 7/8/2025; Expected Discharge Time:        Florencio Galan MD  [facility]  07/03/25  15:38 EDT

## 2025-07-04 NOTE — PROGRESS NOTES
Dedicated to Hospital Care    [phone]   LOS: 8 days     Name: Monica Scherer  Age/Sex: 89 y.o. female  :  1933        PCP: Amilcar Mauricio DO  Chief Complaint   Patient presents with    Rectal Bleeding      Subjective   She is lethargic again this morning.  Sleeping soundly awakes but falls back asleep she is not eating anything with no dinner breakfast or lunch touched in the last 24 hours.    metoprolol tartrate, 25 mg, Oral, BID           Objective   Vital Signs  Temp:  [97.9 °F (36.6 °C)-98.3 °F (36.8 °C)] 97.9 °F (36.6 °C)  Heart Rate:  [82-91] 91  Resp:  [16-18] 18  BP: (113-140)/(53-84) 131/84  Body mass index is 25.78 kg/m².    Intake/Output Summary (Last 24 hours) at 2025 1345  Last data filed at 2025 0401  Gross per 24 hour   Intake 0 ml   Output --   Net 0 ml       Physical Exam  Sleeping soundly on my arrival does start to voice but not open eyes or follow commands  Her respirations are even nonlabored  Abdomen is soft    Results Review:       I reviewed the patient's new clinical results.  Results from last 7 days   Lab Units 257 25  0349   WBC 10*3/mm3 7.02 9.74 6.85   HEMOGLOBIN g/dL 9.0* 9.3* 8.3*   PLATELETS 10*3/mm3 221 255 193     Results from last 7 days   Lab Units 25  1705 25  0437 25  1827 25  0349   SODIUM mmol/L 144  --  146*  --  143   POTASSIUM mmol/L 4.0  --  3.8 4.6 3.4*   CHLORIDE mmol/L 114*  --  112*  --  111*   CO2 mmol/L 20.9*  --  22.1  --  23.6   BUN mg/dL 8.0  --  11.0  --  14.0   CREATININE mg/dL 0.78  --  0.90  --  0.96   CALCIUM mg/dL 8.0*  --  8.6  --  7.5*   MAGNESIUM mg/dL 2.0  --  2.0  --  1.9   PHOSPHORUS mg/dL 2.8 3.2 1.9*  --  2.5   Estimated Creatinine Clearance: 43.7 mL/min (by C-G formula based on SCr of 0.78 mg/dL).      Assessment & Plan   Active Hospital Problems    Diagnosis  POA    **GI bleed [K92.2]  Yes    Moderate protein-calorie malnutrition [E44.0]  Yes    C.  difficile colitis [A04.72]  Yes      Resolved Hospital Problems   No resolved problems to display.       ASSESSMENT  This is a 89-year-old lady with a history of A-fib on chronic anticoagulation, hypertension, hyperlipidemia, dementia and previous stroke who presents to the hospital with bright red blood per rectum and found to have EPEC and norovirus.  Edited by: Florencio Galan MD at 6/30/2025 0905   PLAN  Continue symptom management and monitor closely.  She has not required much medication up to now other than a few doses 24 hours ago.  Continue supportive care for viral infection and bacterial infection this seems to have run its course  Discussed with the nurse and we will continue to monitor  No medication is needed for now.  I still feel like she is at the end stages of her dementia and likely has weeks to months.  My suspicion is she will have ongoing decline and meet for hospice scatter bed in the near future.  My only hope is that they re-evaluate her before she passes.    VTE Prophylaxis:  Mechanical VTE prophylaxis orders are present.      Code Status and Medical Interventions: No CPR (Do Not Attempt to Resuscitate); Comfort Measures   Ordered at: 07/01/25 0928     Code Status (Patient has no pulse and is not breathing):    No CPR (Do Not Attempt to Resuscitate)     Medical Interventions (Patient has pulse or is breathing):    Comfort Measures     Level Of Support Discussed With:    Health Care Surrogate          Disposition  Expected Discharge Date: 7/8/2025; Expected Discharge Time:        Florencio Galan MD  [facility]  07/04/25  13:45 EDT

## 2025-07-04 NOTE — PLAN OF CARE
Goal Outcome Evaluation:  Plan of Care Reviewed With: patient        Progress: no change  Outcome Evaluation: All needs met. Rested well. Bedrest. Oriented to self only. [place]. RA. Incontinent care provided. Regular mechanical ground diet. Turns Q 4.

## 2025-07-04 NOTE — PLAN OF CARE
Goal Outcome Evaluation:              Outcome Evaluation: A&O x1. Took scheduled metropolol for HTN today. Continues with poor PO intake, refusing majority of meals, only taking small bites and sips of water. Sleeps most of day, but arousable to voice. Family visited this morning. Bedbound. On O2 2L/min via NC. Incont- brief in place. Safety measures in place. Plan of care ongoing.

## 2025-07-05 NOTE — PLAN OF CARE
Goal Outcome Evaluation:              Outcome Evaluation: A&O x1. Took scheduled metropolol for HTN today in [facility]. Sleeps most of day, but arousable to voice. Family at bedside. Bedbound. On O2 2L/min via NC. Incont- brief in place. Safety measures in place. Plan of care ongoing.

## 2025-07-05 NOTE — PLAN OF CARE
Goal Outcome Evaluation:     Progress: no change    Administered Ativan 0.5 mg and Morphine 2 mg d/t pt being restless and yelling out. Slept most of shift after medication administration. Will continue with plan of care.

## 2025-07-05 NOTE — PROGRESS NOTES
Dedicated to Hospital Care    [phone]   LOS: 9 days     Name: Monica Scherer  Age/Sex: 89 y.o. female  :  1933        PCP: Amilcar Mauricio DO  Chief Complaint   Patient presents with    Rectal Bleeding      Subjective   She is lethargic again this morning.  Sleeping soundly minimally arousable still not eating.    metoprolol tartrate, 25 mg, Oral, BID           Objective   Vital Signs  Temp:  [97.2 °F (36.2 °C)-99 °F (37.2 °C)] 97.2 °F (36.2 °C)  Heart Rate:  [70-79] 72  Resp:  [14-16] 14  BP: (114-133)/(58-64) 133/64  Body mass index is 25.78 kg/m².    Intake/Output Summary (Last 24 hours) at 2025 1014  Last data filed at 2025 0640  Gross per 24 hour   Intake 320 ml   Output --   Net 320 ml       Physical Exam  Sleeping soundly on my arrival does start to voice but not open eyes or follow commands  Her respirations are even nonlabored  Abdomen is soft    Results Review:       I reviewed the patient's new clinical results.  Results from last 7 days   Lab Units 25  0457 25  0437   WBC 10*3/mm3 7.02 9.74   HEMOGLOBIN g/dL 9.0* 9.3*   PLATELETS 10*3/mm3 221 255     Results from last 7 days   Lab Units 25  0457 25  1705 25  0437 25  1827   SODIUM mmol/L 144  --  146*  --    POTASSIUM mmol/L 4.0  --  3.8 4.6   CHLORIDE mmol/L 114*  --  112*  --    CO2 mmol/L 20.9*  --  22.1  --    BUN mg/dL 8.0  --  11.0  --    CREATININE mg/dL 0.78  --  0.90  --    CALCIUM mg/dL 8.0*  --  8.6  --    MAGNESIUM mg/dL 2.0  --  2.0  --    PHOSPHORUS mg/dL 2.8 3.2 1.9*  --    Estimated Creatinine Clearance: 43.7 mL/min (by C-G formula based on SCr of 0.78 mg/dL).      Assessment & Plan   Active Hospital Problems    Diagnosis  POA    **GI bleed [K92.2]  Yes    Moderate protein-calorie malnutrition [E44.0]  Yes    C. difficile colitis [A04.72]  Yes      Resolved Hospital Problems   No resolved problems to display.       ASSESSMENT  This is a 89-year-old lady with a history of  A-fib on chronic anticoagulation, hypertension, hyperlipidemia, dementia and previous stroke who presents to the hospital with bright red blood per rectum and found to have EPEC and norovirus.  Edited by: Florencio Galan MD at 6/30/2025 0905   PLAN  Continue symptom management and monitor closely.  She received morphine and Ativan overnight  Continue supportive care for viral infection and bacterial infection this seems to have run its course  Discussed with the nurse and we will continue to monitor  No medication is needed for now.  I still feel like she is at the end stages of her dementia and likely has weeks to months.  My suspicion is she will have ongoing decline and meet for hospice scatter bed in the near future.  My only hope is that they re-evaluate her before she passes.    VTE Prophylaxis:  Mechanical VTE prophylaxis orders are present.      Code Status and Medical Interventions: No CPR (Do Not Attempt to Resuscitate); Comfort Measures   Ordered at: 07/01/25 0928     Code Status (Patient has no pulse and is not breathing):    No CPR (Do Not Attempt to Resuscitate)     Medical Interventions (Patient has pulse or is breathing):    Comfort Measures     Level Of Support Discussed With:    Health Care Surrogate          Disposition  Expected Discharge Date: 7/8/2025; Expected Discharge Time:        Florencio Galan MD  [facility]  07/05/25  10:14 EDT

## 2025-07-06 NOTE — PLAN OF CARE
Goal Outcome Evaluation:  Plan of Care Reviewed With: patient        Progress: no change  Outcome Evaluation: PPS 20%. Pt appears to be sleeping most of the day. Pt alert to self, otherwise confused. Pt ate 25% of lunch and dinner, no breakfast. Pt assisted with turns, continence care and oral care. Bed alarm in place. Will continue with current plan of care.

## 2025-07-06 NOTE — PLAN OF CARE
Problem: Adult Inpatient Plan of Care  Goal: Plan of Care Review  Outcome: Progressing  Flowsheets (Taken 7/6/2025 0606)  Progress: no change  Outcome Evaluation: PPS 20%. Patient slept between care. No PRN meds needed this shift. No family at bedside. Patient takes pills whole in apple sauce. Bed alarm on. Patient keeps on removing O2. Incontinence and oral care done. Bed alarm on. Continue with plan of care.  Plan of Care Reviewed With: patient   Goal Outcome Evaluation:  Plan of Care Reviewed With: patient        Progress: no change  Outcome Evaluation: PPS 20%. Patient slept between care. No PRN meds needed this shift. No family at bedside. Patient takes pills whole in apple sauce. Bed alarm on. Patient keeps on removing O2. Incontinence and oral care done. Bed alarm on. Continue with plan of care.

## 2025-07-06 NOTE — PROGRESS NOTES
Dedicated to Hospital Care    [phone]   LOS: 10 days     Name: Monica Scherer  Age/Sex: 89 y.o. female  :  1933        PCP: Amilcar Mauricio DO  Chief Complaint   Patient presents with    Rectal Bleeding      Subjective   She is lethargic again this morning.  Sleeping soundly minimally arousable still not eating.    metoprolol tartrate, 25 mg, Oral, BID           Objective   Vital Signs  Temp:  [97.3 °F (36.3 °C)-97.8 °F (36.6 °C)] 97.6 °F (36.4 °C)  Heart Rate:  [80-96] 96  Resp:  [15-18] 16  BP: (148-163)/(70-76) 148/75  Body mass index is 25.78 kg/m².    Intake/Output Summary (Last 24 hours) at 2025 1303  Last data filed at 2025 0500  Gross per 24 hour   Intake 720 ml   Output --   Net 720 ml       Physical Exam  Sleeping soundly on my arrival does start to voice but not open eyes or follow commands  Her respirations are even nonlabored  Abdomen is soft    Results Review:       I reviewed the patient's new clinical results.  Results from last 7 days   Lab Units 25  0457 25  0437   WBC 10*3/mm3 7.02 9.74   HEMOGLOBIN g/dL 9.0* 9.3*   PLATELETS 10*3/mm3 221 255     Results from last 7 days   Lab Units 25  0457 25  1705 25  0437   SODIUM mmol/L 144  --  146*   POTASSIUM mmol/L 4.0  --  3.8   CHLORIDE mmol/L 114*  --  112*   CO2 mmol/L 20.9*  --  22.1   BUN mg/dL 8.0  --  11.0   CREATININE mg/dL 0.78  --  0.90   CALCIUM mg/dL 8.0*  --  8.6   MAGNESIUM mg/dL 2.0  --  2.0   PHOSPHORUS mg/dL 2.8 3.2 1.9*   Estimated Creatinine Clearance: 43.7 mL/min (by C-G formula based on SCr of 0.78 mg/dL).      Assessment & Plan   Active Hospital Problems    Diagnosis  POA    **GI bleed [K92.2]  Yes    Moderate protein-calorie malnutrition [E44.0]  Yes    C. difficile colitis [A04.72]  Yes      Resolved Hospital Problems   No resolved problems to display.       ASSESSMENT  This is a 89-year-old lady with a history of A-fib on chronic anticoagulation, hypertension,  hyperlipidemia, dementia and previous stroke who presents to the hospital with bright red blood per rectum and found to have EPEC and norovirus.  Edited by: Florencio Galan MD at 6/30/2025 0905   PLAN  Continue symptom management and monitor closely.  She no medications over the last 24 hours  Continue supportive care for viral infection and bacterial infection this seems to have run its course  Discussed with the nurse and we will continue to monitor  No medication is needed for now.  I still feel like she is at the end stages of her dementia and likely has weeks to months.  My suspicion is she will have ongoing decline and meet for hospice scatter bed in the near future.  My only hope is that they re-evaluate her before she passes.    VTE Prophylaxis:  Mechanical VTE prophylaxis orders are present.      Code Status and Medical Interventions: No CPR (Do Not Attempt to Resuscitate); Comfort Measures   Ordered at: 07/01/25 0928     Code Status (Patient has no pulse and is not breathing):    No CPR (Do Not Attempt to Resuscitate)     Medical Interventions (Patient has pulse or is breathing):    Comfort Measures     Level Of Support Discussed With:    Health Care Surrogate          Disposition  Expected Discharge Date: 7/8/2025; Expected Discharge Time:        Florencio Galan MD  [facility]  07/06/25  13:03 EDT

## 2025-07-07 PROBLEM — I69.30 SEQUELAE OF CEREBRAL INFARCTION: Status: ACTIVE | Noted: 2025-07-07

## 2025-07-07 NOTE — PLAN OF CARE
Goal Outcome Evaluation:  Plan of Care Reviewed With: patient        Progress: no change  Outcome Evaluation: Pt AOx1.  Pt pulling at IV and daughter requested ativan for anxiety, restlessness.  2mg morphine and 0.5mg ativan given x1.  Pt appears comfortable at this time.  Will continue with plan of care.

## 2025-07-07 NOTE — PROGRESS NOTES
Case Management Discharge Note      Final Note: Admitted to a [facility] bed on 7/7/25. SOWMYA Danielson RN, CCP.         Selected Continued Care - Admitted Since 6/25/2025       Destination Coordination complete.      Service Provider Services Address Phone Fax Patient Preferred    [facility] [address] [address] [phone] --              Durable Medical Equipment    No services have been selected for the patient.                Dialysis/Infusion    No services have been selected for the patient.                Home Medical Care    No services have been selected for the patient.                Therapy    No services have been selected for the patient.                Community Resources    No services have been selected for the patient.                Community & DME    No services have been selected for the patient.                    Selected Continued Care - Episodes Includes continued care and service providers with selected services from the active episodes listed below      High Risk Care Management Episode start date: 10/18/2023 (Paused)   There are no active outsourced providers for this episode.                 Selected Continued Care - Prior Encounters Includes continued care and service providers with selected services from prior encounters from 3/27/2025 to 7/7/2025      Discharged on 6/9/2025 Admission date: 5/27/2025 - Discharge disposition: Skilled Nursing Facility (DC - External)      Destination       Service Provider Services Address Phone Fax Patient Preferred    [facility] [address] [address] [phone] --                               Final Discharge Disposition Code: 51 - hospice medical facility

## 2025-07-07 NOTE — DISCHARGE SUMMARY
This is a 89-year-old lady with a history of A-fib on chronic anticoagulation, hypertension, hyperlipidemia, dementia and previous stroke who presents to the hospital with bright red blood per rectum and found to have EPEC and norovirus.  She recovered from her GI illness however had significant delirium related to her underlying dementia.  She had a lot of behavioral issues that required additional medications.  After further discussions with the family palliative care and comfort measures was sought.  Nonessential medications were discontinued and she was started on meds for comfort.  On July 7 that she met criteria to transfer to inpatient hospice and they will be assuming care at this point.  Electronically signed by Florencio Galan MD, 07/07/25, 1:21 PM EDT.

## 2025-07-07 NOTE — PLAN OF CARE
Goal Outcome Evaluation:  Plan of Care Reviewed With: patient, family        Progress: declining  Outcome Evaluation: PPS 20%. RA. A&Ox1. Pt ate 50% of breakfast, no lunch, and eating dinner. She slept between care for most of the day. Flipped to HSB. Morphine 2mg given x2, and ativan 0.5mg given x1. IV wrapped in kerlix to discourage pulling. Family at bedside.

## 2025-07-07 NOTE — CONSULTS
[facility] Scattered Bed Admission: 07/07/25  [facility] ID: [id number]  [facility] Diagnosis: I69.30  Symptom management: Pain, Anxiety, Restlessness    Arrived on unit. Met with Raquel (daughter) at bedside. Assessed patient. Answered all questions and concerns. Discussed medical history, medications, symptom management concerns, and goals of care. Verbal certification for inpatient hospice services provided by Arpan Guardado MD (Palliative). Hospice consents reviewed and signed. [facility] admission folder provided and reviewed. Family is aware that they can call us at anytime with needs.     Benefit change form completed and a copy provided to Eneida Danielson RN (CM) and a copy placed in HIM.    Updated Florencio Galan MD, ALEJANDRA Quintero, and Eneida Danielson RN (CM).     [facility] daily RN visits will begin: 07/08/25    Discharge/Readmit orders reviewed with Arpan Guardado MD.     Please call us with any questions, concerns, or changes in patient status. Thank you for allowing us the opportunity to participate in the care of this patient/family.     Jasmin Dominguez, RN, BSN, CHPN  Referral and Admission Coordinator  [facility]

## 2025-07-07 NOTE — PROGRESS NOTES
Dedicated to Hospital Care    [phone]   LOS: 11 days     Name: Monica Scherer  Age/Sex: 89 y.o. female  :  1933        PCP: Amilcar Mauricio DO  Chief Complaint   Patient presents with    Rectal Bleeding      Subjective   She is lethargic again this morning.  Sleeping soundly minimally arousable she was able to wake up and tell me she was hurting and requested pain meds.  Her oral intake is pretty inconsistent she will go days and not eat or drink and then eat some bites for a few meals.   Denies new issues per nursing did get morphine and ativan last evening              Objective   Vital Signs  Temp:  [97.3 °F (36.3 °C)-98.9 °F (37.2 °C)] 97.3 °F (36.3 °C)  Heart Rate:  [87-93] 93  Resp:  [16] 16  BP: (141-142)/(72-81) 141/72  Body mass index is 25.78 kg/m².    Intake/Output Summary (Last 24 hours) at 2025 1031  Last data filed at 2025 0900  Gross per 24 hour   Intake 270 ml   Output --   Net 270 ml       Physical Exam  Sleeping soundly on my arrival does open eyes and answer questions but goes back to sleep  Her respirations are even nonlabored  Abdomen is soft    Results Review:       I reviewed the patient's new clinical results.  Results from last 7 days   Lab Units 25  0457   WBC 10*3/mm3 7.02   HEMOGLOBIN g/dL 9.0*   PLATELETS 10*3/mm3 221     Results from last 7 days   Lab Units 25  0457 25  1705   SODIUM mmol/L 144  --    POTASSIUM mmol/L 4.0  --    CHLORIDE mmol/L 114*  --    CO2 mmol/L 20.9*  --    BUN mg/dL 8.0  --    CREATININE mg/dL 0.78  --    CALCIUM mg/dL 8.0*  --    MAGNESIUM mg/dL 2.0  --    PHOSPHORUS mg/dL 2.8 3.2   Estimated Creatinine Clearance: 43.7 mL/min (by C-G formula based on SCr of 0.78 mg/dL).      Assessment & Plan   Active Hospital Problems    Diagnosis  POA    **GI bleed [K92.2]  Yes    Moderate protein-calorie malnutrition [E44.0]  Yes    C. difficile colitis [A04.72]  Yes      Resolved Hospital Problems   No resolved problems to  display.       ASSESSMENT  This is a 89-year-old lady with a history of A-fib on chronic anticoagulation, hypertension, hyperlipidemia, dementia and previous stroke who presents to the hospital with bright red blood per rectum and found to have EPEC and norovirus.  Edited by: Florencio Galan MD at 6/30/2025 0905   PLAN  Continue symptom management and monitor closely.  She no medications over the last 24 hours  Continue supportive care for viral infection and bacterial infection this seems to have run its course  Discussed with the nurse and we will continue to monitor  Prn meds and discussed morphine this AM for her pain; I still feel like she is at the end stages of her dementia and likely has weeks to months.  My suspicion is she will have ongoing decline discussed with CCP about hospice re-eval but unfortunately she ate 25% of 2 meals yesterday and may no longer meet.  We can begin looking at LTC options after hospice eval today    VTE Prophylaxis:  Mechanical VTE prophylaxis orders are present.      Code Status and Medical Interventions: No CPR (Do Not Attempt to Resuscitate); Comfort Measures   Ordered at: 07/01/25 0928     Code Status (Patient has no pulse and is not breathing):    No CPR (Do Not Attempt to Resuscitate)     Medical Interventions (Patient has pulse or is breathing):    Comfort Measures     Level Of Support Discussed With:    Health Care Surrogate          Disposition  Expected Discharge Date: 7/8/2025; Expected Discharge Time:        Florencio Galan MD  [facility]  07/07/25  10:31 EDT

## 2025-07-07 NOTE — H&P
[facility] Inpatient Hospice  Admission H&P  Monica Scherer   5/4/1933   [facility] Hospice Admission Date: 7/7/2025   Current Date: 7/7/2025   Attending Provider: All Arreaga MD        Chief Complaint: Pain/anxiety    Information obtained from: relative(s) including daughter    History of Present Illness: Ms. Scherer is a 89 year old with primary hospice diagnosis of cerebrovascular disease and history of stroke 5 years prior and another stroke in April along with vascualr dmeentia She has been in and out of the hospital and rehab several times over the last months. She was admitted this time with weakness and colitis/proctitis. She has continued to decline. She had been eating some but appetite is rapidly diminishing. Over the last 24 hours she has required 1  dose of IV lorazepam and pain is worsening throughout her body and has required 2 PRN of IV morphine to palliate her pain. 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    New onset atrial fibrillation 01/2020    now on rate control along with Eliquis    Prolapsed uterus     per patient    Stroke     TIA (transient ischemic attack) 01/2020    Weakness       Past Surgical History:   Procedure Laterality Date    APPENDECTOMY      CHOLECYSTECTOMY N/A 6/8/2016    Procedure: CHOLECYSTECTOMY LAPAROSCOPIC;  Surgeon: Russ Gar MD;  Location: [facility] OR [facility] [place];  Service:     COLONOSCOPY N/A 9/16/2021    Procedure: COLONOSCOPY TO CECUM WITH HOT SNARE POLYPECTOMIES AND COLD BX POLYPECTOMY;  Surgeon: Emerson Izaguirre MD;  Location: [facility] ENDOSCOPY;  Service: Gastroenterology;  Laterality:  "N/A;  pre: RECTAL BLEEDING, FAMILY HX OF COLON CANCER  post: INTERNAL AND EXTERNAL HEMORRHOIDS, DIVERTICULOSIS, POLYPS    ENDOSCOPY N/A 2/22/2018    Z-line regular, 35 cm from incisors, normal esophagus, gastritis, bilious gastric fluid, one non-bleeding duodenal ulcer w/no stigmata of bleeding, Path: DUODENUM: FRAGMENTS OF GASTRIC TYPE MUCOSA WITH HYPERPLASIA (FOVEOLAR) AND PATCHY MIXED INFLAMMATION IN THE LAMINA PROPRIA WITH FOCAL FIBROSIS, COMMENT: 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     EYE SURGERY      tre cataracts        Past Family and Social History:     Social History     Socioeconomic History    Marital status: Single   Tobacco Use    Smoking status: Never     Passive exposure: Never    Smokeless tobacco: Never    Tobacco comments:     caffeine use- \"rare\"   Vaping Use    Vaping status: Never Used   Substance and Sexual Activity    Alcohol use: No    Drug use: No    Sexual activity: Not Currently        No family history on file.     Medications:   No current facility-administered medications for this encounter.     Allergies:   Allergies   Allergen Reactions    Cephalexin Rash    Latex Itching        Physical Assessment:   There were no vitals taken for this visit.   Palliative Performance Scale: Performance 20% based on the following measures: Ambulation: Totally bed bound, Activity and Evidence of Disease: Unable to do any work, extensive evidence of disease, Self-Care: Total care required,  Intake: Minimal sips, LOC: Full, drowsy or confusion  Physical Exam   Ms. Scherer is laying in bed, she is very ill appearing, she is minimally responsive to me  Face is relaxed, she has temporal muscle wasting and some mild o/p secretions  Lungs with decreased aeration   RRR, + pulses   Feet are cool but no mottling  No myoclonus  Lacks decision making capacity       Data Reviewed:   Lab Results   Component Value Date    GLUCOSE 74 07/01/2025    BUN 8.0 07/01/2025    CREATININE 0.78 07/01/2025     " "07/01/2025    K 4.0 07/01/2025     (H) 07/01/2025    CALCIUM 8.0 (L) 07/01/2025    PROTEINTOT 5.8 (L) 06/26/2025    ALBUMIN 2.9 (L) 06/26/2025    ALT 7 06/26/2025    AST 12 06/26/2025    ALKPHOS 70 06/26/2025    BILITOT 0.7 06/26/2025    GLOB 2.9 06/26/2025    AGRATIO 1.0 06/26/2025    BCR 10.3 07/01/2025    ANIONGAP 9.1 07/01/2025    EGFR 71.4 07/01/2025       No results found for: \"WBC\", \"RBC\", \"HGB\", \"HCT\", \"MCV\", \"MCH\", \"MCHC\", \"RDW\", \"RDWSD\", \"MPV\", \"PLT\", \"NEUTRORELPCT\", \"LYMPHORELPCT\", \"MONORELPCT\", \"EOSRELPCT\", \"BASORELPCT\", \"AUTOIGPER\", \"NEUTROABS\", \"LYMPHSABS\", \"MONOSABS\", \"EOSABS\", \"BASOSABS\", \"AUTOIGNUM\", \"NRBC\"       CT Abdomen Pelvis With Contrast  Narrative: CT ABDOMEN PELVIS W CONTRAST-     HISTORY: Lower abdominal pain with rectal bleeding. Rectal hemorrhage.     TECHNIQUE:  CT abdomen and pelvis includes axial imaging from the lung  bases to the trochanters with intravenous contrast and with use of oral  contrast. Data reconstructed in coronal and sagittal planes. Radiation  dose reduction techniques were utilized, including automated exposure  control and exposure modulation based on body size.     COMPARISON: CT abdomen and pelvis 03/18/2024, 05/27/2025     FINDINGS: Mild dependent lower lobe atelectasis. Cardiomegaly.     Liver, spleen, adrenal glands, pancreas appear within normal limits.  Previous cholecystectomy. Stomach mostly decompressed.     Small renal cysts and renal cortical thinning. No hydronephrosis.     No bowel dilatation or evidence for bowel obstruction. Colonic  diverticulosis without evidence for diver reticulitis.     Abnormal rectal wall thickening with perirectal stranding and  inflammation consistent with colitis/proctitis. Pelvic floor relaxation.  Dense material within the rectal lumen distally though not possible to  differentiate dense material within colon from extravasation.     Impression: 1. Abnormal rectal wall thickening with " perirectal  stranding/inflammation consistent with colitis/proctitis. Pelvic floor  relaxation. Dense material within the rectal lumen though not possible  to differentiate dense material within the colon from extravasation of  contrast.  2. Colonic diverticulosis without evidence for diverticulitis.  3. Mild dependent lower lobe atelectasis.  4. Previous cholecystectomy.        This report was finalized on 6/25/2025 3:01 PM by Medardo Quinonez M.D  on Workstation: [id number]           Assessment and Plan: Monica Scherer is a 89 y.o.  female with a primary hospice diagnosis of cerebrovascular disease with underlying vascular dementia  admitted to [facility] level of care for management of pain and anxiety.        Prognosis: PPS 20%. Suspect we are entering the final days of life.   Goals of Care Treatment Preferences   Palliative Care Decision Making Capacity: lacks decision making capacity   Healthcare Surrogate: Per legal succession, daughter at bedside  What is Most important to patient/family at this time: Comfort focused plan of care  Code Status DNR  Symptom Management:   Pain: IV morphine 2mg every 1 hour PRN. Suspect, based on last 24 hours she will need this titrated nad scheduled soon. Renal function ok.   Shortness of Breath:opioids as above  Anxiety: IV lorazepam 0.5mg every 1 hour PRN  Agitation: IV haloperidol 1mg every 1 hour PRN  Constipation: bisacodyl supp PRN  Nausea/Vomiting:PRN ondansetron  Secretions: utilize recovery position as needed and postural drainage, IV glycopyrrolate PRN  Patient is at high risk of decline and death   Patient requires [facility] hospice level of care for management of pan and anxiety that can't be controlled or managed at a lower level of care. She is having emergence of symptoms that requires frequent reassessment and evaluation and with her worsening secretions and history of CVA I am worried about her ability to even get down oral concentrates to keep her  comfortable.   Disposition: Continue to monitor   Discussed plan with: RN and family        If you need to reach the provider on call for the hospice team please call [phone]    Arpan Guardado MD  7/7/2025 16:42 EDT

## 2025-07-08 NOTE — PLAN OF CARE
Goal Outcome Evaluation:  Plan of Care Reviewed With: patient, family        Progress: declining  Outcome Evaluation: PPS 10%. Arouses to pain. RA. 2mg morphine scheduled for pain management. Appears comfortable.

## 2025-07-08 NOTE — PLAN OF CARE
Goal Outcome Evaluation:  Plan of Care Reviewed With: patient        Progress: declining  Outcome Evaluation: Pt now HSB.  Medicated with 2mg morphine and 0.5mg ativan x2.  Pt appears comfortable at this time.  Will continue to provide comfort measures as needed.

## 2025-07-08 NOTE — PROGRESS NOTES
[facility] Inpatient Hospice Follow Up Note  Monica Scherer   5/4/1933   [facility] Hospice Admission Date: 7/7/2025   Current Date: 7/8/2025   Attending Provider: All Arreaga MD        Information obtained from: RN    Interval History: over the last 24 hours Ms. Scherer has required 5 PRN of IV morphine for pain and 2 doses of PRN lorazepam. She is continuing to decline.         Medications:     Current Facility-Administered Medications:     bisacodyl (DULCOLAX) suppository 10 mg, 10 mg, Rectal, Daily PRN, Arpan Guardado MD    glycopyrrolate (ROBINUL) injection 0.4 mg, 0.4 mg, Intravenous, Q1H PRN, Arpan Guardado MD    haloperidol lactate (HALDOL) injection 1 mg, 1 mg, Intravenous, Q1H PRN, Arpan Guardado MD    LORazepam (ATIVAN) injection 0.5 mg, 0.5 mg, Intravenous, Q1H PRN, Arpan Guardado MD, 0.5 mg at 07/08/25 0351    morphine injection 2 mg, 2 mg, Intravenous, Q1H PRN, Arpan Guardado MD, 2 mg at 07/08/25 0351    morphine injection 2 mg, 2 mg, Intravenous, Q4H, Arpan Guardado MD, 2 mg at 07/08/25 0821    ondansetron (ZOFRAN) injection 4 mg, 4 mg, Intravenous, Q6H PRN, Arpan Guardado MD    Polyvinyl Alcohol-Povidone PF (ARTIFICIAL TEARS) 1.4-0.6 % ophthalmic solution 1 drop, 1 drop, Both Eyes, Q30 Min PRN, Arpan Guardado MD     Allergies:   Allergies   Allergen Reactions    Cephalexin Rash    Latex Itching        Physical Assessment:   /77 (BP Location: Right arm, Patient Position: Lying)   Pulse 93   Temp 99.2 °F (37.3 °C) (Axillary)   Resp 18   SpO2 95%    Palliative Performance Scale: Performance 10% based on the following measures: Ambulation: Totally bed bound, Activity and Evidence of Disease: Unable to do any work, extensive evidence of disease, Self-Care: Total care required,  Intake: Mouth care only, LOC: Drowsy or comatose  Physical Exam   Ms. Scherer looks weaker to me today, she is laying in the left recovery position   Face relaxed, mild  audible o/p secretions, no drainage  Snoring respirations, no apnea, lungs with decreased aeration  RRR, + pulses  Abdomen soft, no bowel sounds  No myoclonus  Ms. Scherer does not awaken for me today       Data Reviewed: NA    Assessment and Plan: Monica Scherer is a 89 y.o.  female with a primary hospice diagnosis of cerebrovascular disease with underlying vascular dementia  admitted to [facility] level of care for management of pain and anxiety.         Prognosis: PPS 10%. Likely hours to days   Goals of Care Treatment Preferences   Palliative Care Decision Making Capacity: lacks decision making capacity   Healthcare Surrogate: Per legal succession, daughter at bedside  What is Most important to patient/family at this time: Comfort focused plan of care  Code Status DNR  Symptom Management:   Pain: IV morphine 2mg. Based on PRN needs schedule this every 4 hours and every 1 hour PRN. Renal function ok.   Shortness of Breath:opioids as above  Anxiety: IV lorazepam 0.5mg every 1 hour PRN.Low threshold to schedule   Agitation: IV haloperidol 1mg every 1 hour PRN  Constipation: bisacodyl supp PRN  Nausea/Vomiting:PRN ondansetron  Secretions: utilize recovery position as needed and postural drainage, IV glycopyrrolate PRN  Patient is at high risk of decline and death   Patient requires [facility] hospice level of care for management of pan and anxiety that can't be controlled or managed at a lower level of care. She is having emergence of symptoms that requires frequent reassessment and evaluation and with her worsening secretions and history of CVA I am worried about her ability to even get down oral concentrates to keep her comfortable. We are scheduling parenteral medications today   Disposition: Continue to monitor   Discussed plan with: RN       If you need to reach the provider on call for the hospice team please call [phone]    Arpan Guardado MD  7/8/2025 11:45 EDT    Negative

## 2025-07-08 NOTE — NURSING NOTE
[facility] Visit Report    Monica Scherer  [id number]  7/8/2025    Admission R/T [facility] Dx: yes    Reason for [facility] Admission:    Review of Visit: Patient is a 89 y.o. female [facility] C[facility]. Admitted to [facility] for GIP for symptom management of pain, dyspnea, restlessness, anxiety. Contact Spore     PPS: 10%      Medications in 24 hours:  -IV ativan 0.5mg PRN x3  -IV morphine 2mg PRN x4 (Prior to scheduled regimen)  -IV morphine 2mg q4h routine (added today)    Recommendations:  Continue to monitor for signs of decline and provide comfort measures. Contact [facility] providers at [phone] with any medication adjustment needs and at TOD.     Assessment:  Patient is in bed on her left side. Resting with her eyes closed. PT remained with eyes closed and appeared comfortable for this assessment. She is NPO and nonverbal, she appeared comfortable with no non-verbals s/s of pain/distress/soa. She is warm to touch with exception of her feet which are cool to touch with slowed cap refill.  Palpable peripheral pulses. Hypoactive b/s and pt remains incontinent of bowel and bladder. F/c was unsuccessfully attempted per facility rn as she they will attempt again later in the shift. Morphine was scheduled today, discussed with Raquel Pt dtr and educated on the likely montoya of scheduling ativan in the next few days if needed. She was in agreement as GOC remain comfort    Collaboration:  Spoke with pts DTR Raquel via phone with condition update and support provided. Training provided regarding assessment findings, disease progression, symptom management, recommendations and expected length of stay. Family v/u of pts condition and all questions were answered. Collaborated with [facility] RN and CCP about pts condition and recommendations.    Disposition:  Patient meets GIP criteria, requiring frequent administration and continued titration of  parenteral medications to achieve and maintain symptom management. Patient appears to be unsafe for transport at this time, requiring increasing symptom management and frequent RN assessment. If patient were to stabilize she would require LTC placement due to increased daily care needs.  Will continue [facility] RN visits to monitor for changes, assess needs and provide support.       Shaila Moreira RN  [facility] Visit Nurse  Scattered Bed Team

## 2025-07-08 NOTE — PROGRESS NOTES
SB team SW met with patient to explain role of SB team SW and assess for needs. Patient was lying in her hospital bed, unresponsive with no signs of pain or discomfort. No family members were at the bedside. Patient is currently GIP at [facility] and we are managing her pain. SW sat with patient to provide supportive presence.  SW called daughter Raquel to offer support and discuss GOC.  Patient is currently a 10% PPS and is unresponsive, therefore SW unable to complete PHQ9. Patient has four adult children that live in the area and are all involved in patient's care. Patient was living at  home with one of her sons until her care needs increased and has been living with her daughter for the last several months  prior to this hospital stay. Family would like for patient to remain in a SB for EOL care due to her imminent prognosis. If patient were to stabilize and need to be discharged, she would return to her  home or go to a LTC facility with hospice to follow. SW educated on bereavement services provided by [facility] and family voiced understanding.  SW will visit on a weekly and PRN basis to offer EOL support and assist with needs.

## 2025-07-09 NOTE — PLAN OF CARE
Goal Outcome Evaluation:           Progress: declining  Outcome Evaluation: Patient slept throughout the night.  PPS 10%.  Arouses to pain only.  Scheduled medications administered as ordered.  Nursing will continue plan of care.

## 2025-07-09 NOTE — PLAN OF CARE
Goal Outcome Evaluation:           Progress: declining  Outcome Evaluation: PPS 10%. Pt slept majority of shift. Arouses to voice sometimes, but mostly arouses to pain only. Scheduled medications given with effectivness. Safety measures in place. No PRNs given this shift. Plan of care ongoing.

## 2025-07-09 NOTE — PROGRESS NOTES
[facility] Inpatient Hospice Follow Up Note  Monica Scherer   5/4/1933   [facility] Hospice Admission Date: 7/7/2025   Current Date: 7/9/2025   Attending Provider: All Arreaga MD        Information obtained from: RN    Interval History: Since scheduling her IV morphine  she has required 0 Prn medication and has seemed more comfortable. No family present today         Medications:     Current Facility-Administered Medications:     bisacodyl (DULCOLAX) suppository 10 mg, 10 mg, Rectal, Daily PRN, Arpan Guardado MD    glycopyrrolate (ROBINUL) injection 0.4 mg, 0.4 mg, Intravenous, Q1H PRN, Arpan Guardado MD    haloperidol lactate (HALDOL) injection 1 mg, 1 mg, Intravenous, Q1H PRN, Arpan Guardado MD    LORazepam (ATIVAN) injection 0.5 mg, 0.5 mg, Intravenous, Q1H PRN, Arpan Guardado MD, 0.5 mg at 07/08/25 0351    morphine injection 2 mg, 2 mg, Intravenous, Q1H PRN, Arpan Guardado MD, 2 mg at 07/08/25 0351    morphine injection 2 mg, 2 mg, Intravenous, Q4H, Arpan Guardado MD, 2 mg at 07/09/25 0808    ondansetron (ZOFRAN) injection 4 mg, 4 mg, Intravenous, Q6H PRN, Arpan Guardado MD    Polyvinyl Alcohol-Povidone PF (ARTIFICIAL TEARS) 1.4-0.6 % ophthalmic solution 1 drop, 1 drop, Both Eyes, Q30 Min PRN, Arpan Guardado MD     Allergies:   Allergies   Allergen Reactions    Cephalexin Rash    Latex Itching        Physical Assessment:   /66 (BP Location: Right arm, Patient Position: Lying)   Pulse 103   Temp 99 °F (37.2 °C) (Oral)   Resp 8   SpO2 92%    Palliative Performance Scale: Performance 10% based on the following measures: Ambulation: Totally bed bound, Activity and Evidence of Disease: Unable to do any work, extensive evidence of disease, Self-Care: Total care required,  Intake: Mouth care only, LOC: Drowsy or comatose  Physical Exam   Ms. Scherer is laying in bed, unresponsive, laying on back   Face relaxed, mild audible o/p secretions, no  drainage  Snoring respirations, pauses present in breathing today , lungs with decreased aerationt  tachycardic, + pulses but weaker  Abdomen soft, no bowel sounds  No myoclonus  Extremities are cooler     Data Reviewed: NA    Assessment and Plan: Monica Scherer is a 89 y.o.  female with a primary hospice diagnosis of cerebrovascular disease with underlying vascular dementia  admitted to [facility] hospice level of care for management of pain and anxiety.         Prognosis: PPS 10%. Likely hours to days   Goals of Care Treatment Preferences   Palliative Care Decision Making Capacity: lacks decision making capacity   Healthcare Surrogate: Per legal succession, daughter at bedside  What is Most important to patient/family at this time: Comfort focused plan of care  Code Status DNR  Symptom Management:   Pain: IV morphine 2mg every 4 hours and every 1 hour PRN. Renal function ok.   Shortness of Breath:opioids as above  Anxiety: IV lorazepam 0.5mg every 1 hour PRN.Low threshold to schedule   Agitation: IV haloperidol 1mg every 1 hour PRN  Constipation: bisacodyl supp PRN  Nausea/Vomiting:PRN ondansetron  Secretions: utilize recovery position as needed and postural drainage, IV glycopyrrolate PRN  Patient is at high risk of decline and death   Patient requires [facility] hospice level of care for management of pan and anxiety that can't be controlled or managed at a lower level of care. She is having emergence of symptoms that requires frequent reassessment and evaluation and with her worsening secretions and history of CVA I am worried about her ability to even get down oral concentrates to keep her comfortable.   Disposition: Continue to monitor   Discussed plan with: RN       If you need to reach the provider on call for the hospice team please call [phone]    Arpan Guardado MD  7/9/2025 12:45 EDT

## 2025-07-09 NOTE — NURSING NOTE
[facility] Visit Report     Monica Scherer  [id number]  7/9/2025     Admission R/T [facility] Dx: yes     [facility]     Review of Visit: Patient is a 89 y.o. female with a primary [facility] diagnosis of Cerebral infarct. Admitted to [facility] for GIP for symptom management of pain, dyspnea, restlessness, anxiety. Contact Spore      PPS: 10%        Medications in 24 hours:  -IV ativan 0.5mg PRN x0  -IV morphine 2mg PRN x0  -IV morphine 2mg q4h routine     Recommendations:  Continue to monitor for signs of decline and provide comfort measures. Contact [facility] providers at [phone] with any medication adjustment needs and at TOD.      Assessment:  PT in bed eyes closed and resting peacefully. She showed no non-verbal s/s of pain/distress/soa, She remains incontinent of bowel and bladder with hypoactive b/s. She remains NPO and unresponsive. Warm to touch with palpable peripheral pulses. Lungs clear with slow calm respirations on ra.      Collaboration:  Call to DTR Raquel via phone with condition update and support provided. Training provided regarding assessment findings, disease progression, symptom management, recommendations and expected length of stay. Family v/u of pts condition and all questions were answered. Collaborated with [facility] RN about pts condition and recommendations.     Disposition:  Patient meets GIP criteria, requiring frequent administration and continued titration of parenteral medications to achieve and maintain symptom management. Patient appears to be unsafe for transport at this time, requiring increasing symptom management and frequent RN assessment. If patient were to stabilize she would require LTC placement due to increased daily care needs.  Will continue [facility] RN visits to monitor for changes, assess needs and provide support.         Shaila Moreira, RN  [facility] Visit Nurse  Scattered Bed Team

## 2025-07-10 NOTE — PLAN OF CARE
Goal Outcome Evaluation:           Progress: declining  Outcome Evaluation: Patient slept throughout the shift.  PPS 10%.  Aroused to movement/pain.  Moved to 486 due to plumbing issues, daughter notified.  No PRN's required this shift.  Nursing will continue plan of care.

## 2025-07-10 NOTE — NURSING NOTE
Call to daughter Raquel Lebron to inform of room change.  Answered questions and updated on patient status.  Daughter very appreciative of call.

## 2025-07-10 NOTE — NURSING NOTE
[facility] Visit Report     Monica Scherer  [id number]  7/10/2025     Admission [facility]/T [facility] Dx: yes     Reason for [facility] Admission:     Review of Visit: Patient is a 89 y.o. female with a primary [facility] diagnosis of Cerebral infarct. Admitted to [facility] for GIP for symptom management of pain, dyspnea, restlessness, anxiety. Contact Spore      PPS: 10%        Medications in 24 hours:  -IV ativan 0.5mg PRN x1  -IV morphine 2mg PRN x0  -IV morphine 2mg q4h routine     Recommendations:  Continue to monitor for signs of decline and provide comfort measures. Contact [facility] providers at [phone] with any medication adjustment needs and at TOD.      Assessment:  PT in bed eyes closed and resting peacefully. She did open her eyes once during my assessment but was not tracking nor did she show any purposeful movement. She showed no non-verbal s/s of pain/distress/soa, She remains incontinent of bowel and bladder with hypoactive b/s and f/c was placed successfully today draining diminished concentrated jose l UOP. Pt hands and feet are cool to touch today with palpable peripheral pulses. Lungs clear with slow calm respirations on ra.  Per  MD and facility RN medications are to be adjusted today and adding scheduled.      Collaboration:  Call to DTR Raquel via phone with condition update and support provided. Training provided regarding assessment findings, disease progression, symptom management, recommendations and expected length of stay. Family v/u of pts condition and all questions were answered. Collaborated with [facility] RN about pts condition and recommendations.     Disposition:  Patient meets GIP criteria, requiring frequent administration and continued titration of parenteral medications to achieve and maintain symptom management. Patient appears to be unsafe for transport at this time, requiring increasing symptom management and frequent RN  assessment. If patient were to stabilize she would require LTC placement due to increased daily care needs.  Will continue \[facility]\"" RN visits to monitor for changes, assess needs and provide support.         Shaila Moreira RN  [facility] Visit Nurse  Scattered Bed Team

## 2025-07-10 NOTE — PROGRESS NOTES
[facility] Inpatient Hospice Follow Up Note  Monica Scherer   5/4/1933   [facility] Hospice Admission Date: 7/7/2025   Current Date: 7/10/2025   Attending Provider: All Arreaga MD        Information obtained from: RN    Interval History: RN has noted end dose morphine failure and feels like patient would be more comfortable with scheduled benzodiazepines as she seems anxious.         Medications:     Current Facility-Administered Medications:     bisacodyl (DULCOLAX) suppository 10 mg, 10 mg, Rectal, Daily PRN, Arpan Guardado MD    diazePAM (VALIUM) injection 5 mg, 5 mg, Intravenous, Q4H, Arpan Guardado MD, 5 mg at 07/10/25 1213    diazePAM (VALIUM) injection 5 mg, 5 mg, Intravenous, Q1H PRN, Arpan Guardado MD    glycopyrrolate (ROBINUL) injection 0.4 mg, 0.4 mg, Intravenous, Q1H PRN, Arpan Guardado MD, 0.4 mg at 07/10/25 1214    haloperidol lactate (HALDOL) injection 1 mg, 1 mg, Intravenous, Q1H PRN, Arpan Guardado MD    LORazepam (ATIVAN) injection 0.5 mg, 0.5 mg, Intravenous, Q1H PRN, Arpan Guardado MD, 0.5 mg at 07/10/25 0856    Morphine sulfate (PF) injection 4 mg, 4 mg, Intravenous, Q4H, Arpan Guardado MD, 4 mg at 07/10/25 1214    Morphine sulfate (PF) injection 4 mg, 4 mg, Intravenous, Q1H PRN, Arpan Guardado MD    ondansetron (ZOFRAN) injection 4 mg, 4 mg, Intravenous, Q6H PRN, Arpan Guardado MD    Polyvinyl Alcohol-Povidone PF (ARTIFICIAL TEARS) 1.4-0.6 % ophthalmic solution 1 drop, 1 drop, Both Eyes, Q30 Min PRN, Arpan Guardado MD     Allergies:   Allergies   Allergen Reactions    Cephalexin Rash    Latex Itching        Physical Assessment:   /57 (BP Location: Right arm, Patient Position: Lying)   Pulse (!) 139   Temp 97.5 °F (36.4 °C) (Oral)   Resp 12   SpO2 94%    Palliative Performance Scale: Performance 10% based on the following measures: Ambulation: Totally bed bound, Activity and Evidence of Disease: Unable to do any work, extensive  evidence of disease, Self-Care: Total care required,  Intake: Mouth care only, LOC: Drowsy or comatose  Physical Exam   Ms. Scherer is laying in bed, unresponsive, laying on back   Brow is slightly furrowed, mild audible o/p secretions, no drainage  Snoring respirations, pauses present in breathing today , lungs with decreased aeration  tachycardic, + pulses but weaker  Abdomen soft, no bowel sounds  No myoclonus  Extremities are cooler, holds arms close to chest     Data Reviewed: NA    Assessment and Plan: Monica Scherer is a 89 y.o.  female with a primary hospice diagnosis of cerebrovascular disease with underlying vascular dementia  admitted to [facility] level of care for management of pain and anxiety.         Prognosis: PPS 10%. Likely hours to days   Goals of Care Treatment Preferences   Palliative Care Decision Making Capacity: lacks decision making capacity   Healthcare Surrogate: Per legal succession, daughter at bedside  What is Most important to patient/family at this time: Comfort focused plan of care  Code Status DNR  Symptom Management:   Pain: Based on end dose failure will increase IV morphine to 4mg every 4 hours and every 1 hour PRN. Renal function ok.   Shortness of Breath:opioids as above  Anxiety: IV lorazepam shortage. Rotate to Diazepam and will schedule  5mg every 4 hours and every 1 hour PRN.  Agitation: IV haloperidol 1mg every 1 hour PRN  Constipation: bisacodyl supp PRN  Nausea/Vomiting:PRN ondansetron  Secretions: utilize recovery position as needed and postural drainage, IV glycopyrrolate PRN  Patient is at high risk of decline and death   Patient requires [facility] hospice level of care for management of pan and anxiety that can't be controlled or managed at a lower level of care. She is having emergence of symptoms that requires frequent reassessment and evaluation and with her worsening secretions and history of CVA I am worried about her ability to even get down oral concentrates to  keep her comfortable. Continuing to titrate parenteral medications.   Disposition: Continue to monitor   Discussed plan with: RN. RN updated family        If you need to reach the provider on call for the hospice team please call [phone]    Arpan Guardado MD  7/10/2025 12:49 EDT

## 2025-07-11 NOTE — PLAN OF CARE
Goal Outcome Evaluation:           Progress: declining  Outcome Evaluation: Patient slept throughout the shift.  PPS 10%.  No PRN's required this shift.  Nursing will continue plan of care.

## 2025-07-11 NOTE — NURSING NOTE
[facility] Visit Report     Monica Scherer  [id number]  7/11/2025     [facility] Dx: yes     Reason for [facility] Admission:     Review of Visit: Patient is a 89 y.o. female with a primary [facility] diagnosis of Cerebral infarct. Admitted to [facility] for GIP for symptom management of pain, dyspnea, restlessness, anxiety. Contact Spore      PPS: 10%        Medications in 24 hours:  -IV valium 5mg q4h routine and q1h PRN x0  -IV morphine 4mg q4h routine and q1h PRN x0     Recommendations:  Continue to monitor for signs of decline and provide comfort measures. Contact [facility] providers at [phone] with any medication adjustment needs and at TOD.      Assessment:  PT in bed eyes closed and resting peacefully on her left side. Pt son who was admitted to the hospital yesterday and was brought up to visit with his mom.  She showed no non-verbal s/s of pain/distress/soa, She remains incontinent of bowel and bladder with hypoactive b/s with f/c was draining diminished concentrated jose l UOP. Pt hands and feet are cool to touch today with weak but palpable peripheral pulses. Lungs clear with slow calm respirations on ra. Pt is having short apneic episodes. Current medication regimen appears effective at this time   Collaboration:  Call to DTR Raquel via phone and spoke with son at bedside with condition update and support provided. Training provided regarding assessment findings, disease progression, symptom management, recommendations and expected length of stay. Family v/u of pts condition and all questions were answered. Collaborated with [facility] RN about pts condition and recommendations.     Disposition:  Patient meets GIP criteria, requiring frequent administration and continued titration of parenteral medications to achieve and maintain symptom management. Patient appears to be unsafe for transport at this time, requiring increasing symptom management and  frequent RN assessment. If patient were to stabilize she would require LTC placement due to increased daily care needs.  Will continue [facility] RN visits to monitor for changes, assess needs and provide support.         Shaila Moreira RN  [facility] Visit Nurse  Scattered Bed Team

## 2025-07-12 NOTE — PLAN OF CARE
Goal Outcome Evaluation:  Plan of Care Reviewed With: patient, family        Progress: declining  Outcome Evaluation: PPS 10%. Q4 medications given. Robinul 0.4 given for secretions. Suction set up. Pt appears comfortable.

## 2025-07-12 NOTE — PROGRESS NOTES
[facility] Inpatient Hospice Follow Up Note  Monica Scherer   5/4/1933   [facility] Hospice Admission Date: 7/7/2025   Current Date: 7/11/2025   Attending Provider: All Arreaga MD        Information obtained from: RN    Interval History: RN reported IV med changes have been effective. Pt is receving valium 5 mg IV q 4 and q 1 prn, she has used no prn doses since the change. IV morphine 4 mg q 4 and q 1 prn, no prn doses needed, glycopyrrolate .4 IV q 1 hr prn, has only used 1 dose. Pt appears comfortable.     Medications:     Current Facility-Administered Medications:     bisacodyl (DULCOLAX) suppository 10 mg, 10 mg, Rectal, Daily PRN, Arpan Guardado MD    diazePAM (VALIUM) injection 5 mg, 5 mg, Intravenous, Q4H, Arpan Guardado MD, 5 mg at 07/11/25 2050    diazePAM (VALIUM) injection 5 mg, 5 mg, Intravenous, Q1H PRN, Arpan Guardado MD    glycopyrrolate (ROBINUL) injection 0.4 mg, 0.4 mg, Intravenous, Q1H PRN, Arpan Guardado MD, 0.4 mg at 07/10/25 1214    haloperidol lactate (HALDOL) injection 1 mg, 1 mg, Intravenous, Q1H PRN, Arpan Guardado MD    Morphine sulfate (PF) injection 4 mg, 4 mg, Intravenous, Q4H, Apran Guardado MD, 4 mg at 07/11/25 2050    Morphine sulfate (PF) injection 4 mg, 4 mg, Intravenous, Q1H PRN, Arpan Guardado MD    ondansetron (ZOFRAN) injection 4 mg, 4 mg, Intravenous, Q6H PRN, Arpan Guardado MD    Polyvinyl Alcohol-Povidone PF (ARTIFICIAL TEARS) 1.4-0.6 % ophthalmic solution 1 drop, 1 drop, Both Eyes, Q30 Min PRN, Arpan Guardado MD     Allergies:   Allergies   Allergen Reactions    Cephalexin Rash    Latex Itching        Physical Assessment:   BP 98/57 (BP Location: Right arm, Patient Position: Lying)   Pulse (!) 162   Temp 99.2 °F (37.3 °C) (Oral)   Resp 14   SpO2 (!) 82%    Palliative Performance Scale: Performance 10% based on the following measures: Ambulation: Totally bed bound, Activity and Evidence of Disease: Unable to do any  work, extensive evidence of disease, Self-Care: Total care required,  Intake: Mouth care only, LOC: Drowsy or comatose  Physical Exam   Ms. Scherer is laying in bed, unresponsive, laying on left side  Brow relaxed, mild audible o/p secretions, drainage on towel  Snoring respirations, pauses present in breathing today , lungs with decreased aeration  tachycardic, + pulses but weaker  Abdomen soft, no bowel sounds  No myoclonus  Extremities are cool, no mottling    Data Reviewed: NA    Assessment and Plan: Monica Scherer is a 89 y.o.  female with a primary hospice diagnosis of cerebrovascular disease with underlying vascular dementia  admitted to [facility] level of care for management of pain and anxiety.         Prognosis: PPS 10%. Likely hours to days   Goals of Care Treatment Preferences   Palliative Care Decision Making Capacity: lacks decision making capacity   Healthcare Surrogate: Per legal succession, daughter at bedside  What is Most important to patient/family at this time: Comfort focused plan of care  Code Status DNR  Symptom Management:   Pain: IV morphine 4mg every 4 hours and every 1 hour prn.  Shortness of Breath:opioids as above  Anxiety: Diazepam IV 5mg every 4 hours and every 1 hour PRN.  Agitation: IV haloperidol 1mg every 1 hour PRN  Constipation: bisacodyl supp PRN  Nausea/Vomiting:PRN ondansetron  Secretions: utilize recovery position as needed and postural drainage, IV glycopyrrolate PRN  Patient is at high risk of decline and death   Patient requires [facility] level of care for management of pain and anxiety that can't be controlled or managed at a lower level of care. She is having emergence of symptoms that requires frequent reassessment and evaluation and with her worsening secretions and history of CVA I am worried about her ability to even get down oral concentrates to keep her comfortable. Continuing to titrate parenteral medications.   Disposition: Continue to monitor   Discussed  plan with: RN. RN updated family        If you need to reach the provider on call for the hospice team please call [phone]    CORTEZ Hill  7/11/2025 22:19 EDT

## 2025-07-12 NOTE — PROGRESS NOTES
[facility] Inpatient Hospice Follow Up Note  Monica Scherer   5/4/1933   [facility] Hospice Admission Date: 7/7/2025   Current Date: 7/12/2025   Attending Provider: All Arreaga MD        Information obtained from: RN    Interval History: RN reported IV med changes have been effective. Pt is receving valium 5 mg IV q 4 and q 1 prn, she has used no prn doses since the change. IV morphine 4 mg q 4 and q 1 prn, no prn doses needed, glycopyrrolate .4 IV q 1 hr prn, has only used 1 dose. Pt appears comfortable.     Medications:     Current Facility-Administered Medications:     bisacodyl (DULCOLAX) suppository 10 mg, 10 mg, Rectal, Daily PRN, Arpan Guardado MD    diazePAM (VALIUM) injection 5 mg, 5 mg, Intravenous, Q4H, Arpan Guardado MD, 5 mg at 07/12/25 1314    diazePAM (VALIUM) injection 5 mg, 5 mg, Intravenous, Q1H PRN, Arpan Guardado MD    glycopyrrolate (ROBINUL) injection 0.4 mg, 0.4 mg, Intravenous, Q1H PRN, Arpan Guardado MD, 0.4 mg at 07/10/25 1214    haloperidol lactate (HALDOL) injection 1 mg, 1 mg, Intravenous, Q1H PRN, Arpan Guardado MD    Morphine sulfate (PF) injection 4 mg, 4 mg, Intravenous, Q4H, Arpan Guardado MD, 4 mg at 07/12/25 1314    Morphine sulfate (PF) injection 4 mg, 4 mg, Intravenous, Q1H PRN, Arpan Guardado MD    ondansetron (ZOFRAN) injection 4 mg, 4 mg, Intravenous, Q6H PRN, Arpan Guardado MD    Polyvinyl Alcohol-Povidone PF (ARTIFICIAL TEARS) 1.4-0.6 % ophthalmic solution 1 drop, 1 drop, Both Eyes, Q30 Min PRN, Arpan Guardado MD     Allergies:   Allergies   Allergen Reactions    Cephalexin Rash    Latex Itching        Physical Assessment:   /72 (BP Location: Right arm, Patient Position: Lying)   Pulse (!) 174   Temp 98.1 °F (36.7 °C) (Oral)   Resp 12   SpO2 (!) 75%    Palliative Performance Scale: Performance 10% based on the following measures: Ambulation: Totally bed bound, Activity and Evidence of Disease: Unable to do any  work, extensive evidence of disease, Self-Care: Total care required,  Intake: Mouth care only, LOC: Drowsy or comatose  Physical Exam   Ms. Scherer is laying in bed, unresponsive, laying on right side  Brow relaxed, mild audible o/p secretions, drainage on towel  Snoring respirations, pauses present in breathing today , lungs with decreased aeration  tachycardic, + pulses but weaker  Abdomen soft, no bowel sounds  No myoclonus  Extremities are cool, no mottling    Data Reviewed: NA    Assessment and Plan: Monica Scherer is a 89 y.o.  female with a primary hospice diagnosis of cerebrovascular disease with underlying vascular dementia  admitted to [facility] hospice level of care for management of pain and anxiety.         Prognosis: PPS 10%. Likely hours to days   Goals of Care Treatment Preferences   Palliative Care Decision Making Capacity: lacks decision making capacity   Healthcare Surrogate: Per legal succession  What is Most important to patient/family at this time: Comfort focused plan of care  Code Status DNR  Symptom Management:   Pain: IV morphine 4mg every 4 hours and every 1 hour prn.  Shortness of Breath:opioids as above  Anxiety: Diazepam IV 5mg every 4 hours and every 1 hour PRN.  Agitation: IV haloperidol 1mg every 1 hour PRN  Constipation: bisacodyl supp PRN  Nausea/Vomiting:PRN ondansetron  Secretions: utilize recovery position as needed and postural drainage, IV glycopyrrolate PRN  Patient is at high risk of decline and death   Patient requires [facility] hospice level of care for management of pain and anxiety that can't be controlled or managed at a lower level of care. She is having emergence of symptoms that requires frequent reassessment and evaluation and with her worsening secretions and history of CVA I am worried about her ability to even get down oral concentrates to keep her comfortable. Continuing to titrate parenteral medications.   Disposition: Continue to monitor   Discussed plan with: ALEJANDRA         If you need to reach the provider on call for the hospice team please call [phone]    CORTEZ Hill  7/12/2025 15:05 EDT

## 2025-07-12 NOTE — PLAN OF CARE
Goal Outcome Evaluation:     Progress: declining    Medicated for pain and anxiety prior to Q4 hr turns. No family at bedside. Will continue with plan of care.

## 2025-07-12 NOTE — NURSING NOTE
Pt PPS 10%. Premedicated with 4mg morphine and 5mg valium before turns. New IV placed. No current needs, children visited today. Plan of kindness ongoing.

## 2025-07-13 NOTE — NURSING NOTE
Review of visit: patient is a 89 YO F with a primary diagnosis of CI. Admitted to [facility] for GIP for mgmt. of pain, SOA, anxiety/restlessness, congestion & EOL & symptom mgmt. care.   Isolation: Contact Spore (C diff & Norovirus)  PPS: 10%   Medications in last 24 hours: IV Morphine 4mg Q4H, IV Robinul 0.4mg Q4H, IV Valium 5mg Q4H, Scop patch x1   Recommendations:   Continue to monitor for signs of decline & provide comfort measures. [facility] with any questions/concerns & at TOD.   Assessment:   Patient is lying in bed on side in recovery position, patient is non-responsive, on RA, RR even, mildly labored, lungs coarse, BS absent, pedals W, trace edema in hands & feet, mottling noted on soles of feet, FC in place with jose l urine noted in beg. Patient actively dying, appears comfortable at time of visit.     Collaboration:   Spoke with family at bedside, condition update provided including disease progression, symptom mgmt. & patient's condition, training provided. Family V/U of patient's condition and all questions answered. Updated staff ALEJANDRA Quintero, recommendations provided as appropriate.   Disposition:  Patient meets GIP criteria d/t frequent administration & continued titration of IV meds to achieve/maintain symptom mgmt.. Patient appears unstable for transport, requiring increasing symptom mgmt. & frequent nursing interventions. If patient stabilizes & needs to transition to a lower level of care, placement may be needed due to increased daily care needs. [facility] to monitor for changes, assess needs & provide support.   Please reach out with any questions/concerns. Thank you for allowing us the opportunity to participate in patient's care.     Vivi Talbert RN, BSN  Scatter Bed Team  [facility]

## 2025-07-13 NOTE — NURSING NOTE
Review of visit: patient is a 89 YO F with a primary diagnosis of CI. Admitted to [facility] for GIP for mgmt. of pain, SOA, anxiety/restlessness, congestion & EOL & symptom mgmt. care.   Isolation: N/A  PPS: 10%   Medications in last 24 hours: IV Morphine 4mg Q4H, IV Robinul 0.4mg x1, IV Valium 5mg Q4H   Recommendations:   Continue to monitor for signs of decline & provide comfort measures. Contact [facility] with any questions/concerns & at TOD.   Assessment:   Patient is lying in bed on side in recovery position, patient is non-responsive, on RA, RR even, mildly labored, lungs coarse, BS absent, pedals PW, trace edema in hands & feet, FC in place with jose l urine noted in beg. Patient has had worsening congestion today, family requests scheduled Robinul, CAS Zamarripa confirms will order. Patient appears comfortable at time of visit.     Collaboration:   Spoke with family at bedside, condition update provided including disease progression, symptom mgmt. & patient's condition, training provided. Family V/U of patient's condition and all questions answered. Updated staff RN Ingrid, recommendations provided as appropriate.   Disposition:  Patient meets GIP criteria d/t frequent administration & continued titration of IV meds to achieve/maintain symptom mgmt.. Patient appears unstable for transport, requiring increasing symptom mgmt. & frequent nursing interventions. If patient stabilizes & needs to transition to a lower level of care, placement may be needed due to increased daily care needs. Will continue [facility] RN visits to monitor for changes, assess needs & provide support.   Please reach out with any questions/concerns. Thank you for allowing us the opportunity to participate in patient's care.     Vivi Talbert RN, BSN  Scatter Bed Team  [facility]

## 2025-07-13 NOTE — PLAN OF CARE
Goal Outcome Evaluation:  Plan of Care Reviewed With: patient, family        Progress: declining  Outcome Evaluation: PPS 10%. Q4 medications given. Scope patch added for additional support with secretions.

## 2025-07-13 NOTE — PROGRESS NOTES
[facility]patient [facility]ospice Follow Up Note  Monica Scherer   5/4/1933   [facility] Admission Date: 7/7/2025   Current Date: 7/13/2025   Attending Provider: All Arreaga MD        Information obtained from: RN    Interval History: Family was very concerned last night about the secretions and nurse reached out. I scheduled the robinul .4 mg q 4 and q 1 prn and Rn using suctioning. I will add scopolamine patch today as pt has copious amounts of secretions. IV med changes have been effective. Pt is receving valium 5 mg IV q 4 and q 1 prn, no prn doses used,  IV morphine 4 mg q 4 and q 1 prn, no prn doses needed, glycopyrrolate .4 IV q 4 and q 1. PPS 10%    Medications:     Current Facility-Administered Medications:     bisacodyl (DULCOLAX) suppository 10 mg, 10 mg, Rectal, Daily PRN, Arpan Guardado MD    diazePAM (VALIUM) injection 5 mg, 5 mg, Intravenous, Q4H, Arpan Guardado MD, 5 mg at 07/13/25 0935    diazePAM (VALIUM) injection 5 mg, 5 mg, Intravenous, Q1H PRN, Arpan Guardado MD    glycopyrrolate (ROBINUL) injection 0.4 mg, 0.4 mg, Intravenous, Q1H PRN, Arpan Guardado MD, 0.4 mg at 07/12/25 1852    glycopyrrolate (ROBINUL) injection 0.4 mg, 0.4 mg, Intravenous, Q4H, Regina aClvillo APRN, 0.4 mg at 07/13/25 0935    haloperidol lactate (HALDOL) injection 1 mg, 1 mg, Intravenous, Q1H PRN, Arpan Guardado MD    Morphine sulfate (PF) injection 4 mg, 4 mg, Intravenous, Q4H, Arpan Guardado MD, 4 mg at 07/13/25 0935    Morphine sulfate (PF) injection 4 mg, 4 mg, Intravenous, Q1H PRN, Arpan Guardado MD    ondansetron (ZOFRAN) injection 4 mg, 4 mg, Intravenous, Q6H PRN, Arpan Guardado MD    Polyvinyl Alcohol-Povidone PF (ARTIFICIAL TEARS) 1.4-0.6 % ophthalmic solution 1 drop, 1 drop, Both Eyes, Q30 Min Geo SALAS Dustin W, MD     Allergies:   Allergies   Allergen Reactions    Cephalexin Rash    Latex Itching        Physical Assessment:   BP (!) 77/38 (BP Location:  Right arm, Patient Position: Lying)   Pulse (!) 134   Temp 99.3 °F (37.4 °C) (Oral)   Resp 14   SpO2 (!) 87%    Palliative Performance Scale: Performance 10% based on the following measures: Ambulation: Totally bed bound, Activity and Evidence of Disease: Unable to do any work, extensive evidence of disease, Self-Care: Total care required,  Intake: Mouth care only, LOC: Drowsy or comatose  Physical Exam   Ms. Scherer is laying in bed, unresponsive, laying on right side  Brow relaxed, audible o/p secretions, drainage on towel  Snoring respirations, pauses present in breathing today , lungs decreased   tachycardic, + pulses but weaker  Abdomen soft, no bowel sounds  No myoclonus  Extremities are cool, no mottling    Data Reviewed: NA    Assessment and Plan: Monica Scherer is a 89 y.o.  female with a primary hospice diagnosis of cerebrovascular disease with underlying vascular dementia  admitted to [facility] level of care for management of pain and anxiety.         Prognosis: PPS 10%. Likely hours to days   Goals of Care Treatment Preferences   Palliative Care Decision Making Capacity: lacks decision making capacity   Healthcare Surrogate: Per legal succession  What is Most important to patient/family at this time: Comfort focused plan of care  Code Status DNR  Symptom Management:   Pain: IV morphine 4mg every 4 hours and every 1 hour prn.  Shortness of Breath:opioids as above  Anxiety: Diazepam IV 5mg every 4 hours and every 1 hour PRN.  Agitation: IV haloperidol 1mg every 1 hour PRN  Constipation: bisacodyl supp PRN  Nausea/Vomiting:PRN ondansetron  Secretions: utilize recovery position as needed and postural drainage, IV glycopyrrolate q 4 and q1, will add scopolamine patch today.  Patient is at high risk of decline and death   Patient requires [facility] level of care for management of pain and anxiety that can't be controlled or managed at a lower level of care. She is having emergence of symptoms that  requires frequent reassessment and evaluation.  Continuing to titrate parenteral medications.   Disposition: Continue to monitor   Discussed plan with: RN        If you need to reach the provider on call for the hospice team please call [phone]    Regina Calvillo, CORTEZ  7/13/2025 11:27 EDT

## 2025-07-14 NOTE — OUTREACH NOTE
AMBULATORY CASE MANAGEMENT NOTE    Names and Relationships of Patient/Support Persons: Contact: Bernardino,Raquel; Relationship: Emergency Contact -     Patient Outreach    Call placed to daughter Raquel Lebron. She reports her Mother passed away this morning. Condolences given. Denies needs but does have Ambulatory  number for any needs or support that could be given.         Fatou WASHINGTON  Ambulatory Case Management    7/14/2025, 10:26 EDT

## 2025-07-14 NOTE — PROGRESS NOTES
Case Management Discharge Note      Final Note: Pt  25         Selected Continued Care - Discharged on 2025 Admission date: 2025 - Discharge disposition:       Destination Coordination complete.      Service Provider Services Address Phone Fax Patient Preferred    [facility] [address] [facility] [address] [phone] --              Durable Medical Equipment    No services have been selected for the patient.                Dialysis/Infusion    No services have been selected for the patient.                Home Medical Care    No services have been selected for the patient.                Therapy    No services have been selected for the patient.                Community Resources    No services have been selected for the patient.                Community & DME    No services have been selected for the patient.                    Selected Continued Care - Prior Encounters Includes continued care and service providers with selected services from prior encounters from 2025 to 2025      Discharged on 2025 Admission date: 2025 - Discharge disposition: Hospice/Medical Facility (DC - External)      Destination       Service Provider Services Address Phone Fax Patient Preferred    [facility] [address] [facility] [address] [phone] --                      Discharged on 2025 Admission date: 2025 - Discharge disposition: Skilled Nursing Facility (DC - External)      Destination       Service Provider Services Address Phone Fax Patient Preferred    [facility] [address] [facility] [address] [phone] --                               Final Discharge Disposition Code: 20 -

## 2025-07-19 NOTE — DISCHARGE SUMMARY
[facility] Inpatient Hosp[facility] Death Summary  Monica Scherer   1933   [facility] Admission Date: 2025   Date of Death: 25  Attending Provider: No att. 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 female with a primary hospice diagnosis of cerebrovascular disease who was admitted to [facility] level of Hospice Care at [facility]. Symptoms were managed with parenteral medications. Had further decline and  on 25.      All Arreaga MD  2025 15:00 EDT

## (undated) DEVICE — KT ORCA ORCAPOD DISP STRL

## (undated) DEVICE — ADAPT CLN BIOGUARD AIR/H2O DISP

## (undated) DEVICE — Device: Brand: DEFENDO AIR/WATER/SUCTION AND BIOPSY VALVE

## (undated) DEVICE — TUBING, SUCTION, 1/4" X 10', STRAIGHT: Brand: MEDLINE

## (undated) DEVICE — THE SINGLE USE ETRAP – POLYP TRAP IS USED FOR SUCTION RETRIEVAL OF ENDOSCOPICALLY REMOVED POLYPS.: Brand: ETRAP

## (undated) DEVICE — LN SMPL CO2 SHTRM SD STREAM W/M LUER

## (undated) DEVICE — CANN NASL CO2 TRULINK W/O2 A/

## (undated) DEVICE — PATIENT RETURN ELECTRODE, SINGLE-USE, CONTACT QUALITY MONITORING, ADULT, WITH 9FT CORD, FOR PATIENTS WEIGING OVER 33LBS. (15KG): Brand: MEGADYNE

## (undated) DEVICE — SENSR O2 OXIMAX FNGR A/ 18IN NONSTR

## (undated) DEVICE — TBG 02 CRUSH RESIST LF CLR 7FT

## (undated) DEVICE — SNAR POLYP SENSATION STDOVL 27 240 BX40

## (undated) DEVICE — FRCP BX RADJAW4 NDL 2.8 240CM LG OG BX40

## (undated) DEVICE — CANN O2 ETCO2 FITS ALL CONN CO2 SMPL A/ 7IN DISP LF

## (undated) DEVICE — SINGLE-USE BIOPSY FORCEPS: Brand: RADIAL JAW 4

## (undated) DEVICE — BITEBLOCK OMNI BLOC

## (undated) DEVICE — SNAR POLYP CAPTIVATOR RND STFF 2.4 240CM 10MM 1P/U